# Patient Record
Sex: FEMALE | Race: WHITE | NOT HISPANIC OR LATINO | Employment: OTHER | URBAN - NONMETROPOLITAN AREA
[De-identification: names, ages, dates, MRNs, and addresses within clinical notes are randomized per-mention and may not be internally consistent; named-entity substitution may affect disease eponyms.]

---

## 2017-09-28 ENCOUNTER — TELEPHONE (OUTPATIENT)
Dept: FAMILY MEDICINE | Facility: CLINIC | Age: 67
End: 2017-09-28

## 2017-09-28 ENCOUNTER — OFFICE VISIT (OUTPATIENT)
Dept: FAMILY MEDICINE | Facility: CLINIC | Age: 67
End: 2017-09-28
Payer: MEDICARE

## 2017-09-28 VITALS
DIASTOLIC BLOOD PRESSURE: 54 MMHG | HEART RATE: 68 BPM | RESPIRATION RATE: 18 BRPM | WEIGHT: 191.6 LBS | TEMPERATURE: 98.2 F | SYSTOLIC BLOOD PRESSURE: 110 MMHG | BODY MASS INDEX: 32.71 KG/M2 | HEIGHT: 64 IN

## 2017-09-28 DIAGNOSIS — T63.441A LOCAL REACTION TO BEE STING, ACCIDENTAL OR UNINTENTIONAL, INITIAL ENCOUNTER: Primary | ICD-10-CM

## 2017-09-28 PROCEDURE — 99213 OFFICE O/P EST LOW 20 MIN: CPT | Performed by: NURSE PRACTITIONER

## 2017-09-28 RX ORDER — CEPHALEXIN 500 MG/1
500 CAPSULE ORAL 3 TIMES DAILY
Qty: 21 CAPSULE | Refills: 0 | Status: SHIPPED | OUTPATIENT
Start: 2017-09-28 | End: 2017-10-05

## 2017-09-28 NOTE — TELEPHONE ENCOUNTER
Pt called states yesterday afternoon she was stung by a bee. States over the night pt's left arm swelling. Redness and warm to touch. Appointment made for today. Eva Brady RN

## 2017-09-28 NOTE — NURSING NOTE
"Chief Complaint   Patient presents with     Insect Bites     /54 (BP Location: Right arm, Patient Position: Chair, Cuff Size: Adult Large)  Pulse 68  Temp 98.2  F (36.8  C)  Resp 18  Ht 5' 4\" (1.626 m)  Wt 191 lb 9.6 oz (86.9 kg)  BMI 32.89 kg/m2 Estimated body mass index is 32.89 kg/(m^2) as calculated from the following:    Height as of this encounter: 5' 4\" (1.626 m).    Weight as of this encounter: 191 lb 9.6 oz (86.9 kg).  bp completed using cuff size: large      Health Maintenance that is potentially due pending provider review:  NONE    Pt declines to have  Advanced directive, Mammogram and colonoscopy information    RUTH WinA  "

## 2017-09-28 NOTE — PROGRESS NOTES
SUBJECTIVE:   Ijeoma Shah is a 66 year old female who presents to clinic today for the following health issues:        Bee sting      Duration: Wednesday 9/27/ 3:30 pm    Description (location/character/radiation): left forearm, red, swollen, hot to the touch, itching    Intensity:  moderate    Accompanying signs and symptoms:  red, swollen,hot to the touch, itching    History (similar episodes/previous evaluation): has has a bee sting 6 years     Precipitating or alleviating factors: None    Therapies tried and outcome: benadryl , ibuprofen       Problem list and histories reviewed & adjusted, as indicated.  Additional history: as documented    Patient Active Problem List   Diagnosis     Dermatophytosis of foot     Viral warts     HYPERLIPIDEMIA LDL GOAL <160     Advanced directives, counseling/discussion     Bee sting reaction     Past Surgical History:   Procedure Laterality Date     COLONOSCOPY  6/29/04    colonoscopy to the cecum     SURGICAL HISTORY OF -       nose surgery     TUBAL LIGATION      bilateral tubal ligation.  BTL       Social History   Substance Use Topics     Smoking status: Former Smoker     Packs/day: 0.50     Years: 33.00     Types: Cigarettes     Quit date: 12/13/2014     Smokeless tobacco: Never Used      Comment: working on quitting     Alcohol use 1.0 - 1.5 oz/week     2 - 3 Standard drinks or equivalent per week      Comment: occ,social     Family History   Problem Relation Age of Onset     Eye Disorder Mother      cataracts     CANCER Father      CANCER Sister      bile duct     CANCER Sister      lung         Current Outpatient Prescriptions   Medication Sig Dispense Refill     ketoconazole (NIZORAL) 2 % cream Apply topically daily 60 g 1     clotrimazole (LOTRIMIN) 1 % cream Apply topically 2 times daily 45 g 1     EPINEPHrine (EPIPEN) 0.3 MG/0.3ML injection Inject 0.3 mLs into the muscle once as needed for anaphylaxis for 1 dose. 2 each 3     levofloxacin (LEVAQUIN) 500  "MG tablet Take 1 tablet (500 mg) by mouth daily (Patient not taking: Reported on 9/28/2017) 7 tablet 0     triamcinolone (KENALOG) 0.1 % ointment Apply  topically 2 times daily or as needed. (Patient not taking: Reported on 9/28/2017) 45 g 1     Allergies   Allergen Reactions     Bee Venom Hives     Hot and sweating      Labs reviewed in EPIC      Reviewed and updated as needed this visit by clinical staff     Reviewed and updated as needed this visit by Provider       ROS:  Constitutional, HEENT, cardiovascular, pulmonary, gi and gu systems are negative, except as otherwise noted.      OBJECTIVE:   /54 (BP Location: Right arm, Patient Position: Chair, Cuff Size: Adult Large)  Pulse 68  Temp 98.2  F (36.8  C)  Resp 18  Ht 5' 4\" (1.626 m)  Wt 191 lb 9.6 oz (86.9 kg)  BMI 32.89 kg/m2  Body mass index is 32.89 kg/(m^2).  GENERAL: healthy, alert and no distress  RESP: lungs clear to auscultation - no rales, rhonchi or wheezes  CV: regular rate and rhythm, normal S1 S2, no S3 or S4, no murmur, click or rub, no peripheral edema and peripheral pulses strong  SKIN: left arm, wrist and hand with significant swelling, erythema and warmth present, left anterior arm with central punctuate present  PSYCH: mentation appears normal, affect normal/bright    Diagnostic Test Results:  none     ASSESSMENT/PLAN:     1. Local reaction to bee sting, accidental or unintentional, initial encounter  Keflex sent to the pharmacy for symptoms.  Symptomatic care and follow up discussed.  - cephALEXin (KEFLEX) 500 MG capsule; Take 1 capsule (500 mg) by mouth 3 times daily for 7 days  Dispense: 21 capsule; Refill: 0    Home care instructions were reviewed with the patient. The risks, benefits and treatment options of prescribed medications or other treatments have been discussed with the patient. The patient verbalized their understanding and should call or follow up if no improvement or if they develop further problems.    Ijeoma" reminded-You are due for a physical, please schedule at your earliest convenience.      Patient Instructions     Keflex sent to the pharmacy  Follow up if symptoms do not improve or worsen.    Insect Sting: Local Reaction   You have been stung or bitten by an insect. The insect s venom or body fluid is causing your skin to react in the area where you were stung or bitten. This often causes redness, itching and swelling. This reaction will fade over a few hours, but it can last a few days. An insect bite or sting can become infected 1 to 3 days later, so watch for the signs below. Sometimes it is hard to tell the difference between a local reaction to the insect bite or sting and an early infection, so you may be given antibiotics.  Common insect stings causing problems are from wasps, bees, yellow jackets, and hornets. Common bites are from spiders, mosquitoes, fleas, or ticks. Other types of insects may be more common in different parts of the country or world.  Most people think of allergic reactions when someone has a rash or itchy skin. Symptoms can include:    Rash, hives, redness, welts, or blisters    Itching, burning, stinging, or pain    Swelling around the sting area.  Sometimes swelling spreads to other areas.  Home care  Medicines  The healthcare provider may prescribe medicines to relieve swelling, itching, and pain. Follow the provider s instructions when taking these medicines.    If you had a severe reaction, the provider may prescribe an epinephrine kit. Epinephrine will stop an allergic reaction from getting worse. Before you leave the hospital, be sure that you understand when and how to use this medicine.    Diphenhydramine is an oral antihistamine available at drugstores and groceries. Unless a prescription antihistamine was given, you can use this medicine to reduce itching if large areas of the skin are involved. The medicine may make you sleepy, so be careful using it in the daytime or when  going to school, working, or driving. Don t use diphenhydramine if you have glaucoma or if you are a man with trouble urinating because of an enlarged prostate. Other antihistamines cause less drowsiness and are good choices for daytime use. Ask your pharmacist for suggestions.    Don t use diphenhydramine cream on your skin. In some people it can cause additional reaction and make you allergic to this medicine.    Calamine lotion or oatmeal baths sometimes help with itching.    You may use acetaminophen or ibuprofen to control pain, unless another pain medicine was prescribed. Talk with your healthcare provider before using these medicines if you have chronic liver or kidney disease. Also talk with your provider if you ve had a stomach ulcer or GI bleeding.  General care      If itching is a problem, don t take hot showers or baths. Stay out of direct sunlight. These heat up your skin and will make the itching worse.    Use an ice pack to reduce local areas of redness and itching. You can make your own ice pack by putting ice cubes in a bag that seals and wrapping it in a thin towel. Don t put the ice directly on your skin, because it can damage the skin.    Try not to scratch any affected areas and damage the skin. This will help prevent an infection.    If oral antibiotics were prescribed, be sure to take them until finished.  Preventing future reactions    Future reactions could be worse than this one, so try to stay away from places where you might be stung again.    Be aware that honeybees nest in trees. Wasps and yellow jackets nest in the ground, trees, or roof eaves.    If you are stung by a honeybee, a stinger will remain in your skin. Wasps, yellow jackets, and hornets don t leave a stinger behind. Move away from the nest area right away. The stinger of a honeybee releases a substance that will attract other bees to you. Once you are away from the nest, then remove the stinger as quickly as  possible.    After any sting, you may apply ice and take diphenhydramine or another antihistamine. If you develop any of the warning signs below, seek help right away.    If you are at high risk for another sting, or if your reaction included dizziness, fainting, or trouble breathing or swallowing, ask your doctor for an insect allergy kit.    Remove any ticks on the skin with a set of fine tweezers.  the tick as close to the skin as possible. Pull back gently but firmly. Use an even, steady pressure. Don t jerk or twist. Don t squeeze, crush, or puncture the body of the tick. The bodily fluids may contain infection-causing germs. Don t use a smoldering match or cigarette, nail polish, petroleum jelly, liquid soap, or kerosene. These may irritate the tick. If any mouthparts of the tick remain in the skin, these should be left alone. They will fall off on their own. Trying to remove these parts may damage the skin unless they can be removed very easily. After the tick is removed, wash the bite area with rubbing alcohol, iodine, or soap and water.  Follow-up care  Follow up with your doctor in 2 days, or as advised, if your symptoms don t start to get better.  Call 911  Call 911 if any of these occur:    Trouble breathing or swallowing, or wheezing    New or worsening swelling in the mouth, throat, or tongue    Hoarse voice or trouble speaking    Confused    Very drowsy or trouble awakening    Fainting or loss of consciousness    Rapid heart rate    Low blood pressure    Feeling of doom    Nausea, vomiting, abdominal pain, or diarrhea    Vomiting blood, or large amounts of blood in stool    Seizure  When to seek medical advice  Call your healthcare provider right away if any of these occur:    Spreading areas of itching, redness or swelling    New or worse swelling in the face, eyelids, or  lips    Dizziness or weakness  Also call your provider right away if you have signs of infection:    Spreading  redness    Increased pain or swelling    Fever of 100.4 F (38 C) or higher, or as directed by your healthcare provider    Colored fluid draining from the sting area   Date Last Reviewed: 10/1/2016    9892-5910 [employer] [facility]. [address] [place] [address], [place] [address]. All rights reserved. This information is not intended as a substitute for professional medical care. Always follow your healthcare professional's instructions.            RUDY Cortes [facility]

## 2017-09-28 NOTE — TELEPHONE ENCOUNTER
Reason for call:  Patient reporting a symptom    Symptom or request: Pt was stung by a bee 9/27/17. During the night Left arm swelled. Pt is allergic to Bee's and took Benadryl a couple of times.    Have you been treated for this before? Yes    Phone Number patient can be reached at:  Home number on file [phone] (home)    Best Time:  Any Time      Can we leave a detailed message on this number:  YES    Call taken on 9/28/2017 at 8:31 AM by Svetlana Chen

## 2017-09-28 NOTE — MR AVS SNAPSHOT
After Visit Summary   9/28/2017    Ijeoma Shah    MRN: [id number]           Patient Information     Date Of Birth          1950        Visit Information        Provider Department      9/28/2017 11:00 AM Maria Dolores Godwin APRN [facility]        Today's Diagnoses     Local reaction to bee sting, accidental or unintentional, initial encounter    -  1      Care Instructions      Keflex sent to the pharmacy  Follow up if symptoms do not improve or worsen.    Insect Sting: Local Reaction   You have been stung or bitten by an insect. The insect s venom or body fluid is causing your skin to react in the area where you were stung or bitten. This often causes redness, itching and swelling. This reaction will fade over a few hours, but it can last a few days. An insect bite or sting can become infected 1 to 3 days later, so watch for the signs below. Sometimes it is hard to tell the difference between a local reaction to the insect bite or sting and an early infection, so you may be given antibiotics.  Common insect stings causing problems are from wasps, bees, yellow jackets, and hornets. Common bites are from spiders, mosquitoes, fleas, or ticks. Other types of insects may be more common in different parts of the country or world.  Most people think of allergic reactions when someone has a rash or itchy skin. Symptoms can include:    Rash, hives, redness, welts, or blisters    Itching, burning, stinging, or pain    Swelling around the sting area.  Sometimes swelling spreads to other areas.  Home care  Medicines  The healthcare provider may prescribe medicines to relieve swelling, itching, and pain. Follow the provider s instructions when taking these medicines.    If you had a severe reaction, the provider may prescribe an epinephrine kit. Epinephrine will stop an allergic reaction from getting worse. Before you leave the hospital, be sure that you understand when  and how to use this medicine.    Diphenhydramine is an oral antihistamine available at drugstores and groceries. Unless a prescription antihistamine was given, you can use this medicine to reduce itching if large areas of the skin are involved. The medicine may make you sleepy, so be careful using it in the daytime or when going to school, working, or driving. Don t use diphenhydramine if you have glaucoma or if you are a man with trouble urinating because of an enlarged prostate. Other antihistamines cause less drowsiness and are good choices for daytime use. Ask your pharmacist for suggestions.    Don t use diphenhydramine cream on your skin. In some people it can cause additional reaction and make you allergic to this medicine.    Calamine lotion or oatmeal baths sometimes help with itching.    You may use acetaminophen or ibuprofen to control pain, unless another pain medicine was prescribed. Talk with your healthcare provider before using these medicines if you have chronic liver or kidney disease. Also talk with your provider if you ve had a stomach ulcer or GI bleeding.  General care      If itching is a problem, don t take hot showers or baths. Stay out of direct sunlight. These heat up your skin and will make the itching worse.    Use an ice pack to reduce local areas of redness and itching. You can make your own ice pack by putting ice cubes in a bag that seals and wrapping it in a thin towel. Don t put the ice directly on your skin, because it can damage the skin.    Try not to scratch any affected areas and damage the skin. This will help prevent an infection.    If oral antibiotics were prescribed, be sure to take them until finished.  Preventing future reactions    Future reactions could be worse than this one, so try to stay away from places where you might be stung again.    Be aware that honeybees nest in trees. Wasps and yellow jackets nest in the ground, trees, or roof eaves.    If you are stung  by a honeybee, a stinger will remain in your skin. Wasps, yellow jackets, and hornets don t leave a stinger behind. Move away from the nest area right away. The stinger of a honeybee releases a substance that will attract other bees to you. Once you are away from the nest, then remove the stinger as quickly as possible.    After any sting, you may apply ice and take diphenhydramine or another antihistamine. If you develop any of the warning signs below, seek help right away.    If you are at high risk for another sting, or if your reaction included dizziness, fainting, or trouble breathing or swallowing, ask your doctor for an insect allergy kit.    Remove any ticks on the skin with a set of fine tweezers.  the tick as close to the skin as possible. Pull back gently but firmly. Use an even, steady pressure. Don t jerk or twist. Don t squeeze, crush, or puncture the body of the tick. The bodily fluids may contain infection-causing germs. Don t use a smoldering match or cigarette, nail polish, petroleum jelly, liquid soap, or kerosene. These may irritate the tick. If any mouthparts of the tick remain in the skin, these should be left alone. They will fall off on their own. Trying to remove these parts may damage the skin unless they can be removed very easily. After the tick is removed, wash the bite area with rubbing alcohol, iodine, or soap and water.  Follow-up care  Follow up with your doctor in 2 days, or as advised, if your symptoms don t start to get better.  Call 911  Call 911 if any of these occur:    Trouble breathing or swallowing, or wheezing    New or worsening swelling in the mouth, throat, or tongue    Hoarse voice or trouble speaking    Confused    Very drowsy or trouble awakening    Fainting or loss of consciousness    Rapid heart rate    Low blood pressure    Feeling of doom    Nausea, vomiting, abdominal pain, or diarrhea    Vomiting blood, or large amounts of blood in stool    Seizure  When to  seek medical advice  Call your healthcare provider right away if any of these occur:    Spreading areas of itching, redness or swelling    New or worse swelling in the face, eyelids, or  lips    Dizziness or weakness  Also call your provider right away if you have signs of infection:    Spreading redness    Increased pain or swelling    Fever of 100.4 F (38 C) or higher, or as directed by your healthcare provider    Colored fluid draining from the sting area   Date Last Reviewed: 10/1/2016    6778-9315 [employer]. [address] [place] [address]. All rights reserved. This information is not intended as a substitute for professional medical care. Always follow your healthcare professional's instructions.                Follow-ups after your visit        Who to contact     If you have questions or need follow up information about today's clinic visit or your schedule please contact [facility] directly at [phone].  Normal or non-critical lab and imaging results will be communicated to you by MyChart, letter or phone within 4 business days after the clinic has received the results. If you do not hear from us within 7 days, please contact the clinic through [email or web address] or phone. If you have a critical or abnormal lab result, we will notify you by phone as soon as possible.  Submit refill requests through [email or web address] or call your pharmacy and they will forward the refill request to us. Please allow 3 business days for your refill to be completed.          Additional Information About Your Visit        MyChart Information     [email or web address] gives you secure access to your electronic health record. If you see a primary care provider, you can also send messages to your care team and make appointments. If you have questions, please call your primary care clinic.  If you do not have a primary care provider, please call [phone] and they will assist you.        Care EveryWhere ID     This  "is your Care EveryWhere ID. This could be used by other organizations to access your [facility] medical records  [id number]        Your Vitals Were     Pulse Temperature Respirations Height BMI (Body Mass Index)       68 98.2  F (36.8  C) 18 5' 4\" (1.626 m) 32.89 kg/m2        Blood Pressure from Last 3 Encounters:   09/28/17 110/54   07/13/15 122/80   12/02/13 161/80    Weight from Last 3 Encounters:   09/28/17 191 lb 9.6 oz (86.9 kg)   07/13/15 189 lb (85.7 kg)   12/02/13 200 lb (90.7 kg)              Today, you had the following     No orders found for display         Today's Medication Changes          These changes are accurate as of: 9/28/17 11:21 AM.  If you have any questions, ask your nurse or doctor.               Start taking these medicines.        Dose/Directions    cephALEXin 500 MG capsule   Commonly known as:  KEFLEX   Used for:  Local reaction to bee sting, accidental or unintentional, initial encounter   Started by:  Maria Dolores Godwin APRN CNP        Dose:  500 mg   Take 1 capsule (500 mg) by mouth 3 times daily for 7 days   Quantity:  21 capsule   Refills:  0            Where to get your medicines      These medications were sent to [id number] [address] [address]    Hours:  Closed 10-16-08 business to [facility] Phone:  [phone]     cephALEXin 500 MG capsule                Primary Care Provider Office Phone # Fax #    Jyoti Griffiths [phone]       [address] [place] [address]        Equal Access to Services     BILL CUNNINGHAM AH: Hadtonya Fajardo, laureen snyder, qaybakhil lew. So [facility] [phone].    ATENCIÓN: Si habla español, tiene a soto disposición servicios gratuitos de asistencia lingüística. Llame al [phone].    We comply with applicable federal civil rights laws and [place] laws. We do not discriminate on the " basis of race, color, national origin, age, disability sex, sexual orientation or gender identity.            Thank you!     Thank you for choosing [facility]  for your care. Our goal is always to provide you with excellent care. Hearing back from our patients is one way we can continue to improve our services. Please take a few minutes to complete the written survey that you may receive in the mail after your visit with us. Thank you!             Your Updated Medication List - Protect others around you: Learn how to safely use, store and throw away your medicines at www.disposemymeds.org.          This list is accurate as of: 9/28/17 11:21 AM.  Always use your most recent med list.                   Brand Name Dispense Instructions for use Diagnosis    cephALEXin 500 MG capsule    KEFLEX    21 capsule    Take 1 capsule (500 mg) by mouth 3 times daily for 7 days    Local reaction to bee sting, accidental or unintentional, initial encounter       clotrimazole 1 % cream    LOTRIMIN    45 g    Apply topically 2 times daily    Tinea pedis of both feet, Tinea cruris       EPINEPHrine 0.3 MG/0.3ML injection    EPIPEN    2 each    Inject 0.3 mLs into the muscle once as needed for anaphylaxis for 1 dose.    Bee sting reaction       ketoconazole 2 % cream    NIZORAL    60 g    Apply topically daily    Tinea pedis of both feet       levofloxacin 500 MG tablet    LEVAQUIN    7 tablet    Take 1 tablet (500 mg) by mouth daily    URI (upper respiratory infection)       triamcinolone 0.1 % ointment    KENALOG    45 g    Apply  topically 2 times daily or as needed.    Dermatophytosis of foot

## 2017-09-28 NOTE — PATIENT INSTRUCTIONS
Keflex sent to the pharmacy  Follow up if symptoms do not improve or worsen.    Insect Sting: Local Reaction   You have been stung or bitten by an insect. The insect s venom or body fluid is causing your skin to react in the area where you were stung or bitten. This often causes redness, itching and swelling. This reaction will fade over a few hours, but it can last a few days. An insect bite or sting can become infected 1 to 3 days later, so watch for the signs below. Sometimes it is hard to tell the difference between a local reaction to the insect bite or sting and an early infection, so you may be given antibiotics.  Common insect stings causing problems are from wasps, bees, yellow jackets, and hornets. Common bites are from spiders, mosquitoes, fleas, or ticks. Other types of insects may be more common in different parts of the country or world.  Most people think of allergic reactions when someone has a rash or itchy skin. Symptoms can include:    Rash, hives, redness, welts, or blisters    Itching, burning, stinging, or pain    Swelling around the sting area.  Sometimes swelling spreads to other areas.  Home care  Medicines  The healthcare provider may prescribe medicines to relieve swelling, itching, and pain. Follow the provider s instructions when taking these medicines.    If you had a severe reaction, the provider may prescribe an epinephrine kit. Epinephrine will stop an allergic reaction from getting worse. Before you leave the hospital, be sure that you understand when and how to use this medicine.    Diphenhydramine is an oral antihistamine available at drugstores and groceries. Unless a prescription antihistamine was given, you can use this medicine to reduce itching if large areas of the skin are involved. The medicine may make you sleepy, so be careful using it in the daytime or when going to school, working, or driving. Don t use diphenhydramine if you have glaucoma or if you are a man with  trouble urinating because of an enlarged prostate. Other antihistamines cause less drowsiness and are good choices for daytime use. Ask your pharmacist for suggestions.    Don t use diphenhydramine cream on your skin. In some people it can cause additional reaction and make you allergic to this medicine.    Calamine lotion or oatmeal baths sometimes help with itching.    You may use acetaminophen or ibuprofen to control pain, unless another pain medicine was prescribed. Talk with your healthcare provider before using these medicines if you have chronic liver or kidney disease. Also talk with your provider if you ve had a stomach ulcer or GI bleeding.  General care      If itching is a problem, don t take hot showers or baths. Stay out of direct sunlight. These heat up your skin and will make the itching worse.    Use an ice pack to reduce local areas of redness and itching. You can make your own ice pack by putting ice cubes in a bag that seals and wrapping it in a thin towel. Don t put the ice directly on your skin, because it can damage the skin.    Try not to scratch any affected areas and damage the skin. This will help prevent an infection.    If oral antibiotics were prescribed, be sure to take them until finished.  Preventing future reactions    Future reactions could be worse than this one, so try to stay away from places where you might be stung again.    Be aware that honeybees nest in trees. Wasps and yellow jackets nest in the ground, trees, or roof eaves.    If you are stung by a honeybee, a stinger will remain in your skin. Wasps, yellow jackets, and hornets don t leave a stinger behind. Move away from the nest area right away. The stinger of a honeybee releases a substance that will attract other bees to you. Once you are away from the nest, then remove the stinger as quickly as possible.    After any sting, you may apply ice and take diphenhydramine or another antihistamine. If you develop any of the  warning signs below, seek help right away.    If you are at high risk for another sting, or if your reaction included dizziness, fainting, or trouble breathing or swallowing, ask your doctor for an insect allergy kit.    Remove any ticks on the skin with a set of fine tweezers.  the tick as close to the skin as possible. Pull back gently but firmly. Use an even, steady pressure. Don t jerk or twist. Don t squeeze, crush, or puncture the body of the tick. The bodily fluids may contain infection-causing germs. Don t use a smoldering match or cigarette, nail polish, petroleum jelly, liquid soap, or kerosene. These may irritate the tick. If any mouthparts of the tick remain in the skin, these should be left alone. They will fall off on their own. Trying to remove these parts may damage the skin unless they can be removed very easily. After the tick is removed, wash the bite area with rubbing alcohol, iodine, or soap and water.  Follow-up care  Follow up with your doctor in 2 days, or as advised, if your symptoms don t start to get better.  Call 911  Call 911 if any of these occur:    Trouble breathing or swallowing, or wheezing    New or worsening swelling in the mouth, throat, or tongue    Hoarse voice or trouble speaking    Confused    Very drowsy or trouble awakening    Fainting or loss of consciousness    Rapid heart rate    Low blood pressure    Feeling of doom    Nausea, vomiting, abdominal pain, or diarrhea    Vomiting blood, or large amounts of blood in stool    Seizure  When to seek medical advice  Call your healthcare provider right away if any of these occur:    Spreading areas of itching, redness or swelling    New or worse swelling in the face, eyelids, or  lips    Dizziness or weakness  Also call your provider right away if you have signs of infection:    Spreading redness    Increased pain or swelling    Fever of 100.4 F (38 C) or higher, or as directed by your healthcare provider    Colored fluid  draining from the sting area   Date Last Reviewed: 10/1/2016    6496-1769 [employer]. [address] [place] [address], [place] [address]. All rights reserved. This information is not intended as a substitute for professional medical care. Always follow your healthcare professional's instructions.

## 2018-02-16 ENCOUNTER — OFFICE VISIT (OUTPATIENT)
Dept: FAMILY MEDICINE | Facility: CLINIC | Age: 68
End: 2018-02-16
Payer: COMMERCIAL

## 2018-02-16 VITALS
BODY MASS INDEX: 32.44 KG/M2 | TEMPERATURE: 98.7 F | DIASTOLIC BLOOD PRESSURE: 72 MMHG | OXYGEN SATURATION: 98 % | HEIGHT: 64 IN | WEIGHT: 190 LBS | HEART RATE: 91 BPM | SYSTOLIC BLOOD PRESSURE: 110 MMHG | RESPIRATION RATE: 16 BRPM

## 2018-02-16 DIAGNOSIS — Z23 NEED FOR PROPHYLACTIC VACCINATION AND INOCULATION AGAINST INFLUENZA: ICD-10-CM

## 2018-02-16 DIAGNOSIS — B35.3 TINEA PEDIS OF BOTH FEET: ICD-10-CM

## 2018-02-16 DIAGNOSIS — N81.4 UTERINE PROLAPSE: ICD-10-CM

## 2018-02-16 DIAGNOSIS — E78.5 HYPERLIPIDEMIA LDL GOAL <160: ICD-10-CM

## 2018-02-16 DIAGNOSIS — Z00.00 MEDICARE ANNUAL WELLNESS VISIT, SUBSEQUENT: Primary | ICD-10-CM

## 2018-02-16 DIAGNOSIS — T63.441A BEE STING REACTION, ACCIDENTAL OR UNINTENTIONAL, INITIAL ENCOUNTER: ICD-10-CM

## 2018-02-16 DIAGNOSIS — L98.9 SKIN LESION: ICD-10-CM

## 2018-02-16 DIAGNOSIS — Z12.11 SPECIAL SCREENING FOR MALIGNANT NEOPLASMS, COLON: ICD-10-CM

## 2018-02-16 DIAGNOSIS — N95.0 POST-MENOPAUSAL BLEEDING: ICD-10-CM

## 2018-02-16 PROCEDURE — G0009 ADMIN PNEUMOCOCCAL VACCINE: HCPCS | Performed by: FAMILY MEDICINE

## 2018-02-16 PROCEDURE — G0438 PPPS, INITIAL VISIT: HCPCS | Performed by: FAMILY MEDICINE

## 2018-02-16 PROCEDURE — G0008 ADMIN INFLUENZA VIRUS VAC: HCPCS | Performed by: FAMILY MEDICINE

## 2018-02-16 PROCEDURE — 90662 IIV NO PRSV INCREASED AG IM: CPT | Performed by: FAMILY MEDICINE

## 2018-02-16 PROCEDURE — 99213 OFFICE O/P EST LOW 20 MIN: CPT | Mod: 25 | Performed by: FAMILY MEDICINE

## 2018-02-16 PROCEDURE — 90732 PPSV23 VACC 2 YRS+ SUBQ/IM: CPT | Performed by: FAMILY MEDICINE

## 2018-02-16 RX ORDER — EPINEPHRINE 0.3 MG/.3ML
INJECTION SUBCUTANEOUS
Qty: 0.6 ML | Status: CANCELLED | OUTPATIENT
Start: 2018-02-16

## 2018-02-16 RX ORDER — KETOCONAZOLE 20 MG/G
CREAM TOPICAL DAILY
Qty: 60 G | Refills: 1 | Status: SHIPPED | OUTPATIENT
Start: 2018-02-16 | End: 2020-08-10

## 2018-02-16 RX ORDER — EPINEPHRINE 0.3 MG/.3ML
0.3 INJECTION SUBCUTANEOUS PRN
Qty: 0.6 ML | Refills: 1 | Status: SHIPPED | OUTPATIENT
Start: 2018-02-16 | End: 2020-11-23

## 2018-02-16 NOTE — NURSING NOTE
"Chief Complaint   Patient presents with     Physical     yearly     Flu Shot       Initial /72 (BP Location: Right arm)  Pulse 91  Temp 98.7  F (37.1  C) (Tympanic)  Resp 16  Ht 5' 3.5\" (1.613 m)  Wt 190 lb (86.2 kg)  SpO2 98%  BMI 33.13 kg/m2 Estimated body mass index is 33.13 kg/(m^2) as calculated from the following:    Height as of this encounter: 5' 3.5\" (1.613 m).    Weight as of this encounter: 190 lb (86.2 kg).      Health Maintenance that is potentially due pending provider review:  Mammogram and Colonoscopy/FIT    Gave pt phone number/pended order to schedule mammo and/or colonoscopy(or FIT)    Is there anyone who you would like to be able to receive your results? No  If yes have patient fill out HARPAL    "

## 2018-02-16 NOTE — PATIENT INSTRUCTIONS
Get the pelvic ultrasound done    Switch the detergent.  Take benadryl as you have been doing.   Hives persist, let me know.       Preventive Health Recommendations  Female Ages 65 +    Yearly exam:     See your health care provider every year in order to  o Review health changes.   o Discuss preventive care.    o Review your medicines if your doctor has prescribed any.      You no longer need a yearly Pap test unless you've had an abnormal Pap test in the past 10 years. If you have vaginal symptoms, such as bleeding or discharge, be sure to talk with your provider about a Pap test.      Every 1 to 2 years, have a mammogram.  If you are over 69, talk with your health care provider about whether or not you want to continue having screening mammograms.      Every 10 years, have a colonoscopy. Or, have a yearly FIT test (stool test). These exams will check for colon cancer.       Have a cholesterol test every 5 years, or more often if your doctor advises it.       Have a diabetes test (fasting glucose) every three years. If you are at risk for diabetes, you should have this test more often.       At age 65, have a bone density scan (DEXA) to check for osteoporosis (brittle bone disease).    Shots:    Get a flu shot each year.    Get a tetanus shot every 10 years.    Talk to your doctor about your pneumonia vaccines. There are now two you should receive - Pneumovax (PPSV 23) and Prevnar (PCV 13).    Talk to your doctor about the shingles vaccine.    Talk to your doctor about the hepatitis B vaccine.    Nutrition:     Eat at least 5 servings of fruits and vegetables each day.      Eat whole-grain bread, whole-wheat pasta and brown rice instead of white grains and rice.      Talk to your provider about Calcium and Vitamin D.     Lifestyle    Exercise at least 150 minutes a week (30 minutes a day, 5 days a week). This will help you control your weight and prevent disease.      Limit alcohol to one drink per day.      No  smoking.       Wear sunscreen to prevent skin cancer.       See your dentist twice a year for an exam and cleaning.      See your eye doctor every 1 to 2 years to screen for conditions such as glaucoma, macular degeneration, cataracts, etc

## 2018-02-16 NOTE — LETTER
[facility]  [address] [facility] [address]  [phone]        February 21, 2019    Ijeoma Shah  [address] [facility] [address]              Dear Ijeoma Shah    This is to remind you that your provider wanted you to return to the clinic for fasting lab test(s),    please fast for 10-12 hours. Morning medications can be taken with water.    You may call our office at [facility] at [phone] to schedule an appointment.    Please disregard this notice if you have already had your labs drawn or made an appointment.          Sincerely,        Jyoti Griffiths MD/ keren

## 2018-02-16 NOTE — PROGRESS NOTES

## 2018-02-16 NOTE — PROGRESS NOTES
SUBJECTIVE:   Ijeoma Shah is a 67 year old female who presents for Preventive Visit.      Are you in the first 12 months of your Medicare Part B coverage?  No    Healthy Habits:    Do you get at least three servings of calcium containing foods daily (dairy, green leafy vegetables, etc.)? yes    Amount of exercise or daily activities, outside of work: 2 day(s) per week    Problems taking medications regularly No    Medication side effects: No    Have you had an eye exam in the past two years? no    Do you see a dentist twice per year? no    Do you have sleep apnea, excessive snoring or daytime drowsiness?no      Ability to successfully perform activities of daily living: Yes, no assistance needed    Home safety:  none identified     Hearing impairment: No    Fall risk:  Fallen 2 or more times in the past year?: No  Any fall with injury in the past year?: No        COGNITIVE SCREEN  1) Repeat 3 items (Banana, Sunrise, Chair)    2) Clock draw: NORMAL  3) 3 item recall: Recalls 2 objects   Results: NORMAL clock, 1-2 items recalled: COGNITIVE IMPAIRMENT LESS LIKELY    Mini-CogTM Copyright S Nixon. Licensed by the author for use in [facility]; reprinted with permission (mayela@[facility]). All rights reserved.        Hives-has broken out in hives three times in the last 2 months.   Starts under her breasts. She isn't sure why. Can be when sitting on couch.   Did change laundry soaps recently.   Took 2 benadryl and that resolves it.     Vaginal bleeding-happens once every 3 months for the last 2 year, notices on towels. She does have uterine prolapse .    Reviewed and updated as needed this visit by clinical staff  Tobacco  Allergies  Meds  Problems  Med Hx  Surg Hx  Fam Hx  Soc Hx          Reviewed and updated as needed this visit by Provider  Allergies  Meds  Problems        Social History   Substance Use Topics     Smoking status: Former Smoker     Packs/day: 0.50     Years: 33.00      Types: Cigarettes     Quit date: 12/13/2014     Smokeless tobacco: Never Used      Comment: working on quitting     Alcohol use 1.0 - 1.5 oz/week     2 - 3 Standard drinks or equivalent per week      Comment: occ,social       If you drink alcohol do you typically have >3 drinks per day or >7 drinks per week? No                        Today's PHQ-2 Score:   PHQ-2 ( 1999 Pfizer) 2/16/2018 7/13/2015   Q1: Little interest or pleasure in doing things 0 0   Q2: Feeling down, depressed or hopeless 0 0   PHQ-2 Score 0 0     Recent Labs   Lab Test  07/13/15   1045  03/09/10   1220   CHOL  261*  286*   HDL  67  63   LDL  166*  192*   TRIG  141  156*   CHOLHDLRATIO  3.9  5.0        Do you feel safe in your environment - Yes    Do you have a Health Care Directive?: No: Advance care planning was reviewed with patient; patient declined at this time.    Current providers sharing in care for this patient include:   Patient Care Team:  Jyoti Griffiths MD as PCP - General    The following health maintenance items are reviewed in Epic and correct as of today:  Health Maintenance   Topic Date Due     HEPATITIS C SCREENING  10/22/1968     COLON CANCER SCREEN (SYSTEM ASSIGNED)  06/29/2014     ADVANCE DIRECTIVE PLANNING Q5 YRS  04/06/2016     DEXA SCAN SCREENING (SYSTEM ASSIGNED)  10/22/2016     MAMMO SCREEN Q2 YR (SYSTEM ASSIGNED)  07/13/2017     FALL RISK ASSESSMENT  02/16/2019     LIPID SCREEN Q5 YR FEMALE (SYSTEM ASSIGNED)  07/13/2020     TETANUS IMMUNIZATION (SYSTEM ASSIGNED)  08/28/2022     INFLUENZA VACCINE (SYSTEM ASSIGNED)  Completed     PNEUMOCOCCAL  Completed     BP Readings from Last 3 Encounters:   02/16/18 110/72   09/28/17 110/54   07/13/15 122/80    Wt Readings from Last 3 Encounters:   02/16/18 190 lb (86.2 kg)   09/28/17 191 lb 9.6 oz (86.9 kg)   07/13/15 189 lb (85.7 kg)                    Pneumonia Vaccine:Adults age 65+ who received Pneumovax (PPSV23) at 65 years or older: Should be given PCV13 > 1 year after  "their most recent PPSV23  Mammogram Screening: Patient over age 50, mutual decision to screen reflected in health maintenance.    ROS:  Constitutional, HEENT, cardiovascular, pulmonary, gi and gu systems are negative, except as otherwise noted.    OBJECTIVE:   /72 (BP Location: Right arm)  Pulse 91  Temp 98.7  F (37.1  C) (Tympanic)  Resp 16  Ht 5' 3.5\" (1.613 m)  Wt 190 lb (86.2 kg)  SpO2 98%  BMI 33.13 kg/m2 Estimated body mass index is 33.13 kg/(m^2) as calculated from the following:    Height as of this encounter: 5' 3.5\" (1.613 m).    Weight as of this encounter: 190 lb (86.2 kg).  EXAM:   GENERAL APPEARANCE: healthy, alert and no distress  EYES: Eyes grossly normal to inspection, PERRL and conjunctivae and sclerae normal  HENT: ear canals and TM's normal, nose and mouth without ulcers or lesions, oropharynx clear and oral mucous membranes moist  NECK: no adenopathy, no asymmetry, masses, or scars and thyroid normal to palpation  RESP: lungs clear to auscultation - no rales, rhonchi or wheezes  BREAST: normal without masses, tenderness or nipple discharge and no palpable axillary masses or adenopathy  CV: regular rate and rhythm, normal S1 S2, no S3 or S4, no murmur, click or rub, no peripheral edema and peripheral pulses strong  ABDOMEN: soft, nontender, no hepatosplenomegaly, no masses and bowel sounds normal  MS: no musculoskeletal defects are noted and gait is age appropriate without ataxia  SKIN: no suspicious lesions or rashes  NEURO: Normal strength and tone, sensory exam grossly normal, mentation intact and speech normal  PSYCH: mentation appears normal and affect normal/bright  : uterine prolapse noted, cervix is at introitus and protrudes with valsalva  Skin: 6 mm hyperpigmented inhomogeneous lesion on nose    ASSESSMENT / PLAN:   Ijeoma was seen today for physical and flu shot.    Diagnoses and all orders for this visit:    Medicare annual wellness visit, subsequent  -     " "Pneumococcal vaccine 23 valent PPSV23  (Pneumovax) [97889]  -     ADMIN MEDICARE: Pneumococcal Vaccine ()  -     Glucose; Future    Post-menopausal bleeding  -     US Pelvic Complete w Transvaginal; Future  -     OFFICE/OUTPT VISIT,EST,LEVL III    Hyperlipidemia LDL goal <160  -     Lipid panel reflex to direct LDL Fasting; Future  -     OFFICE/OUTPT VISIT,EST,LEVL III    Uterine prolapse  -     OFFICE/OUTPT VISIT,EST,LEVL III    Skin lesion  -     DERMATOLOGY REFERRAL    Bee sting reaction, accidental or unintentional, initial encounter  -     EPINEPHrine (EPIPEN/ADRENACLICK/OR ANY BX GENERIC EQUIV) 0.3 MG/0.3ML injection 2-pack; Inject 0.3 mLs (0.3 mg) into the muscle as needed  -     OFFICE/OUTPT VISIT,EST,LEVL III    Need for prophylactic vaccination and inoculation against influenza  -     FLU VACCINE, INCREASED ANTIGEN, PRESV FREE, AGE 65+ [78098]  -     ADMIN INFLUENZA (For MEDICARE Patients ONLY) []    Special screening for malignant neoplasms, colon  -     Fecal colorectal cancer screen (FIT); Future    Tinea pedis of both feet  -     ketoconazole (NIZORAL) 2 % cream; Apply topically daily    Other orders  -     Cancel: EPINEPHrine (EPIPEN/ADRENACLICK/OR ANY BX GENERIC EQUIV) 0.3 MG/0.3ML injection 2-pack;     will check a pelvic ultrasound for endometrial thickness  I had given her a referral to ob/gyn in past, would recommend she see them for pessary or surgical fix    End of Life Planning:  Patient currently has an advanced directive: Yes.  Practitioner is supportive of decision.    COUNSELING:  Reviewed preventive health counseling, as reflected in patient instructions       Regular exercise       Healthy diet/nutrition       Vision screening       Hearing screening       Dental care        Estimated body mass index is 33.13 kg/(m^2) as calculated from the following:    Height as of this encounter: 5' 3.5\" (1.613 m).    Weight as of this encounter: 190 lb (86.2 kg).  Weight management plan: " Discussed healthy diet and exercise guidelines and patient will follow up in 12 months in clinic to re-evaluate.     reports that she quit smoking about 3 years ago. Her smoking use included Cigarettes. She has a 16.50 pack-year smoking history. She has never used smokeless tobacco.      Appropriate preventive services were discussed with this patient, including applicable screening as appropriate for cardiovascular disease, diabetes, osteopenia/osteoporosis, and glaucoma.  As appropriate for age/gender, discussed screening for colorectal cancer, prostate cancer, breast cancer, and cervical cancer. Checklist reviewing preventive services available has been given to the patient.    Reviewed patients plan of care and provided an AVS. The Basic Care Plan (routine screening as documented in Health Maintenance) for Ijeoma meets the Care Plan requirement. This Care Plan has been established and reviewed with the Patient.    Counseling Resources:  ATP IV Guidelines  Pooled Cohorts Equation Calculator  Breast Cancer Risk Calculator  FRAX Risk Assessment  ICSI Preventive Guidelines  Dietary Guidelines for Americans, 2010  USDA's MyPlate  ASA Prophylaxis  Lung CA Screening    Jyoti Griffiths MD  [facility]

## 2018-02-16 NOTE — MR AVS SNAPSHOT
After Visit Summary   2/16/2018    Ijeoma Shah    MRN: [id number]           Patient Information     Date Of Birth          1950        Visit Information        Provider Department      2/16/2018 1:40 PM Jyoti Griffiths MD [facility]        Today's Diagnoses     Medicare annual wellness visit, subsequent    -  1    Tinea pedis of both feet        Post-menopausal bleeding        Skin lesion        Bee sting reaction, accidental or unintentional, initial encounter        Need for prophylactic vaccination and inoculation against influenza        Special screening for malignant neoplasms, colon        Hyperlipidemia LDL goal <160          Care Instructions    Get the pelvic ultrasound done    Switch the detergent.  Take benadryl as you have been doing.   Hives persist, let me know.       Preventive Health Recommendations  Female Ages 65 +    Yearly exam:     See your health care provider every year in order to  o Review health changes.   o Discuss preventive care.    o Review your medicines if your doctor has prescribed any.      You no longer need a yearly Pap test unless you've had an abnormal Pap test in the past 10 years. If you have vaginal symptoms, such as bleeding or discharge, be sure to talk with your provider about a Pap test.      Every 1 to 2 years, have a mammogram.  If you are over 69, talk with your health care provider about whether or not you want to continue having screening mammograms.      Every 10 years, have a colonoscopy. Or, have a yearly FIT test (stool test). These exams will check for colon cancer.       Have a cholesterol test every 5 years, or more often if your doctor advises it.       Have a diabetes test (fasting glucose) every three years. If you are at risk for diabetes, you should have this test more often.       At age 65, have a bone density scan (DEXA) to check for osteoporosis (brittle bone disease).    Shots:    Get a flu shot  each year.    Get a tetanus shot every 10 years.    Talk to your doctor about your pneumonia vaccines. There are now two you should receive - Pneumovax (PPSV 23) and Prevnar (PCV 13).    Talk to your doctor about the shingles vaccine.    Talk to your doctor about the hepatitis B vaccine.    Nutrition:     Eat at least 5 servings of fruits and vegetables each day.      Eat whole-grain bread, whole-wheat pasta and brown rice instead of white grains and rice.      Talk to your provider about Calcium and Vitamin D.     Lifestyle    Exercise at least 150 minutes a week (30 minutes a day, 5 days a week). This will help you control your weight and prevent disease.      Limit alcohol to one drink per day.      No smoking.       Wear sunscreen to prevent skin cancer.       See your dentist twice a year for an exam and cleaning.      See your eye doctor every 1 to 2 years to screen for conditions such as glaucoma, macular degeneration, cataracts, etc           Follow-ups after your visit        Additional Services     DERMATOLOGY REFERRAL       Your provider has referred you to: [facility]: [facility] [phone]   [email or web address]    Please be aware that coverage of these services is subject to the terms and limitations of your health insurance plan.  Call member services at your health plan with any benefit or coverage questions.      Please bring the following with you to your appointment:    (1) Any X-Rays, CTs or MRIs which have been performed.  Contact the facility where they were done to arrange for  prior to your scheduled appointment.    (2) List of current medications  (3) This referral request   (4) Any documents/labs given to you for this referral                  Future tests that were ordered for you today     Open Future Orders        Priority Expected Expires Ordered    Lipid panel reflex to direct LDL Fasting Routine  2/15/2019 2/16/2018    Glucose  "Routine  2/16/2019 2/16/2018    US Pelvic Complete w Transvaginal Routine  2/16/2019 2/16/2018    Fecal colorectal cancer screen (FIT) Routine 3/9/2018 5/11/2018 2/16/2018            Who to contact     If you have questions or need follow up information about today's clinic visit or your schedule please contact [facility] directly at [phone].  Normal or non-critical lab and imaging results will be communicated to you by [employer], letter or phone within 4 business days after the clinic has received the results. If you do not hear from us within 7 days, please contact the clinic through [employer] or phone. If you have a critical or abnormal lab result, we will notify you by phone as soon as possible.  Submit refill requests through [employer] or call your pharmacy and they will forward the refill request to us. Please allow 3 business days for your refill to be completed.          Additional Information About Your Visit        [employer] Information     [employer] gives you secure access to your electronic health record. If you see a primary care provider, you can also send messages to your care team and make appointments. If you have questions, please call your primary care clinic.  If you do not have a primary care provider, please call [phone] and they will assist you.        Care EveryWhere ID     This is your Care EveryWhere ID. This could be used by other organizations to access your [facility] medical records  [id number]        Your Vitals Were     Pulse Temperature Respirations Height Pulse Oximetry BMI (Body Mass Index)    91 98.7  F (37.1  C) (Tympanic) 16 5' 3.5\" (1.613 m) 98% 33.13 kg/m2       Blood Pressure from Last 3 Encounters:   02/16/18 110/72   09/28/17 110/54   07/13/15 122/80    Weight from Last 3 Encounters:   02/16/18 190 lb (86.2 kg)   09/28/17 191 lb 9.6 oz (86.9 kg)   07/13/15 189 lb (85.7 kg)              We Performed the Following     ADMIN INFLUENZA (For MEDICARE Patients " ONLY) []     ADMIN MEDICARE: Pneumococcal Vaccine ()     DERMATOLOGY REFERRAL     FLU VACCINE, INCREASED ANTIGEN, PRESV FREE, AGE 65+ [54708]     Pneumococcal vaccine 23 valent PPSV23  (Pneumovax) [07735]          Today's Medication Changes          These changes are accurate as of 2/16/18  2:35 PM.  If you have any questions, ask your nurse or doctor.               Start taking these medicines.        Dose/Directions    EPINEPHrine 0.3 MG/0.3ML injection 2-pack   Commonly known as:  EPIPEN/ADRENACLICK/or ANY BX GENERIC EQUIV   Used for:  Bee sting reaction, accidental or unintentional, initial encounter   Replaces:  EPINEPHrine 0.3 MG/0.3ML injection   Started by:  Jyoti Griffiths MD        Dose:  0.3 mg   Inject 0.3 mLs (0.3 mg) into the muscle as needed   Quantity:  0.6 mL   Refills:  1         Stop taking these medicines if you haven't already. Please contact your care team if you have questions.     EPINEPHrine 0.3 MG/0.3ML injection   Commonly known as:  EPIPEN   Replaced by:  EPINEPHrine 0.3 MG/0.3ML injection 2-pack   Stopped by:  Jyoti Griffiths MD           levofloxacin 500 MG tablet   Commonly known as:  LEVAQUIN   Stopped by:  Jyoti Griffiths MD                Where to get your medicines      These medications were sent to [facility] [address] [place] [address] [address] [facility]  [address] [facility] [address]     Phone:  [phone]     EPINEPHrine 0.3 MG/0.3ML injection 2-pack    ketoconazole 2 % cream                Primary Care Provider Office Phone # Fax #    Jyoti Griffiths [phone]       [address] [place] [address]        Equal Access to Services     [facility] CenterTASHA AH: Hadii aad ku hadasho Soomaali, waaxda luqadaha, qaybta kaalmada adeegyada, akhil verma hayishann jamilah ramirez. So [facility] [phone].    ATENCIÓN: Si habla español, tiene a soto disposición servicios gratuitos de asistencia lingüística. Llame al [phone].    We  comply with applicable federal civil rights laws and [place] laws. We do not discriminate on the basis of race, color, national origin, age, disability, sex, sexual orientation, or gender identity.            Thank you!     Thank you for choosing [facility]  for your care. Our goal is always to provide you with excellent care. Hearing back from our patients is one way we can continue to improve our services. Please take a few minutes to complete the written survey that you may receive in the mail after your visit with us. Thank you!             Your Updated Medication List - Protect others around you: Learn how to safely use, store and throw away your medicines at www.disposemymeds.org.          This list is accurate as of 2/16/18  2:35 PM.  Always use your most recent med list.                   Brand Name Dispense Instructions for use Diagnosis    clotrimazole 1 % cream    LOTRIMIN    45 g    Apply topically 2 times daily    Tinea pedis of both feet, Tinea cruris       EPINEPHrine 0.3 MG/0.3ML injection 2-pack    EPIPEN/ADRENACLICK/or ANY BX GENERIC EQUIV    0.6 mL    Inject 0.3 mLs (0.3 mg) into the muscle as needed    Bee sting reaction, accidental or unintentional, initial encounter       ketoconazole 2 % cream    NIZORAL    60 g    Apply topically daily    Tinea pedis of both feet       triamcinolone 0.1 % ointment    KENALOG    45 g    Apply  topically 2 times daily or as needed.    Dermatophytosis of foot

## 2018-02-18 PROBLEM — N81.4 UTERINE PROLAPSE: Status: ACTIVE | Noted: 2018-02-18

## 2018-04-11 ENCOUNTER — HOSPITAL ENCOUNTER (OUTPATIENT)
Dept: ULTRASOUND IMAGING | Facility: CLINIC | Age: 68
Discharge: HOME | End: 2018-04-11
Attending: FAMILY MEDICINE | Admitting: FAMILY MEDICINE
Payer: MEDICARE

## 2018-04-11 DIAGNOSIS — N95.0 POST-MENOPAUSAL BLEEDING: ICD-10-CM

## 2018-04-11 PROCEDURE — 76856 US EXAM PELVIC COMPLETE: CPT

## 2018-04-23 ENCOUNTER — OFFICE VISIT (OUTPATIENT)
Dept: DERMATOLOGY | Facility: CLINIC | Age: 68
End: 2018-04-23
Payer: COMMERCIAL

## 2018-04-23 VITALS — HEIGHT: 64 IN | DIASTOLIC BLOOD PRESSURE: 89 MMHG | SYSTOLIC BLOOD PRESSURE: 152 MMHG | HEART RATE: 92 BPM

## 2018-04-23 DIAGNOSIS — D48.5 NEOPLASM OF UNCERTAIN BEHAVIOR OF SKIN: Primary | ICD-10-CM

## 2018-04-23 PROCEDURE — 88342 IMHCHEM/IMCYTCHM 1ST ANTB: CPT | Mod: TC | Performed by: PHYSICIAN ASSISTANT

## 2018-04-23 PROCEDURE — 99202 OFFICE O/P NEW SF 15 MIN: CPT | Mod: 25 | Performed by: PHYSICIAN ASSISTANT

## 2018-04-23 PROCEDURE — 88305 TISSUE EXAM BY PATHOLOGIST: CPT | Mod: TC | Performed by: PHYSICIAN ASSISTANT

## 2018-04-23 PROCEDURE — 11100 HC BIOPSY SKIN/SUBQ/MUC MEM, SINGLE LESION: CPT | Performed by: PHYSICIAN ASSISTANT

## 2018-04-23 NOTE — NURSING NOTE
"Chief Complaint   Patient presents with     Derm Problem     spot on nose        Initial BP (!) 155/92  Pulse 92  Ht 1.613 m (5' 3.5\") Estimated body mass index is 33.13 kg/(m^2) as calculated from the following:    Height as of 2/16/18: 1.613 m (5' 3.5\").    Weight as of 2/16/18: 86.2 kg (190 lb).  BP completed using cuff size: selwyn Akers LPN    "

## 2018-04-23 NOTE — MR AVS SNAPSHOT
After Visit Summary   4/23/2018    Ijeoma Shah    MRN: [id number]           Patient Information     Date Of Birth          1950        Visit Information        Provider Department      4/23/2018 2:30 PM Dinorah Sierra PA-C [facility]        Today's Diagnoses     Neoplasm of uncertain behavior of skin    -  1      Care Instructions          Wound Care Instructions     FOR SUPERFICIAL WOUNDS     [facility] [phone]    [facility] [phone]                       AFTER 24 HOURS YOU SHOULD REMOVE THE BANDAGE AND BEGIN DAILY DRESSING CHANGES AS FOLLOWS:     1) Remove Dressing.     2) Clean and dry the area with tap water using a Q-tip or sterile gauze pad.     3) Apply Vaseline, Aquaphor, Polysporin ointment or Bacitracin ointment over entire wound.  Do NOT use Neosporin ointment.     4) Cover the wound with a band-aid, or a sterile non-stick gauze pad and micropore paper tape      REPEAT THESE INSTRUCTIONS AT LEAST ONCE A DAY UNTIL THE WOUND HAS COMPLETELY HEALED.    It is an old wives tale that a wound heals better when it is exposed to air and allowed to dry out. The wound will heal faster with a better cosmetic result if it is kept moist with ointment and covered with a bandage.    **Do not let the wound dry out.**      Supplies Needed:      *Cotton tipped applicators (Q-tips)    *Polysporin Ointment or Bacitracin Ointment (NOT NEOSPORIN)    *Band-aids or non-stick gauze pads and micropore paper tape.      PATIENT INFORMATION:    During the healing process you will notice a number of changes. All wounds develop a small halo of redness surrounding the wound.  This means healing is occurring. Severe itching with extensive redness usually indicates sensitivity to the ointment or bandage tape used to dress the wound.  You should call our office if this develops.      Swelling  and/or discoloration around your surgical site is common,  particularly when performed around the eye.    All wounds normally drain.  The larger the wound the more drainage there will be.  After 7-10 days, you will notice the wound beginning to shrink and new skin will begin to grow.  The wound is healed when you can see skin has formed over the entire area.  A healed wound has a healthy, shiny look to the surface and is red to dark pink in color to normalize.  Wounds may take approximately 4-6 weeks to heal.  Larger wounds may take 6-8 weeks.  After the wound is healed you may discontinue dressing changes.    You may experience a sensation of tightness as your wound heals. This is normal and will gradually subside.    Your healed wound may be sensitive to temperature changes. This sensitivity improves with time, but if you re having a lot of discomfort, try to avoid temperature extremes.    Patients frequently experience itching after their wound appears to have healed because of the continue healing under the skin.  Plain Vaseline will help relieve the itching.        POSSIBLE COMPLICATIONS    BLEEDIN. Leave the bandage in place.  2. Use tightly rolled up gauze or a cloth to apply direct pressure over the bandage for 30  minutes.  3. Reapply pressure for an additional 30 minutes if necessary  4. Use additional gauze and tape to maintain pressure once the bleeding has stopped.            Follow-ups after your visit        Who to contact     If you have questions or need follow up information about today's clinic visit or your schedule please contact [facility] directly at [phone].  Normal or non-critical lab and imaging results will be communicated to you by MyChart, letter or phone within 4 business days after the clinic has received the results. If you do not hear from us within 7 days, please contact the clinic through MyChart or phone. If you have a critical or abnormal lab result, we will notify you by phone as soon as possible.  Submit  "refill requests through [employer] or call your pharmacy and they will forward the refill request to us. Please allow 3 business days for your refill to be completed.          Additional Information About Your Visit        MyChart Information     [employer] gives you secure access to your electronic health record. If you see a primary care provider, you can also send messages to your care team and make appointments. If you have questions, please call your primary care clinic.  If you do not have a primary care provider, please call [phone] and they will assist you.        Care EveryWhere ID     This is your Care EveryWhere ID. This could be used by other organizations to access your [facility] medical records  [id number]        Your Vitals Were     Pulse Height                92 1.613 m (5' 3.5\")           Blood Pressure from Last 3 Encounters:   04/23/18 (!) 155/92   02/16/18 110/72   09/28/17 110/54    Weight from Last 3 Encounters:   02/16/18 86.2 kg (190 lb)   09/28/17 86.9 kg (191 lb 9.6 oz)   07/13/15 85.7 kg (189 lb)              We Performed the Following     BIOPSY SKIN/SUBQ/MUC MEM, SINGLE LESION     Dermatological path order and indications        Primary Care Provider Office Phone # Fax #    Jyoti Griffiths [phone] [address] [place] [phone]        Equal Access to Services     [facility] CenterTASHA : Hadii aad ku hadasho Soomaali, waaxda luqadaha, qaybta kaalmada adeegyada, akhil ramirez. So [facility] [phone].    ATENCIÓN: Si habla español, tiene a soto disposición servicios gratuitos de asistencia lingüística. Emma al [phone].    We comply with applicable federal civil rights laws and [place] laws. We do not discriminate on the basis of race, color, national origin, age, disability, sex, sexual orientation, or gender identity.            Thank you!     Thank you for choosing [facility]  for your care. Our goal is always to provide " you with excellent care. Hearing back from our patients is one way we can continue to improve our services. Please take a few minutes to complete the written survey that you may receive in the mail after your visit with us. Thank you!             Your Updated Medication List - Protect others around you: Learn how to safely use, store and throw away your medicines at www.disposemymeds.org.          This list is accurate as of 4/23/18  2:40 PM.  Always use your most recent med list.                   Brand Name Dispense Instructions for use Diagnosis    clotrimazole 1 % cream    LOTRIMIN    45 g    Apply topically 2 times daily    Tinea pedis of both feet, Tinea cruris       EPINEPHrine 0.3 MG/0.3ML injection 2-pack    EPIPEN/ADRENACLICK/or ANY BX GENERIC EQUIV    0.6 mL    Inject 0.3 mLs (0.3 mg) into the muscle as needed    Bee sting reaction, accidental or unintentional, initial encounter       ketoconazole 2 % cream    NIZORAL    60 g    Apply topically daily    Tinea pedis of both feet       triamcinolone 0.1 % ointment    KENALOG    45 g    Apply  topically 2 times daily or as needed.    Dermatophytosis of foot

## 2018-04-23 NOTE — PATIENT INSTRUCTIONS
Wound Care Instructions     FOR SUPERFICIAL WOUNDS     [facility] [phone]    [facility] [phone]                       AFTER 24 HOURS YOU SHOULD REMOVE THE BANDAGE AND BEGIN DAILY DRESSING CHANGES AS FOLLOWS:     1) Remove Dressing.     2) Clean and dry the area with tap water using a Q-tip or sterile gauze pad.     3) Apply Vaseline, Aquaphor, Polysporin ointment or Bacitracin ointment over entire wound.  Do NOT use Neosporin ointment.     4) Cover the wound with a band-aid, or a sterile non-stick gauze pad and micropore paper tape      REPEAT THESE INSTRUCTIONS AT LEAST ONCE A DAY UNTIL THE WOUND HAS COMPLETELY HEALED.    It is an old wives tale that a wound heals better when it is exposed to air and allowed to dry out. The wound will heal faster with a better cosmetic result if it is kept moist with ointment and covered with a bandage.    **Do not let the wound dry out.**      Supplies Needed:      *Cotton tipped applicators (Q-tips)    *Polysporin Ointment or Bacitracin Ointment (NOT NEOSPORIN)    *Band-aids or non-stick gauze pads and micropore paper tape.      PATIENT INFORMATION:    During the healing process you will notice a number of changes. All wounds develop a small halo of redness surrounding the wound.  This means healing is occurring. Severe itching with extensive redness usually indicates sensitivity to the ointment or bandage tape used to dress the wound.  You should call our office if this develops.      Swelling  and/or discoloration around your surgical site is common, particularly when performed around the eye.    All wounds normally drain.  The larger the wound the more drainage there will be.  After 7-10 days, you will notice the wound beginning to shrink and new skin will begin to grow.  The wound is healed when you can see skin has formed over the entire area.  A healed wound has a healthy, shiny look to the surface and is red to dark pink in color  to normalize.  Wounds may take approximately 4-6 weeks to heal.  Larger wounds may take 6-8 weeks.  After the wound is healed you may discontinue dressing changes.    You may experience a sensation of tightness as your wound heals. This is normal and will gradually subside.    Your healed wound may be sensitive to temperature changes. This sensitivity improves with time, but if you re having a lot of discomfort, try to avoid temperature extremes.    Patients frequently experience itching after their wound appears to have healed because of the continue healing under the skin.  Plain Vaseline will help relieve the itching.        POSSIBLE COMPLICATIONS    BLEEDIN. Leave the bandage in place.  2. Use tightly rolled up gauze or a cloth to apply direct pressure over the bandage for 30  minutes.  3. Reapply pressure for an additional 30 minutes if necessary  4. Use additional gauze and tape to maintain pressure once the bleeding has stopped.

## 2018-04-23 NOTE — LETTER
"    4/23/2018         RE: Ijeoma Shah  [address] [facility] [place] [address]        Dear Colleague,    Thank you for referring your patient, Ijeoma Shah, to the [facility]. Please see a copy of my visit note below.    HPI:   Ijeoma Shah is a 67 year old female who presents for evaluation of a bro  chief complaint  Location: mid nasal bridge   Condition present for:  For a long time - has slowly changed.   Previous treatments include: none    Review Of Systems  Eyes: negative  Ears/Nose/Throat: negative  Respiratory: No shortness of breath, dyspnea on exertion, cough, or hemoptysis  Cardiovascular: negative  Gastrointestinal: negative  Genitourinary: negative  Musculoskeletal: negative  Neurologic: negative  Psychiatric: negative        PHYSICAL EXAM:    BP (!) 155/92  Pulse 92  Ht 1.613 m (5' 3.5\")  Skin exam performed as follows: Type 2 skin. Mood appropriate  Alert and Oriented X 3. Well developed, well nourished in no distress.  General appearance: Normal  Head including face: Normal  Eyes: conjunctiva and lids: Normal  Mouth: Lips, teeth, gums: Normal  Neck: Normal  Chest-breast/axillae: Normal  Back: Normal  Spleen and liver: Normal  Cardiovascular: Exam of peripheral vascular system by observation for swelling, varicosities, edema: Normal  Genitalia: groin, buttocks: Normal  Extremities: digits/nails (clubbing): Normal  Eccrine and Apocrine glands: Normal  Right upper extremity: Normal  Left upper extremity: Normal  Right lower extremity: Normal  Left lower extremity: Normal  Skin: Scalp and body hair: See below    1. 15 x 18 mm irregular dark brown patch on the mid nasal bridge    ASSESSMENT/PLAN:     1. Lentigo r/o lentigo maligna on the mid nasal bridge. Shave bx in typical fashion .  Area cleaned with betadyne and anesthetized with 1% lidocaine with epi .  Dermablade used to remove the lesion and sent to pathology. Bleeding was cauterized. Pt " tolerated procedure well. Can use LN2 to the rest of the area if completely benign.   2. Patient to follow up with Primary Care provider regarding elevated blood pressure.          Follow-up: pending path/PRN  CC:   Scribed By: Dinorah Sierra, MS, PAHOLA      Again, thank you for allowing me to participate in the care of your patient.        Sincerely,        Dinorah Sierra PA-C

## 2018-04-23 NOTE — PROGRESS NOTES
"HPI:   Ijeoma Shah is a 67 year old female who presents for evaluation of a bro  chief complaint  Location: mid nasal bridge   Condition present for:  For a long time - has slowly changed.   Previous treatments include: none    Review Of Systems  Eyes: negative  Ears/Nose/Throat: negative  Respiratory: No shortness of breath, dyspnea on exertion, cough, or hemoptysis  Cardiovascular: negative  Gastrointestinal: negative  Genitourinary: negative  Musculoskeletal: negative  Neurologic: negative  Psychiatric: negative        PHYSICAL EXAM:    BP (!) 155/92  Pulse 92  Ht 1.613 m (5' 3.5\")  Skin exam performed as follows: Type 2 skin. Mood appropriate  Alert and Oriented X 3. Well developed, well nourished in no distress.  General appearance: Normal  Head including face: Normal  Eyes: conjunctiva and lids: Normal  Mouth: Lips, teeth, gums: Normal  Neck: Normal  Chest-breast/axillae: Normal  Back: Normal  Spleen and liver: Normal  Cardiovascular: Exam of peripheral vascular system by observation for swelling, varicosities, edema: Normal  Genitalia: groin, buttocks: Normal  Extremities: digits/nails (clubbing): Normal  Eccrine and Apocrine glands: Normal  Right upper extremity: Normal  Left upper extremity: Normal  Right lower extremity: Normal  Left lower extremity: Normal  Skin: Scalp and body hair: See below    1. 15 x 18 mm irregular dark brown patch on the mid nasal bridge    ASSESSMENT/PLAN:     1. Lentigo r/o lentigo maligna on the mid nasal bridge. Shave bx in typical fashion .  Area cleaned with betadyne and anesthetized with 1% lidocaine with epi .  Dermablade used to remove the lesion and sent to pathology. Bleeding was cauterized. Pt tolerated procedure well. Can use LN2 to the rest of the area if completely benign.   2. Patient to follow up with Primary Care provider regarding elevated blood pressure.          Follow-up: pending path/PRN  CC:   Scribed By: Dinorah Sierra MS, PA-C    "

## 2018-04-26 LAB — COPATH REPORT: NORMAL

## 2018-06-09 ENCOUNTER — HOSPITAL ENCOUNTER (OUTPATIENT)
Facility: CLINIC | Age: 68
Setting detail: SPECIMEN
Discharge: HOME | End: 2018-06-09
Admitting: FAMILY MEDICINE
Payer: MEDICARE

## 2018-06-09 PROCEDURE — 82274 ASSAY TEST FOR BLOOD FECAL: CPT | Performed by: FAMILY MEDICINE

## 2018-06-11 DIAGNOSIS — Z12.11 SPECIAL SCREENING FOR MALIGNANT NEOPLASMS, COLON: ICD-10-CM

## 2018-06-11 LAB — HEMOCCULT STL QL IA: NEGATIVE

## 2018-07-16 ENCOUNTER — TELEPHONE (OUTPATIENT)
Dept: FAMILY MEDICINE | Facility: CLINIC | Age: 68
End: 2018-07-16

## 2018-07-16 DIAGNOSIS — B35.3 TINEA PEDIS OF BOTH FEET: ICD-10-CM

## 2018-07-16 DIAGNOSIS — B35.6 TINEA CRURIS: ICD-10-CM

## 2018-07-16 NOTE — TELEPHONE ENCOUNTER
Reason for Call:  Medication or medication refill:    Do you use a [facility]?  Name of the pharmacy and phone number for the current request:  [facility]    Name of the medication requested:    Ketoconazole not covered by Ins- Requesting alternative to this medication. Please Advise.    Can we leave a detailed message on this number? YES    Phone number patient can be reached at:   Pharm[facility]     Call taken on 7/16/2018 at 11:24 AM by Svetlana Chen

## 2018-07-17 RX ORDER — CLOTRIMAZOLE 1 %
CREAM (GRAM) TOPICAL 2 TIMES DAILY
Qty: 45 G | Refills: 1 | Status: SHIPPED | OUTPATIENT
Start: 2018-07-17 | End: 2019-01-22

## 2018-08-21 ENCOUNTER — TELEPHONE (OUTPATIENT)
Dept: FAMILY MEDICINE | Facility: CLINIC | Age: 68
End: 2018-08-21

## 2018-08-21 NOTE — TELEPHONE ENCOUNTER
Per pt wondering if we heard from Pharmacy ( [facility]) if her medication Epi & Cream not covered. Can ask the Pharmacist to help her locate the OTC.     Spoke with [facility] Pharmacist as the Lotrimin can be bought OTC and would be cheaper.  Epi - Generic not covered - Out of Pocket cost - 379.65 Brand name Pt MOLLY 1   Call [facility] Regarding Insurance.   Svetlana [place] Sec

## 2018-08-23 NOTE — TELEPHONE ENCOUNTER
Pt called. States she still hasn't heard anything. I called pharmacy and states if she only has Part B it isn't covered. They will call her to discuss and see if she has secondary insurance. Eva Brady RN

## 2018-08-29 ENCOUNTER — TELEPHONE (OUTPATIENT)
Dept: FAMILY MEDICINE | Facility: CLINIC | Age: 68
End: 2018-08-29

## 2018-08-29 ENCOUNTER — OFFICE VISIT (OUTPATIENT)
Dept: FAMILY MEDICINE | Facility: CLINIC | Age: 68
End: 2018-08-29
Payer: COMMERCIAL

## 2018-08-29 VITALS
DIASTOLIC BLOOD PRESSURE: 72 MMHG | RESPIRATION RATE: 16 BRPM | SYSTOLIC BLOOD PRESSURE: 138 MMHG | WEIGHT: 189 LBS | OXYGEN SATURATION: 96 % | TEMPERATURE: 97.7 F | BODY MASS INDEX: 32.27 KG/M2 | HEART RATE: 64 BPM | HEIGHT: 64 IN

## 2018-08-29 DIAGNOSIS — J01.90 ACUTE SINUSITIS WITH SYMPTOMS GREATER THAN 10 DAYS: Primary | ICD-10-CM

## 2018-08-29 PROCEDURE — 99213 OFFICE O/P EST LOW 20 MIN: CPT | Performed by: FAMILY MEDICINE

## 2018-08-29 NOTE — PROGRESS NOTES
"  SUBJECTIVE:   Ijeoma Shah is a 67 year old female who presents to clinic today for the following health issues:      RESPIRATORY SYMPTOMS      Duration: 2 week from Monday    Description  nasal congestion, rhinorrhea, sore throat, facial pain/pressure, cough, wheezing, fever and fatigue/malaise    Severity: mild    Accompanying signs and symptoms: None    History (predisposing factors):  none    Precipitating or alleviating factors:     Therapies tried and outcome:  Nyquil- made symptoms worse, but stopped cough    Started with nasal congestion, sore throat, cough, has been more than 2 weeks, fatigue, cough is better, has pain and pressure in her head, forehead, feels pressure over her left eye. No fever.       Problem list and histories reviewed & adjusted, as indicated.  Additional history: as documented    BP Readings from Last 3 Encounters:   08/29/18 138/72   04/23/18 152/89   02/16/18 110/72    Wt Readings from Last 3 Encounters:   08/29/18 189 lb (85.7 kg)   02/16/18 190 lb (86.2 kg)   09/28/17 191 lb 9.6 oz (86.9 kg)                    Reviewed and updated as needed this visit by clinical staff       Reviewed and updated as needed this visit by Provider         OBJECTIVE: /72 (BP Location: Right arm, Patient Position: Sitting, Cuff Size: Adult Large)  Pulse 64  Temp 97.7  F (36.5  C) (Tympanic)  Resp 16  Ht 5' 3.5\" (1.613 m)  Wt 189 lb (85.7 kg)  SpO2 96%  BMI 32.95 kg/m2   General: appears well, no distress  HEENT: TMs and canals negative bilaterally, oropharynx with no erythema, no exudate, pain over frontal sinuses  Neck: supple, no adenopathy  Heart: regular rate and rhythm, normal S1S2, no murmur  Lungs: clear to ascultation     ASSESSMENT:  1. Acute sinusitis with symptoms greater than 10 days        PLAN:  Orders Placed This Encounter     amoxicillin-clavulanate (AUGMENTIN) 875-125 MG per tablet       Patient Instructions   Antibiotic   Return to clinic if symptoms persist or " worsen.      Jyoti Garcia MD

## 2018-08-29 NOTE — NURSING NOTE
"Chief Complaint   Patient presents with     Cough     2 weeks       Initial /72 (BP Location: Right arm, Patient Position: Sitting, Cuff Size: Adult Large)  Pulse 64  Temp 97.7  F (36.5  C) (Tympanic)  Resp 16  Ht 5' 3.5\" (1.613 m)  Wt 189 lb (85.7 kg)  SpO2 96%  BMI 32.95 kg/m2 Estimated body mass index is 32.95 kg/(m^2) as calculated from the following:    Height as of this encounter: 5' 3.5\" (1.613 m).    Weight as of this encounter: 189 lb (85.7 kg).      Health Maintenance that is potentially due pending provider review:  Mammogram    Pt will schedule mammo appt in winter.    Is there anyone who you would like to be able to receive your results? No  If yes have patient fill out HARPAL      "

## 2018-08-29 NOTE — TELEPHONE ENCOUNTER
Reason for Call:  Same Day Appointment, Requested Provider:  Dr Griffiths    PCP: Jyoti Griffiths    Reason for visit: Pt says she has been sick for over 2 weeks. She thought it was just a cold but says she feels drained and run down. She isn't coughing but says she feels weak and wonders if she needs an antibiotic.    Duration of symptoms: > 2 weeks    Additional comments: States she is 10 minutes from the clinic    Can we leave a detailed message on this number? YES    Phone number patient can be reached at: Home number on file [phone] (home)    Best Time: anytime    Call taken on 8/29/2018 at 8:47 AM by Vanessa Fournier

## 2018-08-29 NOTE — MR AVS SNAPSHOT
After Visit Summary   8/29/2018    Ijeoma Shah    MRN: [id number]           Patient Information     Date Of Birth          1950        Visit Information        Provider Department      8/29/2018 10:20 AM Jyoti Griffiths MD [facility]        Today's Diagnoses     Acute sinusitis with symptoms greater than 10 days    -  1      Care Instructions    Antibiotic   Return to clinic if symptoms persist or worsen.           Follow-ups after your visit        Follow-up notes from your care team     Return in about 1 week (around 9/5/2018), or if symptoms worsen or fail to improve.      Who to contact     If you have questions or need follow up information about today's clinic visit or your schedule please contact [facility] directly at [phone].  Normal or non-critical lab and imaging results will be communicated to you by MyChart, letter or phone within 4 business days after the clinic has received the results. If you do not hear from us within 7 days, please contact the clinic through MyChart or phone. If you have a critical or abnormal lab result, we will notify you by phone as soon as possible.  Submit refill requests through [employer] or call your pharmacy and they will forward the refill request to us. Please allow 3 business days for your refill to be completed.          Additional Information About Your Visit        MyChart Information     [employer] gives you secure access to your electronic health record. If you see a primary care provider, you can also send messages to your care team and make appointments. If you have questions, please call your primary care clinic.  If you do not have a primary care provider, please call [phone] and they will assist you.        Care EveryWhere ID     This is your Care EveryWhere ID. This could be used by other organizations to access your [facility] medical records  [id number]        Your Vitals Were      "Pulse Temperature Respirations Height Pulse Oximetry BMI (Body Mass Index)    64 97.7  F (36.5  C) (Tympanic) 16 5' 3.5\" (1.613 m) 96% 32.95 kg/m2       Blood Pressure from Last 3 Encounters:   08/29/18 138/72   04/23/18 152/89   02/16/18 110/72    Weight from Last 3 Encounters:   08/29/18 189 lb (85.7 kg)   02/16/18 190 lb (86.2 kg)   09/28/17 191 lb 9.6 oz (86.9 kg)              Today, you had the following     No orders found for display         Today's Medication Changes          These changes are accurate as of 8/29/18 12:17 PM.  If you have any questions, ask your nurse or doctor.               Start taking these medicines.        Dose/Directions    amoxicillin-clavulanate 875-125 MG per tablet   Commonly known as:  AUGMENTIN   Used for:  Acute sinusitis with symptoms greater than 10 days   Started by:  Jyoti Griffiths MD        Dose:  1 tablet   Take 1 tablet by mouth 2 times daily   Quantity:  20 tablet   Refills:  0            Where to get your medicines      These medications were sent to [facility] [address] [place], [place] [address] [facility]  [address] [facility] [address]     Phone:  [phone]     amoxicillin-clavulanate 875-125 MG per tablet                Primary Care Provider Office Phone # Fax #    Jyoti Griffiths [phone]       [address] [place] [address]        Equal Access to Services     [facility] OCTAVIO AH: Hadii colton fernandez hadasho Soomaali, waaxda luqadaha, qaybta kaalmada mellyegyada, waxay luci canchola adedenilson ramriez. So [facility] [phone].    ATENCIÓN: Si habla kelle, tiene a soto disposición servicios gratuitos de asistencia lingüística. Emma al [phone].    We comply with applicable federal civil rights laws and [place] laws. We do not discriminate on the basis of race, color, national origin, age, disability, sex, sexual orientation, or gender identity.            Thank you!     Thank you for choosing [facility]" for your care. Our goal is always to provide you with excellent care. Hearing back from our patients is one way we can continue to improve our services. Please take a few minutes to complete the written survey that you may receive in the mail after your visit with us. Thank you!             Your Updated Medication List - Protect others around you: Learn how to safely use, store and throw away your medicines at www.disposemymeds.org.          This list is accurate as of 8/29/18 12:17 PM.  Always use your most recent med list.                   Brand Name Dispense Instructions for use Diagnosis    amoxicillin-clavulanate 875-125 MG per tablet    AUGMENTIN    20 tablet    Take 1 tablet by mouth 2 times daily    Acute sinusitis with symptoms greater than 10 days       clotrimazole 1 % cream    LOTRIMIN    45 g    Apply topically 2 times daily    Tinea pedis of both feet, Tinea cruris       EPINEPHrine 0.3 MG/0.3ML injection 2-pack    EPIPEN/ADRENACLICK/or ANY BX GENERIC EQUIV    0.6 mL    Inject 0.3 mLs (0.3 mg) into the muscle as needed    Bee sting reaction, accidental or unintentional, initial encounter       ketoconazole 2 % cream    NIZORAL    60 g    Apply topically daily    Tinea pedis of both feet       triamcinolone 0.1 % ointment    KENALOG    45 g    Apply  topically 2 times daily or as needed.    Dermatophytosis of foot

## 2018-10-25 ENCOUNTER — TELEPHONE (OUTPATIENT)
Dept: FAMILY MEDICINE | Facility: CLINIC | Age: 68
End: 2018-10-25

## 2018-10-25 DIAGNOSIS — B35.3 TINEA PEDIS, UNSPECIFIED LATERALITY: ICD-10-CM

## 2018-10-25 RX ORDER — TRIAMCINOLONE ACETONIDE 1 MG/G
OINTMENT TOPICAL
Qty: 45 G | Refills: 1 | Status: SHIPPED | OUTPATIENT
Start: 2018-10-25 | End: 2019-12-27

## 2018-10-25 NOTE — TELEPHONE ENCOUNTER
Salima is calling to see if she can get some triamcinolone ointment for her rash/ fungus on both feet she has had for years. She has seen her before for this.  She has some left and tried it and it seems to be working well.  The pharmacy is [facility] in [place].    [facility]  [facility] [place].

## 2018-10-31 ENCOUNTER — TELEPHONE (OUTPATIENT)
Dept: MAMMOGRAPHY | Facility: CLINIC | Age: 68
End: 2018-10-31

## 2018-10-31 NOTE — TELEPHONE ENCOUNTER
10/31/2018    Attempt 1    Contacted patient in regards to scheduling VIP mammogram  Message on voicemail     Comments:       Outreach   PAPO

## 2018-11-12 ENCOUNTER — E-VISIT (OUTPATIENT)
Dept: FAMILY MEDICINE | Facility: CLINIC | Age: 68
End: 2018-11-12
Payer: COMMERCIAL

## 2018-11-12 ENCOUNTER — TELEPHONE (OUTPATIENT)
Dept: FAMILY MEDICINE | Facility: CLINIC | Age: 68
End: 2018-11-12

## 2018-11-12 DIAGNOSIS — J01.90 ACUTE SINUSITIS WITH SYMPTOMS GREATER THAN 10 DAYS: ICD-10-CM

## 2018-11-12 PROCEDURE — 99444 ZZC PHYSICIAN ONLINE EVALUATION & MANAGEMENT SERVICE: CPT | Performed by: FAMILY MEDICINE

## 2018-11-12 NOTE — TELEPHONE ENCOUNTER
Reason for Call:  Other     Detailed comments: Patient thrasher the same sinus infection she had in august and she is wanting a Antibiotic and does not want to come in - please call pt    Phone Number Patient can be reached at: Home number on file [phone] (home)    Best Time:     Can we leave a detailed message on this number? YES    Call taken on 11/12/2018 at 9:58 AM by Jyoti Ly

## 2019-01-22 ENCOUNTER — OFFICE VISIT (OUTPATIENT)
Dept: FAMILY MEDICINE | Facility: CLINIC | Age: 69
End: 2019-01-22
Payer: MEDICARE

## 2019-01-22 VITALS
BODY MASS INDEX: 33.12 KG/M2 | RESPIRATION RATE: 20 BRPM | DIASTOLIC BLOOD PRESSURE: 84 MMHG | WEIGHT: 194 LBS | HEART RATE: 81 BPM | TEMPERATURE: 98.4 F | SYSTOLIC BLOOD PRESSURE: 139 MMHG | HEIGHT: 64 IN | OXYGEN SATURATION: 97 %

## 2019-01-22 DIAGNOSIS — Z23 NEED FOR PROPHYLACTIC VACCINATION AND INOCULATION AGAINST INFLUENZA: ICD-10-CM

## 2019-01-22 DIAGNOSIS — Z87.891 PERSONAL HISTORY OF TOBACCO USE: ICD-10-CM

## 2019-01-22 DIAGNOSIS — M65.4 RADIAL STYLOID TENOSYNOVITIS (DE QUERVAIN): Primary | ICD-10-CM

## 2019-01-22 PROCEDURE — G0296 VISIT TO DETERM LDCT ELIG: HCPCS | Performed by: FAMILY MEDICINE

## 2019-01-22 PROCEDURE — 99213 OFFICE O/P EST LOW 20 MIN: CPT | Mod: 25 | Performed by: FAMILY MEDICINE

## 2019-01-22 PROCEDURE — G0008 ADMIN INFLUENZA VIRUS VAC: HCPCS | Performed by: FAMILY MEDICINE

## 2019-01-22 PROCEDURE — 90662 IIV NO PRSV INCREASED AG IM: CPT | Performed by: FAMILY MEDICINE

## 2019-01-22 ASSESSMENT — MIFFLIN-ST. JEOR: SCORE: 1392.04

## 2019-01-22 NOTE — PATIENT INSTRUCTIONS
Wear the splint for 2 weeks at least, taking it out to bathe, and do a few exercises, gradually wean out    Check with insurance for lung cancer screening      Lung Cancer Screening   Frequently Asked Questions  If you are at high-risk for lung cancer, getting screened with low-dose computed tomography (LDCT) every year can help save your life. This handout offers answers to some of the most common questions about lung cancer screening. If you have other questions, please call [phone] ([phone]).     What is it?  Lung cancer screening uses special X-ray technology to create an image of your lung tissue. The exam is quick and easy and takes less than 10 seconds. We don t give you any medicine or use any needles. You can eat before and after the exam. You don t need to change your clothes as long as the clothing on your chest doesn t contain metal. But, you do need to be able to hold your breath for at least 6 seconds during the exam.    What is the goal of lung cancer screening?  The goal of lung cancer screening is to save lives. Many times, lung cancer is not found until a person starts having physical symptoms. Lung cancer screening can help detect lung cancer in the earliest stages when it may be easier to treat.    Who should be screened for lung cancer?  We suggest lung cancer screening for anyone who is at high-risk for lung cancer. You are in the high-risk group if you:      are between the ages of 55 and 79, and    have smoked at least 1 pack of cigarettes a day for 30 or more years, and    still smoke or have quit within the past 15 years.    However, if you have a new cough or shortness of breath, you should talk to your doctor before being screened.    Some national lung health advocacy groups also recommend screening for people ages 50 to 79 who have smoked an average of 1 pack of cigarettes a day for 20 years. They must also have at least 1 other risk factor for lung cancer, not  including exposure to secondhand smoke. Other risk factors are having had cancer in the past, emphysema, pulmonary fibrosis, COPD, a family history of lung cancer, or exposure to certain materials such as arsenic, asbestos, beryllium, cadmium, chromium, diesel fumes, nickel, radon or silica. Your care team can help you know if you have one of these risk factors.     Why does it matter if I have symptoms?  Certain symptoms can be a sign that you have a condition in your lungs that should be checked and treated by your doctor. These symptoms include fever, chest pain, a new or changing cough, shortness of breath that you have never felt before, coughing up blood or unexplained weight loss. Having any of these symptoms can greatly affect the results of lung cancer screening.       Should all smokers get an LDCT lung cancer screening exam?  It depends. Lung cancer screening is for a very specific group of men and women who have a history of heavy smoking over a long period of time (see  Who should be screened for lung cancer  above).  I am in the high-risk group, but have been diagnosed with cancer in the past. Is LDCT lung cancer screening right for me?  In some cases, you should not have LDCT lung screening, such as when your doctor is already following your cancer with CT scan studies. Your doctor will help you decide if LDCT lung screening is right for you.  Do I need to have a screening exam every year?  Yes. If you are in the high-risk group described earlier, you should get an LDCT lung cancer screening exam every year until you are 79, or are no longer willing or able to undergo screening and possible procedures to diagnose and treat lung cancer.  How effective is LDCT at preventing death from lung cancer?  Studies have shown that LDCT lung cancer screening can lower the risk of death from lung cancer by 20 percent in people who are at high-risk.  What are the risks?  There are some risks and limitations of LDCT  lung cancer screening. We want to make sure you understand the risks and benefits, so please let us know if you have any questions. Your doctor may want to talk with you more about these risks.    Radiation exposure: As with any exam that uses radiation, there is a very small increased risk of cancer. The amount of radiation in LDCT is small--about the same amount a person would get from a mammogram. Your doctor orders the exam when he or she feels the potential benefits outweigh the risks.    False negatives: No test is perfect, including LDCT. It is possible that you may have a medical condition, including lung cancer, that is not found during your exam. This is called a false negative result.    False positives and more testing: LDCT very often finds something in the lung that could be cancer, but in fact is not. This is called a false positive result. False positive tests often cause anxiety. To make sure these findings are not cancer, you may need to have more tests. These tests will be done only if you give us permission. Sometimes patients need a treatment that can have side effects, such as a biopsy. For more information on false positives, see  What can I expect from the results?     Findings not related to lung cancer: Your LDCT exam also takes pictures of areas of your body next to your lungs. In a very small number of cases, the CT scan will show an abnormal finding in one of these areas, such as your kidneys, adrenal glands, liver or thyroid. This finding may not be serious, but you may need more tests. Your doctor can help you decide what other tests you may need, if any.  What can I expect from the results?  About 1 out of 4 LDCT exams will find something that may need more tests. Most of the time, these findings are lung nodules. Lung nodules are very small collections of tissue in the lung. These nodules are very common, and the vast majority--more than 97 percent--are not cancer (benign). Most are  normal lymph nodes or small areas of scarring from past infections.  But, if a small lung nodule is found to be cancer, the cancer can be cured more than 90 percent of the time. To know if the nodule is cancer, we may need to get more images before your next yearly screening exam. If the nodule has suspicious features (for example, it is large, has an odd shape or grows over time), we will refer you to a specialist for further testing.  Will my doctor also get the results?  Yes. Your doctor will get a copy of your results.  Is it okay to keep smoking now that there s a cancer screening exam?  No. Tobacco is one of the strongest cancer-causing agents. It causes not only lung cancer, but other cancers and cardiovascular (heart) diseases as well. The damage caused by smoking builds over time. This means that the longer you smoke, the higher your risk of disease. While it is never too late to quit, the sooner you quit, the better.  Where can I find help to quit smoking?  The best way to prevent lung cancer is to stop smoking. If you have already quit smoking, congratulations and keep it up! For help on quitting smoking, please call [employer] at 3-924-290-SKEY ([phone]) or the American Cancer Society at 1-961.742.5723 to find local resources near you.  One-on-one health coaching:  If you d prefer to work individually with a health care provider on tobacco cessation, we offer:      Medication Therapy Management:  Our specially trained pharmacists work closely with you and your doctor to help you quit smoking.  Call [phone] or [phone] (toll free).     Can Do: Health coaching offered by [facility].  [email or web address]

## 2019-01-22 NOTE — NURSING NOTE
"Chief Complaint   Patient presents with     Musculoskeletal Problem     left wrist pain x 1 month       Initial /84 (BP Location: Right arm)   Pulse 81   Temp 98.4  F (36.9  C) (Tympanic)   Resp 20   Ht 1.613 m (5' 3.5\")   Wt 88 kg (194 lb)   SpO2 97%   BMI 33.83 kg/m   Estimated body mass index is 33.83 kg/m  as calculated from the following:    Height as of this encounter: 1.613 m (5' 3.5\").    Weight as of this encounter: 88 kg (194 lb).    Patient presents to the clinic using No DME    Health Maintenance that is potentially due pending provider review:  Mammogram    Gave pt phone number/pended order to schedule mammo and/or colonoscopy(or FIT)    Is there anyone who you would like to be able to receive your results? No  If yes have patient fill out HARPAL    "

## 2019-01-22 NOTE — PROGRESS NOTES
"  SUBJECTIVE:   Ijeoma Shah is a 68 year old female who presents to clinic today for the following health issues:      Musculoskeletal problem/pain      Duration: 1 month    Description  Location: left wrist    Intensity:  moderate,     Accompanying signs and symptoms: radiation of pain to arm, numbness and swelling    History  Previous similar problem: no   Previous evaluation:  none    Precipitating or alleviating factors:  Trauma or overuse: no   Aggravating factors include: if turns wrist a certain way or hits thumb or folds clothes    Therapies tried and outcome: rest/inactivity, heat and ice    3 months ago had some pain in her wrist, then really got worse with hyperflexion of left wrist pain. Located top of hand and thumb and down in the wrist and above it, and swells,   Hurts to turn it, folding clothes. Has a little numbness in fingers.     We also discussed CT lung screening as beow.     Problem list and histories reviewed & adjusted, as indicated.  Additional history: as documented      Reviewed and updated as needed this visit by clinical staff  Tobacco  Allergies  Meds  Problems  Med Hx  Surg Hx  Fam Hx  Soc Hx        Reviewed and updated as needed this visit by Provider  Tobacco  Allergies  Meds  Problems  Med Hx  Surg Hx  Fam Hx       OBJECTIVE: /84 (BP Location: Right arm)   Pulse 81   Temp 98.4  F (36.9  C) (Tympanic)   Resp 20   Ht 1.613 m (5' 3.5\")   Wt 88 kg (194 lb)   SpO2 97%   BMI 33.83 kg/m     General: appears well, in no distress   Left wrist with no erythema, ecchymosis, warmth, there is some mild edema of the wrist, full range of motion, Finkelstein test is markedly positive and tenderness at the base of the left thumb    ASSESSMENT:  1. Radial styloid tenosynovitis (de quervain)    2. Personal history of tobacco use    3. Need for prophylactic vaccination and inoculation against influenza        PLAN:  Orders Placed This Encounter     Prof Fee: " Shared Decision Making Visit for Lung Cancer Screening     CT Chest Lung Cancer Scrn Low Dose wo     FLU VACCINE, INCREASED ANTIGEN, PRESV FREE, AGE 65+ [44961]     ADMIN INFLUENZA (For MEDICARE Patients ONLY) []     order for DME       Patient Instructions   Wear the splint for 2 weeks at least, taking it out to bathe, and do a few exercises, gradually wean out    Check with insurance for lung cancer screening      Lung Cancer Screening   Frequently Asked Questions  If you are at high-risk for lung cancer, getting screened with low-dose computed tomography (LDCT) every year can help save your life. This handout offers answers to some of the most common questions about lung cancer screening. If you have other questions, please call [phone] ([phone]).     What is it?  Lung cancer screening uses special X-ray technology to create an image of your lung tissue. The exam is quick and easy and takes less than 10 seconds. We don t give you any medicine or use any needles. You can eat before and after the exam. You don t need to change your clothes as long as the clothing on your chest doesn t contain metal. But, you do need to be able to hold your breath for at least 6 seconds during the exam.    What is the goal of lung cancer screening?  The goal of lung cancer screening is to save lives. Many times, lung cancer is not found until a person starts having physical symptoms. Lung cancer screening can help detect lung cancer in the earliest stages when it may be easier to treat.    Who should be screened for lung cancer?  We suggest lung cancer screening for anyone who is at high-risk for lung cancer. You are in the high-risk group if you:      are between the ages of 55 and 79, and    have smoked at least 1 pack of cigarettes a day for 30 or more years, and    still smoke or have quit within the past 15 years.    However, if you have a new cough or shortness of breath, you should talk to your doctor  before being screened.    Some national lung health advocacy groups also recommend screening for people ages 50 to 79 who have smoked an average of 1 pack of cigarettes a day for 20 years. They must also have at least 1 other risk factor for lung cancer, not including exposure to secondhand smoke. Other risk factors are having had cancer in the past, emphysema, pulmonary fibrosis, COPD, a family history of lung cancer, or exposure to certain materials such as arsenic, asbestos, beryllium, cadmium, chromium, diesel fumes, nickel, radon or silica. Your care team can help you know if you have one of these risk factors.     Why does it matter if I have symptoms?  Certain symptoms can be a sign that you have a condition in your lungs that should be checked and treated by your doctor. These symptoms include fever, chest pain, a new or changing cough, shortness of breath that you have never felt before, coughing up blood or unexplained weight loss. Having any of these symptoms can greatly affect the results of lung cancer screening.       Should all smokers get an LDCT lung cancer screening exam?  It depends. Lung cancer screening is for a very specific group of men and women who have a history of heavy smoking over a long period of time (see  Who should be screened for lung cancer  above).  I am in the high-risk group, but have been diagnosed with cancer in the past. Is LDCT lung cancer screening right for me?  In some cases, you should not have LDCT lung screening, such as when your doctor is already following your cancer with CT scan studies. Your doctor will help you decide if LDCT lung screening is right for you.  Do I need to have a screening exam every year?  Yes. If you are in the high-risk group described earlier, you should get an LDCT lung cancer screening exam every year until you are 79, or are no longer willing or able to undergo screening and possible procedures to diagnose and treat lung cancer.  How  effective is LDCT at preventing death from lung cancer?  Studies have shown that LDCT lung cancer screening can lower the risk of death from lung cancer by 20 percent in people who are at high-risk.  What are the risks?  There are some risks and limitations of LDCT lung cancer screening. We want to make sure you understand the risks and benefits, so please let us know if you have any questions. Your doctor may want to talk with you more about these risks.    Radiation exposure: As with any exam that uses radiation, there is a very small increased risk of cancer. The amount of radiation in LDCT is small--about the same amount a person would get from a mammogram. Your doctor orders the exam when he or she feels the potential benefits outweigh the risks.    False negatives: No test is perfect, including LDCT. It is possible that you may have a medical condition, including lung cancer, that is not found during your exam. This is called a false negative result.    False positives and more testing: LDCT very often finds something in the lung that could be cancer, but in fact is not. This is called a false positive result. False positive tests often cause anxiety. To make sure these findings are not cancer, you may need to have more tests. These tests will be done only if you give us permission. Sometimes patients need a treatment that can have side effects, such as a biopsy. For more information on false positives, see  What can I expect from the results?     Findings not related to lung cancer: Your LDCT exam also takes pictures of areas of your body next to your lungs. In a very small number of cases, the CT scan will show an abnormal finding in one of these areas, such as your kidneys, adrenal glands, liver or thyroid. This finding may not be serious, but you may need more tests. Your doctor can help you decide what other tests you may need, if any.  What can I expect from the results?  About 1 out of 4 LDCT exams will  find something that may need more tests. Most of the time, these findings are lung nodules. Lung nodules are very small collections of tissue in the lung. These nodules are very common, and the vast majority--more than 97 percent--are not cancer (benign). Most are normal lymph nodes or small areas of scarring from past infections.  But, if a small lung nodule is found to be cancer, the cancer can be cured more than 90 percent of the time. To know if the nodule is cancer, we may need to get more images before your next yearly screening exam. If the nodule has suspicious features (for example, it is large, has an odd shape or grows over time), we will refer you to a specialist for further testing.  Will my doctor also get the results?  Yes. Your doctor will get a copy of your results.  Is it okay to keep smoking now that there s a cancer screening exam?  No. Tobacco is one of the strongest cancer-causing agents. It causes not only lung cancer, but other cancers and cardiovascular (heart) diseases as well. The damage caused by smoking builds over time. This means that the longer you smoke, the higher your risk of disease. While it is never too late to quit, the sooner you quit, the better.  Where can I find help to quit smoking?  The best way to prevent lung cancer is to stop smoking. If you have already quit smoking, congratulations and keep it up! For help on quitting smoking, please call [employer] at 7-664-718-DTZK (1414) or the American [employer] Society at 1-425.857.1040 to find local resources near you.  One-on-one health coaching:  If you d prefer to work individually with a health care provider on tobacco cessation, we offer:      Medication Therapy Management:  Our specially trained pharmacists work closely with you and your doctor to help you quit smoking.  Call [phone] or [phone] (toll free).     Can Do: Health coaching offered by [facility].  [email or web address]       Jyoti Forrester  MD Radha     Lung Cancer Screening Shared Decision Making Visit     Ijeoma Shah is eligible for lung cancer screening on the basis of the information provided in my signed lung cancer screening order.     I have discussed with patient the risks and benefits of screening for lung cancer with low-dose CT.     The risks include:  radiation exposure: one low dose chest CT has as much ionizing radiation as about 15 chest x-rays or 6 months of background radiation living in [place]    false positives: 96% of positive findings/nodules are NOT cancer, but some might still require additional diagnostic evaluation, including biopsy  over-diagnosis: some slow growing cancers that might never have been clinically significant will be detected and treated unnecessarily     The benefit of early detection of lung cancer is contingent upon adherence to annual screening or more frequent follow up if indicated.     Furthermore, reaping the benefits of screening requires Ijeoma Shah to be willing and physically able to undergo diagnostic procedures, if indicated. Although no specific guide is available for determining severity of comorbidities, it is reasonable to withhold screening in patients who have greater mortality risk from other diseases.     We did discuss that the only way to prevent lung cancer is to not smoke. Smoking cessation assistance was not offered.    I did offer risk estimation using a calculator such as this one:    ShouldIScreen

## 2019-01-22 NOTE — PROGRESS NOTES

## 2019-02-18 ENCOUNTER — ANCILLARY PROCEDURE (OUTPATIENT)
Dept: MAMMOGRAPHY | Facility: CLINIC | Age: 69
End: 2019-02-18
Payer: MEDICARE

## 2019-02-18 DIAGNOSIS — Z12.31 VISIT FOR SCREENING MAMMOGRAM: ICD-10-CM

## 2019-02-18 PROCEDURE — 77067 SCR MAMMO BI INCL CAD: CPT | Mod: TC

## 2019-03-27 ENCOUNTER — TELEPHONE (OUTPATIENT)
Dept: FAMILY MEDICINE | Facility: CLINIC | Age: 69
End: 2019-03-27

## 2019-03-27 NOTE — TELEPHONE ENCOUNTER
Patient was told to return for fasting labs, reminder letter was sent to patient on 02/21/2019, patient has still not scheduled an appointment.

## 2019-09-30 ENCOUNTER — TELEPHONE (OUTPATIENT)
Dept: FAMILY MEDICINE | Facility: CLINIC | Age: 69
End: 2019-09-30

## 2019-09-30 NOTE — TELEPHONE ENCOUNTER
Panel Management Review      Patient has the following on her problem list: None      Composite cancer screening  Chart review shows that this patient is due/due soon for the following Fecal Colorectal (FIT)  Summary:    Patient is due/failing the following:   FIT    Action needed:   Patient needs office visit for wellness visit . and Patient needs referral/order: FIT    Type of outreach:    Sent [employer] message.    Questions for provider review:    None                                                                                                                                    Diana Pérez MA

## 2020-02-16 ENCOUNTER — HEALTH MAINTENANCE LETTER (OUTPATIENT)
Age: 70
End: 2020-02-16

## 2020-02-26 ENCOUNTER — TELEPHONE (OUTPATIENT)
Dept: FAMILY MEDICINE | Facility: CLINIC | Age: 70
End: 2020-02-26

## 2020-02-26 NOTE — TELEPHONE ENCOUNTER
Reason for Call:  Other     Detailed comments: pt is calling on her referral from 1/22/19 for a CT Chest Lung and stating per medicare that it needs to be listed with a procedure code.    Please advise pt once this is completed.    Phone Number Patient can be reached at: Home number on file [phone] (home)    Best Time: any    Can we leave a detailed message on this number? YES    Call taken on 2/26/2020 at 3:15 PM by Estefani Dangelo

## 2020-03-18 ENCOUNTER — E-VISIT (OUTPATIENT)
Dept: FAMILY MEDICINE | Facility: CLINIC | Age: 70
End: 2020-03-18
Payer: MEDICARE

## 2020-03-18 DIAGNOSIS — J01.90 ACUTE SINUSITIS WITH SYMPTOMS > 10 DAYS: Primary | ICD-10-CM

## 2020-03-18 PROCEDURE — 99421 OL DIG E/M SVC 5-10 MIN: CPT | Performed by: FAMILY MEDICINE

## 2020-03-18 NOTE — PATIENT INSTRUCTIONS
Thank you for choosing us for your care. I have placed an order for a prescription so that you can start treatment. View your full visit summary for details by clicking on the link below. Your pharmacist will able to address any questions you may have about the medication.     If you're not feeling better within 5-7 days, please schedule an appointment.  You can schedule an appointment right here in [facility], or call [phone]  If the visit is for the same symptoms as your e-visit, we'll refund the cost of your e-visit if seen within seven days.      Sinusitis (Antibiotic Treatment)    The sinuses are air-filled spaces within the bones of the face. They connect to the inside of the nose. Sinusitis is an inflammation of the tissue that lines the sinuses. Sinusitis can occur during a cold. It can also happen due to allergies to pollens and other particles in the air. Sinusitis can cause symptoms of sinus congestion and a feeling of fullness. A sinus infection causes fever, headache, and facial pain. There is often green or yellow fluid draining from the nose or into the back of the throat (post-nasal drip). You have been given antibiotics to treat this condition.  Home care    Take the full course of antibiotics as instructed. Do not stop taking them, even when you feel better.    Drink plenty of water, hot tea, and other liquids. This may help thin nasal mucus. It also may help your sinuses drain fluids.    Heat may help soothe painful areas of your face. Use a towel soaked in hot water. Or,  the shower and direct the warm spray onto your face. Using a vaporizer along with a menthol rub at night may also help soothe symptoms.     An expectorant with guaifenesin may help thin nasal mucus and help your sinuses drain fluids.    You can use an over-the-counter decongestant, unless a similar medicine was prescribed to you. Nasal sprays work the fastest. Use one that contains phenylephrine or oxymetazoline.  First blow your nose gently. Then use the spray. Do not use these medicines more often than directed on the label. If you do, your symptoms may get worse. You may also take pills that contain pseudoephedrine. Don t use products that combine multiple medicines. This is because side effects may be increased. Read labels. You can also ask the pharmacist for help. (People with high blood pressure should not use decongestants. They can raise blood pressure.)    Over-the-counter antihistamines may help if allergies contributed to your sinusitis.      Do not use nasal rinses or irrigation during an acute sinus infection, unless your healthcare provider tells you to. Rinsing may spread the infection to other areas in your sinuses.    Use acetaminophen or ibuprofen to control pain, unless another pain medicine was prescribed to you. If you have chronic liver or kidney disease or ever had a stomach ulcer, talk with your healthcare provider before using these medicines. (Aspirin should never be taken by anyone under age 18 who is ill with a fever. It may cause severe liver damage.)    Don't smoke. This can make symptoms worse.  Follow-up care  Follow up with your healthcare provider or our staff if you are not better in 1 week.  When to seek medical advice  Call your healthcare provider if any of these occur:    Facial pain or headache that gets worse    Stiff neck    Unusual drowsiness or confusion    Swelling of your forehead or eyelids    Vision problems, such as blurred or double vision    Fever of 100.4 F (38 C) or higher, or as directed by your healthcare provider    Seizure    Breathing problems    Symptoms don't go away in 10 days  Prevention  Here are steps you can take to help prevent an infection:    Keep good hand washing habits.    Don t have close contact with people who have sore throats, colds, or other upper respiratory infections.    Don t smoke, and stay away from secondhand smoke.    Stay up to date with of  your vaccines.  Date Last Reviewed: 11/1/2017 2000-2019 [employer] [address] [place] [address], [place] [address]. All rights reserved. This information is not intended as a substitute for professional medical care. Always follow your healthcare professional's instructions.

## 2020-06-23 ENCOUNTER — OFFICE VISIT (OUTPATIENT)
Dept: FAMILY MEDICINE | Facility: CLINIC | Age: 70
End: 2020-06-23
Payer: MEDICARE

## 2020-06-23 VITALS
HEIGHT: 64 IN | DIASTOLIC BLOOD PRESSURE: 80 MMHG | WEIGHT: 177 LBS | TEMPERATURE: 98.1 F | OXYGEN SATURATION: 94 % | RESPIRATION RATE: 16 BRPM | SYSTOLIC BLOOD PRESSURE: 139 MMHG | HEART RATE: 90 BPM | BODY MASS INDEX: 30.22 KG/M2

## 2020-06-23 DIAGNOSIS — Z12.31 ENCOUNTER FOR SCREENING MAMMOGRAM FOR BREAST CANCER: ICD-10-CM

## 2020-06-23 DIAGNOSIS — R63.4 WEIGHT LOSS: ICD-10-CM

## 2020-06-23 DIAGNOSIS — E78.5 HYPERLIPIDEMIA LDL GOAL <160: ICD-10-CM

## 2020-06-23 DIAGNOSIS — Z00.00 ENCOUNTER FOR MEDICARE ANNUAL WELLNESS EXAM: Primary | ICD-10-CM

## 2020-06-23 DIAGNOSIS — Z87.891 HISTORY OF TOBACCO USE: ICD-10-CM

## 2020-06-23 DIAGNOSIS — Z20.822 SUSPECTED COVID-19 VIRUS INFECTION: ICD-10-CM

## 2020-06-23 LAB
ALBUMIN SERPL-MCNC: 3.4 G/DL (ref 3.4–5)
ALP SERPL-CCNC: 106 U/L (ref 40–150)
ALT SERPL W P-5'-P-CCNC: 18 U/L (ref 0–50)
ANION GAP SERPL CALCULATED.3IONS-SCNC: 1 MMOL/L (ref 3–14)
AST SERPL W P-5'-P-CCNC: 19 U/L (ref 0–45)
BILIRUB SERPL-MCNC: 0.3 MG/DL (ref 0.2–1.3)
BUN SERPL-MCNC: 12 MG/DL (ref 7–30)
CALCIUM SERPL-MCNC: 9.4 MG/DL (ref 8.5–10.1)
CHLORIDE SERPL-SCNC: 104 MMOL/L (ref 94–109)
CHOLEST SERPL-MCNC: 238 MG/DL
CO2 SERPL-SCNC: 31 MMOL/L (ref 20–32)
CREAT SERPL-MCNC: 0.74 MG/DL (ref 0.52–1.04)
GFR SERPL CREATININE-BSD FRML MDRD: 83 ML/MIN/{1.73_M2}
GLUCOSE SERPL-MCNC: 107 MG/DL (ref 70–99)
HDLC SERPL-MCNC: 68 MG/DL
LDLC SERPL CALC-MCNC: 147 MG/DL
NONHDLC SERPL-MCNC: 170 MG/DL
POTASSIUM SERPL-SCNC: 4.2 MMOL/L (ref 3.4–5.3)
PROT SERPL-MCNC: 7.5 G/DL (ref 6.8–8.8)
SODIUM SERPL-SCNC: 136 MMOL/L (ref 133–144)
TRIGL SERPL-MCNC: 117 MG/DL
TSH SERPL DL<=0.005 MIU/L-ACNC: 0.62 MU/L (ref 0.4–4)

## 2020-06-23 PROCEDURE — 86769 SARS-COV-2 COVID-19 ANTIBODY: CPT | Mod: 90 | Performed by: FAMILY MEDICINE

## 2020-06-23 PROCEDURE — 99213 OFFICE O/P EST LOW 20 MIN: CPT | Mod: 25 | Performed by: FAMILY MEDICINE

## 2020-06-23 PROCEDURE — 36415 COLL VENOUS BLD VENIPUNCTURE: CPT | Performed by: FAMILY MEDICINE

## 2020-06-23 PROCEDURE — 84443 ASSAY THYROID STIM HORMONE: CPT | Performed by: FAMILY MEDICINE

## 2020-06-23 PROCEDURE — 80061 LIPID PANEL: CPT | Performed by: FAMILY MEDICINE

## 2020-06-23 PROCEDURE — G0439 PPPS, SUBSEQ VISIT: HCPCS | Performed by: FAMILY MEDICINE

## 2020-06-23 PROCEDURE — 80053 COMPREHEN METABOLIC PANEL: CPT | Performed by: FAMILY MEDICINE

## 2020-06-23 ASSESSMENT — MIFFLIN-ST. JEOR: SCORE: 1317.87

## 2020-06-23 NOTE — PROGRESS NOTES
"  SUBJECTIVE:   Ijeoma Shah is a 69 year old female who presents for Preventive Visit.      Are you in the first 12 months of your Medicare Part B coverage?  No    Physical Health:    In general, how would you rate your overall physical health? good    Outside of work, how many days during the week do you exercise? 4-5 days/week    Outside of work, approximately how many minutes a day do you exercise?greater than 60 minutes    If you drink alcohol do you typically have >3 drinks per day or >7 drinks per week? No    Do you usually eat at least 4 servings of fruit and vegetables a day, include whole grains & fiber and avoid regularly eating high fat or \"junk\" foods? No    Do you have any problems taking medications regularly?  No    Do you have any side effects from medications? not applicable    Needs assistance for the following daily activities: no assistance needed    Which of the following safety concerns are present in your home?  none identified     Hearing impairment: No    In the past 6 months, have you been bothered by leaking of urine? no    Mental Health:    In general, how would you rate your overall mental or emotional health? excellent  PHQ-2 Score: 0    Do you feel safe in your environment? Yes    Have you ever done Advance Care Planning? (For example, a Health Directive, POLST, or a discussion with a medical provider or your loved ones about your wishes): No, advance care planning information given to patient to review.  Patient plans to discuss their wishes with loved ones or provider.      Additional concerns to address?  No    Fall risk:  Fallen 2 or more times in the past year?: No  Any fall with injury in the past year?: No    Cognitive Screenin) Repeat 3 items (Leader, Season, Table)    2) Clock draw: ABNORMAL recognized what she did wrong  3) 3 item recall: Recalls 2 objects   Results: ABNORMAL clock, 1-2 items recalled: PROBABLE COGNITIVE IMPAIRMENT, **INFORM " PROVIDER**    Mini-CogTM Copyright TOM Alicea. Licensed by the author for use in [facility]; reprinted with permission (soalona@.[facility]). All rights reserved.      Do you have sleep apnea, excessive snoring or daytime drowsiness?: no    hyperlipidemia   Recent Labs   Lab Test 07/13/15  1045   CHOL 261*   HDL 67   *   TRIG 141   CHOLHDLRATIO 3.9        Weight loss  I note some weight loss. She isn't trying to lose weight but has been busy in the garden and can skip meals  Wt Readings from Last 5 Encounters:   20 80.3 kg (177 lb)   19 88 kg (194 lb)   18 85.7 kg (189 lb)   18 86.2 kg (190 lb)   17 86.9 kg (191 lb 9.6 oz)      Has a history of illness in March, wonders if she had COVID 19      History of smoking  She would like to have the screening test, we had discussed last year but she let the CT scan order .     Reviewed and updated as needed this visit by clinical staff  Tobacco  Allergies  Meds         Reviewed and updated as needed this visit by Provider        Social History     Tobacco Use     Smoking status: Former Smoker     Packs/day: 0.50     Years: 33.00     Pack years: 16.50     Types: Cigarettes     Last attempt to quit: 2014     Years since quittin.5     Smokeless tobacco: Never Used     Tobacco comment: working on quitting   Substance Use Topics     Alcohol use: Yes     Alcohol/week: 1.7 - 2.5 standard drinks     Types: 2 - 3 Standard drinks or equivalent per week     Comment: occ,social                           Current providers sharing in care for this patient include:   Patient Care Team:  Jyoti Espinal MD as PCP - General  Jyoti Espinal MD as Assigned PCP    The following health maintenance items are reviewed in Epic and correct as of today:  Health Maintenance   Topic Date Due     DEXA  1950     HEPATITIS C SCREENING  1950     ZOSTER IMMUNIZATION (2 of 3) 2015     ADVANCE CARE PLANNING  2016      "MEDICARE ANNUAL WELLNESS VISIT  02/16/2019     COLORECTAL CANCER SCREENING  06/09/2019     PHQ-2  01/01/2020     FALL RISK ASSESSMENT  01/22/2020     LIPID  07/13/2020     INFLUENZA VACCINE (Season Ended) 09/01/2020     MAMMO SCREENING  02/18/2021     DTAP/TDAP/TD IMMUNIZATION (2 - Td) 08/28/2022     PNEUMOCOCCAL IMMUNIZATION 65+ LOW/MEDIUM RISK  Completed     IPV IMMUNIZATION  Aged Out     MENINGITIS IMMUNIZATION  Aged Out     BP Readings from Last 3 Encounters:   06/23/20 139/80   01/22/19 139/84   08/29/18 138/72    Wt Readings from Last 3 Encounters:   06/23/20 80.3 kg (177 lb)   01/22/19 88 kg (194 lb)   08/29/18 85.7 kg (189 lb)              ROS:  Constitutional, HEENT, cardiovascular, pulmonary, gi and gu systems are negative, except as otherwise noted.    OBJECTIVE:   /80 (BP Location: Right arm)   Pulse 90   Temp 98.1  F (36.7  C) (Tympanic)   Resp 16   Ht 1.626 m (5' 4\")   Wt 80.3 kg (177 lb)   SpO2 94%   BMI 30.38 kg/m   Estimated body mass index is 30.38 kg/m  as calculated from the following:    Height as of this encounter: 1.626 m (5' 4\").    Weight as of this encounter: 80.3 kg (177 lb).  EXAM:   GENERAL: healthy, alert and no distress  NECK: no adenopathy, no asymmetry, masses, or scars and thyroid normal to palpation  RESP: lungs clear to auscultation - no rales, rhonchi or wheezes  CV: regular rate and rhythm, normal S1 S2, no S3 or S4, no murmur, click or rub, no peripheral edema and peripheral pulses strong  ABDOMEN: soft, nontender, no hepatosplenomegaly, no masses and bowel sounds normal  MS: no gross musculoskeletal defects noted, no edema  PSYCH: mentation appears normal, affect normal/bright  Breast exam: patient declines    Diagnostic Test Results:  Labs reviewed in Epic    ASSESSMENT / PLAN:   Ijeoma was seen today for physical.    Diagnoses and all orders for this visit:    Encounter for Medicare annual wellness exam    Hyperlipidemia LDL goal <160  -     Lipid panel " "reflex to direct LDL Fasting  -     TSH with free T4 reflex    Weight loss  -     Comprehensive metabolic panel  -     TSH with free T4 reflex    Suspected COVID-19 virus infection  -     COVID-19 Virus (Coronavirus) Antibody & Titer Reflex    History of tobacco use  -     CT Chest Lung Cancer Scrn Low Dose wo; Future    Encounter for screening mammogram for breast cancer  -     *MA Screening Digital Bilateral; Future        COUNSELING:  Reviewed preventive health counseling, as reflected in patient instructions  Special attention given to:       Regular exercise       Healthy diet/nutrition       Immunizations    Will get new Shingles vaccination at pharmacy             Consider lung cancer screening for ages 55-80 years and 30 pack-year smoking history          Colon cancer screening    Estimated body mass index is 30.38 kg/m  as calculated from the following:    Height as of this encounter: 1.626 m (5' 4\").    Weight as of this encounter: 80.3 kg (177 lb).    Weight management plan: Discussed healthy diet and exercise guidelines     reports that she quit smoking about 5 years ago. Her smoking use included cigarettes. She has a 16.50 pack-year smoking history. She has never used smokeless tobacco.      Appropriate preventive services were discussed with this patient, including applicable screening as appropriate for cardiovascular disease, diabetes, osteopenia/osteoporosis, and glaucoma.  As appropriate for age/gender, discussed screening for colorectal cancer, prostate cancer, breast cancer, and cervical cancer. Checklist reviewing preventive services available has been given to the patient.    Reviewed patients plan of care and provided an AVS. The Basic Care Plan (routine screening as documented in Health Maintenance) for Ijeoma meets the Care Plan requirement. This Care Plan has been established and reviewed with the Patient.    Counseling Resources:  ATP IV Guidelines  Pooled Cohorts Equation " Calculator  Breast Cancer Risk Calculator  FRAX Risk Assessment  ICSI Preventive Guidelines  Dietary Guidelines for Americans, 2010  [employer]'s MyPlate  ASA Prophylaxis  Lung CA Screening    Jyoti Griffiths MD  [facility]

## 2020-06-23 NOTE — NURSING NOTE
"Chief Complaint   Patient presents with     Physical     yearly       Initial /80 (BP Location: Right arm)   Pulse 90   Temp 98.1  F (36.7  C) (Tympanic)   Resp 16   Ht 1.626 m (5' 4\")   Wt 80.3 kg (177 lb)   SpO2 94%   BMI 30.38 kg/m   Estimated body mass index is 30.38 kg/m  as calculated from the following:    Height as of this encounter: 1.626 m (5' 4\").    Weight as of this encounter: 80.3 kg (177 lb).    Patient presents to the clinic using No DME    Health Maintenance that is potentially due pending provider review:  NONE    n/a    Is there anyone who you would like to be able to receive your results? No  If yes have patient fill out HARPAL    "

## 2020-06-23 NOTE — PATIENT INSTRUCTIONS
Patient Education   Personalized Prevention Plan  You are due for the preventive services outlined below.  Your care team is available to assist you in scheduling these services.  If you have already completed any of these items, please share that information with your care team to update in your medical record.  Health Maintenance Due   Topic Date Due     Osteoporosis Screening  1950     Hepatitis C Screening  1950     Zoster (Shingles) Vaccine (2 of 3) 09/07/2015     Discuss Advance Care Planning  04/06/2016     Annual Wellness Visit  02/16/2019     Colorectal Cancer Screening  06/09/2019     PHQ-2  01/01/2020     FALL RISK ASSESSMENT  01/22/2020     Cholesterol Lab  07/13/2020

## 2020-06-25 LAB
COVID-19 SPIKE RBD ABY TITER: NORMAL
COVID-19 SPIKE RBD ABY: NEGATIVE

## 2020-07-03 ENCOUNTER — TELEPHONE (OUTPATIENT)
Dept: FAMILY MEDICINE | Facility: CLINIC | Age: 70
End: 2020-07-03

## 2020-07-03 ENCOUNTER — HOSPITAL ENCOUNTER (OUTPATIENT)
Dept: MAMMOGRAPHY | Facility: CLINIC | Age: 70
End: 2020-07-03
Attending: FAMILY MEDICINE
Payer: MEDICARE

## 2020-07-03 ENCOUNTER — HOSPITAL ENCOUNTER (OUTPATIENT)
Dept: CT IMAGING | Facility: CLINIC | Age: 70
End: 2020-07-03
Attending: FAMILY MEDICINE
Payer: MEDICARE

## 2020-07-03 DIAGNOSIS — Z87.891 HISTORY OF TOBACCO USE: ICD-10-CM

## 2020-07-03 DIAGNOSIS — R59.0 MEDIASTINAL ADENOPATHY: ICD-10-CM

## 2020-07-03 DIAGNOSIS — Z12.31 ENCOUNTER FOR SCREENING MAMMOGRAM FOR BREAST CANCER: ICD-10-CM

## 2020-07-03 DIAGNOSIS — R91.8 MASS OF LEFT LUNG: Primary | ICD-10-CM

## 2020-07-03 LAB — RADIOLOGIST FLAGS: ABNORMAL

## 2020-07-03 PROCEDURE — G0297 LDCT FOR LUNG CA SCREEN: HCPCS

## 2020-07-03 PROCEDURE — 77067 SCR MAMMO BI INCL CAD: CPT

## 2020-07-03 NOTE — TELEPHONE ENCOUNTER
Dr. Griffiths,    [facility] is calling in urgent finding from CT of chest:    Radiologist flags Rad-Urgent Abnormal  (Urgent)     Probable bronchogenic malignancy in the left lower      Will route this to PCP today for review,    DEIDRE Hernandez

## 2020-07-06 NOTE — TELEPHONE ENCOUNTER
Oncology/Surgical Oncology Referral Request:     Specialty Requested: Medical Oncology     Referring Provider: Jyoti Nascimento MD     Referring Clinic/Organization: [facility]    Records location: [facility]     Requested Provider (if specified): Not Specified

## 2020-07-07 NOTE — TELEPHONE ENCOUNTER
RECORDS STATUS - ALL OTHER DIAGNOSIS      RECORDS RECEIVED FROM: [facility] - Internal Referral   DATE RECEIVED: 7/7/20   NOTES STATUS DETAILS   OFFICE NOTE from referring provider [facility]    OFFICE NOTE from medical oncologist     DISCHARGE SUMMARY from hospital     DISCHARGE REPORT from the ER     OPERATIVE REPORT     MEDICATION LIST [facility]    CLINICAL TRIAL TREATMENTS TO DATE     LABS     PATHOLOGY REPORTS     ANYTHING RELATED TO DIAGNOSIS Epic 5/3/20   GENONOMIC TESTING     TYPE:     IMAGING (NEED IMAGES & REPORT)     CT SCANS PACS 7/3/20   MRI     MAMMO PACS 7/3/20   ULTRASOUND     PET Scheduled @ [facility] 7/9

## 2020-07-09 ENCOUNTER — HOSPITAL ENCOUNTER (OUTPATIENT)
Dept: PET IMAGING | Facility: CLINIC | Age: 70
Discharge: HOME | End: 2020-07-09
Attending: FAMILY MEDICINE | Admitting: FAMILY MEDICINE
Payer: MEDICARE

## 2020-07-09 DIAGNOSIS — R59.0 MEDIASTINAL ADENOPATHY: ICD-10-CM

## 2020-07-09 DIAGNOSIS — R91.8 MASS OF LEFT LUNG: ICD-10-CM

## 2020-07-09 PROCEDURE — A9552 F18 FDG: HCPCS | Performed by: FAMILY MEDICINE

## 2020-07-09 PROCEDURE — 25000128 H RX IP 250 OP 636: Performed by: FAMILY MEDICINE

## 2020-07-09 PROCEDURE — 78816 PET IMAGE W/CT FULL BODY: CPT | Mod: PI

## 2020-07-09 PROCEDURE — 34300033 ZZH RX 343: Performed by: FAMILY MEDICINE

## 2020-07-09 RX ORDER — IOPAMIDOL 755 MG/ML
10-135 INJECTION, SOLUTION INTRAVASCULAR ONCE
Status: COMPLETED | OUTPATIENT
Start: 2020-07-09 | End: 2020-07-09

## 2020-07-09 RX ADMIN — IOPAMIDOL 106 ML: 755 INJECTION, SOLUTION INTRAVENOUS at 09:28

## 2020-07-09 RX ADMIN — FLUDEOXYGLUCOSE F-18 14.3 MCI.: 500 INJECTION, SOLUTION INTRAVENOUS at 09:28

## 2020-07-10 ENCOUNTER — PREP FOR PROCEDURE (OUTPATIENT)
Dept: SURGERY | Facility: CLINIC | Age: 70
End: 2020-07-10

## 2020-07-10 DIAGNOSIS — R91.8 MASS OF LOWER LOBE OF LEFT LUNG: Primary | ICD-10-CM

## 2020-07-10 NOTE — PROGRESS NOTES
I spoke to Salima about recommendations from our Lung Nodule Conference today.  I will have scheduling cancel her appt next week with Dr. Sanabria and, instead, have her talk to Dr. Castro in a virtual visit and arrange for a bronchoscopy/EBUS.  She will talk to her clinic about a pre-op H & P.   She is not on any blood thinners.   I will message our OR  to set this up and call patient with details.

## 2020-07-14 ENCOUNTER — TELEPHONE (OUTPATIENT)
Dept: PULMONOLOGY | Facility: CLINIC | Age: 70
End: 2020-07-14

## 2020-07-14 ENCOUNTER — PRE VISIT (OUTPATIENT)
Dept: SURGERY | Facility: CLINIC | Age: 70
End: 2020-07-14

## 2020-07-14 DIAGNOSIS — Z11.59 ENCOUNTER FOR SCREENING FOR OTHER VIRAL DISEASES: Primary | ICD-10-CM

## 2020-07-14 PROBLEM — R91.8 MASS OF LOWER LOBE OF LEFT LUNG: Status: ACTIVE | Noted: 2020-07-14

## 2020-07-14 NOTE — TELEPHONE ENCOUNTER
Spoke with patient to schedule procedure with Dr. Ashlee Castro    Procedure was scheduled on 07/27 at [facility] OR  Patient will have H&P with [facility]     Patient is aware a COVID test is needed with-in 72hrs, someone will contact her to schedule before surgery.     Patient is aware a / is needed day of surgery.   Surgery letter was sent via [employer], patient has my direct contact information for any further questions.

## 2020-07-15 ENCOUNTER — VIRTUAL VISIT (OUTPATIENT)
Dept: PULMONOLOGY | Facility: CLINIC | Age: 70
End: 2020-07-15
Attending: FAMILY MEDICINE
Payer: MEDICARE

## 2020-07-15 DIAGNOSIS — R91.8 MASS OF LEFT LUNG: ICD-10-CM

## 2020-07-15 DIAGNOSIS — R59.0 MEDIASTINAL ADENOPATHY: ICD-10-CM

## 2020-07-15 NOTE — PROGRESS NOTES
"Ijeoma Shah is a 69 year old female who is being evaluated via a billable video visit.      The patient has been notified of following:     \"This video visit will be conducted via a call between you and your physician/provider. We have found that certain health care needs can be provided without the need for an in-person physical exam.  This service lets us provide the care you need with a video conversation.  If a prescription is necessary we can send it directly to your pharmacy.  If lab work is needed we can place an order for that and you can then stop by our lab to have the test done at a later time.    Video visits are billed at different rates depending on your insurance coverage.  Please reach out to your insurance provider with any questions.    If during the course of the call the physician/provider feels a video visit is not appropriate, you will not be charged for this service.\"    Patient has given verbal consent for Video visit? Yes    How would you like to obtain your AVS? MyChart     Vitals - Patient Reported  Weight (Patient Reported): 80.3 kg (177 lb)  Height (Patient Reported): 162.6 cm (5' 4.02\")  BMI (Based on Pt Reported Ht/Wt): 30.37  Pain Score: No Pain (0)    Maikol Jasso LPN      Video-Visit Details    Type of service:  Video Visit    Video time: 25 minutes  Total visit time: 30 minutes, reviewing chart, images and coordinating care    Originating Location (pt. Location): Home    Distant Location (provider location):  [facility]     Platform used for Video Visit: [facility]  Interventional Pulmonary Clinic Virtual Visit Note    July 15, 2020    Chief complaint:  Ijeoma Shah is a 69 year old female seen for   Chief Complaint   Patient presents with     Video Visit     Mass of Left Lung and Mediastinal Adenopathy       Reason for clinic visit / Chief complaint:   Lung mass    Assessment and Plan:  Left lower lobe mass with multiple hilar " lymph nodes, suspicious for primary lung cancer.  We discussed possible etiologies.  She is scheduled to have bronchoscopy and endobronchial ultrasound-guided biopsies on July 27.  I discussed pros, cons and alternatives of the procedures.  She is in agreement to proceed.  Current smoker, 50-pack-year  Dyspnea on exertion, mild, no pulmonary function tests available.    History of Present Illness:  69 years old woman found to have lung mass on a lung cancer screening CT scan and referred to thoracic surgery.  She was discussed in our multidisciplinary meeting and referred to interventional pulmonary.  She is scheduled to have procedure done as indicated above.  Her main symptom is dyspnea on exertion which is mild which she attributes to smoking.  She smokes almost a pack a day for 50 years.  No history of exposure.  No family history of lung problems.  She had pneumonia in January 2020 when she was treated with antibiotics as an outpatient.  Her father worked in a job where he did siding applications but she recalls no exposure directly.  She has no other medical problems she is not on any medication.       Allergies   Allergen Reactions     Bee Venom Hives     Hot and sweating         Past Medical History:   Diagnosis Date     Simple chronic bronchitis (H)         Past Surgical History:   Procedure Laterality Date     COLONOSCOPY  6/29/04    colonoscopy to the cecum     SURGICAL HISTORY OF -       nose surgery     TUBAL LIGATION      bilateral tubal ligation.  BTL        Social History     Socioeconomic History     Marital status:      Spouse name: Not on file     Number of children: Not on file     Years of education: Not on file     Highest education level: Not on file   Occupational History     Not on file   Social Needs     Financial resource strain: Not on file     Food insecurity     Worry: Not on file     Inability: Not on file     Transportation needs     Medical: Not on file     Non-medical: Not on  file   Tobacco Use     Smoking status: Former Smoker     Packs/day: 0.50     Years: 33.00     Pack years: 16.50     Types: Cigarettes     Last attempt to quit: 2014     Years since quittin.5     Smokeless tobacco: Never Used     Tobacco comment: working on quitting   Substance and Sexual Activity     Alcohol use: Yes     Alcohol/week: 1.7 - 2.5 standard drinks     Types: 2 - 3 Standard drinks or equivalent per week     Comment: occ,social     Drug use: No     Sexual activity: Yes     Partners: Male   Lifestyle     Physical activity     Days per week: Not on file     Minutes per session: Not on file     Stress: Not on file   Relationships     Social connections     Talks on phone: Not on file     Gets together: Not on file     Attends [employer] service: Not on file     Active member of club or organization: Not on file     Attends meetings of clubs or organizations: Not on file     Relationship status: Not on file     Intimate partner violence     Fear of current or ex partner: Not on file     Emotionally abused: Not on file     Physically abused: Not on file     Forced sexual activity: Not on file   Other Topics Concern     Parent/sibling w/ CABG, MI or angioplasty before 65F 55M? Not Asked   Social History Narrative     Not on file        Family History   Problem Relation Age of Onset     Eye Disorder Mother         cataracts     Cancer Father      Melanoma Father      Cancer Sister         bile duct     Cancer Sister         lung        Immunization History   Administered Date(s) Administered     Influenza (High Dose) 3 valent vaccine 2016, 2018, 2019     Pneumo Conj 13-V (2010&after) 2016     Pneumococcal 23 valent 2018     TDAP Vaccine (Adacel) 2012     Zoster vaccine, live 2015       Current Outpatient Medications   Medication Sig     ketoconazole (NIZORAL) 2 % cream Apply topically daily     triamcinolone (KENALOG) 0.1 % external ointment APPLY OINTMENT  TOPICALLY TWICE DAILY OR AS NEEDED     EPINEPHrine (EPIPEN/ADRENACLICK/OR ANY BX GENERIC EQUIV) 0.3 MG/0.3ML injection 2-pack Inject 0.3 mLs (0.3 mg) into the muscle as needed (Patient not taking: Reported on 6/23/2020)     No current facility-administered medications for this visit.         Review of Systems:  I have done 10 points of review systems and all negative except for those mentioned in HPI    Physical examination  Constitutional: Oriented, not in distress  Vitals: unavailable  Head and neck: normal posture and movements  Respiratory: Normal tidal breathing, no shortness of breath, no audible wheezing or stridor over the phone or video visit  Psychiatric:  Mood and affect are appropriate with insight into his/her medical condition    Data:  Lab Results   Component Value Date    WBC 7.7 11/26/2013     Lab Results   Component Value Date    RBC 4.88 11/26/2013     Lab Results   Component Value Date    HGB 14.9 11/26/2013     Lab Results   Component Value Date    HCT 42.4 11/26/2013     Lab Results   Component Value Date    MCV 87 11/26/2013     Lab Results   Component Value Date    MCH 30.5 11/26/2013     Lab Results   Component Value Date    MCHC 35.1 11/26/2013     Lab Results   Component Value Date    RDW 12.3 11/26/2013     Lab Results   Component Value Date     11/26/2013       Lab Results   Component Value Date     06/23/2020      Lab Results   Component Value Date    POTASSIUM 4.2 06/23/2020     Lab Results   Component Value Date    CHLORIDE 104 06/23/2020     Lab Results   Component Value Date    SABINO 9.4 06/23/2020     Lab Results   Component Value Date    CO2 31 06/23/2020     Lab Results   Component Value Date    BUN 12 06/23/2020     Lab Results   Component Value Date    CR 0.74 06/23/2020     Lab Results   Component Value Date     06/23/2020         MARIE Castro MD

## 2020-07-15 NOTE — LETTER
"7/15/2020       RE: Ijeoma Shah  [address] [facility] [address]     Dear Colleague,    Thank you for referring your patient, Ijeoma Shah, to the [facility] at [facility]. Please see a copy of my visit note below.    Ijeoma Shah is a 69 year old female who is being evaluated via a billable video visit.        Vitals - Patient Reported  Weight (Patient Reported): 80.3 kg (177 lb)  Height (Patient Reported): 162.6 cm (5' 4.02\")  BMI (Based on Pt Reported Ht/Wt): 30.37  Pain Score: No Pain (0)    Maikol Jasso LPN      Video-Visit Details    Type of service:  Video Visit    Video time: 25 minutes  Total visit time: 30 minutes, reviewing chart, images and coordinating care    Originating Location (pt. Location): Home    Distant Location (provider location):  [facility]     Platform used for Video Visit: [facility]  Interventional Pulmonary Clinic Virtual Visit Note    July 15, 2020    Chief complaint:  Ijeoma Shah is a 69 year old female seen for   Chief Complaint   Patient presents with     Video Visit     Mass of Left Lung and Mediastinal Adenopathy       Reason for clinic visit / Chief complaint:   Lung mass    Assessment and Plan:  Left lower lobe mass with multiple hilar lymph nodes, suspicious for primary lung cancer.  We discussed possible etiologies.  She is scheduled to have bronchoscopy and endobronchial ultrasound-guided biopsies on July 27.  I discussed pros, cons and alternatives of the procedures.  She is in agreement to proceed.  Current smoker, 50-pack-year  Dyspnea on exertion, mild, no pulmonary function tests available.    History of Present Illness:  69 years old woman found to have lung mass on a lung cancer screening CT scan and referred to thoracic surgery.  She was discussed in our multidisciplinary meeting and referred to interventional pulmonary.  " She is scheduled to have procedure done as indicated above.  Her main symptom is dyspnea on exertion which is mild which she attributes to smoking.  She smokes almost a pack a day for 50 years.  No history of exposure.  No family history of lung problems.  She had pneumonia in 2020 when she was treated with antibiotics as an outpatient.  Her father worked in a job where he did [employer] applications but she recalls no exposure directly.  She has no other medical problems she is not on any medication.       Allergies   Allergen Reactions     Bee Venom Hives     Hot and sweating         Past Medical History:   Diagnosis Date     Simple chronic bronchitis (H)         Past Surgical History:   Procedure Laterality Date     COLONOSCOPY  04    colonoscopy to the cecum     SURGICAL HISTORY OF -       nose surgery     TUBAL LIGATION      bilateral tubal ligation.  BTL        Social History     Socioeconomic History     Marital status:      Spouse name: Not on file     Number of children: Not on file     Years of education: Not on file     Highest education level: Not on file   Occupational History     Not on file   Social Needs     Financial resource strain: Not on file     Food insecurity     Worry: Not on file     Inability: Not on file     Transportation needs     Medical: Not on file     Non-medical: Not on file   Tobacco Use     Smoking status: Former Smoker     Packs/day: 0.50     Years: 33.00     Pack years: 16.50     Types: Cigarettes     Last attempt to quit: 2014     Years since quittin.5     Smokeless tobacco: Never Used     Tobacco comment: working on quitting   Substance and Sexual Activity     Alcohol use: Yes     Alcohol/week: 1.7 - 2.5 standard drinks     Types: 2 - 3 Standard drinks or equivalent per week     Comment: occ,social     Drug use: No     Sexual activity: Yes     Partners: Male   Lifestyle     Physical activity     Days per week: Not on file     Minutes per session: Not  on file     Stress: Not on file   Relationships     Social connections     Talks on phone: Not on file     Gets together: Not on file     Attends Zoroastrianism service: Not on file     Active member of club or organization: Not on file     Attends meetings of clubs or organizations: Not on file     Relationship status: Not on file     Intimate partner violence     Fear of current or ex partner: Not on file     Emotionally abused: Not on file     Physically abused: Not on file     Forced sexual activity: Not on file   Other Topics Concern     Parent/sibling w/ CABG, MI or angioplasty before 65F 55M? Not Asked   Social History Narrative     Not on file        Family History   Problem Relation Age of Onset     Eye Disorder Mother         cataracts     Cancer Father      Melanoma Father      Cancer Sister         bile duct     Cancer Sister         lung        Immunization History   Administered Date(s) Administered     Influenza (High Dose) 3 valent vaccine 11/17/2016, 02/16/2018, 01/22/2019     Pneumo Conj 13-V (2010&after) 11/17/2016     Pneumococcal 23 valent 02/16/2018     TDAP Vaccine (Adacel) 08/28/2012     Zoster vaccine, live 07/13/2015       Current Outpatient Medications   Medication Sig     ketoconazole (NIZORAL) 2 % cream Apply topically daily     triamcinolone (KENALOG) 0.1 % external ointment APPLY OINTMENT TOPICALLY TWICE DAILY OR AS NEEDED     EPINEPHrine (EPIPEN/ADRENACLICK/OR ANY BX GENERIC EQUIV) 0.3 MG/0.3ML injection 2-pack Inject 0.3 mLs (0.3 mg) into the muscle as needed (Patient not taking: Reported on 6/23/2020)     No current facility-administered medications for this visit.         Review of Systems:  I have done 10 points of review systems and all negative except for those mentioned in HPI    Physical examination  Constitutional: Oriented, not in distress  Vitals: unavailable  Head and neck: normal posture and movements  Respiratory: Normal tidal breathing, no shortness of breath, no audible  wheezing or stridor over the phone or video visit  Psychiatric:  Mood and affect are appropriate with insight into his/her medical condition    Data:  Lab Results   Component Value Date    WBC 7.7 11/26/2013     Lab Results   Component Value Date    RBC 4.88 11/26/2013     Lab Results   Component Value Date    HGB 14.9 11/26/2013     Lab Results   Component Value Date    HCT 42.4 11/26/2013     Lab Results   Component Value Date    MCV 87 11/26/2013     Lab Results   Component Value Date    MCH 30.5 11/26/2013     Lab Results   Component Value Date    MCHC 35.1 11/26/2013     Lab Results   Component Value Date    RDW 12.3 11/26/2013     Lab Results   Component Value Date     11/26/2013       Lab Results   Component Value Date     06/23/2020      Lab Results   Component Value Date    POTASSIUM 4.2 06/23/2020     Lab Results   Component Value Date    CHLORIDE 104 06/23/2020     Lab Results   Component Value Date    SABINO 9.4 06/23/2020     Lab Results   Component Value Date    CO2 31 06/23/2020     Lab Results   Component Value Date    BUN 12 06/23/2020     Lab Results   Component Value Date    CR 0.74 06/23/2020     Lab Results   Component Value Date     06/23/2020         MARIE Castro MD

## 2020-07-22 ENCOUNTER — OFFICE VISIT (OUTPATIENT)
Dept: FAMILY MEDICINE | Facility: CLINIC | Age: 70
End: 2020-07-22
Payer: MEDICARE

## 2020-07-22 VITALS
HEIGHT: 64 IN | TEMPERATURE: 98.5 F | RESPIRATION RATE: 18 BRPM | WEIGHT: 178 LBS | OXYGEN SATURATION: 100 % | HEART RATE: 74 BPM | SYSTOLIC BLOOD PRESSURE: 138 MMHG | BODY MASS INDEX: 30.39 KG/M2 | DIASTOLIC BLOOD PRESSURE: 80 MMHG

## 2020-07-22 DIAGNOSIS — Z01.818 PREOP GENERAL PHYSICAL EXAM: Primary | ICD-10-CM

## 2020-07-22 DIAGNOSIS — R91.8 MASS OF LEFT LUNG: ICD-10-CM

## 2020-07-22 PROCEDURE — 99214 OFFICE O/P EST MOD 30 MIN: CPT | Performed by: FAMILY MEDICINE

## 2020-07-22 PROCEDURE — 93000 ELECTROCARDIOGRAM COMPLETE: CPT | Performed by: FAMILY MEDICINE

## 2020-07-22 ASSESSMENT — MIFFLIN-ST. JEOR: SCORE: 1322.4

## 2020-07-22 NOTE — PROGRESS NOTES
[facility]  [address] [place] [id number]  [phone]  Dept: [phone]    PRE-OP EVALUATION:  Today's date: 2020    Ijeoma Shah (: 1950) presents for pre-operative evaluation assessment as requested by Dr. Dr. Krueger.  She requires evaluation and anesthesia risk assessment prior to undergoing surgery/procedure for treatment of Bronchoscopy .    [facility]ax number for surgical facility: [facility]   Primary Physician: Jyoti Espinal  Type of Anesthesia Anticipated: General    Preop Questionnnaire:  Pre-op Questionnaire 2020   Surgery Location: [facility]   Surgeon: irina   Surgery/Procedure: bronchosopy   Surgery Date: 20   Time of Surgery: bronchoscopy   Where patient plans to recover: At home with family   Have you ever had a heart attack or stroke? No   Have you ever had surgery on your heart or blood vessels, such as a stent placement, a coronary artery bypass, or surgery on an artery in your head, neck, heart, or legs? No   Do you have chest pain with activity? No   Do you have a history of  heart failure? No   Do you currently have a cold, bronchitis or symptoms of other infection? No   Do you have a cough, shortness of breath, or wheezing? No   Do you or anyone in your family have previous history of blood clots? No   Do you or does anyone in your family have a serious bleeding problem such as prolonged bleeding following surgeries or cuts? No   Have you ever had problems with anemia or been told to take iron pills? No   Have you had any abnormal blood loss such as black, tarry or bloody stools, or abnormal vaginal bleeding? No   Have you ever had a blood transfusion? No   Are you willing to have a blood transfusion if it is medically needed before, during, or after your surgery? Yes   Have you or any of your relatives ever had problems with anesthesia? No   Do you have sleep apnea, excessive snoring or daytime drowsiness? No   Do you have any  artifical heart valves or other implanted medical devices like a pacemaker, defibrillator, or continuous glucose monitor? No   Do you have artificial joints? No   Are you allergic to latex? No   Is there any chance that you may be pregnant? No         HPI:     HPI related to upcoming procedure   Ijeoma Shah is a 69 year old female with a mass found on screening CT scan.  Having bronchoscopy    Not smoking anymore . No medications or other active medical issues    No fever or cough or cp or sob or rash or urine/stool changes or leg swelling.      MEDICAL HISTORY:     Patient Active Problem List    Diagnosis Date Noted     Mass of lower lobe of left lung 07/14/2020     Priority: Medium     Added automatically from request for surgery [id number]       Mass of left lung 07/03/2020     Priority: Medium     Mediastinal adenopathy 07/03/2020     Priority: Medium     Uterine prolapse 02/18/2018     Priority: Medium     Bee sting reaction 08/28/2012     Priority: Medium     Advanced directives, counseling/discussion 04/06/2011     Priority: Medium     Pt took packet home to fill out        HYPERLIPIDEMIA LDL GOAL <160 10/31/2010     Priority: Medium     Dermatophytosis of foot 05/26/2006     Priority: Medium     Viral warts 05/26/2006     Priority: Medium     Problem list name updated by automated process. Provider to review          Past Surgical History:   Procedure Laterality Date     COLONOSCOPY  6/29/04    colonoscopy to the cecum     SURGICAL HISTORY OF -       nose surgery     TUBAL LIGATION      bilateral tubal ligation.  BTL     Current Outpatient Medications   Medication Sig Dispense Refill     EPINEPHrine (EPIPEN/ADRENACLICK/OR ANY BX GENERIC EQUIV) 0.3 MG/0.3ML injection 2-pack Inject 0.3 mLs (0.3 mg) into the muscle as needed 0.6 mL 1     ketoconazole (NIZORAL) 2 % cream Apply topically daily 60 g 1     triamcinolone (KENALOG) 0.1 % external ointment APPLY OINTMENT TOPICALLY TWICE DAILY OR AS NEEDED 45 g  "0     OTC products: None, except as noted above    Allergies   Allergen Reactions     Bee Venom Hives     Hot and sweating       Latex Allergy: NO    Social History     Tobacco Use     Smoking status: Former Smoker     Packs/day: 0.50     Years: 33.00     Pack years: 16.50     Types: Cigarettes     Last attempt to quit: 2014     Years since quittin.6     Smokeless tobacco: Never Used     Tobacco comment: working on quitting   Substance Use Topics     Alcohol use: Yes     Alcohol/week: 1.7 - 2.5 standard drinks     Types: 2 - 3 Standard drinks or equivalent per week     Comment: occ,social     History   Drug Use No       REVIEW OF SYSTEMS:       EXAM:   /80 (BP Location: Right arm, Patient Position: Sitting, Cuff Size: Adult Large)   Pulse 74   Temp 98.5  F (36.9  C) (Tympanic)   Resp 18   Ht 1.626 m (5' 4\")   Wt 80.7 kg (178 lb)   SpO2 100%   Breastfeeding No   BMI 30.55 kg/m    Gen: alert and oriented, in no acute distress, affect within normal limits  Neck: supple with no masses or nodes  Throat: oropharynx clear, no exudate or tonsillar/palate asymmetry.    CV: RRR, no murmur  Lungs: clear bilaterally with good effort  Abd: nontender, no mass  Ext: no edema or lesions   Neuro: moving all extremities, gait normal, no focal deficts noted      DIAGNOSTICS:   EKG: NSR, no ST or T wave changes.  Normal axis, intervals.  No Q waves.     IMPRESSION:   Pre op  Lung mass      The proposed surgical procedure is considered LOW risk.    REVISED CARDIAC RISK INDEX  The patient has the following serious cardiovascular risks for perioperative complications such as (MI, PE, VFib and 3  AV Block):  No serious cardiac risks  INTERPRETATION: 0 risks: Class I (very low risk - 0.4% complication rate)    The patient has the following additional risks for perioperative complications:  No identified additional risks      RECOMMENDATIONS:       APPROVAL GIVEN to proceed with proposed procedure, without further " diagnostic evaluation       Signed Electronically by: Kojo Griffith MD    Copy of this evaluation report is provided to requesting physician.    [place] Preop Guidelines    Revised Cardiac Risk Index

## 2020-07-24 DIAGNOSIS — Z11.59 ENCOUNTER FOR SCREENING FOR OTHER VIRAL DISEASES: ICD-10-CM

## 2020-07-24 PROCEDURE — U0003 INFECTIOUS AGENT DETECTION BY NUCLEIC ACID (DNA OR RNA); SEVERE ACUTE RESPIRATORY SYNDROME CORONAVIRUS 2 (SARS-COV-2) (CORONAVIRUS DISEASE [COVID-19]), AMPLIFIED PROBE TECHNIQUE, MAKING USE OF HIGH THROUGHPUT TECHNOLOGIES AS DESCRIBED BY CMS-2020-01-R: HCPCS | Performed by: INTERNAL MEDICINE

## 2020-07-25 LAB
SARS-COV-2 RNA SPEC QL NAA+PROBE: NOT DETECTED
SPECIMEN SOURCE: NORMAL

## 2020-07-26 ENCOUNTER — ANESTHESIA EVENT (OUTPATIENT)
Dept: SURGERY | Facility: CLINIC | Age: 70
End: 2020-07-26
Payer: MEDICARE

## 2020-07-26 ASSESSMENT — LIFESTYLE VARIABLES: TOBACCO_USE: 1

## 2020-07-27 ENCOUNTER — ANESTHESIA (OUTPATIENT)
Dept: SURGERY | Facility: CLINIC | Age: 70
End: 2020-07-27
Payer: MEDICARE

## 2020-07-27 ENCOUNTER — HOSPITAL ENCOUNTER (OUTPATIENT)
Facility: CLINIC | Age: 70
Discharge: HOME | End: 2020-07-27
Attending: INTERNAL MEDICINE | Admitting: INTERNAL MEDICINE
Payer: MEDICARE

## 2020-07-27 VITALS
SYSTOLIC BLOOD PRESSURE: 125 MMHG | RESPIRATION RATE: 18 BRPM | WEIGHT: 176.59 LBS | HEART RATE: 79 BPM | DIASTOLIC BLOOD PRESSURE: 72 MMHG | HEIGHT: 64 IN | BODY MASS INDEX: 30.15 KG/M2 | TEMPERATURE: 98.6 F | OXYGEN SATURATION: 94 %

## 2020-07-27 DIAGNOSIS — R91.8 MASS OF LOWER LOBE OF LEFT LUNG: ICD-10-CM

## 2020-07-27 LAB — GLUCOSE BLDC GLUCOMTR-MCNC: 88 MG/DL (ref 70–99)

## 2020-07-27 PROCEDURE — 88342 IMHCHEM/IMCYTCHM 1ST ANTB: CPT | Mod: XU | Performed by: INTERNAL MEDICINE

## 2020-07-27 PROCEDURE — 25800030 ZZH RX IP 258 OP 636: Performed by: STUDENT IN AN ORGANIZED HEALTH CARE EDUCATION/TRAINING PROGRAM

## 2020-07-27 PROCEDURE — 40000170 ZZH STATISTIC PRE-PROCEDURE ASSESSMENT II: Performed by: INTERNAL MEDICINE

## 2020-07-27 PROCEDURE — 36000066 ZZH SURGERY LEVEL 4 W FLUORO 1ST 30 MIN - UMMC: Performed by: INTERNAL MEDICINE

## 2020-07-27 PROCEDURE — 88341 IMHCHEM/IMCYTCHM EA ADD ANTB: CPT | Performed by: INTERNAL MEDICINE

## 2020-07-27 PROCEDURE — 27210794 ZZH OR GENERAL SUPPLY STERILE: Performed by: INTERNAL MEDICINE

## 2020-07-27 PROCEDURE — 36000064 ZZH SURGERY LEVEL 4 EA 15 ADDTL MIN - UMMC: Performed by: INTERNAL MEDICINE

## 2020-07-27 PROCEDURE — 00000155 ZZHCL STATISTIC H-CELL BLOCK W/STAIN: Performed by: INTERNAL MEDICINE

## 2020-07-27 PROCEDURE — 71000014 ZZH RECOVERY PHASE 1 LEVEL 2 FIRST HR: Performed by: INTERNAL MEDICINE

## 2020-07-27 PROCEDURE — 82962 GLUCOSE BLOOD TEST: CPT

## 2020-07-27 PROCEDURE — 88172 CYTP DX EVAL FNA 1ST EA SITE: CPT | Performed by: INTERNAL MEDICINE

## 2020-07-27 PROCEDURE — 71000027 ZZH RECOVERY PHASE 2 EACH 15 MINS: Performed by: INTERNAL MEDICINE

## 2020-07-27 PROCEDURE — 25800030 ZZH RX IP 258 OP 636: Performed by: NURSE ANESTHETIST, CERTIFIED REGISTERED

## 2020-07-27 PROCEDURE — 37000009 ZZH ANESTHESIA TECHNICAL FEE, EACH ADDTL 15 MIN: Performed by: INTERNAL MEDICINE

## 2020-07-27 PROCEDURE — 88360 TUMOR IMMUNOHISTOCHEM/MANUAL: CPT | Performed by: INTERNAL MEDICINE

## 2020-07-27 PROCEDURE — 88305 TISSUE EXAM BY PATHOLOGIST: CPT | Performed by: INTERNAL MEDICINE

## 2020-07-27 PROCEDURE — 00000159 ZZHCL STATISTIC H-SEND OUTS PREP: Performed by: INTERNAL MEDICINE

## 2020-07-27 PROCEDURE — 25000566 ZZH SEVOFLURANE, EA 15 MIN: Performed by: INTERNAL MEDICINE

## 2020-07-27 PROCEDURE — 37000008 ZZH ANESTHESIA TECHNICAL FEE, 1ST 30 MIN: Performed by: INTERNAL MEDICINE

## 2020-07-27 PROCEDURE — 25000128 H RX IP 250 OP 636: Performed by: STUDENT IN AN ORGANIZED HEALTH CARE EDUCATION/TRAINING PROGRAM

## 2020-07-27 PROCEDURE — 88173 CYTOPATH EVAL FNA REPORT: CPT | Performed by: INTERNAL MEDICINE

## 2020-07-27 PROCEDURE — 25000125 ZZHC RX 250: Performed by: ANESTHESIOLOGY

## 2020-07-27 PROCEDURE — 71000015 ZZH RECOVERY PHASE 1 LEVEL 2 EA ADDTL HR: Performed by: INTERNAL MEDICINE

## 2020-07-27 PROCEDURE — 25000125 ZZHC RX 250: Performed by: STUDENT IN AN ORGANIZED HEALTH CARE EDUCATION/TRAINING PROGRAM

## 2020-07-27 RX ORDER — FENTANYL CITRATE 50 UG/ML
INJECTION, SOLUTION INTRAMUSCULAR; INTRAVENOUS PRN
Status: DISCONTINUED | OUTPATIENT
Start: 2020-07-27 | End: 2020-07-27

## 2020-07-27 RX ORDER — SODIUM CHLORIDE, SODIUM LACTATE, POTASSIUM CHLORIDE, CALCIUM CHLORIDE 600; 310; 30; 20 MG/100ML; MG/100ML; MG/100ML; MG/100ML
INJECTION, SOLUTION INTRAVENOUS CONTINUOUS
Status: DISCONTINUED | OUTPATIENT
Start: 2020-07-27 | End: 2020-07-27 | Stop reason: HOSPADM

## 2020-07-27 RX ORDER — PROPOFOL 10 MG/ML
INJECTION, EMULSION INTRAVENOUS CONTINUOUS PRN
Status: DISCONTINUED | OUTPATIENT
Start: 2020-07-27 | End: 2020-07-27

## 2020-07-27 RX ORDER — ONDANSETRON 2 MG/ML
4 INJECTION INTRAMUSCULAR; INTRAVENOUS EVERY 30 MIN PRN
Status: DISCONTINUED | OUTPATIENT
Start: 2020-07-27 | End: 2020-07-27 | Stop reason: HOSPADM

## 2020-07-27 RX ORDER — NALOXONE HYDROCHLORIDE 0.4 MG/ML
.1-.4 INJECTION, SOLUTION INTRAMUSCULAR; INTRAVENOUS; SUBCUTANEOUS
Status: DISCONTINUED | OUTPATIENT
Start: 2020-07-27 | End: 2020-07-27 | Stop reason: HOSPADM

## 2020-07-27 RX ORDER — ALBUTEROL SULFATE 0.83 MG/ML
2.5 SOLUTION RESPIRATORY (INHALATION)
Status: COMPLETED | OUTPATIENT
Start: 2020-07-27 | End: 2020-07-27

## 2020-07-27 RX ORDER — MEPERIDINE HYDROCHLORIDE 25 MG/ML
12.5 INJECTION INTRAMUSCULAR; INTRAVENOUS; SUBCUTANEOUS
Status: DISCONTINUED | OUTPATIENT
Start: 2020-07-27 | End: 2020-07-27 | Stop reason: HOSPADM

## 2020-07-27 RX ORDER — PROPOFOL 10 MG/ML
INJECTION, EMULSION INTRAVENOUS PRN
Status: DISCONTINUED | OUTPATIENT
Start: 2020-07-27 | End: 2020-07-27

## 2020-07-27 RX ORDER — ONDANSETRON 4 MG/1
4 TABLET, ORALLY DISINTEGRATING ORAL EVERY 30 MIN PRN
Status: DISCONTINUED | OUTPATIENT
Start: 2020-07-27 | End: 2020-07-27 | Stop reason: HOSPADM

## 2020-07-27 RX ORDER — LIDOCAINE HYDROCHLORIDE 20 MG/ML
INJECTION, SOLUTION INFILTRATION; PERINEURAL PRN
Status: DISCONTINUED | OUTPATIENT
Start: 2020-07-27 | End: 2020-07-27

## 2020-07-27 RX ORDER — SODIUM CHLORIDE, SODIUM LACTATE, POTASSIUM CHLORIDE, CALCIUM CHLORIDE 600; 310; 30; 20 MG/100ML; MG/100ML; MG/100ML; MG/100ML
INJECTION, SOLUTION INTRAVENOUS CONTINUOUS PRN
Status: DISCONTINUED | OUTPATIENT
Start: 2020-07-27 | End: 2020-07-27

## 2020-07-27 RX ORDER — ONDANSETRON 2 MG/ML
INJECTION INTRAMUSCULAR; INTRAVENOUS PRN
Status: DISCONTINUED | OUTPATIENT
Start: 2020-07-27 | End: 2020-07-27

## 2020-07-27 RX ORDER — DEXAMETHASONE SODIUM PHOSPHATE 4 MG/ML
INJECTION, SOLUTION INTRA-ARTICULAR; INTRALESIONAL; INTRAMUSCULAR; INTRAVENOUS; SOFT TISSUE PRN
Status: DISCONTINUED | OUTPATIENT
Start: 2020-07-27 | End: 2020-07-27

## 2020-07-27 RX ADMIN — LIDOCAINE HYDROCHLORIDE 80 MG: 20 INJECTION, SOLUTION INFILTRATION; PERINEURAL at 10:16

## 2020-07-27 RX ADMIN — PHENYLEPHRINE HYDROCHLORIDE 50 MCG: 10 INJECTION INTRAVENOUS at 11:00

## 2020-07-27 RX ADMIN — PHENYLEPHRINE HYDROCHLORIDE 100 MCG: 10 INJECTION INTRAVENOUS at 10:34

## 2020-07-27 RX ADMIN — PHENYLEPHRINE HYDROCHLORIDE 50 MCG: 10 INJECTION INTRAVENOUS at 10:51

## 2020-07-27 RX ADMIN — PROPOFOL 130 MG: 10 INJECTION, EMULSION INTRAVENOUS at 10:16

## 2020-07-27 RX ADMIN — ONDANSETRON 4 MG: 2 INJECTION INTRAMUSCULAR; INTRAVENOUS at 10:30

## 2020-07-27 RX ADMIN — FENTANYL CITRATE 50 MCG: 50 INJECTION, SOLUTION INTRAMUSCULAR; INTRAVENOUS at 10:16

## 2020-07-27 RX ADMIN — ALBUTEROL SULFATE 2.5 MG: 2.5 SOLUTION RESPIRATORY (INHALATION) at 12:41

## 2020-07-27 RX ADMIN — PROPOFOL 150 MCG/KG/MIN: 10 INJECTION, EMULSION INTRAVENOUS at 10:16

## 2020-07-27 RX ADMIN — ROCURONIUM BROMIDE 50 MG: 10 INJECTION INTRAVENOUS at 10:16

## 2020-07-27 RX ADMIN — SODIUM CHLORIDE, POTASSIUM CHLORIDE, SODIUM LACTATE AND CALCIUM CHLORIDE: 600; 310; 30; 20 INJECTION, SOLUTION INTRAVENOUS at 10:12

## 2020-07-27 RX ADMIN — SUGAMMADEX 200 MG: 100 INJECTION, SOLUTION INTRAVENOUS at 11:19

## 2020-07-27 RX ADMIN — DEXAMETHASONE SODIUM PHOSPHATE 8 MG: 4 INJECTION, SOLUTION INTRA-ARTICULAR; INTRALESIONAL; INTRAMUSCULAR; INTRAVENOUS; SOFT TISSUE at 10:29

## 2020-07-27 RX ADMIN — SODIUM CHLORIDE, POTASSIUM CHLORIDE, SODIUM LACTATE AND CALCIUM CHLORIDE: 600; 310; 30; 20 INJECTION, SOLUTION INTRAVENOUS at 11:30

## 2020-07-27 ASSESSMENT — MIFFLIN-ST. JEOR: SCORE: 1316

## 2020-07-27 NOTE — OR NURSING
Discharge instructions reviewed with patient and her sister. No questions regarding this information patient and sister know where to find phone number to call with questions or concerns.

## 2020-07-27 NOTE — PROCEDURES
INTERVENTIONAL PULMONOLOGY       Procedure(s):    A flexible bronchoscopy  Airway exam  EBUS-TBNA (4 sites)  Therapeutic suctioning (1 sites)    Indication:  New LLL mass on lung cancer screening CT, need for diagnosis and staging    Attending of Record:  Ashlee Castro MD    Interventional Pulmonary Fellow   Kassidy Mariee MD     Trainees Present:   None     Medications:    General Anesthesia - See anesthesia flowsheet for details    Sedation Time:   Per Anesthesia Care Provider    Time Out:  Performed    The patient's medical record has been reviewed.  The indication for the procedure was reviewed.  The necessary history and physical examination was performed and reviewed.  The risks, benefits and alternatives of the procedure were discussed with the the patient in detail and she had the opportunity to ask questions.  I discussed in particular the potential complications including risks of minor or life-threatening bleeding and/or infection, respiratory failure, vocal cord trauma / paralysis, pneumothorax, and discomfort. Sedation risks were also discussed including abnormal heart rhythms, low blood pressure, and respiratory failure. All questions were answered to the best of my ability.  Verbal and written informed consent was obtained.  The proposed procedure and the patient's identification were verified prior to the procedure by the physician and the surgical team.    The patient was assessed for the adequacy for the procedure and to receive medications.   Mental Status:  Alert and oriented x 3  Airway examination:  Class II (Complete visualization of uvula)  Pulmonary:  Clear to ausculation bilaterally  CV:  RRR, no murmurs or gallops  ASA Grade:  (II)  Mild systemic disease    After clinical evaluation and reviewing the indication, risks, alternatives and benefits of the procedure the patient was deemed to be in satisfactory condition to undergo the procedure.        A Tuberculosis risk assessment was  performed:  The patient has no known RISK of Tuberculosis    The procedure was performed in a negative airflow room: The patient could not be moved to a negative airflow room because of needed OR for the procedure    Maneuvers / Procedure:      Airway Examination: A complete airway examination was performed from the distal trachea to the subsegmental level in each lobe of both lungs.  Pertinent findings include: Extrinsic compression of airways in LLL, no endobronchial lesions seen.         EBUS-TBNA: The EBUS scope was inserted and biopsies were obtained from   Station 4L with 4 passes, 4 samples obtained with FAZAL present, a combination of suction and no suction was used to obtain the samples  Station 7 with 4 passes, 4 samples obtained with FAZAL present, a combination of suction and no suction was used to obtain the samples  Station 11L with 4 passes, 4 samples obtained with FAZAL present, a combination of suction and no suction was used to obtain the samples  LLL mass was visualized at LLL takeoff. 8 passes, 8 samples obtained with FAZAL present, a combination of suction and no suction was used to obtain the samples. EBUS samples were sent for cytology.                                      Station 10R, 11R, and 4R were examined but lymph nodes were too small to biopsy.    Therapeutic suctioning: 15-20min of operative time was spent clearing out the airway of debris, blood and mucous prior to the intervention.     Any disposable equipment was visually inspected and deemed to be intact immediately post procedure.      Relevant Pictures  Extrinsic compression at LLL takeoff      LLL mass visualized with EBUS       Recommendations:     --Successful EBUS of lymph nodes and LLL mass  --Followup biopsy results.    Dr. Castro was present for the entire procedure.    Kassidy Mariee  Interventional Pulmonary Fellow  [phone]    I was present with the patient, Ijeoma Shah, for the entire viewing portion of the  bronchscopy procedure (including scope insertion and withdrawal) and agree with the interpretation and report as documented by Dr. Mariee.   Ashlee Castro MD

## 2020-07-27 NOTE — ANESTHESIA CARE TRANSFER NOTE
Patient: Ijeoma Shah    Procedure(s):  Flexible bronchoscopy, endobronchial ultrasound with transbronchial biopsies    Diagnosis: Mass of lower lobe of left lung [R91.8]  Diagnosis Additional Information: No value filed.    Anesthesia Type:   General     Note:  Airway :Face Mask  Patient transferred to:PACU  Comments: Pt alert, breathing spontaneously on 6L o2 via FM. VSS. Report shared with RN.Handoff Report: Identifed the Patient, Identified the Reponsible Provider, Reviewed the pertinent medical history, Discussed the surgical course, Reviewed Intra-OP anesthesia mangement and issues during anesthesia, Set expectations for post-procedure period and Allowed opportunity for questions and acknowledgement of understanding      Vitals: (Last set prior to Anesthesia Care Transfer)    CRNA VITALS  7/27/2020 1055 - 7/27/2020 1133      7/27/2020             NIBP:  (!) 144/99    NIBP Mean:  113    Ht Rate:  100    SpO2:  100 %    EKG:  Sinus rhythm                Electronically Signed By: RUDY Méndez CRNA  July 27, 2020  11:33 AM

## 2020-07-27 NOTE — PROGRESS NOTES
Spiritual Health Services Progress Note  [facility], [address]      Provided chaplaincy care with Salima and her daughter (Fadumo) prior to her surgery. Talked briefly about the exploratory nature of her surgery and the stressors related to the uncertainty of it. Her Ep[employer]opalian fabiana is a source of positive coping. Prayer was shared. She desires follow-up chaplaincy care during her admission. I have no plan for follow-up today.     aimee Torre, MPH, MDiv, [facility]   [phone]

## 2020-07-27 NOTE — OR NURSING
Dr. Hui contacted for neb order for persistent intermittent coughing since arrival to PACU.  Cough productive of small amounts blood tinged secretions.

## 2020-07-27 NOTE — DISCHARGE INSTRUCTIONS
Post-Bronchoscopy Patient Instructions:    July 27, 2020  Ijeoma Shah      Your procedure (lymph node and lung mass biopsy) was completed without any immediate complications.      You may cough up scant amount of blood for the next 12-24 hours. If you have excessive cough with blood, chest pain, shortness of breath or other concerning symptoms, please report to the closest emergency room.      You may experience low grade (less than 100.5 F) fever next 24 hours for which you can take Tylenol. If the fever persists more than 24 hours contact our office or your primary care provider.      Our office (Pulmonary--[phone]) will call you when the results from today's procedure become available.      You  resume your regular diet as it was prior to procedure.      Should you have any question, please do not hesitate to call our office.      [facility], [place]  Same-Day Surgery   Adult Discharge Orders & Instructions     For 24 hours after surgery    1. Get plenty of rest.  A responsible adult must stay with you for at least 24 hours after you leave the hospital.   2. Do not drive or use heavy equipment.  If you have weakness or tingling, don't drive or use heavy equipment until this feeling goes away.  3. Do not drink alcohol.  4. Avoid strenuous or risky activities.  Ask for help when climbing stairs.   5. You may feel lightheaded.  IF so, sit for a few minutes before standing.  Have someone help you get up.   6. If you have nausea (feel sick to your stomach): Drink only clear liquids such as apple juice, ginger ale, broth or 7-Up.  Rest may also help.  Be sure to drink enough fluids.  Move to a regular diet as you feel able.  7. You may have a slight fever. Call the doctor if your fever is over 100 F (37.7 C) (taken under the tongue) or lasts longer than 24 hours.  8. You may have a dry mouth, a sore throat, muscle aches or trouble sleeping.  These should go away after 24  hours.  9. Do not make important or legal decisions.   Call your doctor for any of the followin.  Signs of infection (fever, growing tenderness at the surgery site, a large amount of drainage or bleeding, severe pain, foul-smelling drainage, redness, swelling).    2. It has been over 8 to 10 hours since surgery and you are still not able to urinate (pass water).    3.  Headache for over 24 hours.    To contact a doctor, call Dr. Castro's office at [phone] or:        [phone] and ask for the resident on call for pulmonology (answered 24 hours a day)      Emergency Department:[facility]: [phone]       (TTY for hearing impaired: [phone])

## 2020-07-27 NOTE — ANESTHESIA PREPROCEDURE EVALUATION
"Anesthesia Pre-Procedure Evaluation    Patient: Ijeoma Shah   MRN:     [id number] Gender:   female   Age:    69 year old :      1950        Preoperative Diagnosis: Mass of lower lobe of left lung [R91.8]   Procedure(s):  Flexible bronchoscopy, endobronchial ultrasound with transbronchial biopsies     LABS:  CBC:   Lab Results   Component Value Date    WBC 7.7 2013    HGB 14.9 2013    HCT 42.4 2013     2013     BMP:   Lab Results   Component Value Date     2020    POTASSIUM 4.2 2020    CHLORIDE 104 2020    CO2 31 2020    BUN 12 2020    CR 0.74 2020     (H) 2020     (H) 2015     COAGS: No results found for: PTT, INR, FIBR  POC: No results found for: BGM, HCG, HCGS  OTHER:   Lab Results   Component Value Date    SABINO 9.4 2020    ALBUMIN 3.4 2020    PROTTOTAL 7.5 2020    ALT 18 2020    AST 19 2020    ALKPHOS 106 2020    BILITOTAL 0.3 2020    TSH 0.62 2020        Preop Vitals    BP Readings from Last 3 Encounters:   20 138/80   20 139/80   19 139/84    Pulse Readings from Last 3 Encounters:   20 74   20 90   19 81      Resp Readings from Last 3 Encounters:   20 18   20 16   19 20    SpO2 Readings from Last 3 Encounters:   20 100%   20 94%   19 97%      Temp Readings from Last 1 Encounters:   20 36.9  C (98.5  F) (Tympanic)    Ht Readings from Last 1 Encounters:   20 1.626 m (5' 4\")      Wt Readings from Last 1 Encounters:   20 80.7 kg (178 lb)    Estimated body mass index is 30.55 kg/m  as calculated from the following:    Height as of 20: 1.626 m (5' 4\").    Weight as of 20: 80.7 kg (178 lb).     LDA:        Past Medical History:   Diagnosis Date     Simple chronic bronchitis (H)       Past Surgical History:   Procedure Laterality Date     COLONOSCOPY  " 6/29/04    colonoscopy to the cecum     SURGICAL HISTORY OF -       nose surgery     TUBAL LIGATION      bilateral tubal ligation.  BTL      Allergies   Allergen Reactions     Bee Venom Hives     Hot and sweating         Anesthesia Evaluation     .             ROS/MED HX    ENT/Pulmonary: Comment: Lung mass    (+)tobacco use, Past use , . .    Neurologic:  - neg neurologic ROS     Cardiovascular:  - neg cardiovascular ROS   (+) ----. : . . . :. . Previous cardiac testing date:results:date: results:ECG reviewed date:7/22 results:Sinus Rhythm -Short FL syndrome   Sunny = 116  -Left atrial enlargement.    date: results:          METS/Exercise Tolerance:  >4 METS   Hematologic:  - neg hematologic  ROS       Musculoskeletal:  - neg musculoskeletal ROS       GI/Hepatic:  - neg GI/hepatic ROS       Renal/Genitourinary:         Endo:  - neg endo ROS       Psychiatric:  - neg psychiatric ROS       Infectious Disease:  - neg infectious disease ROS       Malignancy:      - no malignancy   Other:    (+) C-spine cleared: N/A, no H/O Chronic Pain,                       PHYSICAL EXAM:   Mental Status/Neuro: A/A/O   Airway: Facies: Feasible  Mallampati: II  Mouth/Opening: Full  TM distance: > 6 cm  Neck ROM: Full   Respiratory: Auscultation: CTAB     Resp. Rate: Normal      CV: Rhythm: Regular  Heart: Normal Sounds   Comments:      Dental: Normal Dentition                Assessment:   ASA SCORE: 2    H&P: History and physical reviewed and following examination; no interval change.   Smoking Status:  Non-Smoker/Unknown   NPO Status: NPO Appropriate     Plan:   Anes. Type:  General   Pre-Medication: Acetaminophen   Induction:  IV (Standard)   Airway: ETT; Oral   Access/Monitoring: PIV   Maintenance: TIVA     Postop Plan:   Postop Pain: Opioids  Postop Sedation/Airway: Not planned  Disposition: Outpatient     PONV Management:   Adult Risk Factors: Female, Non-Smoker, Postop Opioids   Prevention:, Propofol, No Volatiles     CONSENT:  Direct conversation   Plan and risks discussed with: Patient   Blood Products: Consent Deferred (Minimal Blood Loss)                   Gisell Ruiz MD

## 2020-07-28 NOTE — ANESTHESIA POSTPROCEDURE EVALUATION
Anesthesia POST Procedure Evaluation    Patient: Ijeoma Shah   MRN:     [id number] Gender:   female   Age:    69 year old :      1950        Preoperative Diagnosis: Mass of lower lobe of left lung [R91.8]   Procedure(s):  Flexible bronchoscopy, endobronchial ultrasound with transbronchial biopsies   Postop Comments: No value filed.     Anesthesia Type: General       Disposition: Outpatient   Postop Pain Control: Uneventful            Sign Out: Well controlled pain   PONV: No   Neuro/Psych: Uneventful            Sign Out: Acceptable/Baseline neuro status   Airway/Respiratory: Uneventful            Sign Out: Acceptable/Baseline resp. status   CV/Hemodynamics: Uneventful            Sign Out: Acceptable CV status   Other NRE: NONE   DID A NON-ROUTINE EVENT OCCUR? No         Last Anesthesia Record Vitals:  CRNA VITALS  2020 1055 - 2020 1155      2020             NIBP:  (!) 144/99    NIBP Mean:  113    Ht Rate:  100    SpO2:  100 %    EKG:  Sinus rhythm          Last PACU Vitals:  Vitals Value Taken Time   /83 2020  1:25 PM   Temp 37  C (98.6  F) 2020  1:25 PM   Pulse 95 2020  1:10 PM   Resp 22 2020  1:25 PM   SpO2 96 % 2020  1:25 PM   Temp src     NIBP 144/99 2020 11:30 AM   Pulse     SpO2 100 % 2020 11:30 AM   Resp     Temp     Ht Rate 100 2020 11:30 AM   Temp 2     Vitals shown include unvalidated device data.      Electronically Signed By: Audie Hui MD, 2020, 3:38 PM

## 2020-07-30 ENCOUNTER — TELEPHONE (OUTPATIENT)
Dept: SURGERY | Facility: CLINIC | Age: 70
End: 2020-07-30

## 2020-07-30 DIAGNOSIS — R91.8 MASS OF LOWER LOBE OF LEFT LUNG: Primary | ICD-10-CM

## 2020-07-30 DIAGNOSIS — C34.90 LUNG CANCER (H): Primary | ICD-10-CM

## 2020-07-30 PROCEDURE — 81445 SO NEO GSAP 5-50DNA/DNA&RNA: CPT | Performed by: INTERNAL MEDICINE

## 2020-07-30 PROCEDURE — 40000803 ZZHCL STATISTIC DNA ISOL HIGH PURITY: Performed by: INTERNAL MEDICINE

## 2020-07-30 NOTE — TELEPHONE ENCOUNTER
"I spoke to Salima and we discussed the pathology from her recent EBUS.    I explained that we would review this again tomorrow to address the PET avid adrenal gland.   In the meantime, I will order PFT's and a brain MRI which she will arrange at [facility].   We talked about treatment options.    She is working and says she is \"very active\".       I will call her after conference.    If surgery is option, I will help arrange a virtual visit in 1-2 weeks to discuss further.     She appreciated my call.  "

## 2020-07-31 ENCOUNTER — TELEPHONE (OUTPATIENT)
Dept: SURGERY | Facility: CLINIC | Age: 70
End: 2020-07-31

## 2020-07-31 LAB — COPATH REPORT: NORMAL

## 2020-07-31 NOTE — TELEPHONE ENCOUNTER
I tried to reach Salima and sharmin SCHOFIELD telling her that we wanted a biopsy done of her left adrenal gland prior to pursuing lung cancer surgery.  I explained that this will be done by GI via endoscopy/EUS as an out patient procedure.   I told her to avoid aspirin, ibuprofen until she knows more and to expect a call from the GI department to arrange this.   She has my # to call if additional questions.

## 2020-08-03 ENCOUNTER — PATIENT OUTREACH (OUTPATIENT)
Dept: GASTROENTEROLOGY | Facility: CLINIC | Age: 70
End: 2020-08-03

## 2020-08-03 DIAGNOSIS — E27.8 ADRENAL MASS (H): Primary | ICD-10-CM

## 2020-08-03 NOTE — PROGRESS NOTES
[facility] Internal Procedure Intake form:    Referring/Requesting provider: Dincer    Clinic contact - Thoracic surgery    Procedure Requested: EUS    Requested provider (if specified): Samira      Specific method of sedation requested: MAC    History and physical within last 30 days?  7/22/20    Indication/Reason for procedure: adrenal mass    Is procedure to rule out malignancy or is there concern for underlying malignancy (if yes, send messages as High priority):  Yes    Requested urgency of procedure: tier 2    Is patient is aware of request for procedure and ok to be contacted to schedule? Yes     Orders to endoscopy scheduling for first available EUS with MARVIN Ford RN   BSN, HNBC, STAR-T  [facility]  Care Coordinator  Ph: [phone]  FAX: [phone]

## 2020-08-05 ENCOUNTER — TELEPHONE (OUTPATIENT)
Dept: GASTROENTEROLOGY | Facility: CLINIC | Age: 70
End: 2020-08-05

## 2020-08-07 LAB — COPATH REPORT: NORMAL

## 2020-08-09 DIAGNOSIS — B35.3 TINEA PEDIS, UNSPECIFIED LATERALITY: ICD-10-CM

## 2020-08-10 ENCOUNTER — MYC REFILL (OUTPATIENT)
Dept: FAMILY MEDICINE | Facility: CLINIC | Age: 70
End: 2020-08-10

## 2020-08-10 DIAGNOSIS — B35.3 TINEA PEDIS OF BOTH FEET: ICD-10-CM

## 2020-08-10 RX ORDER — KETOCONAZOLE 20 MG/G
CREAM TOPICAL DAILY
Qty: 60 G | Refills: 1 | Status: SHIPPED | OUTPATIENT
Start: 2020-08-10 | End: 2021-08-24

## 2020-08-10 RX ORDER — TRIAMCINOLONE ACETONIDE 1 MG/G
OINTMENT TOPICAL
Qty: 45 G | Refills: 0 | Status: SHIPPED | OUTPATIENT
Start: 2020-08-10 | End: 2021-08-24

## 2020-08-11 ENCOUNTER — TELEPHONE (OUTPATIENT)
Dept: SURGERY | Facility: CLINIC | Age: 70
End: 2020-08-11

## 2020-08-11 ENCOUNTER — TELEPHONE (OUTPATIENT)
Dept: GASTROENTEROLOGY | Facility: CLINIC | Age: 70
End: 2020-08-11

## 2020-08-11 DIAGNOSIS — Z11.59 ENCOUNTER FOR SCREENING FOR OTHER VIRAL DISEASES: Primary | ICD-10-CM

## 2020-08-11 NOTE — TELEPHONE ENCOUNTER
Patient is scheduled for EUS with Dr. Hogan    Spoke with: patient    Date of Procedure: 9-1-20    Location: [address]    Sedation Type MAC    Informed patient they will need an adult  yes    Informed Patient of COVID Test Requirement yes    Preferred Pharmacy for Pre Prescription chart    Confirmed Nurse will call to complete assessment yes    Additional comments: none

## 2020-08-11 NOTE — TELEPHONE ENCOUNTER
I spoke to Salima and reviewed her course, so far, including the recent lung cancer diagnosis.   Because she has a PET avid left adrenal gland, we need to determine what this is before we proceed with further treatment/surgery for her lung cancer.   I explained an EUS and told her the GI team would be in touch with her soon to arrange this.   She verbalized understanding and agreement with this plan.    She is getting PFT's and a brain MRI tomorrow at [facility] and I advised her to keep those appointments.

## 2020-08-12 ENCOUNTER — HOSPITAL ENCOUNTER (OUTPATIENT)
Dept: MRI IMAGING | Facility: CLINIC | Age: 70
End: 2020-08-12
Attending: CLINICAL NURSE SPECIALIST
Payer: MEDICARE

## 2020-08-12 ENCOUNTER — HOSPITAL ENCOUNTER (OUTPATIENT)
Dept: RESPIRATORY THERAPY | Facility: CLINIC | Age: 70
End: 2020-08-12
Attending: INTERNAL MEDICINE
Payer: MEDICARE

## 2020-08-12 DIAGNOSIS — R91.8 MASS OF LOWER LOBE OF LEFT LUNG: ICD-10-CM

## 2020-08-12 LAB — COPATH REPORT: NORMAL

## 2020-08-12 PROCEDURE — 94729 DIFFUSING CAPACITY: CPT

## 2020-08-12 PROCEDURE — 25500064 ZZH RX 255 OP 636

## 2020-08-12 PROCEDURE — 70553 MRI BRAIN STEM W/O & W/DYE: CPT

## 2020-08-12 PROCEDURE — 94010 BREATHING CAPACITY TEST: CPT

## 2020-08-12 PROCEDURE — 94726 PLETHYSMOGRAPHY LUNG VOLUMES: CPT

## 2020-08-12 PROCEDURE — 94010 BREATHING CAPACITY TEST: CPT | Mod: 26 | Performed by: INTERNAL MEDICINE

## 2020-08-12 PROCEDURE — 94729 DIFFUSING CAPACITY: CPT | Mod: 26 | Performed by: INTERNAL MEDICINE

## 2020-08-12 PROCEDURE — 94726 PLETHYSMOGRAPHY LUNG VOLUMES: CPT | Mod: 26 | Performed by: INTERNAL MEDICINE

## 2020-08-12 PROCEDURE — A9585 GADOBUTROL INJECTION: HCPCS

## 2020-08-12 RX ORDER — GADOBUTROL 604.72 MG/ML
8 INJECTION INTRAVENOUS ONCE
Status: COMPLETED | OUTPATIENT
Start: 2020-08-12 | End: 2020-08-12

## 2020-08-12 RX ADMIN — GADOBUTROL 8 ML: 604.72 INJECTION INTRAVENOUS at 08:59

## 2020-08-17 ENCOUNTER — TELEPHONE (OUTPATIENT)
Dept: SURGERY | Facility: CLINIC | Age: 70
End: 2020-08-17

## 2020-08-17 NOTE — TELEPHONE ENCOUNTER
ONCOLOGY INTAKE: Records Information      APPT INFORMATION:  Referring provider:  Dr. Castro  Referring provider s clinic:  [facility]  Reason for visit/diagnosis:  Lung Cancer  Has patient been notified of appointment date and time?: Yes    RECORDS INFORMATION:  Were the records received with the referral (via [employer])? No    Has patient been seen for any external appt for this diagnosis? No    If yes, where? N/a    Has patient had any imaging or procedures outside of [place] for this condition? No      If Yes, where? N/a    ADDITIONAL INFORMATION:  Scheduling per IB

## 2020-08-17 NOTE — TELEPHONE ENCOUNTER
Left vml for patient to contact scheduling for 8/18 video visit details per IB request (See below). Video instructions sent.        Schedulers, please add her on tomorrow for virtual visit with Dr. Sanabria, referred by Dr. Castro, has lung cancer.   No additional imaging is needed.   I left her a VM about this already.   Thanks   Dr. Sanabria, GI was unable to biopsy the left adrenal (still an unknown) and IR wasn't keen on it, either.   Janny

## 2020-08-17 NOTE — TELEPHONE ENCOUNTER
RECORDS STATUS - ALL OTHER DIAGNOSIS      RECORDS RECEIVED FROM: [facility]   DATE RECEIVED: 8/18/2020    NOTES STATUS DETAILS   OFFICE NOTE from referring provider Complete Jyoti Espinal MD    OFFICE NOTE from medical oncologist     DISCHARGE SUMMARY from hospital Complete 7/27/2020 Dr. Castro (Surgery)   Mass of lower lobe of left lung    DISCHARGE REPORT from the ER     OPERATIVE REPORT Complete General PFT    MEDICATION LIST Complete [facility]   CLINICAL TRIAL TREATMENTS TO DATE     LABS     PATHOLOGY REPORTS Complete [facility]- 7/27/2020 Fine Needle Aspiration    ANYTHING RELATED TO DIAGNOSIS Complete  Epic- Labs last updated on 7/30/2020    GENONOMIC TESTING     TYPE:     IMAGING (NEED IMAGES & REPORT)     CT SCANS Complete CT Chest Lung Cancer    MRI Complete MRI Brain 8/12/2020    MAMMO     ULTRASOUND     PET Complete PET Oncology 7/9/2020

## 2020-08-18 ENCOUNTER — PRE VISIT (OUTPATIENT)
Dept: SURGERY | Facility: CLINIC | Age: 70
End: 2020-08-18

## 2020-08-20 LAB
DLCOUNC-%PRED-PRE: 90 %
DLCOUNC-PRE: 17.72 ML/MIN/MMHG
DLCOUNC-PRED: 19.61 ML/MIN/MMHG
ERV-%PRED-PRE: 56 %
ERV-PRE: 0.29 L
ERV-PRED: 0.52 L
EXPTIME-PRE: 6.03 SEC
FEF2575-%PRED-PRE: 52 %
FEF2575-PRE: 1.02 L/SEC
FEF2575-PRED: 1.95 L/SEC
FEFMAX-%PRED-PRE: 66 %
FEFMAX-PRE: 3.88 L/SEC
FEFMAX-PRED: 5.79 L/SEC
FEV1-%PRED-PRE: 58 %
FEV1-PRE: 1.33 L
FEV1FEV6-PRE: 75 %
FEV1FEV6-PRED: 79 %
FEV1FVC-PRE: 75 %
FEV1FVC-PRED: 78 %
FEV1SVC-PRE: 74 %
FEV1SVC-PRED: 73 %
FIFMAX-PRE: 2.57 L/SEC
FRCPLETH-%PRED-PRE: 112 %
FRCPLETH-PRE: 3.04 L
FRCPLETH-PRED: 2.71 L
FVC-%PRED-PRE: 60 %
FVC-PRE: 1.76 L
FVC-PRED: 2.91 L
IC-%PRED-PRE: 58 %
IC-PRE: 1.51 L
IC-PRED: 2.58 L
RVPLETH-%PRED-PRE: 135 %
RVPLETH-PRE: 2.75 L
RVPLETH-PRED: 2.03 L
TLCPLETH-%PRED-PRE: 92 %
TLCPLETH-PRE: 4.55 L
TLCPLETH-PRED: 4.94 L
VA-%PRED-PRE: 72 %
VA-PRE: 3.44 L
VC-%PRED-PRE: 58 %
VC-PRE: 1.8 L
VC-PRED: 3.1 L

## 2020-08-25 NOTE — PROVIDER NOTIFICATION
H&P Scheduled: 8/28/2020 [facility]  COVID19 PCR Scheduled 8/29/2020 [facility] - pt to request change in appointment to 8/28/2020 to coincide with H&P.     Desiree Yu RN  [facility]

## 2020-08-28 ENCOUNTER — OFFICE VISIT (OUTPATIENT)
Dept: FAMILY MEDICINE | Facility: CLINIC | Age: 70
End: 2020-08-28
Payer: MEDICARE

## 2020-08-28 VITALS
HEART RATE: 89 BPM | OXYGEN SATURATION: 96 % | BODY MASS INDEX: 30.73 KG/M2 | DIASTOLIC BLOOD PRESSURE: 80 MMHG | RESPIRATION RATE: 20 BRPM | HEIGHT: 64 IN | SYSTOLIC BLOOD PRESSURE: 139 MMHG | TEMPERATURE: 98.4 F | WEIGHT: 180 LBS

## 2020-08-28 DIAGNOSIS — Z01.818 PREOP GENERAL PHYSICAL EXAM: Primary | ICD-10-CM

## 2020-08-28 DIAGNOSIS — E27.8 ADRENAL MASS (H): ICD-10-CM

## 2020-08-28 DIAGNOSIS — C34.92 SQUAMOUS CELL CARCINOMA OF LEFT LUNG (H): ICD-10-CM

## 2020-08-28 DIAGNOSIS — R91.8 MASS OF LEFT LUNG: ICD-10-CM

## 2020-08-28 PROCEDURE — 99214 OFFICE O/P EST MOD 30 MIN: CPT | Performed by: FAMILY MEDICINE

## 2020-08-28 ASSESSMENT — MIFFLIN-ST. JEOR: SCORE: 1331.47

## 2020-08-28 NOTE — PATIENT INSTRUCTIONS

## 2020-08-28 NOTE — PROGRESS NOTES
[facility]  [address] [place] [id number]  Phone: [phone]  Fax: [phone]  Primary Provider: Keo Espinal  Pre-op Performing Provider: KEO ESPINAL    PREOPERATIVE EVALUATION:  Today's date: 8/28/2020    Ijeoma Shah is a 69 year old female who presents for a preoperative evaluation.    Surgical Information:  Surgery Details 8/26/2020   Surgery/Procedure: Endoscopy   Surgery Location: [facility]   Surgeon: Dr simpson   Surgery Date: 9-1-2020   Time of Surgery: Endoscopy   Where patient plans to recover: At home with family     Fax number for surgical facility: Note does not need to be faxed, will be available electronically in Epic.  Type of Anesthesia Anticipated: General    Subjective     HPI related to upcoming procedure: ENDOSCOPIC ULTRASOUND, ESOPHAGOSCOPY / UPPER GASTROINTESTINAL TRACT (GI)  Preop Questions 8/26/2020   Have you ever had a heart attack or stroke? No   Have you ever had surgery on your heart or blood vessels, such as a stent placement, a coronary artery bypass, or surgery on an artery in your head, neck, heart, or legs? No   Do you have chest pain with activity? No   Do you have a history of  heart failure? No   Do you currently have a cold, bronchitis or symptoms of other infection? No   Do you have a cough, shortness of breath, or wheezing? No   Do you or anyone in your family have previous history of blood clots? No   Do you or does anyone in your family have a serious bleeding problem such as prolonged bleeding following surgeries or cuts? No   Have you ever had problems with anemia or been told to take iron pills? No   Have you had any abnormal blood loss such as black, tarry or bloody stools, or abnormal vaginal bleeding? No   Have you ever had a blood transfusion? No   Are you willing to have a blood transfusion if it is medically needed before, during, or after your surgery? Yes   Have you or any of your relatives ever had  problems with anesthesia? No   Do you have sleep apnea, excessive snoring or daytime drowsiness? No   Do you have any artifical heart valves or other implanted medical devices like a pacemaker, defibrillator, or continuous glucose monitor? No   Do you have artificial joints? No   Are you allergic to latex? No   Is there any chance that you may be pregnant? No     Patient does not have a Health Care Directive or Living Will: Discussed advance care planning with patient; however, patient declined at this time.    Lung cancer  Mass found on lung CT screening   Bronchoscopy identified squamous cell  Pet scan   5IMPRESSION:  1. Hypermetabolic 7.1 cm mass in the left lower lobe of the lung is  highly suspicious for primary or metastatic malignancy.  2. Hypermetabolic mediastinal adenopathy is suspicious for metastatic  involvement.   3. Small hypermetabolic left adrenal nodule is suspicious for  metastatic disease.  4. A few small indeterminate pulmonary nodules in both lungs measure  0.5 cm or smaller, and are too small for accurate PET  characterization.  5. Two pancreatic cystic lesions are noted, with the largest measuring  1.8 cm. These findings are considered indeterminate, however there is  no significant associated hypermetabolic activity.  6. Cholelithiasis.     ANIYAH MON    Will try to biopsy the adrenal gland to determine next treatment    Review of Systems  ROS: 5 point ROS negative except as noted above in HPI, including Gen., Resp., CV, GI &  system review.     Patient Active Problem List    Diagnosis Date Noted     Mass of lower lobe of left lung 07/14/2020     Priority: Medium     Added automatically from request for surgery [id number]       Mass of left lung 07/03/2020     Priority: Medium     Mediastinal adenopathy 07/03/2020     Priority: Medium     Uterine prolapse 02/18/2018     Priority: Medium     Bee sting reaction 08/28/2012     Priority: Medium     Advanced directives,  counseling/discussion 2011     Priority: Medium     Pt took packet home to fill out        HYPERLIPIDEMIA LDL GOAL <160 10/31/2010     Priority: Medium     Dermatophytosis of foot 2006     Priority: Medium     Viral warts 2006     Priority: Medium     Problem list name updated by automated process. Provider to review        Past Medical History:   Diagnosis Date     Simple chronic bronchitis (H)      Past Surgical History:   Procedure Laterality Date     BRONCHOSCOPY RIGID OR FLEXIBLE W/TRANSENDOSCOPIC ENDOBRONCHIAL ULTRASOUND GUIDED N/A 2020    Procedure: Flexible bronchoscopy, endobronchial ultrasound with transbronchial biopsies;  Surgeon: Edin Castro MD;  Location: [facility] OR     COLONOSCOPY  04    colonoscopy to the cecum     SURGICAL HISTORY OF -       nose surgery     TUBAL LIGATION      bilateral tubal ligation.  BTL     Current Outpatient Medications   Medication Sig Dispense Refill     chlorhexidine (HIBICLENS) 4 % liquid Use for shower before surgery. 118 mL 0     EPINEPHrine (EPIPEN/ADRENACLICK/OR ANY BX GENERIC EQUIV) 0.3 MG/0.3ML injection 2-pack Inject 0.3 mLs (0.3 mg) into the muscle as needed 0.6 mL 1     triamcinolone (KENALOG) 0.1 % external ointment APPLY  OINTMENT TOPICALLY TWICE DAILY OR  AS  NEEDED 45 g 0     ketoconazole (NIZORAL) 2 % external cream Apply topically daily 60 g 1       Allergies   Allergen Reactions     Bee Venom Hives     Hot and sweating         Social History     Tobacco Use     Smoking status: Former Smoker     Packs/day: 0.50     Years: 33.00     Pack years: 16.50     Types: Cigarettes     Last attempt to quit: 2014     Years since quittin.7     Smokeless tobacco: Never Used     Tobacco comment: working on quitting   Substance Use Topics     Alcohol use: Yes     Alcohol/week: 1.7 - 2.5 standard drinks     Types: 2 - 3 Standard drinks or equivalent per week     Comment: occ,social     Family History   Problem Relation Age of Onset  "    Eye Disorder Mother         cataracts     Cancer Father      Melanoma Father      Cancer Sister         bile duct     Cancer Sister         lung     History   Drug Use No            Objective   /80 (BP Location: Right arm)   Pulse 89   Temp 98.4  F (36.9  C) (Tympanic)   Resp 20   Ht 1.626 m (5' 4\")   Wt 81.6 kg (180 lb)   SpO2 96%   BMI 30.90 kg/m    Physical Exam    GENERAL APPEARANCE: healthy, alert and no distress     EYES: EOMI, PERRL     HENT: ear canals and TM's normal and nose and mouth without ulcers or lesions     NECK: no adenopathy, no asymmetry, masses, or scars and thyroid normal to palpation     RESP: lungs clear to auscultation - no rales, rhonchi or wheezes     CV: regular rates and rhythm, normal S1 S2, no S3 or S4 and no murmur, click or rub     ABDOMEN:  soft, nontender, no HSM or masses and bowel sounds normal     MS: extremities normal- no gross deformities noted, no evidence of inflammation in joints, FROM in all extremities.     SKIN: no suspicious lesions or rashes     NEURO: Normal strength and tone, sensory exam grossly normal, mentation intact and speech normal     PSYCH: mentation appears normal. and affect normal/bright     LYMPHATICS: No cervical adenopathy    Recent Labs   Lab Test 06/23/20  1002      POTASSIUM 4.2   CR 0.74        PRE-OP Diagnostics:  No labs were ordered during this visit.  No EKG required for low risk surgery (cataract, skin procedure, breast biopsy, etc).         Assessment & Plan   The proposed surgical procedure is considered LOW risk.    REVISED CARDIAC RISK INDEX  The patient has the following serious cardiovascular risks for perioperative complications:  No serious cardiac risks = 0 points    INTERPRETATION: 0 points: Class I (very low risk - 0.4% complication rate)       Ijeoma was seen today for pre-op exam.    Diagnoses and all orders for this visit:    Preop general physical exam    Mass of left lung    Adrenal mass " (H)        The patient has the following additional risks and recommendations for perioperative complications:     - No identified additional risk factors other than previously addressed     MEDICATION INSTRUCTIONS:  On no medications    RECOMMENDATION:  APPROVAL GIVEN to proceed with proposed procedure, without further diagnostic evaluation.    No follow-ups on file.    Signed Electronically by: Jyoti Griffiths MD    Copy of this evaluation report is provided to requesting physician.    [facility]    Revised Cardiac Risk Index

## 2020-08-28 NOTE — NURSING NOTE
"Chief Complaint   Patient presents with     Pre-Op Exam     ENDOSCOPIC ULTRASOUND, ESOPHAGOSCOPY / UPPER GASTROINTESTINAL TRACT (GI)       Initial /80 (BP Location: Right arm)   Pulse 89   Temp 98.4  F (36.9  C) (Tympanic)   Resp 20   Ht 1.626 m (5' 4\")   Wt 81.6 kg (180 lb)   SpO2 96%   BMI 30.90 kg/m   Estimated body mass index is 30.9 kg/m  as calculated from the following:    Height as of this encounter: 1.626 m (5' 4\").    Weight as of this encounter: 81.6 kg (180 lb).    Patient presents to the clinic using No DME    Health Maintenance that is potentially due pending provider review:  NONE    n/a    Is there anyone who you would like to be able to receive your results? No  If yes have patient fill out HARPAL    "

## 2020-08-29 DIAGNOSIS — Z11.59 ENCOUNTER FOR SCREENING FOR OTHER VIRAL DISEASES: ICD-10-CM

## 2020-08-29 PROCEDURE — U0003 INFECTIOUS AGENT DETECTION BY NUCLEIC ACID (DNA OR RNA); SEVERE ACUTE RESPIRATORY SYNDROME CORONAVIRUS 2 (SARS-COV-2) (CORONAVIRUS DISEASE [COVID-19]), AMPLIFIED PROBE TECHNIQUE, MAKING USE OF HIGH THROUGHPUT TECHNOLOGIES AS DESCRIBED BY CMS-2020-01-R: HCPCS | Performed by: INTERNAL MEDICINE

## 2020-08-30 LAB
SARS-COV-2 RNA SPEC QL NAA+PROBE: NOT DETECTED
SPECIMEN SOURCE: NORMAL

## 2020-09-01 ENCOUNTER — HOSPITAL ENCOUNTER (OUTPATIENT)
Facility: CLINIC | Age: 70
Discharge: HOME | End: 2020-09-01
Attending: INTERNAL MEDICINE | Admitting: INTERNAL MEDICINE
Payer: MEDICARE

## 2020-09-01 ENCOUNTER — ANESTHESIA EVENT (OUTPATIENT)
Dept: GASTROENTEROLOGY | Facility: CLINIC | Age: 70
End: 2020-09-01
Payer: MEDICARE

## 2020-09-01 ENCOUNTER — ANESTHESIA (OUTPATIENT)
Dept: GASTROENTEROLOGY | Facility: CLINIC | Age: 70
End: 2020-09-01
Payer: MEDICARE

## 2020-09-01 VITALS
WEIGHT: 176.8 LBS | BODY MASS INDEX: 30.19 KG/M2 | HEIGHT: 64 IN | HEART RATE: 75 BPM | TEMPERATURE: 97.9 F | SYSTOLIC BLOOD PRESSURE: 159 MMHG | RESPIRATION RATE: 8 BRPM | OXYGEN SATURATION: 94 % | DIASTOLIC BLOOD PRESSURE: 90 MMHG

## 2020-09-01 LAB — UPPER EUS: NORMAL

## 2020-09-01 PROCEDURE — 25000125 ZZHC RX 250: Performed by: NURSE ANESTHETIST, CERTIFIED REGISTERED

## 2020-09-01 PROCEDURE — 25800030 ZZH RX IP 258 OP 636: Performed by: NURSE ANESTHETIST, CERTIFIED REGISTERED

## 2020-09-01 PROCEDURE — 88173 CYTOPATH EVAL FNA REPORT: CPT | Performed by: INTERNAL MEDICINE

## 2020-09-01 PROCEDURE — 00000155 ZZHCL STATISTIC H-CELL BLOCK W/STAIN: Performed by: INTERNAL MEDICINE

## 2020-09-01 PROCEDURE — 88305 TISSUE EXAM BY PATHOLOGIST: CPT | Performed by: INTERNAL MEDICINE

## 2020-09-01 PROCEDURE — 88172 CYTP DX EVAL FNA 1ST EA SITE: CPT | Performed by: INTERNAL MEDICINE

## 2020-09-01 PROCEDURE — 37000009 ZZH ANESTHESIA TECHNICAL FEE, EACH ADDTL 15 MIN: Performed by: INTERNAL MEDICINE

## 2020-09-01 PROCEDURE — 27210249 ZZH NEEDLE EUS, EACH ADDITIONAL: Performed by: INTERNAL MEDICINE

## 2020-09-01 PROCEDURE — 25000128 H RX IP 250 OP 636: Performed by: NURSE ANESTHETIST, CERTIFIED REGISTERED

## 2020-09-01 PROCEDURE — 37000008 ZZH ANESTHESIA TECHNICAL FEE, 1ST 30 MIN: Performed by: INTERNAL MEDICINE

## 2020-09-01 PROCEDURE — 43242 EGD US FINE NEEDLE BX/ASPIR: CPT | Performed by: INTERNAL MEDICINE

## 2020-09-01 RX ORDER — ONDANSETRON 2 MG/ML
INJECTION INTRAMUSCULAR; INTRAVENOUS PRN
Status: DISCONTINUED | OUTPATIENT
Start: 2020-09-01 | End: 2020-09-01

## 2020-09-01 RX ORDER — LABETALOL HYDROCHLORIDE 5 MG/ML
INJECTION, SOLUTION INTRAVENOUS PRN
Status: DISCONTINUED | OUTPATIENT
Start: 2020-09-01 | End: 2020-09-01

## 2020-09-01 RX ORDER — PROPOFOL 10 MG/ML
INJECTION, EMULSION INTRAVENOUS PRN
Status: DISCONTINUED | OUTPATIENT
Start: 2020-09-01 | End: 2020-09-01

## 2020-09-01 RX ORDER — PROPOFOL 10 MG/ML
INJECTION, EMULSION INTRAVENOUS CONTINUOUS PRN
Status: DISCONTINUED | OUTPATIENT
Start: 2020-09-01 | End: 2020-09-01

## 2020-09-01 RX ORDER — NALOXONE HYDROCHLORIDE 0.4 MG/ML
.1-.4 INJECTION, SOLUTION INTRAMUSCULAR; INTRAVENOUS; SUBCUTANEOUS
Status: DISCONTINUED | OUTPATIENT
Start: 2020-09-01 | End: 2020-09-01 | Stop reason: HOSPADM

## 2020-09-01 RX ORDER — LIDOCAINE 40 MG/G
CREAM TOPICAL
Status: DISCONTINUED | OUTPATIENT
Start: 2020-09-01 | End: 2020-09-01 | Stop reason: HOSPADM

## 2020-09-01 RX ORDER — LIDOCAINE HYDROCHLORIDE 20 MG/ML
INJECTION, SOLUTION INFILTRATION; PERINEURAL PRN
Status: DISCONTINUED | OUTPATIENT
Start: 2020-09-01 | End: 2020-09-01

## 2020-09-01 RX ORDER — SODIUM CHLORIDE, SODIUM LACTATE, POTASSIUM CHLORIDE, CALCIUM CHLORIDE 600; 310; 30; 20 MG/100ML; MG/100ML; MG/100ML; MG/100ML
INJECTION, SOLUTION INTRAVENOUS CONTINUOUS PRN
Status: DISCONTINUED | OUTPATIENT
Start: 2020-09-01 | End: 2020-09-01

## 2020-09-01 RX ORDER — FLUMAZENIL 0.1 MG/ML
0.2 INJECTION, SOLUTION INTRAVENOUS
Status: DISCONTINUED | OUTPATIENT
Start: 2020-09-01 | End: 2020-09-01 | Stop reason: HOSPADM

## 2020-09-01 RX ORDER — ESMOLOL HYDROCHLORIDE 10 MG/ML
INJECTION INTRAVENOUS PRN
Status: DISCONTINUED | OUTPATIENT
Start: 2020-09-01 | End: 2020-09-01

## 2020-09-01 RX ORDER — GLYCOPYRROLATE 0.2 MG/ML
INJECTION, SOLUTION INTRAMUSCULAR; INTRAVENOUS PRN
Status: DISCONTINUED | OUTPATIENT
Start: 2020-09-01 | End: 2020-09-01

## 2020-09-01 RX ADMIN — SODIUM CHLORIDE, POTASSIUM CHLORIDE, SODIUM LACTATE AND CALCIUM CHLORIDE: 600; 310; 30; 20 INJECTION, SOLUTION INTRAVENOUS at 11:33

## 2020-09-01 RX ADMIN — GLYCOPYRROLATE 0.2 MG: 0.2 INJECTION, SOLUTION INTRAMUSCULAR; INTRAVENOUS at 11:43

## 2020-09-01 RX ADMIN — LIDOCAINE HYDROCHLORIDE 100 MG: 20 INJECTION, SOLUTION INFILTRATION; PERINEURAL at 11:37

## 2020-09-01 RX ADMIN — ESMOLOL HYDROCHLORIDE 30 MG: 10 INJECTION, SOLUTION INTRAVENOUS at 11:55

## 2020-09-01 RX ADMIN — ESMOLOL HYDROCHLORIDE 20 MG: 10 INJECTION, SOLUTION INTRAVENOUS at 12:09

## 2020-09-01 RX ADMIN — ESMOLOL HYDROCHLORIDE 20 MG: 10 INJECTION, SOLUTION INTRAVENOUS at 12:03

## 2020-09-01 RX ADMIN — ONDANSETRON 4 MG: 2 INJECTION INTRAMUSCULAR; INTRAVENOUS at 11:37

## 2020-09-01 RX ADMIN — TOPICAL ANESTHETIC 1 EACH: 200 SPRAY DENTAL; PERIODONTAL at 11:33

## 2020-09-01 RX ADMIN — PROPOFOL 150 MCG/KG/MIN: 10 INJECTION, EMULSION INTRAVENOUS at 11:37

## 2020-09-01 RX ADMIN — LABETALOL HYDROCHLORIDE 5 MG: 5 INJECTION, SOLUTION INTRAVENOUS at 12:18

## 2020-09-01 RX ADMIN — PROPOFOL 40 MG: 10 INJECTION, EMULSION INTRAVENOUS at 11:50

## 2020-09-01 RX ADMIN — MIDAZOLAM 2 MG: 1 INJECTION INTRAMUSCULAR; INTRAVENOUS at 11:33

## 2020-09-01 ASSESSMENT — MIFFLIN-ST. JEOR: SCORE: 1316.96

## 2020-09-01 ASSESSMENT — LIFESTYLE VARIABLES: TOBACCO_USE: 1

## 2020-09-01 NOTE — ANESTHESIA CARE TRANSFER NOTE
Patient: Ijeoma Shah    Procedure(s):  ESOPHAGOGASTRODUODENOSCOPY, WITH FINE NEEDLE ASPIRATION BIOPSY, WITH ENDOSCOPIC ULTRASOUND GUIDANCE    Diagnosis: Adrenal mass (H) [E27.8]  Diagnosis Additional Information: No value filed.    Anesthesia Type:   MAC     Note:  Airway :Room Air  Patient transferred to:Phase II  Handoff Report: Identifed the Patient, Identified the Reponsible Provider, Reviewed the pertinent medical history, Discussed the surgical course, Reviewed Intra-OP anesthesia mangement and issues during anesthesia, Set expectations for post-procedure period and Allowed opportunity for questions and acknowledgement of understanding      Vitals: (Last set prior to Anesthesia Care Transfer)    CRNA VITALS  9/1/2020 1204 - 9/1/2020 1240      9/1/2020             NIBP:  136/83    Ht Rate:  92                Electronically Signed By: RUDY Powell CRNA  September 1, 2020  12:40 PM

## 2020-09-01 NOTE — ANESTHESIA POSTPROCEDURE EVALUATION
Anesthesia POST Procedure Evaluation    Patient: Ijeoma Shah   MRN:     [id number] Gender:   female   Age:    69 year old :      1950        Preoperative Diagnosis: Adrenal mass (H) [E27.8]   Procedure(s):  ESOPHAGOGASTRODUODENOSCOPY, WITH FINE NEEDLE ASPIRATION BIOPSY, WITH ENDOSCOPIC ULTRASOUND GUIDANCE   Postop Comments: No value filed.     Anesthesia Type: MAC       Disposition: Outpatient   Postop Pain Control: Uneventful            Sign Out: Well controlled pain   PONV: No   Neuro/Psych: Uneventful            Sign Out: Acceptable/Baseline neuro status   Airway/Respiratory: Uneventful            Sign Out: Acceptable/Baseline resp. status   CV/Hemodynamics: Uneventful            Sign Out: Acceptable CV status   Other NRE: NONE   DID A NON-ROUTINE EVENT OCCUR? No         Last Anesthesia Record Vitals:  CRNA VITALS  2020 1204 - 2020 1303      2020             NIBP:  136/83    Ht Rate:  92          Last PACU Vitals:  Vitals Value Taken Time   BP     Temp     Pulse     Resp     SpO2     Temp src     NIBP 144/100 2020 12:31 PM   Pulse 97 2020 12:34 PM   SpO2 100 % 2020 12:34 PM   Resp     Temp     Ht Rate 96 2020 12:34 PM   Temp 2           Electronically Signed By: Yoselin Worrell MD, 2020, 1:03 PM

## 2020-09-01 NOTE — ANESTHESIA PREPROCEDURE EVALUATION
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.8]   Procedure(s):  ENDOSCOPIC ULTRASOUND, ESOPHAGOSCOPY / UPPER GASTROINTESTINAL TRACT (GI)     LABS:  CBC:   Lab Results   Component Value Date    WBC 7.7 2013    HGB 14.9 2013    HCT 42.4 2013     2013     BMP:   Lab Results   Component Value Date     2020    POTASSIUM 4.2 2020    CHLORIDE 104 2020    CO2 31 2020    BUN 12 2020    CR 0.74 2020     (H) 2020     (H) 2015     COAGS: No results found for: PTT, INR, FIBR  POC:   Lab Results   Component Value Date    BGM 88 2020     OTHER:   Lab Results   Component Value Date    SABINO 9.4 2020    ALBUMIN 3.4 2020    PROTTOTAL 7.5 2020    ALT 18 2020    AST 19 2020    ALKPHOS 106 2020    BILITOTAL 0.3 2020    TSH 0.62 2020        Preop Vitals    BP Readings from Last 3 Encounters:   20 (!) 141/86   20 139/80   20 125/72    Pulse Readings from Last 3 Encounters:   20 63   20 89   20 79      Resp Readings from Last 3 Encounters:   20 22   20 20   20 18    SpO2 Readings from Last 3 Encounters:   20 98%   20 96%   20 94%      Temp Readings from Last 1 Encounters:   20 36.6  C (97.9  F) (Oral)    Ht Readings from Last 1 Encounters:   20 1.626 m (5' 4\")      Wt Readings from Last 1 Encounters:   20 80.2 kg (176 lb 12.8 oz)    Estimated body mass index is 30.35 kg/m  as calculated from the following:    Height as of this encounter: 1.626 m (5' 4\").    Weight as of this encounter: 80.2 kg (176 lb 12.8 oz).     LDA:  Peripheral IV 20 Right Wrist (Active)   Site Assessment WDL 20 09   Line Status Saline locked 20 0940 "   Phlebitis Scale 0-->no symptoms 09/01/20 0940   Infiltration Scale 0 09/01/20 0940   Number of days: 0        Past Medical History:   Diagnosis Date     Simple chronic bronchitis (H)       Past Surgical History:   Procedure Laterality Date     BRONCHOSCOPY RIGID OR FLEXIBLE W/TRANSENDOSCOPIC ENDOBRONCHIAL ULTRASOUND GUIDED N/A 7/27/2020    Procedure: Flexible bronchoscopy, endobronchial ultrasound with transbronchial biopsies;  Surgeon: Edin Castro MD;  Location: [facility]     COLONOSCOPY  6/29/04    colonoscopy to the cecum     SURGICAL HISTORY OF -       nose surgery     TUBAL LIGATION      bilateral tubal ligation.  BTL      Allergies   Allergen Reactions     Bee Venom Hives     Hot and sweating         Anesthesia Evaluation     .             ROS/MED HX    ENT/Pulmonary: Comment: Lung mass    (+)tobacco use, Past use , . .    Neurologic:  - neg neurologic ROS     Cardiovascular:  - neg cardiovascular ROS   (+) ----. : . . . :. . Previous cardiac testing date:results:date: results:ECG reviewed date:7/22 results:Sinus Rhythm -Short CA syndrome   Sunny = 116  -Left atrial enlargement.    date: results:          METS/Exercise Tolerance:  4 - Raking leaves, gardening   Hematologic:  - neg hematologic  ROS       Musculoskeletal:  - neg musculoskeletal ROS       GI/Hepatic:  - neg GI/hepatic ROS       Renal/Genitourinary:         Endo:  - neg endo ROS       Psychiatric:  - neg psychiatric ROS       Infectious Disease:  - neg infectious disease ROS       Malignancy:   (+) Malignancy (dx 7/20) History of Lung          Other:    (+) C-spine cleared: N/A, no H/O Chronic Pain,                       PHYSICAL EXAM:   Mental Status/Neuro:    Airway: Facies: Feasible  Mallampati: I  Mouth/Opening: Full  TM distance: > 6 cm  Neck ROM: Full   Respiratory: Auscultation: CTAB     Resp. Rate: Normal     Resp. Effort: Normal      CV: Rhythm: Regular  Rate: Age appropriate  Heart: Normal Sounds  Edema: None   Comments:       Dental: Normal Dentition                Assessment:   ASA SCORE: 3    H&P: History and physical reviewed and following examination; no interval change.   Smoking Status:  Non-Smoker/Unknown   NPO Status: NPO Appropriate     Plan:   Anes. Type:  MAC   Pre-Medication: None   Induction:  IV (Standard)   Airway: Native Airway   Access/Monitoring: PIV   Maintenance: Propofol Sedation     Postop Plan:   Postop Pain: None  Postop Sedation/Airway: Not planned  Disposition: Outpatient     PONV Management:  NO PONV Prophylaxis Required     CONSENT: Direct conversation   Plan and risks discussed with: Patient                      Yoselin Worrell MD

## 2020-09-01 NOTE — OR NURSING
----- Message from Urvashi Moore sent at 3/8/2017 11:35 AM CST -----  Contact: pt  Needs refill on Atorvastatin sent to the [facility] on [address].  Please call pt at [phone] to confirm   Pt tolerated EUS with FNA of 2 separate sites, well under MAC

## 2020-09-02 ENCOUNTER — TELEPHONE (OUTPATIENT)
Dept: GASTROENTEROLOGY | Facility: CLINIC | Age: 70
End: 2020-09-02

## 2020-09-02 LAB — COPATH REPORT: NORMAL

## 2020-09-02 NOTE — TELEPHONE ENCOUNTER
See EUS report from yesterday.    Left adrenal and left lower paratracheal node biopsies returned without evidence of malignancy.    Called and discussed with pt.    Message forwarded to referring pulmonary team. My impression is that plan at this point will be for formal surgical consultation, but defer to pulmonary.    RAVEN Hogan MD  Professor of Medicine  Division of Gastroenterology, Hepatology and Nutrition  [facility]

## 2020-09-03 NOTE — TELEPHONE ENCOUNTER
ONCOLOGY INTAKE: Records Information      APPT INFORMATION:  Referring provider:  Dr. Castro  Referring provider s clinic:  Lung Nodule  Reason for visit/diagnosis:  LLL mass  Has patient been notified of appointment date and time?: Yes    RECORDS INFORMATION:  Were the records received with the referral (via [facility])? No    Has patient been seen for any external appt for this diagnosis? No    If yes, where? N/a    Has patient had any imaging or procedures outside of [place]  [place] for this condition? No      If Yes, where? N/a    ADDITIONAL INFORMATION:  Request via IB

## 2020-09-04 NOTE — TELEPHONE ENCOUNTER
RECORDS STATUS - ALL OTHER DIAGNOSIS      RECORDS RECEIVED FROM: [facility] - Internal Referral   DATE RECEIVED: 9/4/20   NOTES STATUS DETAILS   OFFICE NOTE from referring provider     OFFICE NOTE from medical oncologist     DISCHARGE SUMMARY from hospital     DISCHARGE REPORT from the ER     OPERATIVE REPORT     MEDICATION LIST     CLINICAL TRIAL TREATMENTS TO DATE     LABS     PATHOLOGY REPORTS     ANYTHING RELATED TO DIAGNOSIS     GENONOMIC TESTING     TYPE:     IMAGING (NEED IMAGES & REPORT)     CT SCANS     MRI     MAMMO     ULTRASOUND     PET

## 2020-09-08 ENCOUNTER — PRE VISIT (OUTPATIENT)
Dept: SURGERY | Facility: CLINIC | Age: 70
End: 2020-09-08

## 2020-09-08 ENCOUNTER — VIRTUAL VISIT (OUTPATIENT)
Dept: SURGERY | Facility: CLINIC | Age: 70
End: 2020-09-08
Attending: THORACIC SURGERY (CARDIOTHORACIC VASCULAR SURGERY)
Payer: MEDICARE

## 2020-09-08 ENCOUNTER — PREP FOR PROCEDURE (OUTPATIENT)
Dept: SURGERY | Facility: CLINIC | Age: 70
End: 2020-09-08

## 2020-09-08 DIAGNOSIS — C34.32 MALIGNANT NEOPLASM OF LOWER LOBE OF LEFT LUNG (H): Primary | ICD-10-CM

## 2020-09-08 DIAGNOSIS — C34.92 SQUAMOUS CELL CARCINOMA OF LEFT LUNG (H): Primary | ICD-10-CM

## 2020-09-08 PROCEDURE — 40001009 ZZH VIDEO/TELEPHONE VISIT; NO CHARGE

## 2020-09-08 PROCEDURE — 99203 OFFICE O/P NEW LOW 30 MIN: CPT | Mod: 95 | Performed by: THORACIC SURGERY (CARDIOTHORACIC VASCULAR SURGERY)

## 2020-09-08 RX ORDER — CEFAZOLIN SODIUM 2 G/50ML
2 SOLUTION INTRAVENOUS
Status: CANCELLED | OUTPATIENT
Start: 2020-09-08

## 2020-09-08 RX ORDER — CEFAZOLIN SODIUM 1 G/50ML
1 INJECTION, SOLUTION INTRAVENOUS SEE ADMIN INSTRUCTIONS
Status: CANCELLED | OUTPATIENT
Start: 2020-09-08

## 2020-09-08 NOTE — PROGRESS NOTES
"Ijeoma Shah is a 69 year old female who is being evaluated via a billable video visit.      The patient has been notified of following:     \"This video visit will be conducted via a call between you and your physician/provider. We have found that certain health care needs can be provided without the need for an in-person physical exam.  This service lets us provide the care you need with a video conversation.  If a prescription is necessary we can send it directly to your pharmacy.  If lab work is needed we can place an order for that and you can then stop by our lab to have the test done at a later time.    Video visits are billed at different rates depending on your insurance coverage.  Please reach out to your insurance provider with any questions.    If during the course of the call the physician/provider feels a video visit is not appropriate, you will not be charged for this service.\"    Patient has given verbal consent for Video visit? Yes    How would you like to obtain your AVS? MyChart     If you are dropped from the video visit, the video invite should be resent to: Text to cell phone: [phone]     Will anyone else be joining your video visit? No          Vitals - Patient Reported  Weight (Patient Reported): 79.4 kg (175 lb)  Height (Patient Reported): 162.6 cm (5' 4.02\")  BMI (Based on Pt Reported Ht/Wt): 30.02  Pain Score: No Pain (0)    Maikol Jasso LPN      Video-Visit Details    Type of service:  Video Visit    Video Start Time: 4:15 PM  Video End Time: 4:46 PM    Originating Location (pt. Location): Home    Distant Location (provider location):  [facility]     Platform used for Video Visit: [facility]     THORACIC SURGERY - NEW PATIENT OFFICE VISIT      Dear Dr. Espinal,    I saw Ms. Shah at Dr. Castro s request in consultation for the evaluation and treatment of a Left lower lobe lung nodule.     ANJU Shah is a 69 year old woman who underwent her " "first lung cancer screening CT and was found to have a large left lower lobe lung mass.  She can walk one mile without stopping.  She does yard work and gardening.    Previsit Tests   PFT 8/12/2020:  FEV1:  1.33 (58%)  DLCO:  17.72  (90%)    EUS 9/1/2020:  Left adrenal gland benign  4L:  benign    MRI Brain 8/12/2020:  No intracranial metastasis    EBUS 7/27/2020:  4L/7/11L:  Benign lymph node  Left lower lobe lung mass:  Squamous cell carcinoma, p40 +, TTF-    PET 7/9/2020:     LLL mass:  7.1 x 5.6 cm, SUVmax 19.6  Left paratracheal lymph node (4L):  3.5 x 1.7 cm, SUVmax 5.9  Left adrenal gland nodule 1.1 cm, SUVmax 4.    PMH  Reviewed, as below    Past Medical History:   Diagnosis Date     Simple chronic bronchitis (H)         PSH  Reviewed, as below    Past Surgical History:   Procedure Laterality Date     BRONCHOSCOPY RIGID OR FLEXIBLE W/TRANSENDOSCOPIC ENDOBRONCHIAL ULTRASOUND GUIDED N/A 7/27/2020    Procedure: Flexible bronchoscopy, endobronchial ultrasound with transbronchial biopsies;  Surgeon: Edin Castro MD;  Location: [facility] OR     COLONOSCOPY  6/29/04    colonoscopy to the cecum     ESOPHAGOSCOPY, GASTROSCOPY, DUODENOSCOPY (EGD), COMBINED N/A 9/1/2020    Procedure: ESOPHAGOGASTRODUODENOSCOPY, WITH FINE NEEDLE ASPIRATION BIOPSY, WITH ENDOSCOPIC ULTRASOUND GUIDANCE;  Surgeon: Barber Hogan MD;  Location:  GI     SURGICAL HISTORY OF -       nose surgery     TUBAL LIGATION      bilateral tubal ligation.  BTL        ETOH:  2-3 drinks per every other week  TOB: Age 15 - 69.  1 pack per day for 54 years.  She quit smoking a few months ago.    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  She has one daughter and two grandchildren.    IMPRESSION (C34.92) Squamous cell carcinoma of left lung (H)  (primary encounter diagnosis)    This is a 69 year old woman with a left " lower lobe squamous cell cancer of the lung.  She is a reasonable surgical candidate.    PLAN  I spent a total of 30 minutes with Ms. Shah, more than 50% of which were spent in counseling, coordination of care, and face-to-face time. I reviewed the plan as follows:    Procedure planned: TEMLA, Left thoracoscopic lobectomy, possible thoracotomy.    The risks of mediastinoscopy include bleeding requiring sternotomy or thoracotomy. There is also a risk of recurrent laryngeal nerve injury, which can lead to the need for a separate procedure by the ENT service.    I discussed the risks and benefits of the operation, including obtaining a diagnosis, resecting and staging the cancer. The risks include bleeding, infection, arrhythmia requiring medication or anticoagulation, prolonged air leak, UTI, chylothorax, DVT and PE, and death.  There is also a risk of prolonged pain, which could require further treatment.  Prolonged air leak could be treated with bronchoscopy and endobronchial valve insertion.  Postoperative bleeding (rare) could require a return to the OR.    Consent: Pending    Necessary Preop Tests & Appointments: PAC    Regional Anesthesia Plan: Epidural    Anticoagulation Plan: Lovenox    All questions were answered and Salima Shah was in agreement with the plan.    I appreciate the opportunity to participate in the care of your patient and will keep you updated.    Sincerely,     Andrea Sanabria MD

## 2020-09-08 NOTE — LETTER
"9/8/2020     RE: Ijeoma Shah  [address] [facility] [address]    Dear Colleague,    Thank you for referring your patient, Ijeoma Shah, to the [facility]. Please see a copy of my visit note below.    In error    Ijeoma Shah is a 69 year old female who is being evaluated via a billable video visit.      The patient has been notified of following:     \"This video visit will be conducted via a call between you and your physician/provider. We have found that certain health care needs can be provided without the need for an in-person physical exam.  This service lets us provide the care you need with a video conversation.  If a prescription is necessary we can send it directly to your pharmacy.  If lab work is needed we can place an order for that and you can then stop by our lab to have the test done at a later time.    Video visits are billed at different rates depending on your insurance coverage.  Please reach out to your insurance provider with any questions.    If during the course of the call the physician/provider feels a video visit is not appropriate, you will not be charged for this service.\"    Patient has given verbal consent for Video visit? Yes    How would you like to obtain your AVS? MyChart     If you are dropped from the video visit, the video invite should be resent to: Text to cell phone: [phone]     Will anyone else be joining your video visit? No          Vitals - Patient Reported  Weight (Patient Reported): 79.4 kg (175 lb)  Height (Patient Reported): 162.6 cm (5' 4.02\")  BMI (Based on Pt Reported Ht/Wt): 30.02  Pain Score: No Pain (0)    Maikol Jasso LPN      Video-Visit Details    Type of service:  Video Visit    Video Start Time: 4:15 PM  Video End Time: 4:46 PM    Originating Location (pt. Location): Home    Distant Location (provider location):  [facility]     Platform used for Video Visit: Steven" "    THORACIC SURGERY - NEW PATIENT OFFICE VISIT      Dear Dr. Espinal,    I saw Ms. Shah at Dr. Castro s request in consultation for the evaluation and treatment of a Left lower lobe lung nodule.     HPI  Salima Shah is a 69 year old woman who underwent her first lung cancer screening CT and was found to have a large left lower lobe lung mass.  She can walk one mile without stopping.  She does yard work and gardening.    Previsit Tests   PFT 8/12/2020:  FEV1:  1.33 (58%)  DLCO:  17.72  (90%)    EUS 9/1/2020:  Left adrenal gland benign  4L:  benign    MRI Brain 8/12/2020:  No intracranial metastasis    EBUS 7/27/2020:  4L/7/11L:  Benign lymph node  Left lower lobe lung mass:  Squamous cell carcinoma, p40 +, TTF-    PET 7/9/2020:     LLL mass:  7.1 x 5.6 cm, SUVmax 19.6  Left paratracheal lymph node (4L):  3.5 x 1.7 cm, SUVmax 5.9  Left adrenal gland nodule 1.1 cm, SUVmax 4.    PMH  Reviewed, as below    Past Medical History:   Diagnosis Date     Simple chronic bronchitis (H)         PSH  Reviewed, as below    Past Surgical History:   Procedure Laterality Date     BRONCHOSCOPY RIGID OR FLEXIBLE W/TRANSENDOSCOPIC ENDOBRONCHIAL ULTRASOUND GUIDED N/A 7/27/2020    Procedure: Flexible bronchoscopy, endobronchial ultrasound with transbronchial biopsies;  Surgeon: Edin Castro MD;  Location: [facility] OR     COLONOSCOPY  6/29/04    colonoscopy to the cecum     ESOPHAGOSCOPY, GASTROSCOPY, DUODENOSCOPY (EGD), COMBINED N/A 9/1/2020    Procedure: ESOPHAGOGASTRODUODENOSCOPY, WITH FINE NEEDLE ASPIRATION BIOPSY, WITH ENDOSCOPIC ULTRASOUND GUIDANCE;  Surgeon: Barber Hogan MD;  [facility]     SURGICAL HISTORY OF -       nose surgery     TUBAL LIGATION      bilateral tubal ligation.  BTL        ETOH:  2-3 drinks per every other week  TOB: Age 15 - 69.  1 pack per day for 54 years.  She quit smoking a few months ago.    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  She has one daughter and two grandchildren.    IMPRESSION (C34.92) Squamous cell carcinoma of left lung (H)  (primary encounter diagnosis)    This is a 69 year old woman with a left lower lobe squamous cell cancer of the lung.  She is a reasonable surgical candidate.    PLAN  I spent a total of 30 minutes with Ms. Shah, more than 50% of which were spent in counseling, coordination of care, and face-to-face time. I reviewed the plan as follows:    Procedure planned: TEMLA, Left thoracoscopic lobectomy, possible thoracotomy.    The risks of mediastinoscopy include bleeding requiring sternotomy or thoracotomy. There is also a risk of recurrent laryngeal nerve injury, which can lead to the need for a separate procedure by the ENT service.    I discussed the risks and benefits of the operation, including obtaining a diagnosis, resecting and staging the cancer. The risks include bleeding, infection, arrhythmia requiring medication or anticoagulation, prolonged air leak, UTI, chylothorax, DVT and PE, and death.  There is also a risk of prolonged pain, which could require further treatment.  Prolonged air leak could be treated with bronchoscopy and endobronchial valve insertion.  Postoperative bleeding (rare) could require a return to the OR.    Consent: Pending  Necessary Preop Tests & Appointments: PAC  Regional Anesthesia Plan: Epidural  Anticoagulation Plan: Lovenox    All questions were answered and Salima Shah was in agreement with the plan.    I appreciate the opportunity to participate in the care of your patient and will keep you updated.    Sincerely,     Andrea Sanabria MD

## 2020-09-09 DIAGNOSIS — C34.32 MALIGNANT NEOPLASM OF LOWER LOBE OF LEFT LUNG (H): Primary | ICD-10-CM

## 2020-09-17 DIAGNOSIS — Z11.59 ENCOUNTER FOR SCREENING FOR OTHER VIRAL DISEASES: Primary | ICD-10-CM

## 2020-09-17 PROBLEM — C34.32 MALIGNANT NEOPLASM OF LOWER LOBE OF LEFT LUNG (H): Status: ACTIVE | Noted: 2020-09-17

## 2020-09-17 NOTE — TELEPHONE ENCOUNTER
FUTURE VISIT INFORMATION      SURGERY INFORMATION:    Date: 10/1/20    [facility] or    Surgeon:  Andrea Sanabria MD     Anesthesia Type:  Combined General with Epidural     Procedure: Transcervical extended mediastinal lymphadenectomy Left thoracoscopic lower lobectomy, possible thoracotomy    Consult: virtual visit     RECORDS REQUESTED FROM:       Primary Care Provider: Jyoti Espinal MD - [place]    Most recent EKG+ Tracin20    Most recent PFT's: 20

## 2020-09-18 ENCOUNTER — TELEPHONE (OUTPATIENT)
Dept: SURGERY | Facility: CLINIC | Age: 70
End: 2020-09-18

## 2020-09-18 ENCOUNTER — TELEPHONE (OUTPATIENT)
Dept: ONCOLOGY | Facility: CLINIC | Age: 70
End: 2020-09-18

## 2020-09-18 NOTE — TELEPHONE ENCOUNTER
Spoke with patient to schedule procedure with Dr. Andrea Sanabria    Procedure was scheduled on 10/01 at [facility] OR  Patient will have H&P with PAC 9/29    Patient is aware a COVID-19 test is needed before their procedure. The test should be with-in 4 days of their procedure.   Test Details: Date 09/29  Location: [facility]    Patient is aware a / is needed day of surgery.   Surgery letter was sent via [employer], patient has my direct contact information for any further questions.

## 2020-09-18 NOTE — TELEPHONE ENCOUNTER
----- Message from RUDY Abdullahi sent at 9/18/2020 12:16 PM CDT -----  Regarding: CT needed  She lives in [place] and needs a new chest CT scan before her planned surgery in early Oct.  I believe [facility] is most convenient.   Can you help arrange that?   She has a virtual PAC and Covid testing scheduled, should work around those please.  Thanks  Janny

## 2020-09-28 PROBLEM — J01.90 ACUTE SINUSITIS WITH SYMPTOMS > 10 DAYS: Status: ACTIVE | Noted: 2020-03-06

## 2020-09-29 ENCOUNTER — ANESTHESIA EVENT (OUTPATIENT)
Dept: SURGERY | Facility: CLINIC | Age: 70
End: 2020-09-29
Payer: MEDICARE

## 2020-09-29 ENCOUNTER — HOSPITAL ENCOUNTER (OUTPATIENT)
Dept: CT IMAGING | Facility: CLINIC | Age: 70
Discharge: HOME | End: 2020-09-29
Attending: CLINICAL NURSE SPECIALIST | Admitting: CLINICAL NURSE SPECIALIST
Payer: MEDICARE

## 2020-09-29 ENCOUNTER — VIRTUAL VISIT (OUTPATIENT)
Dept: SURGERY | Facility: CLINIC | Age: 70
End: 2020-09-29
Payer: COMMERCIAL

## 2020-09-29 ENCOUNTER — PRE VISIT (OUTPATIENT)
Dept: SURGERY | Facility: CLINIC | Age: 70
End: 2020-09-29

## 2020-09-29 ENCOUNTER — PREP FOR PROCEDURE (OUTPATIENT)
Dept: SURGERY | Facility: CLINIC | Age: 70
End: 2020-09-29

## 2020-09-29 VITALS — HEIGHT: 64 IN | WEIGHT: 175 LBS | BODY MASS INDEX: 29.88 KG/M2

## 2020-09-29 DIAGNOSIS — C34.32 MALIGNANT NEOPLASM OF LOWER LOBE OF LEFT LUNG (H): ICD-10-CM

## 2020-09-29 DIAGNOSIS — Z01.818 PREOP EXAMINATION: Primary | ICD-10-CM

## 2020-09-29 DIAGNOSIS — Z11.59 ENCOUNTER FOR SCREENING FOR OTHER VIRAL DISEASES: ICD-10-CM

## 2020-09-29 DIAGNOSIS — Z01.818 PREOP EXAMINATION: ICD-10-CM

## 2020-09-29 LAB
ABO + RH BLD: NORMAL
ABO + RH BLD: NORMAL
ANION GAP SERPL CALCULATED.3IONS-SCNC: <1 MMOL/L (ref 3–14)
BLD GP AB SCN SERPL QL: NORMAL
BLOOD BANK CMNT PATIENT-IMP: NORMAL
BUN SERPL-MCNC: 10 MG/DL (ref 7–30)
CALCIUM SERPL-MCNC: 9.4 MG/DL (ref 8.5–10.1)
CHLORIDE SERPL-SCNC: 103 MMOL/L (ref 94–109)
CO2 SERPL-SCNC: 33 MMOL/L (ref 20–32)
CREAT SERPL-MCNC: 0.75 MG/DL (ref 0.52–1.04)
ERYTHROCYTE [DISTWIDTH] IN BLOOD BY AUTOMATED COUNT: 12.9 % (ref 10–15)
GFR SERPL CREATININE-BSD FRML MDRD: 82 ML/MIN/{1.73_M2}
GLUCOSE SERPL-MCNC: 99 MG/DL (ref 70–99)
HCT VFR BLD AUTO: 39.2 % (ref 35–47)
HGB BLD-MCNC: 13.1 G/DL (ref 11.7–15.7)
MCH RBC QN AUTO: 28.9 PG (ref 26.5–33)
MCHC RBC AUTO-ENTMCNC: 33.4 G/DL (ref 31.5–36.5)
MCV RBC AUTO: 86 FL (ref 78–100)
NT-PROBNP SERPL-MCNC: 191 PG/ML (ref 0–125)
PLATELET # BLD AUTO: 276 10E9/L (ref 150–450)
POTASSIUM SERPL-SCNC: 4.1 MMOL/L (ref 3.4–5.3)
RBC # BLD AUTO: 4.54 10E12/L (ref 3.8–5.2)
SODIUM SERPL-SCNC: 136 MMOL/L (ref 133–144)
SPECIMEN EXP DATE BLD: NORMAL
WBC # BLD AUTO: 9.8 10E9/L (ref 4–11)

## 2020-09-29 PROCEDURE — 86850 RBC ANTIBODY SCREEN: CPT | Performed by: NURSE PRACTITIONER

## 2020-09-29 PROCEDURE — 71250 CT THORAX DX C-: CPT

## 2020-09-29 PROCEDURE — 83880 ASSAY OF NATRIURETIC PEPTIDE: CPT | Performed by: NURSE PRACTITIONER

## 2020-09-29 PROCEDURE — 36415 COLL VENOUS BLD VENIPUNCTURE: CPT | Performed by: NURSE PRACTITIONER

## 2020-09-29 PROCEDURE — 86901 BLOOD TYPING SEROLOGIC RH(D): CPT | Performed by: NURSE PRACTITIONER

## 2020-09-29 PROCEDURE — 85027 COMPLETE CBC AUTOMATED: CPT | Performed by: NURSE PRACTITIONER

## 2020-09-29 PROCEDURE — U0003 INFECTIOUS AGENT DETECTION BY NUCLEIC ACID (DNA OR RNA); SEVERE ACUTE RESPIRATORY SYNDROME CORONAVIRUS 2 (SARS-COV-2) (CORONAVIRUS DISEASE [COVID-19]), AMPLIFIED PROBE TECHNIQUE, MAKING USE OF HIGH THROUGHPUT TECHNOLOGIES AS DESCRIBED BY CMS-2020-01-R: HCPCS | Performed by: THORACIC SURGERY (CARDIOTHORACIC VASCULAR SURGERY)

## 2020-09-29 PROCEDURE — 80048 BASIC METABOLIC PNL TOTAL CA: CPT | Performed by: NURSE PRACTITIONER

## 2020-09-29 PROCEDURE — 86900 BLOOD TYPING SEROLOGIC ABO: CPT | Performed by: NURSE PRACTITIONER

## 2020-09-29 RX ORDER — HEPARIN SODIUM 5000 [USP'U]/.5ML
5000 INJECTION, SOLUTION INTRAVENOUS; SUBCUTANEOUS
Status: DISCONTINUED | OUTPATIENT
Start: 2020-10-01 | End: 2020-09-29 | Stop reason: CLARIF

## 2020-09-29 RX ORDER — GABAPENTIN 300 MG/1
300 CAPSULE ORAL ONCE
Status: CANCELLED | OUTPATIENT
Start: 2020-09-29 | End: 2020-09-29

## 2020-09-29 RX ORDER — ACETAMINOPHEN 325 MG/1
975 TABLET ORAL ONCE
Status: CANCELLED | OUTPATIENT
Start: 2020-09-29 | End: 2020-09-29

## 2020-09-29 RX ORDER — CELECOXIB 200 MG/1
200 CAPSULE ORAL ONCE
Status: CANCELLED | OUTPATIENT
Start: 2020-09-29 | End: 2020-09-29

## 2020-09-29 RX ORDER — CHLORHEXIDINE GLUCONATE ORAL RINSE 1.2 MG/ML
15 SOLUTION DENTAL ONCE
Status: CANCELLED | OUTPATIENT
Start: 2020-09-29 | End: 2020-09-29

## 2020-09-29 ASSESSMENT — LIFESTYLE VARIABLES: TOBACCO_USE: 1

## 2020-09-29 ASSESSMENT — COPD QUESTIONNAIRES: COPD: 0

## 2020-09-29 ASSESSMENT — PAIN SCALES - GENERAL: PAINLEVEL: NO PAIN (0)

## 2020-09-29 ASSESSMENT — MIFFLIN-ST. JEOR: SCORE: 1308.79

## 2020-09-29 NOTE — RESULT ENCOUNTER NOTE
Bryant Raphael,    Your test results are attached.  All of your labs are good for surgery.        Jocelyne Pérez DNP, RN, ANP-C

## 2020-09-29 NOTE — H&P
Pre-Operative H & P         Video-Visit Details    Type of service:  Video Visit    Patient verbally consented to video service today: YES      Video Start Time: 0850  Video End Time (time video stopped): 0914    Originating Location (pt. Location): Home    Distant Location (provider location):  [facility]     Mode of Communication:  Video Conference via [employer]      CC:  Preoperative exam to assess for increased cardiopulmonary risk while undergoing surgery and anesthesia.    Date of Encounter: 9/29/2020  Primary Care Physician:  Jyoti Espinal  Associated diagnosis: malignant neoplasm of lower lobe of the left lung    HPI  Ijeoma Shah is a 69 year old female who presents for pre-operative H & P in preparation for a Transcervical extended mediastinal lymphadenectomy (N/A Chest) Left thoracoscopic lower lobectomy, possible thoracotomy on 10/1/20 by Dr. Sanabria at [facility].     Salima Shah is a 69 year old female with obesity and a history of smoking that has a recent diagnosis of malignant neoplasm of lower lobe of the left lung.  She underwent a screening CT scan on 7/3/20 and it returned with results concerning for malignancy.  She had a biopsy done on 7/27/20 that returned positive for malignancy.  She denies any current respiratory symptoms.  She has since consulted with Dr. Sanabria and the above listed surgery has been recommended for initial treatment.      History is obtained from the patient and the medical record.     Past Medical History  Past Medical History:   Diagnosis Date     Lung cancer (H)      Simple chronic bronchitis (H)        Past Surgical History  Past Surgical History:   Procedure Laterality Date     ARTHROEREISIS, SUBTALAR       BRONCHOSCOPY RIGID OR FLEXIBLE W/TRANSENDOSCOPIC ENDOBRONCHIAL ULTRASOUND GUIDED N/A 7/27/2020    Procedure: Flexible bronchoscopy, endobronchial ultrasound with  transbronchial biopsies;  Surgeon: Edin Castro MD;  Location: [facility] OR     CATARACT IOL, RT/LT Bilateral      COLONOSCOPY  6/29/04    colonoscopy to the cecum     ESOPHAGOSCOPY, GASTROSCOPY, DUODENOSCOPY (EGD), COMBINED N/A 9/1/2020    Procedure: ESOPHAGOGASTRODUODENOSCOPY, WITH FINE NEEDLE ASPIRATION BIOPSY, WITH ENDOSCOPIC ULTRASOUND GUIDANCE;  Surgeon: Barber Hogan MD;  Location: [facility] GI     SURGICAL HISTORY OF -       nose surgery     TUBAL LIGATION      bilateral tubal ligation.  BTL       Hx of Blood transfusions/reactions: none    Hx of abnormal bleeding or anti-platelet use: none    Menstrual history: No LMP recorded. Patient is postmenopausal.:     Steroid use in the last year: none    Personal or FH with difficulty with Anesthesia:  none    Prior to Admission Medications  Current Outpatient Medications   Medication Sig Dispense Refill     chlorhexidine (HIBICLENS) 4 % liquid Use for shower before surgery. 118 mL 0     ketoconazole (NIZORAL) 2 % external cream Apply topically daily 60 g 1     triamcinolone (KENALOG) 0.1 % external ointment APPLY  OINTMENT TOPICALLY TWICE DAILY OR  AS  NEEDED 45 g 0     EPINEPHrine (EPIPEN/ADRENACLICK/OR ANY BX GENERIC EQUIV) 0.3 MG/0.3ML injection 2-pack Inject 0.3 mLs (0.3 mg) into the muscle as needed 0.6 mL 1       Allergies  Allergies   Allergen Reactions     Bee Venom Hives     Hot and sweating        Social History  Social History     Socioeconomic History     Marital status:      Spouse name: Not on file     Number of children: 1     Years of education: Not on file     Highest education level: Not on file   Occupational History     Occupation: seasonal summer work only   Social Needs     Financial resource strain: Not on file     Food insecurity     Worry: Not on file     Inability: Not on file     Transportation needs     Medical: Not on file     Non-medical: Not on file   Tobacco Use     Smoking status: Former Smoker     Packs/day: 1.00      Years: 33.00     Pack years: 33.00     Types: Cigarettes     Last attempt to quit: 2014     Years since quittin.8     Smokeless tobacco: Never Used   Substance and Sexual Activity     Alcohol use: Yes     Alcohol/week: 1.7 - 2.5 standard drinks     Types: 2 - 3 Standard drinks or equivalent per week     Comment: occ,social     Drug use: No     Sexual activity: Yes     Partners: Male   Lifestyle     Physical activity     Days per week: Not on file     Minutes per session: Not on file     Stress: Not on file   Relationships     Social connections     Talks on phone: Not on file     Gets together: Not on file     Attends Shinto service: Not on file     Active member of club or organization: Not on file     Attends meetings of clubs or organizations: Not on file     Relationship status: Not on file     Intimate partner violence     Fear of current or ex partner: Not on file     Emotionally abused: Not on file     Physically abused: Not on file     Forced sexual activity: Not on file   Other Topics Concern     Parent/sibling w/ CABG, MI or angioplasty before 65F 55M? Not Asked   Social History Narrative     Not on file       Family History  Family History   Problem Relation Age of Onset     Glaucoma Mother      LUNG DISEASE Mother      Melanoma Father      Down Syndrome Brother      Cancer Sister         bile duct     Coronary Artery Disease Brother      CABG Brother      No Known Problems Brother      No Known Problems Brother      No Known Problems Sister      Lung Cancer Sister         lung     No Known Problems Sister      No Known Problems Sister              ROS/MED HX    The complete review of systems is negative other than noted in the HPI or here.   ENT/Pulmonary:     (+)tobacco use, Past use 0.5 packs/day  , . .   (-) asthma, COPD, ALBERTO risk factors and recent URI   Neurologic:  - neg neurologic ROS     Cardiovascular:  - neg cardiovascular ROS   (+) ----. : . . . :. . No previous cardiac testing   "     METS/Exercise Tolerance:  >4 METS   Hematologic:  - neg hematologic  ROS      (-) history of blood clots and History of Transfusion   Musculoskeletal:  - neg musculoskeletal ROS       GI/Hepatic:  - neg GI/hepatic ROS       Renal/Genitourinary:  - ROS Renal section negative       Endo:  - neg endo ROS       Psychiatric:  - neg psychiatric ROS       Infectious Disease:  - neg infectious disease ROS      (-) Recent Fever   Malignancy:   (+) Malignancy History of Lung  Lung CA Active status post.         Other:    (+) No chance of pregnancy no H/O Chronic Pain,  - neg other ROS                               175 lbs 0 oz  5' 4\"   Body mass index is 30.04 kg/m .       Physical Exam  Constitutional: Awake, alert, cooperative, no apparent distress, and appears stated age.   Neurologic: Awake, alert, oriented to name, place and time.   Neuropsychiatric: Calm, cooperative. Normal affect.     Labs: (personally reviewed)   Component      Latest Ref Rng & Units 2020   Sodium      133 - 144 mmol/L 136   Potassium      3.4 - 5.3 mmol/L 4.1   Chloride      94 - 109 mmol/L 103   Carbon Dioxide      20 - 32 mmol/L 33 (H)   Anion Gap      3 - 14 mmol/L <1 (L)   Glucose      70 - 99 mg/dL 99   Urea Nitrogen      7 - 30 mg/dL 10   Creatinine      0.52 - 1.04 mg/dL 0.75   GFR Estimate      >60 mL/min/1.73:m2 82   GFR Estimate If Black      >60 mL/min/1.73:m2 >90   Calcium      8.5 - 10.1 mg/dL 9.4   WBC      4.0 - 11.0 10e9/L 9.8   RBC Count      3.8 - 5.2 10e12/L 4.54   Hemoglobin      11.7 - 15.7 g/dL 13.1   Hematocrit      35.0 - 47.0 % 39.2   MCV      78 - 100 fl 86   MCH      26.5 - 33.0 pg 28.9   MCHC      31.5 - 36.5 g/dL 33.4   RDW      10.0 - 15.0 % 12.9   Platelet Count      150 - 450 10e9/L 276   N-Terminal Pro Bnp      0 - 125 pg/mL 191 (H)         EK20  SR, short WV syndrome    PFT's: 20  Component  Value  Flag  Ref Range  Units  Status  Collected  Lab    FVC-Pred  2.91    L   2020  7:59 AM  224  "   FVC-Pre  1.76    L   08/12/2020  7:59 AM  224    FVC-%Pred-Pre  60    %   08/12/2020  7:59 AM  224    FEV1-Pre  1.33    L   08/12/2020  7:59 AM  224    FEV1-%Pred-Pre  58    %   08/12/2020  7:59 AM  224    FEV1FVC-Pred  78    %   08/12/2020  7:59 AM  224    FEV1FVC-Pre  75    %   08/12/2020  7:59 AM  224    FEFMax-Pred  5.79    L/sec   08/12/2020  7:59 AM  224    FEFMax-Pre  3.88    L/sec   08/12/2020  7:59 AM  224    FEFMax-%Pred-Pre  66    %   08/12/2020  7:59 AM  224    FEF2575-Pred  1.95    L/sec   08/12/2020  7:59 AM  224    FEF2575-Pre  1.02    L/sec   08/12/2020  7:59 AM  224    BSX1752-%Pred-Pre  52    %   08/12/2020  7:59 AM  224    ExpTime-Pre  6.03    sec   08/12/2020  7:59 AM  224    FIFMax-Pre  2.57    L/sec   08/12/2020  7:59 AM  224    VC-Pred  3.10    L   08/12/2020  7:59 AM  224    VC-Pre  1.80    L   08/12/2020  7:59 AM  224    VC-%Pred-Pre  58    %   08/12/2020  7:59 AM  224    IC-Pred  2.58    L   08/12/2020  7:59 AM  224    IC-Pre  1.51    L   08/12/2020  7:59 AM  224    IC-%Pred-Pre  58    %   08/12/2020  7:59 AM  224    ERV-Pred  0.52    L   08/12/2020  7:59 AM  224    ERV-Pre  0.29    L   08/12/2020  7:59 AM  224    ERV-%Pred-Pre  56    %   08/12/2020  7:59 AM  224    FEV1FEV6-Pred  79    %   08/12/2020  7:59 AM  224    FEV1FEV6-Pre  75    %   08/12/2020  7:59 AM  224    FRCPleth-Pred  2.71    L   08/12/2020  7:59 AM  224    FRCPleth-Pre  3.04    L   08/12/2020  7:59 AM  224    FRCPleth-%Pred-Pre  112    %   08/12/2020  7:59 AM  224    RVPleth-Pred  2.03    L   08/12/2020  7:59 AM  224    RVPleth-Pre  2.75    L   08/12/2020  7:59 AM  224    RVPleth-%Pred-Pre  135    %   08/12/2020  7:59 AM  224    TLCPleth-Pred  4.94    L   08/12/2020  7:59 AM  224    TLCPleth-Pre  4.55    L   08/12/2020  7:59 AM  224    TLCPleth-%Pred-Pre  92    %   08/12/2020  7:59 AM  224    DLCOunc-Pred  19.61    ml/min/mmHg   08/12/2020  7:59 AM  224    DLCOunc-Pre  17.72    ml/min/mmHg   08/12/2020  7:59 AM  224   "  DLCOunc-%Pred-Pre  90    %   08/12/2020  7:59 AM  224    VA-Pre  3.44    L   08/12/2020  7:59 AM  224    VA-%Pred-Pre  72    %   08/12/2020  7:59 AM  224    FEV1SVC-Pred  73    %   08/12/2020  7:59 AM  224    FEV1SVC-Pre  74    %   08/12/2020  7:59 AM  224    Narrative     The FEV1 and FVC are reduced but the FEV1/FVC ratio is normal.  The inspiratory flow rates are recduced but not flattened.  While the vital capacity and total lung capacity are within normal limits, the RV/TLC ratio is increased.  The diffusing capacity   is normal.  However, the diffusing capacity was not corrected for the patient's hemoglobin.   IMPRESSION:   Probable Moderate obstruction   Possible extrathoracic obstruction   Consider repeat testing with broncholdilator reponse          Outside records reviewed from: Care Everywhere     ASSESSMENT and PLAN  Salima Shah is a 69 year old female scheduled for a Transcervical extended mediastinal lymphadenectomy (N/A Chest) Left thoracoscopic lower lobectomy, possible thoracotomy on 10/1/20 by Dr. Sanabria in treatment of malignant neoplasm of lower lobe of left lung.  PAC referral for risk assessment and optimization for anesthesia with comorbid conditions of: history of smoking.    Pre-operative considerations:  1.  Cardiac:  Functional status- METS >4.  She reports that she is active with yard work, but also walks 30 mins a few times per week for exercise.  She has no known cardiac conditions.  BNP today is 191.   Intermediate risk surgery with 0.9% risk of major adverse cardiac event.   2.  Pulm:  Airway feasible.  ALBERTO risk: low.  She reports that she quit smoking in 2014, but has \"cheated\" several times over the years, with a complete stop 6 months ago.    3.  GI:  Risk of PONV score = 3.  If > 2, anti-emetic intervention recommended.  4. Endo:  She is obese with a BMI >30.   5. Anesthesia:  Patient has concerns with the plan for an epidural due to history of her daughter having " difficulty with an epidural in the past.  Discussed during visit today.  Anesthesia to please discuss with her again during pre-op.      VTE risk: 0.5%    Virtual visit - Please refer to the physical examination documented by the anesthesiologist in the anesthesia record on the day of surgery      Patient is optimized and is acceptable candidate for the proposed procedure.  No further diagnostic evaluation is needed.           Jocelyne Pérez DNP, RN, APRN  Preoperative Assessment Center  [facility]  Phone: [phone]  Fax: [phone]

## 2020-09-29 NOTE — PROGRESS NOTES
"Ijeoma Shah is a 69 year old female who is being evaluated via a billable video visit.      The patient has been notified of following:     \"This video visit will be conducted via a call between you and your physician/provider. We have found that certain health care needs can be provided without the need for an in-person physical exam.  This service lets us provide the care you need with a video conversation.  If a prescription is necessary we can send it directly to your pharmacy.  If lab work is needed we can place an order for that and you can then stop by our lab to have the test done at a later time.    Video visits are billed at different rates depending on your insurance coverage.  Please reach out to your insurance provider with any questions.    If during the course of the call the physician/provider feels a video visit is not appropriate, you will not be charged for this service.\"    Patient has given verbal consent for Video visit? Yes  How would you like to obtain your AVS? MyChart  If you are dropped from the video visit, the video invite should be resent to: Other e-mail: [email or web address]  Will anyone else be joining your video visit? No        Esequiel Harmon        "

## 2020-09-29 NOTE — ANESTHESIA PREPROCEDURE EVALUATION
"Anesthesia Pre-Procedure Evaluation    Patient: Ijeoma Shah   MRN:     [id number] Gender:   female   Age:    69 year old :      1950        Preoperative Diagnosis: Malignant neoplasm of lower lobe of left lung (H) [C34.32]   Procedure(s):  Transcervical extended mediastinal lymphadenectomy  Left thoracoscopic lower lobectomy, possible thoracotomy     LABS:  CBC:   Lab Results   Component Value Date    WBC 7.7 2013    HGB 14.9 2013    HCT 42.4 2013     2013     BMP:   Lab Results   Component Value Date     2020    POTASSIUM 4.2 2020    CHLORIDE 104 2020    CO2 31 2020    BUN 12 2020    CR 0.74 2020     (H) 2020     (H) 2015     COAGS: No results found for: PTT, INR, FIBR  POC:   Lab Results   Component Value Date    BGM 88 2020     OTHER:   Lab Results   Component Value Date    SABINO 9.4 2020    ALBUMIN 3.4 2020    PROTTOTAL 7.5 2020    ALT 18 2020    AST 19 2020    ALKPHOS 106 2020    BILITOTAL 0.3 2020    TSH 0.62 2020        Preop Vitals    BP Readings from Last 3 Encounters:   20 (!) 159/90   20 139/80   20 125/72    Pulse Readings from Last 3 Encounters:   20 75   20 89   20 79      Resp Readings from Last 3 Encounters:   20 8   20 20   20 18    SpO2 Readings from Last 3 Encounters:   20 94%   20 96%   20 94%      Temp Readings from Last 1 Encounters:   20 97.9  F (36.6  C) (Oral)    Ht Readings from Last 1 Encounters:   20 1.626 m (5' 4\")      Wt Readings from Last 1 Encounters:   20 79.4 kg (175 lb)    Estimated body mass index is 30.04 kg/m  as calculated from the following:    Height as of this encounter: 1.626 m (5' 4\").    Weight as of this encounter: 79.4 kg (175 lb).     LDA:        Past Medical History:   Diagnosis Date     Lung cancer (H)      " Simple chronic bronchitis (H)       Past Surgical History:   Procedure Laterality Date     BRONCHOSCOPY RIGID OR FLEXIBLE W/TRANSENDOSCOPIC ENDOBRONCHIAL ULTRASOUND GUIDED N/A 7/27/2020    Procedure: Flexible bronchoscopy, endobronchial ultrasound with transbronchial biopsies;  Surgeon: Edin Catsro MD;  Location: [facility] OR     COLONOSCOPY  6/29/04    colonoscopy to the cecum     ESOPHAGOSCOPY, GASTROSCOPY, DUODENOSCOPY (EGD), COMBINED N/A 9/1/2020    Procedure: ESOPHAGOGASTRODUODENOSCOPY, WITH FINE NEEDLE ASPIRATION BIOPSY, WITH ENDOSCOPIC ULTRASOUND GUIDANCE;  Surgeon: Barber Hogan MD;  [facility]     SURGICAL HISTORY OF -       nose surgery     TUBAL LIGATION      bilateral tubal ligation.  BTL      Allergies   Allergen Reactions     Bee Venom Hives     Hot and sweating         Anesthesia Evaluation     . Pt has had prior anesthetic. Type: General    No history of anesthetic complications          ROS/MED HX    ENT/Pulmonary:     (+)tobacco use, Past use 0.5 packs/day  , . .   (-) asthma, COPD, ALBERTO risk factors and recent URI   Neurologic:  - neg neurologic ROS     Cardiovascular:  - neg cardiovascular ROS   (+) ----. : . . . :. . No previous cardiac testing       METS/Exercise Tolerance:  >4 METS   Hematologic:  - neg hematologic  ROS      (-) history of blood clots and History of Transfusion   Musculoskeletal:  - neg musculoskeletal ROS       GI/Hepatic:  - neg GI/hepatic ROS       Renal/Genitourinary:  - ROS Renal section negative       Endo:  - neg endo ROS       Psychiatric:  - neg psychiatric ROS       Infectious Disease:  - neg infectious disease ROS      (-) Recent Fever   Malignancy:   (+) Malignancy History of Lung  Lung CA Active status post.         Other:    (+) No chance of pregnancy no H/O Chronic Pain,  - neg other ROS                 JZG FV AN PHYSICAL EXAM    Assessment:   ASA SCORE: 3      Smoking Status:  Non-Smoker/Unknown        Plan:   Anes. Type:  General; Epidural      "Epidural Details:  Catheter   Pre-Medication: None   Induction:  IV (Standard)   Airway: TIM; CMAC/VL (Bronch tower)   Access/Monitoring: PIV; 2nd PIV; A-Line   Maintenance: Balanced     Postop Plan:   Postop Pain: Opioids; Regional  Postop Sedation/Airway: Not planned  Disposition: Inpatient/Admit     PONV Management:   Adult Risk Factors: Female, Non-Smoker, Postop Opioids   Prevention: Ondansetron, Dexamethasone                PAC Discussion and Assessment    ASA Classification: 2  Case is suitable for: [place]  Anesthetic techniques and relevant risks discussed: GA and GA with regional block for post-op pain control  Invasive monitoring and risk discussed:   Types:   Possibility and Risk of blood transfusion discussed:   NPO instructions given:   Additional anesthetic preparation and risks discussed:   Needs early admission to pre-op area:   Other:     PAC Resident/NP Anesthesia Assessment:  Salima Shah is a 69 year old female scheduled for a Transcervical extended mediastinal lymphadenectomy (N/A Chest) Left thoracoscopic lower lobectomy, possible thoracotomy on 10/1/20 by Dr. Sanabria in treatment of malignant neoplasm of lower lobe of left lung.  PAC referral for risk assessment and optimization for anesthesia with comorbid conditions of: history of smoking.    Pre-operative considerations:  1.  Cardiac:  Functional status- METS >4.  She reports that she is active with yard work, but also walks 30 mins a few times per week for exercise.  She has no known cardiac conditions.  BNP today is 191. Intermediate risk surgery with 0.9% risk of major adverse cardiac event.   2.  Pulm:  Airway feasible.  ALBERTO risk: low.  She reports that she quit smoking in 2014, but has \"cheated\" several times over the years, with a complete stop 6 months ago.    3.  GI:  Risk of PONV score = 3.  If > 2, anti-emetic intervention recommended.  4. Endo:  She is obese with a BMI >30.   5. Anesthesia:  Patient has concerns with the " plan for an epidural due to history of her daughter having difficulty with an epidural in the past.  Discussed during visit today.  Anesthesia to please discuss with her again during pre-op.     VTE risk: 0.5%    Virtual visit - Please refer to the physical examination documented by the anesthesiologist in the anesthesia record on the day of surgery      Patient is optimized and is acceptable candidate for the proposed procedure.  No further diagnostic evaluation is needed.     **For further details of assessment, testing, and physical exam please see H and P completed on same date.          Jocelyne Pérez DNP, RN, APRN      Reviewed and Signed by PAC Mid-Level Provider/Resident  Mid-Level Provider/Resident: Jocelyne Pérez DNP, RN, APRN  Date: 9/29/20  Time: 0921    Attending Anesthesiologist Anesthesia Assessment:  69 year old for mediastinal LND and thoracoscopic LLL in management of  SCC of the left lower lobe. Patient gardens, walks, no known cardiac disease. PFTs show FEV1 at 58% of predicted and DLCO at 80%. 54 pack year smoking history, quit formally August this year.     Surgeon has requested epidural for postop analgesia, but patient is concerned due to daughter's experience - will let this discusison occur DOS with the RAPs team.    Patient/case discussed with TAYLOR/resident; agree with above assessment. No need to see patient. Patient is appropriate for the planned procedure without further work-up or medical management.    Mala Salas MD        Anesthesiologist:   Date:   Time:   Pass/Fail:   Disposition:     PAC Pharmacist Assessment:        Pharmacist:   Date:   Time:    RUDY Badillo CNP

## 2020-09-29 NOTE — PATIENT INSTRUCTIONS
Preparing for Your Surgery      Name:  Ijeoma Shah   MRN:  [id number]   :  1950   Today's Date:  2020       Arriving for surgery:  Surgery date:  10/1/20  Arrival time:  8:45 am    Restrictions due to COVID 19:  Patients are allowed one visitor in the pre-op period  All visitors must wear a mask  No visitors under 18  No ill visitors   parking is not available     Please come to:  [facility] [address]  [address] [place]  [address]     -    Please proceed to the Surgery Lounge on the 3rd floor. [phone]?     - ?If you are in need of directions, wheelchair or escort please stop at the Information Desk in the lobby.  Inform the information person that you are here for surgery; a wheelchair and escort will be provided to the Surgery Lounge .?    Enhanced Recovery After Surgery     This is a team effort, including you, to get you back on your feet, eating and drinking normally and out of the hospital as quickly as possible.  The goals are: 1) NO INFECTIONS and   2) RETURN TO NORMAL DIET    How can we achieve these goals?  1) STAY ACTIVE: Walk every day before your surgery; try to increase the amount every day.  Walk after surgery as much as you can-the nurses will help you.  Walking speeds healing and gets you home quicker, you heal better at home and have less risk of infection.     2) STAY HYDRATED: Drink clear liquids up until 2 hours before your surgery. We would like you to purchase a drink such as Gatorade or Ensure Clear (not the milkshake type).  Drink this before bedtime and on the way into the hospital, drink between 8-10 ounces or until you feel hydrated.  Keeping well hydrated leads to your veins being plump, you wake up faster, and you are less likely to be nauseated. Start drinking water as soon as you can after surgery and advance to clear liquids and food as tolerated.  IV fluids contain salt, drinking fluids will minimize the  amount of IV fluids you need and decrease the amount of salt you get.    The most common reason for the patient to be readmitted is dehydration. Staying hydrated after you go home from the hospital is very important.  Ensure or Ensure Clear are good options to keep you hydrated.     3) PAIN MANAGEMENT: If we minimize the amount of opioids and narcotics, and use regional blocks (which numb the area where your surgery is) along with oral pain medications; you will have less side effects of nausea and constipation. Narcotics can slow down your bowels and cause you to stay in the hospital longer.     Our goal is to keep you comfortable; eating and drinking normally and back home safely.      What can I eat or drink?  -  You may eat and drink normally for up to 8 hours before your surgery. (Until 3:15 am)  -  You may have clear liquids until 2 hours before surgery. (Until 8:45 am- Stop on arrival to hospital)  Examples of clear liquids:  Water  Clear broth  Juices (apple, white grape, white cranberry  and cider) without pulp  Noncarbonated, powder based beverages  (lemonade and Adam-Aid)  Sodas (Sprite, 7-Up, ginger ale and seltzer)  Coffee or tea (without milk or cream)  Gatorade    -  No Alcohol for at least 24 hours before surgery     Which medicines can I take?  Hold Aspirin for 7 days before surgery.   Hold Multivitamins for 7 days before surgery.  Hold Supplements for 7 days before surgery.  Hold Ibuprofen (Advil, Motrin) for 1 day before surgery--unless otherwise directed by surgeon.  Hold Naproxen (Aleve) for 4 days before surgery.    -  DO NOT take these medications the day of surgery:  Nizoral cream, Kenalog ointment    -  PLEASE TAKE these medications the day of surgery:  Acetaminophen (Tylenol) if needed    How do I prepare myself?  - Please take 2 showers before surgery using Scrubcare or Hibiclens soap.    Use this soap only from the neck to your toes.     Leave the soap on your skin for one minute--then  rinse thoroughly.      You may use your own shampoo and conditioner; no other hair products.   - Please remove all jewelry and body piercings.  - No lotions, deodorants or fragrance.  - No makeup or fingernail polish.   - Bring your ID and insurance card.    - All patients are required to have a Covid-19 test within 4 days of surgery/procedure.      -Patients will be contacted by the [facility] scheduling team within 1 week of surgery to make an appointment.      - Patients may call the Scheduling team at [phone] if they have not been scheduled within 4 days of  surgery.      ALL PATIENTS GOING HOME THE SAME DAY OF SURGERY ARE REQUIRED TO HAVE A RESPONSIBLE ADULT TO DRIVE AND BE IN ATTENDANCE WITH THEM FOR 24 HOURS FOLLOWING SURGERY     Questions or Concerns:    - For any questions regarding the day of surgery or your hospital stay, please contact the Pre Admission Nursing Office at [phone].       - If you have health changes between today and your surgery please call your surgeon.       For questions after surgery please call your surgeons office.     AFTER YOUR SURGERY  Breathing exercises   Breathing exercises help you recover faster. Take deep breaths and let the air out slowly. This will:     Help you wake up after surgery.    Help prevent complications like pneumonia.  Preventing complications will help you go home sooner.   Nausea and vomiting   You may feel sick to your stomach after surgery; if so, let your nurse know.    Pain control:  After surgery, you may have pain. Our goal is to help you manage your pain. Pain medicine will help you feel comfortable enough to do activities that will help you heal.  These activities may include breathing exercises, walking and physical therapy.   To help your health care team treat your pain we will ask: 1) If you have pain  2) where it is located 3) describe your pain in your words  Methods of pain control include medications given by mouth, vein or  by nerve block for some surgeries.  Sequential Compression Device (SCD):  You may need to wear SCD S (also called pneumo boots)on your legs or feet. These are wraps connected to a machine that pumps in air and releases it. The repeated pumping helps prevent blood clots from forming.

## 2020-09-30 LAB
SARS-COV-2 RNA SPEC QL NAA+PROBE: NOT DETECTED
SPECIMEN SOURCE: NORMAL

## 2020-10-01 ENCOUNTER — PREP FOR PROCEDURE (OUTPATIENT)
Dept: SURGERY | Facility: CLINIC | Age: 70
End: 2020-10-01

## 2020-10-01 ENCOUNTER — TELEPHONE (OUTPATIENT)
Dept: ONCOLOGY | Facility: CLINIC | Age: 70
End: 2020-10-01

## 2020-10-01 ENCOUNTER — ANESTHESIA (OUTPATIENT)
Dept: SURGERY | Facility: CLINIC | Age: 70
End: 2020-10-01
Payer: MEDICARE

## 2020-10-01 ENCOUNTER — HOSPITAL ENCOUNTER (OUTPATIENT)
Facility: CLINIC | Age: 70
Discharge: HOME | End: 2020-10-01
Attending: THORACIC SURGERY (CARDIOTHORACIC VASCULAR SURGERY) | Admitting: THORACIC SURGERY (CARDIOTHORACIC VASCULAR SURGERY)
Payer: MEDICARE

## 2020-10-01 VITALS
HEART RATE: 64 BPM | SYSTOLIC BLOOD PRESSURE: 116 MMHG | DIASTOLIC BLOOD PRESSURE: 78 MMHG | OXYGEN SATURATION: 97 % | TEMPERATURE: 98.4 F | WEIGHT: 179.9 LBS | BODY MASS INDEX: 29.97 KG/M2 | RESPIRATION RATE: 22 BRPM | HEIGHT: 65 IN

## 2020-10-01 DIAGNOSIS — C34.32 MALIGNANT NEOPLASM OF LOWER LOBE OF LEFT LUNG (H): ICD-10-CM

## 2020-10-01 DIAGNOSIS — I48.91 NEW ONSET ATRIAL FIBRILLATION (H): Primary | ICD-10-CM

## 2020-10-01 DIAGNOSIS — I48.91 ATRIAL FIBRILLATION WITH RAPID VENTRICULAR RESPONSE (H): ICD-10-CM

## 2020-10-01 DIAGNOSIS — R91.8 MASS OF LEFT LUNG: Primary | ICD-10-CM

## 2020-10-01 LAB
GLUCOSE BLDC GLUCOMTR-MCNC: 101 MG/DL (ref 70–99)
INTERPRETATION ECG - MUSE: NORMAL

## 2020-10-01 PROCEDURE — 93010 ELECTROCARDIOGRAM REPORT: CPT | Performed by: INTERNAL MEDICINE

## 2020-10-01 PROCEDURE — 93005 ELECTROCARDIOGRAM TRACING: CPT

## 2020-10-01 PROCEDURE — 250N000013 HC RX MED GY IP 250 OP 250 PS 637: Performed by: NURSE PRACTITIONER

## 2020-10-01 PROCEDURE — 999N001017 HC STATISTIC GLUCOSE BY METER IP

## 2020-10-01 PROCEDURE — 250N000009 HC RX 250

## 2020-10-01 PROCEDURE — 999N000005 HC CANCELLED SURGERY UP TO 61-90 MINS: Performed by: THORACIC SURGERY (CARDIOTHORACIC VASCULAR SURGERY)

## 2020-10-01 RX ORDER — HEPARIN SODIUM 5000 [USP'U]/.5ML
5000 INJECTION, SOLUTION INTRAVENOUS; SUBCUTANEOUS
Status: CANCELLED | OUTPATIENT
Start: 2020-10-01

## 2020-10-01 RX ORDER — CHLORHEXIDINE GLUCONATE ORAL RINSE 1.2 MG/ML
15 SOLUTION DENTAL ONCE
Status: COMPLETED | OUTPATIENT
Start: 2020-10-01 | End: 2020-10-01

## 2020-10-01 RX ORDER — GABAPENTIN 300 MG/1
300 CAPSULE ORAL ONCE
Status: DISCONTINUED | OUTPATIENT
Start: 2020-10-01 | End: 2020-10-01 | Stop reason: HOSPADM

## 2020-10-01 RX ORDER — FENTANYL CITRATE 50 UG/ML
25-50 INJECTION, SOLUTION INTRAMUSCULAR; INTRAVENOUS
Status: DISCONTINUED | OUTPATIENT
Start: 2020-10-01 | End: 2020-10-01 | Stop reason: HOSPADM

## 2020-10-01 RX ORDER — CEFAZOLIN SODIUM 2 G/50ML
2 SOLUTION INTRAVENOUS
Status: CANCELLED | OUTPATIENT
Start: 2020-10-01

## 2020-10-01 RX ORDER — CEFAZOLIN SODIUM 2 G/100ML
2 INJECTION, SOLUTION INTRAVENOUS
Status: DISCONTINUED | OUTPATIENT
Start: 2020-10-01 | End: 2020-10-01 | Stop reason: HOSPADM

## 2020-10-01 RX ORDER — GABAPENTIN 300 MG/1
300 CAPSULE ORAL ONCE
Status: COMPLETED | OUTPATIENT
Start: 2020-10-01 | End: 2020-10-01

## 2020-10-01 RX ORDER — NALOXONE HYDROCHLORIDE 0.4 MG/ML
.1-.4 INJECTION, SOLUTION INTRAMUSCULAR; INTRAVENOUS; SUBCUTANEOUS
Status: DISCONTINUED | OUTPATIENT
Start: 2020-10-01 | End: 2020-10-01 | Stop reason: HOSPADM

## 2020-10-01 RX ORDER — CELECOXIB 200 MG/1
200 CAPSULE ORAL ONCE
Status: COMPLETED | OUTPATIENT
Start: 2020-10-01 | End: 2020-10-01

## 2020-10-01 RX ORDER — ACETAMINOPHEN 325 MG/1
975 TABLET ORAL ONCE
Status: DISCONTINUED | OUTPATIENT
Start: 2020-10-01 | End: 2020-10-01 | Stop reason: HOSPADM

## 2020-10-01 RX ORDER — CEFAZOLIN SODIUM 1 G/50ML
1 INJECTION, SOLUTION INTRAVENOUS SEE ADMIN INSTRUCTIONS
Status: CANCELLED | OUTPATIENT
Start: 2020-10-01

## 2020-10-01 RX ORDER — FLUMAZENIL 0.1 MG/ML
0.2 INJECTION, SOLUTION INTRAVENOUS
Status: DISCONTINUED | OUTPATIENT
Start: 2020-10-01 | End: 2020-10-01 | Stop reason: HOSPADM

## 2020-10-01 RX ORDER — METOPROLOL TARTRATE 1 MG/ML
5 INJECTION, SOLUTION INTRAVENOUS EVERY 5 MIN PRN
Status: DISCONTINUED | OUTPATIENT
Start: 2020-10-01 | End: 2020-10-01 | Stop reason: HOSPADM

## 2020-10-01 RX ORDER — HEPARIN SODIUM 5000 [USP'U]/.5ML
5000 INJECTION, SOLUTION INTRAVENOUS; SUBCUTANEOUS
Status: DISCONTINUED | OUTPATIENT
Start: 2020-10-01 | End: 2020-10-01 | Stop reason: HOSPADM

## 2020-10-01 RX ORDER — ACETAMINOPHEN 325 MG/1
975 TABLET ORAL ONCE
Status: COMPLETED | OUTPATIENT
Start: 2020-10-01 | End: 2020-10-01

## 2020-10-01 RX ORDER — CEFAZOLIN SODIUM 1 G/3ML
1 INJECTION, POWDER, FOR SOLUTION INTRAMUSCULAR; INTRAVENOUS SEE ADMIN INSTRUCTIONS
Status: DISCONTINUED | OUTPATIENT
Start: 2020-10-01 | End: 2020-10-01 | Stop reason: HOSPADM

## 2020-10-01 RX ADMIN — GABAPENTIN 300 MG: 300 CAPSULE ORAL at 06:05

## 2020-10-01 RX ADMIN — CHLORHEXIDINE GLUCONATE 0.12% ORAL RINSE 15 ML: 1.2 LIQUID ORAL at 06:06

## 2020-10-01 RX ADMIN — CELECOXIB 200 MG: 200 CAPSULE ORAL at 06:05

## 2020-10-01 RX ADMIN — ACETAMINOPHEN 975 MG: 325 TABLET, FILM COATED ORAL at 06:05

## 2020-10-01 RX ADMIN — METOPROLOL TARTRATE 5 MG: 5 INJECTION INTRAVENOUS at 06:51

## 2020-10-01 ASSESSMENT — MIFFLIN-ST. JEOR: SCORE: 1346.88

## 2020-10-01 NOTE — PROGRESS NOTES
SPIRITUAL HEALTH SERVICES  [facility]   PRE-SURGERY VISIT    Had pre-surgery visit with pt and spouse.  Provided spiritual support, prayer.   Kenny Rueda M.Div (Bill)., [facility]  Staff   Pager [phone]

## 2020-10-01 NOTE — PROGRESS NOTES
Patient in Atrial fibrillation with RVR, new onset.  She did respond to IV metoprolol and cardiology had no other urgent recommendations.  Given the new onset, I cancelled the case for today and will arrange for cardiology evaluation.  Will reschedule the OR pending these results.

## 2020-10-01 NOTE — TELEPHONE ENCOUNTER
Spoke with patient to schedule procedure with Dr. Andrea Sanabria.   Procedure was scheduled on 10/16/20 at [facility] OR  Patient will have H&P with PAC on 9/29.    Patient is aware a COVID-19 test is needed before their procedure. The test should be with-in 4 days of their procedure.   Test Details: Date 10/12/20 Location [facility].    Patient is aware a / is needed day of surgery.   Surgery letter was sent via [employer] 10/1, patient has my direct contact information for any further questions.

## 2020-10-01 NOTE — OR NURSING
"Anesthesia pain service at bedside in preop to preform pre-op epidural. Upon placing pt onto cardiac monitor, HR's 150-180. STAT 12 lead completed, confirmed Afib w/ RVR. Pt asymptomatic, only stating feeling anxious, /102 and O2 97% on room air.       The following text page was sent to Nathan Sharif with Thoracic team.   \"RAVEN Shah in preop- bay 30   pt here for Dr. Sanabria case- when attached to EKG monitor pt had HRs 160-180 and 12 lead EKG confirmed Afib w/ RVR. \"    Amara JIANG [phone] ext [phone]    Dr. Sharif called this writer back and stated he would let Dr. Sanabria know.   Dr. Valdez with Anesthesia at bedside-orders for 5mg Metoprolol: given. MDA in contact with cardiology. Pt now NSR, HR's 70 post Metoprolol     Dr. Sanabria at bedside- made decision to not proceed with procedure today. Patient to follow up outpatient with cardiology. Dr. Sanabria to set this up.     "

## 2020-10-02 DIAGNOSIS — Z11.59 ENCOUNTER FOR SCREENING FOR OTHER VIRAL DISEASES: Primary | ICD-10-CM

## 2020-10-04 NOTE — TELEPHONE ENCOUNTER
RECORDS RECEIVED FROM:Internal   DATE RECEIVED: 10.7.20   NOTES STATUS DETAILS   OFFICE NOTE from referring provider    Internal 10.1.20 CARLITA Estrada [facility]   OFFICE NOTE from other cardiologist    N/A    DISCHARGE SUMMARY from hospital    N/A    DISCHARGE REPORT from the ER   N/A    OPERATIVE REPORT    N/A    MEDICATION LIST   Internal    LABS     BMP   Internal 9.29.20   CBC   Internal 9.29.20   CMP   Internal 6.23.20   Lipids   N/A    TSH   Internal 6.23.20   DIAGNOSTIC PROCEDURES     EKG   Internal 10.1.20  7.22.20   Monitor Reports   N/A    IMAGING (DISC & REPORT)      Echo   N/A    Stress Tests   N/A    Cath   N/A    MRI/MRA   N/A    CT/CTA   N/A

## 2020-10-05 ENCOUNTER — MYC MEDICAL ADVICE (OUTPATIENT)
Dept: CARDIOLOGY | Facility: CLINIC | Age: 70
End: 2020-10-05

## 2020-10-05 ENCOUNTER — TELEPHONE (OUTPATIENT)
Dept: CARDIOLOGY | Facility: CLINIC | Age: 70
End: 2020-10-05

## 2020-10-05 ENCOUNTER — TELEPHONE (OUTPATIENT)
Dept: CARDIOLOGY | Facility: CLINIC | Age: 70
End: 2020-10-05
Payer: MEDICARE

## 2020-10-05 DIAGNOSIS — Z53.9 ERRONEOUS ENCOUNTER--DISREGARD: Primary | ICD-10-CM

## 2020-10-05 DIAGNOSIS — I48.91 ATRIAL FIBRILLATION (H): Primary | ICD-10-CM

## 2020-10-05 NOTE — TELEPHONE ENCOUNTER
Date: 10/5/2020    Time of Call: 5:37 PM     Diagnosis:  A Fib      [ TORB ] Ordering provider: Dr Jose Luis Arrieta  Order: EKG     Order received by: Maryanne Reyez LPN     Follow-up/additional notes: Per staff message.  Called and spoke with Pt.  She had a difficult time feeling her pulse with her wrist, but was able to take it with her BP machine and noted it was 83 BPM.  Discussed that Dr Arrieta would like her to get a repeat EKG tomorrow.  Pt agreed.  Pt denied any change in sxs.  She said she doesn't feel her heart racing or lightheaded.  Pt has baseline SOB, but reported that has not changed.  Discussed A Fib and the potential sxs she should watch out for and when to report to the ED. Pt verbalized understanding, agreed to current plan and denied any further questions.

## 2020-10-05 NOTE — TELEPHONE ENCOUNTER
----- Message from Jose Luis Arrieta MD sent at 10/3/2020 12:38 PM CDT -----  Karthik Madden,    Can you please call patient and have patient take their pulse manually (teach over phone and count) and if over 110bpm then just send to the ER for afib with RVR.    If pulse is <110bpm then get EKG on Monday please.     Thanks!

## 2020-10-06 ENCOUNTER — DOCUMENTATION ONLY (OUTPATIENT)
Dept: CARE COORDINATION | Facility: CLINIC | Age: 70
End: 2020-10-06

## 2020-10-06 ENCOUNTER — OFFICE VISIT (OUTPATIENT)
Dept: FAMILY MEDICINE | Facility: CLINIC | Age: 70
End: 2020-10-06
Payer: MEDICARE

## 2020-10-06 VITALS
WEIGHT: 179.4 LBS | OXYGEN SATURATION: 97 % | BODY MASS INDEX: 29.85 KG/M2 | DIASTOLIC BLOOD PRESSURE: 84 MMHG | SYSTOLIC BLOOD PRESSURE: 138 MMHG | RESPIRATION RATE: 24 BRPM | HEART RATE: 100 BPM | TEMPERATURE: 98 F

## 2020-10-06 DIAGNOSIS — I48.0 PAROXYSMAL ATRIAL FIBRILLATION (H): ICD-10-CM

## 2020-10-06 PROCEDURE — 93000 ELECTROCARDIOGRAM COMPLETE: CPT | Performed by: PHYSICIAN ASSISTANT

## 2020-10-06 PROCEDURE — 99214 OFFICE O/P EST MOD 30 MIN: CPT | Performed by: PHYSICIAN ASSISTANT

## 2020-10-06 NOTE — PROGRESS NOTES
Subjective     Ijeoma Shah is a 69 year old female who presents to clinic today for the following health issues:    HPI          Chief Complaint   Patient presents with     Heart Problem   was to have surgery 10/01/2020  Patient was found to have an abnormal heart rate. Since then patient has felt anxious because of the findings. Pulse rate at home has been 80s.  Has had elevated BPs at home. Has a virtual visit tomorrow with Cards. Patient here for EKG.   Denies any chest pain, shortness of breath, nausea, vomiting, diaphoresis.     Review of Systems   Constitutional, HEENT, cardiovascular, pulmonary, gi and neuro, psych systems are negative, except as otherwise noted.      Objective    /84   Pulse 100   Temp 98  F (36.7  C)   Resp 24   Wt 81.4 kg (179 lb 6.4 oz)   SpO2 97%   Breastfeeding No   BMI 29.85 kg/m    Body mass index is 29.85 kg/m .  Physical Exam   Constitutional: healthy, alert, and no distress  Head: Normocephalic. Atraumatic  Eyes: No conjunctival injection, sclera anicteric  Cardiovascular: RRR. No murmurs, clicks, gallops, or rubs. No peripheral edema.   Respiratory: No resp distress. Lungs CTAB bilaterally.   Musculoskeletal: extremities normal- no gross deformities noted, and normal muscle tone  Skin: no suspicious lesions or rashes  Neurologic: Gait normal. CN 2-12 grossly intact  Psychiatric: mentation appears normal and affect normal/bright     EKG - appears normal, NSR, no ischemic ST or T wave changes.       Assessment & Plan   Atrial fibrillation (H)  Pt was found to have afib recently. Has appt with Cards tomorrow, who requested an EKG in clinic prior to her appointment. Pt has been anxious about her new diagnosis. On EKG today, she is in NSR. Exam consistent with this as well. She is otherwise asymptomatic. Will have Cards continue work up tomorrow. RTC prn for any new, changing or worsening symptoms.    - EKG 12-lead, tracing only (Future)        Return in about 4  weeks (around 11/3/2020), or if symptoms worsen or fail to improve, for In-Clinic Visit.    Justin Salas PA-C  [facility]

## 2020-10-07 ENCOUNTER — VIRTUAL VISIT (OUTPATIENT)
Dept: CARDIOLOGY | Facility: CLINIC | Age: 70
End: 2020-10-07
Attending: INTERNAL MEDICINE
Payer: MEDICARE

## 2020-10-07 ENCOUNTER — PRE VISIT (OUTPATIENT)
Dept: CARDIOLOGY | Facility: CLINIC | Age: 70
End: 2020-10-07

## 2020-10-07 DIAGNOSIS — I48.91 NEW ONSET ATRIAL FIBRILLATION (H): ICD-10-CM

## 2020-10-07 DIAGNOSIS — I48.91 ATRIAL FIBRILLATION WITH RAPID VENTRICULAR RESPONSE (H): ICD-10-CM

## 2020-10-07 PROCEDURE — 99204 OFFICE O/P NEW MOD 45 MIN: CPT | Mod: 95 | Performed by: INTERNAL MEDICINE

## 2020-10-07 RX ORDER — METOPROLOL SUCCINATE 25 MG/1
25 TABLET, EXTENDED RELEASE ORAL DAILY
Qty: 90 TABLET | Refills: 3 | Status: ON HOLD | OUTPATIENT
Start: 2020-10-07 | End: 2020-10-27

## 2020-10-07 NOTE — PATIENT INSTRUCTIONS
Patient Instructions:  It was a pleasure to see you in the cardiology clinic today.      If you have any questions, you can reach my nurse, Maryanne CARLOS LPN, at [phone].  Press Option #1 for the [facility], and then press Option #4 for nursing.    We are encouraging the use of [employer] to communicate with your HealthCare Provider    Medication Changes:   - Start Metoprolol ER 25 mg every day in the morning.  - We will start Apixaban 5mg po BID if anticoagulation approved by your cancer team    Recommendations:   - Monitor and record daily blood pressures and pulse readings. Contact clinic in 1 week to provide readings.    Studies Ordered:  - Echocardiogram this week.  - 14 day cardiac event monitor in 2 months.    The results from today include: EKG.    Please follow up: With Dr. Arrieta in 3 months virtually.    Sincerely,    Jose Luis Arrieta MD     If you have an urgent need after hours (8:00 am to 4:30 pm) please call [phone] and ask for the cardiology fellow on call.        Patient Education     Patient Education    Metoprolol Succinate Oral tablet, extended-release    Metoprolol Tartrate Oral tablet    Metoprolol Tartrate Solution for injection  Metoprolol Succinate Oral tablet, extended-release  What is this medicine?  METOPROLOL (me TOE proe lole) is a beta-blocker. Beta-blockers reduce the workload on the heart and help it to beat more regularly. This medicine is used to treat high blood pressure and to prevent chest pain. It is also used to after a heart attack and to prevent an additional heart attack from occurring.  This medicine may be used for other purposes; ask your health care provider or pharmacist if you have questions.  What should I tell my health care provider before I take this medicine?  They need to know if you have any of these conditions:    diabetes    heart or vessel disease like slow heart rate, worsening heart failure, heart block, sick sinus syndrome or  Raynaud's disease    kidney disease    liver disease    lung or breathing disease, like asthma or emphysema    pheochromocytoma    thyroid disease    an unusual or allergic reaction to metoprolol, other beta-blockers, medicines, foods, dyes, or preservatives    pregnant or trying to get pregnant    breast-feeding  How should I use this medicine?  Take this medicine by mouth with a glass of water. Follow the directions on the prescription label. Do not crush or chew. Take this medicine with or immediately after meals. Take your doses at regular intervals. Do not take more medicine than directed. Do not stop taking this medicine suddenly. This could lead to serious heart-related effects.  Talk to your pediatrician regarding the use of this medicine in children. While this drug may be prescribed for children as young as 6 years for selected conditions, precautions do apply.  Overdosage: If you think you have taken too much of this medicine contact a poison control center or emergency room at once.  NOTE: This medicine is only for you. Do not share this medicine with others.  What if I miss a dose?  If you miss a dose, take it as soon as you can. If it is almost time for your next dose, take only that dose. Do not take double or extra doses.  What may interact with this medicine?  This medicine may interact with the following medications:    certain medicines for blood pressure, heart disease, irregular heart beat    certain medicines for depression, like monoamine oxidase (MAO) inhibitors, fluoxetine, or paroxetine    clonidine    dobutamine    epinephrine    isoproterenol    reserpine  This list may not describe all possible interactions. Give your health care provider a list of all the medicines, herbs, non-prescription drugs, or dietary supplements you use. Also tell them if you smoke, drink alcohol, or use illegal drugs. Some items may interact with your medicine.  What should I watch for while using this  medicine?  Visit your doctor or health care professional for regular check ups. Contact your doctor right away if your symptoms worsen. Check your blood pressure and pulse rate regularly. Ask your health care professional what your blood pressure and pulse rate should be, and when you should contact them.  You may get drowsy or dizzy. Do not drive, use machinery, or do anything that needs mental alertness until you know how this medicine affects you. Do not sit or stand up quickly, especially if you are an older patient. This reduces the risk of dizzy or fainting spells. Contact your doctor if these symptoms continue. Alcohol may interfere with the effect of this medicine. Avoid alcoholic drinks.  What side effects may I notice from receiving this medicine?  Side effects that you should report to your doctor or health care professional as soon as possible:    allergic reactions like skin rash, itching or hives    cold or numb hands or feet    depression    difficulty breathing    faint    fever with sore throat    irregular heartbeat, chest pain    rapid weight gain    swollen legs or ankles  Side effects that usually do not require medical attention (report to your doctor or health care professional if they continue or are bothersome):    anxiety or nervousness    change in sex drive or performance    dry skin    headache    nightmares or trouble sleeping    short term memory loss    stomach upset or diarrhea    unusually tired  This list may not describe all possible side effects. Call your doctor for medical advice about side effects. You may report side effects to FDA at 8-234-FDA-6804.  Where should I keep my medicine?  Keep out of the reach of children.  Store at room temperature between 15 and 30 degrees C (59 and 86 degrees F). Throw away any unused medicine after the expiration date.  NOTE:This sheet is a summary. It may not cover all possible information. If you have questions about this medicine, talk to  your doctor, pharmacist, or health care provider. Copyright  2016 [employer]           Patient Education     Metoprolol extended-release tablets  Brand Names: toprol, Toprol XL  What is this medicine?  METOPROLOL (me TOE proe lole) is a beta-blocker. Beta-blockers reduce the workload on the heart and help it to beat more regularly. This medicine is used to treat high blood pressure and to prevent chest pain. It is also used to after a heart attack and to prevent an additional heart attack from occurring.  How should I use this medicine?  Take this medicine by mouth with a glass of water. Follow the directions on the prescription label. Do not crush or chew. Take this medicine with or immediately after meals. Take your doses at regular intervals. Do not take more medicine than directed. Do not stop taking this medicine suddenly. This could lead to serious heart-related effects.  Talk to your pediatrician regarding the use of this medicine in children. While this drug may be prescribed for children as young as 6 years for selected conditions, precautions do apply.  What side effects may I notice from receiving this medicine?  Side effects that you should report to your doctor or health care professional as soon as possible:    allergic reactions like skin rash, itching or hives    cold or numb hands or feet    depression    difficulty breathing    faint    fever with sore throat    irregular heartbeat, chest pain    rapid weight gain    swollen legs or ankles  Side effects that usually do not require medical attention (report to your doctor or health care professional if they continue or are bothersome):    anxiety or nervousness    change in sex drive or performance    dry skin    headache    nightmares or trouble sleeping    short term memory loss    stomach upset or diarrhea    unusually tired  What may interact with this medicine?  This medicine may interact with the following medications:    certain medicines  for blood pressure, heart disease, irregular heart beat    certain medicines for depression, like monoamine oxidase (MAO) inhibitors, fluoxetine, or paroxetine    clonidine    dobutamine    epinephrine    isoproterenol    reserpine  What if I miss a dose?  If you miss a dose, take it as soon as you can. If it is almost time for your next dose, take only that dose. Do not take double or extra doses.  Where should I keep my medicine?  Keep out of the reach of children.  Store at room temperature between 15 and 30 degrees C (59 and 86 degrees F). Throw away any unused medicine after the expiration date.  What should I tell my health care provider before I take this medicine?  They need to know if you have any of these conditions:    diabetes    heart or vessel disease like slow heart rate, worsening heart failure, heart block, sick sinus syndrome or Raynaud's disease    kidney disease    liver disease    lung or breathing disease, like asthma or emphysema    pheochromocytoma    thyroid disease    an unusual or allergic reaction to metoprolol, other beta-blockers, medicines, foods, dyes, or preservatives    pregnant or trying to get pregnant    breast-feeding  What should I watch for while using this medicine?  Visit your doctor or health care professional for regular check ups. Contact your doctor right away if your symptoms worsen. Check your blood pressure and pulse rate regularly. Ask your health care professional what your blood pressure and pulse rate should be, and when you should contact them.  You may get drowsy or dizzy. Do not drive, use machinery, or do anything that needs mental alertness until you know how this medicine affects you. Do not sit or stand up quickly, especially if you are an older patient. This reduces the risk of dizzy or fainting spells. Contact your doctor if these symptoms continue. Alcohol may interfere with the effect of this medicine. Avoid alcoholic drinks.  NOTE:This sheet is a  summary. It may not cover all possible information. If you have questions about this medicine, talk to your doctor, pharmacist, or health care provider. Copyright  2019 [employer]           Patient Education     Understanding Atrial Fibrillation    An arrhythmia is any problem with the speed or pattern of the heartbeat. Atrial fibrillation (AFib) is the most common type of arrhythmia. It causes fast, chaotic electrical signals in the atria. This makes it hard for the heart to work as it should. It also affects how much blood your heart can pump out to the body.  AFib may occur once in a while and go away on its own. Or it may continue for longer periods and need treatment.  AFib can lead to serious problems, such as stroke. Your healthcare provider will need to monitor and manage it.  What happens during atrial fibrillation?   The heart has an electrical system that sends signals to control the heartbeat. As signals move through the heart, they tell the heart s upper chambers (atria) and lower chambers (ventricles) when to squeeze (contract) and relax. This lets blood move through the heart and out to the body and lungs.  With AFib, the atria receive abnormal signals. This causes them to contract in a fast and irregular way, and out of sync with the ventricles. When this happens, the atria also have a harder time moving blood into the ventricles. Blood may then pool in the atria. This increases the risk for blood clots and stroke. The ventricles also may contract too quickly and irregularly. As a result, they may not pump blood to the body and lungs as well as they should. This can weaken the heart muscle over time and cause heart failure.  What causes atrial fibrillation?  AFib is more common in older adults. It has many possible causes:    Coronary artery disease    Heart valve disease    Heart attack    Heart surgery    High blood pressure    Thyroid disease    Diabetes    Lung disease    Sleep apnea    Heavy  alcohol use  In some cases of AFib, doctors don't know the cause.  What are the symptoms of atrial fibrillation?  AFib may not cause symptoms. If symptoms do occur, they may include:    A fast, pounding, irregular heartbeat    Shortness of breath    Tiredness    Dizziness or fainting    Chest pain  How is atrial fibrillation treated?  Treatments for AFib can include any of the options below.    Medicines. You may be prescribed:  ? Heart rate medicines to help slow down the heartbeat  ? Heart rhythm medicines to help the heart beat more regularly  ? Blood thinners or anti-clotting medicines to help reduce the risk for blood clots and stroke.    Left atrial appendage closure. Your healthcare provider may advise this device to prevent stroke. You may need if you are at high risk for stroke but have problems taking blood-thinner (anticoagulant) medicines. The device is placed in the part of the heart where most clots form. This area is called the left atrial appendage (BECKI). It's a pouch-like structure in the muscle wall of the left atrium. The device closes off the BECKI to prevent clots moving from the heart to the brain and causing a stroke.    Electrical cardioversion. Your healthcare provider uses special pads or paddles to send one or more brief electrical shocks to the heart. This can help reset the heartbeat to normal.    Ablation. Long, thin tubes (catheters) are threaded through a blood vessel to the heart. There, the catheters send out hot or cold energy to the areas causing the abnormal signals. This energy destroys the problem tissue or cells. This improves the chances that your heart will stay in normal rhythm without using medicines. If your heart rate and rhythm can t be controlled, you may need ablation and a pacemaker. These will help control the heart rate and regularity of the heartbeat.    Surgery. During surgery, your healthcare provider may use different methods to create scar tissue in the areas of  the heart causing the abnormal signals. The scar tissue disrupts the abnormal signals and may stop AFib from occurring.    Hybrid surgical-catheter ablation for AFib. This treatment is used for people with AFib that continues or is hard to treat.. It combines surgery with a catheter ablation. During the surgery, the surgeon makes small cuts (incisions) between the ribs in the chest or in the abdomen near the sternum. The surgeon puts a scope through the incisions to get to the backside of the heart. The catheter portion of the procedure is done by putting a catheter into a vein in the groin. The catheter is guided to the inside of the heart. Using the catheter, radiofrequency ablation is done to destroy the tissue inside the heart that is causing the AFib. Using both of these approaches may work better to block the abnormal electrical signals and be a more permanent treatment for persistent AFib.  What are possible complications of atrial fibrillation?  Complications can include:    Blood clots    Stroke    Heart failure. This problem occurs when the heart muscle weakens so much that it can no longer pump blood well.  When should I call my healthcare provider?  Call your healthcare provider right away if you have any of these:    Symptoms that don t get better with treatment, or get worse    New symptoms  Date Last Reviewed: 5/1/2016 2000-2019 [employer] [address] [place] [address], [place] [address]. All rights reserved. This information is not intended as a substitute for professional medical care. Always follow your healthcare professional's instructions.

## 2020-10-07 NOTE — LETTER
10/7/2020      RE: Ijeoma Shah  [address] [facility] [id number]       Dear Colleague,    Thank you for the opportunity to participate in the care of your patient, Ijeoma Shah, at the [facility] HEART [facility] at [facility]. Please see a copy of my visit note below.    Service Date: 10/07/2020      Jyoti Griffiths MD   [place] [id number] [place] [address]  [id number]      RE: Ijeoma Shah   MRN: [id number]   : 1950      Dear Dr. Griffiths:      It was a pleasure participating in the care of your patient, Ms. Salima Shah.  As you know, she is a 69-year-old lady who I see today for paroxysmal atrial fibrillation.      Her past medical history is significant for the followin.  Hyperlipidemia.   2.  Left lung cancer.   3.  Sinusitis.   4.  Uterine prolapse.   5.  Warts.      Her cardiac history is significant for a recent episode where she was being prepped for lung surgery and was about to undergo anesthesia and she was quite anxious and an EKG revealed that she was in AFib with rapid ventricular response at 161 beats per minute.  This EKG was performed on 10/01/2020.  The procedure was canceled and she was referred here for further evaluation.      Interestingly, the patient, although anxious at that time, did not know that she was in atrial fibrillation.  In other words, she was completely asymptomatic and did not feel any palpitations, rapid heartbeats, dizziness, lightheadedness and essentially could not tell that she was in AFib at the time.      CHADS-VASc2 score is currently 2 for age and female gender.      Her blood pressures have been recorded in the high 130s with a pulse in the 80-90 range.      From a functional standpoint, she is able to walk about a mile 3 times a week and she says that she can climb up and down 2 or 3 flights of stairs without gross limitation or symptoms.   She specifically denies any chest pain or shortness of breath, PND, orthopnea, edema, palpitations, syncope or near-syncope.  She denies any new neurologic symptoms, numbness, weakness, tingling.  She denies any bleeding, black or bloody stools, nosebleeds or bleeding problems.      REVIEW OF SYSTEMS:  A 10-point review of systems is essentially unremarkable.  Pertinent negatives for review of systems, she is not hypersomnolent and does not have trouble sleeping.        CURRENT CARDIAC RISK FACTORS:  No history of diabetes or hypertension.  She quit smoking in July.  Smoked a pack a day for 50 years.  Rarely drinks alcohol.  Brother had heart trouble possibly in his 60s.  She does have hyperlipidemia.      She works in an office.      CURRENT MEDICATIONS:  Triamcinolone ointment.      PHYSICAL EXAMINATION:     VITAL SIGNS:  Her last recorded blood pressure 10/06/2020 was 136/84 with a pulse of 100.  Her weight is 179 pounds.   GENERAL:  She appears comfortable, well groomed.   PSYCHIATRIC:  She is alert and oriented x3.   HEENT:  Her eyes do not appear grossly erythematous or have exudate.   RESPIRATORY:  She is breathing comfortably without gross cough.      The remainder of the comprehensive physical exam was deferred secondary to COVID-19 pandemic and secondary to video visit restrictions.      LABORATORY:  09/29/2020 potassium 4.1, GFR normal, hemoglobin normal.  TSH was normal 06/23/2020.      EKG yesterday, 10/06/2020, revealed normal sinus rhythm at a rate of 84 beats per minute.        IMPRESSION:      Salima is a 69-year-old lady whose past medical history is significant for left lung cancer, anticipating surgery in the near future on 10/23/2020, who has 2 active issues:     1.  Paroxysmal atrial fibrillation with rapid ventricular response, asymptomatic.      The patient had a documented episode of paroxysmal atrial fibrillation 10/01/2020, first noticed prior to undergoing lung surgery for her cancer, at  which time EKG 10/01/2020 revealed atrial fibrillation with a rapid ventricular response of 161 beats per minute.      Her CHADS-VASc2 score is 2 for age and female gender.  She is currently in normal sinus rhythm with an EKG yesterday, 10/06/2020.      Unfortunately, she is completely asymptomatic with her atrial fibrillation and cannot sense whether or not she is in atrial fibrillation despite the rapid rate.  She simply does not feel any palpitations, rapid heart rates, dizziness, lightheadedness or other symptomatology.      Further noninvasive evaluation and medical therapy would be indicated.        PLAN:     1.  Echocardiogram to rule out significant structural pathology as a cause for her atrial fibrillation.     2.  Start Toprol-XL 25 mg a day.  She will keep track of her blood pressures at home and she will let us know what these are in 1 week, and we will hopefully up titrate this up to 50 mg a day if possible prior to her surgery scheduled for 10/23/2020.     3.  If approved by her cancer team, (regarding risks and benefits of bleeding from the malignancy) we will start apixaban 5mg po BID for CHADS-VASc2 score of 2.   If insurance does not cover this or if it is not financially feasible, then Coumadin with a goal INR 2.0-2.5 range would be considered.       3.  If she is able to go through her surgery safely(pending echo results and how she does on the beta blocker).  Hopefully, she will be able to continue the beta blocker throughout the perioperative period in order to help prevent further episodes of atrial fibrillation and also control the rate should she go into it.      4.  Once she is stable on a beta blocker dose and anticoagulation, in the future, we can consider a Zio Patch monitor in approximately 2 months post surgery to monitor for further asymptomatic arrhythmias and to monitor for rate control should she have AFib with a followup in 3 months when results are available.      Once again, it  "was a pleasure participating in the care of your patient, Ms. Salima Vega.  Please feel free to contact me anytime if you have any questions regarding her care in the future.      Sincerely,         CAROLYN PACKER MD          Addendum 10/9/20:    Full anticoagulation OK'd by Dr. Sanabria.    Plan:    1.  Start apixaban 5 mg po BID    2.  Can hold 2-3 days prior to upcoming surgery and restart after deemed safe from a surgical bleeding standpoint.         D: 10/07/2020   T: 10/07/2020   MT: reji      Name:     YOGI VEGA   MRN:      [id number]        Account:      [id number]   :      1950           Service Date: 10/07/2020      Document: [id number]        Yogi Vega is a 69 year old female who is being evaluated via a billable video visit.      The patient has been notified of following:     \"This video visit will be conducted via a call between you and your physician/provider. We have found that certain health care needs can be provided without the need for an in-person physical exam.  This service lets us provide the care you need with a video conversation.  If a prescription is necessary we can send it directly to your pharmacy.  If lab work is needed we can place an order for that and you can then stop by our lab to have the test done at a later time.    Video visits are billed at different rates depending on your insurance coverage.  Please reach out to your insurance provider with any questions.    If during the course of the call the physician/provider feels a video visit is not appropriate, you will not be charged for this service.\"    Patient has given verbal consent for Video visit? Yes  How would you like to obtain your AVS? Mail a copy  If you are dropped from the video visit, the video invite should be resent to: Text to cell phone: [phone], [employer]  Will anyone else be joining your video visit? No      Vitals - Patient Reported  Weight (Patient Reported): 79.4 " kg (175 lb)  Pain Score: No Pain (0)(No SOB)     Video-Visit Details    Type of service:  Video Visit    Video Start Time:237pm    Video End Time:259pm    Originating Location (pt. Location):patient home      Distant Location (provider location):  home office    Platform used for Video Visit: Robert      Please do not hesitate to contact me if you have any questions/concerns.     Sincerely,     Jose Luis Arrieta MD

## 2020-10-07 NOTE — PROGRESS NOTES
Service Date: 10/07/2020      Jyoti Griffiths MD   [facility]   [address] [place] [address]  [id number]      RE: Ijeoma Shah   MRN: [id number]   : 1950      Dear Dr. Griffiths:      It was a pleasure participating in the care of your patient, Ms. Salima Shah.  As you know, she is a 69-year-old lady who I see today for paroxysmal atrial fibrillation.      Her past medical history is significant for the followin.  Hyperlipidemia.   2.  Left lung cancer.   3.  Sinusitis.   4.  Uterine prolapse.   5.  Warts.      Her cardiac history is significant for a recent episode where she was being prepped for lung surgery and was about to undergo anesthesia and she was quite anxious and an EKG revealed that she was in AFib with rapid ventricular response at 161 beats per minute.  This EKG was performed on 10/01/2020.  The procedure was canceled and she was referred here for further evaluation.      Interestingly, the patient, although anxious at that time, did not know that she was in atrial fibrillation.  In other words, she was completely asymptomatic and did not feel any palpitations, rapid heartbeats, dizziness, lightheadedness and essentially could not tell that she was in AFib at the time.      CHADS-VASc2 score is currently 2 for age and female gender.      Her blood pressures have been recorded in the high 130s with a pulse in the 80-90 range.      From a functional standpoint, she is able to walk about a mile 3 times a week and she says that she can climb up and down 2 or 3 flights of stairs without gross limitation or symptoms.  She specifically denies any chest pain or shortness of breath, PND, orthopnea, edema, palpitations, syncope or near-syncope.  She denies any new neurologic symptoms, numbness, weakness, tingling.  She denies any bleeding, black or bloody stools, nosebleeds or bleeding problems.      REVIEW OF SYSTEMS:  A 10-point review of systems is essentially  unremarkable.  Pertinent negatives for review of systems, she is not hypersomnolent and does not have trouble sleeping.        CURRENT CARDIAC RISK FACTORS:  No history of diabetes or hypertension.  She quit smoking in July.  Smoked a pack a day for 50 years.  Rarely drinks alcohol.  Brother had heart trouble possibly in his 60s.  She does have hyperlipidemia.      She works in an office.      CURRENT MEDICATIONS:  Triamcinolone ointment.      PHYSICAL EXAMINATION:     VITAL SIGNS:  Her last recorded blood pressure 10/06/2020 was 136/84 with a pulse of 100.  Her weight is 179 pounds.   GENERAL:  She appears comfortable, well groomed.   PSYCHIATRIC:  She is alert and oriented x3.   HEENT:  Her eyes do not appear grossly erythematous or have exudate.   RESPIRATORY:  She is breathing comfortably without gross cough.      The remainder of the comprehensive physical exam was deferred secondary to COVID-19 pandemic and secondary to video visit restrictions.      LABORATORY:  09/29/2020 potassium 4.1, GFR normal, hemoglobin normal.  TSH was normal 06/23/2020.      EKG yesterday, 10/06/2020, revealed normal sinus rhythm at a rate of 84 beats per minute.        IMPRESSION:      Salima is a 69-year-old lady whose past medical history is significant for left lung cancer, anticipating surgery in the near future on 10/23/2020, who has 2 active issues:     1.  Paroxysmal atrial fibrillation with rapid ventricular response, asymptomatic.      The patient had a documented episode of paroxysmal atrial fibrillation 10/01/2020, first noticed prior to undergoing lung surgery for her cancer, at which time EKG 10/01/2020 revealed atrial fibrillation with a rapid ventricular response of 161 beats per minute.      Her CHADS-VASc2 score is 2 for age and female gender.  She is currently in normal sinus rhythm with an EKG yesterday, 10/06/2020.      Unfortunately, she is completely asymptomatic with her atrial fibrillation and cannot sense  whether or not she is in atrial fibrillation despite the rapid rate.  She simply does not feel any palpitations, rapid heart rates, dizziness, lightheadedness or other symptomatology.      Further noninvasive evaluation and medical therapy would be indicated.        PLAN:     1.  Echocardiogram to rule out significant structural pathology as a cause for her atrial fibrillation.     2.  Start Toprol-XL 25 mg a day.  She will keep track of her blood pressures at home and she will let us know what these are in 1 week, and we will hopefully up titrate this up to 50 mg a day if possible prior to her surgery scheduled for 10/23/2020.     3.  If approved by her cancer team, (regarding risks and benefits of bleeding from the malignancy) we will start apixaban 5mg po BID for CHADS-VASc2 score of 2.   If insurance does not cover this or if it is not financially feasible, then Coumadin with a goal INR 2.0-2.5 range would be considered.       3.  If she is able to go through her surgery safely(pending echo results and how she does on the beta blocker).  Hopefully, she will be able to continue the beta blocker throughout the perioperative period in order to help prevent further episodes of atrial fibrillation and also control the rate should she go into it.      4.  Once she is stable on a beta blocker dose and anticoagulation, in the future, we can consider a Zio Patch monitor in approximately 2 months post surgery to monitor for further asymptomatic arrhythmias and to monitor for rate control should she have AFib with a followup in 3 months when results are available.      Once again, it was a pleasure participating in the care of your patient, Ms. Salima Shah.  Please feel free to contact me anytime if you have any questions regarding her care in the future.      Sincerely,         CAROLYN PACKER MD          Addendum 10/9/20:    Full anticoagulation OK'd by Dr. Sanabria.    Plan:    1.  Start apixaban 5 mg po BID    2.   Can hold 2-3 days prior to upcoming surgery and restart after deemed safe from a surgical bleeding standpoint.      Addendum 10/16/20:    Echo reveals normal LV systolic function without gross valvular pathology, normal atrial dimensions    Insurance requesting change from apixaban to rivaroxaban    Plan:    1.  Patient approved for her procedure at acceptable perioperative risk for event.  Continue the beta blocker throughout the perioperative period both for rhythm control and rate control should she have a recurrent event.    2.  Call patient for current home BPs/pulse/progress to uptitrate beta blocker dose as tolerated for maximal effect prior to procedure    3.  Change from apixaban to rivaroxaban 20mg po every day    Can stop 2-3 days prior to procedure and restart when deemed safe from surgical bleeding standpoint     D: 10/07/2020   T: 10/07/2020   MT: reji      Name:     YOGI VEGA   MRN:      [id number]        Account:      [id number]   :      1950           Service Date: 10/07/2020      Document: [id number]

## 2020-10-07 NOTE — TELEPHONE ENCOUNTER
[facility] Call Center    Phone Message    May a detailed message be left on voicemail: yes     Reason for Call: Other: Pt returning natty's call.  wanted to know what type of blood thinner pt was on. Please call back to discuss.     Action Taken: Message routed to:  [facility]    Travel Screening: Not Applicable

## 2020-10-07 NOTE — PROGRESS NOTES
"Ijeoma Shah is a 69 year old female who is being evaluated via a billable video visit.      The patient has been notified of following:     \"This video visit will be conducted via a call between you and your physician/provider. We have found that certain health care needs can be provided without the need for an in-person physical exam.  This service lets us provide the care you need with a video conversation.  If a prescription is necessary we can send it directly to your pharmacy.  If lab work is needed we can place an order for that and you can then stop by our lab to have the test done at a later time.    Video visits are billed at different rates depending on your insurance coverage.  Please reach out to your insurance provider with any questions.    If during the course of the call the physician/provider feels a video visit is not appropriate, you will not be charged for this service.\"    Patient has given verbal consent for Video visit? Yes  How would you like to obtain your AVS? Mail a copy  If you are dropped from the video visit, the video invite should be resent to: Text to cell phone: [phone], [employer]  Will anyone else be joining your video visit? No      Vitals - Patient Reported  Weight (Patient Reported): 79.4 kg (175 lb)  Pain Score: No Pain (0)(No SOB)     Video-Visit Details    Type of service:  Video Visit    Video Start Time:237pm    Video End Time:259pm    Originating Location (pt. Location):patient home      Distant Location (provider location):  home office    Platform used for Video Visit: Robert      See dictation #[id number]    "

## 2020-10-08 DIAGNOSIS — Z11.59 ENCOUNTER FOR SCREENING FOR OTHER VIRAL DISEASES: Primary | ICD-10-CM

## 2020-10-09 NOTE — TELEPHONE ENCOUNTER
Called and left  for Pt informing her that no anticoagulation has been started yet, as we are awaiting direction from Dr Sanabria on wither medication can be started prior to surgery, or postponed until after.  Requested a return call to clinic to discuss further if needed.  Clinic telephone provided.    Maryanne Reyez LPN

## 2020-10-12 ENCOUNTER — TELEPHONE (OUTPATIENT)
Dept: CARDIOLOGY | Facility: CLINIC | Age: 70
End: 2020-10-12

## 2020-10-12 DIAGNOSIS — I48.91 ATRIAL FIBRILLATION WITH RAPID VENTRICULAR RESPONSE (H): Primary | ICD-10-CM

## 2020-10-12 NOTE — TELEPHONE ENCOUNTER
[facility] Call Center    Phone Message    May a detailed message be left on voicemail: yes     Reason for Call: Other: Pt was told to monitor her BP and pulse for a few days and call in with the values:   10/9: BP: 132/91, HR: 64  10/10: BP: 120/75, HR: 58  10/11: BP: 134/78, HR: 64  10/12: BP: 130/71, HR: 56      Please call back pt with concerns. Thank you    Action Taken: Message routed to:  [facility] Cardio    Travel Screening: Not Applicable

## 2020-10-12 NOTE — TELEPHONE ENCOUNTER
[facility] Call Center    Phone Message    May a detailed message be left on voicemail: yes     Reason for Call: Other: Pt called back and stated she would like a call from Dr. Arrieta's nurse to discuss her plan of care. She is concerned about being put on medication for afib when it had only happened one time. Wanting to discuss if the medication is necessary. Thank you     Action Taken: Message routed to:  [facility] Cardio    Travel Screening: Not Applicable

## 2020-10-14 NOTE — TELEPHONE ENCOUNTER
Date: 10/14/2020    Time of Call: 4:29 PM     Diagnosis:  A Fib     [ TORB ] Ordering provider: Dr Jose Luis Arrieta  Order:   - Start Apixaban 5 mg bid  - Stop 3 days prior to surgery and resume once deemed safe by Dr Sanabria.     Order received by: Maryanne Reyez LPN     Follow-up/additional notes: Called and spoke with Pt.  Discussed medication and Pt's A Fib.  Pt agreed to start medication.  Prescription sent to pharmacy.  Pt asked for explanation to be sent to [facility] so she could review and potentially explore a 2nd opinion. Message will be sent.  Pt verbalized understanding, agreed to current plan and denied any further questions.

## 2020-10-15 DIAGNOSIS — I48.91 ATRIAL FIBRILLATION WITH RAPID VENTRICULAR RESPONSE (H): Primary | ICD-10-CM

## 2020-10-15 NOTE — TELEPHONE ENCOUNTER
apixaban ANTICOAGULANT (ELIQUIS) 5 MG tablet  Last Written Prescription Date:  10/14/20  Last Fill Quantity: 180,   # refills: 3  Last Office Visit : 10/7/20  Future Office visit:  None    Routing refill request to provider for review/approval because:  Alternative medication to apixabanrequested per insurance> xarelto.

## 2020-10-16 ENCOUNTER — HOSPITAL ENCOUNTER (OUTPATIENT)
Dept: CARDIOLOGY | Facility: CLINIC | Age: 70
Discharge: HOME | End: 2020-10-16
Attending: INTERNAL MEDICINE | Admitting: INTERNAL MEDICINE
Payer: MEDICARE

## 2020-10-16 DIAGNOSIS — I48.91 ATRIAL FIBRILLATION WITH RAPID VENTRICULAR RESPONSE (H): ICD-10-CM

## 2020-10-16 PROCEDURE — 93306 TTE W/DOPPLER COMPLETE: CPT

## 2020-10-16 PROCEDURE — 93306 TTE W/DOPPLER COMPLETE: CPT | Mod: 26 | Performed by: INTERNAL MEDICINE

## 2020-10-16 NOTE — TELEPHONE ENCOUNTER
[facility] Call Center    Phone Message    May a detailed message be left on voicemail: no     Reason for Call: Other: Pt says her Eliquis medication is not covered by insurance and Pt requests it be changed to Xarelto instead. Please call Pt back to discuss.     Action Taken: Message routed to:  [facility]    Travel Screening: Not Applicable

## 2020-10-16 NOTE — TELEPHONE ENCOUNTER
Called and spoke with Pt.  Informed her the change to Xarelto will be discussed with Dr Arrieta and Pt would be contacted back with recommendations.  Pt verbalized understanding, agreed to current plan and denied any further questions.    Maryanne Reyez LPN

## 2020-10-19 NOTE — TELEPHONE ENCOUNTER
Date: 10/19/2020    Time of Call: 4:47 PM     Diagnosis:  A Fib     [ TORB ] Ordering provider: Dr Jose Luis Arrieta  Order:   - Stop Apixaban  - Start Rivaroxiban 20 mg every day after surgery once deemed safe by Dr Sanabria     Order received by: Maryanne Reyez LPN     Follow-up/additional notes: Per progress note addendum.  Order placed.  Called and left VM for Pt informing of change approval and order placement.  Requested return call to clinic to discuss further if needed.  Clinic telephone provided.  Also requested home BPs and P readings to be sent via [employer].    Maryanne Reyez LPN

## 2020-10-20 ENCOUNTER — ANESTHESIA EVENT (OUTPATIENT)
Dept: SURGERY | Facility: CLINIC | Age: 70
DRG: 163 | End: 2020-10-20
Payer: MEDICARE

## 2020-10-20 ENCOUNTER — TELEPHONE (OUTPATIENT)
Dept: SURGERY | Facility: CLINIC | Age: 70
End: 2020-10-20

## 2020-10-20 DIAGNOSIS — Z11.59 ENCOUNTER FOR SCREENING FOR OTHER VIRAL DISEASES: ICD-10-CM

## 2020-10-20 PROCEDURE — U0003 INFECTIOUS AGENT DETECTION BY NUCLEIC ACID (DNA OR RNA); SEVERE ACUTE RESPIRATORY SYNDROME CORONAVIRUS 2 (SARS-COV-2) (CORONAVIRUS DISEASE [COVID-19]), AMPLIFIED PROBE TECHNIQUE, MAKING USE OF HIGH THROUGHPUT TECHNOLOGIES AS DESCRIBED BY CMS-2020-01-R: HCPCS | Performed by: THORACIC SURGERY (CARDIOTHORACIC VASCULAR SURGERY)

## 2020-10-20 NOTE — TELEPHONE ENCOUNTER
I called Salima to ask whether she was on any type of blood thinner.   She said she never started one, her insurance denied the first prescribed medication and she never heard back from cardiology about starting another.   She was in a sinus rhythm at the time of her echocardiogram on 10/16.       I told her to continue to stay off anything (in case an alternative was ordered) until after surgery and that Dr. Sanabria would determine if she needed to start on anything post-op.   She agreed with this plan.

## 2020-10-21 LAB
SARS-COV-2 RNA SPEC QL NAA+PROBE: NOT DETECTED
SPECIMEN SOURCE: NORMAL

## 2020-10-22 ENCOUNTER — APPOINTMENT (OUTPATIENT)
Dept: GENERAL RADIOLOGY | Facility: CLINIC | Age: 70
DRG: 163 | End: 2020-10-22
Attending: THORACIC SURGERY (CARDIOTHORACIC VASCULAR SURGERY)
Payer: MEDICARE

## 2020-10-22 ENCOUNTER — HOSPITAL ENCOUNTER (INPATIENT)
Facility: CLINIC | Age: 70
LOS: 6 days | Discharge: HOME | DRG: 163 | End: 2020-10-28
Attending: THORACIC SURGERY (CARDIOTHORACIC VASCULAR SURGERY) | Admitting: THORACIC SURGERY (CARDIOTHORACIC VASCULAR SURGERY)
Payer: MEDICARE

## 2020-10-22 ENCOUNTER — ANESTHESIA (OUTPATIENT)
Dept: SURGERY | Facility: CLINIC | Age: 70
DRG: 163 | End: 2020-10-22
Payer: MEDICARE

## 2020-10-22 DIAGNOSIS — C34.90 LUNG CANCER (H): ICD-10-CM

## 2020-10-22 DIAGNOSIS — I48.0 PAROXYSMAL ATRIAL FIBRILLATION (H): Primary | ICD-10-CM

## 2020-10-22 DIAGNOSIS — C34.32 MALIGNANT NEOPLASM OF LOWER LOBE OF LEFT LUNG (H): ICD-10-CM

## 2020-10-22 DIAGNOSIS — R91.8 MASS OF LEFT LUNG: ICD-10-CM

## 2020-10-22 LAB
ABO + RH BLD: NORMAL
ABO + RH BLD: NORMAL
BASE EXCESS BLDA CALC-SCNC: 2 MMOL/L
BASE EXCESS BLDA CALC-SCNC: 3.2 MMOL/L
BASE EXCESS BLDA CALC-SCNC: 3.9 MMOL/L
BLD GP AB SCN SERPL QL: NORMAL
BLD PROD TYP BPU: NORMAL
BLD UNIT ID BPU: 0
BLOOD BANK CMNT PATIENT-IMP: NORMAL
BLOOD PRODUCT CODE: NORMAL
BPU ID: NORMAL
CA-I BLD-MCNC: 4.6 MG/DL (ref 4.4–5.2)
CA-I BLD-MCNC: 4.7 MG/DL (ref 4.4–5.2)
CA-I BLD-MCNC: 4.7 MG/DL (ref 4.4–5.2)
GLUCOSE BLD-MCNC: 105 MG/DL (ref 70–99)
GLUCOSE BLD-MCNC: 127 MG/DL (ref 70–99)
GLUCOSE BLD-MCNC: 162 MG/DL (ref 70–99)
GLUCOSE BLDC GLUCOMTR-MCNC: 151 MG/DL (ref 70–99)
GLUCOSE BLDC GLUCOMTR-MCNC: 98 MG/DL (ref 70–99)
HCO3 BLD-SCNC: 27 MMOL/L (ref 21–28)
HCO3 BLD-SCNC: 28 MMOL/L (ref 21–28)
HCO3 BLD-SCNC: 28 MMOL/L (ref 21–28)
HGB BLD-MCNC: 11.6 G/DL (ref 11.7–15.7)
HGB BLD-MCNC: 11.8 G/DL (ref 11.7–15.7)
HGB BLD-MCNC: 12.1 G/DL (ref 11.7–15.7)
LACTATE BLD-SCNC: 0.6 MMOL/L (ref 0.7–2)
LACTATE BLD-SCNC: 0.7 MMOL/L (ref 0.7–2)
LACTATE BLD-SCNC: 1 MMOL/L (ref 0.7–2)
NUM BPU REQUESTED: 4
O2/TOTAL GAS SETTING VFR VENT: 40 %
O2/TOTAL GAS SETTING VFR VENT: 40 %
O2/TOTAL GAS SETTING VFR VENT: 60 %
PCO2 BLD: 38 MM HG (ref 35–45)
PCO2 BLD: 41 MM HG (ref 35–45)
PCO2 BLD: 43 MM HG (ref 35–45)
PH BLD: 7.42 PH (ref 7.35–7.45)
PH BLD: 7.42 PH (ref 7.35–7.45)
PH BLD: 7.47 PH (ref 7.35–7.45)
PO2 BLD: 126 MM HG (ref 80–105)
PO2 BLD: 269 MM HG (ref 80–105)
PO2 BLD: 79 MM HG (ref 80–105)
POTASSIUM BLD-SCNC: 4 MMOL/L (ref 3.4–5.3)
POTASSIUM BLD-SCNC: 4 MMOL/L (ref 3.4–5.3)
POTASSIUM BLD-SCNC: 4.1 MMOL/L (ref 3.4–5.3)
SODIUM BLD-SCNC: 137 MMOL/L (ref 133–144)
SODIUM BLD-SCNC: 137 MMOL/L (ref 133–144)
SODIUM BLD-SCNC: 140 MMOL/L (ref 133–144)
SPECIMEN EXP DATE BLD: NORMAL
TRANSFUSION STATUS PATIENT QL: NORMAL

## 2020-10-22 PROCEDURE — 88307 TISSUE EXAM BY PATHOLOGIST: CPT | Mod: TC | Performed by: THORACIC SURGERY (CARDIOTHORACIC VASCULAR SURGERY)

## 2020-10-22 PROCEDURE — 86850 RBC ANTIBODY SCREEN: CPT | Performed by: ANESTHESIOLOGY

## 2020-10-22 PROCEDURE — 83605 ASSAY OF LACTIC ACID: CPT

## 2020-10-22 PROCEDURE — 120N000003 HC R&B IMCU UMMC

## 2020-10-22 PROCEDURE — 0W9B00Z DRAINAGE OF LEFT PLEURAL CAVITY WITH DRAINAGE DEVICE, OPEN APPROACH: ICD-10-PCS | Performed by: THORACIC SURGERY (CARDIOTHORACIC VASCULAR SURGERY)

## 2020-10-22 PROCEDURE — 360N000029 HC SURGERY LEVEL 4 EA 15 ADDTL MIN - UMMC: Performed by: THORACIC SURGERY (CARDIOTHORACIC VASCULAR SURGERY)

## 2020-10-22 PROCEDURE — 250N000011 HC RX IP 250 OP 636: Performed by: STUDENT IN AN ORGANIZED HEALTH CARE EDUCATION/TRAINING PROGRAM

## 2020-10-22 PROCEDURE — 0BTJ0ZZ RESECTION OF LEFT LOWER LUNG LOBE, OPEN APPROACH: ICD-10-PCS | Performed by: THORACIC SURGERY (CARDIOTHORACIC VASCULAR SURGERY)

## 2020-10-22 PROCEDURE — 250N000011 HC RX IP 250 OP 636: Performed by: NURSE ANESTHETIST, CERTIFIED REGISTERED

## 2020-10-22 PROCEDURE — 258N000003 HC RX IP 258 OP 636: Performed by: NURSE ANESTHETIST, CERTIFIED REGISTERED

## 2020-10-22 PROCEDURE — 86900 BLOOD TYPING SEROLOGIC ABO: CPT | Performed by: ANESTHESIOLOGY

## 2020-10-22 PROCEDURE — 88309 TISSUE EXAM BY PATHOLOGIST: CPT | Mod: 26 | Performed by: PATHOLOGY

## 2020-10-22 PROCEDURE — 360N000028 HC SURGERY LEVEL 4 1ST 30 MIN - UMMC: Performed by: THORACIC SURGERY (CARDIOTHORACIC VASCULAR SURGERY)

## 2020-10-22 PROCEDURE — 88360 TUMOR IMMUNOHISTOCHEM/MANUAL: CPT | Mod: TC | Performed by: THORACIC SURGERY (CARDIOTHORACIC VASCULAR SURGERY)

## 2020-10-22 PROCEDURE — 761N000004 HC RECOVERY PHASE 1 LEVEL 2 EA ADDTL HR: Performed by: THORACIC SURGERY (CARDIOTHORACIC VASCULAR SURGERY)

## 2020-10-22 PROCEDURE — 258N000003 HC RX IP 258 OP 636: Performed by: STUDENT IN AN ORGANIZED HEALTH CARE EDUCATION/TRAINING PROGRAM

## 2020-10-22 PROCEDURE — 250N000011 HC RX IP 250 OP 636: Performed by: THORACIC SURGERY (CARDIOTHORACIC VASCULAR SURGERY)

## 2020-10-22 PROCEDURE — 999N000140 HC STATISTIC PRE-PROCEDURE ASSESSMENT III: Performed by: THORACIC SURGERY (CARDIOTHORACIC VASCULAR SURGERY)

## 2020-10-22 PROCEDURE — 71045 X-RAY EXAM CHEST 1 VIEW: CPT | Mod: 26 | Performed by: RADIOLOGY

## 2020-10-22 PROCEDURE — 32480 PARTIAL REMOVAL OF LUNG: CPT | Mod: 62 | Performed by: THORACIC SURGERY (CARDIOTHORACIC VASCULAR SURGERY)

## 2020-10-22 PROCEDURE — 86923 COMPATIBILITY TEST ELECTRIC: CPT | Performed by: ANESTHESIOLOGY

## 2020-10-22 PROCEDURE — 370N000001 HC ANESTHESIA TECHNICAL FEE, 1ST 30 MIN: Performed by: THORACIC SURGERY (CARDIOTHORACIC VASCULAR SURGERY)

## 2020-10-22 PROCEDURE — 258N000003 HC RX IP 258 OP 636: Performed by: ANESTHESIOLOGY

## 2020-10-22 PROCEDURE — 250N000009 HC RX 250: Performed by: NURSE ANESTHETIST, CERTIFIED REGISTERED

## 2020-10-22 PROCEDURE — 88305 TISSUE EXAM BY PATHOLOGIST: CPT | Mod: TC | Performed by: THORACIC SURGERY (CARDIOTHORACIC VASCULAR SURGERY)

## 2020-10-22 PROCEDURE — 39402 MEDIASTINOSCPY W/LMPH NOD BX: CPT | Mod: 51 | Performed by: THORACIC SURGERY (CARDIOTHORACIC VASCULAR SURGERY)

## 2020-10-22 PROCEDURE — 999N000157 HC STATISTIC RCP TIME EA 10 MIN

## 2020-10-22 PROCEDURE — 36415 COLL VENOUS BLD VENIPUNCTURE: CPT | Performed by: ANESTHESIOLOGY

## 2020-10-22 PROCEDURE — 82330 ASSAY OF CALCIUM: CPT

## 2020-10-22 PROCEDURE — 88360 TUMOR IMMUNOHISTOCHEM/MANUAL: CPT | Mod: 26 | Performed by: PATHOLOGY

## 2020-10-22 PROCEDURE — 82803 BLOOD GASES ANY COMBINATION: CPT

## 2020-10-22 PROCEDURE — 82947 ASSAY GLUCOSE BLOOD QUANT: CPT

## 2020-10-22 PROCEDURE — 88305 TISSUE EXAM BY PATHOLOGIST: CPT | Mod: 26 | Performed by: PATHOLOGY

## 2020-10-22 PROCEDURE — 88331 PATH CONSLTJ SURG 1 BLK 1SPC: CPT | Mod: 26 | Performed by: PATHOLOGY

## 2020-10-22 PROCEDURE — 410N000003 HC PER-PERFUSION 1ST 30 MIN: Performed by: THORACIC SURGERY (CARDIOTHORACIC VASCULAR SURGERY)

## 2020-10-22 PROCEDURE — 250N000009 HC RX 250: Performed by: STUDENT IN AN ORGANIZED HEALTH CARE EDUCATION/TRAINING PROGRAM

## 2020-10-22 PROCEDURE — 0BJ08ZZ INSPECTION OF TRACHEOBRONCHIAL TREE, VIA NATURAL OR ARTIFICIAL OPENING ENDOSCOPIC: ICD-10-PCS | Performed by: THORACIC SURGERY (CARDIOTHORACIC VASCULAR SURGERY)

## 2020-10-22 PROCEDURE — 999N000065 XR CHEST PORT 1 VW

## 2020-10-22 PROCEDURE — 999N000015 HC STATISTIC ARTERIAL MONITORING DAILY

## 2020-10-22 PROCEDURE — 07B74ZX EXCISION OF THORAX LYMPHATIC, PERCUTANEOUS ENDOSCOPIC APPROACH, DIAGNOSTIC: ICD-10-PCS | Performed by: THORACIC SURGERY (CARDIOTHORACIC VASCULAR SURGERY)

## 2020-10-22 PROCEDURE — 258N000003 HC RX IP 258 OP 636

## 2020-10-22 PROCEDURE — 250N000009 HC RX 250

## 2020-10-22 PROCEDURE — 81445 SO NEO GSAP 5-50DNA/DNA&RNA: CPT | Performed by: THORACIC SURGERY (CARDIOTHORACIC VASCULAR SURGERY)

## 2020-10-22 PROCEDURE — 86901 BLOOD TYPING SEROLOGIC RH(D): CPT | Performed by: ANESTHESIOLOGY

## 2020-10-22 PROCEDURE — 250N000003 HC SEVOFLURANE, EA 15 MIN: Performed by: THORACIC SURGERY (CARDIOTHORACIC VASCULAR SURGERY)

## 2020-10-22 PROCEDURE — 32480 PARTIAL REMOVAL OF LUNG: CPT | Mod: 62 | Performed by: SURGERY

## 2020-10-22 PROCEDURE — 88309 TISSUE EXAM BY PATHOLOGIST: CPT | Mod: TC | Performed by: THORACIC SURGERY (CARDIOTHORACIC VASCULAR SURGERY)

## 2020-10-22 PROCEDURE — 999N001017 HC STATISTIC GLUCOSE BY METER IP

## 2020-10-22 PROCEDURE — 88307 TISSUE EXAM BY PATHOLOGIST: CPT | Mod: 26 | Performed by: PATHOLOGY

## 2020-10-22 PROCEDURE — 88332 PATH CONSLTJ SURG EA ADD BLK: CPT | Mod: TC | Performed by: THORACIC SURGERY (CARDIOTHORACIC VASCULAR SURGERY)

## 2020-10-22 PROCEDURE — 999N001020 HC STATISTIC H-SEND OUTS PREP: Performed by: THORACIC SURGERY (CARDIOTHORACIC VASCULAR SURGERY)

## 2020-10-22 PROCEDURE — 88331 PATH CONSLTJ SURG 1 BLK 1SPC: CPT | Mod: TC | Performed by: THORACIC SURGERY (CARDIOTHORACIC VASCULAR SURGERY)

## 2020-10-22 PROCEDURE — 84132 ASSAY OF SERUM POTASSIUM: CPT

## 2020-10-22 PROCEDURE — 250N000011 HC RX IP 250 OP 636: Performed by: ANESTHESIOLOGY

## 2020-10-22 PROCEDURE — P9016 RBC LEUKOCYTES REDUCED: HCPCS | Performed by: ANESTHESIOLOGY

## 2020-10-22 PROCEDURE — 761N000003 HC RECOVERY PHASE 1 LEVEL 2 FIRST HR: Performed by: THORACIC SURGERY (CARDIOTHORACIC VASCULAR SURGERY)

## 2020-10-22 PROCEDURE — 84295 ASSAY OF SERUM SODIUM: CPT

## 2020-10-22 PROCEDURE — 02QR0ZZ REPAIR LEFT PULMONARY ARTERY, OPEN APPROACH: ICD-10-PCS | Performed by: THORACIC SURGERY (CARDIOTHORACIC VASCULAR SURGERY)

## 2020-10-22 PROCEDURE — 250N000013 HC RX MED GY IP 250 OP 250 PS 637

## 2020-10-22 PROCEDURE — 272N000001 HC OR GENERAL SUPPLY STERILE: Performed by: THORACIC SURGERY (CARDIOTHORACIC VASCULAR SURGERY)

## 2020-10-22 PROCEDURE — 250N000011 HC RX IP 250 OP 636

## 2020-10-22 PROCEDURE — G0452 MOLECULAR PATHOLOGY INTERPR: HCPCS | Performed by: PATHOLOGY

## 2020-10-22 PROCEDURE — 370N000002 HC ANESTHESIA TECHNICAL FEE, EACH ADDTL 15 MIN: Performed by: THORACIC SURGERY (CARDIOTHORACIC VASCULAR SURGERY)

## 2020-10-22 PROCEDURE — 38746 REMOVE THORACIC LYMPH NODES: CPT | Mod: GC | Performed by: THORACIC SURGERY (CARDIOTHORACIC VASCULAR SURGERY)

## 2020-10-22 RX ORDER — GLYCOPYRROLATE 0.2 MG/ML
INJECTION, SOLUTION INTRAMUSCULAR; INTRAVENOUS PRN
Status: DISCONTINUED | OUTPATIENT
Start: 2020-10-22 | End: 2020-10-22

## 2020-10-22 RX ORDER — ACETAMINOPHEN 325 MG/1
975 TABLET ORAL EVERY 6 HOURS
Status: DISCONTINUED | OUTPATIENT
Start: 2020-10-22 | End: 2020-10-25

## 2020-10-22 RX ORDER — HEPARIN SODIUM 5000 [USP'U]/.5ML
5000 INJECTION, SOLUTION INTRAVENOUS; SUBCUTANEOUS
Status: COMPLETED | OUTPATIENT
Start: 2020-10-22 | End: 2020-10-22

## 2020-10-22 RX ORDER — PHENYLEPHRINE HCL IN 0.9% NACL 50MG/250ML
0.5-6 PLASTIC BAG, INJECTION (ML) INTRAVENOUS CONTINUOUS
Status: DISCONTINUED | OUTPATIENT
Start: 2020-10-22 | End: 2020-10-22 | Stop reason: HOSPADM

## 2020-10-22 RX ORDER — METOPROLOL SUCCINATE 25 MG/1
25 TABLET, EXTENDED RELEASE ORAL DAILY
Status: DISCONTINUED | OUTPATIENT
Start: 2020-10-23 | End: 2020-10-23

## 2020-10-22 RX ORDER — ONDANSETRON 2 MG/ML
4 INJECTION INTRAMUSCULAR; INTRAVENOUS EVERY 30 MIN PRN
Status: DISCONTINUED | OUTPATIENT
Start: 2020-10-22 | End: 2020-10-22 | Stop reason: HOSPADM

## 2020-10-22 RX ORDER — NALOXONE HYDROCHLORIDE 0.4 MG/ML
.1-.4 INJECTION, SOLUTION INTRAMUSCULAR; INTRAVENOUS; SUBCUTANEOUS
Status: DISCONTINUED | OUTPATIENT
Start: 2020-10-22 | End: 2020-10-22

## 2020-10-22 RX ORDER — NALOXONE HYDROCHLORIDE 0.4 MG/ML
.1-.4 INJECTION, SOLUTION INTRAMUSCULAR; INTRAVENOUS; SUBCUTANEOUS
Status: DISCONTINUED | OUTPATIENT
Start: 2020-10-22 | End: 2020-10-23

## 2020-10-22 RX ORDER — ONDANSETRON 4 MG/1
4 TABLET, ORALLY DISINTEGRATING ORAL EVERY 30 MIN PRN
Status: DISCONTINUED | OUTPATIENT
Start: 2020-10-22 | End: 2020-10-22 | Stop reason: HOSPADM

## 2020-10-22 RX ORDER — LIDOCAINE HYDROCHLORIDE AND EPINEPHRINE 15; 5 MG/ML; UG/ML
INJECTION, SOLUTION EPIDURAL PRN
Status: DISCONTINUED | OUTPATIENT
Start: 2020-10-22 | End: 2020-10-22

## 2020-10-22 RX ORDER — LIDOCAINE 40 MG/G
CREAM TOPICAL
Status: DISCONTINUED | OUTPATIENT
Start: 2020-10-22 | End: 2020-10-22 | Stop reason: HOSPADM

## 2020-10-22 RX ORDER — BUPIVACAINE HYDROCHLORIDE 2.5 MG/ML
INJECTION, SOLUTION EPIDURAL; INFILTRATION; INTRACAUDAL PRN
Status: DISCONTINUED | OUTPATIENT
Start: 2020-10-22 | End: 2020-10-22 | Stop reason: HOSPADM

## 2020-10-22 RX ORDER — SODIUM CHLORIDE, SODIUM GLUCONATE, SODIUM ACETATE, POTASSIUM CHLORIDE AND MAGNESIUM CHLORIDE 526; 502; 368; 37; 30 MG/100ML; MG/100ML; MG/100ML; MG/100ML; MG/100ML
INJECTION, SOLUTION INTRAVENOUS CONTINUOUS PRN
Status: DISCONTINUED | OUTPATIENT
Start: 2020-10-22 | End: 2020-10-22

## 2020-10-22 RX ORDER — CALCIUM CHLORIDE 100 MG/ML
INJECTION INTRAVENOUS; INTRAVENTRICULAR PRN
Status: DISCONTINUED | OUTPATIENT
Start: 2020-10-22 | End: 2020-10-22

## 2020-10-22 RX ORDER — LIDOCAINE 40 MG/G
CREAM TOPICAL
Status: DISCONTINUED | OUTPATIENT
Start: 2020-10-22 | End: 2020-10-28 | Stop reason: HOSPADM

## 2020-10-22 RX ORDER — SODIUM CHLORIDE, SODIUM LACTATE, POTASSIUM CHLORIDE, CALCIUM CHLORIDE 600; 310; 30; 20 MG/100ML; MG/100ML; MG/100ML; MG/100ML
INJECTION, SOLUTION INTRAVENOUS CONTINUOUS
Status: DISCONTINUED | OUTPATIENT
Start: 2020-10-22 | End: 2020-10-22 | Stop reason: HOSPADM

## 2020-10-22 RX ORDER — SODIUM CHLORIDE, SODIUM LACTATE, POTASSIUM CHLORIDE, CALCIUM CHLORIDE 600; 310; 30; 20 MG/100ML; MG/100ML; MG/100ML; MG/100ML
INJECTION, SOLUTION INTRAVENOUS CONTINUOUS PRN
Status: DISCONTINUED | OUTPATIENT
Start: 2020-10-22 | End: 2020-10-22

## 2020-10-22 RX ORDER — OXYCODONE HYDROCHLORIDE 5 MG/1
5 TABLET ORAL EVERY 4 HOURS
Status: DISCONTINUED | OUTPATIENT
Start: 2020-10-22 | End: 2020-10-23

## 2020-10-22 RX ORDER — KETAMINE HYDROCHLORIDE 10 MG/ML
INJECTION INTRAMUSCULAR; INTRAVENOUS PRN
Status: DISCONTINUED | OUTPATIENT
Start: 2020-10-22 | End: 2020-10-22

## 2020-10-22 RX ORDER — FENTANYL CITRATE 50 UG/ML
INJECTION, SOLUTION INTRAMUSCULAR; INTRAVENOUS PRN
Status: DISCONTINUED | OUTPATIENT
Start: 2020-10-22 | End: 2020-10-22

## 2020-10-22 RX ORDER — ONDANSETRON 2 MG/ML
INJECTION INTRAMUSCULAR; INTRAVENOUS PRN
Status: DISCONTINUED | OUTPATIENT
Start: 2020-10-22 | End: 2020-10-22

## 2020-10-22 RX ORDER — AMOXICILLIN 250 MG
2 CAPSULE ORAL 2 TIMES DAILY
Status: DISCONTINUED | OUTPATIENT
Start: 2020-10-22 | End: 2020-10-26

## 2020-10-22 RX ORDER — FENTANYL CITRATE 50 UG/ML
25-50 INJECTION, SOLUTION INTRAMUSCULAR; INTRAVENOUS
Status: DISCONTINUED | OUTPATIENT
Start: 2020-10-22 | End: 2020-10-22 | Stop reason: HOSPADM

## 2020-10-22 RX ORDER — FLUMAZENIL 0.1 MG/ML
0.2 INJECTION, SOLUTION INTRAVENOUS
Status: DISCONTINUED | OUTPATIENT
Start: 2020-10-22 | End: 2020-10-22 | Stop reason: HOSPADM

## 2020-10-22 RX ORDER — LIDOCAINE HYDROCHLORIDE 20 MG/ML
INJECTION, SOLUTION INFILTRATION; PERINEURAL PRN
Status: DISCONTINUED | OUTPATIENT
Start: 2020-10-22 | End: 2020-10-22

## 2020-10-22 RX ORDER — NALBUPHINE HYDROCHLORIDE 10 MG/ML
2.5-5 INJECTION, SOLUTION INTRAMUSCULAR; INTRAVENOUS; SUBCUTANEOUS EVERY 6 HOURS PRN
Status: DISCONTINUED | OUTPATIENT
Start: 2020-10-22 | End: 2020-10-23

## 2020-10-22 RX ORDER — NALOXONE HYDROCHLORIDE 0.4 MG/ML
.1-.4 INJECTION, SOLUTION INTRAMUSCULAR; INTRAVENOUS; SUBCUTANEOUS
Status: DISCONTINUED | OUTPATIENT
Start: 2020-10-22 | End: 2020-10-22 | Stop reason: HOSPADM

## 2020-10-22 RX ORDER — CEFAZOLIN SODIUM 2 G/100ML
2 INJECTION, SOLUTION INTRAVENOUS
Status: COMPLETED | OUTPATIENT
Start: 2020-10-22 | End: 2020-10-22

## 2020-10-22 RX ORDER — HYDROMORPHONE HYDROCHLORIDE 1 MG/ML
.3-.5 INJECTION, SOLUTION INTRAMUSCULAR; INTRAVENOUS; SUBCUTANEOUS EVERY 5 MIN PRN
Status: DISCONTINUED | OUTPATIENT
Start: 2020-10-22 | End: 2020-10-22 | Stop reason: HOSPADM

## 2020-10-22 RX ORDER — HEPARIN SODIUM 5000 [USP'U]/.5ML
5000 INJECTION, SOLUTION INTRAVENOUS; SUBCUTANEOUS EVERY 8 HOURS
Status: DISCONTINUED | OUTPATIENT
Start: 2020-10-23 | End: 2020-10-23

## 2020-10-22 RX ORDER — DEXAMETHASONE SODIUM PHOSPHATE 4 MG/ML
INJECTION, SOLUTION INTRA-ARTICULAR; INTRALESIONAL; INTRAMUSCULAR; INTRAVENOUS; SOFT TISSUE PRN
Status: DISCONTINUED | OUTPATIENT
Start: 2020-10-22 | End: 2020-10-22

## 2020-10-22 RX ORDER — CEFAZOLIN SODIUM 1 G/3ML
1 INJECTION, POWDER, FOR SOLUTION INTRAMUSCULAR; INTRAVENOUS SEE ADMIN INSTRUCTIONS
Status: DISCONTINUED | OUTPATIENT
Start: 2020-10-22 | End: 2020-10-22 | Stop reason: HOSPADM

## 2020-10-22 RX ORDER — EPHEDRINE SULFATE 50 MG/ML
INJECTION, SOLUTION INTRAMUSCULAR; INTRAVENOUS; SUBCUTANEOUS PRN
Status: DISCONTINUED | OUTPATIENT
Start: 2020-10-22 | End: 2020-10-22

## 2020-10-22 RX ORDER — PROPOFOL 10 MG/ML
INJECTION, EMULSION INTRAVENOUS PRN
Status: DISCONTINUED | OUTPATIENT
Start: 2020-10-22 | End: 2020-10-22

## 2020-10-22 RX ADMIN — Medication 1 G: at 14:19

## 2020-10-22 RX ADMIN — GLYCOPYRROLATE 0.2 MG: 0.2 INJECTION, SOLUTION INTRAMUSCULAR; INTRAVENOUS at 14:17

## 2020-10-22 RX ADMIN — Medication 2 G: at 08:20

## 2020-10-22 RX ADMIN — Medication 10 MG: at 08:24

## 2020-10-22 RX ADMIN — ONDANSETRON 4 MG: 2 INJECTION INTRAMUSCULAR; INTRAVENOUS at 07:25

## 2020-10-22 RX ADMIN — BUPIVACAINE HYDROCHLORIDE 8 ML/HR: 7.5 INJECTION, SOLUTION EPIDURAL; RETROBULBAR at 10:06

## 2020-10-22 RX ADMIN — CALCIUM CHLORIDE 500 MG: 100 INJECTION INTRAVENOUS; INTRAVENTRICULAR at 10:56

## 2020-10-22 RX ADMIN — SODIUM CHLORIDE, SODIUM GLUCONATE, SODIUM ACETATE, POTASSIUM CHLORIDE AND MAGNESIUM CHLORIDE: 526; 502; 368; 37; 30 INJECTION, SOLUTION INTRAVENOUS at 07:49

## 2020-10-22 RX ADMIN — Medication 10 MG: at 12:06

## 2020-10-22 RX ADMIN — Medication 1 G: at 10:20

## 2020-10-22 RX ADMIN — SUGAMMADEX 200 MG: 100 INJECTION, SOLUTION INTRAVENOUS at 15:40

## 2020-10-22 RX ADMIN — ONDANSETRON 4 MG: 2 INJECTION INTRAMUSCULAR; INTRAVENOUS at 15:11

## 2020-10-22 RX ADMIN — ROCURONIUM BROMIDE 30 MG: 10 INJECTION INTRAVENOUS at 14:16

## 2020-10-22 RX ADMIN — LIDOCAINE HYDROCHLORIDE,EPINEPHRINE BITARTRATE 1.5 ML: 15; .005 INJECTION, SOLUTION EPIDURAL; INFILTRATION; INTRACAUDAL; PERINEURAL at 07:05

## 2020-10-22 RX ADMIN — ROCURONIUM BROMIDE 30 MG: 10 INJECTION INTRAVENOUS at 10:10

## 2020-10-22 RX ADMIN — FENTANYL CITRATE 50 MCG: 50 INJECTION, SOLUTION INTRAMUSCULAR; INTRAVENOUS at 09:08

## 2020-10-22 RX ADMIN — FENTANYL CITRATE 50 MCG: 50 INJECTION, SOLUTION INTRAMUSCULAR; INTRAVENOUS at 06:54

## 2020-10-22 RX ADMIN — HYDROMORPHONE HYDROCHLORIDE 0.5 MG: 1 INJECTION, SOLUTION INTRAMUSCULAR; INTRAVENOUS; SUBCUTANEOUS at 15:54

## 2020-10-22 RX ADMIN — SODIUM CHLORIDE, POTASSIUM CHLORIDE, SODIUM LACTATE AND CALCIUM CHLORIDE: 600; 310; 30; 20 INJECTION, SOLUTION INTRAVENOUS at 16:44

## 2020-10-22 RX ADMIN — Medication 20 MG: at 08:33

## 2020-10-22 RX ADMIN — DEXAMETHASONE SODIUM PHOSPHATE 4 MG: 4 INJECTION, SOLUTION INTRA-ARTICULAR; INTRALESIONAL; INTRAMUSCULAR; INTRAVENOUS; SOFT TISSUE at 07:54

## 2020-10-22 RX ADMIN — PHENYLEPHRINE HYDROCHLORIDE 100 MCG: 10 INJECTION INTRAVENOUS at 07:46

## 2020-10-22 RX ADMIN — DOCUSATE SODIUM 50 MG AND SENNOSIDES 8.6 MG 2 TABLET: 8.6; 5 TABLET, FILM COATED ORAL at 20:06

## 2020-10-22 RX ADMIN — LIDOCAINE HYDROCHLORIDE 100 MG: 20 INJECTION, SOLUTION INFILTRATION; PERINEURAL at 07:37

## 2020-10-22 RX ADMIN — PHENYLEPHRINE HYDROCHLORIDE 0.5 MCG/KG/MIN: 10 INJECTION INTRAVENOUS at 13:55

## 2020-10-22 RX ADMIN — OXYCODONE HYDROCHLORIDE 5 MG: 5 TABLET ORAL at 20:06

## 2020-10-22 RX ADMIN — ROCURONIUM BROMIDE 20 MG: 10 INJECTION INTRAVENOUS at 11:29

## 2020-10-22 RX ADMIN — HYDROMORPHONE HYDROCHLORIDE 0.3 MG: 1 INJECTION, SOLUTION INTRAMUSCULAR; INTRAVENOUS; SUBCUTANEOUS at 17:43

## 2020-10-22 RX ADMIN — Medication 10 MG: at 13:25

## 2020-10-22 RX ADMIN — PHENYLEPHRINE HYDROCHLORIDE 150 MCG: 10 INJECTION INTRAVENOUS at 08:16

## 2020-10-22 RX ADMIN — ROCURONIUM BROMIDE 100 MG: 10 INJECTION INTRAVENOUS at 07:39

## 2020-10-22 RX ADMIN — MIDAZOLAM 2 MG: 1 INJECTION INTRAMUSCULAR; INTRAVENOUS at 06:54

## 2020-10-22 RX ADMIN — HEPARIN SODIUM 5000 UNITS: 5000 INJECTION, SOLUTION INTRAVENOUS; SUBCUTANEOUS at 08:22

## 2020-10-22 RX ADMIN — SODIUM CHLORIDE, POTASSIUM CHLORIDE, SODIUM LACTATE AND CALCIUM CHLORIDE: 600; 310; 30; 20 INJECTION, SOLUTION INTRAVENOUS at 07:30

## 2020-10-22 RX ADMIN — FENTANYL CITRATE 100 MCG: 50 INJECTION, SOLUTION INTRAMUSCULAR; INTRAVENOUS at 07:37

## 2020-10-22 RX ADMIN — PHENYLEPHRINE HYDROCHLORIDE 100 MCG: 10 INJECTION INTRAVENOUS at 10:38

## 2020-10-22 RX ADMIN — Medication 10 MG: at 10:32

## 2020-10-22 RX ADMIN — PHENYLEPHRINE HYDROCHLORIDE 200 MCG: 10 INJECTION INTRAVENOUS at 07:56

## 2020-10-22 RX ADMIN — Medication 1 G: at 12:19

## 2020-10-22 RX ADMIN — PHENYLEPHRINE HYDROCHLORIDE 50 MCG: 10 INJECTION INTRAVENOUS at 11:46

## 2020-10-22 RX ADMIN — PHENYLEPHRINE HYDROCHLORIDE 50 MCG: 10 INJECTION INTRAVENOUS at 10:44

## 2020-10-22 RX ADMIN — PROPOFOL 200 MG: 10 INJECTION, EMULSION INTRAVENOUS at 07:38

## 2020-10-22 RX ADMIN — FENTANYL CITRATE 50 MCG: 50 INJECTION, SOLUTION INTRAMUSCULAR; INTRAVENOUS at 08:45

## 2020-10-22 RX ADMIN — ROCURONIUM BROMIDE 20 MG: 10 INJECTION INTRAVENOUS at 12:45

## 2020-10-22 RX ADMIN — PHENYLEPHRINE HYDROCHLORIDE 0.1 MCG/KG/MIN: 10 INJECTION INTRAVENOUS at 10:50

## 2020-10-22 ASSESSMENT — ACTIVITIES OF DAILY LIVING (ADL): ADLS_ACUITY_SCORE: 12

## 2020-10-22 ASSESSMENT — MIFFLIN-ST. JEOR: SCORE: 1358.88

## 2020-10-22 ASSESSMENT — COPD QUESTIONNAIRES: COPD: 0

## 2020-10-22 ASSESSMENT — LIFESTYLE VARIABLES: TOBACCO_USE: 1

## 2020-10-22 NOTE — OR NURSING
Thoracic epidural placed with 2 mg midazolam and 50 mcg fentanyl. Patient tolerated procedure well, no immediate complications noted (test dose given at 0705), and patient is vitally stable. Will continue to monitor until transfer to OR.

## 2020-10-22 NOTE — ANESTHESIA CARE TRANSFER NOTE
Patient: Ijeoma Shah    Procedure(s):  Left thoracoscopic lobectomy converted to Left Thoracotomy, Medistinal lymph node dissection, Pulmonary Artery repair, flexible bronchoscopy, on-pump oxygenator on standy-by  Transcervical extended mediastinal lymphadenectomy  Thoracotomy    Diagnosis: Mass of left lung [R91.8]  Diagnosis Additional Information: No value filed.    Anesthesia Type:   General     Note:  Airway :Nasal Cannula  Patient transferred to:PACU  Handoff Report: Identifed the Patient, Identified the Reponsible Provider, Reviewed the pertinent medical history, Discussed the surgical course, Reviewed Intra-OP anesthesia mangement and issues during anesthesia, Set expectations for post-procedure period and Allowed opportunity for questions and acknowledgement of understanding      Vitals: (Last set prior to Anesthesia Care Transfer)    CRNA VITALS  10/22/2020 1523 - 10/22/2020 1556      10/22/2020             Pulse:  104    SpO2:  98 %                Electronically Signed By: Charles Patrick Schlatter, APRN CRNA  October 22, 2020  3:56 PM

## 2020-10-22 NOTE — ANESTHESIA PROCEDURE NOTES
Epidural Procedure Note      Staff -   Anesthesiologist:  Caden Jones MD  Resident/Fellow: Jameel Main DO  Performed By: with residents  Procedure performed by resident/CRNA in presence of a teaching physician.      Location: Pre-op     Procedure start time:  10/22/2020 6:50 AM     Procedure end time:  10/22/2020 7:06 AM   Pre-procedure checklist:   patient identified, IV checked, site marked, risks and benefits discussed, informed consent, monitors and equipment checked, pre-op evaluation, at physician/surgeon's request and post-op pain management      Correct Patient: Yes      Correct Position: Yes      Correct Site: Yes      Correct Procedure: Yes      Correct Laterality:  Yes    Site Marked:  Yes  Procedure:     Procedure:  Epidural catheter    ASA:  3    Diagnosis:  Analgesia    Position:  Right lateral decubitus    Sterile Prep: chloraprep      Insertion site:  T6-7    Local skin infiltration:  1% lidocaine    amount (mL):  4    Approach:  Left paramedian    Needle gauge (G):  17    Needle Length (in):  3.5    Block Needle Type:  Touhy    Injection Technique:  LORT saline    JAMIL at (cm):  7.5    Attempts:  2    Redirects:  2    Catheter gauge (G):  19    Catheter threaded easily: Yes      Threaded to cm at skin:  11    Threaded in epidural space (cm):  3.5    Paresthesias:  No    Aspiration negative for Heme or CSF: Yes       Local anesthetic:  Lidocaine 1.5% w/ 1:200,000 epinephrine    Test dose time:  07:05    Test dose negative for signs of intravascular, subdural or intrathecal injection: Yes

## 2020-10-22 NOTE — ANESTHESIA PROCEDURE NOTES
Central Line Procedure Note      Staff -   Anesthesiologist:  Prateek Webster MD  Performed By: anesthesiologist  Location: In OR after induction  Procedure Start/Stop Times:     patient identified      Correct Patient: Yes    Line Placement:     Procedure:  Central Line    Insertion laterality:  Right    Insertion site: brachial.    Sterility preparation included the following: hand hygiene performed prior to central venous catheter insertion, maximum sterile barriers were used: cap, mask, sterile gown, sterile gloves, and large sterile sheet, antiseptic used during central venous catheter insertion and skin prep agent completely dried prior to procedure         Injection Technique:  Ultrasound guided    Vein evaluated via U/S for patency/adequacy of catheter insertion and is adequate.  Using realtime U/S imaging the vein was punctured, and needle was observed entering vein on U/S      Permanent Image entered into patient's record      Local skin infiltration:  None    Catheter size:  9 Fr, 10 cm, Introducer    Cath secured with: suture      Dressing:  Tegaderm and Biopatch    Blood aspirated all lumens: Yes      All Lumens Flushed: Yes  {

## 2020-10-22 NOTE — OR NURSING
Paged Dr. Abram Mabry for Transfer Patient order to [facility]. He returned page concerning BP parameters. OK with current readings.

## 2020-10-22 NOTE — PROGRESS NOTES
"  Thoracic Surgery Progress Note  Surgery Cross-Cover  Post Op Check    10/22/2020    Ijeoma Shah is a 70 year old female with h/o SCC of LLL now POD#0 s/p left thorascopic lobectomy converted left thoracotomy, medistinal lymph node dissection, pulmonary artery repair, flexible bronchoscopy, transcervical extended mediastinal lymphadenectomy.    Pt reports no complaints. Pain controlled. Denies SOB, chest pain, or dizziness. Nursing reinforced dependent portion of chest tube dressing due to saturations. Also reinforced posterior aspect of left thoracotomy incision for slight oozing. Chest tube without air leak. BP soft, last 100/72.     Chest tube currently without air leak; on water seal, will continue to monitor.    BP 99/59   Pulse 84   Temp 98.4  F (36.9  C) (Axillary)   Resp 16   Ht 1.651 m (5' 5\")   Wt 83.3 kg (183 lb 10.3 oz)   SpO2 98%   BMI 30.56 kg/m      Gen: A&O x3, NAD  Chest: breathing non-labored on 2L NC  CV: RRR, no MRG  Abdomen: soft, non-tender, non-distended  Incision: left thoracotomy incision c/d/i with minimal oozing from posterior aspect, L chest tube site saturated on dependent portion  Extremities: warm and well perfused    A/P: No acute post-op issues. Given surgical complexity, will closely monitor BP and chest tube function.     Continue plan of care per primary team. Please call with any questions.    Tai Anand MD  Surgery PGY-1      "

## 2020-10-22 NOTE — BRIEF OP NOTE
[facility]     Brief Operative Note    Pre-operative diagnosis: Mass of left lung [R91.8]    Post-operative diagnosis Same as pre-operative diagnosis    Procedure:  Left Thoracoscopic Lobectomy Converted to Left Thoracotomy, Medistinal Lymph Node Dissection, Pulmonary Artery Repair, Flexible Bronchoscopy, On-Pump Oxygenator on Stand-By,   Transcervical Extended Mediastinal Lymphadenectomy    Surgeon: Surgeon(s) and Role:     * Andrea Sanabria MD - Primary     * Kurt Davis MD - Assisting    Anesthesia: Combined General with Epidural      Estimated blood loss: 300 ml    Drains: 28 Fr Left Apical Pleural Drain to Water Seal    Specimens:   ID Type Source Tests Collected by Time Destination   A : 4R Tissue Lymph Node SURGICAL PATHOLOGY EXAM Andrea Sanabria MD 10/22/2020  8:59 AM    B : 4L  Tissue Lymph Node SURGICAL PATHOLOGY EXAM Andrea Sanabria MD 10/22/2020  9:11 AM    C : level 7 Tissue Lymph Node SURGICAL PATHOLOGY EXAM Andrea Sanabria MD 10/22/2020  9:21 AM    D : 9L Tissue Lymph Node SURGICAL PATHOLOGY EXAM Andrea Sanabria MD 10/22/2020 11:11 AM    E : 11L Tissue Lymph Node SURGICAL PATHOLOGY EXAM Andrea Sanabria MD 10/22/2020 12:30 PM    F : Left Lower Lobectomy Tissue Lung, Left Lower Lobe SURGICAL PATHOLOGY EXAM Andrea Sanabria MD 10/22/2020  2:46 PM    G : level 5 Tissue Lymph Node SURGICAL PATHOLOGY EXAM Andrea Sanabria MD 10/22/2020  2:54 PM      Findings: TEMLA with negative level 4L, 4R and 7 stations. VATS converted to thoracotomy. Significant inflammation, desmoplastic reaction to proximal left basilar pulmonary artery trunk requiring arterioplasty of pulmonary artery with cardiopulmonary bypass standby.     Complications: None    Implants: None    Abram Mabry MD  Cardiothoracic Surgery Fellow  [phone]

## 2020-10-22 NOTE — OR NURSING
R brachial cortis removed by Dr Mik CHAHAL, gauze/transparent dressing applied after pressure held for approx 5 min.

## 2020-10-22 NOTE — ANESTHESIA PROCEDURE NOTES
Arterial Line Procedure Note      Staff -   Anesthesiologist:  Prateek Webster MD  Performed By: anesthesiologist    Location: In OR After Induction  Procedure Start/Stop Times:     patient identified      Correct Patient: Yes    Line Placement:     Procedure:  Arterial Line    Insertion Site:  Radial    Local skin infiltration:  None    Ultrasound Guided?: No      Catheter size:  20 gauge, Quick cath    Cath secured with: suture      Dressing:  Tegaderm    Complications:  None obvious    Arterial waveform: Yes      IBP within 10% of NIBP: Yes

## 2020-10-22 NOTE — ANESTHESIA POSTPROCEDURE EVALUATION
Anesthesia POST Procedure Evaluation    Patient: Ijeoma Shah   MRN:     [id number] Gender:   female   Age:    70 year old :      1950        Preoperative Diagnosis: Mass of left lung [R91.8]   Procedure(s):  Left thoracoscopic lobectomy converted to Left Thoracotomy, Medistinal lymph node dissection, Pulmonary Artery repair, flexible bronchoscopy, on-pump oxygenator on standy-by  Transcervical extended mediastinal lymphadenectomy  Thoracotomy   Postop Comments: No value filed.     Anesthesia Type: General       Disposition: Admission   Postop Pain Control: Uneventful            Sign Out: Well controlled pain   PONV: No   Neuro/Psych: Uneventful            Sign Out: Acceptable/Baseline neuro status   Airway/Respiratory: Uneventful            Sign Out: Acceptable/Baseline resp. status   CV/Hemodynamics: Uneventful            Sign Out: Acceptable CV status   Other NRE: NONE   DID A NON-ROUTINE EVENT OCCUR? No         Last Anesthesia Record Vitals:  CRNA VITALS  10/22/2020 1523 - 10/22/2020 1623      10/22/2020             EKG:  Sinus rhythm          Last PACU Vitals:  Vitals Value Taken Time   /65 10/22/20 1830   Temp 36.1  C (97  F) 10/22/20 1805   Pulse 89 10/22/20 1838   Resp 16 10/22/20 1830   SpO2 98 % 10/22/20 1838   Temp src     NIBP     Pulse     SpO2     Resp     Temp     Ht Rate     Temp 2     Vitals shown include unvalidated device data.      Electronically Signed By: Justin Houser MD, 2020, 6:39 PM

## 2020-10-22 NOTE — PROGRESS NOTES
Spiritual Health Services Progress Note  [facility], [address]      Provided chaplaincy care with Salima and her  (Richard) prior to her surgery. Her [facility] is important for coping. Prayer was shared. She is open to follow-up during her admission. I have no plan for follow-up today.     Chaplain Ajay Torre, MPH, MDiv, [facility]   [phone]

## 2020-10-22 NOTE — ANESTHESIA PREPROCEDURE EVALUATION
"Anesthesia Pre-Procedure Evaluation    Patient: Ijeoma Shah   MRN:     [id number] Gender:   female   Age:    70 year old :      1950        Preoperative Diagnosis: Mass of left lung [R91.8]   Procedure(s):  Left thoracoscopic lobectomy  Transcervical extended mediastinal lymphadenectomy  Possible thoracotomy     LABS:  CBC:   Lab Results   Component Value Date    WBC 9.8 2020    WBC 7.7 2013    HGB 13.1 2020    HGB 14.9 2013    HCT 39.2 2020    HCT 42.4 2013     2020     2013     BMP:   Lab Results   Component Value Date     2020     2020    POTASSIUM 4.1 2020    POTASSIUM 4.2 2020    CHLORIDE 103 2020    CHLORIDE 104 2020    CO2 33 (H) 2020    CO2 31 2020    BUN 10 2020    BUN 12 2020    CR 0.75 2020    CR 0.74 2020    GLC 99 2020     (H) 2020     COAGS: No results found for: PTT, INR, FIBR  POC:   Lab Results   Component Value Date    BGM 98 10/22/2020     OTHER:   Lab Results   Component Value Date    SABINO 9.4 2020    ALBUMIN 3.4 2020    PROTTOTAL 7.5 2020    ALT 18 2020    AST 19 2020    ALKPHOS 106 2020    BILITOTAL 0.3 2020    TSH 0.62 2020        Preop Vitals    BP Readings from Last 3 Encounters:   10/22/20 132/77   10/06/20 138/84   10/01/20 116/78    Pulse Readings from Last 3 Encounters:   10/22/20 64   10/06/20 100   10/01/20 64      Resp Readings from Last 3 Encounters:   10/22/20 16   10/06/20 24   10/01/20 22    SpO2 Readings from Last 3 Encounters:   10/22/20 94%   10/06/20 97%   10/01/20 97%      Temp Readings from Last 1 Encounters:   10/22/20 36.6  C (97.9  F)    Ht Readings from Last 1 Encounters:   10/22/20 1.651 m (5' 5\")      Wt Readings from Last 1 Encounters:   10/22/20 83.3 kg (183 lb 10.3 oz)    Estimated body mass index is 30.56 kg/m  as calculated from the " "following:    Height as of this encounter: 1.651 m (5' 5\").    Weight as of this encounter: 83.3 kg (183 lb 10.3 oz).     LDA:  Peripheral IV 10/01/20 Anterior;Left Wrist (Active)   Number of days: 21       Peripheral IV 10/22/20 Left Hand (Active)   Number of days: 0        Past Medical History:   Diagnosis Date     Arrhythmia     A-Fib     Lung cancer (H)      Simple chronic bronchitis (H)       Past Surgical History:   Procedure Laterality Date     ARTHROEREISIS, SUBTALAR       BRONCHOSCOPY RIGID OR FLEXIBLE W/TRANSENDOSCOPIC ENDOBRONCHIAL ULTRASOUND GUIDED N/A 7/27/2020    Procedure: Flexible bronchoscopy, endobronchial ultrasound with transbronchial biopsies;  Surgeon: Edin Castro MD;  Location: [facility] OR     CATARACT IOL, RT/LT Bilateral      COLONOSCOPY  6/29/04    colonoscopy to the cecum     ESOPHAGOSCOPY, GASTROSCOPY, DUODENOSCOPY (EGD), COMBINED N/A 9/1/2020    Procedure: ESOPHAGOGASTRODUODENOSCOPY, WITH FINE NEEDLE ASPIRATION BIOPSY, WITH ENDOSCOPIC ULTRASOUND GUIDANCE;  Surgeon: Barber Hogan MD;  Location:  GI     SURGICAL HISTORY OF -       nose surgery     TUBAL LIGATION      bilateral tubal ligation.  BTL      Allergies   Allergen Reactions     Bee Venom Hives     Hot and sweating         Anesthesia Evaluation     . Pt has had prior anesthetic. Type: General    No history of anesthetic complications          ROS/MED HX    ENT/Pulmonary:     (+)tobacco use, Past use 0.5 packs/day  , . .   (-) asthma, COPD, ALBERTO risk factors and recent URI   Neurologic:  - neg neurologic ROS     Cardiovascular: Comment: A fib with RVR when presented for last surgery. NSR today. Started on metop. No AC until after surgery. Nl TTE. Saw cariology and is cleared.    (+) ----. : . . . :. . Previous cardiac testing Echodate:10.16.20results:Interpretation Summary     The left ventricle is normal in size. Left ventricular systolic function is  normal. The visual ejection fraction is estimated at 55-60%. Left " ventricular  diastolic function is normal. No regional wall motion abnormalities noted.  The right ventricle is normal size. The right ventricular systolic function is  normal.  Trace mitral and tricuspid regurgitation.  No pericardial effusion.  No previous study for comparison.date: results:ECG reviewed date:10.6.20 results:nsr date: results:         (-) CAD   METS/Exercise Tolerance:  >4 METS   Hematologic:  - neg hematologic  ROS      (-) history of blood clots and History of Transfusion   Musculoskeletal:  - neg musculoskeletal ROS       GI/Hepatic:  - neg GI/hepatic ROS      (-) GERD   Renal/Genitourinary:  - ROS Renal section negative       Endo:  - neg endo ROS       Psychiatric:  - neg psychiatric ROS       Infectious Disease:  - neg infectious disease ROS      (-) Recent Fever   Malignancy:   (+) Malignancy History of Lung  Lung CA Active status post.         Other:    (+) No chance of pregnancy no H/O Chronic Pain,  - neg other ROS                     PHYSICAL EXAM:   Mental Status/Neuro:    Airway: Facies: Feasible  Mallampati: II  Mouth/Opening: Full  TM distance: > 6 cm  Neck ROM: Full   Respiratory: Auscultation: CTAB      CV: Rhythm: Regular   Comments:      Dental: Normal Dentition                Assessment:   ASA SCORE: 3    H&P: History and physical reviewed and following examination; no interval change.   Smoking Status:  Non-Smoker/Unknown   NPO Status: NPO Appropriate     Plan:   Anes. Type:  General   Pre-Medication: None   Induction:  IV (Standard)   Airway: TIM   Access/Monitoring: PIV; 2nd PIV; A-Line   Maintenance: Balanced     Fluid/Blood: T&S active, blood available.     Postop Plan:   Postop Pain: Opioids; Regional; Ketamine  Postop Sedation/Airway: Not planned  Disposition: Inpatient/Admit     PONV Management:   Adult Risk Factors: Female, Non-Smoker, Postop Opioids   Prevention: Ondansetron, Dexamethasone     CONSENT: Direct conversation   Plan and risks discussed with: Patient    Blood Products: Consented (ALL Blood Products)       Comments for Plan/Consent:  Risk of mace, stroke, PN injury, muscle soreness from lateral positioning, blood tx, chipped tooth, bruised lip, sore throat all discussed.     TIM, R radial a line, 2 IV, LE IV per surgeon, epidural.                 Prateek Webster MD

## 2020-10-23 ENCOUNTER — APPOINTMENT (OUTPATIENT)
Dept: GENERAL RADIOLOGY | Facility: CLINIC | Age: 70
DRG: 163 | End: 2020-10-23
Attending: THORACIC SURGERY (CARDIOTHORACIC VASCULAR SURGERY)
Payer: MEDICARE

## 2020-10-23 LAB
ANION GAP SERPL CALCULATED.3IONS-SCNC: 1 MMOL/L (ref 3–14)
ANION GAP SERPL CALCULATED.3IONS-SCNC: 5 MMOL/L (ref 3–14)
BUN SERPL-MCNC: 10 MG/DL (ref 7–30)
BUN SERPL-MCNC: 9 MG/DL (ref 7–30)
CALCIUM SERPL-MCNC: 7.2 MG/DL (ref 8.5–10.1)
CALCIUM SERPL-MCNC: 7.9 MG/DL (ref 8.5–10.1)
CHLORIDE SERPL-SCNC: 101 MMOL/L (ref 94–109)
CHLORIDE SERPL-SCNC: 103 MMOL/L (ref 94–109)
CO2 SERPL-SCNC: 26 MMOL/L (ref 20–32)
CO2 SERPL-SCNC: 32 MMOL/L (ref 20–32)
CREAT SERPL-MCNC: 0.62 MG/DL (ref 0.52–1.04)
CREAT SERPL-MCNC: 0.69 MG/DL (ref 0.52–1.04)
ERYTHROCYTE [DISTWIDTH] IN BLOOD BY AUTOMATED COUNT: 13.1 % (ref 10–15)
ERYTHROCYTE [DISTWIDTH] IN BLOOD BY AUTOMATED COUNT: 13.2 % (ref 10–15)
GFR SERPL CREATININE-BSD FRML MDRD: 89 ML/MIN/{1.73_M2}
GFR SERPL CREATININE-BSD FRML MDRD: >90 ML/MIN/{1.73_M2}
GLUCOSE BLDC GLUCOMTR-MCNC: 107 MG/DL (ref 70–99)
GLUCOSE BLDC GLUCOMTR-MCNC: 117 MG/DL (ref 70–99)
GLUCOSE BLDC GLUCOMTR-MCNC: 130 MG/DL (ref 70–99)
GLUCOSE SERPL-MCNC: 125 MG/DL (ref 70–99)
GLUCOSE SERPL-MCNC: 160 MG/DL (ref 70–99)
HBA1C MFR BLD: 5.6 % (ref 0–5.6)
HCT VFR BLD AUTO: 29.4 % (ref 35–47)
HCT VFR BLD AUTO: 30.6 % (ref 35–47)
HGB BLD-MCNC: 9.3 G/DL (ref 11.7–15.7)
HGB BLD-MCNC: 9.8 G/DL (ref 11.7–15.7)
LMWH PPP CHRO-ACNC: 0.78 IU/ML
MAGNESIUM SERPL-MCNC: 1.9 MG/DL (ref 1.6–2.3)
MAGNESIUM SERPL-MCNC: 2 MG/DL (ref 1.6–2.3)
MCH RBC QN AUTO: 27.9 PG (ref 26.5–33)
MCH RBC QN AUTO: 28.5 PG (ref 26.5–33)
MCHC RBC AUTO-ENTMCNC: 31.6 G/DL (ref 31.5–36.5)
MCHC RBC AUTO-ENTMCNC: 32 G/DL (ref 31.5–36.5)
MCV RBC AUTO: 88 FL (ref 78–100)
MCV RBC AUTO: 89 FL (ref 78–100)
PLATELET # BLD AUTO: 233 10E9/L (ref 150–450)
PLATELET # BLD AUTO: 241 10E9/L (ref 150–450)
POTASSIUM SERPL-SCNC: 4 MMOL/L (ref 3.4–5.3)
POTASSIUM SERPL-SCNC: 4.3 MMOL/L (ref 3.4–5.3)
RBC # BLD AUTO: 3.33 10E12/L (ref 3.8–5.2)
RBC # BLD AUTO: 3.44 10E12/L (ref 3.8–5.2)
SODIUM SERPL-SCNC: 134 MMOL/L (ref 133–144)
SODIUM SERPL-SCNC: 135 MMOL/L (ref 133–144)
WBC # BLD AUTO: 10.7 10E9/L (ref 4–11)
WBC # BLD AUTO: 9.1 10E9/L (ref 4–11)

## 2020-10-23 PROCEDURE — 71045 X-RAY EXAM CHEST 1 VIEW: CPT

## 2020-10-23 PROCEDURE — 250N000011 HC RX IP 250 OP 636: Performed by: STUDENT IN AN ORGANIZED HEALTH CARE EDUCATION/TRAINING PROGRAM

## 2020-10-23 PROCEDURE — 258N000003 HC RX IP 258 OP 636

## 2020-10-23 PROCEDURE — 250N000013 HC RX MED GY IP 250 OP 250 PS 637

## 2020-10-23 PROCEDURE — 93005 ELECTROCARDIOGRAM TRACING: CPT

## 2020-10-23 PROCEDURE — 250N000011 HC RX IP 250 OP 636

## 2020-10-23 PROCEDURE — 36569 INSJ PICC 5 YR+ W/O IMAGING: CPT

## 2020-10-23 PROCEDURE — 999N000127 HC STATISTIC PERIPHERAL IV START W US GUIDANCE

## 2020-10-23 PROCEDURE — 200N000002 HC R&B ICU UMMC

## 2020-10-23 PROCEDURE — 36415 COLL VENOUS BLD VENIPUNCTURE: CPT | Performed by: THORACIC SURGERY (CARDIOTHORACIC VASCULAR SURGERY)

## 2020-10-23 PROCEDURE — 250N000011 HC RX IP 250 OP 636: Performed by: INTERNAL MEDICINE

## 2020-10-23 PROCEDURE — 85520 HEPARIN ASSAY: CPT

## 2020-10-23 PROCEDURE — 80048 BASIC METABOLIC PNL TOTAL CA: CPT | Performed by: STUDENT IN AN ORGANIZED HEALTH CARE EDUCATION/TRAINING PROGRAM

## 2020-10-23 PROCEDURE — 258N000003 HC RX IP 258 OP 636: Performed by: STUDENT IN AN ORGANIZED HEALTH CARE EDUCATION/TRAINING PROGRAM

## 2020-10-23 PROCEDURE — 93010 ELECTROCARDIOGRAM REPORT: CPT | Performed by: INTERNAL MEDICINE

## 2020-10-23 PROCEDURE — 83735 ASSAY OF MAGNESIUM: CPT | Performed by: STUDENT IN AN ORGANIZED HEALTH CARE EDUCATION/TRAINING PROGRAM

## 2020-10-23 PROCEDURE — 272N000201 ZZ HC ADHESIVE SKIN CLOSURE, DERMABOND

## 2020-10-23 PROCEDURE — 99233 SBSQ HOSP IP/OBS HIGH 50: CPT | Mod: 25 | Performed by: INTERNAL MEDICINE

## 2020-10-23 PROCEDURE — 250N000013 HC RX MED GY IP 250 OP 250 PS 637: Performed by: STUDENT IN AN ORGANIZED HEALTH CARE EDUCATION/TRAINING PROGRAM

## 2020-10-23 PROCEDURE — 999N001017 HC STATISTIC GLUCOSE BY METER IP

## 2020-10-23 PROCEDURE — 258N000003 HC RX IP 258 OP 636: Performed by: INTERNAL MEDICINE

## 2020-10-23 PROCEDURE — 272N000459 ZZ HC KIT, 6 FR TL BIOFLO OPEN ENDED PICC

## 2020-10-23 PROCEDURE — 250N000011 HC RX IP 250 OP 636: Performed by: THORACIC SURGERY (CARDIOTHORACIC VASCULAR SURGERY)

## 2020-10-23 PROCEDURE — 71045 X-RAY EXAM CHEST 1 VIEW: CPT | Mod: 26 | Performed by: RADIOLOGY

## 2020-10-23 PROCEDURE — 3E043XZ INTRODUCTION OF VASOPRESSOR INTO CENTRAL VEIN, PERCUTANEOUS APPROACH: ICD-10-PCS | Performed by: THORACIC SURGERY (CARDIOTHORACIC VASCULAR SURGERY)

## 2020-10-23 PROCEDURE — 85027 COMPLETE CBC AUTOMATED: CPT | Performed by: THORACIC SURGERY (CARDIOTHORACIC VASCULAR SURGERY)

## 2020-10-23 PROCEDURE — 250N000009 HC RX 250: Performed by: STUDENT IN AN ORGANIZED HEALTH CARE EDUCATION/TRAINING PROGRAM

## 2020-10-23 PROCEDURE — 258N000003 HC RX IP 258 OP 636: Performed by: THORACIC SURGERY (CARDIOTHORACIC VASCULAR SURGERY)

## 2020-10-23 PROCEDURE — 83735 ASSAY OF MAGNESIUM: CPT | Performed by: THORACIC SURGERY (CARDIOTHORACIC VASCULAR SURGERY)

## 2020-10-23 PROCEDURE — 83036 HEMOGLOBIN GLYCOSYLATED A1C: CPT | Performed by: THORACIC SURGERY (CARDIOTHORACIC VASCULAR SURGERY)

## 2020-10-23 PROCEDURE — 80048 BASIC METABOLIC PNL TOTAL CA: CPT | Performed by: THORACIC SURGERY (CARDIOTHORACIC VASCULAR SURGERY)

## 2020-10-23 PROCEDURE — 99291 CRITICAL CARE FIRST HOUR: CPT | Mod: GC | Performed by: ANESTHESIOLOGY

## 2020-10-23 PROCEDURE — 5A2204Z RESTORATION OF CARDIAC RHYTHM, SINGLE: ICD-10-PCS | Performed by: INTERNAL MEDICINE

## 2020-10-23 PROCEDURE — 92960 CARDIOVERSION ELECTRIC EXT: CPT | Mod: 59 | Performed by: INTERNAL MEDICINE

## 2020-10-23 RX ORDER — ALBUTEROL SULFATE 0.83 MG/ML
2.5 SOLUTION RESPIRATORY (INHALATION)
Status: DISCONTINUED | OUTPATIENT
Start: 2020-10-23 | End: 2020-10-28 | Stop reason: HOSPADM

## 2020-10-23 RX ORDER — FENTANYL CITRATE 50 UG/ML
INJECTION, SOLUTION INTRAMUSCULAR; INTRAVENOUS
Status: DISCONTINUED
Start: 2020-10-23 | End: 2020-10-24 | Stop reason: HOSPADM

## 2020-10-23 RX ORDER — LIDOCAINE 4 G/G
2 PATCH TOPICAL
Status: DISCONTINUED | OUTPATIENT
Start: 2020-10-23 | End: 2020-10-23

## 2020-10-23 RX ORDER — HEPARIN SODIUM,PORCINE 10 UNIT/ML
5-10 VIAL (ML) INTRAVENOUS EVERY 24 HOURS
Status: DISCONTINUED | OUTPATIENT
Start: 2020-10-23 | End: 2020-10-28 | Stop reason: HOSPADM

## 2020-10-23 RX ORDER — NICOTINE POLACRILEX 4 MG
15-30 LOZENGE BUCCAL
Status: DISCONTINUED | OUTPATIENT
Start: 2020-10-23 | End: 2020-10-28 | Stop reason: HOSPADM

## 2020-10-23 RX ORDER — LIDOCAINE 40 MG/G
CREAM TOPICAL
Status: ACTIVE | OUTPATIENT
Start: 2020-10-23 | End: 2020-10-26

## 2020-10-23 RX ORDER — LANOLIN ALCOHOL/MO/W.PET/CERES
3 CREAM (GRAM) TOPICAL
Status: DISCONTINUED | OUTPATIENT
Start: 2020-10-23 | End: 2020-10-28 | Stop reason: HOSPADM

## 2020-10-23 RX ORDER — HYDROMORPHONE HCL IN WATER/PF 6 MG/30 ML
0.2 PATIENT CONTROLLED ANALGESIA SYRINGE INTRAVENOUS
Status: DISCONTINUED | OUTPATIENT
Start: 2020-10-23 | End: 2020-10-24 | Stop reason: DRUGHIGH

## 2020-10-23 RX ORDER — HEPARIN SODIUM,PORCINE 10 UNIT/ML
2-5 VIAL (ML) INTRAVENOUS
Status: ACTIVE | OUTPATIENT
Start: 2020-10-23 | End: 2020-10-26

## 2020-10-23 RX ORDER — FENTANYL CITRATE 50 UG/ML
100 INJECTION, SOLUTION INTRAMUSCULAR; INTRAVENOUS ONCE
Status: COMPLETED | OUTPATIENT
Start: 2020-10-23 | End: 2020-10-23

## 2020-10-23 RX ORDER — OXYCODONE HYDROCHLORIDE 5 MG/1
5-10 TABLET ORAL EVERY 4 HOURS PRN
Status: DISCONTINUED | OUTPATIENT
Start: 2020-10-23 | End: 2020-10-28 | Stop reason: HOSPADM

## 2020-10-23 RX ORDER — FENTANYL CITRATE 50 UG/ML
100 INJECTION, SOLUTION INTRAMUSCULAR; INTRAVENOUS ONCE
Status: DISCONTINUED | OUTPATIENT
Start: 2020-10-23 | End: 2020-10-24 | Stop reason: DRUGHIGH

## 2020-10-23 RX ORDER — HEPARIN SODIUM,PORCINE 10 UNIT/ML
5-10 VIAL (ML) INTRAVENOUS
Status: DISCONTINUED | OUTPATIENT
Start: 2020-10-23 | End: 2020-10-28 | Stop reason: HOSPADM

## 2020-10-23 RX ORDER — DEXTROSE MONOHYDRATE 25 G/50ML
25-50 INJECTION, SOLUTION INTRAVENOUS
Status: DISCONTINUED | OUTPATIENT
Start: 2020-10-23 | End: 2020-10-28 | Stop reason: HOSPADM

## 2020-10-23 RX ORDER — FENTANYL CITRATE 50 UG/ML
INJECTION, SOLUTION INTRAMUSCULAR; INTRAVENOUS
Status: COMPLETED
Start: 2020-10-23 | End: 2020-10-23

## 2020-10-23 RX ORDER — NOREPINEPHRINE BITARTRATE 0.06 MG/ML
0.03-0.4 INJECTION, SOLUTION INTRAVENOUS CONTINUOUS
Status: DISCONTINUED | OUTPATIENT
Start: 2020-10-23 | End: 2020-10-23

## 2020-10-23 RX ORDER — NALOXONE HYDROCHLORIDE 0.4 MG/ML
.1-.4 INJECTION, SOLUTION INTRAMUSCULAR; INTRAVENOUS; SUBCUTANEOUS
Status: DISCONTINUED | OUTPATIENT
Start: 2020-10-23 | End: 2020-10-28 | Stop reason: HOSPADM

## 2020-10-23 RX ORDER — LIDOCAINE 4 G/G
1 PATCH TOPICAL
Status: DISCONTINUED | OUTPATIENT
Start: 2020-10-24 | End: 2020-10-23

## 2020-10-23 RX ORDER — GABAPENTIN 300 MG/1
300 CAPSULE ORAL 3 TIMES DAILY
Status: DISCONTINUED | OUTPATIENT
Start: 2020-10-23 | End: 2020-10-28 | Stop reason: HOSPADM

## 2020-10-23 RX ORDER — HEPARIN SODIUM 10000 [USP'U]/100ML
0-3500 INJECTION, SOLUTION INTRAVENOUS CONTINUOUS
Status: DISPENSED | OUTPATIENT
Start: 2020-10-23 | End: 2020-10-24

## 2020-10-23 RX ADMIN — HEPARIN SODIUM 1000 UNITS/HR: 10000 INJECTION, SOLUTION INTRAVENOUS at 12:30

## 2020-10-23 RX ADMIN — GABAPENTIN 300 MG: 300 CAPSULE ORAL at 20:08

## 2020-10-23 RX ADMIN — OXYCODONE HYDROCHLORIDE 5 MG: 5 TABLET ORAL at 00:12

## 2020-10-23 RX ADMIN — SODIUM CHLORIDE 500 ML: 9 INJECTION, SOLUTION INTRAVENOUS at 08:48

## 2020-10-23 RX ADMIN — ACETAMINOPHEN 975 MG: 325 TABLET, FILM COATED ORAL at 03:58

## 2020-10-23 RX ADMIN — AMIODARONE HYDROCHLORIDE 1 MG/MIN: 50 INJECTION, SOLUTION INTRAVENOUS at 12:56

## 2020-10-23 RX ADMIN — FENTANYL CITRATE 100 MCG: 50 INJECTION, SOLUTION INTRAMUSCULAR; INTRAVENOUS at 13:14

## 2020-10-23 RX ADMIN — PHENYLEPHRINE HYDROCHLORIDE 0.5 MCG/KG/MIN: 10 INJECTION INTRAVENOUS at 13:19

## 2020-10-23 RX ADMIN — ACETAMINOPHEN 975 MG: 325 TABLET, FILM COATED ORAL at 10:41

## 2020-10-23 RX ADMIN — MIDAZOLAM 2 MG: 1 INJECTION INTRAMUSCULAR; INTRAVENOUS at 13:14

## 2020-10-23 RX ADMIN — SODIUM CHLORIDE 500 ML: 9 INJECTION, SOLUTION INTRAVENOUS at 13:05

## 2020-10-23 RX ADMIN — HEPARIN SODIUM 5000 UNITS: 10000 INJECTION, SOLUTION INTRAVENOUS; SUBCUTANEOUS at 10:42

## 2020-10-23 RX ADMIN — AMIODARONE HYDROCHLORIDE 150 MG: 1.5 INJECTION, SOLUTION INTRAVENOUS at 07:36

## 2020-10-23 RX ADMIN — SODIUM CHLORIDE 500 ML: 9 INJECTION, SOLUTION INTRAVENOUS at 13:45

## 2020-10-23 RX ADMIN — Medication: at 16:16

## 2020-10-23 RX ADMIN — OXYCODONE HYDROCHLORIDE 5 MG: 5 TABLET ORAL at 03:58

## 2020-10-23 RX ADMIN — AMIODARONE HYDROCHLORIDE 1 MG/MIN: 50 INJECTION, SOLUTION INTRAVENOUS at 08:38

## 2020-10-23 RX ADMIN — ACETAMINOPHEN 975 MG: 325 TABLET, FILM COATED ORAL at 21:54

## 2020-10-23 RX ADMIN — OXYCODONE HYDROCHLORIDE 5 MG: 5 TABLET ORAL at 16:14

## 2020-10-23 RX ADMIN — LIDOCAINE HYDROCHLORIDE 1 ML: 10 INJECTION, SOLUTION EPIDURAL; INFILTRATION; INTRACAUDAL; PERINEURAL at 10:23

## 2020-10-23 RX ADMIN — SODIUM CHLORIDE 1 MG: 9 INJECTION, SOLUTION INTRAVENOUS at 13:04

## 2020-10-23 RX ADMIN — SODIUM CHLORIDE 1 MG: 9 INJECTION, SOLUTION INTRAVENOUS at 14:23

## 2020-10-23 ASSESSMENT — ACTIVITIES OF DAILY LIVING (ADL)
ADLS_ACUITY_SCORE: 12
ADLS_ACUITY_SCORE: 14
ADLS_ACUITY_SCORE: 14
ADLS_ACUITY_SCORE: 12
ADLS_ACUITY_SCORE: 11
ADLS_ACUITY_SCORE: 12

## 2020-10-23 ASSESSMENT — MIFFLIN-ST. JEOR
SCORE: 1397.88
SCORE: 1370.88

## 2020-10-23 NOTE — PROGRESS NOTES
STAFF NOTE:  70F hx afib, GERD, lung CA  L thoracotomy yesterday w/ repair of PA intra-op  Recovering well; flipped into afib with RVR -> hypotension -> amiodarone    First diagnosis of atrial fibrillation was in October (so presumed paroxysmal vs new-onset in October).    She tells me she does not notice when her heart is racing, and did not notice early October or now, so I have to presume that she may have long-standing afib.    However, transthoracic echo from mid-October did not show significant atrial dilation, suggesting that she does not in fact have significant long-standing afib burden.     Exam awake, interactive  BP has been MAPs 55-70s, SBP 70s-80s even after a saline bolus  Some nausea  Jack dilute  Chest tube tidaling, no airleak    Labs largely unremarkable  ABG adequate oxygenation  Minimal anemia  HgbA1c 5.6    CXR Prominent interstitium with hazy left basilar opacities and possible trace bilateral pleural effusions.     This is a 70F with atrial fibrillation with RVR.  I suspect that she has long-standing paroxysmal atrial fibrillation, and that cardioversion is unlikely to be effective or durable.  However, the cardiology service thinks it's worth a shot, and I agree with that.      Will need her epidural pulled, then can start heparin with a bolus an hour after that, then will attemptt electrical cardioversion.  Continue amiodarone my presumption of low likelihood of durable result with electrical cardioversion.  I favor norepinephrine as a pressor to maintain MAP while in RVR.  Will likely require aggressive diuresis.     LUNG CANCER:  -s/p left lowerr lobectomy and right pulmonary arterioplasty with thoracotomy  -lidocaine patch, scheduled tylenol, gabapentin, and IV/oral narcotics now that epidural will be removed  -chest tube to waterseal    ATRIAL FIBRILLATION:  -presumably long-standing paroxysmal, with acute exacerbation secondary to acute illness / fluid & electrolyte shifts; no  structural disease on formal TTE a few weeks ago  -attempt rate control with amiodarone while hypotensive  -will attempt electrical cardioversion per cardiology, supplemented with amiodarone (150mg bolus, repeated if necessary, then 1mg/h x6h, then 0.5mg/h for 18h, then transition to oral 400mg po bid until has completed ~8g load, then discharge on 400mg po qd and will ask our nurse manager to schedule an outpatient evaluation with PCP or cardiologist to assess need for long-term antiarrhythmic therapy)  -CHADS-2 score of >2 implies the need for long-term systemic anticoagulation.  Patient may be a good candidate for a novel oral anticoagulant.  For now, will need to be on a heparin gtt until thoracic service is comforable transitioning to Xeralto.    MISC:  -Code status is full code  -ok for a diet once sure not going to do a FIOR  -family updated at bedside  -SQH not necessary on heparin gtt; PPI not necessary  -lines: has a new PICC  -solitario can come out (will likely diurese tomorrow)  -anticipate discharge to home in ~5d days    Billing statement: 36min of critical care time; spent in an initial review of imaging, labs, physical exam, and discussion of the patient with my own team and the extended care team including the primary service. Based on this patient's presentation / recent intervention and my bedside assessment, I felt there was or is a reasonably high probability of imminent or life-threatening deterioration today or tonight for hemodynamic reasons.   My overall critical care time, as described in detail above, includes such things as coordination of care, arrhythmia and hemodynamics management with infusions of medicines, respiratory management, fluid therapy including fluid boluses, and pain and sedation therapy. This time excludes time I spent personally performing or supervising procedures for this patient.    MARY Gunderson MD  Clinical   Anesthesia / Critical  Care  *[phone]

## 2020-10-23 NOTE — PROGRESS NOTES
Dr Anand (1st call thoracic surg) paged r/t CT to water seak having air leak and audible leak around site despite reinforced dressing by 6B RN Gem. Awaiting call back r/t plan    Per Gem, thoracic resident assessed patient at bedside

## 2020-10-23 NOTE — PROGRESS NOTES
Rapid Response Team Note    Assessment   In assessment a rapid response was called on Ijeoma Shendarinelyamil due to new onset arrhythmia.     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       Sepsis Evaluation   NO EVIDENCE OF SEPSIS at this time.  Vital sign, physical exam, and lab findings are likely due to o SCC of LLL now POD 1.    Plan   1) Primary team at bedside-  - EKG  - amiodarone gtt  - transfer to ICU  - possible need for 250 bolus if blood pressure falls more    Disposition: The patient will be transferred to the ICU..    The Thoracic Surgery primary team was able to be reached and they are in agreement with the above plan.      Time Spent on this Encounter   Total Critical Care time spent by me, excluding procedures, was 10 minutes.    RUDY Levine CNP  [facility] RRT AMCOM Job Code Contact #[phone]    Hospital Course   Admission Diagnosis: Mass of left lung [R91.8]     Brief Summary of events leading to rapid response:   A rapid response was called for Ijeoma CARLITA Pablo due to acute change in heart rate at 0714  10/23/20.      The patients is not known to have an infection.    Significant Comorbidities:   SCC of LLL now POD 1    Medications   Scheduled     acetaminophen  975 mg Oral Q6H     amiodarone  150 mg Intravenous Once     heparin ANTICOAGULANT  5,000 Units Subcutaneous Q8H     hydromorphone (DILAUDID) PCEA infusion   EPIDURAL PCA     metoprolol succinate ER  25 mg Oral Daily     oxyCODONE  5 mg Oral Q4H     senna-docusate  2 tablet Oral BID     sodium chloride (PF)  3 mL Intracatheter Q8H      PRN   lidocaine 4%, lidocaine (buffered or not buffered), - MEDICATION INSTRUCTIONS -, nalbuphine, naloxone, sodium chloride (PF)   Allergies   Allergies   Allergen Reactions     Bee Venom Hives     Hot and sweating         Physical Exam   Temp: 99.4  F (37.4  C) Temp  Min: 96.9  F (36.1  C)  Max: 99.4  F (37.4  C)  Resp: 18 Resp  Min:  10  Max: 18  SpO2: 99 % SpO2  Min: 93 %  Max: 100 %  Pulse: 75 Pulse  Min: 67  Max: 97    No data recorded  BP: 100/66 Systolic (24hrs), Av , Min:76 , Max:144   Diastolic (24hrs), Av, Min:40, Max:95     I/Os: I/O last 3 completed shifts:  In: 3259.8 [P.O.:838; I.V.:921.8]  Out: 2428 [Urine:1710; Blood:200; Chest Tube:518]     Exam:   General: chronically ill appearing  Mental Status: AAOx4.      Significant Results and Procedures   Lactic Acid:   Recent Labs   Lab Test 10/22/20  1245 10/22/20  1039 10/22/20  0822   LACT 1.0 0.7 0.6*     CBC:   Recent Labs   Lab Test 10/23/20  0442 10/22/20  1245 10/22/20  1039 20  1221 20  1221 13  1545   WBC 9.1  --   --   --  9.8 7.7   HGB 9.8* 12.1 11.6*   < > 13.1 14.9   HCT 30.6*  --   --   --  39.2 42.4     --   --   --  276 237    < > = values in this interval not displayed.

## 2020-10-23 NOTE — PROVIDER NOTIFICATION
10/23/20 0800   Call Information   Date of Call 10/23/20   Time of Call 0714   Name of person requesting the team Ann-Marie   Title of person requesting team RN   RRT Arrival time 0716   Time RRT ended 0840   Reason for call   Type of RRT Adult   Primary reason for call Cardiovascular   Cardiovascular SBP less than 90;HR greater than 160;EKG changes   Was patient transferred from the ED, ICU, or PACU within last 24 hours prior to RRT call? Yes  (POD 1)   SBAR   Situation AFIB/RVR, hypotension   Background s/p lung resection POD 1   Notable History/Conditions Cancer;Recent surgery;Hypertension   Assessment Pt alert and oriented, BPs were as low as SBP 60's, now 80's on arrival; -170's,   Interventions Fluid bolus;Meds;Labs;Portable monitor;ECG   Adjustments to Recommend 500 ml fluid bolus, decreased epidural rate;  Amidoarone bolus and gtt; rate trending down to 130-140's, MAPs low 60's;  Per thoracic and SICU team, pt to transfer to ICU    Patient Outcome   Patient Outcome Transferred to  (4C/SICU overflow)   RRT Team   Attending/Primary/Covering Physician Anel Worrell, Thoracic   Date Attending Physician notified 10/23/20   Time Attending Physician notified 0715   Physician(s) Leigh Berry, BETTINA   Lead RN Mariluz Jacobsen/Estee Lakhani

## 2020-10-23 NOTE — PLAN OF CARE
Admitted/transferred from: 6B at 0810  Reason for admission/transfer: New a fib with RVR with hypotension  Patient status upon admission/transfer: A fib rates 140s-150s, SBP in the 80s, amio drip and fluid bolus running  Interventions: Continue amio and fluids, consulting EP and cards for further workup  Plan: Possible cardioversion and other interventions for a fib  2 RN skin assessment: completed by Saumya  Result of skin assessment and interventions/actions: no new skin concerns, dressings intact  Height, weight, drug calc weight: done  Patient belongings (see Flowsheet - Adult Profile for details): with patient  MDRO education (if applicable): NA

## 2020-10-23 NOTE — PROCEDURES
[facility]     Triple Lumen PICC Placement    Date/Time: 10/23/2020 10:22 AM  Performed by: Billy Mejia RN  Authorized by: Anel Worrell MD   Indications: vascular access    UNIVERSAL PROTOCOL   Site Marked: Yes  Prior Images Obtained and Reviewed:  Yes  Required items: Required blood products, implants, devices and special equipment available    Patient identity confirmed:  Verbally with patient, arm band, provided demographic data and hospital-assigned identification number  NA - No sedation, light sedation, or local anesthesia  Confirmation Checklist:  Patient's identity using two indicators, relevant allergies, procedure was appropriate and matched the consent or emergent situation and correct equipment/implants were available  Time out: Immediately prior to the procedure a time out was called    Universal Protocol: the Joint Commission Universal Protocol was followed    Preparation: Patient was prepped and draped in usual sterile fashion           ANESTHESIA    Anesthesia: See MAR for details  Local Anesthetic:  Lidocaine 1% without epinephrine  Anesthetic Total (mL):  1      SEDATION    Patient Sedated: No        Preparation: skin prepped with ChloraPrep  Skin prep agent: skin prep agent completely dried prior to procedure  Sterile barriers: maximum sterile barriers were used: cap, mask, sterile gown, sterile gloves, and large sterile sheet  Hand hygiene: hand hygiene performed prior to central venous catheter insertion  Type of line used: Power PICC  Catheter type: triple lumen  Lumen type: non-valved  Catheter size: 5 Fr  Brand: Bard  Lot number: [id number]  Placement method: venipuncture, MST, ultrasound and tip confirmation system  Number of attempts: 1  Successful placement: yes  Orientation: right  Location: brachial vein (medial) (vein diameter - 0.39 cm)  Arm circumference: adults 10 cm  Extremity circumference: 34  Visible catheter length:  1  Total catheter length: 36  Dressing and securement: chlorhexidine patch applied, glue, securement device, site cleaned, statlock and sterile dressing applied  Post procedure assessment: blood return through all ports, free fluid flow and placement verified by x-ray  PROCEDURE   Patient Tolerance:  Patient tolerated the procedure well with no immediate complications  Describe Procedure: PICC is OK to use.

## 2020-10-23 NOTE — PLAN OF CARE
Neuro: A&Ox4.   Cardiac: SR. Low BP when lying on side with BP arm elevated. When on back, BP WDL.    Respiratory: Sating 97% on 2L nc.  GI/: Adequate urine output via solitario  Diet/appetite: Tolerating clear liquid diet. Ate popsicle.   Activity:  Repo independently in bed.   Pain: No pain at rest, L shoulder and L incision pain with activity. Epidural infusing.   Skin: CT site draining, dressing reinforced. Scant bloody drainage from thoracotomy incision, dressing applied, drainage on epidural site not increasing.  LDA's: L PIV, Chest tube to water seal, solitario, nasal cannula    Plan:  Continue with POC. Notify primary team with changes.

## 2020-10-23 NOTE — PLAN OF CARE
ICU End of Shift Summary. See flowsheets for vital signs and detailed assessment.    Changes this shift: Pt alert and oriented x4, makes needs known. Reports of generalized back pain 2/2 surgery yesterday. Epidural discontinued due to heparin gtt and cardioversion; dilaudid PCA initiated. Receiving scheduled oxy and tylenol. Upon transfer to , pt in a fib with RVR with sustained hypotension. Attempted cardioversion x2 (200mg fentanyl and 4mg versed given) and given ibutilide x2 with no success at converting, remains in a fib. Amio bolused and gtt started, currently running at 0.5.1 L NS bolus given for soft pressures, Gage running to maintain MAP>65, currently at 0.25. On 2L nasal cannula with EtCO2 monitoring, encouraging IS. Chest tube to water seal with good output. Advanced to full liquid diet. No stool. Jack still in place with good UO. Heparin gtt running at 1000. PICC line placed at bedside.    Plan: Monitor closely and maintain MAPs>65, update SICU with new symptoms associated with a fib and hypotension. Possible more cardioversion/other interventions tomorrow.         Problem: Bleeding (Surgery Nonspecified)  Goal: Absence of Bleeding  Outcome: No Change     Problem: Ongoing Anesthesia Effects (Surgery Nonspecified)  Goal: Anesthesia/Sedation Recovery  Outcome: No Change

## 2020-10-23 NOTE — PROGRESS NOTES
Thoracic Surgery Progress Note    S: No issues overnight.    Team was paged around 0710 that pt was in afib with HR as high as 170s. Rapid response was called and thoracic team, including cardiothoracic surgery fellow, Dr. Mabry, was at bedside to assess.     Upon evaluation, she was in afib with RVR per tele monitor and confirmed by formal EKG. SBP dropped as low as 60s. Patient was placed in trendelenberg, which slightly improved BP. She felt lightheaded, but denied chest pain, palpitations, shortness of breath, and diaphoresis. Amio bolus started and ran over 20min. Amio drip was ordered as well. Pain team was called and decreased epidural rate from 8 to 4. Preparations were made to transfer to SICU. At time of transfer, HR was improved to 120s-130s with occasional bumps into 150s, and BP was consistently in 80s-90s systolic. Lightheadedness had improved.     O:  Temp:  [96.9  F (36.1  C)-99.4  F (37.4  C)] 97.9  F (36.6  C)  Pulse:  [] 176  Resp:  [10-21] 21  BP: ()/(40-95) 82/49  MAP:  [88 mmHg-108 mmHg] 88 mmHg  Arterial Line BP: (119-146)/(68-81) 119/68  SpO2:  [90 %-100 %] 98 %    Gen: NAD, resting comfortably  CV: irregularly irregular narrow-complex tachycardia  Chest: L chest tube output thin, serosanguinous w/o air leak  Resp: nonlabored respirations, comfortable on 2LNC  Abd: soft, ntnd  Ext: WWP  Incisions: cdi, some blood on thoracotomy dressing but no active bleeding from incision    I/O last 3 completed shifts:  In: 3259.8 [P.O.:838; I.V.:921.8]  Out: 2428 [Urine:1710; Blood:200; Chest Tube:518]  Chest tube by shift 180  330    Labs  Reviewed, unremarkable  WBC 9.1  Hgb 9.8    A/P: Ijeoma Shah is a 70 year old female w/ hx of SCC of left lower lobe now s/p L thoracoscopic lobectomy converted to L thoracotomy with pulmonary artery repair and left lower lobectomy, with transcervical extended mediastinal lymphadenectomy, currently with unstable blood pressures in afib with  RVR.    - daily CXR  - chest tube to water seal  Afib w/ RVR    > Transfer to SICU    > cards consult    > amio bolus and drip    > PICC line consult  - Pain control    > RAPS managing epidural    > tylenol 975 Q6H, oxy 5mg Q4H  - Diet: ADAT  - ppx: subcutaneous heparin Q8h, bowel regimen  - Dispo: SICU    Remaining cares per ICU      Patient seen and d/w fellow, Dr. Mabry, and staff    Anel Worrell MD (PGY-1)  Surgery

## 2020-10-23 NOTE — PROVIDER NOTIFICATION
10/23/20 1022   PICC Triple Lumen 10/23/20 Right Brachial vein medial Access.    Placement Date/Time: 10/23/20 (c) 1022   Catheter Brand: Bard  Size (Fr): 5 Fr  Lot #: [id number]  Full barrier precautions done: Yes, hand hygiene, sterile gown, sterile gloves, mask, cap, full body drape, chlorhexidine scrub  Consent Signed: Yes  Time...   Site Assessment WDL   External Cath Length (cm) 1 cm   Extremity Circumference (cm) 34 cm   Dressing Intervention Chlorhexidine patch;Transparent;Securing device;New dressing;Other (Comment)  (Dermabond)   Dressing Change Due 10/30/20   PICC Comment PICC inserted   Lumen A - Color GRAY   Lumen A - Status blood return noted;saline locked   Lumen A - Cap Change Due 10/27/20   Lumen B - Color RED   Lumen B - Status blood return noted;saline locked   Lumen B - Cap Change Due 10/27/20   Lumen C - Color WHITE   Lumen C - Status blood return noted;saline locked   Lumen C - Cap Change Due 10/27/20   Extravasation? No   Line Necessity Yes, meets criteria

## 2020-10-23 NOTE — OP NOTE
CO-SURGEON NOTE    DATE OF SERVICE: 10/22/2020.    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    POST-OPERATIVE DIAGNOSIS:  Left lower lobe squamous cell lung carcinoma of the lung.    PROCEDURE PERFORMED  Right pulmonary arterioplasty (This is performed in conjunction with a left lower lobectomy by Dr. Sanabria).    SURGEON:  Andrea Sanabria MD.    CO-SURGEON:  Kurt Davis MD PhD    ANESTHESIA: General endotracheal anesthesia with a double lumen endotracheal tube.    INDICATIONS FOR PROCEDURE: Ms. Shah is a 69-years-old woman found to have a 7.1 cm left lower lobe lung mass on a lung cancer screening CT scan.  She was discussed at the multidisciplinary meeting lung nodule meeting and referred to interventional pulmonary. Biopsy of the left lower lobe mass demonstrated squamous cell carcinoma.  Her main symptom was dyspnea on exertion which is mild which she attributes to smoking.  She smokes almost a pack a day for 50 years. She had pneumonia in January 2020 when she was treated with antibiotics as an outpatient.     She was undergoing left lower lobectomy today with Dr. Sanabria, and difficulty was encountered with dissection along the left pulmonary artery in the fissure. I was consulted intra-operatively, and I am in agreement with the assessment by Dr. Sanabria that left pulmonary arterioplasty will be required to complete the resection.    INTRA-OPERATIVE FINDINGS: There was a large left lobe mass and the take-off of the medial and lateral lingular pulmonary artery branches, as well as the posterior ascending pulmonary artery branch were retracted down toward the descending pulmonary artery and left lower lobe, precluding safe placement and firing of an endoscopic stapler with a vascular load.    After completion of the left pulmonary arterioplasty, hemostasis was noted. Pulsatile flow was also noted visually in the  medial and lateral lingular pulmonary  artery branches. Before division of the left lower lobe bronchus to complete resection of the left lower lobe, inflation of the left upper lobe was confirmed.    OPERATIVE DESCRIPTION IN DETAIL: I was consulted intra-operatively. Please see the separate operative note by Dr. Sanabria for details of the left lower lobectomy. He had already obtained control of the proximal left main pulmonary artery with a vessel loop. We also then encircled the left upper pulmonary vein with a vessel loop. The left lower lobe pulmonary vein was divided and ligated with a vascular load stapler. A signet clamp was applied across the left main pulmonary artery and after additional dissection posterior to the left pulmonary artery in the fissure, a Derra clamp was placed across the pulmonary artery at the take-off of the medial and lateral lingular pulmonary artery branches, as well as the posterior ascending pulmonary artery branch. The descending left pulmonary artery was then divided sharply and proximal aspect of the left pulmonary artery was over sewn in tow layers with running 6-0 Prolene. The vascular clamps and vessel loops were removed and hemostasis was noted. The left lower lobe bronchus was divided with a blue load endoscopic stapler and the left lower lobe was removed from the field.    Kurt Davis MD PhD.

## 2020-10-23 NOTE — PLAN OF CARE
OT 6B: cancel, pt with rapid response called this AM, transferring down to ICU for higher level of care, not appropriate for therapy today, will reschedule.

## 2020-10-23 NOTE — H&P
SURGICAL ICU ADMISSION NOTE  10/23/2020      PRIMARY TEAM: Thoracic Surgery   PRIMARY PHYSICIAN: Dr. Sanabria    REASON FOR CRITICAL CARE ADMISSION: Close hemodynamic monitoring   ADMITTING PHYSICIAN: Dr. Gunderson  Date of Service (when I saw the patient): 10/23/2020    ASSESSMENT:  Ijeoma Shah is a 70 year old female with a past medical history significant for obesity, newly-diagnosed a. Fib with RVR not on anticoagulation, and  SCC of LLL now POD#1 s/p left open left lower lobectomy, MLND, and  pulmonary arterioplasty who is admitted to the SICU for close hemodynamic monitoring after becoming hypotensive from atrial fibrillation with RVR.     PLAN:    Neurological:  # Acute pain   - Monitor neurological status. Delirium preventions and precautions.   - Pain: thoracic epidural  - Contact RAPs to remove thoracic epidural for initiation of therapeutic heparin. Timing of heparin gtt to their recommendations  - Continue marjan Tylenol, prn oxycodone, gabapentin 300 TID, lidocaine patch   - Order dilaudid PCA for pain control.     Pulmonary:   # SCC of LLL s/p left open left lower lobectomy, MLND, and  pulmonary arterioplasty  # Post operative ventilatory support  # Acute hypoxic respiratory support   - CT to water seal, no air leak in [facility]  - Daily CXR per Thoracic Surgery  - Supplemental oxygen to keep saturation above 92 %.  - Incentive spirometer every 15- 30 minutes when awake.    Cardiovascular:    # A. Fib with RVR  # Hypotension  - Amio bolus + gtt  - Consult EP for cardioversion  - Therapeutic anticoagulation  - Phenylephrine for vasopressor per EP recs.   - Monitor hemodynamic status.     Gastroenterology/Nutrition:  # DAVE  - NPO pending cardioversion, ok for regular diet post-proceudre  - No indication for parenteral nutrition.  - Marjan Miralax, Senna    Fluids/Electrolytes/Renal:   - No MIVF per thoracic  - Received 250mL bolus prior to transfer for hypotension   - Urine output is adequate  - Will  continue to monitor intake and output.    Endocrine:  # Stress hyperglycemia   - No management indication.  - Sliding scale for glucose management.   - Goal to keep BG< 180 for optimal wound healing     ID:  # Leukocytosis   -  no indications for antibiotics.     Heme:     # Acute blood loss anemia   # Anemia of critical illness   - Hemoglobin 9.3  - Transfuse if hgb <7.0 or signs/symptoms of hypoperfusion. Monitor and trend.     Musculoskeletal:  # Weakness and deconditioning of critical illness   - Physical and occupational therapy consult     General Cares/Prophylaxis:    DVT Prophylaxis: Therapeutic heparin  GI Prophylaxis: Not indicated  Restraints: Restraints for medical healing needed: NO    Lines/ tubes/ drains:  - PICC  - PIV  - Thoracic epidural - removed   - Jack - to be removed    Disposition:  -  Surgical ICU.     Patient seen, findings and plan discussed with surgical ICU staff, Dr. Gunderson.    Ashlyn Navarro MD  Surgery Resident PGY-2  Pg [phone]    - - - - - - - - - - - - - - - - - - - - - - - - - - - - - - - - - - - - - - - - - - - - - -   HISTORY PRESENTING ILLNESS:   Salima Shah is a 69 year old female with obesity and a history of smoking that has a recent diagnosis of malignant neoplasm of lower lobe of the left lung.  She underwent a screening CT scan on 7/3/20 and it returned with results concerning for malignancy.  She had a biopsy done on 7/27/20 that returned positive for malignancy. Her main symptom was dyspnea on exertion which is mild which she attributes to smoking.  She smokes almost a pack a day for 50 years. ON 10/1/2020, patient was intended to undergo a left lower lobectomy but while being  prepped for surgery and  about to undergo anesthesia and she was quite anxious and an EKG revealed that she was in AFib with rapid ventricular response at 161 beats per minute. The procedure was canceled and she was referred to cardiology for further evaluation. Her CHADS-VASc2 score is 2 for  age and female gender and was in normal sinus rhythm on EKG the day prior to cardiology appointment (10/6). Cardiology recommended she start apixaban 5mg BID but patient never started this medication.     On 10/22 She was underwent a left lower lobectomy today with Dr. Sanabria. Difficulty was encountered with dissection along the left pulmonary artery in the fissure and CVTS was consulted intra-operatively for  left pulmonary arterioplasty prior to completion of resection. Patient tolerated the procedure, was extubated after the case and then transferred to  after appropriate recovery in PACU. Her initial BP was slightly hypotensive but non-concerning and she was in NSR. Morning of POD1 she was noted have gone into a. Fib with RVR and associated hypotension. Patient denies pain, reports being light headed, but is otherwise alert, oriented, and appropriately pain controlled.     REVIEW OF SYSTEMS: 10 point ROS neg other than the symptoms noted above in the HPI.    PAST MEDICAL HISTORY:   Past Medical History:   Diagnosis Date     Arrhythmia     A-Fib     Lung cancer (H)      Simple chronic bronchitis (H)        SURGICAL HISTORY:   Past Surgical History:   Procedure Laterality Date     ARTHROEREISIS, SUBTALAR       BRONCHOSCOPY RIGID OR FLEXIBLE W/TRANSENDOSCOPIC ENDOBRONCHIAL ULTRASOUND GUIDED N/A 7/27/2020    Procedure: Flexible bronchoscopy, endobronchial ultrasound with transbronchial biopsies;  Surgeon: Edin Castro MD;  Location:  OR     CATARACT IOL, RT/LT Bilateral      COLONOSCOPY  6/29/04    colonoscopy to the cecum     ESOPHAGOSCOPY, GASTROSCOPY, DUODENOSCOPY (EGD), COMBINED N/A 9/1/2020    Procedure: ESOPHAGOGASTRODUODENOSCOPY, WITH FINE NEEDLE ASPIRATION BIOPSY, WITH ENDOSCOPIC ULTRASOUND GUIDANCE;  Surgeon: Barber Hogan MD;  Location:  GI     SURGICAL HISTORY OF -       nose surgery     TUBAL LIGATION      bilateral tubal ligation.  BTL       SOCIAL HISTORY:   Social History      Socioeconomic History     Marital status:      Spouse name: None     Number of children: 1     Years of education: None     Highest education level: None   Occupational History     Occupation: seasonal summer work only   Social Needs     Financial resource strain: None     Food insecurity     Worry: None     Inability: None     Transportation needs     Medical: None     Non-medical: None   Tobacco Use     Smoking status: Former Smoker     Packs/day: 1.00     Years: 33.00     Pack years: 33.00     Types: Cigarettes     Quit date: 2014     Years since quittin.8     Smokeless tobacco: Never Used   Substance and Sexual Activity     Alcohol use: Yes     Alcohol/week: 1.7 - 2.5 standard drinks     Types: 2 - 3 Standard drinks or equivalent per week     Comment: occ,social     Drug use: No     Sexual activity: Yes     Partners: Male   Lifestyle     Physical activity     Days per week: None     Minutes per session: None     Stress: None   Relationships     Social connections     Talks on phone: None     Gets together: None     Attends Yarsani service: None     Active member of club or organization: None     Attends meetings of clubs or organizations: None     Relationship status: None     Intimate partner violence     Fear of current or ex partner: None     Emotionally abused: None     Physically abused: None     Forced sexual activity: None   Other Topics Concern     Parent/sibling w/ CABG, MI or angioplasty before 65F 55M? Not Asked   Social History Narrative     None     FAMILY HISTORY: No bleeding/clotting disorders nor problems with anesthesia.     ALLERGIES:   Allergies   Allergen Reactions     Bee Venom Hives     Hot and sweating        MEDICATIONS:  No current facility-administered medications on file prior to encounter.        EPINEPHrine (EPIPEN/ADRENACLICK/OR ANY BX GENERIC EQUIV) 0.3 MG/0.3ML injection 2-pack, Inject 0.3 mLs (0.3 mg) into the muscle as needed       ketoconazole (NIZORAL) 2  % external cream, Apply topically daily       triamcinolone (KENALOG) 0.1 % external ointment, APPLY  OINTMENT TOPICALLY TWICE DAILY OR  AS  NEEDED        PHYSICAL EXAMINATION:  Temp:  [96.9  F (36.1  C)-99.4  F (37.4  C)] 99.4  F (37.4  C)  Pulse:  [67-97] 75  Resp:  [10-18] 18  BP: ()/(40-95) 100/66  MAP:  [88 mmHg-108 mmHg] 88 mmHg  Arterial Line BP: (119-146)/(68-81) 119/68  SpO2:  [93 %-100 %] 99 %  General:  Pale, non-diaphoretic  Neuro: A&Ox3, NAD  Pulm/Resp: No dyspnea on 2LNC, no increase WOB with conversation  CV: AF RVR on tele, hypotensive,   Abdomen: Soft, non-distended, non-tender  :  solitario catheter in place, urine yellow and clear  Incisions/Skin: left lateral thoracotomy, dressing c/d/i, CT with minimal serosang output, no airleak  MSK/Extremities: no peripheral edema, moving all extremities, peripheral pulses intact, extremities well perfused    LABS: Reviewed.   Arterial Blood Gases   Recent Labs   Lab 10/22/20  1245 10/22/20  1039 10/22/20  0822   PH 7.42 7.42 7.47*   PCO2 41 43 38   PO2 126* 79* 269*   HCO3 27 28 28     Complete Blood Count   Recent Labs   Lab 10/23/20  0442 10/22/20  1245 10/22/20  1039 10/22/20  0822   WBC 9.1  --   --   --    HGB 9.8* 12.1 11.6* 11.8     --   --   --      Basic Metabolic Panel  Recent Labs   Lab 10/23/20  0442 10/22/20  1245 10/22/20  1039 10/22/20  0822    137 137 140   POTASSIUM 4.3 4.0 4.0 4.1   CHLORIDE 101  --   --   --    CO2 32  --   --   --    BUN 10  --   --   --    CR 0.69  --   --   --    * 162* 127* 105*     Liver Function Tests  No lab results found in last 7 days.  Pancreatic Enzymes  No lab results found in last 7 days.  Coagulation Profile  No lab results found in last 7 days.    IMAGING:  Recent Results (from the past 24 hour(s))   XR Chest Port 1 View    Narrative    Portable AP view of the chest10/22/2020 4:36 PM    INDICATION: Status post left lower lobectomy    COMPARISON:  CT 9/29/2020    FINDINGS: AP view of  the chest. Postoperative changes from left lower  lobectomy. Apical approach left-sided chest tube. Small left apical  pneumothorax. Radiopaque linear opacity projecting over the left mid  chest and left neck likely outside of the patient, possibly analgesic  catheter. Cardiac mediastinal silhouette is within normal limits. Mild  interstitial fullness. Hazy left basilar opacities. Possible trace  bilateral pleural effusions. Mild gaseous distention of bowel in the  left upper quadrant.      Impression    IMPRESSION:   1. Postoperative changes from left lower lobectomy with a small left  apical pneumothorax and left-sided apically directed chest tube in  place.  2. Prominent interstitium with hazy left basilar opacities and  possible trace bilateral pleural effusions. Findings likely represent  atelectasis or pulmonary edema.    I have personally reviewed the examination and initial interpretation  and I agree with the findings.    GERALD PANDYA MD

## 2020-10-23 NOTE — PLAN OF CARE
Transfer  Transferred from: PACU  Via: stretcher  Reason for transfer: Pt appropriate for 6BFamily: Aware of transfer  Belongings: Received with pt include cell phone, purse with wallet, clothing.  Chart: Received with pt  Medications: None  Code Status verified on armband: yes  2 RN Skin Assessment Completed By: John Bolaños  Med rec completed: yes  Bed surface reassessed with algorithm and charted: yes  New bed surface ordered: no    Report received from: Roseann CORDOVA   MAP 65 and 63. MD aware, will monitor. HR 80;s. Sats 97% on 2L nc. ETCO2 50. Afebrile.    Pt status: Alert and oriented. C/o pain with movement. Epidural infusing Dilaudid/Marcaine at 8mL/hr. Chest tube to water seal and bubbling in tubing as well as in chamber with cough. MD called to bedside and ruled out air leak. Coarse lung sounds around chest tube site. Bloody drainage from site, dressing reinforced, MD aware. Thoracotoy site with small drainage, dressing applied. Drainage at epidural site as well, not new per PACU RN. CLAUDIO SL. Will monitor and follow POC.

## 2020-10-23 NOTE — PROCEDURES
[facility]     Procedure: *Cardioversion    Date/Time: 10/23/2020 3:18 PM  Performed by: Juani Zamudio MD  Authorized by: Juani Zamudio MD     UNIVERSAL PROTOCOL   Site Marked: NA  Prior Images Obtained and Reviewed:  NA  Required items: Required blood products, implants, devices and special equipment available    Patient identity confirmed:  Verbally with patient and arm band  Patient was reevaluated immediately before administering moderate or deep sedation or anesthesia  Confirmation Checklist:  Patient's identity using two indicators  Time out: Immediately prior to the procedure a time out was called    Universal Protocol: the Joint Commission Universal Protocol was followed          SEDATION    Patient Sedated: Yes    Sedation:  Fentanyl and midazolam  Vital signs: Vital signs monitored during sedation      PROCEDURE DETAILS  Cardioversion basis: emergent  Pre-procedure rhythm: atrial fibrillation  Patient position: patient was placed in a supine position  Electrodes: pads  Electrodes placed: anterior-posterior  Number of attempts: 2    Details of Attempts:  2 failed attempts at cardioversion with 200J & 360J.  Post-procedure rhythm: atrial fibrillation  Complications: no complications    PROCEDURE   Patient Tolerance:  Patient tolerated the procedure well with no immediate complications    Length of time physician/provider present for 1:1 monitoring during sedation: 60    Dr Goldstein present for entirety of procedure.    Juani Zamudio MD,   Cardiovascular Fellow  Pager [phone]

## 2020-10-23 NOTE — PROGRESS NOTES
[facility] EPIDURAL NOTE  Ijeoma Shah is a 70 year old female POD #1s/p C THORACOSCOPY W/THERA WEDGE RESEXN INITIAL UNILAT [89770] (Left thoracoscopic lobectomy)  C THORACOSCOPY W/THERA WEDGE RESEXN ADDL IPSILATRL [84447] (Transcervical extended mediastinal lymphadenectomy)  C THORACOSCOPY W/DX WEDGE RESEXN ANATO LUNG RESEXN [34102] (Possible thoracotomy)  C THORACOSCOPY W/PNEUMONECTOMY [69235]  C BLOOD/LYMPH SYSTEM PROCEDURE [70823]  C THORACTOMY W/DX BX LUNG INFILTRATE UNILATERAL [73908]  C THORACTOMY W/DX BX LUNG NODULE/MASS UNILATERAL [10128]  C THORACOTOMY W/BIOPSY OF PLEURA [35784] and placement of T6-7 epidural catheter for pain management.      SUBJECTIVE  Interval History: This AM, patient in A-fib w/ RVR. Thoracic team requesting epidural maintenance rate be turned down in the setting of hypotension. SICU team requesting epidural to be removed in order to start heparin gtt. Patient reports adequate pain control with epidural infusion and current analgesic medications (see below).  Denies weakness, paresthesias, circumoral numbness, metallic taste or tinnitus.       Antithrombotic/Thrombolytic Therapy ordered:  BID 5,000u heparin transitioning to heparin gtt.     Analgesic Medications:  Medications related to Pain Management (From now, onward)    Start     Dose/Rate Route Frequency Ordered Stop    10/23/20 1400  HYDROmorphone (DILAUDID) 6 mcg/mL, bupivacaine (MARCAINE) 0.125 % in sodium chloride 0.9 % 250 mL EPIDURAL PCEA (patient controlled epidural)       EPIDURAL PCA 10/23/20 0741      10/22/20 2000  senna-docusate (SENOKOT-S/PERICOLACE) 8.6-50 MG per tablet 2 tablet      2 tablet Oral 2 TIMES DAILY 10/22/20 1555      10/22/20 1600  acetaminophen (TYLENOL) tablet 975 mg      975 mg Oral EVERY 6 HOURS 10/22/20 1553      10/22/20 1600  oxyCODONE (ROXICODONE) tablet 5 mg      5 mg Oral EVERY 4 HOURS 10/22/20 1554      10/22/20 1547  lidocaine 1 % 0.1-1 mL      0.1-1 mL Other  "EVERY 1 HOUR PRN 10/22/20 1552      10/22/20 1547  lidocaine (LMX4) kit       Topical EVERY 1 HOUR PRN 10/22/20 1552      10/22/20 0735  nalbuphine (NUBAIN) injection 2.5-5 mg      2.5-5 mg Intravenous EVERY 6 HOURS PRN 10/22/20 0736             OBJECTIVE  Lab Results:   Recent Labs   Lab Test 10/23/20  0442   WBC 9.1   RBC 3.44*   HGB 9.8*   HCT 30.6*   MCV 89   MCH 28.5   MCHC 32.0   RDW 13.1          No results found for: INR    Vitals:    Temp:  [36.1  C (96.9  F)-37.4  C (99.4  F)] 36.6  C (97.9  F)  Pulse:  [] 141  Resp:  [10-24] 18  BP: ()/(34-95) 83/56  MAP:  [88 mmHg-108 mmHg] 88 mmHg  Arterial Line BP: (119-146)/(68-81) 119/68  SpO2:  [90 %-100 %] 99 %  BP (!) 83/56   Pulse 141   Temp 36.6  C (97.9  F) (Oral)   Resp 18   Ht 1.651 m (5' 5\")   Wt 84.5 kg (186 lb 4.6 oz)   SpO2 99%   BMI 31.00 kg/m         Exam:   GEN: alert and no distress  NEURO/MSK: motor intact adequate sensory block.   Heart: A-fib w/ RVR   SKIN: Epidural catheter site with dressing c/d/i, no tenderness, erythema, heme, edema     ASSESSMENT/PLAN:    Given patient in new onset A-fib with RVR, plan to remove epidural so SICU team can anticoagulate with heparin infusion. Discussed timing of removal with attending physician and agree that with 5,000 units heparin can remove now but will need to wait 1 hour for initiation of heparin infusion.    Catheter removed tip intact.     - discussed plan with attending anesthesiologist    Jameel Main DO  [facility]  10/23/2020 7:44 AM    RAPS Contact Info (24 hour job code pager is the last 4 digits) For in-house use only:   [employer] phone: [place] [phone], [place] [employer] [place] [phone], Peds [phone], then enter call-back number.    Text: Use [employer] on the Intranet <Paging/Directory> tab and enter Jobcode ID.   If no call back at any time, contact the hospital  and ask for RAPS attending or backup     "

## 2020-10-23 NOTE — PLAN OF CARE
Neuro: A&Ox4.   Cardiac: SR. HR 70s VSS.   Respiratory: Sating 99% on 2L NC  GI/: Adequate urine output via solitraio and no BM  Diet/appetite: Tolerating clear liquid diet.  Activity:  Assist of 1-2 with repositioning in bed, pt has been in too much pain to get out of bed during the night.  Pain: Pt has scheduled oxy, tylenol, and epidural with pca infusing.  Skin: No new deficits noted.  LDA's: L CT-water seal, L PIV-saline locked, epidural, solitario.    Plan: Continue with POC. Notify primary team with changes.

## 2020-10-23 NOTE — PROGRESS NOTES
Patient went into a-fib 's-170's. BP's dropped to 70's/40's. RRT called. Amiodarone bolus and gtt started. BP's and HR improving. Continuous epidural rate decreased to 4mcg/hr continuous. Patient transferring to  for higher level of care. Will continue to monitor.

## 2020-10-23 NOTE — CONSULTS
Electrophysiology Consultation Note   EP Attending:   Reason for consultation: Hemodynamically unstable atrial fibrillation.   Provider requesting consultation: .  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 Electrophysiology consulted on POD#1 for post-operative hemodynamically unstable atrial fibrillation w/ RVR.     The patient has history of recently diagnosed Afib, first seen on 10/1/2020 when she was being prepped for lung surgery and about to undergo induction by anesthesia; Afib reportedly paroxysmal. EKG 10/6/20 revealed NSR. She was evaluated outpatient by cardiology, underwent a TTE on 10/16/2020 which showed normal LVEF, normal LA with MARIA A 24 mL/m2, no significant valvular pathologies. She was initiated on Toprol XL 25 once a day; no anti-coagulation initiated at the time for CHADS VASc 2 given upcoming lung surgery. She had been asymptomatic from a cardiac perspective during this entire duration.    She underwent the elective lobectomy/PA repair y'day. Post-op course otherwise uncomplicated. Early this AM; the patient had a rapid response called around 7 am for Afib w/ RVR w/ BP 60-70's systolic. She was given a 500 ml bolus, Amio bolus & initiated on Amio gtt without significant improvement in hemodynamics. Patient not initiated on AC this AM due to an epidural catheter in place & risk of epidural hematoma. The patient was largely asymptomatic during this episodes, reporting occasional lightheadedness/diaphoresis; denying palpitations, chest tightness or SOB. Pertinent labs revealed HGB 10 (12 pre-op), normal renal function & lytes. No fever spikes/white count. Pain relatively tolerable.     Past Medical History:   Past Medical History:   Diagnosis Date     Arrhythmia     A-Fib      Lung cancer (H)      Simple chronic bronchitis (H)      Past Surgical History:   Past Surgical History:   Procedure Laterality Date     ARTHROEREISIS, SUBTALAR       BRONCHOSCOPY RIGID OR FLEXIBLE W/TRANSENDOSCOPIC ENDOBRONCHIAL ULTRASOUND GUIDED N/A 07/27/2020    Procedure: Flexible bronchoscopy, endobronchial ultrasound with transbronchial biopsies;  Surgeon: Edin Castro MD;  Location: [facility] OR     CATARACT IOL, RT/LT Bilateral      COLONOSCOPY  06/29/2004    colonoscopy to the cecum     ESOPHAGOSCOPY, GASTROSCOPY, DUODENOSCOPY (EGD), COMBINED N/A 09/01/2020    Procedure: ESOPHAGOGASTRODUODENOSCOPY, WITH FINE NEEDLE ASPIRATION BIOPSY, WITH ENDOSCOPIC ULTRASOUND GUIDANCE;  Surgeon: Barber Hogan MD;  Location: [facility]     PICC TRIPLE LUMEN PLACEMENT Right 10/23/2020    5Fr - 36cm, Medial brachial vein     SURGICAL HISTORY OF -       nose surgery     THORACOSCOPIC RESECTION LUNG Left 10/22/2020    Procedure: Left thoracoscopic lobectomy converted to Left Thoracotomy, Medistinal lymph node dissection, Pulmonary Artery repair, flexible bronchoscopy, on-pump oxygenator on standy-by;  Surgeon: Andrea Sanabria MD;  Location: [facility] OR     THORACOTOMY N/A 10/22/2020    Procedure: Thoracotomy;  Surgeon: Andrea Sanabria MD;  Location: [facility]     TRANSCERVICAL EXTENDED MEDIASTINAL LYMPHADENECTOMY N/A 10/22/2020    Procedure: Transcervical extended mediastinal lymphadenectomy;  Surgeon: Andrea Sanabria MD;  Location: [facility] OR     TUBAL LIGATION      bilateral tubal ligation.  BTL     Allergies: Per MAR     Allergies   Allergen Reactions     Bee Venom Hives     Hot and sweating      Medications:   Per MAR current outpatient cardiovascular medications include:   Medications Prior to Admission   Medication Sig Dispense Refill Last Dose     metoprolol succinate ER (TOPROL XL) 25 MG 24 hr tablet Take 1 tablet (25 mg) by mouth daily 90 tablet 3 10/22/2020 at 0500     EPINEPHrine (EPIPEN/ADRENACLICK/OR ANY BX GENERIC EQUIV)  0.3 MG/0.3ML injection 2-pack Inject 0.3 mLs (0.3 mg) into the muscle as needed 0.6 mL 1      ketoconazole (NIZORAL) 2 % external cream Apply topically daily 60 g 1 More than a month at Unknown time     rivaroxaban ANTICOAGULANT (XARELTO) 20 MG TABS tablet Take 1 tablet (20 mg) by mouth daily (with dinner) 90 tablet 3      triamcinolone (KENALOG) 0.1 % external ointment APPLY  OINTMENT TOPICALLY TWICE DAILY OR  AS  NEEDED 45 g 0 More than a month at Unknown time     No current outpatient medications on file.     Current Facility-Administered Medications   Medication Dose Route Frequency     acetaminophen  975 mg Oral Q6H     fentaNYL (PF)         gabapentin  300 mg Oral TID     heparin lock flush  5-10 mL Intracatheter Q24H     insulin aspart  1-3 Units Subcutaneous TID AC     insulin aspart  1-3 Units Subcutaneous At Bedtime     lidocaine  2 patch Transdermal Q24H     lidocaine   Transdermal Q8H     [Held by provider] metoprolol succinate ER  25 mg Oral Daily     midazolam         senna-docusate  2 tablet Oral BID     sodium chloride (PF)  3 mL Intravenous Q8H     sodium chloride (PF)  3 mL Intracatheter Q8H     Family History:   Family History   Problem Relation Age of Onset     Glaucoma Mother      LUNG DISEASE Mother      Melanoma Father      Down Syndrome Brother      Cancer Sister         bile duct     Coronary Artery Disease Brother      CABG Brother      No Known Problems Brother      No Known Problems Brother      No Known Problems Sister      Lung Cancer Sister         lung     No Known Problems Sister      No Known Problems Sister      Social History:   Social History     Tobacco Use     Smoking status: Former Smoker     Packs/day: 1.00     Years: 33.00     Pack years: 33.00     Types: Cigarettes     Quit date: 2014     Years since quittin.8     Smokeless tobacco: Never Used   Substance Use Topics     Alcohol use: Yes     Alcohol/week: 1.7 - 2.5 standard drinks     Types: 2 - 3 Standard drinks  "or equivalent per week     Comment: occ,social       ROS:   A comprehensive 10 point ROS was negative other than as mentioned in HPI.    Physical Examination:   VITALS: /71   Pulse 116   Temp 98.5  F (36.9  C) (Axillary)   Resp 17   Ht 1.651 m (5' 5\")   Wt 84.5 kg (186 lb 4.6 oz)   SpO2 100%   BMI 31.00 kg/m    GENERAL APPEARANCE: AxO, NAD   HEENT: NCAT, EOMI, MMM.   NECK: Supple. No JVD or bruit. Good carotid upstroke.   CHEST: Surgical bandages in place  CARDIOVASCULAR: S1S2, Irreg, No m/r/g.   ABDOMEN: BS+, soft, No pulsatile masses or bruits.   EXTREMITIES: No pedal edema. Distal pulses intact.   NEURO: Grossly nonfocal.   PSYCH: Normal affect.  SKIN: Warm and dry.   Data:   Labs:  BMP  Recent Labs   Lab 10/23/20  0442 10/22/20  1245 10/22/20  1039 10/22/20  0822    137 137 140   POTASSIUM 4.3 4.0 4.0 4.1   CHLORIDE 101  --   --   --    SABINO 7.9*  --   --   --    CO2 32  --   --   --    BUN 10  --   --   --    CR 0.69  --   --   --    * 162* 127* 105*     CBC  Recent Labs   Lab 10/23/20  1500 10/23/20  0442 10/22/20  1245 10/22/20  1039   WBC 10.7 9.1  --   --    RBC 3.33* 3.44*  --   --    HGB 9.3* 9.8* 12.1 11.6*   HCT 29.4* 30.6*  --   --    MCV 88 89  --   --    MCH 27.9 28.5  --   --    MCHC 31.6 32.0  --   --    RDW 13.2 13.1  --   --     233  --   --      INRNo lab results found in last 7 days.  No results found for: CKTOTAL, CKMB, TROPN  Cholesterol (mg/dL)   Date Value   06/23/2020 238 (H)   07/13/2015 261 (H)   03/09/2010 286 (H)   05/26/2006 234 (H)     Cholesterol/HDL Ratio (no units)   Date Value   07/13/2015 3.9   03/09/2010 5.0   05/26/2006 3.4   06/29/2004 3.7     HDL Cholesterol (mg/dL)   Date Value   06/23/2020 68   07/13/2015 67   03/09/2010 63   05/26/2006 69     LDL Cholesterol Calculated (mg/dL)   Date Value   06/23/2020 147 (H)   07/13/2015 166 (H)   03/09/2010 192 (H)   05/26/2006 142 (H)     EKG: Afib w/ RVR  Tele: Afib w/ RVR  ECHO: 10/16/2020  The left " ventricle is normal in size. Left ventricular systolic function is normal. The visual ejection fraction is estimated at 55-60%. Left ventricular diastolic function is normal. No regional wall motion abnormalities noted.  The right ventricle is normal size. The right ventricular systolic function is normal.  Trace mitral and tricuspid regurgitation.  No pericardial effusion.      Assessment:   Ms Shah ezra 70 year old female with Hx of squamous cell lung carcinoma of the lung admitted on 10/22/2020 for left VATS w/ conversion to thoracotomy, left lower lobectomy & pulmonary artery repair. Electrophysiology consulted on POD#1 for post-operative hemodynamically unstable atrial fibrillation w/ RVR.     The patient underwent 2 attempts at cardioversion (DCCV @ 200J & 360J) without success today. She was given Ibutilide 1 mg times two for chemical cardioversion without success as well. FIOR not performed pre-DCCV due to relatively low risk of LA clot with short duration of Afib, normal LA size & Afib associated significant hemodynamic instability. Heparin gtt initiated after removal of epidural catheter.      EP Recommendations:  1. Recommend continuation of Amio gtt, please decrease rate to 0.5 mg/min.   2. If patient becomes hemodynamically unstable overnight, can attempt digoxin 0.5 mg once along with IV fluids as needed.  3. Recommend transducing CVP from CVC daily for optimal intravascular repletion.   4. Please continue heparin gtt for AC.   5. Close monitoring for triggers of Afib, optimal pain control, electrolytes : K>4, Mg >2.  6. Concurrent work up for alternative causes of shock: sepsis vs hypovolemia vs h'mamadou.      The patient states understanding and is agreeable with plan.   Thank you for allowing us to participate in the care of this patient.     The patient was discussed w/ Dr. Goldstein. The above note reflects our joint plan.    Juani Zamudio MD,   Cardiovascular Disease Fellow  Pager [phone]

## 2020-10-24 ENCOUNTER — APPOINTMENT (OUTPATIENT)
Dept: GENERAL RADIOLOGY | Facility: CLINIC | Age: 70
DRG: 163 | End: 2020-10-24
Attending: THORACIC SURGERY (CARDIOTHORACIC VASCULAR SURGERY)
Payer: MEDICARE

## 2020-10-24 ENCOUNTER — APPOINTMENT (OUTPATIENT)
Dept: OCCUPATIONAL THERAPY | Facility: CLINIC | Age: 70
DRG: 163 | End: 2020-10-24
Attending: THORACIC SURGERY (CARDIOTHORACIC VASCULAR SURGERY)
Payer: MEDICARE

## 2020-10-24 ENCOUNTER — APPOINTMENT (OUTPATIENT)
Dept: PHYSICAL THERAPY | Facility: CLINIC | Age: 70
DRG: 163 | End: 2020-10-24
Attending: THORACIC SURGERY (CARDIOTHORACIC VASCULAR SURGERY)
Payer: MEDICARE

## 2020-10-24 LAB
ANION GAP SERPL CALCULATED.3IONS-SCNC: 5 MMOL/L (ref 3–14)
BUN SERPL-MCNC: 6 MG/DL (ref 7–30)
CALCIUM SERPL-MCNC: 7.7 MG/DL (ref 8.5–10.1)
CHLORIDE SERPL-SCNC: 101 MMOL/L (ref 94–109)
CO2 SERPL-SCNC: 28 MMOL/L (ref 20–32)
CREAT SERPL-MCNC: 0.59 MG/DL (ref 0.52–1.04)
ERYTHROCYTE [DISTWIDTH] IN BLOOD BY AUTOMATED COUNT: 13.3 % (ref 10–15)
GFR SERPL CREATININE-BSD FRML MDRD: >90 ML/MIN/{1.73_M2}
GLUCOSE BLDC GLUCOMTR-MCNC: 106 MG/DL (ref 70–99)
GLUCOSE BLDC GLUCOMTR-MCNC: 113 MG/DL (ref 70–99)
GLUCOSE BLDC GLUCOMTR-MCNC: 154 MG/DL (ref 70–99)
GLUCOSE SERPL-MCNC: 133 MG/DL (ref 70–99)
HCT VFR BLD AUTO: 29.2 % (ref 35–47)
HGB BLD-MCNC: 9.5 G/DL (ref 11.7–15.7)
LMWH PPP CHRO-ACNC: 0.28 IU/ML
LMWH PPP CHRO-ACNC: <0.1 IU/ML
MAGNESIUM SERPL-MCNC: 1.9 MG/DL (ref 1.6–2.3)
MCH RBC QN AUTO: 28.6 PG (ref 26.5–33)
MCHC RBC AUTO-ENTMCNC: 32.5 G/DL (ref 31.5–36.5)
MCV RBC AUTO: 88 FL (ref 78–100)
PHOSPHATE SERPL-MCNC: 2.3 MG/DL (ref 2.5–4.5)
PLATELET # BLD AUTO: 233 10E9/L (ref 150–450)
POTASSIUM SERPL-SCNC: 4 MMOL/L (ref 3.4–5.3)
POTASSIUM SERPL-SCNC: 4.2 MMOL/L (ref 3.4–5.3)
RBC # BLD AUTO: 3.32 10E12/L (ref 3.8–5.2)
SODIUM SERPL-SCNC: 135 MMOL/L (ref 133–144)
TSH SERPL DL<=0.005 MIU/L-ACNC: 0.96 MU/L (ref 0.4–4)
WBC # BLD AUTO: 10.1 10E9/L (ref 4–11)

## 2020-10-24 PROCEDURE — 258N000003 HC RX IP 258 OP 636: Performed by: STUDENT IN AN ORGANIZED HEALTH CARE EDUCATION/TRAINING PROGRAM

## 2020-10-24 PROCEDURE — 97535 SELF CARE MNGMENT TRAINING: CPT | Mod: GO | Performed by: OCCUPATIONAL THERAPIST

## 2020-10-24 PROCEDURE — 250N000011 HC RX IP 250 OP 636: Performed by: STUDENT IN AN ORGANIZED HEALTH CARE EDUCATION/TRAINING PROGRAM

## 2020-10-24 PROCEDURE — 84132 ASSAY OF SERUM POTASSIUM: CPT | Performed by: NURSE PRACTITIONER

## 2020-10-24 PROCEDURE — 97161 PT EVAL LOW COMPLEX 20 MIN: CPT | Mod: GP

## 2020-10-24 PROCEDURE — 84100 ASSAY OF PHOSPHORUS: CPT

## 2020-10-24 PROCEDURE — 250N000011 HC RX IP 250 OP 636: Performed by: THORACIC SURGERY (CARDIOTHORACIC VASCULAR SURGERY)

## 2020-10-24 PROCEDURE — 999N000155 HC STATISTIC RAPCV CVP MONITORING

## 2020-10-24 PROCEDURE — 250N000011 HC RX IP 250 OP 636

## 2020-10-24 PROCEDURE — 80048 BASIC METABOLIC PNL TOTAL CA: CPT

## 2020-10-24 PROCEDURE — 250N000009 HC RX 250: Performed by: STUDENT IN AN ORGANIZED HEALTH CARE EDUCATION/TRAINING PROGRAM

## 2020-10-24 PROCEDURE — 85027 COMPLETE CBC AUTOMATED: CPT

## 2020-10-24 PROCEDURE — 84443 ASSAY THYROID STIM HORMONE: CPT

## 2020-10-24 PROCEDURE — 250N000013 HC RX MED GY IP 250 OP 250 PS 637: Performed by: STUDENT IN AN ORGANIZED HEALTH CARE EDUCATION/TRAINING PROGRAM

## 2020-10-24 PROCEDURE — 97165 OT EVAL LOW COMPLEX 30 MIN: CPT | Mod: GO | Performed by: OCCUPATIONAL THERAPIST

## 2020-10-24 PROCEDURE — 71045 X-RAY EXAM CHEST 1 VIEW: CPT | Mod: 26

## 2020-10-24 PROCEDURE — 200N000002 HC R&B ICU UMMC

## 2020-10-24 PROCEDURE — 71045 X-RAY EXAM CHEST 1 VIEW: CPT

## 2020-10-24 PROCEDURE — 97530 THERAPEUTIC ACTIVITIES: CPT | Mod: GP

## 2020-10-24 PROCEDURE — 85520 HEPARIN ASSAY: CPT

## 2020-10-24 PROCEDURE — 83735 ASSAY OF MAGNESIUM: CPT

## 2020-10-24 PROCEDURE — 999N001017 HC STATISTIC GLUCOSE BY METER IP

## 2020-10-24 PROCEDURE — 97110 THERAPEUTIC EXERCISES: CPT | Mod: GP

## 2020-10-24 RX ORDER — POTASSIUM CHLORIDE 7.45 MG/ML
10 INJECTION INTRAVENOUS
Status: DISCONTINUED | OUTPATIENT
Start: 2020-10-24 | End: 2020-10-28

## 2020-10-24 RX ORDER — DIGOXIN 125 MCG
250 TABLET ORAL DAILY
Status: DISCONTINUED | OUTPATIENT
Start: 2020-10-25 | End: 2020-10-27

## 2020-10-24 RX ORDER — MAGNESIUM SULFATE HEPTAHYDRATE 40 MG/ML
2 INJECTION, SOLUTION INTRAVENOUS DAILY PRN
Status: DISCONTINUED | OUTPATIENT
Start: 2020-10-24 | End: 2020-10-28

## 2020-10-24 RX ORDER — LIDOCAINE 4 G/G
1 PATCH TOPICAL
Status: DISCONTINUED | OUTPATIENT
Start: 2020-10-25 | End: 2020-10-28 | Stop reason: HOSPADM

## 2020-10-24 RX ORDER — AMIODARONE HYDROCHLORIDE 200 MG/1
400 TABLET ORAL 2 TIMES DAILY
Status: DISCONTINUED | OUTPATIENT
Start: 2020-10-24 | End: 2020-10-28 | Stop reason: HOSPADM

## 2020-10-24 RX ORDER — DIGOXIN 0.25 MG/ML
250 INJECTION INTRAMUSCULAR; INTRAVENOUS ONCE
Status: COMPLETED | OUTPATIENT
Start: 2020-10-24 | End: 2020-10-24

## 2020-10-24 RX ORDER — DIGOXIN 0.25 MG/ML
500 INJECTION INTRAMUSCULAR; INTRAVENOUS ONCE
Status: COMPLETED | OUTPATIENT
Start: 2020-10-24 | End: 2020-10-24

## 2020-10-24 RX ORDER — POTASSIUM CHLORIDE 750 MG/1
20-40 TABLET, EXTENDED RELEASE ORAL
Status: DISCONTINUED | OUTPATIENT
Start: 2020-10-24 | End: 2020-10-28

## 2020-10-24 RX ORDER — ONDANSETRON 2 MG/ML
4 INJECTION INTRAMUSCULAR; INTRAVENOUS EVERY 6 HOURS PRN
Status: DISCONTINUED | OUTPATIENT
Start: 2020-10-24 | End: 2020-10-28 | Stop reason: HOSPADM

## 2020-10-24 RX ORDER — DIGOXIN 0.25 MG/ML
250 INJECTION INTRAMUSCULAR; INTRAVENOUS DAILY
Status: DISCONTINUED | OUTPATIENT
Start: 2020-10-25 | End: 2020-10-24

## 2020-10-24 RX ORDER — MAGNESIUM SULFATE HEPTAHYDRATE 40 MG/ML
4 INJECTION, SOLUTION INTRAVENOUS EVERY 4 HOURS PRN
Status: DISCONTINUED | OUTPATIENT
Start: 2020-10-24 | End: 2020-10-28

## 2020-10-24 RX ORDER — POTASSIUM CHLORIDE 1.5 G/1.58G
20-40 POWDER, FOR SOLUTION ORAL
Status: DISCONTINUED | OUTPATIENT
Start: 2020-10-24 | End: 2020-10-28

## 2020-10-24 RX ORDER — POTASSIUM CL/LIDO/0.9 % NACL 10MEQ/0.1L
10 INTRAVENOUS SOLUTION, PIGGYBACK (ML) INTRAVENOUS
Status: DISCONTINUED | OUTPATIENT
Start: 2020-10-24 | End: 2020-10-28

## 2020-10-24 RX ORDER — POTASSIUM CHLORIDE 29.8 MG/ML
20 INJECTION INTRAVENOUS
Status: DISCONTINUED | OUTPATIENT
Start: 2020-10-24 | End: 2020-10-28

## 2020-10-24 RX ORDER — FUROSEMIDE 10 MG/ML
20 INJECTION INTRAMUSCULAR; INTRAVENOUS ONCE
Status: COMPLETED | OUTPATIENT
Start: 2020-10-24 | End: 2020-10-24

## 2020-10-24 RX ADMIN — POTASSIUM PHOSPHATE, MONOBASIC AND POTASSIUM PHOSPHATE, DIBASIC 15 MMOL: 224; 236 INJECTION, SOLUTION INTRAVENOUS at 09:22

## 2020-10-24 RX ADMIN — AMIODARONE HYDROCHLORIDE 400 MG: 200 TABLET ORAL at 20:55

## 2020-10-24 RX ADMIN — OXYCODONE HYDROCHLORIDE 5 MG: 5 TABLET ORAL at 03:33

## 2020-10-24 RX ADMIN — AMIODARONE HYDROCHLORIDE 400 MG: 200 TABLET ORAL at 12:14

## 2020-10-24 RX ADMIN — LACTULOSE 30 G: 20 POWDER, FOR SOLUTION ORAL at 12:14

## 2020-10-24 RX ADMIN — ENOXAPARIN SODIUM 80 MG: 80 INJECTION SUBCUTANEOUS at 21:21

## 2020-10-24 RX ADMIN — DIGOXIN 500 MCG: 0.25 INJECTION INTRAMUSCULAR; INTRAVENOUS at 12:14

## 2020-10-24 RX ADMIN — OXYCODONE HYDROCHLORIDE 5 MG: 5 TABLET ORAL at 17:55

## 2020-10-24 RX ADMIN — GABAPENTIN 300 MG: 300 CAPSULE ORAL at 20:55

## 2020-10-24 RX ADMIN — MAGNESIUM SULFATE IN WATER 2 G: 40 INJECTION, SOLUTION INTRAVENOUS at 06:00

## 2020-10-24 RX ADMIN — LACTULOSE 30 G: 20 POWDER, FOR SOLUTION ORAL at 17:57

## 2020-10-24 RX ADMIN — FUROSEMIDE 20 MG: 10 INJECTION, SOLUTION INTRAVENOUS at 12:14

## 2020-10-24 RX ADMIN — ACETAMINOPHEN 975 MG: 325 TABLET, FILM COATED ORAL at 10:32

## 2020-10-24 RX ADMIN — OXYCODONE HYDROCHLORIDE 5 MG: 5 TABLET ORAL at 12:14

## 2020-10-24 RX ADMIN — HEPARIN SODIUM 1000 UNITS/HR: 10000 INJECTION, SOLUTION INTRAVENOUS at 04:29

## 2020-10-24 RX ADMIN — SALINE NASAL SPRAY 1 SPRAY: 1.5 SOLUTION NASAL at 11:22

## 2020-10-24 RX ADMIN — GABAPENTIN 300 MG: 300 CAPSULE ORAL at 08:01

## 2020-10-24 RX ADMIN — ACETAMINOPHEN 975 MG: 325 TABLET, FILM COATED ORAL at 15:19

## 2020-10-24 RX ADMIN — DIGOXIN 250 MCG: 0.25 INJECTION INTRAMUSCULAR; INTRAVENOUS at 20:55

## 2020-10-24 RX ADMIN — GABAPENTIN 300 MG: 300 CAPSULE ORAL at 15:19

## 2020-10-24 RX ADMIN — OXYCODONE HYDROCHLORIDE 10 MG: 5 TABLET ORAL at 08:01

## 2020-10-24 RX ADMIN — POTASSIUM CHLORIDE 20 MEQ: 29.8 INJECTION, SOLUTION INTRAVENOUS at 08:01

## 2020-10-24 ASSESSMENT — ACTIVITIES OF DAILY LIVING (ADL)
ADLS_ACUITY_SCORE: 15
ADLS_ACUITY_SCORE: 15
IADL_COMMENTS: OT: PT WAS IND
ADLS_ACUITY_SCORE: 15

## 2020-10-24 ASSESSMENT — MIFFLIN-ST. JEOR: SCORE: 1385.88

## 2020-10-24 NOTE — PROGRESS NOTES
Thoracic Surgery Progress Note    S: converted back to NSR around 10 pm so amiodarone was stopped. SICU/thoracic was not notified of that. Pt flipped back to A fib at 0200 so resumed amiodarone. SBP in 110-120 with -160. satting well on 1-2L. No chest pain or sob. Pain tolerable.     O:  Temp:  [98.2  F (36.8  C)-99.4  F (37.4  C)] 99.4  F (37.4  C)  Pulse:  [] 151  Resp:  [8-27] 18  BP: ()/() 97/58  SpO2:  [95 %-100 %] 97 %    Gen: NAD, resting comfortably  CV: irregularly irregular narrow-complex tachycardia  Chest: L chest tube output thin, serosanguinous w/o air leak  Resp: nonlabored respirations, comfortable on 2LNC  Abd: soft, ntnd  Ext: WWP  Incisions: cdi, some blood on thoracotomy dressing but no active bleeding from incision    I/O last 3 completed shifts:  In: 2859.87 [P.O.:400; I.V.:1959.87; IV Piggyback:500]  Out: 2800 [Urine:2280; Chest Tube:520]  Chest tube 710//140    Labs  Reviewed, unremarkable  WBC 10.1  Hgb 9.5    A/P: Ijeoma Shah is a 70 year old female w/ hx of SCC of left lower lobe now s/p L thoracoscopic lobectomy converted to L thoracotomy with pulmonary artery repair and left lower lobectomy, with transcervical extended mediastinal lymphadenectomy, currently with unstable blood pressures in afib with RVR.    - Pain control with dilaudid PCA with oral pain meds. Epidural d/c'ed 10/23  - Afib with RVR. EP on board. On amio gtt, heparin gtt and phenylephrine for BP. Per EP, okay to use digoxin 0.5 once and fluids if hemodynamically unstable with A fib  - chest tube to water seal for now. Daily CXR  - diet ADAT. Bowel regimen  - void spontaneously   - SCDs/heparin gtt    Remaining cares per ICU    Yodit Mcnally MD  General Surgery PGY-3  [phone]

## 2020-10-24 NOTE — PLAN OF CARE
ICU End of Shift Summary. See flowsheets for vital signs and detailed assessment.    Changes this shift: Complaints of back and L shoulder pain throughout day, giving prn oxy and encouraging pt to utilize PCA pump for coverage. Afebrile. Digoxin given. Remained in afib with RVR for most of the shift. Occasional runs of sinus rhythm, longest lasting ~30 minutes. Rates mostly 130-160s, up to 190s with activity and coughing. Phenyl weaned off this morning, blood pressure stable and MAP>65 rest of day. No c/o lightheadedness or dizziness. Remains on 2L NC. Chest tube still putting out moderate amount. Encouraging IS and acapella. Up to chair and bedside commode throughout day. 20 lasix given, UO improved, continent. No stool. Replaced K and phos. Transducing CVP off PICC. Remains on amio and heparin gtt.     Plan: Continue to monitor CV and respiratory status closely, monitor for symptoms associated with a fib. Update team of changes.       Problem: Ongoing Anesthesia Effects (Surgery Nonspecified)  Goal: Anesthesia/Sedation Recovery  10/24/2020 1843 by Rosendo Armstrong RN  Outcome: Improving     Problem: Pain (Surgery Nonspecified)  Goal: Acceptable Pain Control  10/24/2020 1843 by Rosendo Armstrong, RN  Outcome: Improving     Problem: Postoperative Nausea and Vomiting (Surgery Nonspecified)  Goal: Nausea and Vomiting Relief  10/24/2020 1843 by Rosendo Armstrong, RN  Outcome: Improving     Problem: Postoperative Urinary Retention (Surgery Nonspecified)  Goal: Effective Urinary Elimination  10/24/2020 1843 by Rosendo Armstrong, RN  Outcome: Improving     Problem: OT General Care Plan  Goal: Toilet Transfer/Toileting (OT)  Description: Toilet Transfer/Toileting (OT)  10/24/2020 1843 by Rosendo Armstrong, RN  Outcome: Improving

## 2020-10-24 NOTE — PROGRESS NOTES
SURGICAL ICU PROGRESS NOTE      PRIMARY TEAM: Thoracic Surgery   PRIMARY PHYSICIAN: Dr. Sanabria     REASON FOR CRITICAL CARE ADMISSION: Close hemodynamic monitoring   ADMITTING PHYSICIAN: Dr. Gunderson  Date of Service (when I saw the patient): 10/23/2020     ASSESSMENT:  Ijeoma Shah is a 70 year old female with a past medical history significant for obesity, newly-diagnosed a. Fib with RVR not on anticoagulation, and  SCC of LLL now POD#1 s/p left open left lower lobectomy, MLND, and  pulmonary arterioplasty who is admitted to the SICU for close hemodynamic monitoring after becoming hypotensive from atrial fibrillation with RVR.      PLAN:     Neurological:  # Acute pain   - Monitor neurological status. Delirium preventions and precautions.   - Pain: thoracic epidural  - Continue rachid Tylenol, prn oxycodone, gabapentin 300 TID, lidocaine patch   - Order dilaudid PCA for pain control. - used 13x/24h  - used oxy 5 x2 prns   - Better result with oxycodone 10, nursing will continue to encourage better pain control     Pulmonary:   # SCC of LLL s/p left open left lower lobectomy, MLND, and  pulmonary arterioplasty  # Post operative ventilatory support  # Acute hypoxic respiratory support   - CT to water seal, no air leak in [facility] 710/24h 140/shift  - Daily CXR per Thoracic Surgery  - Supplemental oxygen to keep saturation above 92 %.  - Incentive spirometer every 15- 30 minutes when awake. Poor IS, <500mL  - Added Ocean Spray PRN for congestion, patient reports nasal congestion is baseline.      Cardiovascular:    # A. Fib with RVR  # Hypotension  - Amio gtt 400 BID orally starting with 6 hours of gtt running  - Continue 400mg Bid until to 8g load then 200mg  - Therapeutic anticoagulation with heparin   - End heparin gtt at 8pm, start lovenox 1mg/kg q 12h  - Hold home metoprolol   - Digoxin 500x1, 639pgv5 pm, 250 daily  - Digoxin level Monday   - Phenylephrine for vasopressor per EP recs.   - Monitor  hemodynamic status.   - Cardiology EP recs:  EP Recommendations:  1. Recommend continuation of Amio gtt, please decrease rate to 0.5 mg/min.   2. If patient becomes hemodynamically unstable overnight, can attempt digoxin 0.5 mg once along with IV fluids as needed.  3. Recommend transducing CVP from CVC daily for optimal intravascular repletion.   4. Please continue heparin gtt for AC.   5. Close monitoring for triggers of Afib, optimal pain control, electrolytes : K>4, Mg >2.  6. Concurrent work up for alternative causes of shock: sepsis vs hypovolemia vs h'mamadou.     Gastroenterology/Nutrition:  # DAVE  - Regular Diet ADAT - fulls last night   - Boost supplements   - No indication for parenteral nutrition.  - Marjan Miralax, Senna  - no bowel movement charted   - add lactulose 30mg q6h until BM   - Add prn Zofran      Fluids/Electrolytes/Renal:   - No MIVF per thoracic  - Urine output 1640/24h, 990/shift  - q6h K  - Mg to 2.4  - Lasix 200mg once to see response   - goal net negative 200  - Will continue to monitor intake and output.     Endocrine:  # Stress hyperglycemia   - No management indication.  - Sliding scale for glucose management.   - Goal to keep BG< 180 for optimal wound healing      ID:  # Leukocytosis   -  no indications for antibiotics.      Heme:     # Acute blood loss anemia   # Anemia of critical illness   - Hemoglobin 9.5  - Transfuse if hgb <7.0 or signs/symptoms of hypoperfusion. Monitor and trend.      Musculoskeletal:  # Weakness and deconditioning of critical illness   - Physical and occupational therapy consult      General Cares/Prophylaxis:    DVT Prophylaxis: Therapeutic heparin  GI Prophylaxis: Not indicated  Restraints: Restraints for medical healing needed: NO     Lines/ tubes/ drains:  - PICC  - PIVs    Disposition:  -  Surgical ICU.      Patient seen, findings and plan discussed with surgical ICU staff, Dr. Gunderson.     Ashlyn Navarro MD  Surgery Resident PGY-2  Pg  [phone]    ====================================    TODAY'S SUBJECTIVE/INTERVAL HISTORY:   Unsuccessful cardioversion yesterday. Went into NSR overnight with drop in BP, amio stopped as a result. This morning reports 4/10 pain however appears uncomfortable with shallow breaths .    OBJECTIVE:     Temp:  [98.2  F (36.8  C)-99.4  F (37.4  C)] 99.4  F (37.4  C)  Pulse:  [] 186  Resp:  [8-29] 19  BP: ()/() 95/64  SpO2:  [94 %-100 %] 95 %  Resp: 19      I/O last 3 completed shifts:  In: 2859.87 [P.O.:400; I.V.:1959.87; IV Piggyback:500]  Out: 2800 [Urine:2280; Chest Tube:520]    General:  appears in pain  Neuro: A&Ox3, NAD  Pulm/Resp: No dyspnea on 2LNC, no increase WOB with conversation  CV: AF RVR on tele, hypotensive,   Abdomen: Soft, non-distended, non-tender  Incisions/Skin: left lateral thoracotomy, dressing c/d/i, CT with minimal serosang output, no airleak  MSK/Extremities: no peripheral edema, moving all extremities, peripheral pulses intact, extremities well perfused     LABS:   Arterial Blood Gases   Recent Labs   Lab 10/22/20  1245 10/22/20  1039 10/22/20  0822   PH 7.42 7.42 7.47*   PCO2 41 43 38   PO2 126* 79* 269*   HCO3 27 28 28     Complete Blood Count   Recent Labs   Lab 10/24/20  0319 10/23/20  1500 10/23/20  0442 10/22/20  1245   WBC 10.1 10.7 9.1  --    HGB 9.5* 9.3* 9.8* 12.1    241 233  --      Basic Metabolic Panel  Recent Labs   Lab 10/24/20  0319 10/23/20  1500 10/23/20  0442 10/22/20  1245    134 135 137   POTASSIUM 4.0 4.0 4.3 4.0   CHLORIDE 101 103 101  --    CO2 28 26 32  --    BUN 6* 9 10  --    CR 0.59 0.62 0.69  --    * 160* 125* 162*     Liver Function Tests  No lab results found in last 7 days.  Pancreatic Enzymes  No lab results found in last 7 days.  Coagulation Profile  No lab results found in last 7 days.      IMAGING:   Recent Results (from the past 24 hour(s))   XR Chest Port 1 View    Narrative    EXAM: XR CHEST PORT 1 VW  10/23/2020 12:17 PM      HISTORY:  S/P LL Lobectomy       COMPARISON:  Chest x-ray dated 10/22/2020    FINDINGS: Semiupright portable AP chest radiograph. Apically directed  left-sided chest tube is in stable position. New right upper extremity  PICC in place with tip projecting near the cavoatrial junction.     Postoperative changes from left lower lobectomy. Cardiomediastinal  silhouette is within normal limits. Interval decrease in size of small  left apical pneumothorax. Stable small pleural effusions. Bibasilar  and right midlung streaky artifacts suggestive of atelectasis.  Prominent interstitial markings, slightly decreased compared to prior.  No acute osseous abnormality. No evidence of pneumoperitoneum       Impression    IMPRESSION:   1. Right-sided PICC tip near the atrial caval junction.  2. Interval decrease in size of small left apical pneumothorax. Left  chest tube in stable position.  3. Improved pulmonary edema.  3. Right-sided PICC in place with tip projecting near cavoatrial  junction.    I have personally reviewed the examination and initial interpretation  and I agree with the findings.    HARPREET COLIN MD   *Cardioversion    Narrative    Juani Zamudio MD     10/23/2020  3:20 PM  [facility]     Procedure: *Cardioversion    Date/Time: 10/23/2020 3:18 PM  Performed by: Juani Zamudio MD  Authorized by: Juani Zamudio MD     UNIVERSAL PROTOCOL   Site Marked: NA  Prior Images Obtained and Reviewed:  NA  Required items: Required blood products, implants, devices and special   equipment available    Patient identity confirmed:  Verbally with patient and arm band  Patient was reevaluated immediately before administering moderate or deep   sedation or anesthesia  Confirmation Checklist:  Patient's identity using two indicators  Time out: Immediately prior to the procedure a time out was called    Universal Protocol: the Joint Commission Universal Protocol was followed           SEDATION    Patient Sedated: Yes    Sedation:  Fentanyl and midazolam  Vital signs: Vital signs monitored during sedation      PROCEDURE DETAILS  Cardioversion basis: emergent  Pre-procedure rhythm: atrial fibrillation  Patient position: patient was placed in a supine position  Electrodes: pads  Electrodes placed: anterior-posterior  Number of attempts: 2    Details of Attempts:  2 failed attempts at cardioversion with 200J & 360J.  Post-procedure rhythm: atrial fibrillation  Complications: no complications    PROCEDURE   Patient Tolerance:  Patient tolerated the procedure well with no immediate   complications    Length of time physician/provider present for 1:1 monitoring during   sedation: 60   XR Chest Port 1 View    Narrative    Exam: XR CHEST PORT 1 VW, 10/24/2020 6:48 AM    Indication: S/P LL Lobectomy    Comparison: 10/23/2020    Findings:   Postsurgical changes consistent with left lower lobectomy Right  approach PICC line with the tip near the superior cavoatrial junction.  Unchanged right-sided chest tube.    Cardiac silhouette is within normal limits. Mildly widened appearance  of the upper mediastinum similar to prior exam. Interval increase in  mixed interstitial and basilar predominant airspace opacities. Small  bilateral pleural effusions. Small left apical pneumothorax. No acute  findings in the upper abdomen. Osseous structures are unchanged.      Impression    Impression:   1. Increased interstitial and basilar predominant airspace opacities  which may represent pulmonary edema versus atelectasis/infection.  2. Small bilateral pleural effusions.  3. Small left apical pneumothorax, with an unchanged apically directed  chest tube.    I have personally reviewed the examination and initial interpretation  and I agree with the findings.    LUIS DANIEL VOGT MD

## 2020-10-24 NOTE — PROGRESS NOTES
STAFF NOTE:  Unable to be electrically cardioverted yesterday      Exam awake, interactive  BP has been MAPs 55-70s, SBP 70s-80s even after a saline bolus  Some nausea  Solitario dilute  Chest tube tidaling, no airleak    Lytes and renal function stable  Potassium 4  Mild anemia    CXR Prominent interstitium with hazy left basilar opacities and possible trace bilateral pleural effusions.     This is a 70F with atrial fibrillation with RVR. Today will start digoxin; watch potassium with that and with starting diuresis.       LUNG CANCER:  -s/p left lower lobectomy and right pulmonary arterioplasty with thoracotomy  -lidocaine patch, scheduled tylenol, gabapentin, and IV/oral narcotics now that epidural will be removed  -chest tube to waterseal    ATRIAL FIBRILLATION:  -presumably long-standing paroxysmal, with acute exacerbation secondary to acute illness / fluid & electrolyte shifts; no structural disease on formal TTE a few weeks ago  -failed electrical cardioversion  -continue amiodarone, but switch to oral 400mg po bid until has completed ~8g load, then discharge on 200mg po qd   -start oral digoxin (750mcg load, then start 250mcg/d tomorrow  -CHADS-2 score of >2 implies the need for long-term systemic anticoagulation.  Patient may be a good candidate for a novel oral anticoagulant.  Start lovenox tonight to get off heparin gtt, then will start xeralto before discharge    MISC:  -Code status is full code  -add Boost as a dietary supplemtn to every meal  - updated at bedside  -SQH not necessary on heparin gtt / lovenox; PPI not necessary  -lines: PICC to follow CVP  -solitario out (will likely diurese tomorrow)  -anticipate discharge to home in ~5d days    Billing statement: 33min of critical care time; spent in an initial review of imaging, labs, physical exam, and discussion of the patient with my own team and the extended care team including the primary service. Based on this patient's presentation / recent  intervention and my bedside assessment, I felt there was or is a reasonably high probability of imminent or life-threatening deterioration today or tonight for hemodynamic reasons.   My overall critical care time, as described in detail above, includes such things as coordination of care, arrhythmia and hemodynamics management with infusions of medicines, respiratory management, fluid therapy including fluid boluses, and pain and sedation therapy. This time excludes time I spent personally performing or supervising procedures for this patient.    MARY Gunderson MD  Clinical   Anesthesia / Critical Care  *[phone]

## 2020-10-24 NOTE — PROGRESS NOTES
STAFF ADDENDUM:  I saw and evaluated Ms. Shah and agree with the resident s findings and plan of care as documented in the resident s note and edited by me, as applicable.      In summary, Ms. Shah is back in atrial fibrillation with RVR. ICU is working on pharmacologic cardioversion.  The patient had all questions answered and was in agreement with the plan.  Aba Adler MD

## 2020-10-24 NOTE — PROGRESS NOTES
10/24/20 1142   Quick Adds   Type of Visit Initial PT Evaluation   Living Environment   People in home spouse   Current Living Arrangements house   Home Accessibility stairs to enter home;stairs within home   Number of Stairs, Main Entrance 3   Stair Railings, Main Entrance none   Number of Stairs, Within Home, Primary other (see comments)  (12)   Stair Railings, Within Home, Primary railing on right side (ascending)   Transportation Anticipated car, drives self   Living Environment Comments Pt lives with spouse. Stairs present down to basement where shower is located. Pt works full time during spring, summer, and fall, typically off work in winter.    Self-Care   Usual Activity Tolerance good   Current Activity Tolerance moderate   Regular Exercise Yes   Activity/Exercise Type walking   Exercise Amount/Frequency daily   Equipment Currently Used at Home none   Activity/Exercise/Self-Care Comment Pt independent with all mobility and ADLs   Disability/Function   Hearing Difficulty or Deaf no   Wear Glasses or Blind yes   Vision Management reading glasses   Concentrating, Remembering or Making Decisions Difficulty no   Difficulty Communicating no   Difficulty Eating/Swallowing no   Walking or Climbing Stairs Difficulty no   Dressing/Bathing Difficulty no   Toileting no   Doing Errands Independently Difficulty (such as shopping) no   Fall history within last six months no   Change in Functional Status Since Onset of Current Illness/Injury yes   General Information   Onset of Illness/Injury or Date of Surgery 10/22/20   Referring Physician Ashlyn Navarro MD   Patient/Family Therapy Goals Statement (PT) to return home   Pertinent History of Current Problem (include personal factors and/or comorbidities that impact the POC) Per chart, Ijeoma Shah is a 70 year old female with a past medical history significant for obesity, newly-diagnosed a. Fib with RVR not on anticoagulation, and  SCC of LLL now POD#1  s/p left open left lower lobectomy, MLND, and  pulmonary arterioplasty who is admitted to the SICU for close hemodynamic monitoring after becoming hypotensive from atrial fibrillation with RVR.    Existing Precautions/Restrictions fall;thoracotomy   Cognition   Orientation Status (Cognition) oriented x 4   Affect/Mental Status (Cognition) WNL   Follows Commands (Cognition) WNL   Pain Assessment   Patient Currently in Pain Yes, see Vital Sign flowsheet   Integumentary/Edema   Integumentary/Edema no deficits were identifed   Posture    Posture Forward head position;Protracted shoulders   Range of Motion (ROM)   ROM Comment L UE limited 2/2 pain though all other ROM WFL   Strength   Manual Muscle Testing Quick Adds Strength WFL   Bed Mobility   Bed Mobility supine-sit   Supine-Sit [place] (Bed Mobility) minimum assist (75% patient effort)   Bed Mobility Limitations decreased ability to use arms for pushing/pulling   Impairments Contributing to Impaired Bed Mobility pain   Assistive Device (Bed Mobility) bed rails   Transfers   Transfers sit-stand transfer;toilet transfer   Impairments Contributing to Impaired Transfers pain   Sit-Stand Transfer   Sit-Stand [place] (Transfers) minimum assist (75% patient effort)   Toilet Transfer   Type (Toilet Transfer) stand pivot/stand step   [place] Level (Toilet Transfer) minimum assist (75% patient effort)   Assistive Device (Toilet Transfer) commode chair   Balance   Balance Comments SBA for static standing balance   Sensory Examination   Sensory Perception WNL   Clinical Impression   Criteria for Skilled Therapeutic Intervention yes, treatment indicated   PT Diagnosis (PT) Impaired functional mobility   Influenced by the following impairments Pain, impaired balance, post-surgical precautions   Functional limitations due to impairments Pt requires assist for safe functional mobility   Clinical Presentation Stable/Uncomplicated   Clinical Presentation Rationale PM  "and clinical judgment   Clinical Decision Making (Complexity) low complexity   Therapy Frequency (PT) 6x/week   Predicted Duration of Therapy Intervention (days/wks) 2 weeks   Planned Therapy Interventions (PT) balance training;bed mobility training;gait training;home exercise program;neuromuscular re-education;ROM (range of motion);stair training;strengthening;transfer training   Anticipated Equipment Needs at Discharge (PT)   (TBD)   Risk & Benefits of therapy have been explained evaluation/treatment results reviewed;care plan/treatment goals reviewed;participants voiced agreement with care plan;participants included;patient;spouse/significant other   PT Discharge Planning    PT Discharge Recommendation (DC Rec) home with assist   PT Rationale for DC Rec Pt mobilizing well POD 2, anticipate she will be safe to return home with assist from family when medically ready for discharge.   PT Brief overview of current status  Assist of 1-2 for transfers with line management   [facility] TM \"6 Clicks\"   2016, Trustees of [facility], under license to [employer].  All rights reserved.   6 Clicks Short Forms Basic Mobility Inpatient Short Form   [facility] AM-PAC  \"6 Clicks\" V.2 Basic Mobility Inpatient Short Form   1. Turning from your back to your side while in a flat bed without using bedrails? 4 - None   2. Moving from lying on your back to sitting on the side of a flat bed without using bedrails? 3 - A Little   3. Moving to and from a bed to a chair (including a wheelchair)? 3 - A Little   4. Standing up from a chair using your arms (e.g., wheelchair, or bedside chair)? 3 - A Little   5. To walk in hospital room? 3 - A Little   6. Climbing 3-5 steps with a railing? 2 - A Lot   Basic Mobility Raw Score (Score out of 24.Lower scores equate to lower levels of function) 18   Total Evaluation Time   Total Evaluation Time (Minutes) 8     "

## 2020-10-24 NOTE — PLAN OF CARE
ICU End of Shift Summary. See flowsheets for vital signs and detailed assessment.    Changes this shift: Pt in/out A.fib w/ RVR overnight. ~5 Hrs of sinus rhythm w/ HR 60-70. Otherwise, HR fluctuates up to 170. Requiring 0.25-0.5 phenyl to achieve MAP goal >65. On 2 L NC w/ humidification. Intermittently short of breath with one brief episode of light-headedness. Denies chest pain, heart palpitations, or dizziness. Moderate amount of pain in left back, more severe with movement and coughing. PRN oxycodone given x1 on top of scheduled pain meds. PCA dilaudid dose increased to 0.2 mg. L. Chest tube to water-seal with serosanguineous drainage. Moderate amount of leaking at insertion site. Heparin gtt adjusted to 10A level. Mag replaced. Jack removed, purewick in place.    Plan:  Monitor closely and maintain MAP's >65. Consider other interventions to treat A.fib w/ RVR and notify SICU of any worsening symptoms. Encourage voiding on own.       Problem: Pain (Surgery Nonspecified)  Goal: Acceptable Pain Control  Outcome: Declining  Intervention: Prevent or Manage Pain  Recent Flowsheet Documentation  Taken 10/24/2020 0400 by Ese Dickerson RN  Pain Management Interventions:    medication (see MAR)    pillow support provided    rest    repositioned    relaxation techniques promoted  Taken 10/24/2020 0330 by Ese Dickerson, RN  Pain Management Interventions:    medication (see MAR)    rest  Taken 10/23/2020 2000 by Ese Dickerson, RN  Pain Management Interventions:    medication (see MAR)    pillow support provided    repositioned    rest

## 2020-10-25 ENCOUNTER — APPOINTMENT (OUTPATIENT)
Dept: GENERAL RADIOLOGY | Facility: CLINIC | Age: 70
DRG: 163 | End: 2020-10-25
Attending: THORACIC SURGERY (CARDIOTHORACIC VASCULAR SURGERY)
Payer: MEDICARE

## 2020-10-25 ENCOUNTER — APPOINTMENT (OUTPATIENT)
Dept: PHYSICAL THERAPY | Facility: CLINIC | Age: 70
DRG: 163 | End: 2020-10-25
Attending: THORACIC SURGERY (CARDIOTHORACIC VASCULAR SURGERY)
Payer: MEDICARE

## 2020-10-25 PROBLEM — I48.0 PAROXYSMAL ATRIAL FIBRILLATION (H): Status: ACTIVE | Noted: 2020-10-25

## 2020-10-25 LAB
ANION GAP SERPL CALCULATED.3IONS-SCNC: 6 MMOL/L (ref 3–14)
BUN SERPL-MCNC: 6 MG/DL (ref 7–30)
CALCIUM SERPL-MCNC: 8.6 MG/DL (ref 8.5–10.1)
CHLORIDE SERPL-SCNC: 97 MMOL/L (ref 94–109)
CO2 SERPL-SCNC: 29 MMOL/L (ref 20–32)
CREAT SERPL-MCNC: 0.54 MG/DL (ref 0.52–1.04)
ERYTHROCYTE [DISTWIDTH] IN BLOOD BY AUTOMATED COUNT: 13 % (ref 10–15)
ERYTHROCYTE [DISTWIDTH] IN BLOOD BY AUTOMATED COUNT: 13.1 % (ref 10–15)
GFR SERPL CREATININE-BSD FRML MDRD: >90 ML/MIN/{1.73_M2}
GLUCOSE BLDC GLUCOMTR-MCNC: 116 MG/DL (ref 70–99)
GLUCOSE BLDC GLUCOMTR-MCNC: 164 MG/DL (ref 70–99)
GLUCOSE SERPL-MCNC: 128 MG/DL (ref 70–99)
HCT VFR BLD AUTO: 27.4 % (ref 35–47)
HCT VFR BLD AUTO: 31.4 % (ref 35–47)
HGB BLD-MCNC: 10.3 G/DL (ref 11.7–15.7)
HGB BLD-MCNC: 8.9 G/DL (ref 11.7–15.7)
INTERPRETATION ECG - MUSE: NORMAL
LMWH PPP CHRO-ACNC: 0.63 IU/ML
MAGNESIUM SERPL-MCNC: 2.1 MG/DL (ref 1.6–2.3)
MCH RBC QN AUTO: 28.2 PG (ref 26.5–33)
MCH RBC QN AUTO: 28.5 PG (ref 26.5–33)
MCHC RBC AUTO-ENTMCNC: 32.5 G/DL (ref 31.5–36.5)
MCHC RBC AUTO-ENTMCNC: 32.8 G/DL (ref 31.5–36.5)
MCV RBC AUTO: 87 FL (ref 78–100)
MCV RBC AUTO: 87 FL (ref 78–100)
PHOSPHATE SERPL-MCNC: 2.1 MG/DL (ref 2.5–4.5)
PLATELET # BLD AUTO: 260 10E9/L (ref 150–450)
PLATELET # BLD AUTO: 269 10E9/L (ref 150–450)
POTASSIUM SERPL-SCNC: 3.8 MMOL/L (ref 3.4–5.3)
RBC # BLD AUTO: 3.16 10E12/L (ref 3.8–5.2)
RBC # BLD AUTO: 3.62 10E12/L (ref 3.8–5.2)
SODIUM SERPL-SCNC: 132 MMOL/L (ref 133–144)
WBC # BLD AUTO: 11.8 10E9/L (ref 4–11)
WBC # BLD AUTO: 9.6 10E9/L (ref 4–11)

## 2020-10-25 PROCEDURE — 250N000011 HC RX IP 250 OP 636: Performed by: STUDENT IN AN ORGANIZED HEALTH CARE EDUCATION/TRAINING PROGRAM

## 2020-10-25 PROCEDURE — 71045 X-RAY EXAM CHEST 1 VIEW: CPT

## 2020-10-25 PROCEDURE — 250N000013 HC RX MED GY IP 250 OP 250 PS 637: Performed by: STUDENT IN AN ORGANIZED HEALTH CARE EDUCATION/TRAINING PROGRAM

## 2020-10-25 PROCEDURE — 80048 BASIC METABOLIC PNL TOTAL CA: CPT

## 2020-10-25 PROCEDURE — 85027 COMPLETE CBC AUTOMATED: CPT

## 2020-10-25 PROCEDURE — 97116 GAIT TRAINING THERAPY: CPT | Mod: GP

## 2020-10-25 PROCEDURE — 83735 ASSAY OF MAGNESIUM: CPT

## 2020-10-25 PROCEDURE — 250N000013 HC RX MED GY IP 250 OP 250 PS 637: Performed by: THORACIC SURGERY (CARDIOTHORACIC VASCULAR SURGERY)

## 2020-10-25 PROCEDURE — 258N000003 HC RX IP 258 OP 636: Performed by: STUDENT IN AN ORGANIZED HEALTH CARE EDUCATION/TRAINING PROGRAM

## 2020-10-25 PROCEDURE — 85520 HEPARIN ASSAY: CPT

## 2020-10-25 PROCEDURE — 999N001017 HC STATISTIC GLUCOSE BY METER IP

## 2020-10-25 PROCEDURE — 84100 ASSAY OF PHOSPHORUS: CPT

## 2020-10-25 PROCEDURE — 99233 SBSQ HOSP IP/OBS HIGH 50: CPT | Mod: GC | Performed by: ANESTHESIOLOGY

## 2020-10-25 PROCEDURE — 250N000009 HC RX 250: Performed by: STUDENT IN AN ORGANIZED HEALTH CARE EDUCATION/TRAINING PROGRAM

## 2020-10-25 PROCEDURE — 999N000155 HC STATISTIC RAPCV CVP MONITORING

## 2020-10-25 PROCEDURE — 200N000002 HC R&B ICU UMMC

## 2020-10-25 PROCEDURE — 999N000157 HC STATISTIC RCP TIME EA 10 MIN

## 2020-10-25 PROCEDURE — 71045 X-RAY EXAM CHEST 1 VIEW: CPT | Mod: 26 | Performed by: RADIOLOGY

## 2020-10-25 PROCEDURE — 97530 THERAPEUTIC ACTIVITIES: CPT | Mod: GP

## 2020-10-25 RX ORDER — SODIUM CHLORIDE 1 G/1
1 TABLET ORAL
Status: DISCONTINUED | OUTPATIENT
Start: 2020-10-25 | End: 2020-10-27

## 2020-10-25 RX ORDER — FUROSEMIDE 10 MG/ML
20 INJECTION INTRAMUSCULAR; INTRAVENOUS ONCE
Status: COMPLETED | OUTPATIENT
Start: 2020-10-25 | End: 2020-10-25

## 2020-10-25 RX ORDER — ACETAMINOPHEN 325 MG/10.15ML
650 LIQUID ORAL EVERY 4 HOURS PRN
Status: DISCONTINUED | OUTPATIENT
Start: 2020-10-25 | End: 2020-10-27

## 2020-10-25 RX ADMIN — POTASSIUM & SODIUM PHOSPHATES POWDER PACK 280-160-250 MG 1 PACKET: 280-160-250 PACK at 20:30

## 2020-10-25 RX ADMIN — POTASSIUM PHOSPHATE, MONOBASIC AND POTASSIUM PHOSPHATE, DIBASIC 15 MMOL: 224; 236 INJECTION, SOLUTION INTRAVENOUS at 06:12

## 2020-10-25 RX ADMIN — DIGOXIN 250 MCG: 125 TABLET ORAL at 07:46

## 2020-10-25 RX ADMIN — FUROSEMIDE 20 MG: 10 INJECTION, SOLUTION INTRAVENOUS at 11:56

## 2020-10-25 RX ADMIN — ENOXAPARIN SODIUM 80 MG: 80 INJECTION SUBCUTANEOUS at 20:06

## 2020-10-25 RX ADMIN — SODIUM CHLORIDE TAB 1 GM 1 G: 1 TAB at 18:48

## 2020-10-25 RX ADMIN — ENOXAPARIN SODIUM 80 MG: 80 INJECTION SUBCUTANEOUS at 07:46

## 2020-10-25 RX ADMIN — POTASSIUM CHLORIDE 20 MEQ: 29.8 INJECTION, SOLUTION INTRAVENOUS at 10:26

## 2020-10-25 RX ADMIN — AMIODARONE HYDROCHLORIDE 400 MG: 200 TABLET ORAL at 20:06

## 2020-10-25 RX ADMIN — AMIODARONE HYDROCHLORIDE 400 MG: 200 TABLET ORAL at 07:46

## 2020-10-25 RX ADMIN — LIDOCAINE 1 PATCH: 560 PATCH PERCUTANEOUS; TOPICAL; TRANSDERMAL at 07:46

## 2020-10-25 RX ADMIN — OXYCODONE HYDROCHLORIDE 5 MG: 5 TABLET ORAL at 07:46

## 2020-10-25 RX ADMIN — POTASSIUM & SODIUM PHOSPHATES POWDER PACK 280-160-250 MG 1 PACKET: 280-160-250 PACK at 11:56

## 2020-10-25 RX ADMIN — POTASSIUM & SODIUM PHOSPHATES POWDER PACK 280-160-250 MG 1 PACKET: 280-160-250 PACK at 16:26

## 2020-10-25 RX ADMIN — GABAPENTIN 300 MG: 300 CAPSULE ORAL at 14:58

## 2020-10-25 RX ADMIN — GABAPENTIN 300 MG: 300 CAPSULE ORAL at 20:06

## 2020-10-25 RX ADMIN — GABAPENTIN 300 MG: 300 CAPSULE ORAL at 07:46

## 2020-10-25 RX ADMIN — MAGNESIUM SULFATE IN WATER 2 G: 40 INJECTION, SOLUTION INTRAVENOUS at 14:12

## 2020-10-25 ASSESSMENT — ACTIVITIES OF DAILY LIVING (ADL)
ADLS_ACUITY_SCORE: 14

## 2020-10-25 ASSESSMENT — MIFFLIN-ST. JEOR: SCORE: 1377.88

## 2020-10-25 NOTE — PLAN OF CARE
ICU End of Shift Summary. See flowsheets for vital signs and detailed assessment.    Changes this shift: A.fib w/ RVR majority of night with rate consistently in 120-130's. Switched to sinus rhythm around 03:45, rate 70-80's. Maintaining MAP goal >65 w/ few soft BP's. Breathing comfortably on 1-2 L NC. Using IS and acapella at bedside. Transitioned to PO amiodarone and subQ lovenox; heparin gtt and amiodarone gtt shut off. Using PCA pump for back/shoulder pain 2/2 incision & chest tube. Additional PRN pain meds and scheduled tylenol refused. Up to commode w/ SBA. Continent of stool and urine, 4x mixed w/ loose stool. Held scheduled lactulose. Intermittent nausea, refused medication intervention. Reduced output from L. Chest tube (water-seal). Phos being replaced.     Plan: Give K+ replacement with next meal to avoid GI discomfort. Continue POC and notify SICU with changes.

## 2020-10-25 NOTE — PROGRESS NOTES
Thoracic Surgery Progress Note    S: Converted to nsr around 0345, back into afib around 0830. Pressures have been stable. Pain controlled.     O:  Temp:  [97.3  F (36.3  C)-97.8  F (36.6  C)] 97.6  F (36.4  C)  Pulse:  [] 112  Resp:  [8-32] 16  BP: ()/(51-96) 126/79  SpO2:  [91 %-100 %] 100 %    Gen: NAD, resting comfortably  CV: irregularly irregular narrow-complex tachycardia  Chest: L chest tube output thin w/o air leak  Resp: nonlabored respirations, comfortable on NC  Abd: soft, ntnd  Ext: WWP  Incisions: cdi    I/O last 3 completed shifts:  In: 1133.18 [P.O.:380; I.V.:753.18]  Out: 1825 [Urine:1375; Chest Tube:450]  Chest tube by shift: 140  270  100  80    Labs  Reviewed  WBC 11.8 < 10.1  hgb 10.3 < 9.5    Na 132 < 135  Cr 0.54    A/P: Ijeoma Shah is a 70 year old female w/ hx of SCC of left lower lobe now s/p L thoracoscopic lobectomy converted to L thoracotomy with pulmonary artery repair and left lower lobectomy, with transcervical extended mediastinal lymphadenectomy 10/22/20, postoperatively intermittently in afib with RVR requiring pressors, currently stable but still in afib.    - Pain control with dilaudid PCA with oral pain meds. Epidural d/c'ed 10/23  - Afib with RVR. EP on board    > amio PO 40mg BID    > digoxin BID  - chest tube to water seal. Daily CXR  - diet advance as tolerated. Bowel regimen  - SCDs/heparin gtt  - Remainder of cares per SICU  - Dispo: SICU    Patient seen and d/w fellow, Dr. Mabry, and staff    Anel Worrell MD (PGY-1)  Surgery

## 2020-10-25 NOTE — PROGRESS NOTES
STAFF NOTE:  No major issues overnight other than HR better controlled    Exam awake, interactive  BP has been adequate off pressors; HR better controlled now  Chest tube tidaling, no airleak  CVP has been 7-10    Net negative 1.5L yesterday    soidum 132,  Rest of lytes and renal function fine  CBC looks a little hemoconcentrated    CXR     This is a 70F with atrial fibrillation with RVR after a thoracotomy. Digoxin appears to have finally controlled HR.  She should be stable to return to the floor, as long as potassium continues to be watched closely.  No need for aggressive diuresis today.        LUNG CANCER:  -s/p left lower lobectomy and right pulmonary arterioplasty with thoracotomy  -lidocaine patch, scheduled tylenol, gabapentin, and IV/oral narcotics  -chest tube to waterseal    ATRIAL FIBRILLATION:  -presumably long-standing paroxysmal, with acute exacerbation secondary to acute illness / fluid & electrolyte shifts; no structural disease on formal TTE a few weeks ago  -failed electrical cardioversion  -amiodarone oral 400mg po bid until has completed ~8g load, then discharge on 200mg po qd   -digoxin 250mcg/d; check level tomorrow   -CHADS-2 score of >2 implies the need for long-term systemic anticoagulation.  Lovenox through the rest of her hospitalization; will start xeralto before discharge    MISC:  -Code status is full code  -Boost as a dietary supplemtn to every meal  - updated at bedside  -therapeutic lovenox; PPI not necessary  -lines: PICC to follow CVP  -solitario out (will likely diurese tomorrow)  -anticipate discharge to home in ~5d days    Billing Statement: 35min E&M time.    MARY Gunderson MD  Clinical   Anesthesia / Critical Care  *[phone]

## 2020-10-25 NOTE — PROGRESS NOTES
Intensive Care Daily Note          Assessment and Plan:     Summary Statement:  Ijeoma Shah is a 70 year old female admitted on 10/22/2020 for left VATS converted to thoracotomy for removal of SCC of the lung, repair of right pulmonary artery, and mediastinoscopy with lymph node sampling.  She initially did well but then went into atrial fibrillation with RVR with associated hypotension on 10/23.  She was seen by cardiology who attempted cardioversion x2 without success.  She was started on an Amio gtt and Gage gtt and ultimately given Digoxin.  Her BP improved and was able to come off pressors but her HR has still been high.        My assessment and plan for this patient is as follows:  Neurology: Patient with acute post-op pain.    -Switch Tylenol to liquid and PRN  -Continue Gabapentin and Dilaudid PCA   Cardiovascular/Hemodynamics: Patient with A-fib with RVR.  She converted to sinus for a few hours this morning before converting back into A-fib with RVR.  -Continue Amio 400 mg BID and Digoxin 250 mcg BID  -Monitor HR - will consider adding amio bolus if HR goes up  -Lovenox 80 mg BID for anticoagulation for A-fib      Pulmonary: Patient breathing comfortably on mild supplemental O2.  Chest tube in place post-op.    -Chest tube with small air leak - management per thoracic   GI and Nutrition: Patient tolerating a regular diet.   Renal: Patient with good UOP and net negative 1.5 L yesterday.  Patient with worsening hyponatremia.  -Gentle diuresis with Lasix 20 mg IV x1  -Add salt tabs   Hematology and Oncology: Patient with A-fib.  -Lovenox 80 mg BID       Intensive Care: Central line: PICC line present  Arterial line: None  Jack catheter:  None  NG tube:  None  Drains:  Left Chest tube in place  Restraint:  Not needed       Top goals for today -Monitor HR            Key events/ Interval history - last 24 hours:    Patient converted to sinus rhythm for 4-5 hours and then went back into A-fib with RVR  this morning.               Problem list:     Mass of left lung    Paroxysmal atrial fibrillation (H)    * No resolved hospital problems. *           Medications:     Current Facility-Administered Medications Ordered in Epic   Medication Dose Route Frequency Last Rate Last Dose     acetaminophen (TYLENOL) solution 650 mg  650 mg Oral or Feeding Tube Q4H PRN         albuterol (PROVENTIL) neb solution 2.5 mg  2.5 mg Nebulization Q2H PRN         amiodarone (PACERONE) tablet 400 mg  400 mg Oral or Feeding Tube BID   400 mg at 10/25/20 0746     glucose gel 15-30 g  15-30 g Oral Q15 Min PRN        Or     dextrose 50 % injection 25-50 mL  25-50 mL Intravenous Q15 Min PRN        Or     glucagon injection 1 mg  1 mg Subcutaneous Q15 Min PRN         digoxin (LANOXIN) tablet 250 mcg  250 mcg Oral Daily   250 mcg at 10/25/20 0746     enoxaparin ANTICOAGULANT (LOVENOX) injection 80 mg  1 mg/kg (Dosing Weight) Subcutaneous Q12H   80 mg at 10/25/20 0746     gabapentin (NEURONTIN) capsule 300 mg  300 mg Oral TID   300 mg at 10/25/20 0746     Give   of usual dose of LONG ACTING insulin AM of procedure IF diabetic   Does not apply Continuous PRN         heparin lock flush 10 UNIT/ML injection 2-5 mL  2-5 mL Intracatheter Once PRN         heparin lock flush 10 UNIT/ML injection 5-10 mL  5-10 mL Intracatheter Q24H         heparin lock flush 10 UNIT/ML injection 5-10 mL  5-10 mL Intracatheter Q1H PRN         HOLD: Insulin - RAPID/SHORT acting AM of procedure IF diabetic   Does not apply HOLD         HOLD: Insulin - REGULAR AM of procedure IF diabetic   Does not apply HOLD         HOLD: Oral hypoglycemics AM of procedure IF diabetic   Does not apply HOLD         HYDROmorphone (DILAUDID) PCA 0.2 mg/mL OPIOID NAIVE CrCl > 50   Intravenous Continuous         influenza vac high-dose quad (FLUZONE HD) injection JUAN 0.7 mL  0.7 mL Intramuscular Prior to discharge         insulin aspart (NovoLOG) injection (RAPID ACTING)  1-3 Units  Subcutaneous TID AC         insulin aspart (NovoLOG) injection (RAPID ACTING)  1-3 Units Subcutaneous At Bedtime         Lidocaine (LIDOCARE) 4 % Patch 1 patch  1 patch Transdermal Q24H   1 patch at 10/25/20 0746     lidocaine (LMX4) cream   Topical Q1H PRN         lidocaine (LMX4) kit   Topical Q1H PRN         lidocaine 1 % 0.1-1 mL  0.1-1 mL Other Q1H PRN         lidocaine 1 % 0.1-5 mL  0.1-5 mL Other Q1H PRN   1 mL at 10/23/20 1023     lidocaine patch in PLACE   Transdermal Q8H         magnesium sulfate 2 g in water intermittent infusion  2 g Intravenous Daily PRN 50 mL/hr at 10/24/20 0600 2 g at 10/24/20 0600     magnesium sulfate 4 g in 100 mL sterile water (premade)  4 g Intravenous Q4H PRN         May take oral meds with a sip of water, the morning of FIOR procedure.   Does not apply Continuous PRN         melatonin tablet 3 mg  3 mg Oral At Bedtime PRN         naloxone (NARCAN) injection 0.1-0.4 mg  0.1-0.4 mg Intravenous Q2 Min PRN         ondansetron (ZOFRAN) injection 4 mg  4 mg Intravenous Q6H PRN         oxyCODONE (ROXICODONE) tablet 5-10 mg  5-10 mg Oral Q4H PRN   5 mg at 10/25/20 0746     potassium & sodium phosphates (NEUTRA-PHOS) Packet 1 packet  1 packet Oral or Feeding Tube 4x Daily   1 packet at 10/25/20 1156     potassium chloride (KLOR-CON) Packet 20-40 mEq  20-40 mEq Oral or Feeding Tube Q2H PRN         potassium chloride 10 mEq in 100 mL intermittent infusion with 10 mg lidocaine  10 mEq Intravenous Q1H PRN         potassium chloride 10 mEq in 100 mL sterile water intermittent infusion (premix)  10 mEq Intravenous Q1H PRN         potassium chloride 20 mEq in 50 mL intermittent infusion  20 mEq Intravenous Q1H PRN 50 mL/hr at 10/25/20 1026 20 mEq at 10/25/20 1026     potassium chloride ER (KLOR-CON M) CR tablet 20-40 mEq  20-40 mEq Oral Q2H PRN   Stopped at 10/25/20 0654     potassium phosphate 10 mmol in D5W 250 mL intermittent infusion  10 mmol Intravenous Daily PRN         potassium  phosphate 15 mmol in D5W 250 mL intermittent infusion  15 mmol Intravenous Daily PRN 62.5 mL/hr at 10/25/20 0612 15 mmol at 10/25/20 0612     potassium phosphate 20 mmol in D5W 250 mL intermittent infusion  20 mmol Intravenous Q6H PRN         potassium phosphate 20 mmol in D5W 500 mL intermittent infusion  20 mmol Intravenous Q6H PRN         potassium phosphate 25 mmol in D5W 500 mL intermittent infusion  25 mmol Intravenous Q8H PRN         senna-docusate (SENOKOT-S/PERICOLACE) 8.6-50 MG per tablet 2 tablet  2 tablet Oral BID   2 tablet at 10/22/20 2006     sodium chloride (OCEAN) 0.65 % nasal spray 1 spray  1 spray Both Nostrils Q1H PRN   1 spray at 10/24/20 1122     sodium chloride (PF) 0.9% PF flush 10-20 mL  10-20 mL Intracatheter q1 min prn         sodium chloride (PF) 0.9% PF flush 3 mL  3 mL Intravenous Q1H PRN         sodium chloride (PF) 0.9% PF flush 3 mL  3 mL Intravenous Q8H   3 mL at 10/25/20 0746     sodium chloride (PF) 0.9% PF flush 3 mL  3 mL Intracatheter q1 min prn         sodium chloride (PF) 0.9% PF flush 3 mL  3 mL Intracatheter Q8H   3 mL at 10/25/20 0746     sodium chloride (PF) 0.9% PF flush 5-50 mL  5-50 mL Intracatheter Once PRN   30 mL at 10/23/20 1024     sodium chloride tablet 1 g  1 g Oral or Feeding Tube TID w/meals         No current [facility]-ordered outpatient medications on file.            Physical Exam:   Vital Sign Ranges  Temperature Temp  Av.5  F (36.4  C)  Min: 97.3  F (36.3  C)  Max: 97.8  F (36.6  C)   Blood pressure Systolic (24hrs), Av , Min:86 , Max:137        Diastolic (24hrs), Av, Min:51, Max:93      Pulse Pulse  Av.3  Min: 67  Max: 163   Respirations Resp  Av.4  Min: 9  Max: 32   Pulse oximetry SpO2  Av.7 %  Min: 89 %  Max: 100 %       Intake/Output Summary (Last 24 hours) at 10/25/2020 1258  Last data filed at 10/25/2020 1200  Gross per 24 hour   Intake 1117.33 ml   Output 2240 ml   Net -1122.67 ml     General Appearance:   NAD, lying  comfortably in bed   HEENT:   Head is atraumatic   Chest and Lungs:   Symmetrical chest shape and movements with each tidal breath   Cardiovascular:   Atrial fibrillation with HR in 150s     Abdomen:   Non-distended, soft, non tender   Musculoskeletal:   No edema   Extremities and Skin:   Warm, well perfused   Neuro:   Alert and oriented x 3   Drains and Tubes:   Left chest tube on water seal with minor air leak   Intravascular Access and Device:   PICC line present            Data:   Labs:  Lab Results   Component Value Date    WBC 11.8 (H) 10/25/2020    HGB 10.3 (L) 10/25/2020    HCT 31.4 (L) 10/25/2020     10/25/2020     (L) 10/25/2020    POTASSIUM 3.8 10/25/2020    CHLORIDE 97 10/25/2020    CO2 29 10/25/2020    BUN 6 (L) 10/25/2020    CR 0.54 10/25/2020     (H) 10/25/2020    NTBNP 191 (H) 09/29/2020    AST 19 06/23/2020    ALT 18 06/23/2020    ALKPHOS 106 06/23/2020    BILITOTAL 0.3 06/23/2020     Imaging:    CXR:  1) Increased interstitial and airspace opacities most notably in the left lower lung. Findings may represent atelectasis or infection or pulmonary edema.  2) Small right-sided pleural effusion.  3) Left-sided chest tube in unchanged position. Left-sided pneumothorax is not appreciated.      Tigre Calixto MD on 10/25/2020 at 1:22 PM

## 2020-10-25 NOTE — PLAN OF CARE
ICU End of Shift Summary. See flowsheets for vital signs and detailed assessment.    Changes this shift: Pain seems to be improving, prn oxy given x1. Need for PCA dilaudid doses lessening. Tolerated walk in natarajan with therapy, SBA. Afebrile. Maintains sinus rhythm 70s-80s while sleeping or at rest, converted to a fib with RVR in morning and early afternoon when awake and more mobile. A fib rates 130s-160s. BP stable throughout day. Remains on 2L when awake, dyspneic on exertion. Chest tube output lowering. Poor appetite, encouraging boost drinks. 20 lasix given, good urine and stool output. K and mag replaced.     Plan: Continue to monitor heart rate/rhythm and any associated symptoms. Update team of changes.       Problem: Bowel Motility Impaired (Surgery Nonspecified)  Goal: Effective Bowel Elimination  Outcome: Improving     Problem: Infection (Surgery Nonspecified)  Goal: Absence of Infection Signs and Symptoms  Outcome: Improving     Problem: Pain (Surgery Nonspecified)  Goal: Acceptable Pain Control  Outcome: Improving     Problem: Postoperative Nausea and Vomiting (Surgery Nonspecified)  Goal: Nausea and Vomiting Relief  Outcome: Improving     Problem: Postoperative Urinary Retention (Surgery Nonspecified)  Goal: Effective Urinary Elimination  Outcome: Improving     Problem: OT General Care Plan  Goal: Toilet Transfer/Toileting (OT)  Description: Toilet Transfer/Toileting (OT)  Outcome: Improving

## 2020-10-26 ENCOUNTER — APPOINTMENT (OUTPATIENT)
Dept: GENERAL RADIOLOGY | Facility: CLINIC | Age: 70
DRG: 163 | End: 2020-10-26
Attending: THORACIC SURGERY (CARDIOTHORACIC VASCULAR SURGERY)
Payer: MEDICARE

## 2020-10-26 ENCOUNTER — APPOINTMENT (OUTPATIENT)
Dept: PHYSICAL THERAPY | Facility: CLINIC | Age: 70
DRG: 163 | End: 2020-10-26
Attending: THORACIC SURGERY (CARDIOTHORACIC VASCULAR SURGERY)
Payer: MEDICARE

## 2020-10-26 LAB
ANION GAP SERPL CALCULATED.3IONS-SCNC: 3 MMOL/L (ref 3–14)
BUN SERPL-MCNC: 8 MG/DL (ref 7–30)
CALCIUM SERPL-MCNC: 8 MG/DL (ref 8.5–10.1)
CHLORIDE SERPL-SCNC: 94 MMOL/L (ref 94–109)
CO2 SERPL-SCNC: 36 MMOL/L (ref 20–32)
CREAT SERPL-MCNC: 0.58 MG/DL (ref 0.52–1.04)
DIGOXIN SERPL-MCNC: 1 UG/L (ref 0.5–2)
ERYTHROCYTE [DISTWIDTH] IN BLOOD BY AUTOMATED COUNT: 13.1 % (ref 10–15)
GFR SERPL CREATININE-BSD FRML MDRD: >90 ML/MIN/{1.73_M2}
GLUCOSE BLDC GLUCOMTR-MCNC: 101 MG/DL (ref 70–99)
GLUCOSE BLDC GLUCOMTR-MCNC: 108 MG/DL (ref 70–99)
GLUCOSE BLDC GLUCOMTR-MCNC: 119 MG/DL (ref 70–99)
GLUCOSE BLDC GLUCOMTR-MCNC: 146 MG/DL (ref 70–99)
GLUCOSE BLDC GLUCOMTR-MCNC: 94 MG/DL (ref 70–99)
GLUCOSE SERPL-MCNC: 200 MG/DL (ref 70–99)
HCT VFR BLD AUTO: 29.6 % (ref 35–47)
HGB BLD-MCNC: 9.5 G/DL (ref 11.7–15.7)
INTERPRETATION ECG - MUSE: NORMAL
LMWH PPP CHRO-ACNC: 0.91 IU/ML
MAGNESIUM SERPL-MCNC: 2.1 MG/DL (ref 1.6–2.3)
MCH RBC QN AUTO: 27.9 PG (ref 26.5–33)
MCHC RBC AUTO-ENTMCNC: 32.1 G/DL (ref 31.5–36.5)
MCV RBC AUTO: 87 FL (ref 78–100)
PHOSPHATE SERPL-MCNC: 2.1 MG/DL (ref 2.5–4.5)
PLATELET # BLD AUTO: 294 10E9/L (ref 150–450)
POTASSIUM SERPL-SCNC: 3.6 MMOL/L (ref 3.4–5.3)
RBC # BLD AUTO: 3.4 10E12/L (ref 3.8–5.2)
SODIUM SERPL-SCNC: 133 MMOL/L (ref 133–144)
WBC # BLD AUTO: 10.1 10E9/L (ref 4–11)

## 2020-10-26 PROCEDURE — 84100 ASSAY OF PHOSPHORUS: CPT

## 2020-10-26 PROCEDURE — 250N000013 HC RX MED GY IP 250 OP 250 PS 637: Performed by: STUDENT IN AN ORGANIZED HEALTH CARE EDUCATION/TRAINING PROGRAM

## 2020-10-26 PROCEDURE — 120N000003 HC R&B IMCU UMMC

## 2020-10-26 PROCEDURE — 83735 ASSAY OF MAGNESIUM: CPT

## 2020-10-26 PROCEDURE — 71045 X-RAY EXAM CHEST 1 VIEW: CPT | Mod: 26 | Performed by: RADIOLOGY

## 2020-10-26 PROCEDURE — 999N000065 XR CHEST PORT 1 VW

## 2020-10-26 PROCEDURE — 85027 COMPLETE CBC AUTOMATED: CPT

## 2020-10-26 PROCEDURE — 250N000011 HC RX IP 250 OP 636: Performed by: STUDENT IN AN ORGANIZED HEALTH CARE EDUCATION/TRAINING PROGRAM

## 2020-10-26 PROCEDURE — 80048 BASIC METABOLIC PNL TOTAL CA: CPT

## 2020-10-26 PROCEDURE — 93010 ELECTROCARDIOGRAM REPORT: CPT | Performed by: INTERNAL MEDICINE

## 2020-10-26 PROCEDURE — 999N001017 HC STATISTIC GLUCOSE BY METER IP

## 2020-10-26 PROCEDURE — 258N000003 HC RX IP 258 OP 636: Performed by: STUDENT IN AN ORGANIZED HEALTH CARE EDUCATION/TRAINING PROGRAM

## 2020-10-26 PROCEDURE — 250N000009 HC RX 250: Performed by: STUDENT IN AN ORGANIZED HEALTH CARE EDUCATION/TRAINING PROGRAM

## 2020-10-26 PROCEDURE — 250N000013 HC RX MED GY IP 250 OP 250 PS 637: Performed by: THORACIC SURGERY (CARDIOTHORACIC VASCULAR SURGERY)

## 2020-10-26 PROCEDURE — 93005 ELECTROCARDIOGRAM TRACING: CPT

## 2020-10-26 PROCEDURE — 80162 ASSAY OF DIGOXIN TOTAL: CPT

## 2020-10-26 PROCEDURE — 71045 X-RAY EXAM CHEST 1 VIEW: CPT

## 2020-10-26 PROCEDURE — 97116 GAIT TRAINING THERAPY: CPT | Mod: GP

## 2020-10-26 PROCEDURE — 999N000155 HC STATISTIC RAPCV CVP MONITORING

## 2020-10-26 PROCEDURE — 85520 HEPARIN ASSAY: CPT

## 2020-10-26 PROCEDURE — 97530 THERAPEUTIC ACTIVITIES: CPT | Mod: GP

## 2020-10-26 PROCEDURE — 250N000009 HC RX 250

## 2020-10-26 RX ORDER — METOPROLOL TARTRATE 1 MG/ML
5 INJECTION, SOLUTION INTRAVENOUS EVERY 5 MIN PRN
Status: DISCONTINUED | OUTPATIENT
Start: 2020-10-26 | End: 2020-10-28 | Stop reason: HOSPADM

## 2020-10-26 RX ORDER — METOPROLOL TARTRATE 1 MG/ML
5 INJECTION, SOLUTION INTRAVENOUS EVERY 5 MIN PRN
Status: DISCONTINUED | OUTPATIENT
Start: 2020-10-26 | End: 2020-10-26

## 2020-10-26 RX ORDER — METOPROLOL TARTRATE 1 MG/ML
INJECTION, SOLUTION INTRAVENOUS
Status: COMPLETED
Start: 2020-10-26 | End: 2020-10-26

## 2020-10-26 RX ORDER — FUROSEMIDE 10 MG/ML
20 INJECTION INTRAMUSCULAR; INTRAVENOUS ONCE
Status: CANCELLED | OUTPATIENT
Start: 2020-10-26 | End: 2020-10-26

## 2020-10-26 RX ORDER — FUROSEMIDE 20 MG
20 TABLET ORAL ONCE
Status: COMPLETED | OUTPATIENT
Start: 2020-10-26 | End: 2020-10-26

## 2020-10-26 RX ORDER — AMOXICILLIN 250 MG
2 CAPSULE ORAL AT BEDTIME
Status: DISCONTINUED | OUTPATIENT
Start: 2020-10-26 | End: 2020-10-27

## 2020-10-26 RX ORDER — HYDROMORPHONE HYDROCHLORIDE 1 MG/ML
0.3 INJECTION, SOLUTION INTRAMUSCULAR; INTRAVENOUS; SUBCUTANEOUS
Status: DISCONTINUED | OUTPATIENT
Start: 2020-10-26 | End: 2020-10-28 | Stop reason: HOSPADM

## 2020-10-26 RX ADMIN — POTASSIUM & SODIUM PHOSPHATES POWDER PACK 280-160-250 MG 1 PACKET: 280-160-250 PACK at 19:43

## 2020-10-26 RX ADMIN — POTASSIUM CHLORIDE 20 MEQ: 29.8 INJECTION, SOLUTION INTRAVENOUS at 05:48

## 2020-10-26 RX ADMIN — GABAPENTIN 300 MG: 300 CAPSULE ORAL at 08:22

## 2020-10-26 RX ADMIN — AMIODARONE HYDROCHLORIDE 400 MG: 200 TABLET ORAL at 08:22

## 2020-10-26 RX ADMIN — OXYCODONE HYDROCHLORIDE 10 MG: 5 TABLET ORAL at 19:46

## 2020-10-26 RX ADMIN — METOPROLOL TARTRATE 5 MG: 5 INJECTION INTRAVENOUS at 03:27

## 2020-10-26 RX ADMIN — Medication 12.5 MG: at 23:09

## 2020-10-26 RX ADMIN — ENOXAPARIN SODIUM 80 MG: 80 INJECTION SUBCUTANEOUS at 19:43

## 2020-10-26 RX ADMIN — GABAPENTIN 300 MG: 300 CAPSULE ORAL at 19:43

## 2020-10-26 RX ADMIN — SODIUM CHLORIDE TAB 1 GM 1 G: 1 TAB at 12:28

## 2020-10-26 RX ADMIN — AMIODARONE HYDROCHLORIDE 400 MG: 200 TABLET ORAL at 19:43

## 2020-10-26 RX ADMIN — POTASSIUM PHOSPHATE, MONOBASIC AND POTASSIUM PHOSPHATE, DIBASIC 15 MMOL: 224; 236 INJECTION, SOLUTION INTRAVENOUS at 06:59

## 2020-10-26 RX ADMIN — OXYCODONE HYDROCHLORIDE 5 MG: 5 TABLET ORAL at 01:20

## 2020-10-26 RX ADMIN — SODIUM CHLORIDE TAB 1 GM 1 G: 1 TAB at 08:26

## 2020-10-26 RX ADMIN — MAGNESIUM SULFATE IN WATER 2 G: 40 INJECTION, SOLUTION INTRAVENOUS at 03:59

## 2020-10-26 RX ADMIN — DIGOXIN 250 MCG: 125 TABLET ORAL at 08:22

## 2020-10-26 RX ADMIN — GABAPENTIN 300 MG: 300 CAPSULE ORAL at 14:42

## 2020-10-26 RX ADMIN — POTASSIUM & SODIUM PHOSPHATES POWDER PACK 280-160-250 MG 1 PACKET: 280-160-250 PACK at 16:02

## 2020-10-26 RX ADMIN — POTASSIUM & SODIUM PHOSPHATES POWDER PACK 280-160-250 MG 1 PACKET: 280-160-250 PACK at 08:22

## 2020-10-26 RX ADMIN — POTASSIUM & SODIUM PHOSPHATES POWDER PACK 280-160-250 MG 1 PACKET: 280-160-250 PACK at 12:28

## 2020-10-26 RX ADMIN — SODIUM CHLORIDE TAB 1 GM 1 G: 1 TAB at 18:00

## 2020-10-26 RX ADMIN — METOPROLOL TARTRATE 5 MG: 5 INJECTION INTRAVENOUS at 22:09

## 2020-10-26 RX ADMIN — ENOXAPARIN SODIUM 80 MG: 80 INJECTION SUBCUTANEOUS at 08:25

## 2020-10-26 RX ADMIN — FUROSEMIDE 20 MG: 20 TABLET ORAL at 10:31

## 2020-10-26 ASSESSMENT — ACTIVITIES OF DAILY LIVING (ADL)
ADLS_ACUITY_SCORE: 15

## 2020-10-26 ASSESSMENT — MIFFLIN-ST. JEOR: SCORE: 1389.88

## 2020-10-26 NOTE — PROGRESS NOTES
"SPIRITUAL HEALTH SERVICES  SPIRITUAL ASSESSMENT Progress Note  [facility] 4C     REFERRAL SOURCE: pt had been seen by  pre-surgery.  This was follow-up 4C unit  visit to assess needs.    Pt very pleasant, sitting up in chair.  in room, very supportive. Pt/spouse shared that pt \"is doing better today.\" Per pt she is scheduled to transfer to floor when a bed becomes available.   said he is grateful that \"so many people are praying for Salima.\" Prayer was welcomed today.    PLAN:  support available when pt transfers to floor.    Kenny Torres) Fernanda Rueda M.Div., [facility]  Staff   Pager [phone]      "

## 2020-10-26 NOTE — PLAN OF CARE
ICU End of Shift Summary. See flowsheets for vital signs and detailed assessment.    Changes this shift: Pt went into A.fib w/ RVR, rate 140-160's, around 02:45. SICU resident notified. 12-lead EKG ordered and 5 mg metoprolol given IVP. Pt went back into sinus rhythm shortly after. Episode lasted about 1 hr total. BP's stable. ESCALONA and more frequent reports of SOB ever since A.fib episode. Lung sounds clear/diminished. Requiring 1-2 L NC. Chest tube to water seal with 210 mL serosanguineous output. Continues to have a poor appetite and intermittent nausea. Tolerated one boost shake overnight. Mag, potassium, and phos replaced. Still using PCA pump for pain control. PRN 5 mg oxy given x1.     Plan: Notify SICU team if patient goes back into A.fib. Continue POC. Possible removal of chest tube. Assess readiness to transfer out of ICU.

## 2020-10-26 NOTE — PROGRESS NOTES
Thoracic Surgery Progress Note    S: In afib RVR around 3am, received metop 5mg x1. Pain controlled. Stool x6.    O:  Temp:  [97.5  F (36.4  C)-97.8  F (36.6  C)] 97.5  F (36.4  C)  Pulse:  [] 82  Resp:  [9-32] 19  BP: ()/() 116/72  SpO2:  [89 %-100 %] 98 %    Gen: NAD, resting comfortably  CV: RRR  Chest: L chest tube output thin w/o air leak  Resp: nonlabored respirations, comfortable on NC  Abd: soft, ntnd  Ext: WWP  Incisions: cdi    I/O last 3 completed shifts:  In: 1334.5 [P.O.:722; I.V.:612.5]  Out: 1620 [Urine:1050; Other:200; Chest Tube:370]  Chest tube by shift: 80  140  90  140    Labs  Reviewed  Na 133 < 132  Cr 0.58 < 0.54    WBC 10.1 < 9.6   Hgb 9.5 < 8.9  Plt 29.6 < 27.4    Dig 1.0    A/P: Ijeoma Shah is a 70 year old female w/ hx of SCC of left lower lobe now s/p L thoracoscopic lobectomy converted to L thoracotomy with pulmonary artery repair and left lower lobectomy, with transcervical extended mediastinal lymphadenectomy 10/22/20, postoperatively intermittently in afib with RVR requiring pressors, currently stable in nsr.    Changes today:  - Transfer to  with tele  - Remove chest tube    - Pain control with dilaudid PCA with oral pain meds. Epidural d/c'ed 10/23  - Afib with RVR, currently in sinus. EP on board    > amio PO 40mg BID    > digoxin BID  - Lasix 20mg PO x1 for acute noncardiogenic pulmonary edema postop  - Remove chest tube, f/u XR  - diet advance as tolerated. Bowel regimen  - SCDs/heparin gtt  - PT/OT  - Dispo: 6B      Patient seen and d/w fellow, Dr. Mabry, and staff    Anel Worrell MD (PGY-1)  Surgery

## 2020-10-26 NOTE — PLAN OF CARE
Transferred to:  Room 37 Bed 1 at 1715. Report given to DEIDRE Robertson.  Status at time of transfer: VSS. NSR. A/O x4. Denied pain, nausea, and vomiting. Oxygen on via NC at 3 lpm. Right PIV and Right triple lumen PICC saline locked.   Belongings: Brought upstairs by Richard Green.  Jack removed? (if no, why?): N/A.  Chart and medications: Sent along with patient.  Family notified: Richard at bedside.

## 2020-10-27 ENCOUNTER — APPOINTMENT (OUTPATIENT)
Dept: PHYSICAL THERAPY | Facility: CLINIC | Age: 70
DRG: 163 | End: 2020-10-27
Attending: THORACIC SURGERY (CARDIOTHORACIC VASCULAR SURGERY)
Payer: MEDICARE

## 2020-10-27 ENCOUNTER — APPOINTMENT (OUTPATIENT)
Dept: OCCUPATIONAL THERAPY | Facility: CLINIC | Age: 70
DRG: 163 | End: 2020-10-27
Attending: THORACIC SURGERY (CARDIOTHORACIC VASCULAR SURGERY)
Payer: MEDICARE

## 2020-10-27 ENCOUNTER — APPOINTMENT (OUTPATIENT)
Dept: GENERAL RADIOLOGY | Facility: CLINIC | Age: 70
DRG: 163 | End: 2020-10-27
Attending: THORACIC SURGERY (CARDIOTHORACIC VASCULAR SURGERY)
Payer: MEDICARE

## 2020-10-27 LAB
ANION GAP SERPL CALCULATED.3IONS-SCNC: <1 MMOL/L (ref 3–14)
BUN SERPL-MCNC: 7 MG/DL (ref 7–30)
CALCIUM SERPL-MCNC: 8.1 MG/DL (ref 8.5–10.1)
CHLORIDE SERPL-SCNC: 98 MMOL/L (ref 94–109)
CO2 SERPL-SCNC: 37 MMOL/L (ref 20–32)
COPATH REPORT: NORMAL
CREAT SERPL-MCNC: 0.58 MG/DL (ref 0.52–1.04)
ERYTHROCYTE [DISTWIDTH] IN BLOOD BY AUTOMATED COUNT: 13.2 % (ref 10–15)
GFR SERPL CREATININE-BSD FRML MDRD: >90 ML/MIN/{1.73_M2}
GLUCOSE BLDC GLUCOMTR-MCNC: 105 MG/DL (ref 70–99)
GLUCOSE BLDC GLUCOMTR-MCNC: 164 MG/DL (ref 70–99)
GLUCOSE BLDC GLUCOMTR-MCNC: 171 MG/DL (ref 70–99)
GLUCOSE BLDC GLUCOMTR-MCNC: 186 MG/DL (ref 70–99)
GLUCOSE BLDC GLUCOMTR-MCNC: 98 MG/DL (ref 70–99)
GLUCOSE BLDC GLUCOMTR-MCNC: 99 MG/DL (ref 70–99)
GLUCOSE SERPL-MCNC: 104 MG/DL (ref 70–99)
HCT VFR BLD AUTO: 28.2 % (ref 35–47)
HGB BLD-MCNC: 9.1 G/DL (ref 11.7–15.7)
LMWH PPP CHRO-ACNC: 0.89 IU/ML
MAGNESIUM SERPL-MCNC: 2.1 MG/DL (ref 1.6–2.3)
MCH RBC QN AUTO: 28.3 PG (ref 26.5–33)
MCHC RBC AUTO-ENTMCNC: 32.3 G/DL (ref 31.5–36.5)
MCV RBC AUTO: 88 FL (ref 78–100)
PHOSPHATE SERPL-MCNC: 3.3 MG/DL (ref 2.5–4.5)
PLATELET # BLD AUTO: 270 10E9/L (ref 150–450)
POTASSIUM SERPL-SCNC: 4 MMOL/L (ref 3.4–5.3)
RBC # BLD AUTO: 3.22 10E12/L (ref 3.8–5.2)
SODIUM SERPL-SCNC: 136 MMOL/L (ref 133–144)
WBC # BLD AUTO: 6.6 10E9/L (ref 4–11)

## 2020-10-27 PROCEDURE — 250N000013 HC RX MED GY IP 250 OP 250 PS 637: Performed by: STUDENT IN AN ORGANIZED HEALTH CARE EDUCATION/TRAINING PROGRAM

## 2020-10-27 PROCEDURE — 999N001017 HC STATISTIC GLUCOSE BY METER IP

## 2020-10-27 PROCEDURE — 250N000011 HC RX IP 250 OP 636: Performed by: STUDENT IN AN ORGANIZED HEALTH CARE EDUCATION/TRAINING PROGRAM

## 2020-10-27 PROCEDURE — 36415 COLL VENOUS BLD VENIPUNCTURE: CPT

## 2020-10-27 PROCEDURE — 120N000003 HC R&B IMCU UMMC

## 2020-10-27 PROCEDURE — 80048 BASIC METABOLIC PNL TOTAL CA: CPT

## 2020-10-27 PROCEDURE — 97530 THERAPEUTIC ACTIVITIES: CPT | Mod: GO | Performed by: OCCUPATIONAL THERAPIST

## 2020-10-27 PROCEDURE — 97530 THERAPEUTIC ACTIVITIES: CPT | Mod: GP

## 2020-10-27 PROCEDURE — 99232 SBSQ HOSP IP/OBS MODERATE 35: CPT | Mod: GC | Performed by: INTERNAL MEDICINE

## 2020-10-27 PROCEDURE — 71045 X-RAY EXAM CHEST 1 VIEW: CPT

## 2020-10-27 PROCEDURE — 250N000012 HC RX MED GY IP 250 OP 636 PS 637: Performed by: STUDENT IN AN ORGANIZED HEALTH CARE EDUCATION/TRAINING PROGRAM

## 2020-10-27 PROCEDURE — 250N000013 HC RX MED GY IP 250 OP 250 PS 637: Performed by: THORACIC SURGERY (CARDIOTHORACIC VASCULAR SURGERY)

## 2020-10-27 PROCEDURE — 85027 COMPLETE CBC AUTOMATED: CPT

## 2020-10-27 PROCEDURE — 97535 SELF CARE MNGMENT TRAINING: CPT | Mod: GO | Performed by: OCCUPATIONAL THERAPIST

## 2020-10-27 PROCEDURE — 83735 ASSAY OF MAGNESIUM: CPT

## 2020-10-27 PROCEDURE — 84100 ASSAY OF PHOSPHORUS: CPT

## 2020-10-27 PROCEDURE — 97116 GAIT TRAINING THERAPY: CPT | Mod: GP

## 2020-10-27 PROCEDURE — 85520 HEPARIN ASSAY: CPT

## 2020-10-27 PROCEDURE — 71045 X-RAY EXAM CHEST 1 VIEW: CPT | Mod: 26 | Performed by: RADIOLOGY

## 2020-10-27 RX ORDER — ACETAMINOPHEN 325 MG/1
325-650 TABLET ORAL EVERY 6 HOURS PRN
Qty: 60 TABLET | Refills: 0 | Status: SHIPPED | OUTPATIENT
Start: 2020-10-27 | End: 2021-08-24

## 2020-10-27 RX ORDER — ACETAMINOPHEN 325 MG/1
650 TABLET ORAL EVERY 4 HOURS PRN
Status: DISCONTINUED | OUTPATIENT
Start: 2020-10-27 | End: 2020-10-28 | Stop reason: HOSPADM

## 2020-10-27 RX ORDER — OXYCODONE HYDROCHLORIDE 5 MG/1
5-10 TABLET ORAL EVERY 4 HOURS PRN
Qty: 30 TABLET | Refills: 0 | Status: SHIPPED | OUTPATIENT
Start: 2020-10-27 | End: 2021-03-18

## 2020-10-27 RX ORDER — ACETAMINOPHEN 325 MG/1
325 TABLET ORAL EVERY 4 HOURS PRN
Status: DISCONTINUED | OUTPATIENT
Start: 2020-10-27 | End: 2020-10-27

## 2020-10-27 RX ORDER — METOPROLOL TARTRATE 25 MG/1
25 TABLET, FILM COATED ORAL 2 TIMES DAILY
Status: DISCONTINUED | OUTPATIENT
Start: 2020-10-27 | End: 2020-10-28 | Stop reason: HOSPADM

## 2020-10-27 RX ORDER — AMOXICILLIN 250 MG
2 CAPSULE ORAL AT BEDTIME
Qty: 30 TABLET | Refills: 0 | Status: SHIPPED | OUTPATIENT
Start: 2020-10-27 | End: 2020-12-01

## 2020-10-27 RX ORDER — GABAPENTIN 300 MG/1
300 CAPSULE ORAL 3 TIMES DAILY
Qty: 21 CAPSULE | Refills: 0 | Status: SHIPPED | OUTPATIENT
Start: 2020-10-27 | End: 2020-11-17

## 2020-10-27 RX ORDER — METOPROLOL TARTRATE 25 MG/1
25 TABLET, FILM COATED ORAL 2 TIMES DAILY
Qty: 60 TABLET | Refills: 0 | Status: SHIPPED | OUTPATIENT
Start: 2020-10-27 | End: 2020-12-01

## 2020-10-27 RX ADMIN — POTASSIUM & SODIUM PHOSPHATES POWDER PACK 280-160-250 MG 1 PACKET: 280-160-250 PACK at 19:59

## 2020-10-27 RX ADMIN — INSULIN ASPART 1 UNITS: 100 INJECTION, SOLUTION INTRAVENOUS; SUBCUTANEOUS at 12:44

## 2020-10-27 RX ADMIN — AMIODARONE HYDROCHLORIDE 400 MG: 200 TABLET ORAL at 09:01

## 2020-10-27 RX ADMIN — MAGNESIUM SULFATE IN WATER 2 G: 40 INJECTION, SOLUTION INTRAVENOUS at 09:11

## 2020-10-27 RX ADMIN — Medication 5 ML: at 11:51

## 2020-10-27 RX ADMIN — DIGOXIN 250 MCG: 125 TABLET ORAL at 09:01

## 2020-10-27 RX ADMIN — AMIODARONE HYDROCHLORIDE 400 MG: 200 TABLET ORAL at 19:59

## 2020-10-27 RX ADMIN — Medication 12.5 MG: at 09:01

## 2020-10-27 RX ADMIN — GABAPENTIN 300 MG: 300 CAPSULE ORAL at 13:08

## 2020-10-27 RX ADMIN — Medication 12.5 MG: at 11:51

## 2020-10-27 RX ADMIN — Medication 5 ML: at 04:48

## 2020-10-27 RX ADMIN — INSULIN ASPART 1 UNITS: 100 INJECTION, SOLUTION INTRAVENOUS; SUBCUTANEOUS at 18:09

## 2020-10-27 RX ADMIN — OXYCODONE HYDROCHLORIDE 10 MG: 5 TABLET ORAL at 17:29

## 2020-10-27 RX ADMIN — GABAPENTIN 300 MG: 300 CAPSULE ORAL at 19:59

## 2020-10-27 RX ADMIN — GABAPENTIN 300 MG: 300 CAPSULE ORAL at 09:01

## 2020-10-27 RX ADMIN — POTASSIUM & SODIUM PHOSPHATES POWDER PACK 280-160-250 MG 1 PACKET: 280-160-250 PACK at 16:14

## 2020-10-27 RX ADMIN — POTASSIUM & SODIUM PHOSPHATES POWDER PACK 280-160-250 MG 1 PACKET: 280-160-250 PACK at 11:50

## 2020-10-27 RX ADMIN — SALINE NASAL SPRAY 1 SPRAY: 1.5 SOLUTION NASAL at 13:09

## 2020-10-27 RX ADMIN — METOPROLOL TARTRATE 25 MG: 25 TABLET, FILM COATED ORAL at 19:58

## 2020-10-27 RX ADMIN — POTASSIUM CHLORIDE 20 MEQ: 750 TABLET, EXTENDED RELEASE ORAL at 06:59

## 2020-10-27 RX ADMIN — POTASSIUM & SODIUM PHOSPHATES POWDER PACK 280-160-250 MG 1 PACKET: 280-160-250 PACK at 09:02

## 2020-10-27 RX ADMIN — ENOXAPARIN SODIUM 80 MG: 80 INJECTION SUBCUTANEOUS at 09:02

## 2020-10-27 RX ADMIN — ENOXAPARIN SODIUM 80 MG: 80 INJECTION SUBCUTANEOUS at 20:01

## 2020-10-27 ASSESSMENT — ACTIVITIES OF DAILY LIVING (ADL)
ADLS_ACUITY_SCORE: 15

## 2020-10-27 ASSESSMENT — MIFFLIN-ST. JEOR: SCORE: 1357.88

## 2020-10-27 NOTE — PROGRESS NOTES
Electrophysiology Follow up Note   EP Attending: .   Date of Service: 10/27/2020      Assessment:   Ms Shah ezra 70 year old female with Hx of squamous cell lung carcinoma of the lung admitted on 10/22/2020 for left VATS w/ conversion to thoracotomy, left lower lobectomy & pulmonary artery repair. Electrophysiology consulted on POD#1 for post-operative hemodynamically unstable atrial fibrillation w/ RVR.      On 10/23/20, the patient underwent 2 attempts at cardioversion (DCCV @ 200J & 360J), was also given Ibutilide 1 mg times two for chemical cardioversion with transient conversion to NSR. She went back into Afib shortly after; required loading with Amio gtt and Digoxin loading. On 10/26/20, she was initiated on B-blockers (metoprolol) w/ use of PRN IV lopressor for short bursts of Afib while inpatient.    FIOR not performed pre-DCCV due to relatively low risk of LA clot with short duration of Afib, normal LA size & Afib associated significant hemodynamic instability. She was initiated on heparin gtt for AC >> converted to Lovenox 1mg/kg BID.    EP Recommendations:  1. Recommend continuation of Amiodarone 400 mg BID for 4 weeks.  2. Please stop digoxin.  3. Please uptitrate B-blocker: increase metoprolol to 25 mg BID, can increase further outpatient as tolerated hemodynamically.   4. Recommend transition of Lovenox to NOAC for AC.   5. Outpatient follow up with EP, Dr Goldstein in 2-3 weeks.     The patient states understanding and is agreeable with plan.   Thank you for allowing us to participate in the care of this patient.     The patient was discussed w/ Dr. Parmar.  The above note reflects our joint plan.    Juani Zamudio MD,   Cardiovascular Disease Fellow  Pager [phone]    Past Medical History:   Past Medical History:   Diagnosis Date     Arrhythmia     A-Fib     Lung cancer (H)      Simple chronic bronchitis (H)      Past Surgical History:   Past Surgical History:   Procedure Laterality  Date     ARTHROEREISIS, SUBTALAR       BRONCHOSCOPY RIGID OR FLEXIBLE W/TRANSENDOSCOPIC ENDOBRONCHIAL ULTRASOUND GUIDED N/A 07/27/2020    Procedure: Flexible bronchoscopy, endobronchial ultrasound with transbronchial biopsies;  Surgeon: Edin Castro MD;  Location: [facility] OR     CATARACT IOL, RT/LT Bilateral      COLONOSCOPY  06/29/2004    colonoscopy to the cecum     ESOPHAGOSCOPY, GASTROSCOPY, DUODENOSCOPY (EGD), COMBINED N/A 09/01/2020    Procedure: ESOPHAGOGASTRODUODENOSCOPY, WITH FINE NEEDLE ASPIRATION BIOPSY, WITH ENDOSCOPIC ULTRASOUND GUIDANCE;  Surgeon: Barber Hogan MD;  Location: [facility] GI     PICC TRIPLE LUMEN PLACEMENT Right 10/23/2020    5Fr - 36cm, Medial brachial vein     SURGICAL HISTORY OF -       nose surgery     THORACOSCOPIC RESECTION LUNG Left 10/22/2020    Procedure: Left thoracoscopic lobectomy converted to Left Thoracotomy, Medistinal lymph node dissection, Pulmonary Artery repair, flexible bronchoscopy, on-pump oxygenator on standy-by;  Surgeon: Andrea Sanabria MD;  Location: [facility] OR     THORACOTOMY N/A 10/22/2020    Procedure: Thoracotomy;  Surgeon: Andrea Sanabria MD;  Location: [facility] OR     TRANSCERVICAL EXTENDED MEDIASTINAL LYMPHADENECTOMY N/A 10/22/2020    Procedure: Transcervical extended mediastinal lymphadenectomy;  Surgeon: Andrea Sanabria MD;  Location: [facility] OR     TUBAL LIGATION      bilateral tubal ligation.  BTL     Allergies: Per MAR     Allergies   Allergen Reactions     Bee Venom Hives     Hot and sweating      Medications:   Per MAR current outpatient cardiovascular medications include:   Medications Prior to Admission   Medication Sig Dispense Refill Last Dose     metoprolol succinate ER (TOPROL XL) 25 MG 24 hr tablet Take 1 tablet (25 mg) by mouth daily 90 tablet 3 10/22/2020 at 0500     EPINEPHrine (EPIPEN/ADRENACLICK/OR ANY BX GENERIC EQUIV) 0.3 MG/0.3ML injection 2-pack Inject 0.3 mLs (0.3 mg) into the muscle as needed 0.6 mL 1      ketoconazole (NIZORAL) 2 % external  cream Apply topically daily 60 g 1 More than a month at Unknown time     rivaroxaban ANTICOAGULANT (XARELTO) 20 MG TABS tablet Take 1 tablet (20 mg) by mouth daily (with dinner) 90 tablet 3      triamcinolone (KENALOG) 0.1 % external ointment APPLY  OINTMENT TOPICALLY TWICE DAILY OR  AS  NEEDED 45 g 0 More than a month at Unknown time     No current outpatient medications on file.     Current Facility-Administered Medications   Medication Dose Route Frequency     amiodarone  400 mg Oral or Feeding Tube BID     digoxin  250 mcg Oral Daily     enoxaparin ANTICOAGULANT  1 mg/kg (Dosing Weight) Subcutaneous Q12H     gabapentin  300 mg Oral TID     heparin lock flush  5-10 mL Intracatheter Q24H     influenza vac high-dose quad  0.7 mL Intramuscular Prior to discharge     insulin aspart  1-3 Units Subcutaneous TID AC     insulin aspart  1-3 Units Subcutaneous At Bedtime     lidocaine  1 patch Transdermal Q24H     lidocaine   Transdermal Q8H     metoprolol tartrate  12.5 mg Oral BID     potassium & sodium phosphates  1 packet Oral or Feeding Tube 4x Daily     senna-docusate  2 tablet Oral At Bedtime     sodium chloride (PF)  3 mL Intravenous Q8H     sodium chloride (PF)  3 mL Intracatheter Q8H     Family History:   Family History   Problem Relation Age of Onset     Glaucoma Mother      LUNG DISEASE Mother      Melanoma Father      Down Syndrome Brother      Cancer Sister         bile duct     Coronary Artery Disease Brother      CABG Brother      No Known Problems Brother      No Known Problems Brother      No Known Problems Sister      Lung Cancer Sister         lung     No Known Problems Sister      No Known Problems Sister      Social History:   Social History     Tobacco Use     Smoking status: Former Smoker     Packs/day: 1.00     Years: 33.00     Pack years: 33.00     Types: Cigarettes     Quit date: 2014     Years since quittin.8     Smokeless tobacco: Never Used   Substance Use Topics     Alcohol use: Yes      Alcohol/week: 1.7 - 2.5 standard drinks     Types: 2 - 3 Standard drinks or equivalent per week     Comment: occ,social     Labs:  BMP  Recent Labs   Lab 10/27/20  0453 10/26/20  0305 10/25/20  0418 10/24/20  1637 10/24/20  0319    133 132*  --  135   POTASSIUM 4.0 3.6 3.8 4.2 4.0   CHLORIDE 98 94 97  --  101   SABINO 8.1* 8.0* 8.6  --  7.7*   CO2 37* 36* 29  --  28   BUN 7 8 6*  --  6*   CR 0.58 0.58 0.54  --  0.59   * 200* 128*  --  133*     CBC  Recent Labs   Lab 10/27/20  0453 10/26/20  0305 10/25/20  2324 10/25/20  0418   WBC 6.6 10.1 9.6 11.8*   RBC 3.22* 3.40* 3.16* 3.62*   HGB 9.1* 9.5* 8.9* 10.3*   HCT 28.2* 29.6* 27.4* 31.4*   MCV 88 87 87 87   MCH 28.3 27.9 28.2 28.5   MCHC 32.3 32.1 32.5 32.8   RDW 13.2 13.1 13.0 13.1    294 260 269     INRNo lab results found in last 7 days.  No results found for: CKTOTAL, CKMB, TROPN  Cholesterol (mg/dL)   Date Value   06/23/2020 238 (H)   07/13/2015 261 (H)   03/09/2010 286 (H)   05/26/2006 234 (H)     Cholesterol/HDL Ratio (no units)   Date Value   07/13/2015 3.9   03/09/2010 5.0   05/26/2006 3.4   06/29/2004 3.7     HDL Cholesterol (mg/dL)   Date Value   06/23/2020 68   07/13/2015 67   03/09/2010 63   05/26/2006 69     LDL Cholesterol Calculated (mg/dL)   Date Value   06/23/2020 147 (H)   07/13/2015 166 (H)   03/09/2010 192 (H)   05/26/2006 142 (H)       I very much appreciated the opportunity to see and assess Ijeoma Shah in the hospital in follow-up with CV Fellow Dr Zamudio and resident. The note above summarizes my findings and current recommendations.  Please do not hesitate to contact my office if you have any questions or concerns.      Gavin Parmar MD  Cardiac Arrhythmia Service  [facility]  [phone]

## 2020-10-27 NOTE — PLAN OF CARE
Temp: 97.3  F (36.3  C) Temp src: Oral BP: 124/71 Pulse: 127   Resp: 18 SpO2: 100 % O2 Device: Nasal cannula Oxygen Delivery: 1 LPM     Patient A&Ox4, can make needs known, follows commands. VSS, sinus rhythm for majority of shift. Converted to Afib at 1630, rates 100-120s; metoprolol increased this am. Patient complains of SOB and requesting O2, however sats >95% on RA. IS and acapella encouraged. Voiding adequate amounts of urine. Pt having episodes of diarrhea x3, metamucil ordered. BS ACHS with sliding scale insulin. Tolerating regular diet, denies nausea. Up with stand by assist, participating in PT and OT. L lateral incision [place] and CDI, old CT site drainage-dressing changed x2. Plans to discharge tomorrow.

## 2020-10-27 NOTE — PROGRESS NOTES
Transfer  Transferred to: 6B room 37 bed 1 from  at 1730.   Via:bed.  Reason for transfer:appropriate for 6B, improved patient's condition.   Family:  in room.   Belongings:brought with patient.   Chart:brought with patient.   Medications:brought with patient.   Pt status:A&Ox4.

## 2020-10-27 NOTE — PLAN OF CARE
"BP 98/63 (BP Location: Left arm)   Pulse 71   Temp 98.2  F (36.8  C) (Oral)   Resp 16   Ht 1.651 m (5' 5\")   Wt 83.2 kg (183 lb 6.8 oz)   SpO2 99%   BMI 30.52 kg/m      Time: 9990-6180  R. of admission/Status:POD# 5 s/p left VATS conversion to arthrectomy, L lower lobectomy, mediastinal lymph node dissection, and pulmonary artery repair due to hx of SCC of left lower lobe.   Neuro:  A&Ox4, calls appropriately.   Activity: Assist of one using walker and GB to the bathroom.   Pain: c/o left upper back/shoulder area. Oxycodone 10 mg once with relief.   Cardiac: SR converted to Afib with RVR at 2137, 5 mg iv Metoprolol once resulted with conversion to SR then after sometime back to Afib with RVR lower rate 100-120s. 12.5 mg Metoprolol new order twice a day. BP 98/63. Reported feeling of racing, no other symptoms.   Respiratory: On 2 L oxygen supplement via NC IQRA da silva. Old chest tube site leaking, dressing changed x 2. Team notified. LS clear/diminished.   GI/: Active bowel sound. No bm this shift. Voided in the bathroom, not saved.   Diet: regular, denied N/V. Fair appetite.   Skin: left posterior chest incision [place], no drainage noted.   LDAs: PICC triple lumen, saline locked. PIV saline locked.   Labs/Imaging: reviewed. ACHS blood glucose.   New change this shift: Afib with RVR, old dressing CT site leaking.   Plan: PT/OT following. Pain management. Encourage taking deep breathing and coughing. Contact Thoracic surgery team for any concerns/quesiton.   "

## 2020-10-27 NOTE — PROVIDER NOTIFICATION
Telemetry reported that NSR converted to A fib with RVR at the rate of 120-150 at 2137. Pt reported feeling palpitation. Thoracic Surgery team notified via pager. Tai Anand MD came in person and ordered Metoprolol 5 mg iv q 5 minutes prn.

## 2020-10-27 NOTE — PROGRESS NOTES
Surgery Crosscover Note    Paged by RN that patient was in Afib with RVR (rate 120-150s) and experiencing palpitations. Came to bedside to assess. Patient sleeping but when awoken stated that her heart felt like it was racing. Denied dizziness, lightheadedness.     /78 prior to interventions. IV metoprolol 5mg given with conversion to NSR with HR 80s.     Addendum:  Approximately 40 minutes later, patient converted back into Afib with rates in the 110-120s. Continues to be asymptomatic. I contacted the cardiology fellow for recommendations given hx of abnormal rhythm and patient already being on amiodarone PO as well as digoxin.     Recommended PO metoprolol tartrate 12.5mg BID; first dose to be given now. If HR continues to be elevated in 140-150s, will assess need for IV metoprolol.     Patient also discussed with thoracic surgery fellow.     Tai Anand MD  Surgery PGY-1

## 2020-10-28 ENCOUNTER — APPOINTMENT (OUTPATIENT)
Dept: PHYSICAL THERAPY | Facility: CLINIC | Age: 70
DRG: 163 | End: 2020-10-28
Attending: THORACIC SURGERY (CARDIOTHORACIC VASCULAR SURGERY)
Payer: MEDICARE

## 2020-10-28 ENCOUNTER — PATIENT OUTREACH (OUTPATIENT)
Dept: CARE COORDINATION | Facility: CLINIC | Age: 70
End: 2020-10-28

## 2020-10-28 ENCOUNTER — APPOINTMENT (OUTPATIENT)
Dept: GENERAL RADIOLOGY | Facility: CLINIC | Age: 70
DRG: 163 | End: 2020-10-28
Attending: THORACIC SURGERY (CARDIOTHORACIC VASCULAR SURGERY)
Payer: MEDICARE

## 2020-10-28 VITALS
RESPIRATION RATE: 18 BRPM | HEART RATE: 60 BPM | OXYGEN SATURATION: 98 % | HEIGHT: 65 IN | TEMPERATURE: 97.3 F | SYSTOLIC BLOOD PRESSURE: 134 MMHG | WEIGHT: 181.44 LBS | DIASTOLIC BLOOD PRESSURE: 81 MMHG | BODY MASS INDEX: 30.23 KG/M2

## 2020-10-28 LAB
ANION GAP SERPL CALCULATED.3IONS-SCNC: 4 MMOL/L (ref 3–14)
BUN SERPL-MCNC: 9 MG/DL (ref 7–30)
CALCIUM SERPL-MCNC: 8.3 MG/DL (ref 8.5–10.1)
CHLORIDE SERPL-SCNC: 100 MMOL/L (ref 94–109)
CO2 SERPL-SCNC: 32 MMOL/L (ref 20–32)
CREAT SERPL-MCNC: 0.64 MG/DL (ref 0.52–1.04)
ERYTHROCYTE [DISTWIDTH] IN BLOOD BY AUTOMATED COUNT: 13.5 % (ref 10–15)
GFR SERPL CREATININE-BSD FRML MDRD: >90 ML/MIN/{1.73_M2}
GLUCOSE BLDC GLUCOMTR-MCNC: 109 MG/DL (ref 70–99)
GLUCOSE SERPL-MCNC: 109 MG/DL (ref 70–99)
HCT VFR BLD AUTO: 30.5 % (ref 35–47)
HGB BLD-MCNC: 10 G/DL (ref 11.7–15.7)
LMWH PPP CHRO-ACNC: 0.94 IU/ML
MAGNESIUM SERPL-MCNC: 2.1 MG/DL (ref 1.6–2.3)
MCH RBC QN AUTO: 28.7 PG (ref 26.5–33)
MCHC RBC AUTO-ENTMCNC: 32.8 G/DL (ref 31.5–36.5)
MCV RBC AUTO: 87 FL (ref 78–100)
PHOSPHATE SERPL-MCNC: 3.7 MG/DL (ref 2.5–4.5)
PLATELET # BLD AUTO: 317 10E9/L (ref 150–450)
POTASSIUM SERPL-SCNC: 3.8 MMOL/L (ref 3.4–5.3)
RBC # BLD AUTO: 3.49 10E12/L (ref 3.8–5.2)
SODIUM SERPL-SCNC: 137 MMOL/L (ref 133–144)
WBC # BLD AUTO: 8.3 10E9/L (ref 4–11)

## 2020-10-28 PROCEDURE — 250N000011 HC RX IP 250 OP 636: Performed by: STUDENT IN AN ORGANIZED HEALTH CARE EDUCATION/TRAINING PROGRAM

## 2020-10-28 PROCEDURE — 71045 X-RAY EXAM CHEST 1 VIEW: CPT

## 2020-10-28 PROCEDURE — 250N000013 HC RX MED GY IP 250 OP 250 PS 637: Performed by: STUDENT IN AN ORGANIZED HEALTH CARE EDUCATION/TRAINING PROGRAM

## 2020-10-28 PROCEDURE — 36415 COLL VENOUS BLD VENIPUNCTURE: CPT

## 2020-10-28 PROCEDURE — 999N001017 HC STATISTIC GLUCOSE BY METER IP

## 2020-10-28 PROCEDURE — 97530 THERAPEUTIC ACTIVITIES: CPT | Mod: GP | Performed by: REHABILITATION PRACTITIONER

## 2020-10-28 PROCEDURE — 83735 ASSAY OF MAGNESIUM: CPT

## 2020-10-28 PROCEDURE — 85520 HEPARIN ASSAY: CPT

## 2020-10-28 PROCEDURE — 84100 ASSAY OF PHOSPHORUS: CPT

## 2020-10-28 PROCEDURE — 80048 BASIC METABOLIC PNL TOTAL CA: CPT

## 2020-10-28 PROCEDURE — 97116 GAIT TRAINING THERAPY: CPT | Mod: GP | Performed by: REHABILITATION PRACTITIONER

## 2020-10-28 PROCEDURE — 85027 COMPLETE CBC AUTOMATED: CPT

## 2020-10-28 PROCEDURE — 71045 X-RAY EXAM CHEST 1 VIEW: CPT | Mod: 26 | Performed by: RADIOLOGY

## 2020-10-28 RX ORDER — AMIODARONE HYDROCHLORIDE 400 MG/1
400 TABLET ORAL 2 TIMES DAILY
Qty: 56 TABLET | Refills: 0 | Status: SHIPPED | OUTPATIENT
Start: 2020-10-28 | End: 2020-11-23

## 2020-10-28 RX ADMIN — METOPROLOL TARTRATE 25 MG: 25 TABLET, FILM COATED ORAL at 08:11

## 2020-10-28 RX ADMIN — ENOXAPARIN SODIUM 80 MG: 80 INJECTION SUBCUTANEOUS at 08:12

## 2020-10-28 RX ADMIN — SALINE NASAL SPRAY 1 SPRAY: 1.5 SOLUTION NASAL at 04:26

## 2020-10-28 RX ADMIN — GABAPENTIN 300 MG: 300 CAPSULE ORAL at 08:11

## 2020-10-28 RX ADMIN — MAGNESIUM SULFATE IN WATER 2 G: 40 INJECTION, SOLUTION INTRAVENOUS at 08:18

## 2020-10-28 RX ADMIN — POTASSIUM CHLORIDE 20 MEQ: 1.5 POWDER, FOR SOLUTION ORAL at 08:12

## 2020-10-28 RX ADMIN — AMIODARONE HYDROCHLORIDE 400 MG: 200 TABLET ORAL at 08:11

## 2020-10-28 RX ADMIN — POTASSIUM & SODIUM PHOSPHATES POWDER PACK 280-160-250 MG 1 PACKET: 280-160-250 PACK at 08:12

## 2020-10-28 RX ADMIN — OXYCODONE HYDROCHLORIDE 10 MG: 5 TABLET ORAL at 04:52

## 2020-10-28 ASSESSMENT — ACTIVITIES OF DAILY LIVING (ADL)
ADLS_ACUITY_SCORE: 15

## 2020-10-28 ASSESSMENT — MIFFLIN-ST. JEOR: SCORE: 1348.88

## 2020-10-28 NOTE — PROGRESS NOTES
[facility]   Spoke with patient to discuss plans for home care. Patient to be discharged home 10/28/20 and has agreed to have [facility] follow with services of RN, PT and OT. Patient care support center processing referral.  Patient verbalized understanding that initial visit is scheduled for 10/29 or 10/30/20.   Provided 24 hour phone number for [facility] for any questions or concerns.    Lakeisha Del Rio RN BSN  [facility] Care Liaison  [phone]

## 2020-10-28 NOTE — PLAN OF CARE
Neuro: A&Ox4.   Cardiac: Flips between SR and Afib to AFIB RVR. BP stable. Denies Chest pain.   Respiratory: Sating high 90's on 1L NC. Old left chest tube site and thoracotomy site.  GI/: Adequate urine output. No bm overnight.  Diet/appetite: Tolerating regular diet. ACHS BS  Activity:  SBA  Pain: Pain from site manageable  Skin: Left side thoracotomy site and chest tube site. Chest tube pulled 2 days ago.  LDA's: triple lumen PICC on right arm. 1 PIV left hand.     Plan: On metoprolol and amiodarone for afib. Continue with POC. Notify primary team with changes.

## 2020-10-28 NOTE — DISCHARGE SUMMARY
NAME: Ijeoma Shah   MRN: [id number]   : 1950     DATE OF ADMISSION: 10/22/2020     PRE/POSTOPERATIVE DIAGNOSES: Lung cancer    PROCEDURES PERFORMED:   1. TEMLA  2. Left VATS, conversion to thoracotomy  3. Left lower lobectom  4. Mediastinal lymph node dissection  5. 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 Afib with RVR and hypotension - POD#1 patient had a rapid response called around 7 am for Afib w/ RVR w/ BP 60-70's systolic. She was given a 500 ml bolus, Amio bolus & initiated on Amio gtt without significant improvement in hemodynamics.  Pt was transferred to SICU for continued management where she underwent unsuccessful electrical cardioversion. She was able to be managed medically with slow titration of metoprolol and continuation of amiodarone.    CONSULTS: Electrophysiology  EP Final Recommendations:  1. Recommend continuation of Amiodarone 400 mg BID for 4 weeks.  2. Please stop digoxin.  3. Please uptitrate B-blocker: increase metoprolol to 25 mg BID, can increase further outpatient as tolerated hemodynamically.   4. Recommend transition of Lovenox to NOAC for AC.   5. Outpatient follow up with EP, Dr Goldstein in 2-3 weeks.     DRAINS/TUBES PRESENT AT DISCHARGE: None    DATE OF DISCHARGE:  10/28/2020     HOSPITAL COURSE: Ijeoma Shah is a 70 year old female who on 10/22/2020 underwent the above-named procedures.  She tolerated the operation well and postoperatively was transferred to the general post-surgical unit.  The remainder of her course was essentially uncomplicated with the exception of the above noted afib.  Prior to discharge, her pain was controlled well, she was able to perform ADLs and ambulate independently without difficulty, and had full return of bowel and bladder function.  On  10/28/2020, she was discharged to home in stable condition.    DISCHARGE EXAM:   A&O, NAD  Resp non-labored  Distal extremities warm    Incisions healing well     DISCHARGE INSTRUCTIONS:  Discharge Procedure Orders   X-ray Chest 2 vws*   Standing Status: Future Standing Exp. Date: 01/28/21     Order Specific Question Answer Comments   Priority Routine      Adult [facility] Follow-up and recommended labs and tests   Order Comments: Follow up with electrophysiology, at [facility], within 2-3 weeks  to evaluate treatment change and for hospital follow- up. No follow up labs or test are needed.    Appointments on [place] and/or [facility] (with [facility] or [facility] provider or service). Call [phone] if you haven't heard regarding these appointments within 7 days of discharge.     Discharge Instructions   Order Comments: Cardiology Recommendations:  1. Recommend continuation of Amiodarone 400 mg BID for 4 weeks.  2. Please stop digoxin.  3. Please uptitrate B-blocker: increase metoprolol to 25 mg BID, can increase further outpatient as tolerated hemodynamically.   4. Recommend transition of Lovenox to NOAC for AC.   5. Outpatient follow up with EP, Dr Goldstein in 2-3 weeks.     Reason for your hospital stay   Order Comments: The surgery     Adult [facility] Follow-up and recommended labs and tests   Order Comments: 1.) Follow up with primary care physician, Jyoti Espinal, in 1-2 weeks.  2.) Follow up with a thoracic surgery Clinical Nurse Specialist in Thoracic Surgery clinic in 1 month, prior to which a CXR should be performed.     Appointments on [place] and/or [facility] (with [facility] or [facility] provider or service). Call [phone] if you haven't heard regarding these appointments within 7 days of discharge.     Discharge Instructions   Order Comments: THORACIC SURGERY DISCHARGE INSTRUCTIONS    DIET: Regular diet - as prior to admission     If your plans upon discharge include prolonged periods of sitting (i.e a  "lengthy car or plane ride), it is highly beneficial to get up and walk at least once per hour to help prevent swelling and blood clots.     You may remove chest tube dressing 48 hours after tube removal and bandage the site at your own discretion thereafter.  Small amounts of leakage are normal for 2-3 days after removal.  Feel free to call with questions.    You may get incision wet 2 days after operation. Do not submerge, soak, or scrub incision or swim until seen in follow-up.    Take incentive spirometer home for continued frequent use    Activity as tolerated, no strenous activity until seen in follow-up, no lifting greater than 20 pounds for the next 1-2 weeks.    Stay hydrated. Take over the counter fiber (metamucil or benefiber) and stool softeners (Miralax, docusate or senna) if becoming constipated.     Call for fever greater than 101.5, chills, increased size of incision, red skin around incision, vision changes, muscle strength changes, sensation changes, shortness of breath, or other concerns.    No driving while taking narcotic pain medication.    Transition to ibuprofen or tylenol/acetaminophen for pain control. Do not take tylenol/acetaminophen and acetaminophen containing narcotic (e.g., percocet or vicodin) at the same time. If you have known ulcer problems, or kidney trouble (elevated creatinine) do not take the ibuprofen.    In emergencies, call 911    For other Questions or Concerns;   A.) During weekday working hours (Monday through Friday 8am to 4:30pm)   call [phone] and ask to speak to a clinical nurse specialist.     B.) At nights (after 4:30pm), on weekends, or if urgent call [phone] and   tell the  \"I would like to page job code [phone], the thoracic surgery   fellow on call, please.\"     Walker Order   Order Comments: DME Documentation:   Describe the reason for need to support medical necessity: gait instability.     I, the undersigned, certify that the above " prescribed supplies are medically necessary for this patient and is both reasonable and necessary in reference to accepted standards of medical and necessary in reference to accepted standards of medical practice in the treatment of this patient's condition and is not prescribed as a convenience.     Order Specific Question Answer Comments   DME Provider: [facility]    Walker Type: Standard (2 Wheel)        DISCHARGE MEDICATIONS:   Current Discharge Medication List      START taking these medications    Details   acetaminophen (TYLENOL) 325 MG tablet Take 1-2 tablets (325-650 mg) by mouth every 6 hours as needed for mild pain  Qty: 60 tablet, Refills: 0    Associated Diagnoses: Malignant neoplasm of lower lobe of left lung (H)      amiodarone (PACERONE) 400 MG tablet 1 tablet (400 mg) by Oral or Feeding Tube route 2 times daily for 28 days  Qty: 56 tablet, Refills: 0    Associated Diagnoses: Mass of left lung      gabapentin (NEURONTIN) 300 MG capsule Take 1 capsule (300 mg) by mouth 3 times daily for 7 days  Qty: 21 capsule, Refills: 0    Associated Diagnoses: Malignant neoplasm of lower lobe of left lung (H)      metoprolol tartrate (LOPRESSOR) 25 MG tablet Take 1 tablet (25 mg) by mouth 2 times daily  Qty: 60 tablet, Refills: 0    Associated Diagnoses: Malignant neoplasm of lower lobe of left lung (H)      oxyCODONE (ROXICODONE) 5 MG tablet Take 1-2 tablets (5-10 mg) by mouth every 4 hours as needed for moderate to severe pain  Qty: 30 tablet, Refills: 0    Associated Diagnoses: Malignant neoplasm of lower lobe of left lung (H)      senna-docusate (SENOKOT-S/PERICOLACE) 8.6-50 MG tablet Take 2 tablets by mouth At Bedtime  Qty: 30 tablet, Refills: 0    Comments: While taking oxycodone  Associated Diagnoses: Malignant neoplasm of lower lobe of left lung (H)         CONTINUE these medications which have NOT CHANGED    Details   EPINEPHrine (EPIPEN/ADRENACLICK/OR ANY BX GENERIC EQUIV) 0.3 MG/0.3ML injection  2-pack Inject 0.3 mLs (0.3 mg) into the muscle as needed  Qty: 0.6 mL, Refills: 1    Associated Diagnoses: Bee sting reaction, accidental or unintentional, initial encounter      ketoconazole (NIZORAL) 2 % external cream Apply topically daily  Qty: 60 g, Refills: 1    Associated Diagnoses: Tinea pedis of both feet      rivaroxaban ANTICOAGULANT (XARELTO) 20 MG TABS tablet Take 1 tablet (20 mg) by mouth daily (with dinner)  Qty: 90 tablet, Refills: 3    Associated Diagnoses: Atrial fibrillation with rapid ventricular response (H)      triamcinolone (KENALOG) 0.1 % external ointment APPLY  OINTMENT TOPICALLY TWICE DAILY OR  AS  NEEDED  Qty: 45 g, Refills: 0    Associated Diagnoses: Tinea pedis, unspecified laterality         STOP taking these medications       metoprolol succinate ER (TOPROL XL) 25 MG 24 hr tablet Comments:   Reason for Stopping:

## 2020-10-28 NOTE — PROGRESS NOTES
Thoracic Surgery Progress Note    S: Afib with RVR overnight, given 5mg of IV metoprolol and then 12.5mg PO. Started on 12.5mg PO BID. Pain controlled. Loose stools.    O:  Temp:  [97.3  F (36.3  C)-98.6  F (37  C)] 97.7  F (36.5  C)  Pulse:  [] 119  Resp:  [16-18] 18  BP: ()/(55-87) 102/87  SpO2:  [91 %-100 %] 95 %    Gen: NAD, resting comfortably  CV: RRR  Chest: L chest tube site draining serous fluid  Resp: nonlabored respirations, comfortable on NC  Abd: soft, ntnd  Ext: WWP  Incisions: cdi    I/O last 3 completed shifts:  In: 9 [I.V.:9]  Out: 2100 [Urine:2100]    Labs  All labs and imaging reviewed    A/P: Ijeoma Shah is a 70 year old female w/ hx of SCC of left lower lobe now s/p L thoracoscopic lobectomy converted to L thoracotomy with pulmonary artery repair and left lower lobectomy, with transcervical extended mediastinal lymphadenectomy 10/22/20, postoperatively intermittently in afib with RVR requiring pressors, currently stable in nsr     Changes today:  - Stop digoxin  - Metoprolol 25mg BID  - Pain control with oral pain meds. Epidural d/c'ed 10/23  - Afib with RVR, currently in sinus. EP on board    > amio PO 400mg BID    > metop 25 BID    > Outpatient referral to EP in 2-3 weeks  - Remove chest tube, f/u XR  - Full diet  - SCDs/heparin gtt  - PT/OT  - Dispo: 6B      Patient seen and d/w fellow, Dr. Mabry, and staff    Anel Worrell MD (PGY-1)  Surgery

## 2020-10-28 NOTE — PLAN OF CARE
Physical Therapy Discharge Summary    Reason for therapy discharge:    Discharged to home with home therapy.    Progress towards therapy goal(s). See [facility] on [facility] for goal details.  Goals partially met.  Barriers to achieving goals:   discharge from facility.    Therapy recommendation(s):    Continued therapy is recommended.  Rationale/Recommendations:  Pt would benefit from additional skilled PT to address gait and endurance.

## 2020-10-28 NOTE — PLAN OF CARE
DISCHARGE                         10/28/2020  1:39 PM  ----------------------------------------------------------------------------  Discharged to: Home  Via: private transportation  Accompanied by: Family  Discharge Instructions: regular diet, activity as tolerated, medications, follow up appointments, when to call the MD, aftercare instructions.  Prescriptions: To be filled by [facility] pharmacy; medication list reviewed & sent with pt  Follow Up Appointments: arranged; information given  Belongings: All sent with pt  IV: d/c'd  Telemetry: d/c'd  Pt exhibits understanding of above discharge instructions; all questions answered.    Discharge Paperwork: Signed, copied, and sent home with patient.

## 2020-10-28 NOTE — CONSULTS
Care Management Assessment and Discharge Consult    General Information  Assessment completed with:: Patient, (Patient)     Primary Care Provider verified and updated as needed?: Yes        Reason for Consult: discharge planning            Communication Assessment  Patient's communication style: spoken language (English or Bilingual)  Hearing Difficulty or Deaf: no Wear Glasses or Blind: yes    Cognitive  Cognitive/Neuro/Behavioral: WDL  Level of Consciousness: alert  Arousal Level: opens eyes spontaneously  Orientation: oriented x 4  Mood/Behavior: calm, cooperative  Best Language: 0 - No aphasia  Speech: clear, spontaneous, logical    Living Environment:   People in home: spouse  Name(s) of People in Home: Richard Puentes  Current living Arrangements: house          Family/Social Support:   Marital Status:   Who is your support system?:   Spouse's Name: (Richard)  Description of Support System: Supportive, Involved         Current Resources:   Skilled Home Care Services:  Referral made to [facility] #[phone]  Equipment currently used at home: none     Employment:  Employment Status:     Retired     Financial/Environmental Concerns:    N/A              Socioeconomic History     Marital status:      Spouse name: Not on file     Number of children: 1     Years of education: Not on file     Highest education level: Not on file   Occupational History     Occupation: seasonal summer work only     Tobacco Use     Smoking status: Former Smoker     Packs/day: 1.00     Years: 33.00     Pack years: 33.00     Types: Cigarettes     Quit date: 2014     Years since quittin.8     Smokeless tobacco: Never Used   Substance and Sexual Activity     Alcohol use: Yes     Alcohol/week: 1.7 - 2.5 standard drinks     Types: 2 - 3 Standard drinks or equivalent per week     Comment: occ,social     Drug use: No     Sexual activity: Yes     Partners: Male       Functional Status:  Prior to admission  patient needed assistance:     Assesssment of Functional Status: Not at baseline with mobility    Mental Health Status:  WDL                            Values/Beliefs:  Spiritual, Cultural Beliefs, Mandaeism Practices, Values that affect care: yes                 Discharge Planning:  Expected Discharge Date: 10/29/20     Concerns to be Addressed: discharge planning       Anticipated Discharge Disposition: Home Care  Anticipated Discharge Services:    Anticipated Discharge DME:      Patient/family educated on Medicare website which has current facility and service quality ratings: yes  Referrals Placed by CM/SW: Homecare  Education Provided on the Discharge Plan:    Patient/Family in Agreement with the Plan: yes     Disposition Comments:    Pt with Lung cancer, s/p Thoracotomy, left lower lobectomy, mediastinal lymph node dissection, post op a fib.  Per Thoracic Surgery, Pt is medically stable for discharge to home today.  I have met with Pt and , Richard to assist with discharge planning. Prior to admission Pt was independent living with her  in [facility].  Home Care follow up discussed which Pt is agreeable to. Choice of Home Care agencies offered, [facility] is preferred. I have made a referral to [facility] and added RN, PT to the discharge orders.  Selected Continued Care - Admitted Since 10/22/2020             Brissa Cook RN  6B Care Coordinator #[phone]

## 2020-10-29 NOTE — PROGRESS NOTES
[facility]: Post-Discharge Note  SITUATION                                                      Admission:    Admission Date: 10/22/20   Reason for Admission: Lung cancer  Discharge:   Discharge Date: 10/28/20  Discharge Diagnosis: Lung cancer    BACKGROUND                                                      POSTOPERATIVE COMPLICATIONS:   1. Afib with RVR and hypotension - POD#1 patient had a rapid response called around 7 am for Afib w/ RVR w/ BP 60-70's systolic. She was given a 500 ml bolus, Amio bolus & initiated on Amio gtt without significant improvement in hemodynamics.  Pt was transferred to SICU for continued management where she underwent unsuccessful electrical cardioversion. She was able to be managed medically with slow titration of metoprolol and continuation of amiodarone    ASSESSMENT      Discharge Assessment  Patient reports symptoms are: Improved  Does the patient have all of their medications?: Yes  Does patient know what their new medications are for?: Yes  Does patient have a follow-up appointment scheduled?: No  Does patient have any other questions or concerns?: No    Post-op  Did the patient have surgery or a procedure: Yes  Incision: healing  Drainage: Yes  Drainage Color: serous  Incision Drainage Amount: light  Bleeding: none  Fever: No  Chills: No  Redness: No  Warmth: No  Swelling: No  Incision site pain: No  Eating & Drinking: eating and drinking without complaints/concerns  PO Intake: regular diet  Bowel Function: normal  Urinary Status: voiding without complaint/concerns        PLAN                                                      Outpatient Plan:  Follow up with electrophysiology, at [facility], within 2-3 weeks  to evaluate treatment change and for hospital follow- up. No follow up labs or test are needed.       Future Appointments   Date Time Provider Department Center   11/23/2020 11:00 AM Alexa Goldstein MD [facility]           Erlinda Washington  CMA

## 2020-10-30 ENCOUNTER — TELEPHONE (OUTPATIENT)
Dept: SURGERY | Facility: CLINIC | Age: 70
End: 2020-10-30

## 2020-10-30 NOTE — TELEPHONE ENCOUNTER
I called Jocelyne Nation RN from  Home Care at [phone].  She had called asking for home care orders.  She just saw Salima and said she is doing pretty good, has some pain and a small amount of drainage from her chest tube site but over all is doing well.  Home care request:  ORDER SN 2w2, 1w1, 3 prns, PT eval and treat, SW assessment     MD SUMMARY/PLAN OF CARE     SN for incision assessment/education, respiratory assessment/education, cardiac assessment, nutrition/hydration, GI/ status, infection control education.     PT for endurance and stair training, home safety.     SW for community resources, insurance questions.     INCISION CARE  May leave incisions open to air.     Drain site to be covered with gauze and tape when draining, ok to leave open to air when scabbed over.    This plan was approved by me.

## 2020-11-03 ENCOUNTER — TRANSCRIBE ORDERS (OUTPATIENT)
Dept: SURGERY | Facility: CLINIC | Age: 70
End: 2020-11-03

## 2020-11-03 DIAGNOSIS — C34.92 SQUAMOUS CELL CARCINOMA OF LEFT LUNG (H): Primary | ICD-10-CM

## 2020-11-03 NOTE — TELEPHONE ENCOUNTER
ONCOLOGY INTAKE: Records Information      APPT INFORMATION:  Referring provider:  Janny GRANT CNS  Referring provider s clinic:  [facility]  Reason for visit/diagnosis:  Squamous cell carcinoma of left lung   Has patient been notified of appointment date and time?: Yes    RECORDS INFORMATION:  Were the records received with the referral (via [employer])? No,Internal Referral      Has patient been seen for any external appt for this diagnosis? No    If yes, where? NA    ADDITIONAL INFORMATION:  None

## 2020-11-04 ENCOUNTER — TELEPHONE (OUTPATIENT)
Dept: FAMILY MEDICINE | Facility: CLINIC | Age: 70
End: 2020-11-04

## 2020-11-04 NOTE — TELEPHONE ENCOUNTER
[facility] now requests orders and shares plan of care/discharge summaries for some patients through [employer].  Please REPLY TO THIS MESSAGE OR ROUTE BACK TO THE AUTHOR in order to give authorization for orders when needed.  This is considered a verbal order, you will still receive a faxed copy of orders for signature.  Thank you for your assistance in improving collaboration for our patients.    ORDER: PT eval completed. Requesting to continue PT for therapeutic exercise/HEP instruction, balance training, and falls prevention to facilitate return to community activities.    Thank you,  Shira Vu, PT  [email or web address]  [phone]

## 2020-11-04 NOTE — TELEPHONE ENCOUNTER
RECORDS STATUS - ALL OTHER DIAGNOSIS      RECORDS RECEIVED FROM: [facility] - Internal Referral   DATE RECEIVED: 11/4/20   NOTES STATUS DETAILS   OFFICE NOTE from referring provider [facility] Dr. Sanabria   OFFICE NOTE from medical oncologist     DISCHARGE SUMMARY from hospital [facility] 10/22/20, 10/1/20, 9/1/20, 7/27/20   DISCHARGE REPORT from the ER NA    OPERATIVE REPORT [facility] 10/22/20: Left thoracoscopic lobectomy converted to Left Thoracotomy, Medistinal lymph node dissection    9/1/20: EGD    7/27/20: Flexible Bronchoscopy   MEDICATION LIST [facility] 10/28/20   CLINICAL TRIAL TREATMENTS TO DATE     LABS     PATHOLOGY REPORTS [facility] 10/22/20: Surg Path  9/1/20, 7/27/20: FNA   ANYTHING RELATED TO DIAGNOSIS Epic 10/28/20   GENONOMIC TESTING     TYPE:     IMAGING (NEED IMAGES & REPORT)     CT SCANS PACS 9/29/19, 7/3/20: Epic   MRI PACS 8/12/20: [facility]   MAMMO     ULTRASOUND     PET PACS 7/9/20: [facility]

## 2020-11-05 LAB — COPATH REPORT: NORMAL

## 2020-11-10 LAB — COPATH REPORT: NORMAL

## 2020-11-17 ENCOUNTER — VIRTUAL VISIT (OUTPATIENT)
Dept: ONCOLOGY | Facility: CLINIC | Age: 70
End: 2020-11-17
Attending: CLINICAL NURSE SPECIALIST
Payer: MEDICARE

## 2020-11-17 ENCOUNTER — ANCILLARY PROCEDURE (OUTPATIENT)
Dept: GENERAL RADIOLOGY | Facility: CLINIC | Age: 70
End: 2020-11-17
Attending: PHYSICIAN ASSISTANT
Payer: MEDICARE

## 2020-11-17 ENCOUNTER — PRE VISIT (OUTPATIENT)
Dept: ONCOLOGY | Facility: CLINIC | Age: 70
End: 2020-11-17

## 2020-11-17 DIAGNOSIS — I48.0 PAROXYSMAL ATRIAL FIBRILLATION (H): Primary | ICD-10-CM

## 2020-11-17 DIAGNOSIS — E83.42 HYPOMAGNESEMIA: ICD-10-CM

## 2020-11-17 DIAGNOSIS — R91.8 MASS OF LEFT LUNG: ICD-10-CM

## 2020-11-17 DIAGNOSIS — D70.1 CHEMOTHERAPY-INDUCED NEUTROPENIA (H): ICD-10-CM

## 2020-11-17 DIAGNOSIS — T45.1X5A CHEMOTHERAPY-INDUCED NEUTROPENIA (H): ICD-10-CM

## 2020-11-17 DIAGNOSIS — C34.32 SQUAMOUS CELL CARCINOMA OF BRONCHUS IN LEFT LOWER LOBE (H): ICD-10-CM

## 2020-11-17 PROCEDURE — 71046 X-RAY EXAM CHEST 2 VIEWS: CPT | Performed by: RADIOLOGY

## 2020-11-17 PROCEDURE — 99204 OFFICE O/P NEW MOD 45 MIN: CPT | Mod: 95 | Performed by: INTERNAL MEDICINE

## 2020-11-17 RX ORDER — MEPERIDINE HYDROCHLORIDE 25 MG/ML
25 INJECTION INTRAMUSCULAR; INTRAVENOUS; SUBCUTANEOUS EVERY 30 MIN PRN
Status: CANCELLED | OUTPATIENT
Start: 2020-12-10

## 2020-11-17 RX ORDER — MEPERIDINE HYDROCHLORIDE 25 MG/ML
25 INJECTION INTRAMUSCULAR; INTRAVENOUS; SUBCUTANEOUS EVERY 30 MIN PRN
Status: CANCELLED | OUTPATIENT
Start: 2020-12-03

## 2020-11-17 RX ORDER — METHYLPREDNISOLONE SODIUM SUCCINATE 125 MG/2ML
125 INJECTION, POWDER, LYOPHILIZED, FOR SOLUTION INTRAMUSCULAR; INTRAVENOUS
Status: CANCELLED
Start: 2020-12-10

## 2020-11-17 RX ORDER — ALBUTEROL SULFATE 0.83 MG/ML
2.5 SOLUTION RESPIRATORY (INHALATION)
Status: CANCELLED | OUTPATIENT
Start: 2020-12-10

## 2020-11-17 RX ORDER — HEPARIN SODIUM,PORCINE 10 UNIT/ML
5 VIAL (ML) INTRAVENOUS
Status: CANCELLED | OUTPATIENT
Start: 2020-12-10

## 2020-11-17 RX ORDER — NALOXONE HYDROCHLORIDE 0.4 MG/ML
.1-.4 INJECTION, SOLUTION INTRAMUSCULAR; INTRAVENOUS; SUBCUTANEOUS
Status: CANCELLED | OUTPATIENT
Start: 2020-12-10

## 2020-11-17 RX ORDER — ALBUTEROL SULFATE 90 UG/1
1-2 AEROSOL, METERED RESPIRATORY (INHALATION)
Status: CANCELLED
Start: 2020-12-10

## 2020-11-17 RX ORDER — EPINEPHRINE 1 MG/ML
0.3 INJECTION, SOLUTION INTRAMUSCULAR; SUBCUTANEOUS EVERY 5 MIN PRN
Status: CANCELLED | OUTPATIENT
Start: 2020-12-03

## 2020-11-17 RX ORDER — LORAZEPAM 2 MG/ML
0.5 INJECTION INTRAMUSCULAR EVERY 4 HOURS PRN
Status: CANCELLED
Start: 2020-12-10

## 2020-11-17 RX ORDER — NALOXONE HYDROCHLORIDE 0.4 MG/ML
.1-.4 INJECTION, SOLUTION INTRAMUSCULAR; INTRAVENOUS; SUBCUTANEOUS
Status: CANCELLED | OUTPATIENT
Start: 2020-12-03

## 2020-11-17 RX ORDER — ALBUTEROL SULFATE 90 UG/1
1-2 AEROSOL, METERED RESPIRATORY (INHALATION)
Status: CANCELLED
Start: 2020-12-03

## 2020-11-17 RX ORDER — DIPHENHYDRAMINE HYDROCHLORIDE 50 MG/ML
50 INJECTION INTRAMUSCULAR; INTRAVENOUS
Status: CANCELLED
Start: 2020-12-10

## 2020-11-17 RX ORDER — ALBUTEROL SULFATE 0.83 MG/ML
2.5 SOLUTION RESPIRATORY (INHALATION)
Status: CANCELLED | OUTPATIENT
Start: 2020-12-03

## 2020-11-17 RX ORDER — LORAZEPAM 2 MG/ML
0.5 INJECTION INTRAMUSCULAR EVERY 4 HOURS PRN
Status: CANCELLED
Start: 2020-12-03

## 2020-11-17 RX ORDER — HEPARIN SODIUM (PORCINE) LOCK FLUSH IV SOLN 100 UNIT/ML 100 UNIT/ML
5 SOLUTION INTRAVENOUS
Status: CANCELLED | OUTPATIENT
Start: 2020-12-10

## 2020-11-17 RX ORDER — EPINEPHRINE 1 MG/ML
0.3 INJECTION, SOLUTION INTRAMUSCULAR; SUBCUTANEOUS EVERY 5 MIN PRN
Status: CANCELLED | OUTPATIENT
Start: 2020-12-10

## 2020-11-17 RX ORDER — DIPHENHYDRAMINE HYDROCHLORIDE 50 MG/ML
50 INJECTION INTRAMUSCULAR; INTRAVENOUS
Status: CANCELLED
Start: 2020-12-03

## 2020-11-17 RX ORDER — PALONOSETRON 0.05 MG/ML
0.25 INJECTION, SOLUTION INTRAVENOUS ONCE
Status: CANCELLED
Start: 2020-12-03

## 2020-11-17 RX ORDER — METHYLPREDNISOLONE SODIUM SUCCINATE 125 MG/2ML
125 INJECTION, POWDER, LYOPHILIZED, FOR SOLUTION INTRAMUSCULAR; INTRAVENOUS
Status: CANCELLED
Start: 2020-12-03

## 2020-11-17 RX ORDER — HEPARIN SODIUM (PORCINE) LOCK FLUSH IV SOLN 100 UNIT/ML 100 UNIT/ML
5 SOLUTION INTRAVENOUS
Status: CANCELLED | OUTPATIENT
Start: 2020-12-03

## 2020-11-17 RX ORDER — HEPARIN SODIUM,PORCINE 10 UNIT/ML
5 VIAL (ML) INTRAVENOUS
Status: CANCELLED | OUTPATIENT
Start: 2020-12-03

## 2020-11-17 RX ORDER — SODIUM CHLORIDE 9 MG/ML
1000 INJECTION, SOLUTION INTRAVENOUS CONTINUOUS PRN
Status: CANCELLED
Start: 2020-12-10

## 2020-11-17 RX ORDER — SODIUM CHLORIDE 9 MG/ML
1000 INJECTION, SOLUTION INTRAVENOUS CONTINUOUS PRN
Status: CANCELLED
Start: 2020-12-03

## 2020-11-17 NOTE — PROGRESS NOTES
[facility] CANCER [facility]    NEW PATIENT VIRTUAL VIDEO VISIT NOTE    PATIENT NAME: Ijeoma Shah MRN # [id number]  DATE OF VISIT: November 17, 2020 YOB: 1950    Referring Provider: Dr. Andrea Sanabria, Thoracic Surgery    CANCER TYPE: SCC lung, poorly differentiated  STAGE: pT4N0 (IIIA)  ECOG PS: 0-1    Cancer Staging  Squamous cell carcinoma of left lung (H)  Staging form: Lung, AJCC 8th Edition  - Pathologic stage from 11/17/2020: Stage IIIA (pT4, pN0, cM0) - Signed by Eneida De Leon MD on 11/17/2020    PD-L1: TPS 15% on [id number] lobectomy, <1% on [id number] (LLL bx)  Lung panel: SMO [id number] on LLL bx, negative for fusions on lobectomy specimen.   NGS: N/A    SUMMARY  7/3/20  CT chest (screening). 6.7 cm LLL mass, 0.6 cm RML nodule, mediastinal and L hilar LNs  7/9/20 PET/CT. 7.1 x 5.6 cm LLL mass (SUV 19.6), post obstructive pneumonitis LLL inferior to the mass (SUV 2.8), 3.5 x 1.7 cm 4L node (SUV 5.9), L adrenal nodule 1.1 cm (SUV 4), indeterminate pulmonary nodules, pancreatic cysts, not hypermetabolic  7/27/20 Bronch, EBUS (Dr. Castro). 10R, 11R, 4R too small to biopsy. Stations 7, 4L, 11L negative for malignancy. LLL mass bx: SCC  8/12/20 Brain MRI negative  9/1/20 EUS to evaluate adrenal and 4L (Dr. Hogan). Both negative for malignancy. Adrenal with bland adrenal cells  10/22/20 L VATS, converted to thoracotomy, LLL lobectomy, MLND, R pulmonary arterioplasty (Dr. Sanabria, Dr. Davis). 8.3 cm poorly differentiated SCC, +angiolymphatic invasion    SUBJECTIVE  Ms. Shah is a 69 yo female who presents today for SCC lung, s/p lobectomy 10/22/20, so almost 4 weeks ago. Goes by [place]. Found incidentally on screening CT. Doing ok overall. Has some discomfort under the bra line, feels tight. Not needing analgesics. Hasn't been sleeping as much as usual, usually 8 hours per day but has been experiencing shortness of breath, mostly when lying down  at night and especially when thinking about the atrial fibrillation. Has a little cough once in a while, mostly non productive, has to work to get the phelgm out but feels better once it's out. Has been experiencing temperature fluctuations. Otherwise doing ok. Eating ok. No F/C, N/V. No numbness/tingling. No problems with hearing or ringing in the ears. No problems with leg swelling. No problems with rivaroxaban. No bleeding. Afib was asymptomatic. Has questions about the atrial fibrillation and anticoagulations, for example, warfarin vs rivaroxaban. Has been checking her pulse ox a couple of times per day. Sometimes it reads in the 40s or up close to 100. No symptoms such as lightheadedness, chest pressure or shortness of breath associated with those readings.     PAST MEDICAL HISTORY  SCC as above  Afib with RVR. Initially when she presented for surgery 10/1, then post-op in 10/2020. DCCV x 2 10/23/20, ibutilide --> NSR, dig load, amio gtt. TTE 10/16/20 unremarkable.   Dyslipidemia  H/o bronchitis  Nose surgery  Tubal ligation  Adrenal nodule, bx 9/1/20, negative for malignancy  Cholelithiasis incidentally identified on CT  Cataract repair 2011?    PFT 8/20/20. FEV1 1.33 (58%), FVC 1.76 (60%), DLCO 17.72 (90%)    CURRENT OUTPATIENT MEDICATIONS  Current Outpatient Medications   Medication     acetaminophen (TYLENOL) 325 MG tablet     amiodarone (PACERONE) 400 MG tablet     EPINEPHrine (EPIPEN/ADRENACLICK/OR ANY BX GENERIC EQUIV) 0.3 MG/0.3ML injection 2-pack     ketoconazole (NIZORAL) 2 % external cream     metoprolol tartrate (LOPRESSOR) 25 MG tablet     oxyCODONE (ROXICODONE) 5 MG tablet     rivaroxaban ANTICOAGULANT (XARELTO ANTICOAGULANT) 20 MG TABS tablet     rivaroxaban ANTICOAGULANT (XARELTO) 20 MG TABS tablet     senna-docusate (SENOKOT-S/PERICOLACE) 8.6-50 MG tablet     triamcinolone (KENALOG) 0.1 % external ointment     gabapentin (NEURONTIN) 300 MG capsule     No current facility-administered  medications for this visit.      ALLERGIES  Allergies   Allergen Reactions     Bee Venom Hives     Hot and sweating      SOCIAL HISTORY: . Former smoker. Quit in about 2014, about 50 years at 0.5-1 ppd. One daughter, three grandchildren    FAMILY HISTORY:   Family History   Problem Relation Age of Onset     Glaucoma Mother      LUNG DISEASE Mother      Melanoma Father      Down Syndrome Brother      Cancer Sister         bile duct     Coronary Artery Disease Brother      CABG Brother      No Known Problems Brother      No Known Problems Brother      No Known Problems Sister      Lung Cancer Sister         lung     No Known Problems Sister      No Known Problems Sister       REVIEW OF SYSTEMS  As above in the HPI, o/w complete 12-point ROS was negative.    PHYSICAL EXAM  No vitals due to virtual video visit  Reviews some of the readings from her pulse ox with me. See above in subjective section  Wt Readings from Last 3 Encounters:   10/28/20 82.3 kg (181 lb 7 oz)   10/06/20 81.4 kg (179 lb 6.4 oz)   10/01/20 81.6 kg (179 lb 14.3 oz)     GEN: NAD    Remainder of physical exam deferred due to public health emergency and limitations of video visit.    LABORATORY AND IMAGING STUDIES  No labs for today's visit, but lab results from hospitalization in October reviewed.     Results from CT chest 7/3/20, PET/CT 7/9, brain MRI 8/12, CT chest 9/29 were all personally reviewed and the images were also reviewed.    Non calcified nodule (6:185 on 7/3/20 CT, 12:78 on PET/CT 7/9) was reviewed. No prior chest CTs to compare. Neck CT back in 2013 didn't go down as far as where the nodule is.     Copath Report Patient Name: YOGI VEGA   MR#: [id number]   Specimen #: [id number]   Collected: 10/22/2020   Received: 10/22/2020   Reported: 10/26/2020 19:01   Ordering Phy(s): NELSON CHONG     For improved result formatting, select 'View Enhanced Report Format' under    Linked Documents section.     +++Original Report  Follows Addendum+++     TO ORIGINAL REPORT   Status: Signed Out   Date Ordered:10/27/2020   Date Reported:10/27/2020 12:34   Signed Out By: Katelynn Danielle M.D., PhD, [facility]     INTERPRETATION:   PD-L1 immunostain with appropriate control reveals that the tumor cells   show low expression (TPS 15% and CPS   30%).     COMMENTS:   RESULT FOR IMMUNOHISTOCHEMICAL VENT[place] CLONE  PD-L1 ASSAY   TUMOR PROPORTION SCORE (TPS):             15%   INTERPRETATION: LOW PD-L1 EXPRESSION (TPS >/=1-49%)   COMMENT:  This [place]  PD-L1 immunohistochemistry antibody assay is   a laboratory developed test to be   used for patients with non-small cell lung carcinoma (NSCLC) who are being    considered for treatment with   Keytruda (Pembrolizumab), an anti-PD-1 immune checkpoint inhibitor.  The   [place]  PD-L1 assay has been   validated by the [facility]   Immunohistochemistry Laboratory against the FDA   approved clinical trial-validated PharmDx 22C3 PD-L1 assay.  Evidence   suggests that the level of PD-L1   expression in the tumor cell population by immunohistochemistry is a major    predictor of response to checkpoint   inhibitor therapy.   Previous studies demonstrate a high correlation   between PD-L1 immunohistochemistry   expression data obtained with PD-L1 clones Dako 22C3 and [place]  in   NSCLC.   (References:  Lancet   387:1540-50, 2016; :1823-33, 2016; J Clin Oncol 34:4102-9. 2016; J    Thorac Oncol 12:1654-63, 2017;   [facility] PD-L1 2018 assessment)   Scoring system:   The tumor proportion score(TPS) is determined by   enumeration of the percentage of PD-L1   tumor cells with any amount of membrane positivity expressed as a whole   number relative to all viable tumor   cells in the specimen.  The scoring system for PD-L1 expression is divided    into three groups:  a) High   expressor, for tumors with TPS >/= 50%; b) Low expressor, for tumors with   TPS of >/=1%-49%;  and c) Negative ,   for tumors with TPS <1%.   Assay conditions:   - Fixation and processing:  10% neutral buffered formalin, paraffin   embedded.   - Staining method:  [place] predilute monoclonal PD-L1 antibody clone   , standard heat induced epitope   retrieval in cell conditioning 1 (CC1 -  EDTA, alkaline pH) , primary   antibody incubation 16 minutes, [place]   Optiview detection kit, and [place] BenchMark Ultra automated instrument.   - Minimum tumor cell requirement:   >/= 100 viable tumor cells present in   the specimen.   - Positive and negative controls react appropriately.   This test was developed and its performance characteristics determined by   [facility].  It has not been cleared or   approved by the FDA.  The laboratory is   regulated under CLIA and qualified to perform high complexity testing.     This test is used for clinical   purposes.   It  should not be regarded as investigational or for research.     ORIGINAL REPORT:     SPECIMEN(S):   A: Lymph node, 4R   B: Lymph node, 4L   C: Lymph node, level 7   D: Lymph node, 9L   E: Lymph node, 11L   F: Left lower lobectomy   G: Lymph nodes, level 5     FINAL DIAGNOSIS:   A. LYMPH NODE, 4R, EXCISION:   - No malignancy identified in five lymph node fragments     B. LYMPH NODE, 4L, EXCISION:   - No malignancy identified in three lymph node fragments     C. LYMPH NODE, LEVEL 7, EXCISION:   - No malignancy identified in six lymph node fragments     D. LYMPH NODE, 9L, EXCISION:   - One lymph node, negative for malignancy (0/1)     E. LYMPH NODE, 11L, EXCISION:   - No malignancy identified in three lymph node fragments     F. LUNG, LEFT LOWER LOBE, LOBECTOMY:   - Invasive poorly differentiated squamous cell carcinoma, 8.3 cm in   greatest dimension   - Angiolymphatic invasion: Identified   - Visceral pleura invasion: Not identified   - Margins negative for tumor   - Two benign peribronchial  lymph nodes   - Respiratory bronchiolitis   - See synoptic report     G. LYMPH NODES, LEVEL 5, EXCISION:   - No malignancy identified in seven lymph node fragments     COMMENT:   Frozen section interpretations correlated with final diagnoses.     Report Name: Lung - Resection        Status: Submitted Checklist Inst: 1      Last Updated By: Katelynn Danielle M.D., PhD, [facility], 10/26/2020   19:01:22   Part(s) Involved:   F: Left lower lobectomy     Synoptic Report:     SPECIMEN     Procedure:         - Lobectomy     Specimen Laterality:         - Left     TUMOR     Tumor Site:         - Lower lobe of lung     Histologic Type:         - Invasive squamous cell carcinoma, keratinizing     Total Tumor Size (size of entire tumor): 8.3 Centimeters (cm)     Tumor Focality:         - Single focus     Visceral Pleura Invasion:         - Not identified     Direct Invasion of Adjacent Structures:         - Adjacent structures present but not involved     Treatment Effect:         - No known presurgical therapy     Lymphovascular Invasion:         - Present     MARGINS     Margins:         - All margins are uninvolved by tumor       Margins Examined:           - Bronchial           - Vascular           - Parenchymal       Distance of Invasive Carcinoma from Closest Margin (Centimeters):           0.1 cm         Closest Margin:             - Bronchial     LYMPH NODES     Number of Lymph Nodes Involved:         - 0     Number of Lymph Nodes Examined: Cannot be determined - multiple   fragments     Sylvia Stations Examined:         - 7: Subcarinal         - 5: Subaortic/ aortopulmonary (AP) / AP window         - 9L: Pulmonary ligament         - 11L: Interlobar         - 12L: Lobar     PATHOLOGIC STAGE CLASSIFICATION (PTNM, AJCC 8TH EDITION)     Primary Tumor (pT):         - pT4     Regional Lymph Nodes (pN):         - pN0     COMMENTS    Best block for ancillary studies:  F7     CAP eCC August 2019 Agile Release     I have  personally reviewed all specimens and/or slides, including the   listed special stains, and used them   with my medical judgement to determine or confirm the final diagnosis.     Electronically signed out by:     Katelynn Danielle M.D., PhD, [facility]      ASSESSMENT AND PLAN  SCC lung, [id number], IIIA, R0 resection, discrepant PD-L1: Discussed rationale of adjuvant cisplatin (d1) and gemcitabine (d1, 8) IV every 3 weeks x 4 cycles, with improvement in DFS and overall survival, with the improvement increasing with increasing stage. Discussed logistics and potential side effects. Lives very close to [place]. I asked her to come to the [facility] for C1D1 so we can meet her once in person prior to starting; subsequent visits can be virtual video and subsequent infusions can be given in [place]. Has recovered sufficiently from surgery and will plan to start the week of 11/30 rather than next week due to the Thanksgiving holiday. Briefly discussed surveillance strategy with CT chest w/contrast including the liver and adrenals (needs to be specified in order) every 3 months for year 1, every 3-4 months for year 2, every 6 months for year 3, then annual after that. Will also be doing the CT with contrast due to the mediastinal adenopathy, even though pathologically negative. First scan will be about 3-4 weeks after chemo. Teaching will be done. No port needed. She asked about immunotherapy as part of the adjuvant therapy. We discussed there are many clinical trials going on right now but we don't have a definitive answer as to the benefit just yet. Further, the ALCHEMIST trial just recently closed, although I can double check on that, and we discussed the longer duration of immunotherapy typically in clinical trials compared to the 3 months of typical adjuvant therapy. CXR today and has appt with Janny but not until next week. Messaged Janny to see if it could potentially be moved up to tomorrow.     Afib w/RVR, anticoagulation: TSH  "last month was normal. On rivaroxaban, metoprolol and amiodarone for another week. Has an appt with Dr. Goldstein next week. Asked her to discuss pros and cons between warfarin, aspirin and DOACs such as rivaroxaban with Dr. Goldstein, although aspirin would be least preferable due to the paroxysmal nature of the afib. The copay for the rivaroxaban is high until the out-of-pocket maximum is met. Will see if there's a mechanism for copay assistance.    R pulmonary nodule: Monitor on routine scans for lung cancer    A total of 50 minutes was spent with the patient, >50% of which was spent in counseling and coordination of care.    Eneida De Leon MD  Associate Professor of Medicine  Hematology, Oncology and Transplantation    Ijeoma Shah is a 70 year old female who is being evaluated via a billable video visit.      The patient has been notified of following:     \"This video visit will be conducted via a call between you and your physician/provider. We have found that certain health care needs can be provided without the need for an in-person physical exam.  This service lets us provide the care you need with a video conversation.  If a prescription is necessary we can send it directly to your pharmacy.  If lab work is needed we can place an order for that and you can then stop by our lab to have the test done at a later time.    Video visits are billed at different rates depending on your insurance coverage.  Please reach out to your insurance provider with any questions.    If during the course of the call the physician/provider feels a video visit is not appropriate, you will not be charged for this service.\"    Patient has given verbal consent for Video visit? Yes  How would you like to obtain your AVS? MyChart  If you are dropped from the video visit, the video invite should be resent to: Text to cell phone: [phone]  Will anyone else be joining your video visit? No      Missy MACARIO    Video-Visit " Details  Type of service:  Video Visit  Video Start Time: 10:29 AM  Video End Time: 11:19 AM  Originating Location (pt. Location): Home  Distant Location (provider location):  [facility] CANCER [facility]   Platform used for Video Visit: Steven De Leon MD

## 2020-11-17 NOTE — LETTER
11/17/2020         RE: Ijeoma Shah  [address] [facility] [address]        Dear Colleague,    Thank you for referring your patient, Ijeoma Shah, to the [facility]. Please see a copy of my visit note below.    [facility]    NEW PATIENT VIRTUAL VIDEO VISIT NOTE    PATIENT NAME: Ijeoma Shah MRN # [id number]  DATE OF VISIT: November 17, 2020 YOB: 1950    Referring Provider: Dr. Andrea Sanabria, Thoracic Surgery    CANCER TYPE: SCC lung, poorly differentiated  STAGE: pT4N0 (IIIA)  ECOG PS: 0-1    Cancer Staging  Squamous cell carcinoma of left lung (H)  Staging form: Lung, AJCC 8th Edition  - Pathologic stage from 11/17/2020: Stage IIIA (pT4, pN0, cM0) - Signed by Eneida De Leon MD on 11/17/2020    PD-L1: TPS 15% on [id number] lobectomy, <1% on [id number] (LLL bx)  Lung panel: SMO [id number] on LLL bx, negative for fusions on lobectomy specimen.   NGS: N/A    SUMMARY  7/3/20  CT chest (screening). 6.7 cm LLL mass, 0.6 cm RML nodule, mediastinal and L hilar LNs  7/9/20 PET/CT. 7.1 x 5.6 cm LLL mass (SUV 19.6), post obstructive pneumonitis LLL inferior to the mass (SUV 2.8), 3.5 x 1.7 cm 4L node (SUV 5.9), L adrenal nodule 1.1 cm (SUV 4), indeterminate pulmonary nodules, pancreatic cysts, not hypermetabolic  7/27/20 Bronch, EBUS (Dr. Castro). 10R, 11R, 4R too small to biopsy. Stations 7, 4L, 11L negative for malignancy. LLL mass bx: SCC  8/12/20 Brain MRI negative  9/1/20 EUS to evaluate adrenal and 4L (Dr. Hogan). Both negative for malignancy. Adrenal with bland adrenal cells  10/22/20 L VATS, converted to thoracotomy, LLL lobectomy, MLND, R pulmonary arterioplasty (Dr. Sanabria, Dr. Davis). 8.3 cm poorly differentiated SCC, +angiolymphatic invasion    SUBJECTIVE  Ms. Shah is a 71 yo female who presents today for SCC lung, s/p lobectomy 10/22/20, so almost 4 weeks ago.  Goes by Salima. Found incidentally on screening CT. Doing ok overall. Has some discomfort under the bra line, feels tight. Not needing analgesics. Hasn't been sleeping as much as usual, usually 8 hours per day but has been experiencing shortness of breath, mostly when lying down at night and especially when thinking about the atrial fibrillation. Has a little cough once in a while, mostly non productive, has to work to get the phelgm out but feels better once it's out. Has been experiencing temperature fluctuations. Otherwise doing ok. Eating ok. No F/C, N/V. No numbness/tingling. No problems with hearing or ringing in the ears. No problems with leg swelling. No problems with rivaroxaban. No bleeding. Afib was asymptomatic. Has questions about the atrial fibrillation and anticoagulations, for example, warfarin vs rivaroxaban. Has been checking her pulse ox a couple of times per day. Sometimes it reads in the 40s or up close to 100. No symptoms such as lightheadedness, chest pressure or shortness of breath associated with those readings.     PAST MEDICAL HISTORY  SCC as above  Afib with RVR. Initially when she presented for surgery 10/1, then post-op in 10/2020. DCCV x 2 10/23/20, ibutilide --> NSR, dig load, amio gtt. TTE 10/16/20 unremarkable.   Dyslipidemia  H/o bronchitis  Nose surgery  Tubal ligation  Adrenal nodule, bx 9/1/20, negative for malignancy  Cholelithiasis incidentally identified on CT  Cataract repair 2011?    PFT 8/20/20. FEV1 1.33 (58%), FVC 1.76 (60%), DLCO 17.72 (90%)    CURRENT OUTPATIENT MEDICATIONS  Current Outpatient Medications   Medication     acetaminophen (TYLENOL) 325 MG tablet     amiodarone (PACERONE) 400 MG tablet     EPINEPHrine (EPIPEN/ADRENACLICK/OR ANY BX GENERIC EQUIV) 0.3 MG/0.3ML injection 2-pack     ketoconazole (NIZORAL) 2 % external cream     metoprolol tartrate (LOPRESSOR) 25 MG tablet     oxyCODONE (ROXICODONE) 5 MG tablet     rivaroxaban ANTICOAGULANT (XARELTO  ANTICOAGULANT) 20 MG TABS tablet     rivaroxaban ANTICOAGULANT (XARELTO) 20 MG TABS tablet     senna-docusate (SENOKOT-S/PERICOLACE) 8.6-50 MG tablet     triamcinolone (KENALOG) 0.1 % external ointment     gabapentin (NEURONTIN) 300 MG capsule     No current facility-administered medications for this visit.      ALLERGIES  Allergies   Allergen Reactions     Bee Venom Hives     Hot and sweating      SOCIAL HISTORY: . Former smoker. Quit in about 2014, about 50 years at 0.5-1 ppd. One daughter, three grandchildren    FAMILY HISTORY:   Family History   Problem Relation Age of Onset     Glaucoma Mother      LUNG DISEASE Mother      Melanoma Father      Down Syndrome Brother      Cancer Sister         bile duct     Coronary Artery Disease Brother      CABG Brother      No Known Problems Brother      No Known Problems Brother      No Known Problems Sister      Lung Cancer Sister         lung     No Known Problems Sister      No Known Problems Sister       REVIEW OF SYSTEMS  As above in the HPI, o/w complete 12-point ROS was negative.    PHYSICAL EXAM  No vitals due to virtual video visit  Reviews some of the readings from her pulse ox with me. See above in subjective section  Wt Readings from Last 3 Encounters:   10/28/20 82.3 kg (181 lb 7 oz)   10/06/20 81.4 kg (179 lb 6.4 oz)   10/01/20 81.6 kg (179 lb 14.3 oz)     GEN: NAD    Remainder of physical exam deferred due to public health emergency and limitations of video visit.    LABORATORY AND IMAGING STUDIES  No labs for today's visit, but lab results from hospitalization in October reviewed.     Results from CT chest 7/3/20, PET/CT 7/9, brain MRI 8/12, CT chest 9/29 were all personally reviewed and the images were also reviewed.    Non calcified nodule (6:185 on 7/3/20 CT, 12:78 on PET/CT 7/9) was reviewed. No prior chest CTs to compare. Neck CT back in 2013 didn't go down as far as where the nodule is.     Copath Report Patient Name: YOGI VEGA    MR#: [id number]   Specimen #: [id number]   Collected: 10/22/2020   Received: 10/22/2020   Reported: 10/26/2020 19:01   Ordering Phy(s): NELSON CHONG     For improved result formatting, select 'View Enhanced Report Format' under    Linked Documents section.     +++Original Report Follows Addendum+++     TO ORIGINAL REPORT   Status: Signed Out   Date Ordered:10/27/2020   Date Reported:10/27/2020 12:34   Signed Out By: Katelynn Danielle M.D., PhD, [facility]     INTERPRETATION:   PD-L1 immunostain with appropriate control reveals that the tumor cells   show low expression (TPS 15% and CPS   30%).     COMMENTS:   RESULT FOR IMMUNOHISTOCHEMICAL VENTANA CLONE  PD-L1 ASSAY   TUMOR PROPORTION SCORE (TPS):             15%   INTERPRETATION: LOW PD-L1 EXPRESSION (TPS >/=1-49%)   COMMENT:  This [place]  PD-L1 immunohistochemistry antibody assay is   a laboratory developed test to be   used for patients with non-small cell lung carcinoma (NSCLC) who are being    considered for treatment with   Keytruda (Pembrolizumab), an anti-PD-1 immune checkpoint inhibitor.  The   [place]  PD-L1 assay has been   validated by the [facility]   Immunohistochemistry Laboratory against the FDA   approved clinical trial-validated PharmDx 22C3 PD-L1 assay.  Evidence   suggests that the level of PD-L1   expression in the tumor cell population by immunohistochemistry is a major    predictor of response to checkpoint   inhibitor therapy.   Previous studies demonstrate a high correlation   between PD-L1 immunohistochemistry   expression data obtained with PD-L1 clones Dako 22C3 and [place]  in   NSCLC.   (References:  Lancet   387:1540-50, 2016; NEJM 375:1823-33, 2016; J Clin Oncol 34:4102-9. 2016; J    Thorac Oncol 12:1654-63, 2017;   [facility] PD-L1 2018 assessment)   Scoring system:   The tumor proportion score(TPS) is determined by   enumeration of the percentage of PD-L1   tumor cells with any amount of  membrane positivity expressed as a whole   number relative to all viable tumor   cells in the specimen.  The scoring system for PD-L1 expression is divided    into three groups:  a) High   expressor, for tumors with TPS >/= 50%; b) Low expressor, for tumors with   TPS of >/=1%-49%; and c) Negative ,   for tumors with TPS <1%.   Assay conditions:   - Fixation and processing:  10% neutral buffered formalin, paraffin   embedded.   - Staining method:  [place] predilute monoclonal PD-L1 antibody clone   , standard heat induced epitope   retrieval in cell conditioning 1 (CC1 -  EDTA, alkaline pH) , primary   antibody incubation 16 minutes, [place]ino   Optiview detection kit, and [place]ino BenchMark Ultra automated instrument.   - Minimum tumor cell requirement:   >/= 100 viable tumor cells present in   the specimen.   - Positive and negative controls react appropriately.   This test was developed and its performance characteristics determined by   [facility].  It has not been cleared or   approved by the FDA.  The laboratory is   regulated under CLIA and qualified to perform high complexity testing.     This test is used for clinical   purposes.   It  should not be regarded as investigational or for research.     ORIGINAL REPORT:     SPECIMEN(S):   A: Lymph node, 4R   B: Lymph node, 4L   C: Lymph node, level 7   D: Lymph node, 9L   E: Lymph node, 11L   F: Left lower lobectomy   G: Lymph nodes, level 5     FINAL DIAGNOSIS:   A. LYMPH NODE, 4R, EXCISION:   - No malignancy identified in five lymph node fragments     B. LYMPH NODE, 4L, EXCISION:   - No malignancy identified in three lymph node fragments     C. LYMPH NODE, LEVEL 7, EXCISION:   - No malignancy identified in six lymph node fragments     D. LYMPH NODE, 9L, EXCISION:   - One lymph node, negative for malignancy (0/1)     E. LYMPH NODE, 11L, EXCISION:   - No malignancy identified in three lymph node  fragments     F. 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 LYMPH NODES, LEVEL 5, EXCISION:   - No malignancy identified in seven lymph node fragments     COMMENT:   Frozen section interpretations correlated with final diagnoses.     Report Name: Lung - Resection        Status: Submitted Checklist Inst: 1      Last Updated By: Katelynn Danielle M.D., PhD, [facility], 10/26/2020   19:01:22   Part(s) Involved:   F: Left lower lobectomy     Synoptic Report:     SPECIMEN     Procedure:         - Lobectomy     Specimen Laterality:         - Left     TUMOR     Tumor Site:         - Lower lobe of lung     Histologic Type:         - Invasive squamous cell carcinoma, keratinizing     Total Tumor Size (size of entire tumor): 8.3 Centimeters (cm)     Tumor Focality:         - Single focus     Visceral Pleura Invasion:         - Not identified     Direct Invasion of Adjacent Structures:         - Adjacent structures present but not involved     Treatment Effect:         - No known presurgical therapy     Lymphovascular Invasion:         - Present     MARGINS     Margins:         - All margins are uninvolved by tumor       Margins Examined:           - Bronchial           - Vascular           - Parenchymal       Distance of Invasive Carcinoma from Closest Margin (Centimeters):           0.1 cm         Closest Margin:             - Bronchial     LYMPH NODES     Number of Lymph Nodes Involved:         - 0     Number of Lymph Nodes Examined: Cannot be determined - multiple   fragments     Sylvia Stations Examined:         - 7: Subcarinal         - 5: Subaortic/ aortopulmonary (AP) / AP window         - 9L: Pulmonary ligament         - 11L:  Interlobar         - 12L: Lobar     PATHOLOGIC STAGE CLASSIFICATION (PTNM, AJCC 8TH EDITION)     Primary Tumor (pT):         - pT4     Regional Lymph Nodes (pN):         - pN0     COMMENTS    Best block for ancillary studies:  F7     CAP eCC August 2019 Agile Release     I have personally reviewed all specimens and/or slides, including the   listed special stains, and used them   with my medical judgement to determine or confirm the final diagnosis.     Electronically signed out by:     Katelynn Danielle M.D., PhD, [facility]      ASSESSMENT AND PLAN  SCC lung, [id number], IIIA, R0 resection, discrepant PD-L1: Discussed rationale of adjuvant cisplatin (d1) and gemcitabine (d1, 8) IV every 3 weeks x 4 cycles, with improvement in DFS and overall survival, with the improvement increasing with increasing stage. Discussed logistics and potential side effects. Lives very close to [place]. I asked her to come to the [facility] for C1D1 so we can meet her once in person prior to starting; subsequent visits can be virtual video and subsequent infusions can be given in [place]. Has recovered sufficiently from surgery and will plan to start the week of 11/30 rather than next week due to the Thanksgiving holiday. Briefly discussed surveillance strategy with CT chest w/contrast including the liver and adrenals (needs to be specified in order) every 3 months for year 1, every 3-4 months for year 2, every 6 months for year 3, then annual after that. Will also be doing the CT with contrast due to the mediastinal adenopathy, even though pathologically negative. First scan will be about 3-4 weeks after chemo. Teaching will be done. No port needed. She asked about immunotherapy as part of the adjuvant therapy. We discussed there are many clinical trials going on right now but we don't have a definitive answer as to the benefit just yet. Further, the ALCHEMIST trial just recently closed, although I can double check on that, and we discussed the  "longer duration of immunotherapy typically in clinical trials compared to the 3 months of typical adjuvant therapy. CXR today and has appt with Janny but not until next week. Messaged Janny to see if it could potentially be moved up to tomorrow.     Afib w/RVR, anticoagulation: TSH last month was normal. On rivaroxaban, metoprolol and amiodarone for another week. Has an appt with Dr. Goldstein next week. Asked her to discuss pros and cons between warfarin, aspirin and DOACs such as rivaroxaban with Dr. Goldstein, although aspirin would be least preferable due to the paroxysmal nature of the afib. The copay for the rivaroxaban is high until the out-of-pocket maximum is met. Will see if there's a mechanism for copay assistance.    R pulmonary nodule: Monitor on routine scans for lung cancer    A total of 50 minutes was spent with the patient, >50% of which was spent in counseling and coordination of care.    Eneida De Leon MD  Associate Professor of Medicine  Hematology, Oncology and Transplantation    Ijeoma Shah is a 70 year old female who is being evaluated via a billable video visit.      The patient has been notified of following:     \"This video visit will be conducted via a call between you and your physician/provider. We have found that certain health care needs can be provided without the need for an in-person physical exam.  This service lets us provide the care you need with a video conversation.  If a prescription is necessary we can send it directly to your pharmacy.  If lab work is needed we can place an order for that and you can then stop by our lab to have the test done at a later time.    Video visits are billed at different rates depending on your insurance coverage.  Please reach out to your insurance provider with any questions.    If during the course of the call the physician/provider feels a video visit is not appropriate, you will not be charged for this service.\"    Patient has given " verbal consent for Video visit? Yes  How would you like to obtain your AVS? MyChart  If you are dropped from the video visit, the video invite should be resent to: Text to cell phone: [phone]  Will anyone else be joining your video visit? Naz MACARIO    Video-Visit Details  Type of service:  Video Visit  Video Start Time: 10:29 AM  Video End Time: 11:19 AM  Originating Location (pt. Location): Home  Distant Location (provider location):  [facility]   Platform used for Video Visit: Steven De Leon MD

## 2020-11-18 ENCOUNTER — VIRTUAL VISIT (OUTPATIENT)
Dept: SURGERY | Facility: CLINIC | Age: 70
End: 2020-11-18
Attending: CLINICAL NURSE SPECIALIST
Payer: MEDICARE

## 2020-11-18 DIAGNOSIS — C34.92 SQUAMOUS CELL CARCINOMA OF LEFT LUNG (H): Primary | ICD-10-CM

## 2020-11-18 PROCEDURE — 999N001193 HC VIDEO/TELEPHONE VISIT; NO CHARGE

## 2020-11-18 PROCEDURE — 99024 POSTOP FOLLOW-UP VISIT: CPT | Mod: 95 | Performed by: CLINICAL NURSE SPECIALIST

## 2020-11-18 NOTE — PROGRESS NOTES
"Ijeoma Shah is a 70 year old female who is being evaluated via a billable telephone visit.      The patient has been notified of following:     \"This telephone visit will be conducted via a call between you and your physician/provider. We have found that certain health care needs can be provided without the need for a physical exam.  This service lets us provide the care you need with a short phone conversation.  If a prescription is necessary we can send it directly to your pharmacy.  If lab work is needed we can place an order for that and you can then stop by our lab to have the test done at a later time.    Telephone visits are billed at different rates depending on your insurance coverage. During this emergency period, for some insurers they may be billed the same as an in-person visit.  Please reach out to your insurance provider with any questions.    If during the course of the call the physician/provider feels a telephone visit is not appropriate, you will not be charged for this service.\"    Patient has given verbal consent for Telephone visit?  Yes    What phone number would you like to be contacted at? [phone]    How would you like to obtain your AVS? [facility] beds at the [facility]    THORACIC SURGERY FOLLOW UP VISIT    I spoke by telephone to Ms. Shah in follow-up today. The clinical summary follows:     PREOP DIAGNOSIS   Left lower lobe nodule    PROCEDURE   1. TEMLA  2. Left VATS, conversion to thoracotomy  3. Left lower lobectom  4. Mediastinal lymph node dissection  5. Pulmonary artery repair    DATE OF PROCEDURE  10/22/2020    HISTOPATHOLOGY  T4 N0    FINAL DIAGNOSIS:   A. LYMPH NODE, 4R, EXCISION:   - No malignancy identified in five lymph node fragments     B. LYMPH NODE, 4L, EXCISION:   - No malignancy identified in three lymph node fragments     C. LYMPH NODE, LEVEL 7, EXCISION:   - No malignancy identified in six lymph node fragments     D. LYMPH NODE, 9L, EXCISION:   - One " lymph node, negative for malignancy (0/1)     E. LYMPH NODE, 11L, EXCISION:   - No malignancy identified in three lymph node fragments     F. 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 LYMPH NODES, LEVEL 5, EXCISION:   - No malignancy identified in seven lymph node fragments     COMPLICATIONS  None    INTERVAL STUDIES  CXR 11/17/2020  FINDINGS: There is been interval resolution in the right lower lobe  infiltrate. A small left lower lobe infiltrate or area of atelectasis  remains. The upper lungs are clear. Normal heart and pulmonary  vasculature. Probable small left pleural effusion. No masses are  identified. However, the previous CT a mass is identified in the left  basilar infiltrate. This would be better followed with CT.     SUBJECTIVE   I am actually doing pretty good!        IMPRESSION Left lower lobe squamous cell cancer  Salima is a 71 yo female who is recovering from her recent lobectomy for squamous cell cancer.   She reports feeling better each day with minimal pain.   She states her incisions are healed with no redness or drainage.   She denies a cough, fever or significant shortness of breath.          She has seen Dr. De Leon in medical oncology and has planned chemotherapy.   She will be meeting with cardiology in the next few weeks to follow-up after experiencing atrial fibrillation with a rapid ventricular rate which caused us to cancel her surgery a few weeks ago.   She experienced another episode of Afib with RVR and hypotension on post-op day #1, was given a fluid bolus along with amiodarone, eventually successful cardioversion.       We reviewed her pathology again and the results from her CXR  today.    I explained that Dr. De Leon would determine timing of her next scan and we would not need to see her again unless something changed.       I asked her to call us with any new concerns or questions.    PLAN  I spent a total of 30 minutes with Ms. Ijeoma Shah, more than 50% of which were spent in counseling, coordination of care, and face-to-face time. I reviewed the plan as follows:  Call or return PRN  1. Necessary Tests & Appointments: As scheduled with medical oncology and cardiology.  2. Pain Control Plan: N/A  3. Anticoagulation Plan: Xarelto as prescribed by cardiology-  She wants to discuss options as the cost is difficult for her to sustain.  4. Smoking Cessation: N/A    All questions were answered and the patient and present family were in agreement with the plan.  I appreciate the opportunity to participate in the care of your patient and will keep you updated.  Sincerely,  RUDY Garza, CNS

## 2020-11-18 NOTE — LETTER
"    11/18/2020         RE: Ijeoma Shah  [address] [facility] [id number]        Dear Colleague,    Thank you for referring your patient, Ijeoma Shah, to the [facility]. Please see a copy of my visit note below.    Ijeoma Shah is a 70 year old female who is being evaluated via a billable telephone visit.      The patient has been notified of following:     \"This telephone visit will be conducted via a call between you and your physician/provider. We have found that certain health care needs can be provided without the need for a physical exam.  This service lets us provide the care you need with a short phone conversation.  If a prescription is necessary we can send it directly to your pharmacy.  If lab work is needed we can place an order for that and you can then stop by our lab to have the test done at a later time.    Telephone visits are billed at different rates depending on your insurance coverage. During this emergency period, for some insurers they may be billed the same as an in-person visit.  Please reach out to your insurance provider with any questions.    If during the course of the call the physician/provider feels a telephone visit is not appropriate, you will not be charged for this service.\"    Patient has given verbal consent for Telephone visit?  Yes    What phone number would you like to be contacted at? [phone]    How would you like to obtain your AVS? [facility]    THORACIC SURGERY FOLLOW UP VISIT    I spoke by telephone to Ms. Shah in follow-up today. The clinical summary follows:     PREOP DIAGNOSIS   Left lower lobe nodule    PROCEDURE   1. TEMLA  2. Left VATS, conversion to thoracotomy  3. Left lower lobectom  4. Mediastinal lymph node dissection  5. Pulmonary artery repair    DATE OF PROCEDURE  10/22/2020    HISTOPATHOLOGY  T4 N0    FINAL DIAGNOSIS:   A. LYMPH NODE, 4R, EXCISION:   - No " malignancy identified in five lymph node fragments     B. LYMPH NODE, 4L, EXCISION:   - No malignancy identified in three lymph node fragments     C. LYMPH NODE, LEVEL 7, EXCISION:   - No malignancy identified in six lymph node fragments     D. LYMPH NODE, 9L, EXCISION:   - One lymph node, negative for malignancy (0/1)     E. LYMPH NODE, 11L, EXCISION:   - No malignancy identified in three lymph node fragments     F. 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 LYMPH NODES, LEVEL 5, EXCISION:   - No malignancy identified in seven lymph node fragments     COMPLICATIONS  None    INTERVAL STUDIES  CXR 11/17/2020  FINDINGS: There is been interval resolution in the right lower lobe  infiltrate. A small left lower lobe infiltrate or area of atelectasis  remains. The upper lungs are clear. Normal heart and pulmonary  vasculature. Probable small left pleural effusion. No masses are  identified. However, the previous CT a mass is identified in the left  basilar infiltrate. This would be better followed with CT.     SUBJECTIVE   I am actually doing pretty good!        IMPRESSION Left lower lobe squamous cell cancer  Salima is a 69 yo female who is recovering from her recent lobectomy for squamous cell cancer.   She reports feeling better each day with minimal pain.   She states her incisions are healed with no redness or drainage.   She denies a cough, fever or significant shortness of breath.          She has seen Dr. De Leon in medical oncology and has planned chemotherapy.   She will be meeting with cardiology in the next few weeks to follow-up after experiencing atrial fibrillation with a rapid ventricular rate which caused us to  cancel her surgery a few weeks ago.   She experienced another episode of Afib with RVR and hypotension on post-op day #1, was given a fluid bolus along with amiodarone, eventually successful cardioversion.       We reviewed her pathology again and the results from her CXR today.    I explained that Dr. De Leon would determine timing of her next scan and we would not need to see her again unless something changed.       I asked her to call us with any new concerns or questions.    PLAN  I spent a total of 30 minutes with Ms. Ijeoma ZAZUETA Pablo, more than 50% of which were spent in counseling, coordination of care, and face-to-face time. I reviewed the plan as follows:  Call or return PRN  1. Necessary Tests & Appointments: As scheduled with medical oncology and cardiology.  2. Pain Control Plan: N/A  3. Anticoagulation Plan: Xarelto as prescribed by cardiology-  She wants to discuss options as the cost is difficult for her to sustain.  4. Smoking Cessation: N/A    All questions were answered and the patient and present family were in agreement with the plan.  I appreciate the opportunity to participate in the care of your patient and will keep you updated.  Sincerely,  RUDY Garza, CNS

## 2020-11-19 ENCOUNTER — PATIENT OUTREACH (OUTPATIENT)
Dept: ONCOLOGY | Facility: CLINIC | Age: 70
End: 2020-11-19

## 2020-11-19 NOTE — PROGRESS NOTES
RN Care Coordination Note  OUTGOING CALL to Ijeoma Shah after consultation appt with Dr. De Leon.    We reviewed Dr. De Leon's plan of care: Cisplatin/Gemzar x 4 cycles.  Household includes: .       Introduced self and role of RN Care Coordinator at [facility]. Provided my contact information, [facility] phone number (which has options to talk with a Nurse available 24/7 - triage and RNCC via this option during business hours).     Reviewed current adjustments in clinic flow due to Covid-19 pandemic including addition of telemedicine visits, visitor restrictions, RNCCs working remotely at varying intervals, and Covid testing.    Reviewed [facility] care team members including midlevel providers in oncology dept and Dr. De Leon usual clinic hours.    Reviewed [facility] Guidebook as resource for additional information including side effects management, clinic information as well as additional supportive care resources. [employer] message sent with      Reviewed appropriate use of MyChart, not to be used for symptom reporting.      Reviewed Auth to Discuss PHI form. Verbal ok to discuss PHI with: spouse, Lisa Shah.      Learning assessment: complete      Answered questions regarding: Covid testing prior to first infusion. Patient is aware she will need one.     Patient also has questions regarding survival rates with adjuvant treatment vs no treatment. Discussed with Dr De Leon; Depending on the study, we can improve overall survival and disease free survival, so the amount of time without cancer, by about 5-15%, with the estimate leaning more toward the 10-15% range since it was a higher stage IIIA cancer. People with IIIA cancer clearly benefitted more from chemo than those with Stage II cancers. Those with Stage IIIA did much better than that average improvement.       Ijeoma Shah voiced understanding and appreciation of above information and denies any  further questions, and she understands that I will follow and provide        Vicenta Pearce RN, BSN, OCN   RN Care Coordinator   [facility]

## 2020-11-20 ENCOUNTER — TELEPHONE (OUTPATIENT)
Dept: FAMILY MEDICINE | Facility: CLINIC | Age: 70
End: 2020-11-20

## 2020-11-20 NOTE — PATIENT INSTRUCTIONS
Call or return PRN  1. Necessary Tests & Appointments: As scheduled with medical oncology and cardiology.  2. Pain Control Plan: N/A  3. Anticoagulation Plan: Xarelto as prescribed by cardiology-  She wants to discuss options as the cost is difficult for her to sustain.  4. Smoking Cessation: N/A

## 2020-11-20 NOTE — PROGRESS NOTES
"Ijeoma Shah is a 70 year old female who is being evaluated via a billable video visit.      The patient has been notified of following:     \"This video visit will be conducted via a call between you and your physician/provider. We have found that certain health care needs can be provided without the need for an in-person physical exam.  This service lets us provide the care you need with a video conversation.  If a prescription is necessary we can send it directly to your pharmacy.  If lab work is needed we can place an order for that and you can then stop by our lab to have the test done at a later time.    Video visits are billed at different rates depending on your insurance coverage.  Please reach out to your insurance provider with any questions.    If during the course of the call the physician/provider feels a video visit is not appropriate, you will not be charged for this service.\"    Patient has given verbal consent for Video visit? Yes  How would you like to obtain your AVS? [employer]  If you are dropped from the video visit, the video invite should be resent to: Text to cell phone: [phone]   Will anyone else be joining your video visit? Her  joined.    Pt will be connecting on [employer] @10:45    Video-Visit Details    Type of service:  Video Visit    Video Start Time: 11:02 AM  Video End Time: 11:51 AM    Originating Location (pt. Location): Home    Distant Location (provider location):  [facility]     Platform used for Video Visit: Steven Goldstein MD      HPI:  Ms Shah is a 70 year old female with Hx of squamous cell lung carcinoma of the lung admitted on 10/22/2020 for left VATS w/ conversion to thoracotomy, left lower lobectomy & pulmonary artery repair. Electrophysiology consulted on POD#1 for post-operative hemodynamically unstable atrial fibrillation w/ RVR.      The patient has history of recently diagnosed Afib, first seen on 10/1/2020 " when she was being prepped for lung surgery and about to undergo induction by anesthesia; Afib reportedly paroxysmal. EKG 10/6/20 revealed NSR. She was evaluated outpatient by cardiology, underwent a TTE on 10/16/2020 which showed normal LVEF, normal LA with MARIA A 24 mL/m2, no significant valvular pathologies. She was initiated on Toprol XL 25 once a day; no anti-coagulation initiated at the time for CHADS VASc = 2 given upcoming lung surgery. She had been asymptomatic from a cardiac perspective during this entire duration.     She underwent the elective lobectomy/PA repair y'day. Post-op course otherwise uncomplicated. She developed Afib w/ RVR. The patient underwent 2 attempts at cardioversion (DCCV @ 200J & 360J) without success. Intravenous 1 mg of ibutilide x 2 for chemical cardioversion successfully cardioverted her, but could not maintain sinus rhythm. She was then started on amiodarone 400 mg BID.    She is now seen for a follow up.    She has not recently taken any ECG, but her home care nurse was checking her BP and HR, which were OK except a slight bradycardia.      PAST MEDICAL HISTORY:  Past Medical History:   Diagnosis Date     Arrhythmia     A-Fib     Lung cancer (H)      Simple chronic bronchitis (H)        CURRENT MEDICATIONS:  Current Outpatient Medications   Medication Sig Dispense Refill     acetaminophen (TYLENOL) 325 MG tablet Take 1-2 tablets (325-650 mg) by mouth every 6 hours as needed for mild pain 60 tablet 0     amiodarone (PACERONE) 400 MG tablet 1 tablet (400 mg) by Oral or Feeding Tube route 2 times daily for 28 days 56 tablet 0     ketoconazole (NIZORAL) 2 % external cream Apply topically daily 60 g 1     metoprolol tartrate (LOPRESSOR) 25 MG tablet Take 1 tablet (25 mg) by mouth 2 times daily 60 tablet 0     oxyCODONE (ROXICODONE) 5 MG tablet Take 1-2 tablets (5-10 mg) by mouth every 4 hours as needed for moderate to severe pain 30 tablet 0     rivaroxaban ANTICOAGULANT (XARELTO  ANTICOAGULANT) 20 MG TABS tablet Take 1 tablet (20 mg) by mouth daily (with dinner) 30 tablet 1     senna-docusate (SENOKOT-S/PERICOLACE) 8.6-50 MG tablet Take 2 tablets by mouth At Bedtime 30 tablet 0     triamcinolone (KENALOG) 0.1 % external ointment APPLY  OINTMENT TOPICALLY TWICE DAILY OR  AS  NEEDED 45 g 0     EPINEPHrine (EPIPEN/ADRENACLICK/OR ANY BX GENERIC EQUIV) 0.3 MG/0.3ML injection 2-pack Inject 0.3 mLs (0.3 mg) into the muscle as needed (Patient not taking: Reported on 11/23/2020) 0.6 mL 1     rivaroxaban ANTICOAGULANT (XARELTO) 20 MG TABS tablet Take 1 tablet (20 mg) by mouth daily (with dinner) (Patient not taking: Reported on 11/23/2020) 90 tablet 3       PAST SURGICAL HISTORY:  Past Surgical History:   Procedure Laterality Date     ARTHROEREISIS, SUBTALAR       BRONCHOSCOPY RIGID OR FLEXIBLE W/TRANSENDOSCOPIC ENDOBRONCHIAL ULTRASOUND GUIDED N/A 07/27/2020    Procedure: Flexible bronchoscopy, endobronchial ultrasound with transbronchial biopsies;  Surgeon: Edin Castro MD;  Location: [facility] OR     CATARACT IOL, RT/LT Bilateral      COLONOSCOPY  06/29/2004    colonoscopy to the cecum     ESOPHAGOSCOPY, GASTROSCOPY, DUODENOSCOPY (EGD), COMBINED N/A 09/01/2020    Procedure: ESOPHAGOGASTRODUODENOSCOPY, WITH FINE NEEDLE ASPIRATION BIOPSY, WITH ENDOSCOPIC ULTRASOUND GUIDANCE;  Surgeon: Barber Hogan MD;  Location: [facility] GI     PICC TRIPLE LUMEN PLACEMENT Right 10/23/2020    5Fr - 36cm, Medial brachial vein     SURGICAL HISTORY OF -       nose surgery     THORACOSCOPIC RESECTION LUNG Left 10/22/2020    Procedure: Left thoracoscopic lobectomy converted to Left Thoracotomy, Medistinal lymph node dissection, Pulmonary Artery repair, flexible bronchoscopy, on-pump oxygenator on standy-by;  Surgeon: Andrea Sanabria MD;  Location: [facility] OR     THORACOTOMY N/A 10/22/2020    Procedure: Thoracotomy;  Surgeon: Andrea Sanabria MD;  Location: [facility] OR     TRANSCERVICAL EXTENDED MEDIASTINAL LYMPHADENECTOMY N/A  10/22/2020    Procedure: Transcervical extended mediastinal lymphadenectomy;  Surgeon: Andrea Sanabria MD;  Location: [facility] OR     TUBAL LIGATION      bilateral tubal ligation.  BTL       ALLERGIES:     Allergies   Allergen Reactions     Bee Venom Hives     Hot and sweating        FAMILY HISTORY:  + Premature coronary artery disease  + Atrial fibrillation  - Sudden cardiac death     SOCIAL HISTORY:  Social History     Tobacco Use     Smoking status: Former Smoker     Packs/day: 1.00     Years: 33.00     Pack years: 33.00     Types: Cigarettes     Quit date: 2014     Years since quittin.9     Smokeless tobacco: Never Used   Substance Use Topics     Alcohol use: Yes     Alcohol/week: 1.7 - 2.5 standard drinks     Types: 2 - 3 Standard drinks or equivalent per week     Comment: occ,social     Drug use: No       ROS:   10 points of ROS were reviewed, and there were no significant changes since the discharge.  Constitutional: No fever, chills, or sweats. Weight stable.   ENT: No visual disturbance, ear ache, epistaxis, sore throat.   Cardiovascular: As per HPI.   Respiratory: No cough, hemoptysis.    GI: No nausea, vomiting, hematemesis, melena, or hematochezia.   : No hematuria.   Integument: Negative.   Psychiatric: Negative.   Hematologic:  Easy bruising, no easy bleeding.  Neuro: Negative.   Endocrinology: No significant heat or cold intolerance   Musculoskeletal: No myalgia.    Exam:  There were no vitals taken for this visit.  GENERAL APPEARANCE: healthy, alert and no distress  HEENT: no icterus, no xanthelasmas, normal pupil size and reaction, normal palate, mucosa moist, no central cyanosis  NECK: no adenopathy, no asymmetry, masses, or scars, thyroid normal to palpation and no bruits, JVP not elevated  RESPIRATORY: lungs clear to auscultation - no rales, rhonchi or wheezes, no use of accessory muscles, no retractions, respirations are unlabored, normal respiratory rate  CARDIOVASCULAR: regular rhythm,  normal S1 with physiologic split S2, no S3 or S4 and no murmur, click or rub, precordium quiet with normal PMI.  ABDOMEN: soft, non tender, without hepatosplenomegaly, no masses palpable, bowel sounds normal, aorta not enlarged by palpation, no abdominal bruits  EXTREMITIES: peripheral pulses normal, no edema, no bruits  NEURO: alert and oriented to person/place/time, normal speech, gait and affect  VASC: Radial, femoral, dorsalis pedis and posterior tibialis pulses are normal in volumes and symmetric bilaterally. No bruits are heard.  SKIN: no ecchymoses, no rashes    Labs:  CBC RESULTS:   Lab Results   Component Value Date    WBC 8.3 10/28/2020    RBC 3.49 (L) 10/28/2020    HGB 10.0 (L) 10/28/2020    HCT 30.5 (L) 10/28/2020    MCV 87 10/28/2020    MCH 28.7 10/28/2020    MCHC 32.8 10/28/2020    RDW 13.5 10/28/2020     10/28/2020       BMP RESULTS:  Lab Results   Component Value Date     10/28/2020    POTASSIUM 3.8 10/28/2020    CHLORIDE 100 10/28/2020    CO2 32 10/28/2020    ANIONGAP 4 10/28/2020     (H) 10/28/2020    BUN 9 10/28/2020    CR 0.64 10/28/2020    GFRESTIMATED >90 10/28/2020    GFRESTBLACK >90 10/28/2020    SABINO 8.3 (L) 10/28/2020        INR RESULTS:  No results found for: INR    Procedures:      Assessment and Plan:  1. Squamous cell lung carcinoma of the lung s/p left VATS w/ conversion to thoracotomy, left lower lobectomy & pulmonary artery repair on 10/22/2020.  2. PAF with RVR Diagnosed on 10/1/2020 before the lung surgery.  She developed Afib w/ RVR post surgery. The patient underwent 2 attempts at cardioversion (DCCV @ 200J & 360J) without success. Intravenous 1 mg of ibutilide x 2 for chemical cardioversion successfully cardioverted her, but could not maintain sinus rhythm. She was then started on amiodarone 400 mg BID.  It appears that she has been doing well on amiodarone, metoprolol, and xarelto.  Her CHADS2-Vasc score = 2. I advised her to continue Xarelto 20 mg  daily.  The etiology of her AF is not clear; lone AF vs the other medical problem-related.  If her AF was related to her lung disease, she may discontinue amiodarone.  Because she is schedule to take chemotherapy for next couple of months, I advised he to stay on amiodarone at least until the chemotherapy is completed.  I advised her to reduce amiodarone to 200 mg daily at this point.  We will schedule her to take ECG next week, and if she stays in sinus rhythm, I will advise her to discontinue metoprolol because of her slight bradycardia.  I will follow up with her in 6 months.    I spent more than 30 min to discuss the physiology and treatment plans of her AF.     CC  Patient Care Team:  Jyoti Espinal MD as PCP - General  Jyoti Espinal MD as Assigned PCP  Edin Castro MD as Referring Physician (Pulmonary Disease)  Andrea Sanabria MD as MD (Thoracic Surgery)  Edin Castro MD as Assigned Pulmonology Provider  [facility], Jose Luis Barroso MD as Assigned Heart and Vascular Provider  [facility] ([facility]ALTH [facility]ENC[facility] ([facility]), ([place]))  Eneida De Leon MD as MD (Hematology & Oncology)  Vicenta Pearce, RN as Specialty Care Coordinator (Hematology & Oncology)  Michelle Carlisle, RN as Specialty Care Coordinator (Cardiology)  Alexa Goldstein MD (Cardiovascular Disease)  KALEN GOLDEN

## 2020-11-20 NOTE — TELEPHONE ENCOUNTER
Reason for Call:  Home Health Care    Teresa  with [facility] called regarding (reason for call): Verbal Orders    Orders are needed for this patient.     Skilled Nursing: Skilled nursing 1x a week for 5 weeks and 2 PRN visits    Pt Provider: Radha    Phone Number Homecare Nurse can be reached at: [phone]    Can we leave a detailed message on this number? YES      Call taken on 11/20/2020 at 1:26 PM by Vanessa Fournier

## 2020-11-23 ENCOUNTER — VIRTUAL VISIT (OUTPATIENT)
Dept: CARDIOLOGY | Facility: CLINIC | Age: 70
End: 2020-11-23
Attending: INTERNAL MEDICINE
Payer: MEDICARE

## 2020-11-23 DIAGNOSIS — I48.0 PAROXYSMAL ATRIAL FIBRILLATION (H): Primary | ICD-10-CM

## 2020-11-23 DIAGNOSIS — R91.8 MASS OF LEFT LUNG: ICD-10-CM

## 2020-11-23 PROCEDURE — 99215 OFFICE O/P EST HI 40 MIN: CPT | Mod: 95 | Performed by: INTERNAL MEDICINE

## 2020-11-23 RX ORDER — AMIODARONE HYDROCHLORIDE 200 MG/1
200 TABLET ORAL DAILY
Qty: 90 TABLET | Refills: 1 | Status: SHIPPED | OUTPATIENT
Start: 2020-11-23 | End: 2021-05-05

## 2020-11-23 ASSESSMENT — PAIN SCALES - GENERAL: PAINLEVEL: NO PAIN (0)

## 2020-11-23 NOTE — LETTER
"11/23/2020      RE: Ijeoma Shah  [address] [facility] [address]       Dear Colleague,    Thank you for the opportunity to participate in the care of your patient, Ijeoma Shah, at the [facility] HEART [facility]. Please see a copy of my visit note below.    Ijeoma Shah is a 70 year old female who is being evaluated via a billable video visit.      The patient has been notified of following:     \"This video visit will be conducted via a call between you and your physician/provider. We have found that certain health care needs can be provided without the need for an in-person physical exam.  This service lets us provide the care you need with a video conversation.  If a prescription is necessary we can send it directly to your pharmacy.  If lab work is needed we can place an order for that and you can then stop by our lab to have the test done at a later time.    Video visits are billed at different rates depending on your insurance coverage.  Please reach out to your insurance provider with any questions.    If during the course of the call the physician/provider feels a video visit is not appropriate, you will not be charged for this service.\"    Patient has given verbal consent for Video visit? Yes  How would you like to obtain your AVS? Transfer To  If you are dropped from the video visit, the video invite should be resent to: Text to cell phone: [phone]   Will anyone else be joining your video visit? Her  joined.    Pt will be connecting on [email or web address] @10:45    Video-Visit Details    Type of service:  Video Visit    Video Start Time: 11:02 AM  Video End Time: 11:51 AM    Originating Location (pt. 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 Electrophysiology consulted on POD#1 for post-operative hemodynamically unstable atrial fibrillation w/ RVR.      The patient has history of recently diagnosed Afib, first seen on 10/1/2020 when she was being prepped for lung surgery and about to undergo induction by anesthesia; Afib reportedly paroxysmal. EKG 10/6/20 revealed NSR. She was evaluated outpatient by cardiology, underwent a TTE on 10/16/2020 which showed normal LVEF, normal LA with MARIA A 24 mL/m2, no significant valvular pathologies. She was initiated on Toprol XL 25 once a day; no anti-coagulation initiated at the time for CHADS VASc = 2 given upcoming lung surgery. She had been asymptomatic from a cardiac perspective during this entire duration.     She underwent the elective lobectomy/PA repair y'day. Post-op course otherwise uncomplicated. She developed Afib w/ RVR. The patient underwent 2 attempts at cardioversion (DCCV @ 200J & 360J) without success. Intravenous 1 mg of ibutilide x 2 for chemical cardioversion successfully cardioverted her, but could not maintain sinus rhythm. She was then started on amiodarone 400 mg BID.    She is now seen for a follow up.    She has not recently taken any ECG, but her home care nurse was checking her BP and HR, which were OK except a slight bradycardia.      PAST MEDICAL HISTORY:  Past Medical History:   Diagnosis Date     Arrhythmia     A-Fib     Lung cancer (H)      Simple chronic bronchitis (H)        CURRENT MEDICATIONS:  Current Outpatient Medications   Medication Sig Dispense Refill     acetaminophen (TYLENOL) 325 MG tablet Take 1-2 tablets (325-650 mg) by mouth every 6 hours as needed for mild pain 60 tablet 0     amiodarone (PACERONE) 400 MG tablet 1 tablet (400 mg) by Oral or Feeding Tube route 2 times daily for 28 days 56 tablet 0      ketoconazole (NIZORAL) 2 % external cream Apply topically daily 60 g 1     metoprolol tartrate (LOPRESSOR) 25 MG tablet Take 1 tablet (25 mg) by mouth 2 times daily 60 tablet 0     oxyCODONE (ROXICODONE) 5 MG tablet Take 1-2 tablets (5-10 mg) by mouth every 4 hours as needed for moderate to severe pain 30 tablet 0     rivaroxaban ANTICOAGULANT (XARELTO ANTICOAGULANT) 20 MG TABS tablet Take 1 tablet (20 mg) by mouth daily (with dinner) 30 tablet 1     senna-docusate (SENOKOT-S/PERICOLACE) 8.6-50 MG tablet Take 2 tablets by mouth At Bedtime 30 tablet 0     triamcinolone (KENALOG) 0.1 % external ointment APPLY  OINTMENT TOPICALLY TWICE DAILY OR  AS  NEEDED 45 g 0     EPINEPHrine (EPIPEN/ADRENACLICK/OR ANY BX GENERIC EQUIV) 0.3 MG/0.3ML injection 2-pack Inject 0.3 mLs (0.3 mg) into the muscle as needed (Patient not taking: Reported on 11/23/2020) 0.6 mL 1     rivaroxaban ANTICOAGULANT (XARELTO) 20 MG TABS tablet Take 1 tablet (20 mg) by mouth daily (with dinner) (Patient not taking: Reported on 11/23/2020) 90 tablet 3       PAST SURGICAL HISTORY:  Past Surgical History:   Procedure Laterality Date     ARTHROEREISIS, SUBTALAR       BRONCHOSCOPY RIGID OR FLEXIBLE W/TRANSENDOSCOPIC ENDOBRONCHIAL ULTRASOUND GUIDED N/A 07/27/2020    Procedure: Flexible bronchoscopy, endobronchial ultrasound with transbronchial biopsies;  Surgeon: Edin Castro MD;  Location: [facility] OR     CATARACT IOL, RT/LT Bilateral      COLONOSCOPY  06/29/2004    colonoscopy to the cecum     ESOPHAGOSCOPY, GASTROSCOPY, DUODENOSCOPY (EGD), COMBINED N/A 09/01/2020    Procedure: ESOPHAGOGASTRODUODENOSCOPY, WITH FINE NEEDLE ASPIRATION BIOPSY, WITH ENDOSCOPIC ULTRASOUND GUIDANCE;  Surgeon: Barber Hogan MD;  Location:  GI     PICC TRIPLE LUMEN PLACEMENT Right 10/23/2020    5Fr - 36cm, Medial brachial vein     SURGICAL HISTORY OF -       nose surgery     THORACOSCOPIC RESECTION LUNG Left 10/22/2020    Procedure: Left thoracoscopic lobectomy  converted to Left Thoracotomy, Medistinal lymph node dissection, Pulmonary Artery repair, flexible bronchoscopy, on-pump oxygenator on standy-by;  Surgeon: Andrea Sanabria MD;  [facility]     THORACOTOMY N/A 10/22/2020    Procedure: Thoracotomy;  Surgeon: Andrea Sanabria MD;  [facility]     TRANSCERVICAL EXTENDED MEDIASTINAL LYMPHADENECTOMY N/A 10/22/2020    Procedure: Transcervical extended mediastinal lymphadenectomy;  Surgeon: Andrea Sanabria MD;  [facility]     TUBAL LIGATION      bilateral tubal ligation.  BTL       ALLERGIES:     Allergies   Allergen Reactions     Bee Venom Hives     Hot and sweating        FAMILY HISTORY:  + Premature coronary artery disease  + Atrial fibrillation  - Sudden cardiac death     SOCIAL HISTORY:  Social History     Tobacco Use     Smoking status: Former Smoker     Packs/day: 1.00     Years: 33.00     Pack years: 33.00     Types: Cigarettes     Quit date: 2014     Years since quittin.9     Smokeless tobacco: Never Used   Substance Use Topics     Alcohol use: Yes     Alcohol/week: 1.7 - 2.5 standard drinks     Types: 2 - 3 Standard drinks or equivalent per week     Comment: occ,social     Drug use: No       ROS:   10 points of ROS were reviewed, and there were no significant changes since the discharge.  Constitutional: No fever, chills, or sweats. Weight stable.   ENT: No visual disturbance, ear ache, epistaxis, sore throat.   Cardiovascular: As per HPI.   Respiratory: No cough, hemoptysis.    GI: No nausea, vomiting, hematemesis, melena, or hematochezia.   : No hematuria.   Integument: Negative.   Psychiatric: Negative.   Hematologic:  Easy bruising, no easy bleeding.  Neuro: Negative.   Endocrinology: No significant heat or cold intolerance   Musculoskeletal: No myalgia.    Exam:  There were no vitals taken for this visit.  GENERAL APPEARANCE: healthy, alert and no distress  HEENT: no icterus, no xanthelasmas, normal pupil size and reaction, normal  palate, mucosa moist, no central cyanosis  NECK: no adenopathy, no asymmetry, masses, or scars, thyroid normal to palpation and no bruits, JVP not elevated  RESPIRATORY: lungs clear to auscultation - no rales, rhonchi or wheezes, no use of accessory muscles, no retractions, respirations are unlabored, normal respiratory rate  CARDIOVASCULAR: regular rhythm, normal S1 with physiologic split S2, no S3 or S4 and no murmur, click or rub, precordium quiet with normal PMI.  ABDOMEN: soft, non tender, without hepatosplenomegaly, no masses palpable, bowel sounds normal, aorta not enlarged by palpation, no abdominal bruits  EXTREMITIES: peripheral pulses normal, no edema, no bruits  NEURO: alert and oriented to person/place/time, normal speech, gait and affect  VASC: Radial, femoral, dorsalis pedis and posterior tibialis pulses are normal in volumes and symmetric bilaterally. No bruits are heard.  SKIN: no ecchymoses, no rashes    Labs:  CBC RESULTS:   Lab Results   Component Value Date    WBC 8.3 10/28/2020    RBC 3.49 (L) 10/28/2020    HGB 10.0 (L) 10/28/2020    HCT 30.5 (L) 10/28/2020    MCV 87 10/28/2020    MCH 28.7 10/28/2020    MCHC 32.8 10/28/2020    RDW 13.5 10/28/2020     10/28/2020       BMP RESULTS:  Lab Results   Component Value Date     10/28/2020    POTASSIUM 3.8 10/28/2020    CHLORIDE 100 10/28/2020    CO2 32 10/28/2020    ANIONGAP 4 10/28/2020     (H) 10/28/2020    BUN 9 10/28/2020    CR 0.64 10/28/2020    GFRESTIMATED >90 10/28/2020    GFRESTBLACK >90 10/28/2020    SABINO 8.3 (L) 10/28/2020        INR RESULTS:  No results found for: INR    Procedures:      Assessment and Plan:  1. Squamous cell lung carcinoma of the lung s/p left VATS w/ conversion to thoracotomy, left lower lobectomy & pulmonary artery repair on 10/22/2020.  2. PAF with RVR Diagnosed on 10/1/2020 before the lung surgery.  She developed Afib w/ RVR post surgery. The patient underwent 2 attempts at cardioversion (DCCV @ 200J  & 360J) without success. Intravenous 1 mg of ibutilide x 2 for chemical cardioversion successfully cardioverted her, but could not maintain sinus rhythm. She was then started on amiodarone 400 mg BID.  It appears that she has been doing well on amiodarone, metoprolol, and xarelto.  Her CHADS2-Vasc score = 2. I advised her to continue Xarelto 20 mg daily.  The etiology of her AF is not clear; lone AF vs the other medical problem-related.  If her AF was related to her lung disease, she may discontinue amiodarone.  Because she is schedule to take chemotherapy for next couple of months, I advised he to stay on amiodarone at least until the chemotherapy is completed.  I advised her to reduce amiodarone to 200 mg daily at this point.  We will schedule her to take ECG next week, and if she stays in sinus rhythm, I will advise her to discontinue metoprolol because of her slight bradycardia.  I will follow up with her in 6 months.    I spent more than 30 min to discuss the physiology and treatment plans of her AF.     CC  Patient Care Team:  Jyoti Espinal MD as PCP - General  Jyoti Espinal MD as Assigned PCP  Edin Castro MD as Referring Physician (Pulmonary Disease)  Andrea Sanabria MD as MD (Thoracic Surgery)  Edin Castro MD as Assigned Pulmonology Provider  [facility] as [facility]igned Heart and Vascular Provider  Care, [facility] ([facility] ([facility]), ([place]))  Eneida De Leon MD as MD (Hematology & Oncology)  Vicenta Pearce, RN as Specialty Care Coordinator (Hematology & Oncology)  Michelle Carlisle, DEIDRE as Specialty Care Coordinator (Cardiology)  Alexa Goldstein MD (Cardiovascular Disease)  KALEN GOLDEN        Please do not hesitate to contact me if you have any questions/concerns.     Sincerely,     Alexa Goldstein MD

## 2020-11-23 NOTE — PATIENT INSTRUCTIONS
You were seen virtually in Electrophysiology today by: Dr Goldstein    Plan:     Medication Changes:     Decrease amiodarone to 200mg daily    Labs/Tests Needed:    EKG next week (can be done in [facility] #: [phone] to schedule)    Follow up visit: 6 months        Your Care Team:  [facility]   Telephone Number     Michelle Carlisle RN [phone]     For scheduling appts or procedures:    Katheryn Ferreira   [phone]   For the Device Clinic (Pacemakers, ICDs, Loop Recorders)    During business hours: [phone]  After business hours:   [phone]- select option 4 and ask for job code [id number].       Cardiovascular Clinic:   [address] [facility] [place] [address]      As always, Thank you for trusting us with your health care needs!

## 2020-12-01 ENCOUNTER — MYC MEDICAL ADVICE (OUTPATIENT)
Dept: CARDIOLOGY | Facility: CLINIC | Age: 70
End: 2020-12-01

## 2020-12-01 ENCOUNTER — HOSPITAL ENCOUNTER (OUTPATIENT)
Dept: CARDIOLOGY | Facility: CLINIC | Age: 70
Discharge: HOME | End: 2020-12-01
Attending: FAMILY MEDICINE | Admitting: FAMILY MEDICINE
Payer: MEDICARE

## 2020-12-01 DIAGNOSIS — C34.32 SQUAMOUS CELL CARCINOMA OF BRONCHUS IN LEFT LOWER LOBE (H): ICD-10-CM

## 2020-12-01 DIAGNOSIS — I48.0 PAROXYSMAL ATRIAL FIBRILLATION (H): ICD-10-CM

## 2020-12-01 LAB
SARS-COV-2 RNA SPEC QL NAA+PROBE: NOT DETECTED
SPECIMEN SOURCE: NORMAL

## 2020-12-01 PROCEDURE — 93005 ELECTROCARDIOGRAM TRACING: CPT

## 2020-12-01 PROCEDURE — U0003 INFECTIOUS AGENT DETECTION BY NUCLEIC ACID (DNA OR RNA); SEVERE ACUTE RESPIRATORY SYNDROME CORONAVIRUS 2 (SARS-COV-2) (CORONAVIRUS DISEASE [COVID-19]), AMPLIFIED PROBE TECHNIQUE, MAKING USE OF HIGH THROUGHPUT TECHNOLOGIES AS DESCRIBED BY CMS-2020-01-R: HCPCS | Performed by: INTERNAL MEDICINE

## 2020-12-01 PROCEDURE — 93010 ELECTROCARDIOGRAM REPORT: CPT | Performed by: INTERNAL MEDICINE

## 2020-12-01 NOTE — TELEPHONE ENCOUNTER
EKG today shows NSR 75bpm. Will discontinue metoprolol. MyC msg sent to patient.       Dr Goldstein 11/23/20 note:  We will schedule her to take ECG next week, and if she stays in sinus rhythm, I will advise her to discontinue metoprolol because of her slight bradycardia.  I will follow up with her in 6 months.

## 2020-12-02 NOTE — PROGRESS NOTES
Oncology/Hematology Visit Note  Dec 3, 2020    Reason for Visit: Follow up of SCC lung, poorly differentiated    History of Present Illness:   7/3/20                 CT chest (screening). 6.7 cm LLL mass, 0.6 cm RML nodule, mediastinal and L hilar LNs  7/9/20                PET/CT. 7.1 x 5.6 cm LLL mass (SUV 19.6), post obstructive pneumonitis LLL inferior to the mass (SUV 2.8), 3.5 x 1.7 cm 4L node (SUV 5.9), L adrenal nodule 1.1 cm (SUV 4), indeterminate pulmonary nodules, pancreatic cysts, not hypermetabolic  7/27/20              Bronch, EBUS (Dr. Castro). 10R, 11R, 4R too small to biopsy. Stations 7, 4L, 11L negative for malignancy. LLL mass bx: SCC  8/12/20              Brain MRI negative  9/1/20                EUS to evaluate adrenal and 4L (Dr. Hogan). Both negative for malignancy. Adrenal with bland adrenal cells  10/22/20            L VATS, converted to thoracotomy, LLL lobectomy, MLND, R pulmonary arterioplasty (Dr. Sanabria, Dr. Davis). 8.3 cm poorly differentiated SCC, +angiolymphatic invasion    Interval History:  Ijeoma ZAZUETA Anjaliyamil was met with for follow up  -Overall she is feeling okay.  She continues to note some mild shortness of breath on exertion and occasionally at night when she is laying down to sleep.  Denies any cough at this time.  No chest pain.  Denies any fevers or chills.  -Otherwise she is eating and drinking okay.  Continues on rivaroxaban and amiodarone.  No rashes or swelling.  Denies any diminished in her hearing or baseline neuropathy.    Review of Systems:  Patient denies fevers, chills, headaches, dizziness, vision or hearing changes, new lumps or bumps, abdominal pain, nausea, vomiting, changes to bowel or bladder, swelling of extremities, bleeding issues, or rash.    Current Outpatient Medications   Medication Sig Dispense Refill     acetaminophen (TYLENOL) 325 MG tablet Take 1-2 tablets (325-650 mg) by mouth every 6 hours as needed for mild pain 60 tablet 0      amiodarone (PACERONE) 200 MG tablet Take 1 tablet (200 mg) by mouth daily 90 tablet 1     ketoconazole (NIZORAL) 2 % external cream Apply topically daily 60 g 1     oxyCODONE (ROXICODONE) 5 MG tablet Take 1-2 tablets (5-10 mg) by mouth every 4 hours as needed for moderate to severe pain 30 tablet 0     rivaroxaban ANTICOAGULANT (XARELTO) 20 MG TABS tablet Take 1 tablet (20 mg) by mouth daily (with dinner) 90 tablet 3     triamcinolone (KENALOG) 0.1 % external ointment APPLY  OINTMENT TOPICALLY TWICE DAILY OR  AS  NEEDED 45 g 0       Physical Examination:  There were no vitals taken for this visit.  Wt Readings from Last 10 Encounters:   10/28/20 82.3 kg (181 lb 7 oz)   10/06/20 81.4 kg (179 lb 6.4 oz)   10/01/20 81.6 kg (179 lb 14.3 oz)   09/29/20 79.4 kg (175 lb)   09/01/20 80.2 kg (176 lb 12.8 oz)   08/28/20 81.6 kg (180 lb)   07/27/20 80.1 kg (176 lb 9.4 oz)   07/22/20 80.7 kg (178 lb)   06/23/20 80.3 kg (177 lb)   01/22/19 88 kg (194 lb)     Constitutional: Well-appearing female in no acute distress.   Eyes: EOMI, PERRL. No scleral icterus.  ENT: Oral mucosa is moist without lesions or thrush.   Lymphatic: Neck is supple without cervical or supraclavicular lymphadenopathy. No axillary lymphadenopathy.  Cardiovascular: Regular rate and rhythm. No murmurs, gallops, or rubs. No peripheral edema.  Respiratory: Clear to auscultation bilaterally. No wheezes or crackles.  Gastrointestinal: Bowel sounds present. Abdomen soft, non-tender. No palpable hepatosplenomegaly or masses.   Neurologic: Cranial nerves II through XII are grossly intact.  Skin: Left lateral chest wall incision is healed. Well approximated without any evidence of infection.    Laboratory Data:  Results for MAYCO VEGA (MRN [id number]) as of 12/3/2020 08:28   Ref. Range 12/3/2020 07:35   Sodium Latest Ref Range: 133 - 144 mmol/L 133   Potassium Latest Ref Range: 3.4 - 5.3 mmol/L 4.0   Chloride Latest Ref Range: 94 - 109 mmol/L 98   Carbon  Dioxide Latest Ref Range: 20 - 32 mmol/L 30   Urea Nitrogen Latest Ref Range: 7 - 30 mg/dL 11   Creatinine Latest Ref Range: 0.52 - 1.04 mg/dL 0.72   GFR Estimate Latest Ref Range: >60 mL/min/1.73_m2 85   GFR Estimate If Black Latest Ref Range: >60 mL/min/1.73_m2 >90   Calcium Latest Ref Range: 8.5 - 10.1 mg/dL 9.0   Anion Gap Latest Ref Range: 3 - 14 mmol/L 5   Magnesium Latest Ref Range: 1.6 - 2.3 mg/dL 2.0   Albumin Latest Ref Range: 3.4 - 5.0 g/dL 3.1 (L)   Protein Total Latest Ref Range: 6.8 - 8.8 g/dL 7.7   Bilirubin Total Latest Ref Range: 0.2 - 1.3 mg/dL 0.3   Alkaline Phosphatase Latest Ref Range: 40 - 150 U/L 108   ALT Latest Ref Range: 0 - 50 U/L 20   AST Latest Ref Range: 0 - 45 U/L 13   Glucose Latest Ref Range: 70 - 99 mg/dL 164 (H)   WBC Latest Ref Range: 4.0 - 11.0 10e9/L 10.7   Hemoglobin Latest Ref Range: 11.7 - 15.7 g/dL 12.5   Hematocrit Latest Ref Range: 35.0 - 47.0 % 38.3   Platelet Count Latest Ref Range: 150 - 450 10e9/L 380   RBC Count Latest Ref Range: 3.8 - 5.2 10e12/L 4.45   MCV Latest Ref Range: 78 - 100 fl 86   MCH Latest Ref Range: 26.5 - 33.0 pg 28.1   MCHC Latest Ref Range: 31.5 - 36.5 g/dL 32.6   RDW Latest Ref Range: 10.0 - 15.0 % 13.7   Diff Method Unknown Automated Method   % Neutrophils Latest Units: % 83.5   % Lymphocytes Latest Units: % 5.3   % Monocytes Latest Units: % 5.9   % Eosinophils Latest Units: % 4.7   % Basophils Latest Units: % 0.2   % Immature Granulocytes Latest Units: % 0.4   Nucleated RBCs Latest Ref Range: 0 /100 0   Absolute Neutrophil Latest Ref Range: 1.6 - 8.3 10e9/L 9.0 (H)   Absolute Lymphocytes Latest Ref Range: 0.8 - 5.3 10e9/L 0.6 (L)   Absolute Monocytes Latest Ref Range: 0.0 - 1.3 10e9/L 0.6   Absolute Eosinophils Latest Ref Range: 0.0 - 0.7 10e9/L 0.5   Absolute Basophils Latest Ref Range: 0.0 - 0.2 10e9/L 0.0   Abs Immature Granulocytes Latest Ref Range: 0 - 0.4 10e9/L 0.0   Absolute Nucleated RBC Unknown 0.0         Assessment and Plan:  ONC  SCC  lung, [id number], IIIA, R0 resection, discrepant PD-L1:   - Started adjuvant cisplatin (d1) and gemcitabine (d1, 8) IV every 3 weeks x 4 cycles today 12/3/2020. We reviewed potential adverse effects related to cis/gem including but not limited to cytopenias, kidney liver dysfunction, hearing loss, neuropathy, worsening shortness of breath or cough, leg swelling, rash.  - Plan for surveillance strategy with CT chest w/contrast including the liver and adrenals (needs to be specified in order) every 3 months for year 1, every 3-4 months for year 2, every 6 months for year 3, then annual after that. First scan will be about 3-4 weeks after chemo.      CARDS  Afib w/RVR, anticoagulation:  On rivaroxaban and amiodarone.   - Given the need for amiodarone, will check occasional ecgs if antiemetic adjustment is needed- had one on Monday. Will avoid zofran for now- has scripts for compazine and ativan.      PULM  R pulmonary nodule: Monitor on routine scans for lung cancer    Baseline, mild shortness of breath on exertion and occasionally at night. Overall improving. No cough or chest pain. Aware to monitor for worsening.     Bhumi Barrow PA-C  [facility]  [address] [place] [address] [phone]

## 2020-12-03 ENCOUNTER — APPOINTMENT (OUTPATIENT)
Dept: LAB | Facility: CLINIC | Age: 70
End: 2020-12-03
Attending: INTERNAL MEDICINE
Payer: MEDICARE

## 2020-12-03 ENCOUNTER — TELEPHONE (OUTPATIENT)
Dept: ONCOLOGY | Facility: CLINIC | Age: 70
End: 2020-12-03

## 2020-12-03 ENCOUNTER — ONCOLOGY VISIT (OUTPATIENT)
Dept: ONCOLOGY | Facility: CLINIC | Age: 70
End: 2020-12-03
Attending: INTERNAL MEDICINE
Payer: MEDICARE

## 2020-12-03 VITALS
HEART RATE: 85 BPM | DIASTOLIC BLOOD PRESSURE: 74 MMHG | BODY MASS INDEX: 29.52 KG/M2 | RESPIRATION RATE: 15 BRPM | OXYGEN SATURATION: 97 % | WEIGHT: 177.4 LBS | TEMPERATURE: 98.6 F | SYSTOLIC BLOOD PRESSURE: 132 MMHG

## 2020-12-03 DIAGNOSIS — C34.32 SQUAMOUS CELL CARCINOMA OF BRONCHUS IN LEFT LOWER LOBE (H): Primary | ICD-10-CM

## 2020-12-03 DIAGNOSIS — E83.42 HYPOMAGNESEMIA: ICD-10-CM

## 2020-12-03 DIAGNOSIS — T45.1X5A CHEMOTHERAPY-INDUCED NEUTROPENIA (H): ICD-10-CM

## 2020-12-03 DIAGNOSIS — D70.1 CHEMOTHERAPY-INDUCED NEUTROPENIA (H): ICD-10-CM

## 2020-12-03 DIAGNOSIS — E83.42 HYPOMAGNESEMIA: Primary | ICD-10-CM

## 2020-12-03 DIAGNOSIS — C34.32 SQUAMOUS CELL CARCINOMA OF BRONCHUS IN LEFT LOWER LOBE (H): ICD-10-CM

## 2020-12-03 LAB
ALBUMIN SERPL-MCNC: 3.1 G/DL (ref 3.4–5)
ALP SERPL-CCNC: 108 U/L (ref 40–150)
ALT SERPL W P-5'-P-CCNC: 20 U/L (ref 0–50)
ANION GAP SERPL CALCULATED.3IONS-SCNC: 5 MMOL/L (ref 3–14)
AST SERPL W P-5'-P-CCNC: 13 U/L (ref 0–45)
BASOPHILS # BLD AUTO: 0 10E9/L (ref 0–0.2)
BASOPHILS NFR BLD AUTO: 0.2 %
BILIRUB SERPL-MCNC: 0.3 MG/DL (ref 0.2–1.3)
BUN SERPL-MCNC: 11 MG/DL (ref 7–30)
CALCIUM SERPL-MCNC: 9 MG/DL (ref 8.5–10.1)
CHLORIDE SERPL-SCNC: 98 MMOL/L (ref 94–109)
CO2 SERPL-SCNC: 30 MMOL/L (ref 20–32)
CREAT SERPL-MCNC: 0.72 MG/DL (ref 0.52–1.04)
DIFFERENTIAL METHOD BLD: ABNORMAL
EOSINOPHIL # BLD AUTO: 0.5 10E9/L (ref 0–0.7)
EOSINOPHIL NFR BLD AUTO: 4.7 %
ERYTHROCYTE [DISTWIDTH] IN BLOOD BY AUTOMATED COUNT: 13.7 % (ref 10–15)
GFR SERPL CREATININE-BSD FRML MDRD: 85 ML/MIN/{1.73_M2}
GLUCOSE SERPL-MCNC: 164 MG/DL (ref 70–99)
HCT VFR BLD AUTO: 38.3 % (ref 35–47)
HGB BLD-MCNC: 12.5 G/DL (ref 11.7–15.7)
IMM GRANULOCYTES # BLD: 0 10E9/L (ref 0–0.4)
IMM GRANULOCYTES NFR BLD: 0.4 %
LYMPHOCYTES # BLD AUTO: 0.6 10E9/L (ref 0.8–5.3)
LYMPHOCYTES NFR BLD AUTO: 5.3 %
MAGNESIUM SERPL-MCNC: 2 MG/DL (ref 1.6–2.3)
MCH RBC QN AUTO: 28.1 PG (ref 26.5–33)
MCHC RBC AUTO-ENTMCNC: 32.6 G/DL (ref 31.5–36.5)
MCV RBC AUTO: 86 FL (ref 78–100)
MONOCYTES # BLD AUTO: 0.6 10E9/L (ref 0–1.3)
MONOCYTES NFR BLD AUTO: 5.9 %
NEUTROPHILS # BLD AUTO: 9 10E9/L (ref 1.6–8.3)
NEUTROPHILS NFR BLD AUTO: 83.5 %
NRBC # BLD AUTO: 0 10*3/UL
NRBC BLD AUTO-RTO: 0 /100
PLATELET # BLD AUTO: 380 10E9/L (ref 150–450)
POTASSIUM SERPL-SCNC: 4 MMOL/L (ref 3.4–5.3)
PROT SERPL-MCNC: 7.7 G/DL (ref 6.8–8.8)
RBC # BLD AUTO: 4.45 10E12/L (ref 3.8–5.2)
SODIUM SERPL-SCNC: 133 MMOL/L (ref 133–144)
WBC # BLD AUTO: 10.7 10E9/L (ref 4–11)

## 2020-12-03 PROCEDURE — 96413 CHEMO IV INFUSION 1 HR: CPT

## 2020-12-03 PROCEDURE — 83735 ASSAY OF MAGNESIUM: CPT | Performed by: INTERNAL MEDICINE

## 2020-12-03 PROCEDURE — 80053 COMPREHEN METABOLIC PANEL: CPT | Performed by: INTERNAL MEDICINE

## 2020-12-03 PROCEDURE — 85025 COMPLETE CBC W/AUTO DIFF WBC: CPT | Performed by: INTERNAL MEDICINE

## 2020-12-03 PROCEDURE — 258N000003 HC RX IP 258 OP 636: Performed by: INTERNAL MEDICINE

## 2020-12-03 PROCEDURE — 250N000011 HC RX IP 250 OP 636: Performed by: INTERNAL MEDICINE

## 2020-12-03 PROCEDURE — 96368 THER/DIAG CONCURRENT INF: CPT

## 2020-12-03 PROCEDURE — 96367 TX/PROPH/DG ADDL SEQ IV INF: CPT

## 2020-12-03 PROCEDURE — 96375 TX/PRO/DX INJ NEW DRUG ADDON: CPT

## 2020-12-03 PROCEDURE — 96417 CHEMO IV INFUS EACH ADDL SEQ: CPT

## 2020-12-03 PROCEDURE — 96415 CHEMO IV INFUSION ADDL HR: CPT

## 2020-12-03 PROCEDURE — 99214 OFFICE O/P EST MOD 30 MIN: CPT | Performed by: PHYSICIAN ASSISTANT

## 2020-12-03 RX ORDER — LORAZEPAM 0.5 MG/1
0.5 TABLET ORAL EVERY 4 HOURS PRN
Qty: 30 TABLET | Refills: 0 | Status: SHIPPED | OUTPATIENT
Start: 2020-12-03 | End: 2021-03-18

## 2020-12-03 RX ORDER — LORAZEPAM 0.5 MG/1
0.5 TABLET ORAL EVERY 4 HOURS PRN
Qty: 30 TABLET | Refills: 0 | Status: SHIPPED | OUTPATIENT
Start: 2020-12-03 | End: 2020-12-03

## 2020-12-03 RX ORDER — PROCHLORPERAZINE MALEATE 10 MG
5 TABLET ORAL EVERY 6 HOURS PRN
Qty: 30 TABLET | Refills: 11 | Status: SHIPPED | OUTPATIENT
Start: 2020-12-03 | End: 2021-03-18

## 2020-12-03 RX ORDER — PROCHLORPERAZINE MALEATE 10 MG
5 TABLET ORAL EVERY 6 HOURS PRN
Qty: 30 TABLET | Refills: 11 | Status: SHIPPED | OUTPATIENT
Start: 2020-12-03 | End: 2020-12-03

## 2020-12-03 RX ORDER — PALONOSETRON 0.05 MG/ML
0.25 INJECTION, SOLUTION INTRAVENOUS ONCE
Status: COMPLETED | OUTPATIENT
Start: 2020-12-03 | End: 2020-12-03

## 2020-12-03 RX ADMIN — MAGNESIUM SULFATE HEPTAHYDRATE: 500 INJECTION, SOLUTION INTRAMUSCULAR; INTRAVENOUS at 11:45

## 2020-12-03 RX ADMIN — GEMCITABINE 2400 MG: 38 INJECTION, SOLUTION INTRAVENOUS at 09:45

## 2020-12-03 RX ADMIN — PALONOSETRON 0.25 MG: 0.05 INJECTION, SOLUTION INTRAVENOUS at 09:03

## 2020-12-03 RX ADMIN — SODIUM CHLORIDE 1000 ML: 9 INJECTION, SOLUTION INTRAVENOUS at 09:03

## 2020-12-03 RX ADMIN — DEXAMETHASONE SODIUM PHOSPHATE: 10 INJECTION, SOLUTION INTRAMUSCULAR; INTRAVENOUS at 09:11

## 2020-12-03 RX ADMIN — CISPLATIN 150 MG: 1 INJECTION, SOLUTION INTRAVENOUS at 10:52

## 2020-12-03 ASSESSMENT — PAIN SCALES - GENERAL: PAINLEVEL: NO PAIN (0)

## 2020-12-03 NOTE — TELEPHONE ENCOUNTER
PA Initiation    Medication: Lorazepam PA Pending  Insurance Company: [employer] - Phone [phone] Fax [phone]  Pharmacy Filling the Rx: [facility] [place], [place] - [address] [place] [address] [phone]  Filling Pharmacy Phone:    Filling Pharmacy Fax:    Start Date: 12/3/2020    Timi Goodrich CPhT  [facility]  Oncology Pharmacy [email or web address]  [phone] (phone  [phone] (fax

## 2020-12-03 NOTE — NURSING NOTE
"Oncology Rooming Note    December 3, 2020 7:51 AM   Ijeoma Shah is a 70 year old female who presents for:    Chief Complaint   Patient presents with     Blood Draw     IV placed, blood drawn, vitals taken, checked into next appointment.     Oncology Clinic Visit     Squamous cell carcinoma of left lung (H)      Initial Vitals: /74   Pulse 85   Temp 98.6  F (37  C) (Oral)   Resp 15   Wt 80.5 kg (177 lb 6.4 oz)   SpO2 97%   BMI 29.52 kg/m   Estimated body mass index is 29.52 kg/m  as calculated from the following:    Height as of 10/22/20: 1.651 m (5' 5\").    Weight as of this encounter: 80.5 kg (177 lb 6.4 oz). Body surface area is 1.92 meters squared.  No Pain (0) Comment: Data Unavailable   No LMP recorded. Patient is postmenopausal.  Allergies reviewed: Yes  Medications reviewed: Yes    Medications: Medication refills not needed today.  Pharmacy name entered into [facility]:    [id number] - [place], [place] - [address] [facility] [address] [place] [facility] - [place], [place] - [address] [id number] - [facility] [address] [facility]    Clinical concerns: BRIAN Villarreal CMA              "

## 2020-12-03 NOTE — PROGRESS NOTES
Chief Complaint   Patient presents with     Blood Draw     IV placed, blood drawn, vitals taken, checked into next appointment.     Labs drawn via IV placed by Mame Cade MA in right arm.    Mame Cade MA

## 2020-12-03 NOTE — PROGRESS NOTES
Infusion Nursing Note:  Ijeoma Shah presents today for Cycle 1 Day 1 Gemzar and Cisplatin.    Patient seen by provider today: Yes: HOMA Rocha prior to infusion   present during visit today: Not Applicable.    Note: Patient arrives to infusion today doing well. Patient new to oncology infusion room and is receiving Cisplatin and Gemzar for the first time. Pt oriented to infusion room, including chair, nutrition station and call light. New patient teaching done previously by MANI Alan. Pt received new pt folder in infusion as well as additional medication handouts. Writer reinforced chemotherapy teaching/side effects and schedule.     Pt instructed to call care coordinator, triage (or MD on call if after hours/weekends) with chills/temp >=100.5, questions/concerns. Pt stated understanding of plan.     Per pharmacy, okay to run post cisplatin fluids concurrently with Cisplatin.    Intravenous Access:  Peripheral IV placed.    Treatment Conditions:  Lab Results   Component Value Date    HGB 12.5 12/03/2020     Lab Results   Component Value Date    WBC 10.7 12/03/2020      Lab Results   Component Value Date    ANEU 9.0 12/03/2020     Lab Results   Component Value Date     12/03/2020      Lab Results   Component Value Date     12/03/2020                   Lab Results   Component Value Date    POTASSIUM 4.0 12/03/2020           Lab Results   Component Value Date    MAG 2.0 12/03/2020            Lab Results   Component Value Date    CR 0.72 12/03/2020                   Lab Results   Component Value Date    SABINO 9.0 12/03/2020                Lab Results   Component Value Date    BILITOTAL 0.3 12/03/2020           Lab Results   Component Value Date    ALBUMIN 3.1 12/03/2020                    Lab Results   Component Value Date    ALT 20 12/03/2020           Lab Results   Component Value Date    AST 13 12/03/2020       Results reviewed, labs MET treatment parameters, ok to  proceed with treatment.  Patient voided pre and post Cisplatin.     Post Infusion Assessment:  Patient tolerated infusion without incident.  Blood return noted pre and post infusion.  Site patent and intact, free from redness, edema or discomfort.  No evidence of extravasations.  Access discontinued per protocol.     Discharge Plan:   Prescription refills given for Ativan and Compazine. Patient counseled on new medications by pharmacy.  Discharge instructions reviewed with: Patient.  Patient and/or family verbalized understanding of discharge instructions and all questions answered.  Copy of AVS reviewed with patient and/or family.  Patient will return to [facility] 12/10 for next appointment.  Patient discharged in stable condition accompanied by: self.  Departure Mode: Ambulatory.    Seda Adam RN

## 2020-12-03 NOTE — PATIENT INSTRUCTIONS
Contact Numbers  [facility]: [phone] (for symptom and scheduling needs)    Please call the [facility] Triage line if you experience a temperature greater than or equal to 100.4, shaking chills, have uncontrolled nausea, vomiting and/or diarrhea, dizziness, shortness of breath, chest pain, bleeding, unexplained bruising, or if you have any other new/concerning symptoms, questions or concerns.     If you are having any concerning symptoms or wish to speak to a provider before your next infusion visit, please call your care coordinator or triage to notify them so we can adequately serve you.     If you need a refill on a narcotic prescription or other medication, please call triage before your infusion appointment.          December 2020 Sunday Monday Tuesday Wednesday Thursday Friday Saturday             1    ECG   9:30 AM   (15 min.)   [place] [facility]   [facility]    PRE-PROCEDURE COVID PCR  11:00 AM   (15 min.)   [place] COVID LAB   [facility] 2     3    LAB PERIPHERAL   7:30 AM   (15 min.)    [facility] DRAW   [facility] RETURN   7:45 AM   (50 min.)   Bhmui Barrow PA-C   [facility] ONC INFUSION 360   8:30 AM   (360 min.)   [facility]   [facility] 4     5       6     7     8     9    VIDEO VISIT RETURN   7:05 AM   (50 min.)   Dina Sy CNP   [facility] 10    LAB   1:00 PM   (15 min.)   NL LAB [facility]    LEVEL 2   1:30 PM   (120 min.)   NL INFUSION CHAIR 2   [facility] 11     12       13     14     15     16     17     18     19       20     21    VIDEO VISIT RETURN  10:05 AM   (50 min.)   Dina Sy CNP   [facility] 22    LAB   8:00 AM   (15 min.)   NL LAB [facility]  [facility]    LEVEL 6   8:30 AM   (360 min.)   NL INFUSION CHAIR 5   [facility] 23     24     25     26       27     28     29    LAB   1:00 PM   (15 min.)   NL LAB PMC   [facility] Laboratory    LEVEL 2   1:30 PM   (120 min.)   NL INFUSION CHAIR 7   [facility] 30 31 January 2021 Sunday Monday Tuesday Wednesday Thursday Friday Saturday                            1     2       3     4     5     6     7     8    VIDEO VISIT RETURN  11:25 AM   (50 min.)   Dina Sy CNP   [facility] 9       10     11    LAB   8:00 AM   (10 min.)   [facility]    LEVEL 7   8:30 AM   (420 min.)   ROOM 8 [facility] 12     13     14     15     16       17     18    LAB  10:30 AM   (10 min.)   [facility]    LEVEL 1  11:00 AM   (60 min.)   ROOM 7 [facility] 19     20     21     22     23       24     25     26     27     28     29     30       31                                                   Lab Results:  Recent Results (from the past 12 hour(s))   Magnesium    Collection Time: 12/03/20  7:35 AM   Result Value Ref Range    Magnesium 2.0 1.6 - 2.3 mg/dL   CBC with platelets differential    Collection Time: 12/03/20  7:35 AM   Result Value Ref Range    WBC 10.7 4.0 - 11.0 10e9/L    RBC Count 4.45 3.8 - 5.2 10e12/L    Hemoglobin 12.5 11.7 - 15.7 g/dL    Hematocrit 38.3 35.0 - 47.0 %    MCV 86 78 - 100 fl    MCH 28.1 26.5 - 33.0 pg    MCHC 32.6 31.5 - 36.5 g/dL    RDW 13.7 10.0 - 15.0 %    Platelet Count 380 150 - 450 10e9/L    Diff Method Automated Method     % Neutrophils 83.5 %    % Lymphocytes 5.3 %    % Monocytes 5.9 %    % Eosinophils 4.7 %    % Basophils 0.2 %    % Immature Granulocytes 0.4 %    Nucleated  RBCs 0 0 /100    Absolute Neutrophil 9.0 (H) 1.6 - 8.3 10e9/L    Absolute Lymphocytes 0.6 (L) 0.8 - 5.3 10e9/L    Absolute Monocytes 0.6 0.0 - 1.3 10e9/L    Absolute Eosinophils 0.5 0.0 - 0.7 10e9/L    Absolute Basophils 0.0 0.0 - 0.2 10e9/L    Abs Immature Granulocytes 0.0 0 - 0.4 10e9/L    Absolute Nucleated RBC 0.0    Comprehensive metabolic panel    Collection Time: 12/03/20  7:35 AM   Result Value Ref Range    Sodium 133 133 - 144 mmol/L    Potassium 4.0 3.4 - 5.3 mmol/L    Chloride 98 94 - 109 mmol/L    Carbon Dioxide 30 20 - 32 mmol/L    Anion Gap 5 3 - 14 mmol/L    Glucose 164 (H) 70 - 99 mg/dL    Urea Nitrogen 11 7 - 30 mg/dL    Creatinine 0.72 0.52 - 1.04 mg/dL    GFR Estimate 85 >60 mL/min/[1.73_m2]    GFR Estimate If Black >90 >60 mL/min/[1.73_m2]    Calcium 9.0 8.5 - 10.1 mg/dL    Bilirubin Total 0.3 0.2 - 1.3 mg/dL    Albumin 3.1 (L) 3.4 - 5.0 g/dL    Protein Total 7.7 6.8 - 8.8 g/dL    Alkaline Phosphatase 108 40 - 150 U/L    ALT 20 0 - 50 U/L    AST 13 0 - 45 U/L

## 2020-12-03 NOTE — TELEPHONE ENCOUNTER
Prior Authorization Approval    Authorization Effective Date: 12/3/2020  Authorization Expiration Date: 12/31/2021  Medication: Lorazepam PA Approved  Approved Dose/Quantity:   Reference #: [id number]   Insurance Company: [employer] - Phone [phone] Fax [phone]  Expected CoPay:       CoPay Card Available:      Foundation Assistance Needed:    Which Pharmacy is filling the prescription (Not needed for infusion/clinic administered): [facility] [place], [place] - [address] [place] [address] [phone]  Pharmacy Notified:  Yes  Patient Notified:  No-pharmacy will contact

## 2020-12-08 ENCOUNTER — TELEPHONE (OUTPATIENT)
Dept: ONCOLOGY | Facility: CLINIC | Age: 70
End: 2020-12-08

## 2020-12-08 NOTE — TELEPHONE ENCOUNTER
Timi Goodrich CPhT  [facility]  Oncology Pharmacy Liaison   [email or web address]  [phone] (phone  [phone] (fax

## 2020-12-08 NOTE — PROGRESS NOTES
"Ijeoma Shah is a 70 year old female who is being evaluated via a billable video visit.      The patient has been notified of following:     \"This video visit will be conducted via a call between you and your physician/provider. We have found that certain health care needs can be provided without the need for an in-person physical exam.  This service lets us provide the care you need with a video conversation.  If a prescription is necessary we can send it directly to your pharmacy.  If lab work is needed we can place an order for that and you can then stop by our lab to have the test done at a later time.    Video visits are billed at different rates depending on your insurance coverage.  Please reach out to your insurance provider with any questions.    If during the course of the call the physician/provider feels a video visit is not appropriate, you will not be charged for this service.\"    Patient has given verbal consent for Video visit? Yes  How would you like to obtain your AVS? MyChart  If you are dropped from the video visit, the video invite should be resent to: Text to cell phone: [phone]  Will anyone else be joining your video visit? No        Video-Visit Details    Type of service:  Video Visit    Video Start Time: 0720  Video End Time: 7:48 AM    Originating Location (pt. Location): Home    Distant Location (provider location):  [facility]     Platform used for Video Visit: Steven Sy CNP      I have reviewed and updated the patient's allergies and medication list. Patient was asked if they had any patient reported vital signs to present, if yes, please see documented vitals.  Patient was also asked for their current weight and height, if presented, documented in vitals.    Concerns: Pt would like to discuss feeling more nauseous after first treatment.    Refills: No refills    Stephanie Vail CMA    Oncology/Hematology Visit Note  Dec 9, " "2020    Reason for Visit: Follow up of SCC lung, poorly differentiated    History of Present Illness:   7/3/20                 CT chest (screening). 6.7 cm LLL mass, 0.6 cm RML nodule, mediastinal and L hilar LNs  7/9/20                PET/CT. 7.1 x 5.6 cm LLL mass (SUV 19.6), post obstructive pneumonitis LLL inferior to the mass (SUV 2.8), 3.5 x 1.7 cm 4L node (SUV 5.9), L adrenal nodule 1.1 cm (SUV 4), indeterminate pulmonary nodules, pancreatic cysts, not hypermetabolic  7/27/20              Bronch, EBUS (Dr. Castro). 10R, 11R, 4R too small to biopsy. Stations 7, 4L, 11L negative for malignancy. LLL mass bx: SCC  8/12/20              Brain MRI negative  9/1/20                EUS to evaluate adrenal and 4L (Dr. Hogan). Both negative for malignancy. Adrenal with bland adrenal cells  10/22/20            L VATS, converted to thoracotomy, LLL lobectomy, MLND, R pulmonary arterioplasty (Dr. Sanabria, Dr. Davis). 8.3 cm poorly differentiated SCC, +angiolymphatic invasion  12/03/20 C1D1 Cisplatin & Gemzar    Interval History:  Last Thursday was infusion, was tired after. Friday night she started getting \"sick\"--vomited only once very small amount, more so just felt queasy. Saturday and Sunday she was taking every 6 hours compazine, Lorazepam taking at bedtime. Did help some. Energy is sort of blah,  is doing cooking. Yesterday she did some laundry, cleaned, etc. Breathing a little better, dyspnea has improved since infusion. Tightness under-chest L side unchanged, has residual numbness too. Both present since surgery. No hearing changes, maybe a couple episodes of \"buzzing\". Denies chest pain, racing heart, or palpitations. Appetite overall has been diminished since her surgery. Denies feeling dehydrated (dark urine, LH/dizziness).     Review of Systems:  Patient denies fevers, chills, headaches, dizziness, vision or hearing changes, new lumps or bumps, abdominal pain, nausea, vomiting, changes to bowel or " bladder, swelling of extremities, bleeding issues, or rash.    Current Outpatient Medications   Medication Sig Dispense Refill     acetaminophen (TYLENOL) 325 MG tablet Take 1-2 tablets (325-650 mg) by mouth every 6 hours as needed for mild pain 60 tablet 0     amiodarone (PACERONE) 200 MG tablet Take 1 tablet (200 mg) by mouth daily 90 tablet 1     ketoconazole (NIZORAL) 2 % external cream Apply topically daily 60 g 1     LORazepam (ATIVAN) 0.5 MG tablet Take 1 tablet (0.5 mg) by mouth every 4 hours as needed (Nausea/Vomiting) 30 tablet 0     oxyCODONE (ROXICODONE) 5 MG tablet Take 1-2 tablets (5-10 mg) by mouth every 4 hours as needed for moderate to severe pain 30 tablet 0     prochlorperazine (COMPAZINE) 10 MG tablet Take 0.5 tablets (5 mg) by mouth every 6 hours as needed (Nausea/Vomiting) 30 tablet 11     rivaroxaban ANTICOAGULANT (XARELTO) 20 MG TABS tablet Take 1 tablet (20 mg) by mouth daily (with dinner) 90 tablet 3     triamcinolone (KENALOG) 0.1 % external ointment APPLY  OINTMENT TOPICALLY TWICE DAILY OR  AS  NEEDED 45 g 0       Physical Examination:  There were no vitals taken for this visit.  Wt Readings from Last 10 Encounters:   12/03/20 80.5 kg (177 lb 6.4 oz)   10/28/20 82.3 kg (181 lb 7 oz)   10/06/20 81.4 kg (179 lb 6.4 oz)   10/01/20 81.6 kg (179 lb 14.3 oz)   09/29/20 79.4 kg (175 lb)   09/01/20 80.2 kg (176 lb 12.8 oz)   08/28/20 81.6 kg (180 lb)   07/27/20 80.1 kg (176 lb 9.4 oz)   07/22/20 80.7 kg (178 lb)   06/23/20 80.3 kg (177 lb)     Video physical exam  General: Patient appears well in no acute distress. Normal body habitus.  Skin: No visualized rash or lesions on visualized skin  Eyes: EOMI, no erythema, sclera icterus or discharge noted  Resp: Appears to be breathing comfortably without accessory muscle usage, speaking in full sentences, no cough  MSK: Appears to have normal range of motion based on visualized movements  Neurologic: No apparent tremors, facial movements  symmetric  Psych: affect good, alert and oriented    The rest of a comprehensive physical examination is deferred due to PHE (public health emergency) video restrictions    Laboratory Data: to be drawn tomorrow     Assessment and Plan:  ONC  SCC lung, [id number], IIIA, R0 resection, discrepant PD-L1:   - Started adjuvant cisplatin (d1) and gemcitabine (d1, 8) IV every 3 weeks x 4 cycles 12/3/2020. She tolerated ex nausea and fatigue. Adjusted some meds for day 8 tomorrow and will likely add dex taper with c2d1.   - Plan for surveillance strategy with CT chest w/contrast including the liver and adrenals (needs to be specified in order) every 3 months for year 1, every 3-4 months for year 2, every 6 months for year 3, then annual after that. First scan will be about 3-4 weeks after chemo.      Nausea: started ~24-32 hours after infusion. I suspect cisplatin is main contributor but will add in Emend with Dex for Gemzar tomorrow. Also adding Zofran as prn to alternate with compazine. Lorazepam as third line. Will try pepcid 20 mg BID too to see if reflux contributing. She had a EKG 12/1 with QTc 420. Will check before cycle 2.     Nutrition/poor appetite: 2/2 chemotherapy. Discussed small frequent meals. She will give Ensure a try. Consider nutrition consult pending status.     CARDS  Afib w/RVR, anticoagulation:  On rivaroxaban and amiodarone.   - Given the need for amiodarone, checking EKG before C2 as above.      PULM  R pulmonary nodule: Monitor on routine scans for lung cancer    Baseline, mild shortness of breath on exertion and occasionally at night. Notable improvement since infusion. No cough or chest pain. Aware to monitor for worsening.     Has some lingering discomfort/numbness near surgical site. Discussed this could be scar tissue or ongoing nerve healing which can take some time.     Dina Sy, CNP on 12/9/2020 at 7:54 AM

## 2020-12-09 ENCOUNTER — VIRTUAL VISIT (OUTPATIENT)
Dept: ONCOLOGY | Facility: CLINIC | Age: 70
End: 2020-12-09
Attending: INTERNAL MEDICINE
Payer: MEDICARE

## 2020-12-09 DIAGNOSIS — C34.92 SQUAMOUS CELL CARCINOMA OF LEFT LUNG (H): Primary | ICD-10-CM

## 2020-12-09 PROCEDURE — 99215 OFFICE O/P EST HI 40 MIN: CPT | Mod: 95 | Performed by: NURSE PRACTITIONER

## 2020-12-09 PROCEDURE — 999N001193 HC VIDEO/TELEPHONE VISIT; NO CHARGE

## 2020-12-09 RX ORDER — ONDANSETRON 8 MG/1
8 TABLET, FILM COATED ORAL EVERY 8 HOURS PRN
Qty: 30 TABLET | Refills: 1 | Status: SHIPPED | OUTPATIENT
Start: 2020-12-09 | End: 2021-03-18

## 2020-12-09 RX ORDER — FAMOTIDINE 20 MG/1
20 TABLET, FILM COATED ORAL 2 TIMES DAILY
Qty: 60 TABLET | Refills: 1 | Status: SHIPPED | OUTPATIENT
Start: 2020-12-09 | End: 2021-03-18

## 2020-12-09 RX ORDER — AMIODARONE HYDROCHLORIDE 400 MG/1
TABLET ORAL
COMMUNITY
Start: 2020-10-28 | End: 2021-02-17

## 2020-12-09 NOTE — LETTER
"    12/9/2020         RE: Ijeoma Shah  [address] [facility] [address]        Dear Colleague,    Thank you for referring your patient, Ijeoma Shah, to the [facility]. Please see a copy of my visit note below.    Ijeoma Shah is a 70 year old female who is being evaluated via a billable video visit.      The patient has been notified of following:     \"This video visit will be conducted via a call between you and your physician/provider. We have found that certain health care needs can be provided without the need for an in-person physical exam.  This service lets us provide the care you need with a video conversation.  If a prescription is necessary we can send it directly to your pharmacy.  If lab work is needed we can place an order for that and you can then stop by our lab to have the test done at a later time.    Video visits are billed at different rates depending on your insurance coverage.  Please reach out to your insurance provider with any questions.    If during the course of the call the physician/provider feels a video visit is not appropriate, you will not be charged for this service.\"    Patient has given verbal consent for Video visit? Yes  How would you like to obtain your AVS? MyChart  If you are dropped from the video visit, the video invite should be resent to: Text to cell phone: [phone]  Will anyone else be joining your video visit? No        Video-Visit Details    Type of service:  Video Visit    Video Start Time: 0720  Video End Time: 7:48 AM    Originating Location (pt. Location): Home    Distant Location (provider location):  [facility]     Platform used for Video Visit: Steven Sy CNP      I have reviewed and updated the patient's allergies and medication list. Patient was asked if they had any patient reported vital signs to present, if yes, please see documented " "vitals.  Patient was also asked for their current weight and height, if presented, documented in vitals.    Concerns: Pt would like to discuss feeling more nauseous after first treatment.    Refills: No refills    Stephanie Vail CMA    Oncology/Hematology Visit Note  Dec 9, 2020    Reason for Visit: Follow up of SCC lung, poorly differentiated    History of Present Illness:   7/3/20                 CT chest (screening). 6.7 cm LLL mass, 0.6 cm RML nodule, mediastinal and L hilar LNs  7/9/20                PET/CT. 7.1 x 5.6 cm LLL mass (SUV 19.6), post obstructive pneumonitis LLL inferior to the mass (SUV 2.8), 3.5 x 1.7 cm 4L node (SUV 5.9), L adrenal nodule 1.1 cm (SUV 4), indeterminate pulmonary nodules, pancreatic cysts, not hypermetabolic  7/27/20              Bronch, EBUS (Dr. Castro). 10R, 11R, 4R too small to biopsy. Stations 7, 4L, 11L negative for malignancy. LLL mass bx: SCC  8/12/20              Brain MRI negative  9/1/20                EUS to evaluate adrenal and 4L (Dr. Hogan). Both negative for malignancy. Adrenal with bland adrenal cells  10/22/20            L VATS, converted to thoracotomy, LLL lobectomy, MLND, R pulmonary arterioplasty (Dr. Sanabria, Dr. Davis). 8.3 cm poorly differentiated SCC, +angiolymphatic invasion  12/03/20 C1D1 Cisplatin & Gemzar    Interval History:  Last Thursday was infusion, was tired after. Friday night she started getting \"sick\"--vomited only once very small amount, more so just felt queasy. Saturday and Sunday she was taking every 6 hours compazine, Lorazepam taking at bedtime. Did help some. Energy is sort of blah,  is doing cooking. Yesterday she did some laundry, cleaned, etc. Breathing a little better, dyspnea has improved since infusion. Tightness under-chest L side unchanged, has residual numbness too. Both present since surgery. No hearing changes, maybe a couple episodes of \"buzzing\". Denies chest pain, racing heart, or palpitations. Appetite " overall has been diminished since her surgery. Denies feeling dehydrated (dark urine, LH/dizziness).     Review of Systems:  Patient denies fevers, chills, headaches, dizziness, vision or hearing changes, new lumps or bumps, abdominal pain, nausea, vomiting, changes to bowel or bladder, swelling of extremities, bleeding issues, or rash.    Current Outpatient Medications   Medication Sig Dispense Refill     acetaminophen (TYLENOL) 325 MG tablet Take 1-2 tablets (325-650 mg) by mouth every 6 hours as needed for mild pain 60 tablet 0     amiodarone (PACERONE) 200 MG tablet Take 1 tablet (200 mg) by mouth daily 90 tablet 1     ketoconazole (NIZORAL) 2 % external cream Apply topically daily 60 g 1     LORazepam (ATIVAN) 0.5 MG tablet Take 1 tablet (0.5 mg) by mouth every 4 hours as needed (Nausea/Vomiting) 30 tablet 0     oxyCODONE (ROXICODONE) 5 MG tablet Take 1-2 tablets (5-10 mg) by mouth every 4 hours as needed for moderate to severe pain 30 tablet 0     prochlorperazine (COMPAZINE) 10 MG tablet Take 0.5 tablets (5 mg) by mouth every 6 hours as needed (Nausea/Vomiting) 30 tablet 11     rivaroxaban ANTICOAGULANT (XARELTO) 20 MG TABS tablet Take 1 tablet (20 mg) by mouth daily (with dinner) 90 tablet 3     triamcinolone (KENALOG) 0.1 % external ointment APPLY  OINTMENT TOPICALLY TWICE DAILY OR  AS  NEEDED 45 g 0       Physical Examination:  There were no vitals taken for this visit.  Wt Readings from Last 10 Encounters:   12/03/20 80.5 kg (177 lb 6.4 oz)   10/28/20 82.3 kg (181 lb 7 oz)   10/06/20 81.4 kg (179 lb 6.4 oz)   10/01/20 81.6 kg (179 lb 14.3 oz)   09/29/20 79.4 kg (175 lb)   09/01/20 80.2 kg (176 lb 12.8 oz)   08/28/20 81.6 kg (180 lb)   07/27/20 80.1 kg (176 lb 9.4 oz)   07/22/20 80.7 kg (178 lb)   06/23/20 80.3 kg (177 lb)     Video physical exam  General: Patient appears well in no acute distress. Normal body habitus.  Skin: No visualized rash or lesions on visualized skin  Eyes: EOMI, no erythema, sclera  icterus or discharge noted  Resp: Appears to be breathing comfortably without accessory muscle usage, speaking in full sentences, no cough  MSK: Appears to have normal range of motion based on visualized movements  Neurologic: No apparent tremors, facial movements symmetric  Psych: affect good, alert and oriented    The rest of a comprehensive physical examination is deferred due to PHE (public health emergency) video restrictions    Laboratory Data: to be drawn tomorrow     Assessment and Plan:  ONC  SCC lung, [id number], IIIA, R0 resection, discrepant PD-L1:   - Started adjuvant cisplatin (d1) and gemcitabine (d1, 8) IV every 3 weeks x 4 cycles 12/3/2020. She tolerated ex nausea and fatigue. Adjusted some meds for day 8 tomorrow and will likely add dex taper with c2d1.   - Plan for surveillance strategy with CT chest w/contrast including the liver and adrenals (needs to be specified in order) every 3 months for year 1, every 3-4 months for year 2, every 6 months for year 3, then annual after that. First scan will be about 3-4 weeks after chemo.      Nausea: started ~24-32 hours after infusion. I suspect cisplatin is main contributor but will add in Emend with Dex for Gemzar tomorrow. Also adding Zofran as prn to alternate with compazine. Lorazepam as third line. Will try pepcid 20 mg BID too to see if reflux contributing. She had a EKG 12/1 with QTc 420. Will check before cycle 2.     Nutrition/poor appetite: 2/2 chemotherapy. Discussed small frequent meals. She will give Ensure a try. Consider nutrition consult pending status.     CARDS  Afib w/RVR, anticoagulation:  On rivaroxaban and amiodarone.   - Given the need for amiodarone, checking EKG before C2 as above.      PULM  R pulmonary nodule: Monitor on routine scans for lung cancer    Baseline, mild shortness of breath on exertion and occasionally at night. Notable improvement since infusion. No cough or chest pain. Aware to monitor for worsening.     Has some  lingering discomfort/numbness near surgical site. Discussed this could be scar tissue or ongoing nerve healing which can take some time.     Dina Sy, CNP on 12/9/2020 at 7:54 AM

## 2020-12-10 ENCOUNTER — INFUSION THERAPY VISIT (OUTPATIENT)
Dept: INFUSION THERAPY | Facility: CLINIC | Age: 70
End: 2020-12-10
Payer: MEDICARE

## 2020-12-10 VITALS
HEIGHT: 65 IN | SYSTOLIC BLOOD PRESSURE: 189 MMHG | RESPIRATION RATE: 18 BRPM | WEIGHT: 169.7 LBS | TEMPERATURE: 97.5 F | BODY MASS INDEX: 28.27 KG/M2 | DIASTOLIC BLOOD PRESSURE: 93 MMHG | OXYGEN SATURATION: 98 % | HEART RATE: 66 BPM

## 2020-12-10 DIAGNOSIS — C34.32 SQUAMOUS CELL CARCINOMA OF BRONCHUS IN LEFT LOWER LOBE (H): ICD-10-CM

## 2020-12-10 DIAGNOSIS — E83.42 HYPOMAGNESEMIA: ICD-10-CM

## 2020-12-10 DIAGNOSIS — T45.1X5A CHEMOTHERAPY-INDUCED NEUTROPENIA (H): ICD-10-CM

## 2020-12-10 DIAGNOSIS — C34.32 SQUAMOUS CELL CARCINOMA OF BRONCHUS IN LEFT LOWER LOBE (H): Primary | ICD-10-CM

## 2020-12-10 DIAGNOSIS — D70.1 CHEMOTHERAPY-INDUCED NEUTROPENIA (H): ICD-10-CM

## 2020-12-10 LAB
ALBUMIN SERPL-MCNC: 3.5 G/DL (ref 3.4–5)
ALP SERPL-CCNC: 116 U/L (ref 40–150)
ALT SERPL W P-5'-P-CCNC: 62 U/L (ref 0–50)
ANION GAP SERPL CALCULATED.3IONS-SCNC: 6 MMOL/L (ref 3–14)
AST SERPL W P-5'-P-CCNC: 41 U/L (ref 0–45)
BASOPHILS # BLD AUTO: 0 10E9/L (ref 0–0.2)
BASOPHILS NFR BLD AUTO: 0.4 %
BILIRUB SERPL-MCNC: 0.3 MG/DL (ref 0.2–1.3)
BUN SERPL-MCNC: 19 MG/DL (ref 7–30)
CALCIUM SERPL-MCNC: 9.3 MG/DL (ref 8.5–10.1)
CHLORIDE SERPL-SCNC: 98 MMOL/L (ref 94–109)
CO2 SERPL-SCNC: 31 MMOL/L (ref 20–32)
CREAT SERPL-MCNC: 0.87 MG/DL (ref 0.52–1.04)
DIFFERENTIAL METHOD BLD: ABNORMAL
EOSINOPHIL NFR BLD AUTO: 1.9 %
ERYTHROCYTE [DISTWIDTH] IN BLOOD BY AUTOMATED COUNT: 13.2 % (ref 10–15)
GFR SERPL CREATININE-BSD FRML MDRD: 68 ML/MIN/{1.73_M2}
GLUCOSE SERPL-MCNC: 110 MG/DL (ref 70–99)
HCT VFR BLD AUTO: 37.6 % (ref 35–47)
HGB BLD-MCNC: 12.6 G/DL (ref 11.7–15.7)
IMM GRANULOCYTES # BLD: 0 10E9/L (ref 0–0.4)
IMM GRANULOCYTES NFR BLD: 0.4 %
LYMPHOCYTES # BLD AUTO: 0.9 10E9/L (ref 0.8–5.3)
LYMPHOCYTES NFR BLD AUTO: 34.2 %
MAGNESIUM SERPL-MCNC: 1.9 MG/DL (ref 1.6–2.3)
MCH RBC QN AUTO: 28.4 PG (ref 26.5–33)
MCHC RBC AUTO-ENTMCNC: 33.5 G/DL (ref 31.5–36.5)
MCV RBC AUTO: 85 FL (ref 78–100)
MONOCYTES # BLD AUTO: 0.1 10E9/L (ref 0–1.3)
MONOCYTES NFR BLD AUTO: 5.1 %
NEUTROPHILS # BLD AUTO: 1.5 10E9/L (ref 1.6–8.3)
NEUTROPHILS NFR BLD AUTO: 58 %
NRBC # BLD AUTO: 0 10*3/UL
NRBC BLD AUTO-RTO: 0 /100
PLATELET # BLD AUTO: 248 10E9/L (ref 150–450)
POTASSIUM SERPL-SCNC: 4.2 MMOL/L (ref 3.4–5.3)
PROT SERPL-MCNC: 7.9 G/DL (ref 6.8–8.8)
RBC # BLD AUTO: 4.44 10E12/L (ref 3.8–5.2)
SODIUM SERPL-SCNC: 135 MMOL/L (ref 133–144)
WBC # BLD AUTO: 2.6 10E9/L (ref 4–11)

## 2020-12-10 PROCEDURE — 258N000003 HC RX IP 258 OP 636: Performed by: NURSE PRACTITIONER

## 2020-12-10 PROCEDURE — 258N000003 HC RX IP 258 OP 636: Performed by: INTERNAL MEDICINE

## 2020-12-10 PROCEDURE — 250N000011 HC RX IP 250 OP 636: Performed by: NURSE PRACTITIONER

## 2020-12-10 PROCEDURE — 96413 CHEMO IV INFUSION 1 HR: CPT

## 2020-12-10 PROCEDURE — 96375 TX/PRO/DX INJ NEW DRUG ADDON: CPT

## 2020-12-10 PROCEDURE — 250N000011 HC RX IP 250 OP 636: Performed by: INTERNAL MEDICINE

## 2020-12-10 PROCEDURE — 96367 TX/PROPH/DG ADDL SEQ IV INF: CPT

## 2020-12-10 PROCEDURE — 36415 COLL VENOUS BLD VENIPUNCTURE: CPT | Performed by: INTERNAL MEDICINE

## 2020-12-10 PROCEDURE — 85025 COMPLETE CBC W/AUTO DIFF WBC: CPT | Performed by: INTERNAL MEDICINE

## 2020-12-10 PROCEDURE — 80053 COMPREHEN METABOLIC PANEL: CPT | Performed by: INTERNAL MEDICINE

## 2020-12-10 PROCEDURE — 83735 ASSAY OF MAGNESIUM: CPT | Performed by: INTERNAL MEDICINE

## 2020-12-10 RX ADMIN — DEXAMETHASONE SODIUM PHOSPHATE 12 MG: 10 INJECTION, SOLUTION INTRAMUSCULAR; INTRAVENOUS at 15:02

## 2020-12-10 RX ADMIN — SODIUM CHLORIDE 250 ML: 9 INJECTION, SOLUTION INTRAVENOUS at 14:05

## 2020-12-10 RX ADMIN — FOSAPREPITANT 150 MG: 150 INJECTION, POWDER, LYOPHILIZED, FOR SOLUTION INTRAVENOUS at 14:37

## 2020-12-10 RX ADMIN — GEMCITABINE 2400 MG: 38 INJECTION, SOLUTION INTRAVENOUS at 15:30

## 2020-12-10 ASSESSMENT — PAIN SCALES - GENERAL: PAINLEVEL: MILD PAIN (2)

## 2020-12-10 ASSESSMENT — MIFFLIN-ST. JEOR: SCORE: 1295.63

## 2020-12-10 NOTE — PROGRESS NOTES
Infusion Nursing Note:  Ijeoma ZAZUETA Anjaliyamil presents today for C2D8 Gemzar.    Patient seen by provider today: No   present during visit today: Not Applicable.    Note: Patient B/P elevated. Will monitor at home and follow up with PCP. .    Intravenous Access:  Peripheral IV placed.    Treatment Conditions:  Lab Results   Component Value Date    HGB 12.6 12/10/2020     Lab Results   Component Value Date    WBC 2.6 12/10/2020      Lab Results   Component Value Date    ANEU 1.5 12/10/2020     Lab Results   Component Value Date     12/10/2020      Lab Results   Component Value Date     12/10/2020                   Lab Results   Component Value Date    POTASSIUM 4.2 12/10/2020           Lab Results   Component Value Date    MAG 1.9 12/10/2020            Lab Results   Component Value Date    CR 0.87 12/10/2020                   Lab Results   Component Value Date    SABINO 9.3 12/10/2020                Lab Results   Component Value Date    BILITOTAL 0.3 12/10/2020           Lab Results   Component Value Date    ALBUMIN 3.5 12/10/2020                    Lab Results   Component Value Date    ALT 62 12/10/2020           Lab Results   Component Value Date    AST 41 12/10/2020       Results reviewed, labs MET treatment parameters, ok to proceed with treatment.      Post Infusion Assessment:  Patient tolerated infusion without incident.  Site patent and intact, free from redness, edema or discomfort.  No evidence of extravasations.  Access discontinued per protocol.       Discharge Plan:   Discharge instructions reviewed with: Patient.  Patient and/or family verbalized understanding of discharge instructions and all questions answered.  Patient discharged in stable condition accompanied by: self.  Departure Mode: Ambulatory,  to piack her up at front entrance..    Bhakti Pérez RN

## 2020-12-10 NOTE — TELEPHONE ENCOUNTER
Prescription verified by Oleksandr Cruz, PharmD.  Oral Chemotherapy Monitoring Program  [phone]

## 2020-12-10 NOTE — TELEPHONE ENCOUNTER
Free Drug Application Denied  Denial Reason(s) over income, cut-off is $51,720  Patient notified: yes via email  Additional Information-no    Timi Goodrich CPhT  [facility]  Oncology Pharmacy Liaison   [email or web address]  [phone] (phone  [phone] (fax

## 2020-12-21 ENCOUNTER — VIRTUAL VISIT (OUTPATIENT)
Dept: ONCOLOGY | Facility: CLINIC | Age: 70
End: 2020-12-21
Attending: NURSE PRACTITIONER
Payer: MEDICARE

## 2020-12-21 DIAGNOSIS — D70.1 CHEMOTHERAPY-INDUCED NEUTROPENIA (H): ICD-10-CM

## 2020-12-21 DIAGNOSIS — E83.42 HYPOMAGNESEMIA: ICD-10-CM

## 2020-12-21 DIAGNOSIS — T45.1X5A CHEMOTHERAPY-INDUCED NEUTROPENIA (H): ICD-10-CM

## 2020-12-21 DIAGNOSIS — C34.92 SQUAMOUS CELL CARCINOMA OF LEFT LUNG (H): Primary | ICD-10-CM

## 2020-12-21 DIAGNOSIS — C34.32 SQUAMOUS CELL CARCINOMA OF BRONCHUS IN LEFT LOWER LOBE (H): ICD-10-CM

## 2020-12-21 PROCEDURE — 999N001193 HC VIDEO/TELEPHONE VISIT; NO CHARGE

## 2020-12-21 PROCEDURE — 99214 OFFICE O/P EST MOD 30 MIN: CPT | Mod: 95 | Performed by: NURSE PRACTITIONER

## 2020-12-21 RX ORDER — HEPARIN SODIUM (PORCINE) LOCK FLUSH IV SOLN 100 UNIT/ML 100 UNIT/ML
5 SOLUTION INTRAVENOUS
Status: CANCELLED | OUTPATIENT
Start: 2020-12-22

## 2020-12-21 RX ORDER — METHYLPREDNISOLONE SODIUM SUCCINATE 125 MG/2ML
125 INJECTION, POWDER, LYOPHILIZED, FOR SOLUTION INTRAMUSCULAR; INTRAVENOUS
Status: CANCELLED
Start: 2020-12-22

## 2020-12-21 RX ORDER — EPINEPHRINE 1 MG/ML
0.3 INJECTION, SOLUTION INTRAMUSCULAR; SUBCUTANEOUS EVERY 5 MIN PRN
Status: CANCELLED | OUTPATIENT
Start: 2020-12-22

## 2020-12-21 RX ORDER — DEXAMETHASONE 4 MG/1
8 TABLET ORAL
Qty: 6 TABLET | Refills: 0 | Status: SHIPPED | OUTPATIENT
Start: 2020-12-21 | End: 2021-01-25

## 2020-12-21 RX ORDER — ALBUTEROL SULFATE 90 UG/1
1-2 AEROSOL, METERED RESPIRATORY (INHALATION)
Status: CANCELLED
Start: 2020-12-22

## 2020-12-21 RX ORDER — ALBUTEROL SULFATE 0.83 MG/ML
2.5 SOLUTION RESPIRATORY (INHALATION)
Status: CANCELLED | OUTPATIENT
Start: 2020-12-22

## 2020-12-21 RX ORDER — DIPHENHYDRAMINE HYDROCHLORIDE 50 MG/ML
50 INJECTION INTRAMUSCULAR; INTRAVENOUS
Status: CANCELLED
Start: 2020-12-22

## 2020-12-21 RX ORDER — FLUTICASONE PROPIONATE 50 MCG
1 SPRAY, SUSPENSION (ML) NASAL DAILY
Qty: 9.9 ML | Refills: 1 | Status: SHIPPED | OUTPATIENT
Start: 2020-12-21 | End: 2021-03-18

## 2020-12-21 RX ORDER — HEPARIN SODIUM,PORCINE 10 UNIT/ML
5 VIAL (ML) INTRAVENOUS
Status: CANCELLED | OUTPATIENT
Start: 2020-12-22

## 2020-12-21 RX ORDER — PALONOSETRON 0.05 MG/ML
0.25 INJECTION, SOLUTION INTRAVENOUS ONCE
Status: CANCELLED
Start: 2020-12-22

## 2020-12-21 RX ORDER — MEPERIDINE HYDROCHLORIDE 25 MG/ML
25 INJECTION INTRAMUSCULAR; INTRAVENOUS; SUBCUTANEOUS EVERY 30 MIN PRN
Status: CANCELLED | OUTPATIENT
Start: 2020-12-22

## 2020-12-21 RX ORDER — NALOXONE HYDROCHLORIDE 0.4 MG/ML
.1-.4 INJECTION, SOLUTION INTRAMUSCULAR; INTRAVENOUS; SUBCUTANEOUS
Status: CANCELLED | OUTPATIENT
Start: 2020-12-22

## 2020-12-21 RX ORDER — LORAZEPAM 2 MG/ML
0.5 INJECTION INTRAMUSCULAR EVERY 4 HOURS PRN
Status: CANCELLED
Start: 2020-12-22

## 2020-12-21 RX ORDER — SODIUM CHLORIDE 9 MG/ML
1000 INJECTION, SOLUTION INTRAVENOUS CONTINUOUS PRN
Status: CANCELLED
Start: 2020-12-22

## 2020-12-21 NOTE — LETTER
"    12/21/2020         RE: Ijeoma Shah  [address] [facility] [address]        Dear Colleague,    Thank you for referring your patient, Ijeoma Shah, to the [facility]. Please see a copy of my visit note below.      Ijeoma Shah is a 70 year old female who is being evaluated via a billable video visit.      The patient has been notified of following:     \"This video visit will be conducted via a call between you and your physician/provider. We have found that certain health care needs can be provided without the need for an in-person physical exam.  This service lets us provide the care you need with a video conversation.  If a prescription is necessary we can send it directly to your pharmacy.  If lab work is needed we can place an order for that and you can then stop by our lab to have the test done at a later time.    Video visits are billed at different rates depending on your insurance coverage.  Please reach out to your insurance provider with any questions.    If during the course of the call the physician/provider feels a video visit is not appropriate, you will not be charged for this service.\"    Patient has given verbal consent for Video visit? Yes  How would you like to obtain your AVS? MyChart  If you are dropped from the video visit, the video invite should be resent to: Text to cell phone: [phone]  Will anyone else be joining your video visit? No      Vitals - Patient Reported  Weight (Patient Reported): 76.7 kg (169 lb)  Height (Patient Reported): 165.1 cm (5' 5\")  BMI (Based on Pt Reported Ht/Wt): 28.12  Pain Score: Mild Pain (2)  Pain Loc: (PATIENT REPORTS PAIN UNDER LEFT BREAST)      I have reviewed and updated patient's allergy and medication list.    Concerns: NONE  Refills: NONE      Kaykay Nash [facility]      Oncology/Hematology Visit Note  Dec 21, 2020    Reason for Visit: Follow up of SCC lung, poorly " "differentiated    History of Present Illness:   7/3/20                 CT chest (screening). 6.7 cm LLL mass, 0.6 cm RML nodule, mediastinal and L hilar LNs  7/9/20                PET/CT. 7.1 x 5.6 cm LLL mass (SUV 19.6), post obstructive pneumonitis LLL inferior to the mass (SUV 2.8), 3.5 x 1.7 cm 4L node (SUV 5.9), L adrenal nodule 1.1 cm (SUV 4), indeterminate pulmonary nodules, pancreatic cysts, not hypermetabolic  7/27/20              Bronch, EBUS (Dr. Castro). 10R, 11R, 4R too small to biopsy. Stations 7, 4L, 11L negative for malignancy. LLL mass bx: SCC  8/12/20              Brain MRI negative  9/1/20                EUS to evaluate adrenal and 4L (Dr. Hogan). Both negative for malignancy. Adrenal with bland adrenal cells  10/22/20            L VATS, converted to thoracotomy, LLL lobectomy, MLND, R pulmonary arterioplasty (Dr. Sanabria, Dr. Davis). 8.3 cm poorly differentiated SCC, +angiolymphatic invasion  12/03/20 C1D1 Cisplatin & Gemzar    Interval History:  Felt sort of sick Sunday but ate some greasy food Saturday. Stomach \"hurt\" after. Close to normal today. Was feeling sort of constipated, took some softeners and was able to have a BM today. Zofran was helpful, just felt queasy the day after. Having some congestion, and nasal fullness. Sinuses not tender. Taste and smell intact. No fever. Still some dyspnea with exertion is the same.     Numbness around her incision site okay, tight feeling. Not taking any pain medication. Energy level is medium. This last week she has done more cooking and cleaning. No hearing changes, no neuropathy. Not feeling dehydrated, but admits she could be drinking. Appetite overall has been diminished since her surgery. Denies chest pain, racing heart, or palpitations. /70, she was started on lisinopril.     Review of Systems:  Patient denies fevers, chills, headaches, dizziness, vision or hearing changes, new lumps or bumps, abdominal pain, nausea, vomiting, " changes to bowel or bladder, swelling of extremities, bleeding issues, or rash.    Current Outpatient Medications   Medication Sig Dispense Refill     acetaminophen (TYLENOL) 325 MG tablet Take 1-2 tablets (325-650 mg) by mouth every 6 hours as needed for mild pain 60 tablet 0     amiodarone (PACERONE) 200 MG tablet Take 1 tablet (200 mg) by mouth daily 90 tablet 1     amiodarone (PACERONE) 400 MG tablet        famotidine (PEPCID) 20 MG tablet Take 1 tablet (20 mg) by mouth 2 times daily 60 tablet 1     ketoconazole (NIZORAL) 2 % external cream Apply topically daily 60 g 1     lisinopril (ZESTRIL) 20 MG tablet Take 1 tablet (20 mg) by mouth daily 30 tablet 11     LORazepam (ATIVAN) 0.5 MG tablet Take 1 tablet (0.5 mg) by mouth every 4 hours as needed (Nausea/Vomiting) 30 tablet 0     ondansetron (ZOFRAN) 8 MG tablet Take 1 tablet (8 mg) by mouth every 8 hours as needed for nausea 30 tablet 1     oxyCODONE (ROXICODONE) 5 MG tablet Take 1-2 tablets (5-10 mg) by mouth every 4 hours as needed for moderate to severe pain (Patient not taking: Reported on 12/9/2020) 30 tablet 0     prochlorperazine (COMPAZINE) 10 MG tablet Take 0.5 tablets (5 mg) by mouth every 6 hours as needed (Nausea/Vomiting) 30 tablet 11     rivaroxaban ANTICOAGULANT (XARELTO) 20 MG TABS tablet Take 1 tablet (20 mg) by mouth daily (with dinner) 90 tablet 3     triamcinolone (KENALOG) 0.1 % external ointment APPLY  OINTMENT TOPICALLY TWICE DAILY OR  AS  NEEDED 45 g 0       Physical Examination:  There were no vitals taken for this visit.  Wt Readings from Last 10 Encounters:   12/10/20 77 kg (169 lb 11.2 oz)   12/03/20 80.5 kg (177 lb 6.4 oz)   10/28/20 82.3 kg (181 lb 7 oz)   10/06/20 81.4 kg (179 lb 6.4 oz)   10/01/20 81.6 kg (179 lb 14.3 oz)   09/29/20 79.4 kg (175 lb)   09/01/20 80.2 kg (176 lb 12.8 oz)   08/28/20 81.6 kg (180 lb)   07/27/20 80.1 kg (176 lb 9.4 oz)   07/22/20 80.7 kg (178 lb)     Video physical exam  General: Patient appears well  in no acute distress. Normal body habitus.  Skin: No visualized rash or lesions on visualized skin  Eyes: EOMI, no erythema, sclera icterus or discharge noted  Resp: Appears to be breathing comfortably without accessory muscle usage, speaking in full sentences, no cough  MSK: Appears to have normal range of motion based on visualized movements  Neurologic: No apparent tremors, facial movements symmetric  Psych: affect good, alert and oriented    The rest of a comprehensive physical examination is deferred due to PHE (public health emergency) video restrictions    Laboratory Data: to be drawn tomorrow     Assessment and Plan:  ONC  SCC lung, [id number], IIIA, R0 resection, discrepant PD-L1:   - Started adjuvant cisplatin (d1) and gemcitabine (d1, 8) IV every 3 weeks x 4 cycles 12/3/2020. She tolerated ex nausea and fatigue. Adjusted some meds for day 8 tomorrow and will likely add dex taper with c2d1.   - Plan for surveillance strategy with CT chest w/contrast including the liver and adrenals (needs to be specified in order) every 3 months for year 1, every 3-4 months for year 2, every 6 months for year 3, then annual after that. First scan will be about 3-4 weeks after chemo.      Nausea: started ~24-32 hours after Cis/[place] infusion. I suspect cisplatin is main contributor but added Emend with Dex for Gemzar for D8. Still felt sort of queasy the after D8 but the zofran did help. She had a EKG 12/1 with QTc 420. Will check tomorrow.      We talked about the following plan for C2, D1:  -Dex, Emend, Aloxi premed   -Dex PO 8 mg days 2-4, sent to her pharmacy   -prn compazine and lorazepam starting anytime after chemotherapy  -prn zofran starting day 5 after chemotherapy   -can use pepcid prn if she feels reflux    For D8:  -Dex, emend premed  -prn zofran, compazine, and lorazepam anytime  -pepcid prn    Nutrition/poor appetite: 2/2 chemotherapy. Discussed small frequent meals. She will give Ensure a try. Consider nutrition  consult pending status. Weight will be checked tomorrow. Talked about weighing herself at home for better trend.     CARDS  Afib w/RVR, anticoagulation:  On rivaroxaban and amiodarone.   - Given the need for amiodarone, checking EKG before C2 as above.     HTN: recenty started on lisinoprl. She is monitoring BP daily at home. deferr management to cards    PULM  R pulmonary nodule: Monitor on routine scans for lung cancer    Baseline, mild shortness of breath on exertion and occasionally at night. Stable today. No cough or chest pain. Aware to monitor for worsening.     Has some lingering discomfort/numbness near surgical site. Discussed this could be scar tissue or ongoing nerve healing which can take some time. I offered her a follow up with Janny Ruvalcaba which she was interested in, they last met about one month ago.     ENT  She has some nasal congestion that is sort of non-specific. No sinus tenderness or fever/chills with cause for infectious process. She is using saline rinse. Has taste and smell and no COVID exposure so will hold off on testing.   -Try flonase, sent to her pharmacy    Video-Visit Details    Type of service:  Video Visit    Video Start Time: 10:00 AM  Video End Time: 10:30 AM    Originating Location (pt. Location): Home    Distant Location (provider location):  [facility]     Platform used for Video Visit: Steven Sy CNP

## 2020-12-21 NOTE — PROGRESS NOTES
"  Ijeoma Shah is a 70 year old female who is being evaluated via a billable video visit.      The patient has been notified of following:     \"This video visit will be conducted via a call between you and your physician/provider. We have found that certain health care needs can be provided without the need for an in-person physical exam.  This service lets us provide the care you need with a video conversation.  If a prescription is necessary we can send it directly to your pharmacy.  If lab work is needed we can place an order for that and you can then stop by our lab to have the test done at a later time.    Video visits are billed at different rates depending on your insurance coverage.  Please reach out to your insurance provider with any questions.    If during the course of the call the physician/provider feels a video visit is not appropriate, you will not be charged for this service.\"    Patient has given verbal consent for Video visit? Yes  How would you like to obtain your AVS? MyChart  If you are dropped from the video visit, the video invite should be resent to: Text to cell phone: [phone]  Will anyone else be joining your video visit? No      Vitals - Patient Reported  Weight (Patient Reported): 76.7 kg (169 lb)  Height (Patient Reported): 165.1 cm (5' 5\")  BMI (Based on Pt Reported Ht/Wt): 28.12  Pain Score: Mild Pain (2)  Pain Loc: (PATIENT REPORTS PAIN UNDER LEFT BREAST)      I have reviewed and updated patient's allergy and medication list.    Concerns: NONE  Refills: NONE      Kaykay Nash [facility]      Oncology/Hematology Visit Note  Dec 21, 2020    Reason for Visit: Follow up of SCC lung, poorly differentiated    History of Present Illness:   7/3/20                 CT chest (screening). 6.7 cm LLL mass, 0.6 cm RML nodule, mediastinal and L hilar LNs  7/9/20                PET/CT. 7.1 x 5.6 cm LLL mass (SUV 19.6), post obstructive pneumonitis LLL inferior to the mass (SUV 2.8), 3.5 " "x 1.7 cm 4L node (SUV 5.9), L adrenal nodule 1.1 cm (SUV 4), indeterminate pulmonary nodules, pancreatic cysts, not hypermetabolic  7/27/20              Bronch, EBUS (Dr. Castro). 10R, 11R, 4R too small to biopsy. Stations 7, 4L, 11L negative for malignancy. LLL mass bx: SCC  8/12/20              Brain MRI negative  9/1/20                EUS to evaluate adrenal and 4L (Dr. Hogan). Both negative for malignancy. Adrenal with bland adrenal cells  10/22/20            L VATS, converted to thoracotomy, LLL lobectomy, MLND, R pulmonary arterioplasty (Dr. Sanabria, Dr. Davis). 8.3 cm poorly differentiated SCC, +angiolymphatic invasion  12/03/20 C1D1 Cisplatin & Gemzar    Interval History:  Felt sort of sick Sunday but ate some greasy food Saturday. Stomach \"hurt\" after. Close to normal today. Was feeling sort of constipated, took some softeners and was able to have a BM today. Zofran was helpful, just felt queasy the day after. Having some congestion, and nasal fullness. Sinuses not tender. Taste and smell intact. No fever. Still some dyspnea with exertion is the same.     Numbness around her incision site okay, tight feeling. Not taking any pain medication. Energy level is medium. This last week she has done more cooking and cleaning. No hearing changes, no neuropathy. Not feeling dehydrated, but admits she could be drinking. Appetite overall has been diminished since her surgery. Denies chest pain, racing heart, or palpitations. /69, she was started on lisinopril.     Review of Systems:  Patient denies fevers, chills, headaches, dizziness, vision or hearing changes, new lumps or bumps, abdominal pain, nausea, vomiting, changes to bowel or bladder, swelling of extremities, bleeding issues, or rash.    Current Outpatient Medications   Medication Sig Dispense Refill     acetaminophen (TYLENOL) 325 MG tablet Take 1-2 tablets (325-650 mg) by mouth every 6 hours as needed for mild pain 60 tablet 0     amiodarone " (PACERONE) 200 MG tablet Take 1 tablet (200 mg) by mouth daily 90 tablet 1     amiodarone (PACERONE) 400 MG tablet        famotidine (PEPCID) 20 MG tablet Take 1 tablet (20 mg) by mouth 2 times daily 60 tablet 1     ketoconazole (NIZORAL) 2 % external cream Apply topically daily 60 g 1     lisinopril (ZESTRIL) 20 MG tablet Take 1 tablet (20 mg) by mouth daily 30 tablet 11     LORazepam (ATIVAN) 0.5 MG tablet Take 1 tablet (0.5 mg) by mouth every 4 hours as needed (Nausea/Vomiting) 30 tablet 0     ondansetron (ZOFRAN) 8 MG tablet Take 1 tablet (8 mg) by mouth every 8 hours as needed for nausea 30 tablet 1     oxyCODONE (ROXICODONE) 5 MG tablet Take 1-2 tablets (5-10 mg) by mouth every 4 hours as needed for moderate to severe pain (Patient not taking: Reported on 12/9/2020) 30 tablet 0     prochlorperazine (COMPAZINE) 10 MG tablet Take 0.5 tablets (5 mg) by mouth every 6 hours as needed (Nausea/Vomiting) 30 tablet 11     rivaroxaban ANTICOAGULANT (XARELTO) 20 MG TABS tablet Take 1 tablet (20 mg) by mouth daily (with dinner) 90 tablet 3     triamcinolone (KENALOG) 0.1 % external ointment APPLY  OINTMENT TOPICALLY TWICE DAILY OR  AS  NEEDED 45 g 0       Physical Examination:  There were no vitals taken for this visit.  Wt Readings from Last 10 Encounters:   12/10/20 77 kg (169 lb 11.2 oz)   12/03/20 80.5 kg (177 lb 6.4 oz)   10/28/20 82.3 kg (181 lb 7 oz)   10/06/20 81.4 kg (179 lb 6.4 oz)   10/01/20 81.6 kg (179 lb 14.3 oz)   09/29/20 79.4 kg (175 lb)   09/01/20 80.2 kg (176 lb 12.8 oz)   08/28/20 81.6 kg (180 lb)   07/27/20 80.1 kg (176 lb 9.4 oz)   07/22/20 80.7 kg (178 lb)     Video physical exam  General: Patient appears well in no acute distress. Normal body habitus.  Skin: No visualized rash or lesions on visualized skin  Eyes: EOMI, no erythema, sclera icterus or discharge noted  Resp: Appears to be breathing comfortably without accessory muscle usage, speaking in full sentences, no cough  MSK: Appears to have  normal range of motion based on visualized movements  Neurologic: No apparent tremors, facial movements symmetric  Psych: affect good, alert and oriented    The rest of a comprehensive physical examination is deferred due to PHE (public health emergency) video restrictions    Laboratory Data: to be drawn tomorrow     Assessment and Plan:  ONC  SCC lung, [id number], IIIA, R0 resection, discrepant PD-L1:   - Started adjuvant cisplatin (d1) and gemcitabine (d1, 8) IV every 3 weeks x 4 cycles 12/3/2020. She tolerated ex nausea and fatigue. Adjusted some meds for day 8 tomorrow and will likely add dex taper with c2d1.   - Plan for surveillance strategy with CT chest w/contrast including the liver and adrenals (needs to be specified in order) every 3 months for year 1, every 3-4 months for year 2, every 6 months for year 3, then annual after that. First scan will be about 3-4 weeks after chemo.      Nausea: started ~24-32 hours after Cis/[place] infusion. I suspect cisplatin is main contributor but added Emend with Dex for Gemzar for D8. Still felt sort of queasy the after D8 but the zofran did help. She had a EKG 12/1 with QTc 420. Will check tomorrow.      We talked about the following plan for C2, D1:  -Dex, Emend, Aloxi premed   -Dex PO 8 mg days 2-4, sent to her pharmacy   -prn compazine and lorazepam starting anytime after chemotherapy  -prn zofran starting day 5 after chemotherapy   -can use pepcid prn if she feels reflux    For D8:  -Dex, emend premed  -prn zofran, compazine, and lorazepam anytime  -pepcid prn    Nutrition/poor appetite: 2/2 chemotherapy. Discussed small frequent meals. She will give Ensure a try. Consider nutrition consult pending status. Weight will be checked tomorrow. Talked about weighing herself at home for better trend.     CARDS  Afib w/RVR, anticoagulation:  On rivaroxaban and amiodarone.   - Given the need for amiodarone, checking EKG before C2 as above.     HTN: recenty started on lisinoprl.  She is monitoring BP daily at home. deferr management to cards    PULM  R pulmonary nodule: Monitor on routine scans for lung cancer    Baseline, mild shortness of breath on exertion and occasionally at night. Stable today. No cough or chest pain. Aware to monitor for worsening.     Has some lingering discomfort/numbness near surgical site. Discussed this could be scar tissue or ongoing nerve healing which can take some time. I offered her a follow up with Janny Ruvalcaba which she was interested in, they last met about one month ago.     ENT  She has some nasal congestion that is sort of non-specific. No sinus tenderness or fever/chills with cause for infectious process. She is using saline rinse. Has taste and smell and no COVID exposure so will hold off on testing.   -Try flonase, sent to her pharmacy    Video-Visit Details    Type of service:  Video Visit    Video Start Time: 10:00 AM  Video End Time: 10:30 AM    Originating Location (pt. Location): Home    Distant Location (provider location):  [facility]     Platform used for Video Visit: Steven Sy, CNP

## 2020-12-22 ENCOUNTER — INFUSION THERAPY VISIT (OUTPATIENT)
Dept: INFUSION THERAPY | Facility: CLINIC | Age: 70
End: 2020-12-22
Attending: INTERNAL MEDICINE
Payer: MEDICARE

## 2020-12-22 ENCOUNTER — HOSPITAL ENCOUNTER (OUTPATIENT)
Dept: CARDIOLOGY | Facility: CLINIC | Age: 70
Discharge: HOME | End: 2020-12-22
Attending: NURSE PRACTITIONER | Admitting: NURSE PRACTITIONER
Payer: MEDICARE

## 2020-12-22 VITALS
TEMPERATURE: 97.8 F | WEIGHT: 171.5 LBS | RESPIRATION RATE: 16 BRPM | HEART RATE: 67 BPM | SYSTOLIC BLOOD PRESSURE: 137 MMHG | DIASTOLIC BLOOD PRESSURE: 80 MMHG | BODY MASS INDEX: 28.54 KG/M2 | OXYGEN SATURATION: 98 %

## 2020-12-22 DIAGNOSIS — C34.32 SQUAMOUS CELL CARCINOMA OF BRONCHUS IN LEFT LOWER LOBE (H): Primary | ICD-10-CM

## 2020-12-22 DIAGNOSIS — C34.92 SQUAMOUS CELL CARCINOMA OF LEFT LUNG (H): ICD-10-CM

## 2020-12-22 DIAGNOSIS — D70.1 CHEMOTHERAPY-INDUCED NEUTROPENIA (H): ICD-10-CM

## 2020-12-22 DIAGNOSIS — E83.42 HYPOMAGNESEMIA: ICD-10-CM

## 2020-12-22 DIAGNOSIS — C34.32 SQUAMOUS CELL CARCINOMA OF BRONCHUS IN LEFT LOWER LOBE (H): ICD-10-CM

## 2020-12-22 DIAGNOSIS — T45.1X5A CHEMOTHERAPY-INDUCED NEUTROPENIA (H): ICD-10-CM

## 2020-12-22 LAB
ALBUMIN SERPL-MCNC: 3.3 G/DL (ref 3.4–5)
ALP SERPL-CCNC: 101 U/L (ref 40–150)
ALT SERPL W P-5'-P-CCNC: 22 U/L (ref 0–50)
ANION GAP SERPL CALCULATED.3IONS-SCNC: 5 MMOL/L (ref 3–14)
AST SERPL W P-5'-P-CCNC: 15 U/L (ref 0–45)
BASOPHILS # BLD AUTO: 0 10E9/L (ref 0–0.2)
BASOPHILS NFR BLD AUTO: 0.4 %
BILIRUB SERPL-MCNC: 0.2 MG/DL (ref 0.2–1.3)
BUN SERPL-MCNC: 11 MG/DL (ref 7–30)
CALCIUM SERPL-MCNC: 8.9 MG/DL (ref 8.5–10.1)
CHLORIDE SERPL-SCNC: 102 MMOL/L (ref 94–109)
CO2 SERPL-SCNC: 31 MMOL/L (ref 20–32)
CREAT SERPL-MCNC: 0.94 MG/DL (ref 0.52–1.04)
DIFFERENTIAL METHOD BLD: ABNORMAL
EOSINOPHIL NFR BLD AUTO: 5.2 %
ERYTHROCYTE [DISTWIDTH] IN BLOOD BY AUTOMATED COUNT: 13.9 % (ref 10–15)
GFR SERPL CREATININE-BSD FRML MDRD: 62 ML/MIN/{1.73_M2}
GLUCOSE SERPL-MCNC: 84 MG/DL (ref 70–99)
HCT VFR BLD AUTO: 33.3 % (ref 35–47)
HGB BLD-MCNC: 11.2 G/DL (ref 11.7–15.7)
IMM GRANULOCYTES # BLD: 0 10E9/L (ref 0–0.4)
IMM GRANULOCYTES NFR BLD: 0.8 %
LYMPHOCYTES # BLD AUTO: 0.8 10E9/L (ref 0.8–5.3)
LYMPHOCYTES NFR BLD AUTO: 30 %
MAGNESIUM SERPL-MCNC: 2 MG/DL (ref 1.6–2.3)
MCH RBC QN AUTO: 28.3 PG (ref 26.5–33)
MCHC RBC AUTO-ENTMCNC: 33.6 G/DL (ref 31.5–36.5)
MCV RBC AUTO: 84 FL (ref 78–100)
MONOCYTES # BLD AUTO: 0.4 10E9/L (ref 0–1.3)
MONOCYTES NFR BLD AUTO: 14 %
NEUTROPHILS # BLD AUTO: 1.2 10E9/L (ref 1.6–8.3)
NEUTROPHILS NFR BLD AUTO: 49.6 %
NRBC # BLD AUTO: 0 10*3/UL
NRBC BLD AUTO-RTO: 0 /100
PLATELET # BLD AUTO: 175 10E9/L (ref 150–450)
POTASSIUM SERPL-SCNC: 3.9 MMOL/L (ref 3.4–5.3)
PROT SERPL-MCNC: 6.9 G/DL (ref 6.8–8.8)
RBC # BLD AUTO: 3.96 10E12/L (ref 3.8–5.2)
SODIUM SERPL-SCNC: 138 MMOL/L (ref 133–144)
WBC # BLD AUTO: 2.5 10E9/L (ref 4–11)

## 2020-12-22 PROCEDURE — 36415 COLL VENOUS BLD VENIPUNCTURE: CPT | Performed by: INTERNAL MEDICINE

## 2020-12-22 PROCEDURE — 93005 ELECTROCARDIOGRAM TRACING: CPT | Performed by: REHABILITATION PRACTITIONER

## 2020-12-22 PROCEDURE — 83735 ASSAY OF MAGNESIUM: CPT | Performed by: INTERNAL MEDICINE

## 2020-12-22 PROCEDURE — 85025 COMPLETE CBC W/AUTO DIFF WBC: CPT | Performed by: INTERNAL MEDICINE

## 2020-12-22 PROCEDURE — 80053 COMPREHEN METABOLIC PANEL: CPT | Performed by: INTERNAL MEDICINE

## 2020-12-22 PROCEDURE — 999N000104 HC STATISTIC NO CHARGE

## 2020-12-22 ASSESSMENT — PAIN SCALES - GENERAL: PAINLEVEL: MILD PAIN (2)

## 2020-12-22 NOTE — PROGRESS NOTES
Infusion Nursing Note:  Ijeoma ZAZUETA Hermelindasindy presents today for C2D1 Gemzar/Cisplatin.    Patient seen by provider today: No   present during visit today: Not Applicable.    Note: Patient neutropenic, afebrile. Discussed neutropenic precautions, hand out given to patient. Questions answered and verbalizes understanding. New schedule of appts given to her. .    Intravenous Access:  No Intravenous access at this visit.    Treatment Conditions:  Lab Results   Component Value Date    HGB 11.2 12/22/2020     Lab Results   Component Value Date    WBC 2.5 12/22/2020      Lab Results   Component Value Date    ANEU 1.2 12/22/2020     Lab Results   Component Value Date     12/22/2020      Lab Results   Component Value Date     12/22/2020                   Lab Results   Component Value Date    POTASSIUM 3.9 12/22/2020           Lab Results   Component Value Date    MAG 2.0 12/22/2020            Lab Results   Component Value Date    CR 0.94 12/22/2020                   Lab Results   Component Value Date    SABINO 8.9 12/22/2020                Lab Results   Component Value Date    BILITOTAL 0.2 12/22/2020           Lab Results   Component Value Date    ALBUMIN 3.3 12/22/2020                    Lab Results   Component Value Date    ALT 22 12/22/2020           Lab Results   Component Value Date    AST 15 12/22/2020       Results reviewed, labs did NOT meet treatment parameters: ANC.      Post Infusion Assessment:  N/A.       Discharge Plan:   Discharge instructions reviewed with: Patient.  Patient and/or family verbalized understanding of discharge instructions and all questions answered.  Patient discharged in stable condition accompanied by: self.  Departure Mode: Ambulatory.  picking her up at front entrance. Patient will return next week for C2D1.    Bhakti Pérez RN

## 2020-12-24 ENCOUNTER — TELEPHONE (OUTPATIENT)
Dept: FAMILY MEDICINE | Facility: CLINIC | Age: 70
End: 2020-12-24

## 2020-12-24 ENCOUNTER — MYC MEDICAL ADVICE (OUTPATIENT)
Dept: FAMILY MEDICINE | Facility: CLINIC | Age: 70
End: 2020-12-24

## 2020-12-24 DIAGNOSIS — I10 BENIGN ESSENTIAL HYPERTENSION: Primary | ICD-10-CM

## 2020-12-24 RX ORDER — AMLODIPINE BESYLATE 5 MG/1
5 TABLET ORAL DAILY
Qty: 90 TABLET | Refills: 0 | Status: SHIPPED | OUTPATIENT
Start: 2020-12-24 | End: 2021-01-08

## 2020-12-24 NOTE — TELEPHONE ENCOUNTER
Reason for Call:  Elevated BP  190/100  and orders    Detailed comments: Teresa from [facility] is calling to ask for orders to continue home care for 1 time a week for 3 weeks and 2 prn. She also wants to report a 200/100 home machine, and 190/100, manually by Teresa. No headache, also pre medication for BP. Please call.    Phone Number Patient can be reached at: Other phone number:  [phone]    Best Time: any    Can we leave a detailed message on this number? YES   Roseann Graf  [facility] [place]       Call taken on 12/24/2020 at 9:13 AM by Roseann Mckeon

## 2020-12-24 NOTE — TELEPHONE ENCOUNTER
Please call. On 12/14 cardiology had recommended lisinopril 20mg daily for hypertension.  It is not on her med list.  Is she taking that medication?  If not, start the lisinopril 20mg daily.   If she has been taking lisinopril and blood pressure still high, I suggest adding amlodipine 5mg daily.    We can send prescription for #30 with 1 refill.  Continue to monitor blood pressure and if remaining high like over 140/90, let us know.   FERNANDA IZQUIERDO MD

## 2020-12-24 NOTE — TELEPHONE ENCOUNTER
Spoke with Homecare nurse, Salima has been taking the Lisinopril so we will go ahead and start the Amlodipine. She will continue to monitor her BP and update us next week if elevated

## 2020-12-28 DIAGNOSIS — C34.32 SQUAMOUS CELL CARCINOMA OF BRONCHUS IN LEFT LOWER LOBE (H): Primary | ICD-10-CM

## 2020-12-28 DIAGNOSIS — T45.1X5A CHEMOTHERAPY-INDUCED NEUTROPENIA (H): ICD-10-CM

## 2020-12-28 DIAGNOSIS — D70.1 CHEMOTHERAPY-INDUCED NEUTROPENIA (H): ICD-10-CM

## 2020-12-28 DIAGNOSIS — E83.42 HYPOMAGNESEMIA: ICD-10-CM

## 2020-12-28 RX ORDER — ALBUTEROL SULFATE 90 UG/1
1-2 AEROSOL, METERED RESPIRATORY (INHALATION)
Status: CANCELLED
Start: 2020-12-29

## 2020-12-28 RX ORDER — NALOXONE HYDROCHLORIDE 0.4 MG/ML
.1-.4 INJECTION, SOLUTION INTRAMUSCULAR; INTRAVENOUS; SUBCUTANEOUS
Status: CANCELLED | OUTPATIENT
Start: 2021-01-05

## 2020-12-28 RX ORDER — HEPARIN SODIUM,PORCINE 10 UNIT/ML
5 VIAL (ML) INTRAVENOUS
Status: CANCELLED | OUTPATIENT
Start: 2020-12-29

## 2020-12-28 RX ORDER — DIPHENHYDRAMINE HYDROCHLORIDE 50 MG/ML
50 INJECTION INTRAMUSCULAR; INTRAVENOUS
Status: CANCELLED
Start: 2020-12-29

## 2020-12-28 RX ORDER — ALBUTEROL SULFATE 0.83 MG/ML
2.5 SOLUTION RESPIRATORY (INHALATION)
Status: CANCELLED | OUTPATIENT
Start: 2021-01-05

## 2020-12-28 RX ORDER — SODIUM CHLORIDE 9 MG/ML
1000 INJECTION, SOLUTION INTRAVENOUS CONTINUOUS PRN
Status: CANCELLED
Start: 2020-12-29

## 2020-12-28 RX ORDER — HEPARIN SODIUM (PORCINE) LOCK FLUSH IV SOLN 100 UNIT/ML 100 UNIT/ML
5 SOLUTION INTRAVENOUS
Status: CANCELLED | OUTPATIENT
Start: 2020-12-29

## 2020-12-28 RX ORDER — METHYLPREDNISOLONE SODIUM SUCCINATE 125 MG/2ML
125 INJECTION, POWDER, LYOPHILIZED, FOR SOLUTION INTRAMUSCULAR; INTRAVENOUS
Status: CANCELLED
Start: 2021-01-05

## 2020-12-28 RX ORDER — MEPERIDINE HYDROCHLORIDE 25 MG/ML
25 INJECTION INTRAMUSCULAR; INTRAVENOUS; SUBCUTANEOUS EVERY 30 MIN PRN
Status: CANCELLED | OUTPATIENT
Start: 2020-12-29

## 2020-12-28 RX ORDER — METHYLPREDNISOLONE SODIUM SUCCINATE 125 MG/2ML
125 INJECTION, POWDER, LYOPHILIZED, FOR SOLUTION INTRAMUSCULAR; INTRAVENOUS
Status: CANCELLED
Start: 2020-12-29

## 2020-12-28 RX ORDER — EPINEPHRINE 1 MG/ML
0.3 INJECTION, SOLUTION INTRAMUSCULAR; SUBCUTANEOUS EVERY 5 MIN PRN
Status: CANCELLED | OUTPATIENT
Start: 2021-01-05

## 2020-12-28 RX ORDER — DIPHENHYDRAMINE HYDROCHLORIDE 50 MG/ML
50 INJECTION INTRAMUSCULAR; INTRAVENOUS
Status: CANCELLED
Start: 2021-01-05

## 2020-12-28 RX ORDER — HEPARIN SODIUM,PORCINE 10 UNIT/ML
5 VIAL (ML) INTRAVENOUS
Status: CANCELLED | OUTPATIENT
Start: 2021-01-05

## 2020-12-28 RX ORDER — NALOXONE HYDROCHLORIDE 0.4 MG/ML
.1-.4 INJECTION, SOLUTION INTRAMUSCULAR; INTRAVENOUS; SUBCUTANEOUS
Status: CANCELLED | OUTPATIENT
Start: 2020-12-29

## 2020-12-28 RX ORDER — SODIUM CHLORIDE 9 MG/ML
1000 INJECTION, SOLUTION INTRAVENOUS CONTINUOUS PRN
Status: CANCELLED
Start: 2021-01-05

## 2020-12-28 RX ORDER — ALBUTEROL SULFATE 90 UG/1
1-2 AEROSOL, METERED RESPIRATORY (INHALATION)
Status: CANCELLED
Start: 2021-01-05

## 2020-12-28 RX ORDER — LORAZEPAM 2 MG/ML
0.5 INJECTION INTRAMUSCULAR EVERY 4 HOURS PRN
Status: CANCELLED
Start: 2021-01-05

## 2020-12-28 RX ORDER — MEPERIDINE HYDROCHLORIDE 25 MG/ML
25 INJECTION INTRAMUSCULAR; INTRAVENOUS; SUBCUTANEOUS EVERY 30 MIN PRN
Status: CANCELLED | OUTPATIENT
Start: 2021-01-05

## 2020-12-28 RX ORDER — HEPARIN SODIUM (PORCINE) LOCK FLUSH IV SOLN 100 UNIT/ML 100 UNIT/ML
5 SOLUTION INTRAVENOUS
Status: CANCELLED | OUTPATIENT
Start: 2021-01-05

## 2020-12-28 RX ORDER — EPINEPHRINE 1 MG/ML
0.3 INJECTION, SOLUTION INTRAMUSCULAR; SUBCUTANEOUS EVERY 5 MIN PRN
Status: CANCELLED | OUTPATIENT
Start: 2020-12-29

## 2020-12-28 RX ORDER — PALONOSETRON 0.05 MG/ML
0.25 INJECTION, SOLUTION INTRAVENOUS ONCE
Status: CANCELLED
Start: 2020-12-29

## 2020-12-28 RX ORDER — LORAZEPAM 2 MG/ML
0.5 INJECTION INTRAMUSCULAR EVERY 4 HOURS PRN
Status: CANCELLED
Start: 2020-12-29

## 2020-12-28 RX ORDER — ALBUTEROL SULFATE 0.83 MG/ML
2.5 SOLUTION RESPIRATORY (INHALATION)
Status: CANCELLED | OUTPATIENT
Start: 2020-12-29

## 2020-12-29 ENCOUNTER — INFUSION THERAPY VISIT (OUTPATIENT)
Dept: INFUSION THERAPY | Facility: CLINIC | Age: 70
End: 2020-12-29
Attending: INTERNAL MEDICINE
Payer: MEDICARE

## 2020-12-29 VITALS
TEMPERATURE: 97.9 F | RESPIRATION RATE: 16 BRPM | OXYGEN SATURATION: 99 % | BODY MASS INDEX: 28.87 KG/M2 | WEIGHT: 173.3 LBS | HEART RATE: 81 BPM | DIASTOLIC BLOOD PRESSURE: 72 MMHG | SYSTOLIC BLOOD PRESSURE: 142 MMHG | HEIGHT: 65 IN

## 2020-12-29 DIAGNOSIS — C34.32 SQUAMOUS CELL CARCINOMA OF BRONCHUS IN LEFT LOWER LOBE (H): ICD-10-CM

## 2020-12-29 DIAGNOSIS — D70.1 CHEMOTHERAPY-INDUCED NEUTROPENIA (H): ICD-10-CM

## 2020-12-29 DIAGNOSIS — T45.1X5A CHEMOTHERAPY-INDUCED NEUTROPENIA (H): ICD-10-CM

## 2020-12-29 DIAGNOSIS — E83.42 HYPOMAGNESEMIA: ICD-10-CM

## 2020-12-29 DIAGNOSIS — E83.42 HYPOMAGNESEMIA: Primary | ICD-10-CM

## 2020-12-29 LAB
ALBUMIN SERPL-MCNC: 3.4 G/DL (ref 3.4–5)
ALP SERPL-CCNC: 89 U/L (ref 40–150)
ALT SERPL W P-5'-P-CCNC: 19 U/L (ref 0–50)
ANION GAP SERPL CALCULATED.3IONS-SCNC: 4 MMOL/L (ref 3–14)
AST SERPL W P-5'-P-CCNC: 13 U/L (ref 0–45)
BASOPHILS # BLD AUTO: 0 10E9/L (ref 0–0.2)
BASOPHILS NFR BLD AUTO: 0.3 %
BILIRUB SERPL-MCNC: 0.1 MG/DL (ref 0.2–1.3)
BUN SERPL-MCNC: 16 MG/DL (ref 7–30)
CALCIUM SERPL-MCNC: 9.2 MG/DL (ref 8.5–10.1)
CHLORIDE SERPL-SCNC: 101 MMOL/L (ref 94–109)
CO2 SERPL-SCNC: 32 MMOL/L (ref 20–32)
CREAT SERPL-MCNC: 1.04 MG/DL (ref 0.52–1.04)
DIFFERENTIAL METHOD BLD: ABNORMAL
EOSINOPHIL NFR BLD AUTO: 3.5 %
ERYTHROCYTE [DISTWIDTH] IN BLOOD BY AUTOMATED COUNT: 15.3 % (ref 10–15)
GFR SERPL CREATININE-BSD FRML MDRD: 55 ML/MIN/{1.73_M2}
GLUCOSE SERPL-MCNC: 137 MG/DL (ref 70–99)
HCT VFR BLD AUTO: 35.5 % (ref 35–47)
HGB BLD-MCNC: 11.5 G/DL (ref 11.7–15.7)
IMM GRANULOCYTES # BLD: 0.1 10E9/L (ref 0–0.4)
IMM GRANULOCYTES NFR BLD: 1.3 %
LYMPHOCYTES # BLD AUTO: 1 10E9/L (ref 0.8–5.3)
LYMPHOCYTES NFR BLD AUTO: 26.5 %
MAGNESIUM SERPL-MCNC: 1.9 MG/DL (ref 1.6–2.3)
MCH RBC QN AUTO: 28.2 PG (ref 26.5–33)
MCHC RBC AUTO-ENTMCNC: 32.4 G/DL (ref 31.5–36.5)
MCV RBC AUTO: 87 FL (ref 78–100)
MONOCYTES # BLD AUTO: 0.6 10E9/L (ref 0–1.3)
MONOCYTES NFR BLD AUTO: 15 %
NEUTROPHILS # BLD AUTO: 2 10E9/L (ref 1.6–8.3)
NEUTROPHILS NFR BLD AUTO: 53.4 %
NRBC # BLD AUTO: 0 10*3/UL
NRBC BLD AUTO-RTO: 0 /100
PLATELET # BLD AUTO: 738 10E9/L (ref 150–450)
POTASSIUM SERPL-SCNC: 4.1 MMOL/L (ref 3.4–5.3)
PROT SERPL-MCNC: 7.1 G/DL (ref 6.8–8.8)
RBC # BLD AUTO: 4.08 10E12/L (ref 3.8–5.2)
SODIUM SERPL-SCNC: 137 MMOL/L (ref 133–144)
WBC # BLD AUTO: 3.7 10E9/L (ref 4–11)

## 2020-12-29 PROCEDURE — 96367 TX/PROPH/DG ADDL SEQ IV INF: CPT

## 2020-12-29 PROCEDURE — 96413 CHEMO IV INFUSION 1 HR: CPT

## 2020-12-29 PROCEDURE — 83735 ASSAY OF MAGNESIUM: CPT | Performed by: INTERNAL MEDICINE

## 2020-12-29 PROCEDURE — 85025 COMPLETE CBC W/AUTO DIFF WBC: CPT | Performed by: INTERNAL MEDICINE

## 2020-12-29 PROCEDURE — 96417 CHEMO IV INFUS EACH ADDL SEQ: CPT

## 2020-12-29 PROCEDURE — 96366 THER/PROPH/DIAG IV INF ADDON: CPT

## 2020-12-29 PROCEDURE — 96361 HYDRATE IV INFUSION ADD-ON: CPT

## 2020-12-29 PROCEDURE — 96368 THER/DIAG CONCURRENT INF: CPT

## 2020-12-29 PROCEDURE — 96375 TX/PRO/DX INJ NEW DRUG ADDON: CPT

## 2020-12-29 PROCEDURE — 258N000003 HC RX IP 258 OP 636: Performed by: INTERNAL MEDICINE

## 2020-12-29 PROCEDURE — 250N000011 HC RX IP 250 OP 636: Performed by: INTERNAL MEDICINE

## 2020-12-29 PROCEDURE — 96415 CHEMO IV INFUSION ADDL HR: CPT

## 2020-12-29 PROCEDURE — 36415 COLL VENOUS BLD VENIPUNCTURE: CPT | Performed by: INTERNAL MEDICINE

## 2020-12-29 PROCEDURE — 80053 COMPREHEN METABOLIC PANEL: CPT | Performed by: INTERNAL MEDICINE

## 2020-12-29 PROCEDURE — 250N000009 HC RX 250: Performed by: INTERNAL MEDICINE

## 2020-12-29 RX ORDER — PALONOSETRON 0.05 MG/ML
0.25 INJECTION, SOLUTION INTRAVENOUS ONCE
Status: COMPLETED | OUTPATIENT
Start: 2020-12-29 | End: 2020-12-29

## 2020-12-29 RX ADMIN — PALONOSETRON 0.25 MG: 0.05 INJECTION, SOLUTION INTRAVENOUS at 09:59

## 2020-12-29 RX ADMIN — POTASSIUM CHLORIDE: 2 INJECTION, SOLUTION, CONCENTRATE INTRAVENOUS at 11:38

## 2020-12-29 RX ADMIN — GEMCITABINE 2400 MG: 38 INJECTION, SOLUTION INTRAVENOUS at 10:58

## 2020-12-29 RX ADMIN — SODIUM CHLORIDE 1000 ML: 9 INJECTION, SOLUTION INTRAVENOUS at 09:13

## 2020-12-29 RX ADMIN — CISPLATIN 150 MG: 1 INJECTION, SOLUTION INTRAVENOUS at 12:10

## 2020-12-29 RX ADMIN — FAMOTIDINE 20 MG: 10 INJECTION, SOLUTION INTRAVENOUS at 10:01

## 2020-12-29 RX ADMIN — FOSAPREPITANT: 150 INJECTION, POWDER, LYOPHILIZED, FOR SOLUTION INTRAVENOUS at 10:21

## 2020-12-29 ASSESSMENT — PAIN SCALES - GENERAL: PAINLEVEL: MILD PAIN (2)

## 2020-12-29 ASSESSMENT — MIFFLIN-ST. JEOR: SCORE: 1311.96

## 2020-12-30 NOTE — PATIENT INSTRUCTIONS
Pt to return on 01/05/21 for C2 D8 Gemzar. Copies of medication list and upcoming appointments given prior to discharge.

## 2020-12-30 NOTE — PROGRESS NOTES
Infusion Nursing Note:  Ijeoma ZAZUETA Hermelindasindy presents today for C2 D1 Cisplatin and Gemzar.    Patient seen by provider today: No   present during visit today: Not Applicable.    Note: Spoke to Dr. De Leon regarding patients platelet results and ok to proceed with treatment today, will colt labs at next week appts. Gemzar started to hurt and irritate vein, slowed infusion down and placed hot pack on it which helped    Intravenous Access:  Peripheral IV placed.    Treatment Conditions:  Lab Results   Component Value Date    HGB 11.5 12/29/2020     Lab Results   Component Value Date    WBC 3.7 12/29/2020      Lab Results   Component Value Date    ANEU 2.0 12/29/2020     Lab Results   Component Value Date     12/29/2020      Lab Results   Component Value Date     12/29/2020                   Lab Results   Component Value Date    POTASSIUM 4.1 12/29/2020           Lab Results   Component Value Date    MAG 1.9 12/29/2020            Lab Results   Component Value Date    CR 1.04 12/29/2020                   Lab Results   Component Value Date    SABINO 9.2 12/29/2020                Lab Results   Component Value Date    BILITOTAL 0.1 12/29/2020           Lab Results   Component Value Date    ALBUMIN 3.4 12/29/2020                    Lab Results   Component Value Date    ALT 19 12/29/2020           Lab Results   Component Value Date    AST 13 12/29/2020       Results reviewed, labs MET treatment parameters, ok to proceed with treatment.      Post Infusion Assessment:  Patient tolerated infusion without incident.  Patient observed for 15 minutes post infusion per protocol.  Blood return noted pre and post infusion.  Site patent and intact, free from redness, edema or discomfort.  No evidence of extravasations.  Access discontinued per protocol.       Discharge Plan:   Discharge instructions reviewed with: Patient.  Patient and/or family verbalized understanding of discharge instructions and all questions  answered.  Patient discharged in stable condition accompanied by: self.  Departure Mode: Ambulatory.    Lauren Hudson RN

## 2021-01-05 ENCOUNTER — INFUSION THERAPY VISIT (OUTPATIENT)
Dept: INFUSION THERAPY | Facility: CLINIC | Age: 71
End: 2021-01-05
Attending: INTERNAL MEDICINE
Payer: MEDICARE

## 2021-01-05 VITALS
WEIGHT: 170.8 LBS | HEART RATE: 69 BPM | DIASTOLIC BLOOD PRESSURE: 84 MMHG | TEMPERATURE: 97.5 F | OXYGEN SATURATION: 98 % | SYSTOLIC BLOOD PRESSURE: 156 MMHG | BODY MASS INDEX: 28.42 KG/M2 | RESPIRATION RATE: 16 BRPM

## 2021-01-05 DIAGNOSIS — E83.42 HYPOMAGNESEMIA: Primary | ICD-10-CM

## 2021-01-05 DIAGNOSIS — E83.42 HYPOMAGNESEMIA: ICD-10-CM

## 2021-01-05 DIAGNOSIS — C34.32 SQUAMOUS CELL CARCINOMA OF BRONCHUS IN LEFT LOWER LOBE (H): ICD-10-CM

## 2021-01-05 DIAGNOSIS — T45.1X5A CHEMOTHERAPY-INDUCED NEUTROPENIA (H): ICD-10-CM

## 2021-01-05 DIAGNOSIS — D70.1 CHEMOTHERAPY-INDUCED NEUTROPENIA (H): ICD-10-CM

## 2021-01-05 LAB
ALBUMIN SERPL-MCNC: 3.3 G/DL (ref 3.4–5)
ALP SERPL-CCNC: 89 U/L (ref 40–150)
ALT SERPL W P-5'-P-CCNC: 73 U/L (ref 0–50)
ANION GAP SERPL CALCULATED.3IONS-SCNC: 4 MMOL/L (ref 3–14)
AST SERPL W P-5'-P-CCNC: 26 U/L (ref 0–45)
BASOPHILS # BLD AUTO: 0 10E9/L (ref 0–0.2)
BASOPHILS NFR BLD AUTO: 0.4 %
BILIRUB SERPL-MCNC: 0.3 MG/DL (ref 0.2–1.3)
BUN SERPL-MCNC: 17 MG/DL (ref 7–30)
CALCIUM SERPL-MCNC: 8.5 MG/DL (ref 8.5–10.1)
CHLORIDE SERPL-SCNC: 103 MMOL/L (ref 94–109)
CO2 SERPL-SCNC: 31 MMOL/L (ref 20–32)
CREAT SERPL-MCNC: 0.87 MG/DL (ref 0.52–1.04)
DIFFERENTIAL METHOD BLD: ABNORMAL
EOSINOPHIL NFR BLD AUTO: 0.4 %
ERYTHROCYTE [DISTWIDTH] IN BLOOD BY AUTOMATED COUNT: 14.7 % (ref 10–15)
GFR SERPL CREATININE-BSD FRML MDRD: 68 ML/MIN/{1.73_M2}
GLUCOSE SERPL-MCNC: 108 MG/DL (ref 70–99)
HCT VFR BLD AUTO: 35.3 % (ref 35–47)
HGB BLD-MCNC: 11.8 G/DL (ref 11.7–15.7)
IMM GRANULOCYTES # BLD: 0 10E9/L (ref 0–0.4)
IMM GRANULOCYTES NFR BLD: 0.8 %
LYMPHOCYTES # BLD AUTO: 1.5 10E9/L (ref 0.8–5.3)
LYMPHOCYTES NFR BLD AUTO: 57.1 %
MAGNESIUM SERPL-MCNC: 1.7 MG/DL (ref 1.6–2.3)
MCH RBC QN AUTO: 28.4 PG (ref 26.5–33)
MCHC RBC AUTO-ENTMCNC: 33.4 G/DL (ref 31.5–36.5)
MCV RBC AUTO: 85 FL (ref 78–100)
MONOCYTES # BLD AUTO: 0.1 10E9/L (ref 0–1.3)
MONOCYTES NFR BLD AUTO: 3.1 %
NEUTROPHILS # BLD AUTO: 1 10E9/L (ref 1.6–8.3)
NEUTROPHILS NFR BLD AUTO: 38.2 %
NRBC # BLD AUTO: 0 10*3/UL
NRBC BLD AUTO-RTO: 0 /100
PLATELET # BLD AUTO: 302 10E9/L (ref 150–450)
POTASSIUM SERPL-SCNC: 3.8 MMOL/L (ref 3.4–5.3)
PROT SERPL-MCNC: 6.6 G/DL (ref 6.8–8.8)
RBC # BLD AUTO: 4.16 10E12/L (ref 3.8–5.2)
SODIUM SERPL-SCNC: 138 MMOL/L (ref 133–144)
WBC # BLD AUTO: 2.5 10E9/L (ref 4–11)

## 2021-01-05 PROCEDURE — 36415 COLL VENOUS BLD VENIPUNCTURE: CPT | Performed by: INTERNAL MEDICINE

## 2021-01-05 PROCEDURE — 83735 ASSAY OF MAGNESIUM: CPT | Performed by: INTERNAL MEDICINE

## 2021-01-05 PROCEDURE — 80053 COMPREHEN METABOLIC PANEL: CPT | Performed by: INTERNAL MEDICINE

## 2021-01-05 PROCEDURE — 999N000104 HC STATISTIC NO CHARGE

## 2021-01-05 PROCEDURE — 85025 COMPLETE CBC W/AUTO DIFF WBC: CPT | Performed by: INTERNAL MEDICINE

## 2021-01-05 ASSESSMENT — PAIN SCALES - GENERAL: PAINLEVEL: MILD PAIN (2)

## 2021-01-05 NOTE — PROGRESS NOTES
Infusion Nursing Note:  Ijeoma Shah presents today for C2D8 Gemzar.    Patient seen by provider today: No   present during visit today: Not Applicable.    Note: NA.    Intravenous Access:  No Intravenous access at this visit.    Treatment Conditions:  Lab Results   Component Value Date    HGB 11.8 01/05/2021     Lab Results   Component Value Date    WBC 2.5 01/05/2021      Lab Results   Component Value Date    ANEU 1.0 01/05/2021     Lab Results   Component Value Date     01/05/2021      Lab Results   Component Value Date     01/05/2021                   Lab Results   Component Value Date    POTASSIUM 3.8 01/05/2021           Lab Results   Component Value Date    MAG 1.7 01/05/2021            Lab Results   Component Value Date    CR 0.87 01/05/2021                   Lab Results   Component Value Date    SABINO 8.5 01/05/2021                Lab Results   Component Value Date    BILITOTAL 0.3 01/05/2021           Lab Results   Component Value Date    ALBUMIN 3.3 01/05/2021                    Lab Results   Component Value Date    ALT 73 01/05/2021           Lab Results   Component Value Date    AST 26 01/05/2021       Results reviewed, labs did NOT meet treatment parameters: ANC-1.0.      Post Infusion Assessment:  Reviewed Neutropenic precautions. Patient tx deferred x 1 week. New schedule of appts. Given to patient. Message left with Bhakti Gill regarding  appt if needs to be rescheduled 1 week later.       Discharge Plan:   Discharge instructions reviewed with: Patient.  Departure Mode: Ambulatory.  picking her up at front entrance.    Bhakti Pérez RN

## 2021-01-06 NOTE — PROGRESS NOTES
"  Salima is a 70 year old who is being evaluated via a billable video visit.      How would you like to obtain your AVS? MyChart  If the video visit is dropped, the invitation should be resent by: Text to cell phone: [phone]  Will anyone else be joining your video visit? No      Vitals - Patient Reported  Weight (Patient Reported): 77.1 kg (170 lb)  Height (Patient Reported): 165.1 cm (5' 5\")  BMI (Based on Pt Reported Ht/Wt): 28.29  Pain Score: Mild Pain (2)  Pain Loc: (PATIENT REPORTS DISCOMFORT UNDER LEFT BREAST)  I have reviewed and updated patient's allergy and medication list.    Concerns: NONE  Refills: NONE    Kaykay Nash [facility]    Video Start Time: 12:00 PM    Subjective     Salima is a 70 year old who presents to clinic today for the following health issues         Objective    Vitals - Patient Reported  Weight (Patient Reported): 77.1 kg (170 lb)  Height (Patient Reported): 165.1 cm (5' 5\")  BMI (Based on Pt Reported Ht/Wt): 28.29  Pain Score: Mild Pain (2)  Pain Loc: (PATIENT REPORTS DISCOMFORT UNDER LEFT BREAST)      Physical Exam     Video-Visit Details    Type of service:  Video Visit    Video Start Time: 11:50am    Video End Time:12:20 pm    Originating Location (pt. Location): Home    Distant Location (provider location):  [facility]     Platform used for Video Visit: [facility]      Oncology/Hematology Visit Note  Jan 8, 2021    Reason for Visit: Follow up of SCC lung, poorly differentiated    History of Present Illness:   7/3/20                 CT chest (screening). 6.7 cm LLL mass, 0.6 cm RML nodule, mediastinal and L hilar LNs  7/9/20                PET/CT. 7.1 x 5.6 cm LLL mass (SUV 19.6), post obstructive pneumonitis LLL inferior to the mass (SUV 2.8), 3.5 x 1.7 cm 4L node (SUV 5.9), L adrenal nodule 1.1 cm (SUV 4), indeterminate pulmonary nodules, pancreatic cysts, not hypermetabolic  7/27/20              Bronch, EBUS (Dr. Castro). 10R, 11R, 4R too small to " biopsy. Stations 7, 4L, 11L negative for malignancy. LLL mass bx: SCC  8/12/20              Brain MRI negative  9/1/20                EUS to evaluate adrenal and 4L (Dr. Hogan). Both negative for malignancy. Adrenal with bland adrenal cells  10/22/20            L VATS, converted to thoracotomy, LLL lobectomy, MLND, R pulmonary arterioplasty (Dr. Sanabria, Dr. Davis). 8.3 cm poorly differentiated SCC, +angiolymphatic invasion  12/03/20 C1D1 Cisplatin & Gemzar    Interval History:  -Nausea from last infusion resolved a couple days ago, anti nausea medicine helps. Couldn't tell if the steroids after helped much   -Sedentary especially the week after chemo but trying go for a walk and do things around the house.  -Eating about the same, lost a few lbs maybe but overall just eating what tastes good which is stable.   -Still feeling congested, no facial pain. Feels better after she coughs.   -Overall, breathing a bit better. Less dyspnea than prior to treatment  -Still has tightness by incision, numbness too  -No neuropathy or hearing changes  -No chest pain or racing heart  -No swelling of arms or legs    -Denies fever or chills.   -They recently increased her BP med further, usually she runs 140-150    Review of Systems:  Patient denies fevers, chills, headaches, dizziness, vision or hearing changes, new lumps or bumps, abdominal pain, nausea, vomiting, changes to bowel or bladder, swelling of extremities, bleeding issues, or rash.    Current Outpatient Medications   Medication Sig Dispense Refill     acetaminophen (TYLENOL) 325 MG tablet Take 1-2 tablets (325-650 mg) by mouth every 6 hours as needed for mild pain 60 tablet 0     amiodarone (PACERONE) 200 MG tablet Take 1 tablet (200 mg) by mouth daily 90 tablet 1     amiodarone (PACERONE) 400 MG tablet        amLODIPine (NORVASC) 5 MG tablet Take 1 tablet (5 mg) by mouth daily 90 tablet 0     dexamethasone (DECADRON) 4 MG tablet Take 2 tablets (8 mg) by mouth  daily (with breakfast) for 3 days Start taking daily the day after chemotherapy for 3 days 6 tablet 0     famotidine (PEPCID) 20 MG tablet Take 1 tablet (20 mg) by mouth 2 times daily (Patient taking differently: Take 20 mg by mouth 2 times daily as needed ) 60 tablet 1     fluticasone (FLONASE) 50 MCG/ACT nasal spray Spray 1 spray into both nostrils daily 9.9 mL 1     ketoconazole (NIZORAL) 2 % external cream Apply topically daily 60 g 1     lisinopril (ZESTRIL) 20 MG tablet Take 1 tablet (20 mg) by mouth daily 30 tablet 11     LORazepam (ATIVAN) 0.5 MG tablet Take 1 tablet (0.5 mg) by mouth every 4 hours as needed (Nausea/Vomiting) 30 tablet 0     ondansetron (ZOFRAN) 8 MG tablet Take 1 tablet (8 mg) by mouth every 8 hours as needed for nausea 30 tablet 1     oxyCODONE (ROXICODONE) 5 MG tablet Take 1-2 tablets (5-10 mg) by mouth every 4 hours as needed for moderate to severe pain (Patient not taking: Reported on 12/9/2020) 30 tablet 0     prochlorperazine (COMPAZINE) 10 MG tablet Take 0.5 tablets (5 mg) by mouth every 6 hours as needed (Nausea/Vomiting) (Patient not taking: Reported on 12/29/2020) 30 tablet 11     rivaroxaban ANTICOAGULANT (XARELTO) 20 MG TABS tablet Take 1 tablet (20 mg) by mouth daily (with dinner) 90 tablet 3     triamcinolone (KENALOG) 0.1 % external ointment APPLY  OINTMENT TOPICALLY TWICE DAILY OR  AS  NEEDED 45 g 0     Physical Examination:  There were no vitals taken for this visit.  Wt Readings from Last 10 Encounters:   01/05/21 77.5 kg (170 lb 12.8 oz)   12/29/20 78.6 kg (173 lb 4.8 oz)   12/22/20 77.8 kg (171 lb 8 oz)   12/10/20 77 kg (169 lb 11.2 oz)   12/03/20 80.5 kg (177 lb 6.4 oz)   10/28/20 82.3 kg (181 lb 7 oz)   10/06/20 81.4 kg (179 lb 6.4 oz)   10/01/20 81.6 kg (179 lb 14.3 oz)   09/29/20 79.4 kg (175 lb)   09/01/20 80.2 kg (176 lb 12.8 oz)     Video physical exam  General: Patient appears well in no acute distress. Normal body habitus.  Skin: No visualized rash or lesions on  visualized skin  Eyes: EOMI, no erythema, sclera icterus or discharge noted  Resp: Appears to be breathing comfortably without accessory muscle usage, speaking in full sentences, no cough  MSK: Appears to have normal range of motion based on visualized movements  Neurologic: No apparent tremors, facial movements symmetric  Psych: affect good, alert and oriented    The rest of a comprehensive physical examination is deferred due to PHE (public health emergency) video restrictions    Laboratory Data: recent labs reviewed, to be drawn next cycle    Assessment and Plan:  ONC  SCC lung, [id number], IIIA, R0 resection, discrepant PD-L1:   - Started adjuvant cisplatin (d1) and gemcitabine (d1, 8) IV every 3 weeks x 4 cycles 12/3/2020. She tolerated ex nausea and fatigue, and two delays due to neutropenia. Add neulasta with day 8 moving forward. Proceed with chemo 1/12 pending labs. I will add a visit with me prior to C3.  - Plan for surveillance strategy with CT chest w/contrast including the liver and adrenals (needs to be specified in order) every 3 months [ for year 1, every 3-4 months for year 2, every 6 months for year 3, then annual after that. First scan will be about 3-4 weeks after chemo completion. May need to adjust currently scheduled scan due to delays, will clarify with Dr. De Leon.     Addendum: keeps scans as scheduled. Dose reducing gemzar by 80% instead of adding neulasta. I called and updated Salima on 1/11/21.     Dina Sy, CNP on 1/11/2021 at 6:27 PM       Nausea: starts ~24-32 hours after Cis/[place] infusion and lasts about one week. Still felt the other day when she was due for D8 which she did not notice prior cycle. Nausea plan as follows, we can re-discuss prior to C3:    For D1 with Cis/[place]:  -Dex, Emend, Aloxi premed  -Dex PO 8 mg days 2-4  -Pepcid BID scheduled x7 days  -Add zyprexa with C3, 5 mg at bedtime starting night of chemotherapy x1 week (talked about increasing to 7.5, then 10 if she  cannot tell benefit from 5 mg)  -prn compazine and lorazepam starting anytime after chemotherapy  -prn zofran starting day 5 after chemotherapy     She had a EKG 12/22 with QTc 418. Will recheck next week in light of adding zyprexa    For D8 with [place]:  -Dex, emend premed  -prn pepcid  -prn zofran, compazine, and lorazepam anytime     Nutrition/poor appetite: 2/2 chemotherapy. Discussed small frequent meals. She will give Ensure a try. Offered nutrition consult but she declined. Weight overall stable    CARDS  Afib w/RVR, anticoagulation:  On rivaroxaban and amiodarone.     HTN: On lisinopril and norvasc. She is monitoring BP daily at home, runs 140-150. Recently increased norvasc to 10 mg    PULM  R pulmonary nodule: Monitor on routine scans for lung cancer    Baseline, mild shortness of breath on exertion and occasionally at night has improved!  No cough or chest pain. Aware to monitor for worsening.     Has some lingering discomfort/numbness near surgical site. Discussed this could be scar tissue or ongoing nerve healing which can take some time. I requested follow up with Janny Ruvalcaba last visit but this was not scheduled thus I messaged Janny today personally.     ENT  She has some nasal congestion that is sort of non-specific and chronic. No sinus tenderness or fever/chills with cause for infectious process. Sh is using saline rinse. Has taste and smell and no COVID exposure so will hold off on testing.   -Continue Flonase  -Try Claritin daily   -Recommended she follow up with PCP for further recs or we could consider ENT referral      Dina Sy CNP on 1/8/2021 at 4:43 PM

## 2021-01-07 ENCOUNTER — TELEPHONE (OUTPATIENT)
Dept: FAMILY MEDICINE | Facility: CLINIC | Age: 71
End: 2021-01-07

## 2021-01-07 DIAGNOSIS — C34.32 SQUAMOUS CELL CARCINOMA OF BRONCHUS IN LEFT LOWER LOBE (H): Primary | ICD-10-CM

## 2021-01-07 DIAGNOSIS — T45.1X5A CHEMOTHERAPY-INDUCED NEUTROPENIA (H): ICD-10-CM

## 2021-01-07 DIAGNOSIS — E83.42 HYPOMAGNESEMIA: ICD-10-CM

## 2021-01-07 DIAGNOSIS — D70.1 CHEMOTHERAPY-INDUCED NEUTROPENIA (H): ICD-10-CM

## 2021-01-07 RX ORDER — HEPARIN SODIUM (PORCINE) LOCK FLUSH IV SOLN 100 UNIT/ML 100 UNIT/ML
5 SOLUTION INTRAVENOUS
Status: CANCELLED | OUTPATIENT
Start: 2021-01-12

## 2021-01-07 RX ORDER — ALBUTEROL SULFATE 0.83 MG/ML
2.5 SOLUTION RESPIRATORY (INHALATION)
Status: CANCELLED | OUTPATIENT
Start: 2021-01-12

## 2021-01-07 RX ORDER — METHYLPREDNISOLONE SODIUM SUCCINATE 125 MG/2ML
125 INJECTION, POWDER, LYOPHILIZED, FOR SOLUTION INTRAMUSCULAR; INTRAVENOUS
Status: CANCELLED
Start: 2021-01-12

## 2021-01-07 RX ORDER — LORAZEPAM 2 MG/ML
0.5 INJECTION INTRAMUSCULAR EVERY 4 HOURS PRN
Status: CANCELLED
Start: 2021-01-12

## 2021-01-07 RX ORDER — ALBUTEROL SULFATE 90 UG/1
1-2 AEROSOL, METERED RESPIRATORY (INHALATION)
Status: CANCELLED
Start: 2021-01-12

## 2021-01-07 RX ORDER — EPINEPHRINE 1 MG/ML
0.3 INJECTION, SOLUTION INTRAMUSCULAR; SUBCUTANEOUS EVERY 5 MIN PRN
Status: CANCELLED | OUTPATIENT
Start: 2021-01-12

## 2021-01-07 RX ORDER — HEPARIN SODIUM,PORCINE 10 UNIT/ML
5 VIAL (ML) INTRAVENOUS
Status: CANCELLED | OUTPATIENT
Start: 2021-01-12

## 2021-01-07 RX ORDER — SODIUM CHLORIDE 9 MG/ML
1000 INJECTION, SOLUTION INTRAVENOUS CONTINUOUS PRN
Status: CANCELLED
Start: 2021-01-12

## 2021-01-07 RX ORDER — MEPERIDINE HYDROCHLORIDE 25 MG/ML
25 INJECTION INTRAMUSCULAR; INTRAVENOUS; SUBCUTANEOUS EVERY 30 MIN PRN
Status: CANCELLED | OUTPATIENT
Start: 2021-01-12

## 2021-01-07 RX ORDER — DIPHENHYDRAMINE HYDROCHLORIDE 50 MG/ML
50 INJECTION INTRAMUSCULAR; INTRAVENOUS
Status: CANCELLED
Start: 2021-01-12

## 2021-01-07 RX ORDER — NALOXONE HYDROCHLORIDE 0.4 MG/ML
.1-.4 INJECTION, SOLUTION INTRAMUSCULAR; INTRAVENOUS; SUBCUTANEOUS
Status: CANCELLED | OUTPATIENT
Start: 2021-01-12

## 2021-01-07 NOTE — TELEPHONE ENCOUNTER
Patient Concern/Update:    Patient BP continues to be elevated.  This has been ranging in the 130 to upper 150s systolic.  Diastolic has been in 80 to 90 range.  She has been complaint with taking amlodipine and lisinopril.      Please advise if any other changes need to be made to Blood Pressure Medications.    Also of note, client continues to have some pain on the left rib cage region. This is a mild pain rated at a 2 or a 3 and has been present since her surgery.  She reports it is always present and that deep breathing will make this pain worse.  Client also reports that at times this pain will travel to the center (sternum region).      Verónica Ruelas RN Case Manager  [phone]  [email or web address]

## 2021-01-07 NOTE — TELEPHONE ENCOUNTER
She can increase the Amlodipine to 10 mg daily, continue to monitor.   I would let her surgeon know about the chest wall pain, I suspect it will be present for some time.

## 2021-01-08 ENCOUNTER — VIRTUAL VISIT (OUTPATIENT)
Dept: ONCOLOGY | Facility: CLINIC | Age: 71
End: 2021-01-08
Attending: NURSE PRACTITIONER
Payer: MEDICARE

## 2021-01-08 DIAGNOSIS — C34.32 SQUAMOUS CELL CARCINOMA OF BRONCHUS IN LEFT LOWER LOBE (H): Primary | ICD-10-CM

## 2021-01-08 DIAGNOSIS — I10 BENIGN ESSENTIAL HYPERTENSION: ICD-10-CM

## 2021-01-08 PROCEDURE — 99214 OFFICE O/P EST MOD 30 MIN: CPT | Mod: 95 | Performed by: NURSE PRACTITIONER

## 2021-01-08 PROCEDURE — 999N001193 HC VIDEO/TELEPHONE VISIT; NO CHARGE

## 2021-01-08 RX ORDER — LORATADINE 10 MG/1
10 TABLET ORAL DAILY
Qty: 30 TABLET | Refills: 1 | Status: SHIPPED | OUTPATIENT
Start: 2021-01-08 | End: 2021-03-18

## 2021-01-08 RX ORDER — OLANZAPINE 5 MG/1
5 TABLET ORAL AT BEDTIME
Qty: 30 TABLET | Refills: 0 | Status: SHIPPED | OUTPATIENT
Start: 2021-01-08 | End: 2021-03-18

## 2021-01-08 RX ORDER — AMLODIPINE BESYLATE 5 MG/1
10 TABLET ORAL DAILY
Qty: 30 TABLET | Refills: 0 | COMMUNITY
Start: 2021-01-08 | End: 2021-02-15

## 2021-01-08 NOTE — LETTER
"1/8/2021       RE: Ijeoma Shah  [address] [facility] [address]         Salima is a 70 year old who is being evaluated via a billable video visit.      How would you like to obtain your AVS? MyChart  If the video visit is dropped, the invitation should be resent by: Text to cell phone: [phone]  Will anyone else be joining your video visit? No      Vitals - Patient Reported  Weight (Patient Reported): 77.1 kg (170 lb)  Height (Patient Reported): 165.1 cm (5' 5\")  BMI (Based on Pt Reported Ht/Wt): 28.29  Pain Score: Mild Pain (2)  Pain Loc: (PATIENT REPORTS DISCOMFORT UNDER LEFT BREAST)  I have reviewed and updated patient's allergy and medication list.    Concerns: NONE  Refills: NONE    Kaykay Nash [facility]    Video Start Time: 12:00 PM    Subjective     Salima is a 70 year old who presents to clinic today for the following health issues         Objective    Vitals - Patient Reported  Weight (Patient Reported): 77.1 kg (170 lb)  Height (Patient Reported): 165.1 cm (5' 5\")  BMI (Based on Pt Reported Ht/Wt): 28.29  Pain Score: Mild Pain (2)  Pain Loc: (PATIENT REPORTS DISCOMFORT UNDER LEFT BREAST)      Physical Exam     Video-Visit Details    Type of service:  Video Visit    Video Start Time: 11:50am    Video End Time:12:20 pm    Originating Location (pt. Location): Home    Distant Location (provider location):  [facility]     Platform used for Video Visit: [facility]      Oncology/Hematology Visit Note  Jan 8, 2021    Reason for Visit: Follow up of SCC lung, poorly differentiated    History of Present Illness:   7/3/20                 CT chest (screening). 6.7 cm LLL mass, 0.6 cm RML nodule, mediastinal and L hilar LNs  7/9/20                PET/CT. 7.1 x 5.6 cm LLL mass (SUV 19.6), post obstructive pneumonitis LLL inferior to the mass (SUV 2.8), 3.5 x 1.7 cm 4L node (SUV 5.9), L adrenal nodule 1.1 cm (SUV 4), indeterminate pulmonary nodules, pancreatic cysts, " not hypermetabolic  7/27/20              Bronch, EBUS (Dr. Castro). 10R, 11R, 4R too small to biopsy. Stations 7, 4L, 11L negative for malignancy. LLL mass bx: SCC  8/12/20              Brain MRI negative  9/1/20                EUS to evaluate adrenal and 4L (Dr. Hogan). Both negative for malignancy. Adrenal with bland adrenal cells  10/22/20            L VATS, converted to thoracotomy, LLL lobectomy, MLND, R pulmonary arterioplasty (Dr. Sanabria, Dr. Davis). 8.3 cm poorly differentiated SCC, +angiolymphatic invasion  12/03/20 C1D1 Cisplatin & Gemzar    Interval History:  -Nausea from last infusion resolved a couple days ago, anti nausea medicine helps. Couldn't tell if the steroids after helped much   -Sedentary especially the week after chemo but trying go for a walk and do things around the house.  -Eating about the same, lost a few lbs maybe but overall just eating what tastes good which is stable.   -Still feeling congested, no facial pain. Feels better after she coughs.   -Overall, breathing a bit better. Less dyspnea than prior to treatment  -Still has tightness by incision, numbness too  -No neuropathy or hearing changes  -No chest pain or racing heart  -No swelling of arms or legs    -Denies fever or chills.   -They recently increased her BP med further, usually she runs 140-150    Review of Systems:  Patient denies fevers, chills, headaches, dizziness, vision or hearing changes, new lumps or bumps, abdominal pain, nausea, vomiting, changes to bowel or bladder, swelling of extremities, bleeding issues, or rash.    Current Outpatient Medications   Medication Sig Dispense Refill     acetaminophen (TYLENOL) 325 MG tablet Take 1-2 tablets (325-650 mg) by mouth every 6 hours as needed for mild pain 60 tablet 0     amiodarone (PACERONE) 200 MG tablet Take 1 tablet (200 mg) by mouth daily 90 tablet 1     amiodarone (PACERONE) 400 MG tablet        amLODIPine (NORVASC) 5 MG tablet Take 1 tablet (5 mg) by  mouth daily 90 tablet 0     dexamethasone (DECADRON) 4 MG tablet Take 2 tablets (8 mg) by mouth daily (with breakfast) for 3 days Start taking daily the day after chemotherapy for 3 days 6 tablet 0     famotidine (PEPCID) 20 MG tablet Take 1 tablet (20 mg) by mouth 2 times daily (Patient taking differently: Take 20 mg by mouth 2 times daily as needed ) 60 tablet 1     fluticasone (FLONASE) 50 MCG/ACT nasal spray Spray 1 spray into both nostrils daily 9.9 mL 1     ketoconazole (NIZORAL) 2 % external cream Apply topically daily 60 g 1     lisinopril (ZESTRIL) 20 MG tablet Take 1 tablet (20 mg) by mouth daily 30 tablet 11     LORazepam (ATIVAN) 0.5 MG tablet Take 1 tablet (0.5 mg) by mouth every 4 hours as needed (Nausea/Vomiting) 30 tablet 0     ondansetron (ZOFRAN) 8 MG tablet Take 1 tablet (8 mg) by mouth every 8 hours as needed for nausea 30 tablet 1     oxyCODONE (ROXICODONE) 5 MG tablet Take 1-2 tablets (5-10 mg) by mouth every 4 hours as needed for moderate to severe pain (Patient not taking: Reported on 12/9/2020) 30 tablet 0     prochlorperazine (COMPAZINE) 10 MG tablet Take 0.5 tablets (5 mg) by mouth every 6 hours as needed (Nausea/Vomiting) (Patient not taking: Reported on 12/29/2020) 30 tablet 11     rivaroxaban ANTICOAGULANT (XARELTO) 20 MG TABS tablet Take 1 tablet (20 mg) by mouth daily (with dinner) 90 tablet 3     triamcinolone (KENALOG) 0.1 % external ointment APPLY  OINTMENT TOPICALLY TWICE DAILY OR  AS  NEEDED 45 g 0     Physical Examination:  There were no vitals taken for this visit.  Wt Readings from Last 10 Encounters:   01/05/21 77.5 kg (170 lb 12.8 oz)   12/29/20 78.6 kg (173 lb 4.8 oz)   12/22/20 77.8 kg (171 lb 8 oz)   12/10/20 77 kg (169 lb 11.2 oz)   12/03/20 80.5 kg (177 lb 6.4 oz)   10/28/20 82.3 kg (181 lb 7 oz)   10/06/20 81.4 kg (179 lb 6.4 oz)   10/01/20 81.6 kg (179 lb 14.3 oz)   09/29/20 79.4 kg (175 lb)   09/01/20 80.2 kg (176 lb 12.8 oz)     Video physical exam  General: Patient  appears well in no acute distress. Normal body habitus.  Skin: No visualized rash or lesions on visualized skin  Eyes: EOMI, no erythema, sclera icterus or discharge noted  Resp: Appears to be breathing comfortably without accessory muscle usage, speaking in full sentences, no cough  MSK: Appears to have normal range of motion based on visualized movements  Neurologic: No apparent tremors, facial movements symmetric  Psych: affect good, alert and oriented    The rest of a comprehensive physical examination is deferred due to PHE (public health emergency) video restrictions    Laboratory Data: recent labs reviewed, to be drawn next cycle    Assessment and Plan:  ONC  SCC lung, [id number], IIIA, R0 resection, discrepant PD-L1:   - Started adjuvant cisplatin (d1) and gemcitabine (d1, 8) IV every 3 weeks x 4 cycles 12/3/2020. She tolerated ex nausea and fatigue, and two delays due to neutropenia. Add neulasta with day 8 moving forward. Proceed with chemo 1/12 pending labs. I will add a visit with me prior to C3.  - Plan for surveillance strategy with CT chest w/contrast including the liver and adrenals (needs to be specified in order) every 3 months [ for year 1, every 3-4 months for year 2, every 6 months for year 3, then annual after that. First scan will be about 3-4 weeks after chemo completion. May need to adjust currently scheduled scan due to delays, will clarify with Dr. De Leon.     Addendum: keeps scans as scheduled. Dose reducing gemzar by 80% instead of adding neulasta. I called and updated Salima on 1/11/21.     Dina Sy, CNP on 1/11/2021 at 6:27 PM       Nausea: starts ~24-32 hours after Cis/[place] infusion and lasts about one week. Still felt the other day when she was due for D8 which she did not notice prior cycle. Nausea plan as follows, we can re-discuss prior to C3:    For D1 with Cis/[place]:  -Dex, Emend, Aloxi premed  -Dex PO 8 mg days 2-4  -Pepcid BID scheduled x7 days  -Add zyprexa with C3, 5 mg  at bedtime starting night of chemotherapy x1 week (talked about increasing to 7.5, then 10 if she cannot tell benefit from 5 mg)  -prn compazine and lorazepam starting anytime after chemotherapy  -prn zofran starting day 5 after chemotherapy     She had a EKG 12/22 with QTc 418. Will recheck next week in light of adding zyprexa    For D8 with [place]:  -Dex, emend premed  -prn pepcid  -prn zofran, compazine, and lorazepam anytime     Nutrition/poor appetite: 2/2 chemotherapy. Discussed small frequent meals. She will give Ensure a try. Offered nutrition consult but she declined. Weight overall stable    CARDS  Afib w/RVR, anticoagulation:  On rivaroxaban and amiodarone.     HTN: On lisinopril and norvasc. She is monitoring BP daily at home, runs 140-150. Recently increased norvasc to 10 mg    PULM  R pulmonary nodule: Monitor on routine scans for lung cancer    Baseline, mild shortness of breath on exertion and occasionally at night has improved!  No cough or chest pain. Aware to monitor for worsening.     Has some lingering discomfort/numbness near surgical site. Discussed this could be scar tissue or ongoing nerve healing which can take some time. I requested follow up with Janny Ruvalcaba last visit but this was not scheduled thus I messaged Janny today personally.     ENT  She has some nasal congestion that is sort of non-specific and chronic. No sinus tenderness or fever/chills with cause for infectious process. Sh is using saline rinse. Has taste and smell and no COVID exposure so will hold off on testing.   -Continue Flonase  -Try Claritin daily   -Recommended she follow up with PCP for further recs or we could consider ENT referral      Dina Sy CNP on 1/8/2021 at 4:43 PM

## 2021-01-08 NOTE — LETTER
January 8, 2021       TO: Ijeoma Shah  [address] [facility] [address]       DearMs.Pablo,    We are writing to inform you of your test results.    {[facility] results letter list:[id number]}    No results found from the In Basket message.    ***

## 2021-01-12 ENCOUNTER — INFUSION THERAPY VISIT (OUTPATIENT)
Dept: INFUSION THERAPY | Facility: CLINIC | Age: 71
End: 2021-01-12
Attending: INTERNAL MEDICINE
Payer: MEDICARE

## 2021-01-12 ENCOUNTER — HOSPITAL ENCOUNTER (OUTPATIENT)
Dept: CARDIOLOGY | Facility: CLINIC | Age: 71
End: 2021-01-12
Attending: PHYSICIAN ASSISTANT
Payer: MEDICARE

## 2021-01-12 VITALS
RESPIRATION RATE: 18 BRPM | HEART RATE: 71 BPM | WEIGHT: 172.6 LBS | BODY MASS INDEX: 28.72 KG/M2 | TEMPERATURE: 98.5 F | DIASTOLIC BLOOD PRESSURE: 82 MMHG | SYSTOLIC BLOOD PRESSURE: 164 MMHG | OXYGEN SATURATION: 100 %

## 2021-01-12 DIAGNOSIS — E83.42 HYPOMAGNESEMIA: ICD-10-CM

## 2021-01-12 DIAGNOSIS — C34.32 SQUAMOUS CELL CARCINOMA OF BRONCHUS IN LEFT LOWER LOBE (H): ICD-10-CM

## 2021-01-12 DIAGNOSIS — I48.0 PAROXYSMAL ATRIAL FIBRILLATION (H): Primary | ICD-10-CM

## 2021-01-12 DIAGNOSIS — D70.1 CHEMOTHERAPY-INDUCED NEUTROPENIA (H): ICD-10-CM

## 2021-01-12 DIAGNOSIS — T45.1X5A CHEMOTHERAPY-INDUCED NEUTROPENIA (H): ICD-10-CM

## 2021-01-12 DIAGNOSIS — E83.42 HYPOMAGNESEMIA: Primary | ICD-10-CM

## 2021-01-12 LAB
ALBUMIN SERPL-MCNC: 3.5 G/DL (ref 3.4–5)
ALP SERPL-CCNC: 81 U/L (ref 40–150)
ALT SERPL W P-5'-P-CCNC: 25 U/L (ref 0–50)
ANION GAP SERPL CALCULATED.3IONS-SCNC: 5 MMOL/L (ref 3–14)
AST SERPL W P-5'-P-CCNC: 12 U/L (ref 0–45)
BASOPHILS # BLD AUTO: 0 10E9/L (ref 0–0.2)
BASOPHILS NFR BLD AUTO: 0.2 %
BILIRUB SERPL-MCNC: 0.2 MG/DL (ref 0.2–1.3)
BUN SERPL-MCNC: 15 MG/DL (ref 7–30)
CALCIUM SERPL-MCNC: 9 MG/DL (ref 8.5–10.1)
CHLORIDE SERPL-SCNC: 105 MMOL/L (ref 94–109)
CO2 SERPL-SCNC: 28 MMOL/L (ref 20–32)
CREAT SERPL-MCNC: 0.84 MG/DL (ref 0.52–1.04)
DIFFERENTIAL METHOD BLD: ABNORMAL
EOSINOPHIL NFR BLD AUTO: 0.5 %
ERYTHROCYTE [DISTWIDTH] IN BLOOD BY AUTOMATED COUNT: 15.9 % (ref 10–15)
GFR SERPL CREATININE-BSD FRML MDRD: 71 ML/MIN/{1.73_M2}
GLUCOSE SERPL-MCNC: 136 MG/DL (ref 70–99)
HCT VFR BLD AUTO: 34.8 % (ref 35–47)
HGB BLD-MCNC: 11.5 G/DL (ref 11.7–15.7)
IMM GRANULOCYTES # BLD: 0 10E9/L (ref 0–0.4)
IMM GRANULOCYTES NFR BLD: 0.7 %
LYMPHOCYTES # BLD AUTO: 1 10E9/L (ref 0.8–5.3)
LYMPHOCYTES NFR BLD AUTO: 16.8 %
MAGNESIUM SERPL-MCNC: 1.9 MG/DL (ref 1.6–2.3)
MCH RBC QN AUTO: 28.3 PG (ref 26.5–33)
MCHC RBC AUTO-ENTMCNC: 33 G/DL (ref 31.5–36.5)
MCV RBC AUTO: 86 FL (ref 78–100)
MONOCYTES # BLD AUTO: 0.5 10E9/L (ref 0–1.3)
MONOCYTES NFR BLD AUTO: 8.9 %
NEUTROPHILS # BLD AUTO: 4.3 10E9/L (ref 1.6–8.3)
NEUTROPHILS NFR BLD AUTO: 72.9 %
NRBC # BLD AUTO: 0 10*3/UL
NRBC BLD AUTO-RTO: 0 /100
PLATELET # BLD AUTO: 143 10E9/L (ref 150–450)
POTASSIUM SERPL-SCNC: 4.2 MMOL/L (ref 3.4–5.3)
PROT SERPL-MCNC: 6.8 G/DL (ref 6.8–8.8)
RBC # BLD AUTO: 4.06 10E12/L (ref 3.8–5.2)
SODIUM SERPL-SCNC: 138 MMOL/L (ref 133–144)
WBC # BLD AUTO: 5.9 10E9/L (ref 4–11)

## 2021-01-12 PROCEDURE — 250N000011 HC RX IP 250 OP 636: Performed by: INTERNAL MEDICINE

## 2021-01-12 PROCEDURE — 250N000009 HC RX 250: Performed by: INTERNAL MEDICINE

## 2021-01-12 PROCEDURE — 96413 CHEMO IV INFUSION 1 HR: CPT

## 2021-01-12 PROCEDURE — 80053 COMPREHEN METABOLIC PANEL: CPT | Performed by: INTERNAL MEDICINE

## 2021-01-12 PROCEDURE — 85025 COMPLETE CBC W/AUTO DIFF WBC: CPT | Performed by: INTERNAL MEDICINE

## 2021-01-12 PROCEDURE — 96367 TX/PROPH/DG ADDL SEQ IV INF: CPT

## 2021-01-12 PROCEDURE — 96375 TX/PRO/DX INJ NEW DRUG ADDON: CPT

## 2021-01-12 PROCEDURE — 36415 COLL VENOUS BLD VENIPUNCTURE: CPT | Performed by: INTERNAL MEDICINE

## 2021-01-12 PROCEDURE — 93005 ELECTROCARDIOGRAM TRACING: CPT | Performed by: REHABILITATION PRACTITIONER

## 2021-01-12 PROCEDURE — 258N000003 HC RX IP 258 OP 636: Performed by: INTERNAL MEDICINE

## 2021-01-12 PROCEDURE — 93010 ELECTROCARDIOGRAM REPORT: CPT | Performed by: NURSE PRACTITIONER

## 2021-01-12 PROCEDURE — 93005 ELECTROCARDIOGRAM TRACING: CPT

## 2021-01-12 PROCEDURE — 83735 ASSAY OF MAGNESIUM: CPT | Performed by: INTERNAL MEDICINE

## 2021-01-12 RX ADMIN — FAMOTIDINE 20 MG: 10 INJECTION INTRAVENOUS at 08:56

## 2021-01-12 RX ADMIN — DEXAMETHASONE SODIUM PHOSPHATE 12 MG: 10 INJECTION, SOLUTION INTRAMUSCULAR; INTRAVENOUS at 09:01

## 2021-01-12 RX ADMIN — GEMCITABINE 2000 MG: 38 INJECTION, SOLUTION INTRAVENOUS at 10:00

## 2021-01-12 RX ADMIN — FOSAPREPITANT 150 MG: 150 INJECTION, POWDER, LYOPHILIZED, FOR SOLUTION INTRAVENOUS at 09:22

## 2021-01-12 RX ADMIN — SODIUM CHLORIDE 250 ML: 9 INJECTION, SOLUTION INTRAVENOUS at 08:56

## 2021-01-12 ASSESSMENT — PAIN SCALES - GENERAL: PAINLEVEL: MILD PAIN (2)

## 2021-01-12 NOTE — PROGRESS NOTES
Infusion Nursing Note:  Ijeoma Shah presents today for C2D8 Gemzar.    Patient seen by provider today: No   present during visit today: Not Applicable.    Note: Patient states she has a mild upset stomach this morning. Nasal congestion at times;not new. No other new or concerning symptoms. B/P 150/84, HR 79. Afebrile.    Stomach started feeling better during infusion. Rate of Gemzar adjusted again today due to vein irritation.. See MAR. Patient tolerated well with at this rate.    Intravenous Access:  Peripheral IV placed.    Treatment Conditions:  Lab Results   Component Value Date    HGB 11.5 01/12/2021     Lab Results   Component Value Date    WBC 5.9 01/12/2021      Lab Results   Component Value Date    ANEU 4.3 01/12/2021     Lab Results   Component Value Date     01/12/2021      Lab Results   Component Value Date     01/12/2021                   Lab Results   Component Value Date    POTASSIUM 4.2 01/12/2021           Lab Results   Component Value Date    MAG 1.9 01/12/2021            Lab Results   Component Value Date    CR 0.84 01/12/2021                   Lab Results   Component Value Date    SABINO 9.0 01/12/2021                Lab Results   Component Value Date    BILITOTAL 0.2 01/12/2021           Lab Results   Component Value Date    ALBUMIN 3.5 01/12/2021                    Lab Results   Component Value Date    ALT 25 01/12/2021           Lab Results   Component Value Date    AST 12 01/12/2021       Results reviewed, labs MET treatment parameters, ok to proceed with treatment.      Post Infusion Assessment:  Patient tolerated infusion without incident.  Blood return noted pre and post infusion.  Site patent and intact, free from redness, edema or discomfort.  No evidence of extravasations.  PIV access discontinued per protocol.       Discharge Plan:   Copy of AVS reviewed with patient and/or family.  Patient will return 1/26 for next appointment.  Patient discharged in  stable condition accompanied by: self.  Departure Mode: Ambulatory.    Glenny Farmre RN

## 2021-01-13 ENCOUNTER — VIRTUAL VISIT (OUTPATIENT)
Dept: SURGERY | Facility: CLINIC | Age: 71
End: 2021-01-13
Attending: CLINICAL NURSE SPECIALIST
Payer: MEDICARE

## 2021-01-13 DIAGNOSIS — G89.18 POST-OP PAIN: ICD-10-CM

## 2021-01-13 DIAGNOSIS — C34.32 MALIGNANT NEOPLASM OF LOWER LOBE OF LEFT LUNG (H): Primary | ICD-10-CM

## 2021-01-13 PROCEDURE — 99024 POSTOP FOLLOW-UP VISIT: CPT | Mod: 95 | Performed by: CLINICAL NURSE SPECIALIST

## 2021-01-13 PROCEDURE — 999N001193 HC VIDEO/TELEPHONE VISIT; NO CHARGE

## 2021-01-13 NOTE — LETTER
"    1/13/2021         RE: Ijeoma Shah  [address] [facility] [address]        Dear Colleague,    Thank you for referring your patient, Ijeoma Shah, to the [facility]. Please see a copy of my visit note below.    Salima is a 70 year old who is being evaluated via a billable telephone visit.      What phone number would you like to be contacted at? [phone]  How would you like to obtain your AVS? MyChart     Vitals - Patient Reported  Weight (Patient Reported): 78 kg (172 lb)  Height (Patient Reported): 165.1 cm (5' 5\")  BMI (Based on Pt Reported Ht/Wt): 28.62  Pain Score: Mild Pain (2)  Pain Loc: (PATIENT REPORTS PAIN IN LEFT BREAST/RIB AREA)      I have reviewed and updated patient's allergy and medication list.    Concerns: NONE  Refills: NONE      Kaykay Nash [facility]    THORACIC SURGERY FOLLOW UP VISIT    Dear Dr. De Leon,  I saw Ms. Shah in follow-up today. The clinical summary follows:     PREOP DIAGNOSIS   Left lower lobe nodule    PROCEDURE   1. TEMLA  2. Left VATS, conversion to thoracotomy  3. Left lower lobectom  4. Mediastinal lymph node dissection  5. Pulmonary artery repair    DATE OF PROCEDURE  10/22/2020    HISTOPATHOLOGY  T4 N0  FINAL DIAGNOSIS:   A. LYMPH NODE, 4R, EXCISION:   - No malignancy identified in five lymph node fragments     B. LYMPH NODE, 4L, EXCISION:   - No malignancy identified in three lymph node fragments     C. LYMPH NODE, LEVEL 7, EXCISION:   - No malignancy identified in six lymph node fragments     D. LYMPH NODE, 9L, EXCISION:   - One lymph node, negative for malignancy (0/1)     E. LYMPH NODE, 11L, EXCISION:   - No malignancy identified in three lymph node fragments     F. 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 LYMPH NODES, LEVEL 5, EXCISION:   - No malignancy identified in seven lymph node fragments     COMPLICATIONS  None    INTERVAL STUDIES  None    SUBJECTIVE   I continue to have a pain under my breast and around to my back, a little better but it is constant.    I can't wear a bra.   I am sleeping fair, finally able to lie on my left side.    From a personal perspective, she is receiving chemotherapy, needed to have a break in scheduling recently due to blood counts.    IMPRESSION (C34.32) Malignant neoplasm of lower lobe of left lung (H)  (primary encounter diagnosis)  Salima is a 70 year old female who is nearly 3 months post-op from a lobectomy via a thoracotomy incision.   She is doing fairly well, overall, is undergoing chemotherapy right now.    She has had persistent pain under left breast and radiating to back.   She denies cough, fever or shortness of breath.   The pain does not impact her ability to sleep, and she is able to now lie on her left side.   She is not taking any analgesics.       We discussed neuropathic pain and methods to decrease this with gabapentin, heating pad, NSAIDS and/or lidocaine patches.   She is reluctant to add more medications (\"I take enough already\").   She has Icy Hot and may try that.   I offered her a referral to a pain management clinic/provider but she declined this for now.          I advised her to let me know if she changes her mind or if this pain doesn't lessen significantly over the next month or two.  She verbalized agreement and appreciated my call.    PLAN  I spent a total of 30 minutes with Ms. Ijeoma Shah, more than 50% of which were spent in counseling, coordination of care, and face-to-face time. I reviewed the plan as follows:  Call with any persistent pain or concerns  All questions were answered and the patient and present family were in " agreement with the plan.  I appreciate the opportunity to participate in the care of your patient and will keep you updated.  Sincerely,  RUDY Garza, CNS          Again, thank you for allowing me to participate in the care of your patient.        Sincerely,        RUDY Abdullahi CNS

## 2021-01-13 NOTE — PROGRESS NOTES
"Salima is a 70 year old who is being evaluated via a billable telephone visit.      What phone number would you like to be contacted at? [phone]  How would you like to obtain your AVS? Mt     Vitals - Patient Reported  Weight (Patient Reported): 78 kg (172 lb)  Height (Patient Reported): 165.1 cm (5' 5\")  BMI (Based on Pt Reported Ht/Wt): 28.62  Pain Score: Mild Pain (2)  Pain Loc: (PATIENT REPORTS PAIN IN LEFT BREAST/RIB AREA)      I have reviewed and updated patient's allergy and medication list.    Concerns: NONE  Refills: NONE      Kaykay Nash [facility]    THORACIC SURGERY FOLLOW UP VISIT    Dear Dr. De Leon,  I saw Ms. Shah in follow-up today. The clinical summary follows:     PREOP DIAGNOSIS   Left lower lobe nodule    PROCEDURE   1. TEMLA  2. Left VATS, conversion to thoracotomy  3. Left lower lobectom  4. Mediastinal lymph node dissection  5. Pulmonary artery repair    DATE OF PROCEDURE  10/22/2020    HISTOPATHOLOGY  T4 N0  FINAL DIAGNOSIS:   A. LYMPH NODE, 4R, EXCISION:   - No malignancy identified in five lymph node fragments     B. LYMPH NODE, 4L, EXCISION:   - No malignancy identified in three lymph node fragments     C. LYMPH NODE, LEVEL 7, EXCISION:   - No malignancy identified in six lymph node fragments     D. LYMPH NODE, 9L, EXCISION:   - One lymph node, negative for malignancy (0/1)     E. LYMPH NODE, 11L, EXCISION:   - No malignancy identified in three lymph node fragments     F. 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 LYMPH NODES, LEVEL 5, EXCISION:   - No malignancy identified in seven lymph node fragments     COMPLICATIONS  None    INTERVAL STUDIES  None    SUBJECTIVE   I continue to " "have a pain under my breast and around to my back, a little better but it is constant.    I can't wear a bra.   I am sleeping fair, finally able to lie on my left side.    From a personal perspective, she is receiving chemotherapy, needed to have a break in scheduling recently due to blood counts.    IMPRESSION (C34.32) Malignant neoplasm of lower lobe of left lung (H)  (primary encounter diagnosis)  Salima is a 70 year old female who is nearly 3 months post-op from a lobectomy via a thoracotomy incision.   She is doing fairly well, overall, is undergoing chemotherapy right now.    She has had persistent pain under left breast and radiating to back.   She denies cough, fever or shortness of breath.   The pain does not impact her ability to sleep, and she is able to now lie on her left side.   She is not taking any analgesics.       We discussed neuropathic pain and methods to decrease this with gabapentin, heating pad, NSAIDS and/or lidocaine patches.   She is reluctant to add more medications (\"I take enough already\").   She has Icy Hot and may try that.   I offered her a referral to a pain management clinic/provider but she declined this for now.          I advised her to let me know if she changes her mind or if this pain doesn't lessen significantly over the next month or two.  She verbalized agreement and appreciated my call.    PLAN  I spent a total of 30 minutes with Ms. Ijeoma Shah, more than 50% of which were spent in counseling, coordination of care, and face-to-face time. I reviewed the plan as follows:  Call with any persistent pain or concerns  All questions were answered and the patient and present family were in agreement with the plan.  I appreciate the opportunity to participate in the care of your patient and will keep you updated.  Sincerely,  RUDY Garza, CNS      "

## 2021-01-15 ENCOUNTER — TELEPHONE (OUTPATIENT)
Dept: FAMILY MEDICINE | Facility: CLINIC | Age: 71
End: 2021-01-15

## 2021-01-15 NOTE — TELEPHONE ENCOUNTER
[facility] utilizes an encounter to take the place of a direct phone call to your office. Please take a moment to review the below request. Please reply or route message to author of this encounter.  Message will act as a verbal OK of orders requested below. Thank you.    ORDER    Skilled Nursing 1 x a week for 2 weeks with 1 PRN    Ongoing assessment of BP.  Client BP today prior to taking medications is 149/98.  She will start to set an alarm on her phone to check BP approx 2 hours after taking AM medications.     Verónica Ruelas RN Case Manager  [phone]  [email or web address]

## 2021-01-25 ENCOUNTER — VIRTUAL VISIT (OUTPATIENT)
Dept: ONCOLOGY | Facility: CLINIC | Age: 71
End: 2021-01-25
Attending: INTERNAL MEDICINE
Payer: MEDICARE

## 2021-01-25 DIAGNOSIS — C34.92 SQUAMOUS CELL CARCINOMA OF LEFT LUNG (H): ICD-10-CM

## 2021-01-25 PROCEDURE — 999N001193 HC VIDEO/TELEPHONE VISIT; NO CHARGE

## 2021-01-25 PROCEDURE — 99214 OFFICE O/P EST MOD 30 MIN: CPT | Mod: 95 | Performed by: NURSE PRACTITIONER

## 2021-01-25 RX ORDER — DEXAMETHASONE 4 MG/1
8 TABLET ORAL
Qty: 6 TABLET | Refills: 0 | Status: SHIPPED | OUTPATIENT
Start: 2021-01-25 | End: 2021-03-18

## 2021-01-25 NOTE — LETTER
"1/25/2021      RE: Ijeoma CARLITA Pablo  [address] [facility] [address]       Salima is a 70 year old who is being evaluated via a billable video visit.      How would you like to obtain your AVS? MyChart     If the video visit is dropped, the invitation should be resent by: Text to cell phone: [phone]     Will anyone else be joining your video visit? No         Vitals - Patient Reported  Weight (Patient Reported): 78 kg (172 lb)  Height (Patient Reported): 165.1 cm (5' 5\")  BMI (Based on Pt Reported Ht/Wt): 28.62  Pain Score: Mild Pain (2)  Pain Loc: Other - see comment(UNDER LEFT BREAST)    Maikol Jasso LPN      Video-Visit Details    Type of service:  Video Visit    Video Start Time: 2: 38 PM    Video End Time:3:08 PM    Originating Location (pt. Location): home    Distant Location (provider location):  [facility]     Platform used for Video Visit:[facility]    Oncology/Hematology Visit Note  Jan 25, 2021    Reason for Visit: Follow up of SCC lung, poorly differentiated    History of Present Illness:   7/3/20                 CT chest (screening). 6.7 cm LLL mass, 0.6 cm RML nodule, mediastinal and L hilar LNs  7/9/20                PET/CT. 7.1 x 5.6 cm LLL mass (SUV 19.6), post obstructive pneumonitis LLL inferior to the mass (SUV 2.8), 3.5 x 1.7 cm 4L node (SUV 5.9), L adrenal nodule 1.1 cm (SUV 4), indeterminate pulmonary nodules, pancreatic cysts, not hypermetabolic  7/27/20              Bronch, EBUS (Dr. Castro). 10R, 11R, 4R too small to biopsy. Stations 7, 4L, 11L negative for malignancy. LLL mass bx: SCC  8/12/20              Brain MRI negative  9/1/20                EUS to evaluate adrenal and 4L (Dr. Hogan). Both negative for malignancy. Adrenal with bland adrenal cells  10/22/20            L VATS, converted to thoracotomy, LLL lobectomy, MLND, R pulmonary arterioplasty (Dr. Sanabria, Dr. Davis). 8.3 cm poorly differentiated SCC, +angiolymphatic " invasion  12/03/20 C1D1 Cisplatin & Gemzar    Interval History:  -Feeling blah. Going for walks once in a while, 2-3 times per week. Doing things around the house too.   -Has some dyspnea and tiring with moving, better compared to 6 weeks ago  -Still congested, tried Claritin, doing sinus wash. Not a new issue but also not getting better  -No racing heart or palpitations  -130/87 BP, which is better.   -No fever or chills  -Took Zofran every 8 hours for 4 or 5 days after gemzar and that really helped  -Stool softener if no BM for a couple days but she needed this only once   -Eating enough, appetite ok. weight stable.   -No chest pain or racing heart  -No swelling of arms or legs    -Denies fever or chills.     10 point review of systems otherwise negative    Current Outpatient Medications   Medication Sig Dispense Refill     acetaminophen (TYLENOL) 325 MG tablet Take 1-2 tablets (325-650 mg) by mouth every 6 hours as needed for mild pain 60 tablet 0     amiodarone (PACERONE) 200 MG tablet Take 1 tablet (200 mg) by mouth daily 90 tablet 1     amiodarone (PACERONE) 400 MG tablet        amLODIPine (NORVASC) 5 MG tablet Take 2 tablets (10 mg) by mouth daily 30 tablet 0     dexamethasone (DECADRON) 4 MG tablet Take 2 tablets (8 mg) by mouth daily (with breakfast) for 3 days Start taking daily the day after chemotherapy for 3 days 6 tablet 0     famotidine (PEPCID) 20 MG tablet Take 1 tablet (20 mg) by mouth 2 times daily (Patient taking differently: Take 20 mg by mouth 2 times daily as needed ) 60 tablet 1     fluticasone (FLONASE) 50 MCG/ACT nasal spray Spray 1 spray into both nostrils daily 9.9 mL 1     ketoconazole (NIZORAL) 2 % external cream Apply topically daily 60 g 1     lisinopril (ZESTRIL) 20 MG tablet Take 1 tablet (20 mg) by mouth daily 30 tablet 11     loratadine (CLARITIN) 10 MG tablet Take 1 tablet (10 mg) by mouth daily 30 tablet 1     LORazepam (ATIVAN) 0.5 MG tablet Take 1 tablet (0.5 mg) by mouth  every 4 hours as needed (Nausea/Vomiting) 30 tablet 0     OLANZapine (ZYPREXA) 5 MG tablet Take 1 tablet (5 mg) by mouth At Bedtime Starting night of chemotherapy x7 days. 30 tablet 0     ondansetron (ZOFRAN) 8 MG tablet Take 1 tablet (8 mg) by mouth every 8 hours as needed for nausea 30 tablet 1     oxyCODONE (ROXICODONE) 5 MG tablet Take 1-2 tablets (5-10 mg) by mouth every 4 hours as needed for moderate to severe pain (Patient not taking: Reported on 12/9/2020) 30 tablet 0     prochlorperazine (COMPAZINE) 10 MG tablet Take 0.5 tablets (5 mg) by mouth every 6 hours as needed (Nausea/Vomiting) (Patient not taking: Reported on 12/29/2020) 30 tablet 11     rivaroxaban ANTICOAGULANT (XARELTO) 20 MG TABS tablet Take 1 tablet (20 mg) by mouth daily (with dinner) 90 tablet 3     triamcinolone (KENALOG) 0.1 % external ointment APPLY  OINTMENT TOPICALLY TWICE DAILY OR  AS  NEEDED 45 g 0     Physical Examination:  There were no vitals taken for this visit.  Wt Readings from Last 10 Encounters:   01/12/21 78.3 kg (172 lb 9.6 oz)   01/05/21 77.5 kg (170 lb 12.8 oz)   12/29/20 78.6 kg (173 lb 4.8 oz)   12/22/20 77.8 kg (171 lb 8 oz)   12/10/20 77 kg (169 lb 11.2 oz)   12/03/20 80.5 kg (177 lb 6.4 oz)   10/28/20 82.3 kg (181 lb 7 oz)   10/06/20 81.4 kg (179 lb 6.4 oz)   10/01/20 81.6 kg (179 lb 14.3 oz)   09/29/20 79.4 kg (175 lb)     Video physical exam  General: Patient appears well in no acute distress. Normal body habitus.  Skin: No visualized rash or lesions on visualized skin  Eyes: EOMI, no erythema, sclera icterus or discharge noted  Resp: Appears to be breathing comfortably without accessory muscle usage, speaking in full sentences, no cough  MSK: Appears to have normal range of motion based on visualized movements  Neurologic: No apparent tremors, facial movements symmetric  Psych: affect good, alert and oriented    The rest of a comprehensive physical examination is deferred due to PHE (public health emergency) video  restrictions    Laboratory Data: recent labs reviewed, to be drawn next cycle    Assessment and Plan:  ONC  SCC lung, [id number], IIIA, R0 resection, discrepant PD-L1:   - Started adjuvant cisplatin (d1) and gemcitabine (d1, 8) IV every 3 weeks x 4 cycles 12/3/2020. She tolerated ex nausea and fatigue, and two delays due to neutropenia. Add neulasta with day 8 next two cycles, she will take tylenol prn, declined claritin. Back to full dose Gemzar moving forward after discussion with Dr. De Leon. Proceed with chemo tomorrow pending labs. Seeing Dr. De Leon prior to next (last) cycle with CT--clarified timing okay with her.   - Plan for surveillance strategy with CT chest w/contrast including the liver and adrenals (needs to be specified in order) every 3 months [ for year 1, every 3-4 months for year 2, every 6 months for year 3, then annual after that. First surveillance scan will be about 3-4 weeks after chemo completion.    Nausea: starts ~24-32 hours after Cis/McLennan infusion and lasts about one week. Still felt the other day when she was due for D8 which she did not notice prior cycle. Nausea plan as follows, we can re-discuss prior to C3:    For D1 with Cis/McLennan:  -Dex, Emend, Aloxi premed  -Dex PO 8 mg days 2-4  -Pepcid BID scheduled x 7 days  -Add zyprexa with C3, 5 mg at bedtime starting night of chemotherapy x1 week (talked about increasing to 7.5, then 10 if she cannot tell benefit from 5 mg)  -prn compazine and lorazepam starting anytime after chemotherapy  -prn zofran starting day 4 after chemotherapy     She had a EKG at last infusion with ok QT.     For D8 with McLennan:  -Dex, emend premed  -prn pepcid  -prn zofran, compazine, and lorazepam anytime     Nutrition/poor appetite: 2/2 chemotherapy. Discussed small frequent meals. She will give Ensure a try. Offered nutrition consult but she declined. Weight overall stable    CARDS  Afib w/RVR, anticoagulation:  On rivaroxaban and amiodarone.     HTN: On lisinopril  and norvasc. She is monitoring BP daily at home, which have been better. Now 130's instead of 140-150's.     PULM  R pulmonary nodule: Monitor on routine scans for lung cancer    Baseline, mild shortness of breath on exertion and occasionally at night overall better since treatment.  No cough or chest pain. Aware to monitor for worsening.     Has some lingering discomfort/numbness near surgical site. Had follow up with Janny Ruvalcaba at my request, see her note from 1/13/20.    ENT  She has some nasal congestion that is sort of non-specific and chronic. No sinus tenderness or fever/chills with cause for infectious process. She is using saline rinse. Has taste and smell and no COVID exposure so will hold off on testing.   -Claritin and Flonase were unsuccessful.   -Recommended she follow up with PCP if worsens otherwise she is interested in ENT eval after chemo done. I will request today so we can get on the books.     Prep: 10 minutes  Visit: 30 minutes  Care coordination and documentation: 20 minutes    Dina Sy CNP on 1/8/2021 at 4:43 PM

## 2021-01-25 NOTE — PROGRESS NOTES
"Salima is a 70 year old who is being evaluated via a billable video visit.      How would you like to obtain your AVS? MyChart     If the video visit is dropped, the invitation should be resent by: Text to cell phone: [phone]     Will anyone else be joining your video visit? No         Vitals - Patient Reported  Weight (Patient Reported): 78 kg (172 lb)  Height (Patient Reported): 165.1 cm (5' 5\")  BMI (Based on Pt Reported Ht/Wt): 28.62  Pain Score: Mild Pain (2)  Pain Loc: Other - see comment(UNDER LEFT BREAST)    Maikol Jasso LPN      Video-Visit Details    Type of service:  Video Visit    Video Start Time: 2: 38 PM    Video End Time:3:08 PM    Originating Location (pt. Location): home    Distant Location (provider location):  [facility]     Platform used for Video Visit:[facility]    Oncology/Hematology Visit Note  Jan 25, 2021    Reason for Visit: Follow up of SCC lung, poorly differentiated    History of Present Illness:   7/3/20                 CT chest (screening). 6.7 cm LLL mass, 0.6 cm RML nodule, mediastinal and L hilar LNs  7/9/20                PET/CT. 7.1 x 5.6 cm LLL mass (SUV 19.6), post obstructive pneumonitis LLL inferior to the mass (SUV 2.8), 3.5 x 1.7 cm 4L node (SUV 5.9), L adrenal nodule 1.1 cm (SUV 4), indeterminate pulmonary nodules, pancreatic cysts, not hypermetabolic  7/27/20              Bronch, EBUS (Dr. Castro). 10R, 11R, 4R too small to biopsy. Stations 7, 4L, 11L negative for malignancy. LLL mass bx: SCC  8/12/20              Brain MRI negative  9/1/20                EUS to evaluate adrenal and 4L (Dr. Hogan). Both negative for malignancy. Adrenal with bland adrenal cells  10/22/20            L VATS, converted to thoracotomy, LLL lobectomy, MLND, R pulmonary arterioplasty (Dr. Sanabria, Dr. Davis). 8.3 cm poorly differentiated SCC, +angiolymphatic invasion  12/03/20 C1D1 Cisplatin & Gemzar    Interval History:  -Feeling blah. Going for walks " once in a while, 2-3 times per week. Doing things around the house too.   -Has some dyspnea and tiring with moving, better compared to 6 weeks ago  -Still congested, tried Claritin, doing sinus wash. Not a new issue but also not getting better  -No racing heart or palpitations  -130/87 BP, which is better.   -No fever or chills  -Took Zofran every 8 hours for 4 or 5 days after gemzar and that really helped  -Stool softener if no BM for a couple days but she needed this only once   -Eating enough, appetite ok. weight stable.   -No chest pain or racing heart  -No swelling of arms or legs    -Denies fever or chills.     10 point review of systems otherwise negative    Current Outpatient Medications   Medication Sig Dispense Refill     acetaminophen (TYLENOL) 325 MG tablet Take 1-2 tablets (325-650 mg) by mouth every 6 hours as needed for mild pain 60 tablet 0     amiodarone (PACERONE) 200 MG tablet Take 1 tablet (200 mg) by mouth daily 90 tablet 1     amiodarone (PACERONE) 400 MG tablet        amLODIPine (NORVASC) 5 MG tablet Take 2 tablets (10 mg) by mouth daily 30 tablet 0     dexamethasone (DECADRON) 4 MG tablet Take 2 tablets (8 mg) by mouth daily (with breakfast) for 3 days Start taking daily the day after chemotherapy for 3 days 6 tablet 0     famotidine (PEPCID) 20 MG tablet Take 1 tablet (20 mg) by mouth 2 times daily (Patient taking differently: Take 20 mg by mouth 2 times daily as needed ) 60 tablet 1     fluticasone (FLONASE) 50 MCG/ACT nasal spray Spray 1 spray into both nostrils daily 9.9 mL 1     ketoconazole (NIZORAL) 2 % external cream Apply topically daily 60 g 1     lisinopril (ZESTRIL) 20 MG tablet Take 1 tablet (20 mg) by mouth daily 30 tablet 11     loratadine (CLARITIN) 10 MG tablet Take 1 tablet (10 mg) by mouth daily 30 tablet 1     LORazepam (ATIVAN) 0.5 MG tablet Take 1 tablet (0.5 mg) by mouth every 4 hours as needed (Nausea/Vomiting) 30 tablet 0     OLANZapine (ZYPREXA) 5 MG tablet Take 1  tablet (5 mg) by mouth At Bedtime Starting night of chemotherapy x7 days. 30 tablet 0     ondansetron (ZOFRAN) 8 MG tablet Take 1 tablet (8 mg) by mouth every 8 hours as needed for nausea 30 tablet 1     oxyCODONE (ROXICODONE) 5 MG tablet Take 1-2 tablets (5-10 mg) by mouth every 4 hours as needed for moderate to severe pain (Patient not taking: Reported on 12/9/2020) 30 tablet 0     prochlorperazine (COMPAZINE) 10 MG tablet Take 0.5 tablets (5 mg) by mouth every 6 hours as needed (Nausea/Vomiting) (Patient not taking: Reported on 12/29/2020) 30 tablet 11     rivaroxaban ANTICOAGULANT (XARELTO) 20 MG TABS tablet Take 1 tablet (20 mg) by mouth daily (with dinner) 90 tablet 3     triamcinolone (KENALOG) 0.1 % external ointment APPLY  OINTMENT TOPICALLY TWICE DAILY OR  AS  NEEDED 45 g 0     Physical Examination:  There were no vitals taken for this visit.  Wt Readings from Last 10 Encounters:   01/12/21 78.3 kg (172 lb 9.6 oz)   01/05/21 77.5 kg (170 lb 12.8 oz)   12/29/20 78.6 kg (173 lb 4.8 oz)   12/22/20 77.8 kg (171 lb 8 oz)   12/10/20 77 kg (169 lb 11.2 oz)   12/03/20 80.5 kg (177 lb 6.4 oz)   10/28/20 82.3 kg (181 lb 7 oz)   10/06/20 81.4 kg (179 lb 6.4 oz)   10/01/20 81.6 kg (179 lb 14.3 oz)   09/29/20 79.4 kg (175 lb)     Video physical exam  General: Patient appears well in no acute distress. Normal body habitus.  Skin: No visualized rash or lesions on visualized skin  Eyes: EOMI, no erythema, sclera icterus or discharge noted  Resp: Appears to be breathing comfortably without accessory muscle usage, speaking in full sentences, no cough  MSK: Appears to have normal range of motion based on visualized movements  Neurologic: No apparent tremors, facial movements symmetric  Psych: affect good, alert and oriented    The rest of a comprehensive physical examination is deferred due to PHE (public health emergency) video restrictions    Laboratory Data: recent labs reviewed, to be drawn next cycle    Assessment and  Plan:  ONC  SCC lung, [id number], IIIA, R0 resection, discrepant PD-L1:   - Started adjuvant cisplatin (d1) and gemcitabine (d1, 8) IV every 3 weeks x 4 cycles 12/3/2020. She tolerated ex nausea and fatigue, and two delays due to neutropenia. Add neulasta with day 8 next two cycles, she will take tylenol prn, declined claritin. Back to full dose Gemzar moving forward after discussion with Dr. De Leon. Proceed with chemo tomorrow pending labs. Seeing Dr. De Leon prior to next (last) cycle with CT--clarified timing okay with her.   - Plan for surveillance strategy with CT chest w/contrast including the liver and adrenals (needs to be specified in order) every 3 months [ for year 1, every 3-4 months for year 2, every 6 months for year 3, then annual after that. First surveillance scan will be about 3-4 weeks after chemo completion.    Nausea: starts ~24-32 hours after Cis/McCreary infusion and lasts about one week. Still felt the other day when she was due for D8 which she did not notice prior cycle. Nausea plan as follows, we can re-discuss prior to C3:    For D1 with Cis/McCreary:  -Dex, Emend, Aloxi premed  -Dex PO 8 mg days 2-4  -Pepcid BID scheduled x 7 days  -Add zyprexa with C3, 5 mg at bedtime starting night of chemotherapy x1 week (talked about increasing to 7.5, then 10 if she cannot tell benefit from 5 mg)  -prn compazine and lorazepam starting anytime after chemotherapy  -prn zofran starting day 4 after chemotherapy     She had a EKG at last infusion with ok QT.     For D8 with McCreary:  -Dex, emend premed  -prn pepcid  -prn zofran, compazine, and lorazepam anytime     Nutrition/poor appetite: 2/2 chemotherapy. Discussed small frequent meals. She will give Ensure a try. Offered nutrition consult but she declined. Weight overall stable    CARDS  Afib w/RVR, anticoagulation:  On rivaroxaban and amiodarone.     HTN: On lisinopril and norvasc. She is monitoring BP daily at home, which have been better. Now 130's instead of  140-150's.     PULM  R pulmonary nodule: Monitor on routine scans for lung cancer    Baseline, mild shortness of breath on exertion and occasionally at night overall better since treatment.  No cough or chest pain. Aware to monitor for worsening.     Has some lingering discomfort/numbness near surgical site. Had follow up with Janny Ruvalcaba at my request, see her note from 1/13/20.    ENT  She has some nasal congestion that is sort of non-specific and chronic. No sinus tenderness or fever/chills with cause for infectious process. She is using saline rinse. Has taste and smell and no COVID exposure so will hold off on testing.   -Claritin and Flonase were unsuccessful.   -Recommended she follow up with PCP if worsens otherwise she is interested in ENT eval after chemo done. I will request today so we can get on the books.     Prep: 10 minutes  Visit: 30 minutes  Care coordination and documentation: 20 minutes    Dina Sy CNP on 1/8/2021 at 4:43 PM

## 2021-01-25 NOTE — LETTER
January 25, 2021       TO: Ijeoma Shah  [address] [facility] [address]       DearMs.Pablo,    We are writing to inform you of your test results.    {[facility] results letter list:750312}    No results found from the In Basket message.    ***

## 2021-01-26 ENCOUNTER — INFUSION THERAPY VISIT (OUTPATIENT)
Dept: INFUSION THERAPY | Facility: CLINIC | Age: 71
End: 2021-01-26
Attending: INTERNAL MEDICINE
Payer: MEDICARE

## 2021-01-26 VITALS
SYSTOLIC BLOOD PRESSURE: 145 MMHG | HEIGHT: 65 IN | HEART RATE: 83 BPM | WEIGHT: 170.3 LBS | RESPIRATION RATE: 16 BRPM | DIASTOLIC BLOOD PRESSURE: 76 MMHG | TEMPERATURE: 98.1 F | BODY MASS INDEX: 28.37 KG/M2 | OXYGEN SATURATION: 100 %

## 2021-01-26 DIAGNOSIS — T45.1X5A CHEMOTHERAPY-INDUCED NEUTROPENIA (H): ICD-10-CM

## 2021-01-26 DIAGNOSIS — E83.42 HYPOMAGNESEMIA: ICD-10-CM

## 2021-01-26 DIAGNOSIS — D70.1 CHEMOTHERAPY-INDUCED NEUTROPENIA (H): ICD-10-CM

## 2021-01-26 DIAGNOSIS — C34.32 SQUAMOUS CELL CARCINOMA OF BRONCHUS IN LEFT LOWER LOBE (H): Primary | ICD-10-CM

## 2021-01-26 DIAGNOSIS — C34.32 SQUAMOUS CELL CARCINOMA OF BRONCHUS IN LEFT LOWER LOBE (H): ICD-10-CM

## 2021-01-26 LAB
ALBUMIN SERPL-MCNC: 3.4 G/DL (ref 3.4–5)
ALP SERPL-CCNC: 86 U/L (ref 40–150)
ALT SERPL W P-5'-P-CCNC: 17 U/L (ref 0–50)
ANION GAP SERPL CALCULATED.3IONS-SCNC: 4 MMOL/L (ref 3–14)
AST SERPL W P-5'-P-CCNC: 12 U/L (ref 0–45)
BASOPHILS # BLD AUTO: 0 10E9/L (ref 0–0.2)
BASOPHILS NFR BLD AUTO: 0.2 %
BILIRUB SERPL-MCNC: 0.2 MG/DL (ref 0.2–1.3)
BUN SERPL-MCNC: 18 MG/DL (ref 7–30)
CALCIUM SERPL-MCNC: 9.1 MG/DL (ref 8.5–10.1)
CHLORIDE SERPL-SCNC: 101 MMOL/L (ref 94–109)
CO2 SERPL-SCNC: 30 MMOL/L (ref 20–32)
CREAT SERPL-MCNC: 0.99 MG/DL (ref 0.52–1.04)
DIFFERENTIAL METHOD BLD: ABNORMAL
EOSINOPHIL NFR BLD AUTO: 1.6 %
ERYTHROCYTE [DISTWIDTH] IN BLOOD BY AUTOMATED COUNT: 16.8 % (ref 10–15)
GFR SERPL CREATININE-BSD FRML MDRD: 58 ML/MIN/{1.73_M2}
GLUCOSE SERPL-MCNC: 96 MG/DL (ref 70–99)
HCT VFR BLD AUTO: 33.5 % (ref 35–47)
HGB BLD-MCNC: 11.2 G/DL (ref 11.7–15.7)
IMM GRANULOCYTES # BLD: 0.1 10E9/L (ref 0–0.4)
IMM GRANULOCYTES NFR BLD: 1.6 %
LYMPHOCYTES # BLD AUTO: 1.2 10E9/L (ref 0.8–5.3)
LYMPHOCYTES NFR BLD AUTO: 19.4 %
MAGNESIUM SERPL-MCNC: 2 MG/DL (ref 1.6–2.3)
MCH RBC QN AUTO: 28.5 PG (ref 26.5–33)
MCHC RBC AUTO-ENTMCNC: 33.4 G/DL (ref 31.5–36.5)
MCV RBC AUTO: 85 FL (ref 78–100)
MONOCYTES # BLD AUTO: 0.6 10E9/L (ref 0–1.3)
MONOCYTES NFR BLD AUTO: 9.9 %
NEUTROPHILS # BLD AUTO: 4.2 10E9/L (ref 1.6–8.3)
NEUTROPHILS NFR BLD AUTO: 67.3 %
NRBC # BLD AUTO: 0 10*3/UL
NRBC BLD AUTO-RTO: 0 /100
PLATELET # BLD AUTO: 143 10E9/L (ref 150–450)
POTASSIUM SERPL-SCNC: 4.3 MMOL/L (ref 3.4–5.3)
PROT SERPL-MCNC: 7.1 G/DL (ref 6.8–8.8)
RBC # BLD AUTO: 3.93 10E12/L (ref 3.8–5.2)
SODIUM SERPL-SCNC: 135 MMOL/L (ref 133–144)
WBC # BLD AUTO: 6.2 10E9/L (ref 4–11)

## 2021-01-26 PROCEDURE — 96361 HYDRATE IV INFUSION ADD-ON: CPT

## 2021-01-26 PROCEDURE — 96417 CHEMO IV INFUS EACH ADDL SEQ: CPT

## 2021-01-26 PROCEDURE — 250N000009 HC RX 250: Performed by: INTERNAL MEDICINE

## 2021-01-26 PROCEDURE — 96366 THER/PROPH/DIAG IV INF ADDON: CPT

## 2021-01-26 PROCEDURE — 96376 TX/PRO/DX INJ SAME DRUG ADON: CPT

## 2021-01-26 PROCEDURE — 96367 TX/PROPH/DG ADDL SEQ IV INF: CPT

## 2021-01-26 PROCEDURE — 96368 THER/DIAG CONCURRENT INF: CPT

## 2021-01-26 PROCEDURE — 258N000003 HC RX IP 258 OP 636: Performed by: INTERNAL MEDICINE

## 2021-01-26 PROCEDURE — 96415 CHEMO IV INFUSION ADDL HR: CPT

## 2021-01-26 PROCEDURE — 250N000011 HC RX IP 250 OP 636: Performed by: INTERNAL MEDICINE

## 2021-01-26 PROCEDURE — 36415 COLL VENOUS BLD VENIPUNCTURE: CPT | Performed by: INTERNAL MEDICINE

## 2021-01-26 PROCEDURE — 80053 COMPREHEN METABOLIC PANEL: CPT | Performed by: INTERNAL MEDICINE

## 2021-01-26 PROCEDURE — 85025 COMPLETE CBC W/AUTO DIFF WBC: CPT | Performed by: INTERNAL MEDICINE

## 2021-01-26 PROCEDURE — 83735 ASSAY OF MAGNESIUM: CPT | Performed by: INTERNAL MEDICINE

## 2021-01-26 PROCEDURE — 96375 TX/PRO/DX INJ NEW DRUG ADDON: CPT

## 2021-01-26 PROCEDURE — 96413 CHEMO IV INFUSION 1 HR: CPT

## 2021-01-26 RX ORDER — LORAZEPAM 2 MG/ML
0.5 INJECTION INTRAMUSCULAR EVERY 4 HOURS PRN
Status: CANCELLED
Start: 2021-01-26

## 2021-01-26 RX ORDER — PALONOSETRON 0.05 MG/ML
0.25 INJECTION, SOLUTION INTRAVENOUS ONCE
Status: COMPLETED | OUTPATIENT
Start: 2021-01-26 | End: 2021-01-26

## 2021-01-26 RX ORDER — MEPERIDINE HYDROCHLORIDE 25 MG/ML
25 INJECTION INTRAMUSCULAR; INTRAVENOUS; SUBCUTANEOUS EVERY 30 MIN PRN
Status: CANCELLED | OUTPATIENT
Start: 2021-01-26

## 2021-01-26 RX ORDER — HEPARIN SODIUM,PORCINE 10 UNIT/ML
5 VIAL (ML) INTRAVENOUS
Status: CANCELLED | OUTPATIENT
Start: 2021-02-02

## 2021-01-26 RX ORDER — LORAZEPAM 2 MG/ML
0.5 INJECTION INTRAMUSCULAR EVERY 4 HOURS PRN
Status: CANCELLED
Start: 2021-02-02

## 2021-01-26 RX ORDER — EPINEPHRINE 1 MG/ML
0.3 INJECTION, SOLUTION INTRAMUSCULAR; SUBCUTANEOUS EVERY 5 MIN PRN
Status: CANCELLED | OUTPATIENT
Start: 2021-02-02

## 2021-01-26 RX ORDER — DIPHENHYDRAMINE HYDROCHLORIDE 50 MG/ML
50 INJECTION INTRAMUSCULAR; INTRAVENOUS
Status: CANCELLED
Start: 2021-02-02

## 2021-01-26 RX ORDER — EPINEPHRINE 1 MG/ML
0.3 INJECTION, SOLUTION INTRAMUSCULAR; SUBCUTANEOUS EVERY 5 MIN PRN
Status: CANCELLED | OUTPATIENT
Start: 2021-01-26

## 2021-01-26 RX ORDER — DIPHENHYDRAMINE HYDROCHLORIDE 50 MG/ML
50 INJECTION INTRAMUSCULAR; INTRAVENOUS
Status: CANCELLED
Start: 2021-01-26

## 2021-01-26 RX ORDER — METHYLPREDNISOLONE SODIUM SUCCINATE 125 MG/2ML
125 INJECTION, POWDER, LYOPHILIZED, FOR SOLUTION INTRAMUSCULAR; INTRAVENOUS
Status: CANCELLED
Start: 2021-01-26

## 2021-01-26 RX ORDER — MEPERIDINE HYDROCHLORIDE 25 MG/ML
25 INJECTION INTRAMUSCULAR; INTRAVENOUS; SUBCUTANEOUS EVERY 30 MIN PRN
Status: CANCELLED | OUTPATIENT
Start: 2021-02-02

## 2021-01-26 RX ORDER — NALOXONE HYDROCHLORIDE 0.4 MG/ML
.1-.4 INJECTION, SOLUTION INTRAMUSCULAR; INTRAVENOUS; SUBCUTANEOUS
Status: CANCELLED | OUTPATIENT
Start: 2021-02-02

## 2021-01-26 RX ORDER — HEPARIN SODIUM (PORCINE) LOCK FLUSH IV SOLN 100 UNIT/ML 100 UNIT/ML
5 SOLUTION INTRAVENOUS
Status: CANCELLED | OUTPATIENT
Start: 2021-02-02

## 2021-01-26 RX ORDER — ALBUTEROL SULFATE 0.83 MG/ML
2.5 SOLUTION RESPIRATORY (INHALATION)
Status: CANCELLED | OUTPATIENT
Start: 2021-01-26

## 2021-01-26 RX ORDER — SODIUM CHLORIDE 9 MG/ML
1000 INJECTION, SOLUTION INTRAVENOUS CONTINUOUS PRN
Status: CANCELLED
Start: 2021-01-26

## 2021-01-26 RX ORDER — ALBUTEROL SULFATE 90 UG/1
1-2 AEROSOL, METERED RESPIRATORY (INHALATION)
Status: CANCELLED
Start: 2021-02-02

## 2021-01-26 RX ORDER — HEPARIN SODIUM,PORCINE 10 UNIT/ML
5 VIAL (ML) INTRAVENOUS
Status: CANCELLED | OUTPATIENT
Start: 2021-01-26

## 2021-01-26 RX ORDER — HEPARIN SODIUM (PORCINE) LOCK FLUSH IV SOLN 100 UNIT/ML 100 UNIT/ML
5 SOLUTION INTRAVENOUS
Status: CANCELLED | OUTPATIENT
Start: 2021-01-26

## 2021-01-26 RX ORDER — ALBUTEROL SULFATE 90 UG/1
1-2 AEROSOL, METERED RESPIRATORY (INHALATION)
Status: CANCELLED
Start: 2021-01-26

## 2021-01-26 RX ORDER — SODIUM CHLORIDE 9 MG/ML
1000 INJECTION, SOLUTION INTRAVENOUS CONTINUOUS PRN
Status: CANCELLED
Start: 2021-02-02

## 2021-01-26 RX ORDER — NALOXONE HYDROCHLORIDE 0.4 MG/ML
.1-.4 INJECTION, SOLUTION INTRAMUSCULAR; INTRAVENOUS; SUBCUTANEOUS
Status: CANCELLED | OUTPATIENT
Start: 2021-01-26

## 2021-01-26 RX ORDER — ALBUTEROL SULFATE 0.83 MG/ML
2.5 SOLUTION RESPIRATORY (INHALATION)
Status: CANCELLED | OUTPATIENT
Start: 2021-02-02

## 2021-01-26 RX ORDER — METHYLPREDNISOLONE SODIUM SUCCINATE 125 MG/2ML
125 INJECTION, POWDER, LYOPHILIZED, FOR SOLUTION INTRAMUSCULAR; INTRAVENOUS
Status: CANCELLED
Start: 2021-02-02

## 2021-01-26 RX ADMIN — PALONOSETRON 0.25 MG: 0.05 INJECTION, SOLUTION INTRAVENOUS at 09:29

## 2021-01-26 RX ADMIN — SODIUM CHLORIDE 1000 ML: 9 INJECTION, SOLUTION INTRAVENOUS at 09:17

## 2021-01-26 RX ADMIN — FAMOTIDINE 20 MG: 10 INJECTION, SOLUTION INTRAVENOUS at 09:33

## 2021-01-26 RX ADMIN — CISPLATIN 150 MG: 1 INJECTION, SOLUTION INTRAVENOUS at 11:38

## 2021-01-26 RX ADMIN — POTASSIUM CHLORIDE: 2 INJECTION, SOLUTION, CONCENTRATE INTRAVENOUS at 11:43

## 2021-01-26 RX ADMIN — FOSAPREPITANT: 150 INJECTION, POWDER, LYOPHILIZED, FOR SOLUTION INTRAVENOUS at 09:37

## 2021-01-26 RX ADMIN — GEMCITABINE 2400 MG: 38 INJECTION, SOLUTION INTRAVENOUS at 10:16

## 2021-01-26 ASSESSMENT — MIFFLIN-ST. JEOR: SCORE: 1298.36

## 2021-01-26 ASSESSMENT — PAIN SCALES - GENERAL: PAINLEVEL: NO PAIN (0)

## 2021-01-26 NOTE — PATIENT INSTRUCTIONS
Pt to return on 02/02/21 for C3 D8 Gemzar. Copies of medication list and upcoming appointments given prior to discharge.

## 2021-01-26 NOTE — PROGRESS NOTES
Infusion Nursing Note:  Ijeoma Shah presents today for C3 D1 Gemzar and Cisplatin.    Patient seen by provider today: No   present during visit today: Not Applicable.    Note: Patient declined any new complaints today.  Patient  did meet criteria for an asymptomatic covid-19 PCR test in infusion today. Patient declined the covid-19 test.    Intravenous Access:  Peripheral IV placed.  Difficult IV stick today, reiterated more water intake day before and morning of treatments.    Treatment Conditions:  Lab Results   Component Value Date    HGB 11.2 01/26/2021     Lab Results   Component Value Date    WBC 6.2 01/26/2021      Lab Results   Component Value Date    ANEU 4.2 01/26/2021     Lab Results   Component Value Date     01/26/2021      Lab Results   Component Value Date     01/26/2021                   Lab Results   Component Value Date    POTASSIUM 4.3 01/26/2021           Lab Results   Component Value Date    MAG 2.0 01/26/2021            Lab Results   Component Value Date    CR 0.99 01/26/2021                   Lab Results   Component Value Date    SABINO 9.1 01/26/2021                Lab Results   Component Value Date    BILITOTAL 0.2 01/26/2021           Lab Results   Component Value Date    ALBUMIN 3.4 01/26/2021                    Lab Results   Component Value Date    ALT 17 01/26/2021           Lab Results   Component Value Date    AST 12 01/26/2021       Results reviewed, labs MET treatment parameters, ok to proceed with treatment.      Post Infusion Assessment:  Patient tolerated infusion without incident.  Patient observed for 15 minutes post infusion per protocol.  Blood return noted pre and post infusion.  Site patent and intact, free from redness, edema or discomfort.  No evidence of extravasations.  Access discontinued per protocol.       Discharge Plan:   Discharge instructions reviewed with: Patient.  Patient and/or family verbalized understanding of discharge  instructions and all questions answered.  Patient discharged in stable condition accompanied by: self.  Departure Mode: Ambulatory.    Lauren Hudson RN

## 2021-01-29 ENCOUNTER — TELEPHONE (OUTPATIENT)
Dept: FAMILY MEDICINE | Facility: CLINIC | Age: 71
End: 2021-01-29

## 2021-01-29 NOTE — TELEPHONE ENCOUNTER
[facility] utilizes an encounter to take the place of a direct phone call to your office. Please take a moment to review the below request. Please reply or route message to author of this encounter.  Message will act as a verbal OK of orders requested below. Thank you.    ORDER    SN 1 x a week for 4 weeks with 2 PRN    BP today is 122/78    Reading have been averaging in the 130/140 systolic and 70/80 diastolic    Verónica Ruelas RN Case Manager  [phone]  [email or web address]

## 2021-02-02 ENCOUNTER — INFUSION THERAPY VISIT (OUTPATIENT)
Dept: INFUSION THERAPY | Facility: CLINIC | Age: 71
End: 2021-02-02
Attending: INTERNAL MEDICINE
Payer: MEDICARE

## 2021-02-02 VITALS
BODY MASS INDEX: 28.71 KG/M2 | DIASTOLIC BLOOD PRESSURE: 83 MMHG | OXYGEN SATURATION: 99 % | RESPIRATION RATE: 18 BRPM | WEIGHT: 172.5 LBS | SYSTOLIC BLOOD PRESSURE: 159 MMHG | HEART RATE: 75 BPM | TEMPERATURE: 97.6 F

## 2021-02-02 DIAGNOSIS — E83.42 HYPOMAGNESEMIA: Primary | ICD-10-CM

## 2021-02-02 DIAGNOSIS — T45.1X5A CHEMOTHERAPY-INDUCED NEUTROPENIA (H): ICD-10-CM

## 2021-02-02 DIAGNOSIS — C34.32 SQUAMOUS CELL CARCINOMA OF BRONCHUS IN LEFT LOWER LOBE (H): ICD-10-CM

## 2021-02-02 DIAGNOSIS — D70.1 CHEMOTHERAPY-INDUCED NEUTROPENIA (H): ICD-10-CM

## 2021-02-02 DIAGNOSIS — E83.42 HYPOMAGNESEMIA: ICD-10-CM

## 2021-02-02 LAB
ALBUMIN SERPL-MCNC: 3.5 G/DL (ref 3.4–5)
ALP SERPL-CCNC: 96 U/L (ref 40–150)
ALT SERPL W P-5'-P-CCNC: 33 U/L (ref 0–50)
ANION GAP SERPL CALCULATED.3IONS-SCNC: 7 MMOL/L (ref 3–14)
AST SERPL W P-5'-P-CCNC: 16 U/L (ref 0–45)
BASOPHILS # BLD AUTO: 0 10E9/L (ref 0–0.2)
BASOPHILS NFR BLD AUTO: 0.2 %
BILIRUB SERPL-MCNC: 0.3 MG/DL (ref 0.2–1.3)
BUN SERPL-MCNC: 18 MG/DL (ref 7–30)
CALCIUM SERPL-MCNC: 8.9 MG/DL (ref 8.5–10.1)
CHLORIDE SERPL-SCNC: 99 MMOL/L (ref 94–109)
CO2 SERPL-SCNC: 30 MMOL/L (ref 20–32)
CREAT SERPL-MCNC: 1.01 MG/DL (ref 0.52–1.04)
DIFFERENTIAL METHOD BLD: ABNORMAL
EOSINOPHIL NFR BLD AUTO: 0.4 %
ERYTHROCYTE [DISTWIDTH] IN BLOOD BY AUTOMATED COUNT: 16.2 % (ref 10–15)
GFR SERPL CREATININE-BSD FRML MDRD: 57 ML/MIN/{1.73_M2}
GLUCOSE SERPL-MCNC: 128 MG/DL (ref 70–99)
HCT VFR BLD AUTO: 32.8 % (ref 35–47)
HGB BLD-MCNC: 11 G/DL (ref 11.7–15.7)
IMM GRANULOCYTES # BLD: 0 10E9/L (ref 0–0.4)
IMM GRANULOCYTES NFR BLD: 0.7 %
LYMPHOCYTES # BLD AUTO: 1.8 10E9/L (ref 0.8–5.3)
LYMPHOCYTES NFR BLD AUTO: 33 %
MAGNESIUM SERPL-MCNC: 1.6 MG/DL (ref 1.6–2.3)
MCH RBC QN AUTO: 28.6 PG (ref 26.5–33)
MCHC RBC AUTO-ENTMCNC: 33.5 G/DL (ref 31.5–36.5)
MCV RBC AUTO: 85 FL (ref 78–100)
MONOCYTES # BLD AUTO: 0.2 10E9/L (ref 0–1.3)
MONOCYTES NFR BLD AUTO: 3.1 %
NEUTROPHILS # BLD AUTO: 3.4 10E9/L (ref 1.6–8.3)
NEUTROPHILS NFR BLD AUTO: 62.6 %
NRBC # BLD AUTO: 0 10*3/UL
NRBC BLD AUTO-RTO: 0 /100
PLATELET # BLD AUTO: 334 10E9/L (ref 150–450)
POTASSIUM SERPL-SCNC: 3.4 MMOL/L (ref 3.4–5.3)
PROT SERPL-MCNC: 7 G/DL (ref 6.8–8.8)
RBC # BLD AUTO: 3.85 10E12/L (ref 3.8–5.2)
SODIUM SERPL-SCNC: 136 MMOL/L (ref 133–144)
WBC # BLD AUTO: 5.5 10E9/L (ref 4–11)

## 2021-02-02 PROCEDURE — 36415 COLL VENOUS BLD VENIPUNCTURE: CPT | Performed by: INTERNAL MEDICINE

## 2021-02-02 PROCEDURE — 80053 COMPREHEN METABOLIC PANEL: CPT | Performed by: INTERNAL MEDICINE

## 2021-02-02 PROCEDURE — 85025 COMPLETE CBC W/AUTO DIFF WBC: CPT | Performed by: INTERNAL MEDICINE

## 2021-02-02 PROCEDURE — 96372 THER/PROPH/DIAG INJ SC/IM: CPT | Performed by: INTERNAL MEDICINE

## 2021-02-02 PROCEDURE — 250N000011 HC RX IP 250 OP 636: Performed by: INTERNAL MEDICINE

## 2021-02-02 PROCEDURE — 96377 APPLICATON ON-BODY INJECTOR: CPT | Mod: XS

## 2021-02-02 PROCEDURE — 96375 TX/PRO/DX INJ NEW DRUG ADDON: CPT

## 2021-02-02 PROCEDURE — 258N000003 HC RX IP 258 OP 636: Performed by: INTERNAL MEDICINE

## 2021-02-02 PROCEDURE — 83735 ASSAY OF MAGNESIUM: CPT | Performed by: INTERNAL MEDICINE

## 2021-02-02 PROCEDURE — 250N000009 HC RX 250: Performed by: INTERNAL MEDICINE

## 2021-02-02 PROCEDURE — 96413 CHEMO IV INFUSION 1 HR: CPT

## 2021-02-02 PROCEDURE — 96376 TX/PRO/DX INJ SAME DRUG ADON: CPT

## 2021-02-02 PROCEDURE — 96367 TX/PROPH/DG ADDL SEQ IV INF: CPT

## 2021-02-02 RX ADMIN — PEGFILGRASTIM 6 MG: KIT SUBCUTANEOUS at 16:30

## 2021-02-02 RX ADMIN — DEXAMETHASONE SODIUM PHOSPHATE 12 MG: 10 INJECTION, SOLUTION INTRAMUSCULAR; INTRAVENOUS at 14:38

## 2021-02-02 RX ADMIN — SODIUM CHLORIDE 250 ML: 9 INJECTION, SOLUTION INTRAVENOUS at 14:35

## 2021-02-02 RX ADMIN — FOSAPREPITANT 150 MG: 150 INJECTION, POWDER, LYOPHILIZED, FOR SOLUTION INTRAVENOUS at 14:55

## 2021-02-02 RX ADMIN — GEMCITABINE 2400 MG: 38 INJECTION, SOLUTION INTRAVENOUS at 15:36

## 2021-02-02 RX ADMIN — FAMOTIDINE 20 MG: 10 INJECTION, SOLUTION INTRAVENOUS at 15:20

## 2021-02-02 ASSESSMENT — PAIN SCALES - GENERAL: PAINLEVEL: MILD PAIN (2)

## 2021-02-02 NOTE — PROGRESS NOTES
Infusion Nursing Note:  Ijeoma ZAZUETA Anjaliaymil presents today for C3D8 Gemzar.    Patient seen by provider today: No   present during visit today: Not Applicable.    Note: N/A.  Patient did meet criteria for an asymptomatic covid-19 PCR test in infusion today. Patient  declined the covid-19 test.    Intravenous Access:  Peripheral IV placed.    Treatment Conditions:  Lab Results   Component Value Date    HGB 11.0 02/02/2021     Lab Results   Component Value Date    WBC 5.5 02/02/2021      Lab Results   Component Value Date    ANEU 3.4 02/02/2021     Lab Results   Component Value Date     02/02/2021      Lab Results   Component Value Date     02/02/2021                   Lab Results   Component Value Date    POTASSIUM 3.4 02/02/2021           Lab Results   Component Value Date    MAG 1.6 02/02/2021            Lab Results   Component Value Date    CR 1.01 02/02/2021                   Lab Results   Component Value Date    SABINO 8.9 02/02/2021                Lab Results   Component Value Date    BILITOTAL 0.3 02/02/2021           Lab Results   Component Value Date    ALBUMIN 3.5 02/02/2021                    Lab Results   Component Value Date    ALT 33 02/02/2021           Lab Results   Component Value Date    AST 16 02/02/2021       Results reviewed, labs MET treatment parameters, ok to proceed with treatment.      Post Infusion Assessment:  Patient tolerated infusion without incident.  Patient observed for 15 minutes post chemo per protocol.  Blood return noted pre and post infusion.  Site patent and intact, free from redness, edema or discomfort.  Access discontinued per protocol.    Your On-body Neulasta injector was applied today at 1630.  The injection will start 27 hours after application, at 1930 tomorrow.  Note: the medication will be delivered over 45 minutes.  Before removing the injector, check to see that the light is either solid green or off and that the indicator line is on empty.   If there is a red light on the injector at any time or wetness on the dressing or under the injector after removal, you must report this information.   Call [place]: [phone] during business hours.  Please refer to the written information given to you for additional information on the injector. .       Discharge Plan:   Discharge instructions reviewed with: Patient.  Patient and/or family verbalized understanding of discharge instructions and all questions answered.  Patient discharged in stable condition accompanied by: self.  Departure Mode: Ambulatory.  Patient will start claritin tonight.    Bhakti Pérez RN

## 2021-02-15 ENCOUNTER — TELEPHONE (OUTPATIENT)
Dept: FAMILY MEDICINE | Facility: CLINIC | Age: 71
End: 2021-02-15

## 2021-02-15 ENCOUNTER — HOSPITAL ENCOUNTER (OUTPATIENT)
Dept: CT IMAGING | Facility: CLINIC | Age: 71
Discharge: HOME | End: 2021-02-15
Attending: INTERNAL MEDICINE | Admitting: INTERNAL MEDICINE
Payer: MEDICARE

## 2021-02-15 DIAGNOSIS — I10 BENIGN ESSENTIAL HYPERTENSION: ICD-10-CM

## 2021-02-15 DIAGNOSIS — C34.32 SQUAMOUS CELL CARCINOMA OF BRONCHUS IN LEFT LOWER LOBE (H): ICD-10-CM

## 2021-02-15 PROCEDURE — 71260 CT THORAX DX C+: CPT | Mod: MG

## 2021-02-15 PROCEDURE — 250N000011 HC RX IP 250 OP 636: Performed by: RADIOLOGY

## 2021-02-15 PROCEDURE — 250N000009 HC RX 250: Performed by: RADIOLOGY

## 2021-02-15 RX ORDER — AMLODIPINE BESYLATE 5 MG/1
TABLET ORAL
Qty: 90 TABLET | Refills: 0 | OUTPATIENT
Start: 2021-02-15

## 2021-02-15 RX ORDER — IOPAMIDOL 755 MG/ML
500 INJECTION, SOLUTION INTRAVASCULAR ONCE
Status: COMPLETED | OUTPATIENT
Start: 2021-02-15 | End: 2021-02-15

## 2021-02-15 RX ORDER — AMLODIPINE BESYLATE 10 MG/1
10 TABLET ORAL DAILY
Qty: 90 TABLET | Refills: 0 | Status: SHIPPED | OUTPATIENT
Start: 2021-02-15 | End: 2021-03-18

## 2021-02-15 RX ADMIN — IOPAMIDOL 85 ML: 755 INJECTION, SOLUTION INTRAVENOUS at 11:26

## 2021-02-15 RX ADMIN — SODIUM CHLORIDE 60 ML: 9 INJECTION, SOLUTION INTRAVENOUS at 11:26

## 2021-02-15 NOTE — TELEPHONE ENCOUNTER
( Pt is out of her Medication ) Pt is to take two 5 mg tablets daily. She is with one tablet 10 mg tablet.  Please Advise.  Svetlana Orn Station Sec

## 2021-02-15 NOTE — TELEPHONE ENCOUNTER
1/7/21 2:25 PM  Note     She can increase the Amlodipine to 10 mg daily, continue to monitor.   I would let her surgeon know about the chest wall pain, I suspect it will be present for some time.

## 2021-02-17 ENCOUNTER — VIRTUAL VISIT (OUTPATIENT)
Dept: ONCOLOGY | Facility: CLINIC | Age: 71
End: 2021-02-17
Attending: INTERNAL MEDICINE
Payer: MEDICARE

## 2021-02-17 DIAGNOSIS — C34.32 SQUAMOUS CELL CARCINOMA OF BRONCHUS IN LEFT LOWER LOBE (H): ICD-10-CM

## 2021-02-17 DIAGNOSIS — E83.42 HYPOMAGNESEMIA: ICD-10-CM

## 2021-02-17 DIAGNOSIS — K80.20 GALLSTONES: ICD-10-CM

## 2021-02-17 DIAGNOSIS — D70.1 CHEMOTHERAPY-INDUCED NEUTROPENIA (H): ICD-10-CM

## 2021-02-17 DIAGNOSIS — J43.9 PULMONARY EMPHYSEMA, UNSPECIFIED EMPHYSEMA TYPE (H): Primary | ICD-10-CM

## 2021-02-17 DIAGNOSIS — T45.1X5A CHEMOTHERAPY-INDUCED NEUTROPENIA (H): ICD-10-CM

## 2021-02-17 PROCEDURE — 99215 OFFICE O/P EST HI 40 MIN: CPT | Mod: 95 | Performed by: INTERNAL MEDICINE

## 2021-02-17 PROCEDURE — 999N001193 HC VIDEO/TELEPHONE VISIT; NO CHARGE

## 2021-02-17 RX ORDER — LORAZEPAM 2 MG/ML
0.5 INJECTION INTRAMUSCULAR EVERY 4 HOURS PRN
Status: CANCELLED
Start: 2021-02-18

## 2021-02-17 RX ORDER — SODIUM CHLORIDE 9 MG/ML
1000 INJECTION, SOLUTION INTRAVENOUS CONTINUOUS PRN
Status: CANCELLED
Start: 2021-02-25

## 2021-02-17 RX ORDER — HEPARIN SODIUM (PORCINE) LOCK FLUSH IV SOLN 100 UNIT/ML 100 UNIT/ML
5 SOLUTION INTRAVENOUS
Status: CANCELLED | OUTPATIENT
Start: 2021-02-18

## 2021-02-17 RX ORDER — MEPERIDINE HYDROCHLORIDE 25 MG/ML
25 INJECTION INTRAMUSCULAR; INTRAVENOUS; SUBCUTANEOUS EVERY 30 MIN PRN
Status: CANCELLED | OUTPATIENT
Start: 2021-02-18

## 2021-02-17 RX ORDER — NALOXONE HYDROCHLORIDE 0.4 MG/ML
.1-.4 INJECTION, SOLUTION INTRAMUSCULAR; INTRAVENOUS; SUBCUTANEOUS
Status: CANCELLED | OUTPATIENT
Start: 2021-02-18

## 2021-02-17 RX ORDER — EPINEPHRINE 1 MG/ML
0.3 INJECTION, SOLUTION INTRAMUSCULAR; SUBCUTANEOUS EVERY 5 MIN PRN
Status: CANCELLED | OUTPATIENT
Start: 2021-02-18

## 2021-02-17 RX ORDER — NALOXONE HYDROCHLORIDE 0.4 MG/ML
.1-.4 INJECTION, SOLUTION INTRAMUSCULAR; INTRAVENOUS; SUBCUTANEOUS
Status: CANCELLED | OUTPATIENT
Start: 2021-02-25

## 2021-02-17 RX ORDER — METHYLPREDNISOLONE SODIUM SUCCINATE 125 MG/2ML
125 INJECTION, POWDER, LYOPHILIZED, FOR SOLUTION INTRAMUSCULAR; INTRAVENOUS
Status: CANCELLED
Start: 2021-02-18

## 2021-02-17 RX ORDER — DIPHENHYDRAMINE HYDROCHLORIDE 50 MG/ML
50 INJECTION INTRAMUSCULAR; INTRAVENOUS
Status: CANCELLED
Start: 2021-02-25

## 2021-02-17 RX ORDER — PALONOSETRON 0.05 MG/ML
0.25 INJECTION, SOLUTION INTRAVENOUS ONCE
Status: CANCELLED
Start: 2021-02-18

## 2021-02-17 RX ORDER — ALBUTEROL SULFATE 0.83 MG/ML
2.5 SOLUTION RESPIRATORY (INHALATION)
Status: CANCELLED | OUTPATIENT
Start: 2021-02-25

## 2021-02-17 RX ORDER — SODIUM CHLORIDE 9 MG/ML
1000 INJECTION, SOLUTION INTRAVENOUS CONTINUOUS PRN
Status: CANCELLED
Start: 2021-02-18

## 2021-02-17 RX ORDER — HEPARIN SODIUM,PORCINE 10 UNIT/ML
5 VIAL (ML) INTRAVENOUS
Status: CANCELLED | OUTPATIENT
Start: 2021-02-18

## 2021-02-17 RX ORDER — ALBUTEROL SULFATE 0.83 MG/ML
2.5 SOLUTION RESPIRATORY (INHALATION)
Status: CANCELLED | OUTPATIENT
Start: 2021-02-18

## 2021-02-17 RX ORDER — METHYLPREDNISOLONE SODIUM SUCCINATE 125 MG/2ML
125 INJECTION, POWDER, LYOPHILIZED, FOR SOLUTION INTRAMUSCULAR; INTRAVENOUS
Status: CANCELLED
Start: 2021-02-25

## 2021-02-17 RX ORDER — MEPERIDINE HYDROCHLORIDE 25 MG/ML
25 INJECTION INTRAMUSCULAR; INTRAVENOUS; SUBCUTANEOUS EVERY 30 MIN PRN
Status: CANCELLED | OUTPATIENT
Start: 2021-02-25

## 2021-02-17 RX ORDER — ALBUTEROL SULFATE 90 UG/1
1-2 AEROSOL, METERED RESPIRATORY (INHALATION)
Status: CANCELLED
Start: 2021-02-25

## 2021-02-17 RX ORDER — DIPHENHYDRAMINE HYDROCHLORIDE 50 MG/ML
50 INJECTION INTRAMUSCULAR; INTRAVENOUS
Status: CANCELLED
Start: 2021-02-18

## 2021-02-17 RX ORDER — ALBUTEROL SULFATE 90 UG/1
1-2 AEROSOL, METERED RESPIRATORY (INHALATION)
Status: CANCELLED
Start: 2021-02-18

## 2021-02-17 RX ORDER — ALBUTEROL SULFATE 90 UG/1
1-2 AEROSOL, METERED RESPIRATORY (INHALATION) EVERY 6 HOURS
Qty: 1 INHALER | Refills: 11 | Status: SHIPPED | OUTPATIENT
Start: 2021-02-17 | End: 2021-05-28

## 2021-02-17 RX ORDER — HEPARIN SODIUM (PORCINE) LOCK FLUSH IV SOLN 100 UNIT/ML 100 UNIT/ML
5 SOLUTION INTRAVENOUS
Status: CANCELLED | OUTPATIENT
Start: 2021-02-25

## 2021-02-17 RX ORDER — EPINEPHRINE 1 MG/ML
0.3 INJECTION, SOLUTION INTRAMUSCULAR; SUBCUTANEOUS EVERY 5 MIN PRN
Status: CANCELLED | OUTPATIENT
Start: 2021-02-25

## 2021-02-17 RX ORDER — LORAZEPAM 2 MG/ML
0.5 INJECTION INTRAMUSCULAR EVERY 4 HOURS PRN
Status: CANCELLED
Start: 2021-02-25

## 2021-02-17 RX ORDER — HEPARIN SODIUM,PORCINE 10 UNIT/ML
5 VIAL (ML) INTRAVENOUS
Status: CANCELLED | OUTPATIENT
Start: 2021-02-25

## 2021-02-17 NOTE — LETTER
2/17/2021         RE: Ijeoma Shah  [address] [id number]        Dear Colleague,    Thank you for referring your patient, Ijeoma Shah, to the [facility]. Please see a copy of my visit note below.    [facility]    FOLLOW-UP VISIT NOTE    PATIENT NAME: Ijeoma Shah MRN # [id number]  DATE OF VISIT: February 17, 2021 YOB: 1950    CANCER TYPE: SCC lung, poorly differentiated  STAGE: pT4N0 (IIIA)  ECOG PS: 1    Cancer Staging  Squamous cell carcinoma of left lung (H)  Staging form: Lung, AJCC 8th Edition  - Pathologic stage from 11/17/2020: Stage IIIA (pT4, pN0, cM0) - Signed by Eneida De Leon MD on 11/17/2020    PD-L1: TPS 15% on [id number] lobectomy, <1% on [id number] (LLL bx)  Lung panel: SMO [id number] on LLL bx, negative for fusions on lobectomy specimen.   NGS: N/A    SUMMARY  7/3/20  CT chest (screening). 6.7 cm LLL mass, 0.6 cm RML nodule, mediastinal and L hilar LNs  7/9/20 PET/CT. 7.1 x 5.6 cm LLL mass (SUV 19.6), post obstructive pneumonitis LLL inferior to the mass (SUV 2.8), 3.5 x 1.7 cm 4L node (SUV 5.9), L adrenal nodule 1.1 cm (SUV 4), indeterminate pulmonary nodules, pancreatic cysts, not hypermetabolic  7/27/20 Bronch, EBUS (Dr. Castro). 10R, 11R, 4R too small to biopsy. Stations 7, 4L, 11L negative for malignancy. LLL mass bx: SCC  8/12/20 Brain MRI negative  9/1/20 EUS to evaluate adrenal and 4L (Dr. Hogan). Both negative for malignancy. Adrenal with bland adrenal cells  10/22/20 L VATS, converted to thoracotomy, LLL lobectomy, MLND, R pulmonary arterioplasty (Dr. Sanabria, Dr. Davis). 8.3 cm poorly differentiated SCC, +angiolymphatic invasion  12/3/20~curr Cisplatin gemcitabine. C2D8 delayed 1 week due to neutropenia, added neulasta      ASSESSMENT AND PLAN  SCC lung, [id number], IIIA, R0 resection, discrepant PD-L1: Tolerating adjuvant cisplatin gemcitabine fairly well.  Doesn't think any adjustments need to be made to anti-emetics, etc. Will complete 4 cycles 2/25. Discussed findings on the CT and the somewhat unconventional timing of it. I'm more worried about a solid-appearing nodule in the L lung. The GGO on the right looks potentially inflammatory, possibly malignant but certainly not what I would expect a metastatic SCC to look. There's a small chance I suppose that this represents some lung toxicity from amio, but I would expect that to look more diffuse. Recommended repeating CT chest abdomen w/contrast in about 6-8 weeks and going from there.    Shortness of breath: Due to surgery, some degree of deconditioning and I think COPD (see below). Ideally would refer her for pulm rehab, will keep that in mind. Encouraged activity.     COPD: Reviewed PFTs prior to surgery, which showed FEV1 about 1.5 c/w COPD. Discussed trying to manage this more optimally in an attempt to help shortness of breath. Start Breo and albuterol inhaler. I don't think she needs an anticholinergic. Discussed potential cost issues and asked her to let us know if an issue. Further management per Dr. Espinal. I asked Salima to make an appt with Dr. Espinal in the next couple of months for this and afib.     Impacted gallstone, GB wall thickening: No clinical symptoms of cholecystitis. RUQ US. Review results at appt with TAYLOR.    Fatigue: Not surprising but will check TSH with next chemo as she's on amio.     Afib: On amiodarone through the end of chemo, currently 200 mg daily, and rivaroxaban. Off metoprolol since ~ 12/1 as she remains in NSR. Sees Dr. Goldstein in Cardiology but I explained that she can also discuss when to stop amiodarone with Dr. Espinal since she wasn't going to see Dr. Goldstein for a number of more months.     HTN: Started lisinopril in mid Dec 10 mg daily, added amlodipine 5 mg daily ~ 12/24.    R pulmonary nodules: As above; the one that was there before looks partially calcified and is  stable.     Nausea: Continue current anti-emetic plan - see notes by HOMA yS and Danial before.     Review of the result(s) of each unique test - CMP, CBC p/d  Independent interpretation of a test performed by another physician/other qualified health care professional (not separately reported) - CT CAP    60 minutes spent on the date of the encounter doing chart review, history and exam, documentation and further activities as noted above     Eneida De Leon MD  Associate Professor of Medicine  Hematology, Oncology and Transplantation      SUBJECTIVE  Salima returns today for follow of NSCLC, SCC, on adjuvant cisplatin gemcitabine. Her  is also there providing some history. She's been doing overall ok. Had some issues with nausea but feels the most recent regimen seems to be doing better - addition of emend, more regular ondansetron use. Appetite is down as food isn't tasting the same. But no weight loss. No change in hearing, no ringing in the ears, no neuropathy. More short of breath than before surgery. Still has some numbness and some heaviness at the surgical site. Otherwise no new problems.     PAST MEDICAL HISTORY  SCC as above  Afib with RVR. Initially when she presented for surgery 10/1, then post-op in 10/2020. DCCV x 2 10/23/20, ibutilide --> NSR, dig load, amio gtt. TTE 10/16/20 unremarkable.   Dyslipidemia  H/o bronchitis  Nose surgery  Tubal ligation  Adrenal nodule, bx 9/1/20, negative for malignancy  Cholelithiasis incidentally identified on CT  Cataract repair 2011?    PFT 8/20/20. FEV1 1.33 (58%), FVC 1.76 (60%), DLCO 17.72 (90%)    CURRENT OUTPATIENT MEDICATIONS  Current Outpatient Medications   Medication Sig Dispense Refill     acetaminophen (TYLENOL) 325 MG tablet Take 1-2 tablets (325-650 mg) by mouth every 6 hours as needed for mild pain 60 tablet 0     albuterol (PROAIR HFA/PROVENTIL HFA/VENTOLIN HFA) 108 (90 Base) MCG/ACT inhaler Inhale 1-2 puffs into the lungs every 6 hours 1  Inhaler 11     amiodarone (PACERONE) 200 MG tablet Take 1 tablet (200 mg) by mouth daily 90 tablet 1     amLODIPine (NORVASC) 10 MG tablet Take 1 tablet (10 mg) by mouth daily 90 tablet 0     dexamethasone (DECADRON) 4 MG tablet Take 2 tablets (8 mg) by mouth daily (with breakfast) Start taking daily the day after chemotherapy for 3 days 6 tablet 0     famotidine (PEPCID) 20 MG tablet Take 1 tablet (20 mg) by mouth 2 times daily (Patient taking differently: Take 20 mg by mouth 2 times daily as needed ) 60 tablet 1     fluticasone (FLONASE) 50 MCG/ACT nasal spray Spray 1 spray into both nostrils daily 9.9 mL 1     fluticasone-vilanterol (BREO ELLIPTA) 100-25 MCG/INH inhaler Inhale 1 puff into the lungs daily 1 each 11     ketoconazole (NIZORAL) 2 % external cream Apply topically daily 60 g 1     lisinopril (ZESTRIL) 20 MG tablet Take 1 tablet (20 mg) by mouth daily 30 tablet 11     loratadine (CLARITIN) 10 MG tablet Take 1 tablet (10 mg) by mouth daily 30 tablet 1     LORazepam (ATIVAN) 0.5 MG tablet Take 1 tablet (0.5 mg) by mouth every 4 hours as needed (Nausea/Vomiting) 30 tablet 0     OLANZapine (ZYPREXA) 5 MG tablet Take 1 tablet (5 mg) by mouth At Bedtime Starting night of chemotherapy x7 days. 30 tablet 0     ondansetron (ZOFRAN) 8 MG tablet Take 1 tablet (8 mg) by mouth every 8 hours as needed for nausea 30 tablet 1     rivaroxaban ANTICOAGULANT (XARELTO) 20 MG TABS tablet Take 1 tablet (20 mg) by mouth daily (with dinner) 90 tablet 3     triamcinolone (KENALOG) 0.1 % external ointment APPLY  OINTMENT TOPICALLY TWICE DAILY OR  AS  NEEDED 45 g 0     oxyCODONE (ROXICODONE) 5 MG tablet Take 1-2 tablets (5-10 mg) by mouth every 4 hours as needed for moderate to severe pain (Patient not taking: Reported on 12/9/2020) 30 tablet 0     prochlorperazine (COMPAZINE) 10 MG tablet Take 0.5 tablets (5 mg) by mouth every 6 hours as needed (Nausea/Vomiting) (Patient not taking: Reported on 12/29/2020) 30 tablet 11      ALLERGIES  Allergies   Allergen Reactions     Bee Venom Hives     Hot and sweating       REVIEW OF SYSTEMS  As above in the HPI, o/w complete 12-point ROS was negative.    PHYSICAL EXAM      2/2/2021  Day 8   Weight 78.2 kg (172 lb 8 oz)   /83 (A)   Temp 97.6  F (36.4  C)   Pulse 75     Wt Readings from Last 3 Encounters:   02/02/21 78.2 kg (172 lb 8 oz)   01/26/21 77.2 kg (170 lb 4.8 oz)   01/12/21 78.3 kg (172 lb 9.6 oz)     GEN: NAD  NEURO: alert  SKIN: no rashes    Remainder of physical exam deferred due to public health emergency and limitations of video visit.    LABORATORY AND IMAGING STUDIES  Results for MAYCO VEGA (MRN [id number]) as of 2/17/2021 09:25   1/26/2021 08:05 2/2/2021 13:28   Sodium 135 136   Potassium 4.3 3.4   Chloride 101 99   Carbon Dioxide 30 30   Urea Nitrogen 18 18   Creatinine 0.99 1.01   GFR Estimate 58 (L) 57 (L)   GFR Estimate If Black 67 66   Calcium 9.1 8.9   Anion Gap 4 7   Magnesium 2.0 1.6   Albumin 3.4 3.5   Protein Total 7.1 7.0   Bilirubin Total 0.2 0.3   Alkaline Phosphatase 86 96   ALT 17 33   AST 12 16   Glucose 96 128 (H)   WBC 6.2 5.5   Hemoglobin 11.2 (L) 11.0 (L)   Hematocrit 33.5 (L) 32.8 (L)   Platelet Count 143 (L) 334   RBC Count 3.93 3.85   MCV 85 85   MCH 28.5 28.6   MCHC 33.4 33.5   RDW 16.8 (H) 16.2 (H)   Diff Method Automated Method Automated Method   % Neutrophils 67.3 62.6   % Lymphocytes 19.4 33.0   % Monocytes 9.9 3.1   % Eosinophils 1.6 0.4   % Basophils 0.2 0.2   % Immature Granulocytes 1.6 0.7   Nucleated RBCs 0 0   Absolute Neutrophil 4.2 3.4   Absolute Lymphocytes 1.2 1.8   Absolute Monocytes 0.6 0.2   Absolute Basophils 0.0 0.0   Abs Immature Granulocytes 0.1 0.0   Absolute Nucleated RBC 0.0 0.0     Labs were independently reviewed and interpreted by me    CT Chest/Abdomen/Pelvis w Contrast  Narrative: CT CHEST/ABDOMEN/PELVIS WITH CONTRAST  2/15/2021 11:40 AM    CLINICAL HISTORY:  Assess for treatment response. Squamous  cell  carcinoma of bronchus in left lower lobe (H).    TECHNIQUE: CT scan of the chest, abdomen, and pelvis was performed  following injection of IV contrast. Multiplanar reformats were  obtained. Dose reduction techniques were used.   CONTRAST: 85 mL Isovue 370    COMPARISON: Chest x-rays dated 11/17/2020, CT chest dated 9/29/2020  and CT PET dated 7/9/2020.    FINDINGS:   LUNGS AND PLEURA: Since the prior study, there has been partial left  lung resection. There is a focal area of density in the posterior left  hemithorax adjacent to the resection site likely representing  scarring, although neoplastic infiltration is difficult to exclude.  This appears to be outside of the lung adjacent to the pleura  measuring up to 5.1 x 2.3 x 8.7 cm in the transverse, AP and  craniocaudal dimensions, respectively. It has an average density of  approximately 11 Hounsfield units, most consistent with postoperative  fluid. There is an irregular nodular density in the left lower lung  measuring 0.8 cm (image 173 series 5). Other smaller nodular densities  are seen in the left lung. These are best seen on images 122, 150, and  187. Nodular type infiltrates are seen in the right upper lung lobe,  the largest area of groundglass nodular density measuring up to 1.6 cm  (image 98 series 5). A more solid nodular density is seen in the  lateral aspect of the right upper lung lobe (image 93 series 5)  measuring up to 0.7 cm. This nodule is either new or more prominent as  compared to the prior study. The nodule in the anterolateral right  upper lung (image 149 series 5) measures up to 0.5 x 0.3 cm, not  appreciably changed since the prior study. Other groundglass  nodularity is seen in the right upper lung lobe (images 84 and 94 of  series 5). No pneumothorax or significant pleural fluid collection.    MEDIASTINUM/AXILLAE: Heart is normal in size. No mediastinal, hilar,  or axillary lymphadenopathy is identified. Visualized portions of  the  thyroid are unremarkable. Calcified left hilar lymph node is noted.  There are no filling defects in the central pulmonary arteries to  suggest central pulmonary artery embolism. This study was not  optimized for pulmonary artery evaluation. Thoracic aorta is of normal  caliber. There are thoracic aortic calcifications.    HEPATOBILIARY: Multiple hepatic cysts are again noted and are  unchanged since the prior study. No evidence for hepatic metastasis is  seen. There is abnormal irregular thickening of the gallbladder wall  which is either new or significantly progressed since the prior CT PET  dated 7/9/2020. There is a large gallstone in the dependent aspect of  the gallbladder with adjacent gallbladder wall thickening and  hypoattenuation. This stone measures at least 1.7 cm and with the  adjacent gallbladder wall thickening measures a total of 2.7 cm in  diameter. Acute cholecystitis is not excluded. Neoplastic infiltration  of the wall of the gallbladder is considered a less likely  possibility.    PANCREAS: Cystic structures in the pancreatic neck are again noted,  stable as compared to the prior CT PET dated 7/9/2020. The largest  measures approximately 1.2 cm in diameter. Pancreas otherwise enhances  normally.    SPLEEN: Normal.    ADRENAL GLANDS: Minimal thickening of the left adrenal gland is stable  since the prior CT PET dated 7/9/2020. The adrenal glands otherwise  enhance normally.    KIDNEYS/BLADDER: Kidneys enhance normally. No hydronephrosis,  nephrolithiasis, hydroureter, or ureteral calculus is identified.  Urinary bladder is normal in appearance.    BOWEL: The colon is of normal caliber without pericolonic inflammatory  change to suggest acute diverticulitis. Mild thickening of the wall of  the proximal transverse colon at the hepatic flexure is likely due to  contraction and less likely due to soft tissue neoplasm. The appendix  is normal in appearance. Small bowel is of normal caliber  and  appearance. The stomach contains a moderate to large amount of  ingested material and air but is otherwise unremarkable.    PELVIC ORGANS: Uterus and ovaries are normal in appearance.    ADDITIONAL FINDINGS: No adenopathy, free fluid or free air is seen in  the peritoneal cavity. There is nonaneurysmal atherosclerosis.    MUSCULOSKELETAL: There are degenerative changes in the spine. No  aggressive osseous lesions or acute osseous fractures are seen. There  appears to be transitional L5 that is partially sacralized. Minimal  anterolisthesis of L4 on L5 appears degenerative in etiology.  Impression: IMPRESSION:  1.  Multiple small nodules in the bilateral lungs are either new or  more prominent as compared to the prior study and could represent  metastases versus an inflammatory or infectious etiology. Some of  these are groundglass and others are more solid in appearance. Close  interval follow-up CT chest is recommended in 3 months.  2.  Interval partial left pneumonectomy since the prior CT PET and  prior CT chest. Small amount of loculated fluid is seen adjacent to  the operative site in the posterior left hemithorax.  3.  Hepatic cysts are again seen. Pancreatic cystic structures are  also unchanged since the prior study.  4.  Previous noted AP window lymphadenopathy is not well seen on  today's study.  5.  No other evidence for metastasis is seen.  6.  Abnormal irregular thickening of the gallbladder wall with what  appears to be a large impacted gallstone in the gallbladder neck/lower  gallbladder are concerning for acute cholecystitis although other  etiologies for gallbladder wall thickening are also possible. Further  evaluation with gallbladder ultrasound is recommended.    TARAN GARCIA MD       Imaging was personally reviewed and interpreted by me       Salima is a 70 year old who is being evaluated via a billable video visit.      How would you like to obtain your AVS? MyChart     If the video visit  "is dropped, the invitation should be resent by: Text to cell phone: [phone]     Will anyone else be joining your video visit? No          Vitals - Patient Reported  Weight (Patient Reported): 79.4 kg (175 lb)  Height (Patient Reported): 165.1 cm (5' 5\")  BMI (Based on Pt Reported Ht/Wt): 29.12  Pain Score: Mild Pain (2)  Pain Loc: (LEFT SIDE OF CHEST)    Maikol Jasso LPN    Video Start Time: 1:35 PM    Video-Visit Details  Type of service:  Video Visit  Video End Time:2:27 PM  Originating Location (pt. Location): Home  Distant Location (provider location):  [facility]   Platform used for Video Visit: Well      Again, thank you for allowing me to participate in the care of your patient.        Sincerely,        Eneida De Leon MD    "

## 2021-02-17 NOTE — PROGRESS NOTES
[facility]    FOLLOW-UP VISIT NOTE    PATIENT NAME: Ijeoma Shah MRN # [id number]  DATE OF VISIT: February 17, 2021 YOB: 1950    CANCER TYPE: SCC lung, poorly differentiated  STAGE: pT4N0 (IIIA)  ECOG PS: 1    Cancer Staging  Squamous cell carcinoma of left lung (H)  Staging form: Lung, AJCC 8th Edition  - Pathologic stage from 11/17/2020: Stage IIIA (pT4, pN0, cM0) - Signed by Eneida De Leon MD on 11/17/2020    PD-L1: TPS 15% on [id number] lobectomy, <1% on [id number] (LLL bx)  Lung panel: SMO [id number] on LLL bx, negative for fusions on lobectomy specimen.   NGS: N/A    SUMMARY  7/3/20  CT chest (screening). 6.7 cm LLL mass, 0.6 cm RML nodule, mediastinal and L hilar LNs  7/9/20 PET/CT. 7.1 x 5.6 cm LLL mass (SUV 19.6), post obstructive pneumonitis LLL inferior to the mass (SUV 2.8), 3.5 x 1.7 cm 4L node (SUV 5.9), L adrenal nodule 1.1 cm (SUV 4), indeterminate pulmonary nodules, pancreatic cysts, not hypermetabolic  7/27/20 Bronch, EBUS (Dr. Castro). 10R, 11R, 4R too small to biopsy. Stations 7, 4L, 11L negative for malignancy. LLL mass bx: SCC  8/12/20 Brain MRI negative  9/1/20 EUS to evaluate adrenal and 4L (Dr. Hogan). Both negative for malignancy. Adrenal with bland adrenal cells  10/22/20 L VATS, converted to thoracotomy, LLL lobectomy, MLND, R pulmonary arterioplasty (Dr. Sanabria, Dr. Davis). 8.3 cm poorly differentiated SCC, +angiolymphatic invasion  12/3/20~curr Cisplatin gemcitabine. C2D8 delayed 1 week due to neutropenia, added neulasta      ASSESSMENT AND PLAN  SCC lung, [id number], IIIA, R0 resection, discrepant PD-L1: Tolerating adjuvant cisplatin gemcitabine fairly well. Doesn't think any adjustments need to be made to anti-emetics, etc. Will complete 4 cycles 2/25. Discussed findings on the CT and the somewhat unconventional timing of it. I'm more worried about a solid-appearing nodule in the L lung. The GGO on the right looks potentially  inflammatory, possibly malignant but certainly not what I would expect a metastatic SCC to look. There's a small chance I suppose that this represents some lung toxicity from amio, but I would expect that to look more diffuse. Recommended repeating CT chest abdomen w/contrast in about 6-8 weeks and going from there.    Shortness of breath: Due to surgery, some degree of deconditioning and I think COPD (see below). Ideally would refer her for pulm rehab, will keep that in mind. Encouraged activity.     COPD: Reviewed PFTs prior to surgery, which showed FEV1 about 1.5 c/w COPD. Discussed trying to manage this more optimally in an attempt to help shortness of breath. Start Breo and albuterol inhaler. I don't think she needs an anticholinergic. Discussed potential cost issues and asked her to let us know if an issue. Further management per Dr. Espinal. I asked Salima to make an appt with Dr. Espinal in the next couple of months for this and afib.     Impacted gallstone, GB wall thickening: No clinical symptoms of cholecystitis. RUQ US. Review results at appt with TAYLOR.    Fatigue: Not surprising but will check TSH with next chemo as she's on amio.     Afib: On amiodarone through the end of chemo, currently 200 mg daily, and rivaroxaban. Off metoprolol since ~ 12/1 as she remains in NSR. Sees Dr. Goldstein in Cardiology but I explained that she can also discuss when to stop amiodarone with Dr. Espinal since she wasn't going to see Dr. Goldstein for a number of more months.     HTN: Started lisinopril in mid Dec 10 mg daily, added amlodipine 5 mg daily ~ 12/24.    R pulmonary nodules: As above; the one that was there before looks partially calcified and is stable.     Nausea: Continue current anti-emetic plan - see notes by HOMA Sy and Danial before.     Review of the result(s) of each unique test - CMP, CBC p/d  Independent interpretation of a test performed by another physician/other qualified health care professional (not  separately reported) - CT CAP    60 minutes spent on the date of the encounter doing chart review, history and exam, documentation and further activities as noted above     Eneida De Leon MD  Associate Professor of Medicine  Hematology, Oncology and Transplantation      SUBJECTIVE  Salima returns today for follow of NSCLC, SCC, on adjuvant cisplatin gemcitabine. Her  is also there providing some history. She's been doing overall ok. Had some issues with nausea but feels the most recent regimen seems to be doing better - addition of emend, more regular ondansetron use. Appetite is down as food isn't tasting the same. But no weight loss. No change in hearing, no ringing in the ears, no neuropathy. More short of breath than before surgery. Still has some numbness and some heaviness at the surgical site. Otherwise no new problems.     PAST MEDICAL HISTORY  SCC as above  Afib with RVR. Initially when she presented for surgery 10/1, then post-op in 10/2020. DCCV x 2 10/23/20, ibutilide --> NSR, dig load, amio gtt. TTE 10/16/20 unremarkable.   Dyslipidemia  H/o bronchitis  Nose surgery  Tubal ligation  Adrenal nodule, bx 9/1/20, negative for malignancy  Cholelithiasis incidentally identified on CT  Cataract repair 2011?    PFT 8/20/20. FEV1 1.33 (58%), FVC 1.76 (60%), DLCO 17.72 (90%)    CURRENT OUTPATIENT MEDICATIONS  Current Outpatient Medications   Medication Sig Dispense Refill     acetaminophen (TYLENOL) 325 MG tablet Take 1-2 tablets (325-650 mg) by mouth every 6 hours as needed for mild pain 60 tablet 0     albuterol (PROAIR HFA/PROVENTIL HFA/VENTOLIN HFA) 108 (90 Base) MCG/ACT inhaler Inhale 1-2 puffs into the lungs every 6 hours 1 Inhaler 11     amiodarone (PACERONE) 200 MG tablet Take 1 tablet (200 mg) by mouth daily 90 tablet 1     amLODIPine (NORVASC) 10 MG tablet Take 1 tablet (10 mg) by mouth daily 90 tablet 0     dexamethasone (DECADRON) 4 MG tablet Take 2 tablets (8 mg) by mouth daily (with  breakfast) Start taking daily the day after chemotherapy for 3 days 6 tablet 0     famotidine (PEPCID) 20 MG tablet Take 1 tablet (20 mg) by mouth 2 times daily (Patient taking differently: Take 20 mg by mouth 2 times daily as needed ) 60 tablet 1     fluticasone (FLONASE) 50 MCG/ACT nasal spray Spray 1 spray into both nostrils daily 9.9 mL 1     fluticasone-vilanterol (BREO ELLIPTA) 100-25 MCG/INH inhaler Inhale 1 puff into the lungs daily 1 each 11     ketoconazole (NIZORAL) 2 % external cream Apply topically daily 60 g 1     lisinopril (ZESTRIL) 20 MG tablet Take 1 tablet (20 mg) by mouth daily 30 tablet 11     loratadine (CLARITIN) 10 MG tablet Take 1 tablet (10 mg) by mouth daily 30 tablet 1     LORazepam (ATIVAN) 0.5 MG tablet Take 1 tablet (0.5 mg) by mouth every 4 hours as needed (Nausea/Vomiting) 30 tablet 0     OLANZapine (ZYPREXA) 5 MG tablet Take 1 tablet (5 mg) by mouth At Bedtime Starting night of chemotherapy x7 days. 30 tablet 0     ondansetron (ZOFRAN) 8 MG tablet Take 1 tablet (8 mg) by mouth every 8 hours as needed for nausea 30 tablet 1     rivaroxaban ANTICOAGULANT (XARELTO) 20 MG TABS tablet Take 1 tablet (20 mg) by mouth daily (with dinner) 90 tablet 3     triamcinolone (KENALOG) 0.1 % external ointment APPLY  OINTMENT TOPICALLY TWICE DAILY OR  AS  NEEDED 45 g 0     oxyCODONE (ROXICODONE) 5 MG tablet Take 1-2 tablets (5-10 mg) by mouth every 4 hours as needed for moderate to severe pain (Patient not taking: Reported on 12/9/2020) 30 tablet 0     prochlorperazine (COMPAZINE) 10 MG tablet Take 0.5 tablets (5 mg) by mouth every 6 hours as needed (Nausea/Vomiting) (Patient not taking: Reported on 12/29/2020) 30 tablet 11     ALLERGIES  Allergies   Allergen Reactions     Bee Venom Hives     Hot and sweating       REVIEW OF SYSTEMS  As above in the HPI, o/w complete 12-point ROS was negative.    PHYSICAL EXAM      2/2/2021  Day 8   Weight 78.2 kg (172 lb 8 oz)   /83 (A)   Temp 97.6  F (36.4   C)   Pulse 75     Wt Readings from Last 3 Encounters:   02/02/21 78.2 kg (172 lb 8 oz)   01/26/21 77.2 kg (170 lb 4.8 oz)   01/12/21 78.3 kg (172 lb 9.6 oz)     GEN: NAD  NEURO: alert  SKIN: no rashes    Remainder of physical exam deferred due to public health emergency and limitations of video visit.    LABORATORY AND IMAGING STUDIES  Results for MAYCO VEGA (MRN [id number]) as of 2/17/2021 09:25   1/26/2021 08:05 2/2/2021 13:28   Sodium 135 136   Potassium 4.3 3.4   Chloride 101 99   Carbon Dioxide 30 30   Urea Nitrogen 18 18   Creatinine 0.99 1.01   GFR Estimate 58 (L) 57 (L)   GFR Estimate If Black 67 66   Calcium 9.1 8.9   Anion Gap 4 7   Magnesium 2.0 1.6   Albumin 3.4 3.5   Protein Total 7.1 7.0   Bilirubin Total 0.2 0.3   Alkaline Phosphatase 86 96   ALT 17 33   AST 12 16   Glucose 96 128 (H)   WBC 6.2 5.5   Hemoglobin 11.2 (L) 11.0 (L)   Hematocrit 33.5 (L) 32.8 (L)   Platelet Count 143 (L) 334   RBC Count 3.93 3.85   MCV 85 85   MCH 28.5 28.6   MCHC 33.4 33.5   RDW 16.8 (H) 16.2 (H)   Diff Method Automated Method Automated Method   % Neutrophils 67.3 62.6   % Lymphocytes 19.4 33.0   % Monocytes 9.9 3.1   % Eosinophils 1.6 0.4   % Basophils 0.2 0.2   % Immature Granulocytes 1.6 0.7   Nucleated RBCs 0 0   Absolute Neutrophil 4.2 3.4   Absolute Lymphocytes 1.2 1.8   Absolute Monocytes 0.6 0.2   Absolute Basophils 0.0 0.0   Abs Immature Granulocytes 0.1 0.0   Absolute Nucleated RBC 0.0 0.0     Labs were independently reviewed and interpreted by me    CT Chest/Abdomen/Pelvis w Contrast  Narrative: CT CHEST/ABDOMEN/PELVIS WITH CONTRAST  2/15/2021 11:40 AM    CLINICAL HISTORY:  Assess for treatment response. Squamous cell  carcinoma of bronchus in left lower lobe (H).    TECHNIQUE: CT scan of the chest, abdomen, and pelvis was performed  following injection of IV contrast. Multiplanar reformats were  obtained. Dose reduction techniques were used.   CONTRAST: 85 mL Isovue 370    COMPARISON: Chest x-rays  dated 11/17/2020, CT chest dated 9/29/2020  and CT PET dated 7/9/2020.    FINDINGS:   LUNGS AND PLEURA: Since the prior study, there has been partial left  lung resection. There is a focal area of density in the posterior left  hemithorax adjacent to the resection site likely representing  scarring, although neoplastic infiltration is difficult to exclude.  This appears to be outside of the lung adjacent to the pleura  measuring up to 5.1 x 2.3 x 8.7 cm in the transverse, AP and  craniocaudal dimensions, respectively. It has an average density of  approximately 11 Hounsfield units, most consistent with postoperative  fluid. There is an irregular nodular density in the left lower lung  measuring 0.8 cm (image 173 series 5). Other smaller nodular densities  are seen in the left lung. These are best seen on images 122, 150, and  187. Nodular type infiltrates are seen in the right upper lung lobe,  the largest area of groundglass nodular density measuring up to 1.6 cm  (image 98 series 5). A more solid nodular density is seen in the  lateral aspect of the right upper lung lobe (image 93 series 5)  measuring up to 0.7 cm. This nodule is either new or more prominent as  compared to the prior study. The nodule in the anterolateral right  upper lung (image 149 series 5) measures up to 0.5 x 0.3 cm, not  appreciably changed since the prior study. Other groundglass  nodularity is seen in the right upper lung lobe (images 84 and 94 of  series 5). No pneumothorax or significant pleural fluid collection.    MEDIASTINUM/AXILLAE: Heart is normal in size. No mediastinal, hilar,  or axillary lymphadenopathy is identified. Visualized portions of the  thyroid are unremarkable. Calcified left hilar lymph node is noted.  There are no filling defects in the central pulmonary arteries to  suggest central pulmonary artery embolism. This study was not  optimized for pulmonary artery evaluation. Thoracic aorta is of normal  caliber. There  are thoracic aortic calcifications.    HEPATOBILIARY: Multiple hepatic cysts are again noted and are  unchanged since the prior study. No evidence for hepatic metastasis is  seen. There is abnormal irregular thickening of the gallbladder wall  which is either new or significantly progressed since the prior CT PET  dated 7/9/2020. There is a large gallstone in the dependent aspect of  the gallbladder with adjacent gallbladder wall thickening and  hypoattenuation. This stone measures at least 1.7 cm and with the  adjacent gallbladder wall thickening measures a total of 2.7 cm in  diameter. Acute cholecystitis is not excluded. Neoplastic infiltration  of the wall of the gallbladder is considered a less likely  possibility.    PANCREAS: Cystic structures in the pancreatic neck are again noted,  stable as compared to the prior CT PET dated 7/9/2020. The largest  measures approximately 1.2 cm in diameter. Pancreas otherwise enhances  normally.    SPLEEN: Normal.    ADRENAL GLANDS: Minimal thickening of the left adrenal gland is stable  since the prior CT PET dated 7/9/2020. The adrenal glands otherwise  enhance normally.    KIDNEYS/BLADDER: Kidneys enhance normally. No hydronephrosis,  nephrolithiasis, hydroureter, or ureteral calculus is identified.  Urinary bladder is normal in appearance.    BOWEL: The colon is of normal caliber without pericolonic inflammatory  change to suggest acute diverticulitis. Mild thickening of the wall of  the proximal transverse colon at the hepatic flexure is likely due to  contraction and less likely due to soft tissue neoplasm. The appendix  is normal in appearance. Small bowel is of normal caliber and  appearance. The stomach contains a moderate to large amount of  ingested material and air but is otherwise unremarkable.    PELVIC ORGANS: Uterus and ovaries are normal in appearance.    ADDITIONAL FINDINGS: No adenopathy, free fluid or free air is seen in  the peritoneal cavity. There is  "nonaneurysmal atherosclerosis.    MUSCULOSKELETAL: There are degenerative changes in the spine. No  aggressive osseous lesions or acute osseous fractures are seen. There  appears to be transitional L5 that is partially sacralized. Minimal  anterolisthesis of L4 on L5 appears degenerative in etiology.  Impression: IMPRESSION:  1.  Multiple small nodules in the bilateral lungs are either new or  more prominent as compared to the prior study and could represent  metastases versus an inflammatory or infectious etiology. Some of  these are groundglass and others are more solid in appearance. Close  interval follow-up CT chest is recommended in 3 months.  2.  Interval partial left pneumonectomy since the prior CT PET and  prior CT chest. Small amount of loculated fluid is seen adjacent to  the operative site in the posterior left hemithorax.  3.  Hepatic cysts are again seen. Pancreatic cystic structures are  also unchanged since the prior study.  4.  Previous noted AP window lymphadenopathy is not well seen on  today's study.  5.  No other evidence for metastasis is seen.  6.  Abnormal irregular thickening of the gallbladder wall with what  appears to be a large impacted gallstone in the gallbladder neck/lower  gallbladder are concerning for acute cholecystitis although other  etiologies for gallbladder wall thickening are also possible. Further  evaluation with gallbladder ultrasound is recommended.    TARAN GARCIA MD       Imaging was personally reviewed and interpreted by me       Salima is a 70 year old who is being evaluated via a billable video visit.      How would you like to obtain your AVS? MyChart     If the video visit is dropped, the invitation should be resent by: Text to cell phone: [phone]     Will anyone else be joining your video visit? No          Vitals - Patient Reported  Weight (Patient Reported): 79.4 kg (175 lb)  Height (Patient Reported): 165.1 cm (5' 5\")  BMI (Based on Pt Reported Ht/Wt): " 29.12  Pain Score: Mild Pain (2)  Pain Loc: (LEFT SIDE OF CHEST)    Maikol Jasso LPN    Video Start Time: 1:35 PM    Video-Visit Details  Type of service:  Video Visit  Video End Time:2:27 PM  Originating Location (pt. Location): Home  Distant Location (provider location):  [facility]   Platform used for Video Visit: [employer]

## 2021-02-18 ENCOUNTER — INFUSION THERAPY VISIT (OUTPATIENT)
Dept: INFUSION THERAPY | Facility: CLINIC | Age: 71
End: 2021-02-18
Attending: INTERNAL MEDICINE
Payer: MEDICARE

## 2021-02-18 VITALS
DIASTOLIC BLOOD PRESSURE: 64 MMHG | TEMPERATURE: 97.9 F | WEIGHT: 171.9 LBS | OXYGEN SATURATION: 100 % | HEART RATE: 87 BPM | BODY MASS INDEX: 28.61 KG/M2 | RESPIRATION RATE: 18 BRPM | SYSTOLIC BLOOD PRESSURE: 126 MMHG

## 2021-02-18 DIAGNOSIS — E83.42 HYPOMAGNESEMIA: ICD-10-CM

## 2021-02-18 DIAGNOSIS — C34.32 SQUAMOUS CELL CARCINOMA OF BRONCHUS IN LEFT LOWER LOBE (H): Primary | ICD-10-CM

## 2021-02-18 DIAGNOSIS — D70.1 CHEMOTHERAPY-INDUCED NEUTROPENIA (H): ICD-10-CM

## 2021-02-18 DIAGNOSIS — C34.32 SQUAMOUS CELL CARCINOMA OF BRONCHUS IN LEFT LOWER LOBE (H): ICD-10-CM

## 2021-02-18 DIAGNOSIS — T45.1X5A CHEMOTHERAPY-INDUCED NEUTROPENIA (H): ICD-10-CM

## 2021-02-18 LAB
ALBUMIN SERPL-MCNC: 3.5 G/DL (ref 3.4–5)
ALP SERPL-CCNC: 169 U/L (ref 40–150)
ALT SERPL W P-5'-P-CCNC: 32 U/L (ref 0–50)
ANION GAP SERPL CALCULATED.3IONS-SCNC: 4 MMOL/L (ref 3–14)
AST SERPL W P-5'-P-CCNC: 15 U/L (ref 0–45)
BASOPHILS # BLD AUTO: 0 10E9/L (ref 0–0.2)
BASOPHILS NFR BLD AUTO: 0 %
BILIRUB SERPL-MCNC: 0.2 MG/DL (ref 0.2–1.3)
BUN SERPL-MCNC: 11 MG/DL (ref 7–30)
CALCIUM SERPL-MCNC: 9.2 MG/DL (ref 8.5–10.1)
CHLORIDE SERPL-SCNC: 102 MMOL/L (ref 94–109)
CO2 SERPL-SCNC: 30 MMOL/L (ref 20–32)
CREAT SERPL-MCNC: 0.99 MG/DL (ref 0.52–1.04)
DIFFERENTIAL METHOD BLD: ABNORMAL
EOSINOPHIL # BLD AUTO: 0 10E9/L (ref 0–0.7)
EOSINOPHIL NFR BLD AUTO: 0 %
ERYTHROCYTE [DISTWIDTH] IN BLOOD BY AUTOMATED COUNT: 19.7 % (ref 10–15)
GFR SERPL CREATININE-BSD FRML MDRD: 58 ML/MIN/{1.73_M2}
GLUCOSE SERPL-MCNC: 95 MG/DL (ref 70–99)
HCT VFR BLD AUTO: 30.5 % (ref 35–47)
HGB BLD-MCNC: 9.9 G/DL (ref 11.7–15.7)
LYMPHOCYTES # BLD AUTO: 2.5 10E9/L (ref 0.8–5.3)
LYMPHOCYTES NFR BLD AUTO: 17 %
MAGNESIUM SERPL-MCNC: 2.2 MG/DL (ref 1.6–2.3)
MCH RBC QN AUTO: 29.1 PG (ref 26.5–33)
MCHC RBC AUTO-ENTMCNC: 32.5 G/DL (ref 31.5–36.5)
MCV RBC AUTO: 90 FL (ref 78–100)
MONOCYTES # BLD AUTO: 0.3 10E9/L (ref 0–1.3)
MONOCYTES NFR BLD AUTO: 2 %
NEUTROPHILS # BLD AUTO: 12.1 10E9/L (ref 1.6–8.3)
NEUTROPHILS NFR BLD AUTO: 81 %
PLATELET # BLD AUTO: 361 10E9/L (ref 150–450)
PLATELET # BLD EST: ABNORMAL 10*3/UL
POTASSIUM SERPL-SCNC: 4.5 MMOL/L (ref 3.4–5.3)
PROT SERPL-MCNC: 7.1 G/DL (ref 6.8–8.8)
RBC # BLD AUTO: 3.4 10E12/L (ref 3.8–5.2)
RBC MORPH BLD: ABNORMAL
SODIUM SERPL-SCNC: 136 MMOL/L (ref 133–144)
TSH SERPL DL<=0.005 MIU/L-ACNC: 1.94 MU/L (ref 0.4–4)
WBC # BLD AUTO: 14.9 10E9/L (ref 4–11)

## 2021-02-18 PROCEDURE — 96366 THER/PROPH/DIAG IV INF ADDON: CPT

## 2021-02-18 PROCEDURE — 250N000009 HC RX 250: Performed by: INTERNAL MEDICINE

## 2021-02-18 PROCEDURE — 36415 COLL VENOUS BLD VENIPUNCTURE: CPT | Performed by: INTERNAL MEDICINE

## 2021-02-18 PROCEDURE — 250N000011 HC RX IP 250 OP 636: Performed by: INTERNAL MEDICINE

## 2021-02-18 PROCEDURE — 85025 COMPLETE CBC W/AUTO DIFF WBC: CPT | Performed by: INTERNAL MEDICINE

## 2021-02-18 PROCEDURE — 96367 TX/PROPH/DG ADDL SEQ IV INF: CPT

## 2021-02-18 PROCEDURE — 96417 CHEMO IV INFUS EACH ADDL SEQ: CPT

## 2021-02-18 PROCEDURE — 84443 ASSAY THYROID STIM HORMONE: CPT | Performed by: INTERNAL MEDICINE

## 2021-02-18 PROCEDURE — 80053 COMPREHEN METABOLIC PANEL: CPT | Performed by: INTERNAL MEDICINE

## 2021-02-18 PROCEDURE — 96415 CHEMO IV INFUSION ADDL HR: CPT

## 2021-02-18 PROCEDURE — 96375 TX/PRO/DX INJ NEW DRUG ADDON: CPT

## 2021-02-18 PROCEDURE — 96413 CHEMO IV INFUSION 1 HR: CPT

## 2021-02-18 PROCEDURE — 96361 HYDRATE IV INFUSION ADD-ON: CPT

## 2021-02-18 PROCEDURE — 83735 ASSAY OF MAGNESIUM: CPT | Performed by: INTERNAL MEDICINE

## 2021-02-18 PROCEDURE — 258N000003 HC RX IP 258 OP 636: Performed by: INTERNAL MEDICINE

## 2021-02-18 RX ORDER — PALONOSETRON 0.05 MG/ML
0.25 INJECTION, SOLUTION INTRAVENOUS ONCE
Status: COMPLETED | OUTPATIENT
Start: 2021-02-18 | End: 2021-02-18

## 2021-02-18 RX ADMIN — PALONOSETRON 0.25 MG: 0.05 INJECTION, SOLUTION INTRAVENOUS at 09:47

## 2021-02-18 RX ADMIN — CISPLATIN 150 MG: 1 INJECTION, SOLUTION INTRAVENOUS at 11:47

## 2021-02-18 RX ADMIN — GEMCITABINE 2400 MG: 38 INJECTION, SOLUTION INTRAVENOUS at 10:39

## 2021-02-18 RX ADMIN — FOSAPREPITANT: 150 INJECTION, POWDER, LYOPHILIZED, FOR SOLUTION INTRAVENOUS at 09:56

## 2021-02-18 RX ADMIN — POTASSIUM CHLORIDE: 2 INJECTION, SOLUTION, CONCENTRATE INTRAVENOUS at 13:57

## 2021-02-18 RX ADMIN — FAMOTIDINE 20 MG: 10 INJECTION INTRAVENOUS at 09:51

## 2021-02-18 RX ADMIN — SODIUM CHLORIDE 1000 ML: 9 INJECTION, SOLUTION INTRAVENOUS at 09:31

## 2021-02-18 ASSESSMENT — PAIN SCALES - GENERAL: PAINLEVEL: NO PAIN (0)

## 2021-02-18 NOTE — PROGRESS NOTES
"Infusion Nursing Note:  Ijeoma Shah presents today for C4D1 Gemcitabine/Cisplatin.    Patient seen by provider today: No, Virtual visit with Dr. De Leon yesterday.   present during visit today: Not Applicable.    Note: Patient denies new symptoms or concerns including pain. Feels \"icky\"/fatigued usually during the last half of the day. Has upcoming appt with ENT to evaluate ongoing nasal congestion. VSS, afebrile.   Patient did not meet criteria for an asymptomatic covid-19 PCR test in infusion today. Patient declined the covid-19 test.    Intravenous Access:  Peripheral IV placed.    Treatment Conditions:  Lab Results   Component Value Date    HGB 11.0 02/02/2021     Lab Results   Component Value Date    WBC 5.5 02/02/2021      Lab Results   Component Value Date    ANEU 3.4 02/02/2021     Lab Results   Component Value Date     02/02/2021      Lab Results   Component Value Date     02/02/2021                   Lab Results   Component Value Date    POTASSIUM 3.4 02/02/2021           Lab Results   Component Value Date    MAG 1.6 02/02/2021            Lab Results   Component Value Date    CR 1.01 02/02/2021                   Lab Results   Component Value Date    SABINO 8.9 02/02/2021                Lab Results   Component Value Date    BILITOTAL 0.3 02/02/2021           Lab Results   Component Value Date    ALBUMIN 3.5 02/02/2021                    Lab Results   Component Value Date    ALT 33 02/02/2021           Lab Results   Component Value Date    AST 16 02/02/2021       Results reviewed, labs MET treatment parameters, ok to proceed with treatment.      Post Infusion Assessment:  Patient tolerated infusion without incident.  Blood return noted pre and post infusion.  Site patent and intact, free from redness, edema or discomfort.  No evidence of extravasations.  PIV access discontinued per protocol.       Discharge Plan:   Copy of AVS reviewed with patient and/or family.  Patient will " return 2/25 for next appointment.  Patient discharged in stable condition accompanied by: self.  Departure Mode: Ambulatory.    Glenny Farmer RN

## 2021-02-25 ENCOUNTER — INFUSION THERAPY VISIT (OUTPATIENT)
Dept: INFUSION THERAPY | Facility: CLINIC | Age: 71
End: 2021-02-25
Attending: INTERNAL MEDICINE
Payer: MEDICARE

## 2021-02-25 VITALS
WEIGHT: 170.1 LBS | OXYGEN SATURATION: 100 % | DIASTOLIC BLOOD PRESSURE: 72 MMHG | BODY MASS INDEX: 28.31 KG/M2 | SYSTOLIC BLOOD PRESSURE: 143 MMHG | HEART RATE: 75 BPM | TEMPERATURE: 97.8 F

## 2021-02-25 DIAGNOSIS — E83.42 HYPOMAGNESEMIA: Primary | ICD-10-CM

## 2021-02-25 DIAGNOSIS — T45.1X5A CHEMOTHERAPY-INDUCED NEUTROPENIA (H): ICD-10-CM

## 2021-02-25 DIAGNOSIS — D70.1 CHEMOTHERAPY-INDUCED NEUTROPENIA (H): ICD-10-CM

## 2021-02-25 DIAGNOSIS — C34.32 SQUAMOUS CELL CARCINOMA OF BRONCHUS IN LEFT LOWER LOBE (H): ICD-10-CM

## 2021-02-25 LAB
BASOPHILS # BLD AUTO: 0 10E9/L (ref 0–0.2)
BASOPHILS NFR BLD AUTO: 1 %
CREAT SERPL-MCNC: 1.04 MG/DL (ref 0.52–1.04)
DIFFERENTIAL METHOD BLD: ABNORMAL
EOSINOPHIL NFR BLD AUTO: 0.3 %
ERYTHROCYTE [DISTWIDTH] IN BLOOD BY AUTOMATED COUNT: 18.4 % (ref 10–15)
GFR SERPL CREATININE-BSD FRML MDRD: 55 ML/MIN/{1.73_M2}
HCT VFR BLD AUTO: 27 % (ref 35–47)
HGB BLD-MCNC: 8.9 G/DL (ref 11.7–15.7)
IMM GRANULOCYTES # BLD: 0 10E9/L (ref 0–0.4)
IMM GRANULOCYTES NFR BLD: 0.6 %
LYMPHOCYTES # BLD AUTO: 1.1 10E9/L (ref 0.8–5.3)
LYMPHOCYTES NFR BLD AUTO: 34 %
MCH RBC QN AUTO: 29.1 PG (ref 26.5–33)
MCHC RBC AUTO-ENTMCNC: 33 G/DL (ref 31.5–36.5)
MCV RBC AUTO: 88 FL (ref 78–100)
MONOCYTES # BLD AUTO: 0.1 10E9/L (ref 0–1.3)
MONOCYTES NFR BLD AUTO: 4.5 %
NEUTROPHILS # BLD AUTO: 1.8 10E9/L (ref 1.6–8.3)
NEUTROPHILS NFR BLD AUTO: 59.6 %
NRBC # BLD AUTO: 0 10*3/UL
NRBC BLD AUTO-RTO: 0 /100
PLATELET # BLD AUTO: 215 10E9/L (ref 150–450)
RBC # BLD AUTO: 3.06 10E12/L (ref 3.8–5.2)
WBC # BLD AUTO: 3.1 10E9/L (ref 4–11)

## 2021-02-25 PROCEDURE — 82565 ASSAY OF CREATININE: CPT | Performed by: INTERNAL MEDICINE

## 2021-02-25 PROCEDURE — 96413 CHEMO IV INFUSION 1 HR: CPT

## 2021-02-25 PROCEDURE — 250N000009 HC RX 250: Performed by: INTERNAL MEDICINE

## 2021-02-25 PROCEDURE — 96367 TX/PROPH/DG ADDL SEQ IV INF: CPT

## 2021-02-25 PROCEDURE — 258N000003 HC RX IP 258 OP 636: Performed by: INTERNAL MEDICINE

## 2021-02-25 PROCEDURE — 250N000011 HC RX IP 250 OP 636: Performed by: INTERNAL MEDICINE

## 2021-02-25 PROCEDURE — 85025 COMPLETE CBC W/AUTO DIFF WBC: CPT | Performed by: INTERNAL MEDICINE

## 2021-02-25 PROCEDURE — 96376 TX/PRO/DX INJ SAME DRUG ADON: CPT

## 2021-02-25 PROCEDURE — 96375 TX/PRO/DX INJ NEW DRUG ADDON: CPT

## 2021-02-25 PROCEDURE — 36415 COLL VENOUS BLD VENIPUNCTURE: CPT | Performed by: INTERNAL MEDICINE

## 2021-02-25 RX ADMIN — GEMCITABINE 2400 MG: 38 INJECTION, SOLUTION INTRAVENOUS at 11:34

## 2021-02-25 RX ADMIN — FAMOTIDINE 20 MG: 10 INJECTION INTRAVENOUS at 11:12

## 2021-02-25 RX ADMIN — FOSAPREPITANT 150 MG: 150 INJECTION, POWDER, LYOPHILIZED, FOR SOLUTION INTRAVENOUS at 10:42

## 2021-02-25 RX ADMIN — DEXAMETHASONE SODIUM PHOSPHATE 12 MG: 10 INJECTION, SOLUTION INTRAMUSCULAR; INTRAVENOUS at 10:21

## 2021-02-25 ASSESSMENT — PAIN SCALES - GENERAL: PAINLEVEL: NO PAIN (0)

## 2021-02-25 NOTE — PROGRESS NOTES
Infusion Nursing Note:  Ijeoma CARLITA Pablo presents today for C4D8 Gemzar.    Patient seen by provider today: No   present during visit today: Not Applicable.    Note: N/A.  Patient  did meet criteria for an asymptomatic covid-19 PCR test in infusion today. Patient  declined the covid-19 test.    Intravenous Access:  Peripheral IV placed.    Treatment Conditions:  Lab Results   Component Value Date    HGB 8.9 02/25/2021     Lab Results   Component Value Date    WBC 3.1 02/25/2021      Lab Results   Component Value Date    ANEU 1.8 02/25/2021     Lab Results   Component Value Date     02/25/2021      Lab Results   Component Value Date     02/18/2021                   Lab Results   Component Value Date    POTASSIUM 4.5 02/18/2021           Lab Results   Component Value Date    MAG 2.2 02/18/2021            Lab Results   Component Value Date    CR 1.04 02/25/2021                   Lab Results   Component Value Date    SABINO 9.2 02/18/2021                Lab Results   Component Value Date    BILITOTAL 0.2 02/18/2021           Lab Results   Component Value Date    ALBUMIN 3.5 02/18/2021                    Lab Results   Component Value Date    ALT 32 02/18/2021           Lab Results   Component Value Date    AST 15 02/18/2021       Results reviewed, labs MET treatment parameters, ok to proceed with treatment.      Post Infusion Assessment:  Patient tolerated infusion without incident.  Patient observed for 15 minutes post infusion per protocol.  Blood return noted pre and post infusion.  Site patent and intact, free from redness, edema or discomfort.  Access discontinued per protocol.       Discharge Plan:   Discharge instructions reviewed with: Patient.  Patient and/or family verbalized understanding of discharge instructions and all questions answered.  Patient discharged in stable condition accompanied by: self.  Departure Mode: Ambulatory.    Bhakti Pérez RN

## 2021-02-26 ENCOUNTER — MYC MEDICAL ADVICE (OUTPATIENT)
Dept: FAMILY MEDICINE | Facility: CLINIC | Age: 71
End: 2021-02-26

## 2021-03-03 ENCOUNTER — DOCUMENTATION ONLY (OUTPATIENT)
Dept: ONCOLOGY | Facility: CLINIC | Age: 71
End: 2021-03-03

## 2021-03-03 DIAGNOSIS — J43.9 PULMONARY EMPHYSEMA, UNSPECIFIED EMPHYSEMA TYPE (H): ICD-10-CM

## 2021-03-05 ENCOUNTER — IMMUNIZATION (OUTPATIENT)
Dept: FAMILY MEDICINE | Facility: CLINIC | Age: 71
End: 2021-03-05
Payer: MEDICARE

## 2021-03-05 PROCEDURE — 91300 PR COVID VAC PFIZER DIL RECON 30 MCG/0.3 ML IM: CPT

## 2021-03-05 PROCEDURE — 0001A PR COVID VAC PFIZER DIL RECON 30 MCG/0.3 ML IM: CPT

## 2021-03-10 ENCOUNTER — HOSPITAL ENCOUNTER (OUTPATIENT)
Dept: ULTRASOUND IMAGING | Facility: CLINIC | Age: 71
Discharge: HOME | End: 2021-03-10
Attending: INTERNAL MEDICINE | Admitting: INTERNAL MEDICINE
Payer: MEDICARE

## 2021-03-10 DIAGNOSIS — C34.32 SQUAMOUS CELL CARCINOMA OF BRONCHUS IN LEFT LOWER LOBE (H): ICD-10-CM

## 2021-03-10 DIAGNOSIS — E83.42 HYPOMAGNESEMIA: ICD-10-CM

## 2021-03-10 DIAGNOSIS — C34.32 SQUAMOUS CELL CARCINOMA OF BRONCHUS IN LEFT LOWER LOBE (H): Primary | ICD-10-CM

## 2021-03-10 DIAGNOSIS — K80.20 GALLSTONES: ICD-10-CM

## 2021-03-10 LAB
ALBUMIN SERPL-MCNC: 3.6 G/DL (ref 3.4–5)
ALP SERPL-CCNC: 84 U/L (ref 40–150)
ALT SERPL W P-5'-P-CCNC: 22 U/L (ref 0–50)
ANION GAP SERPL CALCULATED.3IONS-SCNC: 4 MMOL/L (ref 3–14)
AST SERPL W P-5'-P-CCNC: 13 U/L (ref 0–45)
BASOPHILS # BLD AUTO: 0 10E9/L (ref 0–0.2)
BASOPHILS NFR BLD AUTO: 0 %
BILIRUB SERPL-MCNC: 0.3 MG/DL (ref 0.2–1.3)
BUN SERPL-MCNC: 12 MG/DL (ref 7–30)
CALCIUM SERPL-MCNC: 9.1 MG/DL (ref 8.5–10.1)
CHLORIDE SERPL-SCNC: 107 MMOL/L (ref 94–109)
CO2 SERPL-SCNC: 29 MMOL/L (ref 20–32)
CREAT SERPL-MCNC: 1.17 MG/DL (ref 0.52–1.04)
DIFFERENTIAL METHOD BLD: ABNORMAL
EOSINOPHIL # BLD AUTO: 0.2 10E9/L (ref 0–0.7)
EOSINOPHIL NFR BLD AUTO: 6.3 %
ERYTHROCYTE [DISTWIDTH] IN BLOOD BY AUTOMATED COUNT: 21.6 % (ref 10–15)
GFR SERPL CREATININE-BSD FRML MDRD: 47 ML/MIN/{1.73_M2}
GLUCOSE SERPL-MCNC: 96 MG/DL (ref 70–99)
HCT VFR BLD AUTO: 25.9 % (ref 35–47)
HGB BLD-MCNC: 8.3 G/DL (ref 11.7–15.7)
IMM GRANULOCYTES # BLD: 0 10E9/L (ref 0–0.4)
IMM GRANULOCYTES NFR BLD: 0.3 %
LYMPHOCYTES # BLD AUTO: 0.5 10E9/L (ref 0.8–5.3)
LYMPHOCYTES NFR BLD AUTO: 17.8 %
MAGNESIUM SERPL-MCNC: 2 MG/DL (ref 1.6–2.3)
MCH RBC QN AUTO: 31 PG (ref 26.5–33)
MCHC RBC AUTO-ENTMCNC: 32 G/DL (ref 31.5–36.5)
MCV RBC AUTO: 97 FL (ref 78–100)
MONOCYTES # BLD AUTO: 0.4 10E9/L (ref 0–1.3)
MONOCYTES NFR BLD AUTO: 13.9 %
NEUTROPHILS # BLD AUTO: 1.8 10E9/L (ref 1.6–8.3)
NEUTROPHILS NFR BLD AUTO: 61.7 %
NRBC # BLD AUTO: 0 10*3/UL
NRBC BLD AUTO-RTO: 0 /100
PLATELET # BLD AUTO: 205 10E9/L (ref 150–450)
POTASSIUM SERPL-SCNC: 4.4 MMOL/L (ref 3.4–5.3)
PROT SERPL-MCNC: 6.6 G/DL (ref 6.8–8.8)
RBC # BLD AUTO: 2.68 10E12/L (ref 3.8–5.2)
SODIUM SERPL-SCNC: 140 MMOL/L (ref 133–144)
WBC # BLD AUTO: 2.9 10E9/L (ref 4–11)

## 2021-03-10 PROCEDURE — 85025 COMPLETE CBC W/AUTO DIFF WBC: CPT | Performed by: INTERNAL MEDICINE

## 2021-03-10 PROCEDURE — 83735 ASSAY OF MAGNESIUM: CPT | Performed by: INTERNAL MEDICINE

## 2021-03-10 PROCEDURE — 76705 ECHO EXAM OF ABDOMEN: CPT

## 2021-03-10 PROCEDURE — 36415 COLL VENOUS BLD VENIPUNCTURE: CPT | Performed by: INTERNAL MEDICINE

## 2021-03-10 PROCEDURE — 80053 COMPREHEN METABOLIC PANEL: CPT | Performed by: INTERNAL MEDICINE

## 2021-03-11 ENCOUNTER — VIRTUAL VISIT (OUTPATIENT)
Dept: ONCOLOGY | Facility: CLINIC | Age: 71
End: 2021-03-11
Attending: INTERNAL MEDICINE
Payer: MEDICARE

## 2021-03-11 DIAGNOSIS — C34.32 SQUAMOUS CELL CARCINOMA OF BRONCHUS IN LEFT LOWER LOBE (H): Primary | ICD-10-CM

## 2021-03-11 DIAGNOSIS — K80.20 CALCULUS OF GALLBLADDER WITHOUT CHOLECYSTITIS WITHOUT OBSTRUCTION: ICD-10-CM

## 2021-03-11 PROCEDURE — 99215 OFFICE O/P EST HI 40 MIN: CPT | Mod: 95 | Performed by: NURSE PRACTITIONER

## 2021-03-11 PROCEDURE — 999N001193 HC VIDEO/TELEPHONE VISIT; NO CHARGE

## 2021-03-11 NOTE — LETTER
"    3/11/2021         RE: Ijeoma Shah  [address] [facility] [address]        Dear Colleague,    Thank you for referring your patient, Ijeoma Shah, to the [facility]. Please see a copy of my visit note below.    Salima is a 70 year old who is being evaluated via a billable video visit.      How would you like to obtain your AVS? MyChart  If the video visit is dropped, the invitation should be resent by: Text to cell phone: [phone]  Will anyone else be joining your video visit? No    Vitals - Patient Reported  Weight (Patient Reported): 77.1 kg (170 lb)  Height (Patient Reported): 165.1 cm (5' 5\")  BMI (Based on Pt Reported Ht/Wt): 28.29  Pain Score: Mild Pain (2)  Pain Loc: (BY RIBS)  Video Start Time: 1109  Video-Visit Details    Type of service:  Video Visit    Video End Time:1126    Originating Location (pt. Location): Home    Distant Location (provider location):  [facility]     Platform used for Video Visit: Steven Zamudio MA      Reason for Visit: f/u lung cancer, f/u gallbladder thickening    Oncology HPI:   CANCER TYPE: SCC lung, poorly differentiated  STAGE: pT4N0 (IIIA)     Cancer Staging  Squamous cell carcinoma of left lung (H)  Staging form: Lung, AJCC 8th Edition  - Pathologic stage from 11/17/2020: Stage IIIA (pT4, pN0, cM0) - Signed by Eneida De Leon MD on 11/17/2020     PD-L1: TPS 15% on [id number] lobectomy, <1% on [id number] (LLL bx)  Lung panel: SMO [id number] on LLL bx, negative for fusions on lobectomy specimen.   NGS: N/A     SUMMARY  7/3/20                 CT chest (screening). 6.7 cm LLL mass, 0.6 cm RML nodule, mediastinal and L hilar LNs  7/9/20                PET/CT. 7.1 x 5.6 cm LLL mass (SUV 19.6), post obstructive pneumonitis LLL inferior to the mass (SUV 2.8), 3.5 x 1.7 cm 4L node (SUV 5.9), L adrenal nodule 1.1 cm (SUV 4), indeterminate pulmonary nodules, pancreatic cysts, not " "hypermetabolic  7/27/20              Bronch, EBUS (Dr. Castro). 10R, 11R, 4R too small to biopsy. Stations 7, 4L, 11L negative for malignancy. LLL mass bx: SCC  8/12/20              Brain MRI negative  9/1/20                EUS to evaluate adrenal and 4L (Dr. Hogan). Both negative for malignancy. Adrenal with bland adrenal cells  10/22/20            L VATS, converted to thoracotomy, LLL lobectomy, MLND, R pulmonary arterioplasty (Dr. Sanabria, Dr. Davis). 8.3 cm poorly differentiated SCC, +angiolymphatic invasion  12/3/20~ 2/25/21    Completed 4 cycles of adjuvant Cisplatin gemcitabine. C2D8 delayed 1 week due to neutropenia, added neulasta    Interval history: Salima is noting she is starting to recover from her last chemotherapy cycle. Remains fatigued, but is worse on the first couple of weeks after chemotherapy. Nausea is improved. Appetite is stable. Denies RUQ pain after meals, fatty stools, cramping, diarrhea. Has noted pain along the R lateral rib cage for \" months\" but hasn't been associated with meals. Urine flow is slow sometimes, no dysuria/hematuria.   -no fevers/chills.    Current Outpatient Medications   Medication Sig Dispense Refill     acetaminophen (TYLENOL) 325 MG tablet Take 1-2 tablets (325-650 mg) by mouth every 6 hours as needed for mild pain 60 tablet 0     albuterol (PROAIR HFA/PROVENTIL HFA/VENTOLIN HFA) 108 (90 Base) MCG/ACT inhaler Inhale 1-2 puffs into the lungs every 6 hours (Patient not taking: Reported on 2/25/2021) 1 Inhaler 11     amiodarone (PACERONE) 200 MG tablet Take 1 tablet (200 mg) by mouth daily 90 tablet 1     amLODIPine (NORVASC) 10 MG tablet Take 1 tablet (10 mg) by mouth daily 90 tablet 0     dexamethasone (DECADRON) 4 MG tablet Take 2 tablets (8 mg) by mouth daily (with breakfast) Start taking daily the day after chemotherapy for 3 days 6 tablet 0     famotidine (PEPCID) 20 MG tablet Take 1 tablet (20 mg) by mouth 2 times daily (Patient taking differently: Take " 20 mg by mouth 2 times daily as needed ) 60 tablet 1     fluticasone (FLONASE) 50 MCG/ACT nasal spray Spray 1 spray into both nostrils daily 9.9 mL 1     fluticasone-vilanterol (BREO ELLIPTA) 100-25 MCG/INH inhaler Inhale 1 puff into the lungs daily 1 each 11     ketoconazole (NIZORAL) 2 % external cream Apply topically daily 60 g 1     lisinopril (ZESTRIL) 20 MG tablet Take 1 tablet (20 mg) by mouth daily 30 tablet 11     loratadine (CLARITIN) 10 MG tablet Take 1 tablet (10 mg) by mouth daily 30 tablet 1     LORazepam (ATIVAN) 0.5 MG tablet Take 1 tablet (0.5 mg) by mouth every 4 hours as needed (Nausea/Vomiting) 30 tablet 0     OLANZapine (ZYPREXA) 5 MG tablet Take 1 tablet (5 mg) by mouth At Bedtime Starting night of chemotherapy x7 days. 30 tablet 0     ondansetron (ZOFRAN) 8 MG tablet Take 1 tablet (8 mg) by mouth every 8 hours as needed for nausea 30 tablet 1     oxyCODONE (ROXICODONE) 5 MG tablet Take 1-2 tablets (5-10 mg) by mouth every 4 hours as needed for moderate to severe pain (Patient not taking: Reported on 12/9/2020) 30 tablet 0     prochlorperazine (COMPAZINE) 10 MG tablet Take 0.5 tablets (5 mg) by mouth every 6 hours as needed (Nausea/Vomiting) (Patient not taking: Reported on 12/29/2020) 30 tablet 11     rivaroxaban ANTICOAGULANT (XARELTO) 20 MG TABS tablet Take 1 tablet (20 mg) by mouth daily (with dinner) 90 tablet 3     triamcinolone (KENALOG) 0.1 % external ointment APPLY  OINTMENT TOPICALLY TWICE DAILY OR  AS  NEEDED 45 g 0          Allergies   Allergen Reactions     Bee Venom Hives     Hot and sweating          Video physical exam  General: Patient appears well in no acute distress.   Skin: No visualized rash or lesions on visualized skin  Eyes: EOMI, no erythema, sclera icterus or discharge noted  Resp: Appears to be breathing comfortably without accessory muscle usage, speaking in full sentences, no cough  MSK: Appears to have normal range of motion based on visualized  movements  Neurologic: No apparent tremors, facial movements symmetric  Psych: affect pleasant, alert and oriented    The rest of a comprehensive physical examination is deferred due to PHE (public health emergency) video restrictions     not currently breastfeeding.  Wt Readings from Last 4 Encounters:   02/25/21 77.2 kg (170 lb 1.6 oz)   02/18/21 78 kg (171 lb 14.4 oz)   02/02/21 78.2 kg (172 lb 8 oz)   01/26/21 77.2 kg (170 lb 4.8 oz)     Labs: Results for MAYCO VEGA (MRN [id number]) as of 3/11/2021 18:32   Ref. Range 3/10/2021 07:52   Sodium Latest Ref Range: 133 - 144 mmol/L 140   Potassium Latest Ref Range: 3.4 - 5.3 mmol/L 4.4   Chloride Latest Ref Range: 94 - 109 mmol/L 107   Carbon Dioxide Latest Ref Range: 20 - 32 mmol/L 29   Urea Nitrogen Latest Ref Range: 7 - 30 mg/dL 12   Creatinine Latest Ref Range: 0.52 - 1.04 mg/dL 1.17 (H)   GFR Estimate Latest Ref Range: >60 mL/min/1.73_m2 47 (L)   GFR Estimate If Black Latest Ref Range: >60 mL/min/1.73_m2 55 (L)   Calcium Latest Ref Range: 8.5 - 10.1 mg/dL 9.1   Anion Gap Latest Ref Range: 3 - 14 mmol/L 4   Magnesium Latest Ref Range: 1.6 - 2.3 mg/dL 2.0   Albumin Latest Ref Range: 3.4 - 5.0 g/dL 3.6   Protein Total Latest Ref Range: 6.8 - 8.8 g/dL 6.6 (L)   Bilirubin Total Latest Ref Range: 0.2 - 1.3 mg/dL 0.3   Alkaline Phosphatase Latest Ref Range: 40 - 150 U/L 84   ALT Latest Ref Range: 0 - 50 U/L 22   AST Latest Ref Range: 0 - 45 U/L 13   Glucose Latest Ref Range: 70 - 99 mg/dL 96   WBC Latest Ref Range: 4.0 - 11.0 10e9/L 2.9 (L)   Hemoglobin Latest Ref Range: 11.7 - 15.7 g/dL 8.3 (L)   Hematocrit Latest Ref Range: 35.0 - 47.0 % 25.9 (L)   Platelet Count Latest Ref Range: 150 - 450 10e9/L 205   RBC Count Latest Ref Range: 3.8 - 5.2 10e12/L 2.68 (L)   MCV Latest Ref Range: 78 - 100 fl 97   MCH Latest Ref Range: 26.5 - 33.0 pg 31.0   MCHC Latest Ref Range: 31.5 - 36.5 g/dL 32.0   RDW Latest Ref Range: 10.0 - 15.0 % 21.6 (H)   Diff Method Unknown  Automated Method   % Neutrophils Latest Units: % 61.7   % Lymphocytes Latest Units: % 17.8   % Monocytes Latest Units: % 13.9   % Eosinophils Latest Units: % 6.3   % Basophils Latest Units: % 0.0   % Immature Granulocytes Latest Units: % 0.3   Nucleated RBCs Latest Ref Range: 0 /100 0   Absolute Neutrophil Latest Ref Range: 1.6 - 8.3 10e9/L 1.8   Absolute Lymphocytes Latest Ref Range: 0.8 - 5.3 10e9/L 0.5 (L)   Absolute Monocytes Latest Ref Range: 0.0 - 1.3 10e9/L 0.4   Absolute Eosinophils Latest Ref Range: 0.0 - 0.7 10e9/L 0.2   Absolute Basophils Latest Ref Range: 0.0 - 0.2 10e9/L 0.0   Abs Immature Granulocytes Latest Ref Range: 0 - 0.4 10e9/L 0.0   Absolute Nucleated RBC Unknown 0.0       Imaging:   ULTRASOUND ABDOMEN LIMITED March 10, 2021 8:30 AM     CLINICAL HISTORY: Evaluate impacted gallstone seen on CT. No  cholecystitis symptoms. Gallstones.     TECHNIQUE: Limited abdominal ultrasound.     COMPARISON: CT chest, abdomen and pelvis dated 2/15/2021. CT/PET dated  7/9/2020.     FINDINGS:     GALLBLADDER: The gallbladder wall is abnormally thickened at 0.6 cm.  Gallbladder is filled with calculi, causing the wall echo shadow sign  limiting evaluation of the gallbladder. The largest stone measures up  to 3.0 cm. No sonographic Blackburn's sign or pericholecystic fluid.     BILE DUCTS: There is no biliary dilatation. The common duct measures 7  mm.     LIVER: Two simple appearing cysts are again seen in the liver  measuring 2.3 x 1.1 x 1.7 cm and 3.2 x 1.8 x 3.2 cm. These correspond  with cysts in the left lobe of the liver on prior CT dated 2/15/2021.  The other smaller cysts seen in the liver on the prior CT are not  identified on this ultrasound. No intrahepatic biliary ductal  dilatation. The liver is diffusely hyperechogenic, as compared to the  right kidney, most consistent with diffuse fatty infiltration.     RIGHT KIDNEY: No hydronephrosis.     PANCREAS: Cysts in the head of the pancreas measuring 1.3 x  1.1 x 0.7  cm and 1.3 x 0.9 x 1.3 cm corresponding with cysts in the pancreas on  the prior CT dated 2/15/2021 and CT/PET dated 7/9/2020. These have not  significantly increased in size since prior CT/PET scan dated 7/9/2020     No ascites.                                                                      IMPRESSION:  1. Abnormal thickened gallbladder wall is 0.6 cm. Upper normal limits  at 0.3 cm.  2. Gallbladder is contracted around multiple gallstones measuring up  to 3.0 cm.  3. No evidence for pericholecystic fluid or sonographic Blackburn's sign  to confirm acute cholecystitis although given the cholelithiasis and  abnormal thickening of the gallbladder wall, acute cholecystitis is  considered a possibility.  4. Hepatic cysts are again noted.  5. Cystic structures in the pancreatic head are again seen, similar to  the prior CT abdomen and CT/PET. These are highly likely benign.  Follow-up MRI versus CT of the pancreas is recommended in one year.   6. Diffuse fatty infiltration of the liver.     TARAN GARCIA MD    Impression/plan:   SCC lung, [id number], IIIA, R0 resection, discrepant PD-L1  -has now completed adjuvant treatment with cisplatin/gem  X 4 planned cycles, starting 12/3/20, ending 2/25/21  -recovering from the side effects. Expect that to continue to improve as she gets further out from treatment  -reviewed the plan for follow-up with a CT and f/u with Dr. De Leon in 1 month to follow up on lung nodules    Cholelithiasis/ thickened GB  -has some chronic R lateral rib pain, but no nausea/RUQ pain, dyspepsia, bowel irregularity  -reviewed her US with her. Reviewed potential complications including biliary obstruction, cholecystitis. Reviewed symptoms that would require urgent attention including RUQ abdominal pain, fevers. Recommended she have a surgical consultation to discuss possible cholecystectomy. She was agreeable    COPD/ dyspnea  -on breo and albuterol inhaler  -following with Dr. Teddy GUPTA  fib  -on amiodarone through the end of chemo  -off metoprolol since 12/1 as in NSR   -anticoagulation with rivaroxaban  -following with Dr. Goldstein in cardiology, Dr. Espinal next week    HTN  -started lisinopril in mid- Dec, amolidipine added 12/24    Fatigue/hair thinning  -likely s/t chemotherapy, TSH was normal. Should improve as she gets further out from completion of treatment    Nausea  -improving now that she has completed her cancer treatment     40 minutes spent on the date of the encounter doing chart review, review of test results, interpretation of tests, patient visit and documentation       Again, thank you for allowing me to participate in the care of your patient.        Sincerely,        RUDY Carlson CNP

## 2021-03-11 NOTE — PROGRESS NOTES
"Salima is a 70 year old who is being evaluated via a billable video visit.      How would you like to obtain your AVS? MyChart  If the video visit is dropped, the invitation should be resent by: Text to cell phone: [phone]  Will anyone else be joining your video visit? No    Vitals - Patient Reported  Weight (Patient Reported): 77.1 kg (170 lb)  Height (Patient Reported): 165.1 cm (5' 5\")  BMI (Based on Pt Reported Ht/Wt): 28.29  Pain Score: Mild Pain (2)  Pain Loc: (BY RIBS)  Video Start Time: 1109  Video-Visit Details    Type of service:  Video Visit    Video End Time:1126    Originating Location (pt. Location): Home    Distant Location (provider location):  [facility] CANCER [facility]     Platform used for Video Visit: [employer]     Chloé Zamudio MA      Reason for Visit: f/u lung cancer, f/u gallbladder thickening    Oncology HPI:   CANCER TYPE: SCC lung, poorly differentiated  STAGE: pT4N0 (IIIA)     Cancer Staging  Squamous cell carcinoma of left lung (H)  Staging form: Lung, AJCC 8th Edition  - Pathologic stage from 11/17/2020: Stage IIIA (pT4, pN0, cM0) - Signed by Eneida De Leon MD on 11/17/2020     PD-L1: TPS 15% on [id number] lobectomy, <1% on [id number] (LLL bx)  Lung panel: SMO [id number] on LLL bx, negative for fusions on lobectomy specimen.   NGS: N/A     SUMMARY  7/3/20                 CT chest (screening). 6.7 cm LLL mass, 0.6 cm RML nodule, mediastinal and L hilar LNs  7/9/20                PET/CT. 7.1 x 5.6 cm LLL mass (SUV 19.6), post obstructive pneumonitis LLL inferior to the mass (SUV 2.8), 3.5 x 1.7 cm 4L node (SUV 5.9), L adrenal nodule 1.1 cm (SUV 4), indeterminate pulmonary nodules, pancreatic cysts, not hypermetabolic  7/27/20              Bronch, EBUS (Dr. Castro). 10R, 11R, 4R too small to biopsy. Stations 7, 4L, 11L negative for malignancy. LLL mass bx: SCC  8/12/20              Brain MRI negative  9/1/20                EUS to evaluate adrenal and 4L (Dr. Hogan). Both " "negative for malignancy. Adrenal with bland adrenal cells  10/22/20            L VATS, converted to thoracotomy, LLL lobectomy, MLND, R pulmonary arterioplasty (Dr. Sanabria, Dr. Davis). 8.3 cm poorly differentiated SCC, +angiolymphatic invasion  12/3/20~ 2/25/21    Completed 4 cycles of adjuvant Cisplatin gemcitabine. C2D8 delayed 1 week due to neutropenia, added neulasta    Interval history: Salima is noting she is starting to recover from her last chemotherapy cycle. Remains fatigued, but is worse on the first couple of weeks after chemotherapy. Nausea is improved. Appetite is stable. Denies RUQ pain after meals, fatty stools, cramping, diarrhea. Has noted pain along the R lateral rib cage for \" months\" but hasn't been associated with meals. Urine flow is slow sometimes, no dysuria/hematuria.   -no fevers/chills.    Current Outpatient Medications   Medication Sig Dispense Refill     acetaminophen (TYLENOL) 325 MG tablet Take 1-2 tablets (325-650 mg) by mouth every 6 hours as needed for mild pain 60 tablet 0     albuterol (PROAIR HFA/PROVENTIL HFA/VENTOLIN HFA) 108 (90 Base) MCG/ACT inhaler Inhale 1-2 puffs into the lungs every 6 hours (Patient not taking: Reported on 2/25/2021) 1 Inhaler 11     amiodarone (PACERONE) 200 MG tablet Take 1 tablet (200 mg) by mouth daily 90 tablet 1     amLODIPine (NORVASC) 10 MG tablet Take 1 tablet (10 mg) by mouth daily 90 tablet 0     dexamethasone (DECADRON) 4 MG tablet Take 2 tablets (8 mg) by mouth daily (with breakfast) Start taking daily the day after chemotherapy for 3 days 6 tablet 0     famotidine (PEPCID) 20 MG tablet Take 1 tablet (20 mg) by mouth 2 times daily (Patient taking differently: Take 20 mg by mouth 2 times daily as needed ) 60 tablet 1     fluticasone (FLONASE) 50 MCG/ACT nasal spray Spray 1 spray into both nostrils daily 9.9 mL 1     fluticasone-vilanterol (BREO ELLIPTA) 100-25 MCG/INH inhaler Inhale 1 puff into the lungs daily 1 each 11     " ketoconazole (NIZORAL) 2 % external cream Apply topically daily 60 g 1     lisinopril (ZESTRIL) 20 MG tablet Take 1 tablet (20 mg) by mouth daily 30 tablet 11     loratadine (CLARITIN) 10 MG tablet Take 1 tablet (10 mg) by mouth daily 30 tablet 1     LORazepam (ATIVAN) 0.5 MG tablet Take 1 tablet (0.5 mg) by mouth every 4 hours as needed (Nausea/Vomiting) 30 tablet 0     OLANZapine (ZYPREXA) 5 MG tablet Take 1 tablet (5 mg) by mouth At Bedtime Starting night of chemotherapy x7 days. 30 tablet 0     ondansetron (ZOFRAN) 8 MG tablet Take 1 tablet (8 mg) by mouth every 8 hours as needed for nausea 30 tablet 1     oxyCODONE (ROXICODONE) 5 MG tablet Take 1-2 tablets (5-10 mg) by mouth every 4 hours as needed for moderate to severe pain (Patient not taking: Reported on 12/9/2020) 30 tablet 0     prochlorperazine (COMPAZINE) 10 MG tablet Take 0.5 tablets (5 mg) by mouth every 6 hours as needed (Nausea/Vomiting) (Patient not taking: Reported on 12/29/2020) 30 tablet 11     rivaroxaban ANTICOAGULANT (XARELTO) 20 MG TABS tablet Take 1 tablet (20 mg) by mouth daily (with dinner) 90 tablet 3     triamcinolone (KENALOG) 0.1 % external ointment APPLY  OINTMENT TOPICALLY TWICE DAILY OR  AS  NEEDED 45 g 0          Allergies   Allergen Reactions     Bee Venom Hives     Hot and sweating          Video physical exam  General: Patient appears well in no acute distress.   Skin: No visualized rash or lesions on visualized skin  Eyes: EOMI, no erythema, sclera icterus or discharge noted  Resp: Appears to be breathing comfortably without accessory muscle usage, speaking in full sentences, no cough  MSK: Appears to have normal range of motion based on visualized movements  Neurologic: No apparent tremors, facial movements symmetric  Psych: affect pleasant, alert and oriented    The rest of a comprehensive physical examination is deferred due to PHE (public health emergency) video restrictions     not currently breastfeeding.  Wt Readings  from Last 4 Encounters:   02/25/21 77.2 kg (170 lb 1.6 oz)   02/18/21 78 kg (171 lb 14.4 oz)   02/02/21 78.2 kg (172 lb 8 oz)   01/26/21 77.2 kg (170 lb 4.8 oz)     Labs: Results for MAYCO VEGA (MRN [id number]) as of 3/11/2021 18:32   Ref. Range 3/10/2021 07:52   Sodium Latest Ref Range: 133 - 144 mmol/L 140   Potassium Latest Ref Range: 3.4 - 5.3 mmol/L 4.4   Chloride Latest Ref Range: 94 - 109 mmol/L 107   Carbon Dioxide Latest Ref Range: 20 - 32 mmol/L 29   Urea Nitrogen Latest Ref Range: 7 - 30 mg/dL 12   Creatinine Latest Ref Range: 0.52 - 1.04 mg/dL 1.17 (H)   GFR Estimate Latest Ref Range: >60 mL/min/1.73_m2 47 (L)   GFR Estimate If Black Latest Ref Range: >60 mL/min/1.73_m2 55 (L)   Calcium Latest Ref Range: 8.5 - 10.1 mg/dL 9.1   Anion Gap Latest Ref Range: 3 - 14 mmol/L 4   Magnesium Latest Ref Range: 1.6 - 2.3 mg/dL 2.0   Albumin Latest Ref Range: 3.4 - 5.0 g/dL 3.6   Protein Total Latest Ref Range: 6.8 - 8.8 g/dL 6.6 (L)   Bilirubin Total Latest Ref Range: 0.2 - 1.3 mg/dL 0.3   Alkaline Phosphatase Latest Ref Range: 40 - 150 U/L 84   ALT Latest Ref Range: 0 - 50 U/L 22   AST Latest Ref Range: 0 - 45 U/L 13   Glucose Latest Ref Range: 70 - 99 mg/dL 96   WBC Latest Ref Range: 4.0 - 11.0 10e9/L 2.9 (L)   Hemoglobin Latest Ref Range: 11.7 - 15.7 g/dL 8.3 (L)   Hematocrit Latest Ref Range: 35.0 - 47.0 % 25.9 (L)   Platelet Count Latest Ref Range: 150 - 450 10e9/L 205   RBC Count Latest Ref Range: 3.8 - 5.2 10e12/L 2.68 (L)   MCV Latest Ref Range: 78 - 100 fl 97   MCH Latest Ref Range: 26.5 - 33.0 pg 31.0   MCHC Latest Ref Range: 31.5 - 36.5 g/dL 32.0   RDW Latest Ref Range: 10.0 - 15.0 % 21.6 (H)   Diff Method Unknown Automated Method   % Neutrophils Latest Units: % 61.7   % Lymphocytes Latest Units: % 17.8   % Monocytes Latest Units: % 13.9   % Eosinophils Latest Units: % 6.3   % Basophils Latest Units: % 0.0   % Immature Granulocytes Latest Units: % 0.3   Nucleated RBCs Latest Ref Range: 0 /100 0    Absolute Neutrophil Latest Ref Range: 1.6 - 8.3 10e9/L 1.8   Absolute Lymphocytes Latest Ref Range: 0.8 - 5.3 10e9/L 0.5 (L)   Absolute Monocytes Latest Ref Range: 0.0 - 1.3 10e9/L 0.4   Absolute Eosinophils Latest Ref Range: 0.0 - 0.7 10e9/L 0.2   Absolute Basophils Latest Ref Range: 0.0 - 0.2 10e9/L 0.0   Abs Immature Granulocytes Latest Ref Range: 0 - 0.4 10e9/L 0.0   Absolute Nucleated RBC Unknown 0.0       Imaging:   ULTRASOUND ABDOMEN LIMITED March 10, 2021 8:30 AM     CLINICAL HISTORY: Evaluate impacted gallstone seen on CT. No  cholecystitis symptoms. Gallstones.     TECHNIQUE: Limited abdominal ultrasound.     COMPARISON: CT chest, abdomen and pelvis dated 2/15/2021. CT/PET dated  7/9/2020.     FINDINGS:     GALLBLADDER: The gallbladder wall is abnormally thickened at 0.6 cm.  Gallbladder is filled with calculi, causing the wall echo shadow sign  limiting evaluation of the gallbladder. The largest stone measures up  to 3.0 cm. No sonographic Blackburn's sign or pericholecystic fluid.     BILE DUCTS: There is no biliary dilatation. The common duct measures 7  mm.     LIVER: Two simple appearing cysts are again seen in the liver  measuring 2.3 x 1.1 x 1.7 cm and 3.2 x 1.8 x 3.2 cm. These correspond  with cysts in the left lobe of the liver on prior CT dated 2/15/2021.  The other smaller cysts seen in the liver on the prior CT are not  identified on this ultrasound. No intrahepatic biliary ductal  dilatation. The liver is diffusely hyperechogenic, as compared to the  right kidney, most consistent with diffuse fatty infiltration.     RIGHT KIDNEY: No hydronephrosis.     PANCREAS: Cysts in the head of the pancreas measuring 1.3 x 1.1 x 0.7  cm and 1.3 x 0.9 x 1.3 cm corresponding with cysts in the pancreas on  the prior CT dated 2/15/2021 and CT/PET dated 7/9/2020. These have not  significantly increased in size since prior CT/PET scan dated 7/9/2020     No ascites.                                                                       IMPRESSION:  1. Abnormal thickened gallbladder wall is 0.6 cm. Upper normal limits  at 0.3 cm.  2. Gallbladder is contracted around multiple gallstones measuring up  to 3.0 cm.  3. No evidence for pericholecystic fluid or sonographic Blackburn's sign  to confirm acute cholecystitis although given the cholelithiasis and  abnormal thickening of the gallbladder wall, acute cholecystitis is  considered a possibility.  4. Hepatic cysts are again noted.  5. Cystic structures in the pancreatic head are again seen, similar to  the prior CT abdomen and CT/PET. These are highly likely benign.  Follow-up MRI versus CT of the pancreas is recommended in one year.   6. Diffuse fatty infiltration of the liver.     TARAN GARCIA MD    Impression/plan:   SCC lung, [id number], IIIA, R0 resection, discrepant PD-L1  -has now completed adjuvant treatment with cisplatin/gem  X 4 planned cycles, starting 12/3/20, ending 2/25/21  -recovering from the side effects. Expect that to continue to improve as she gets further out from treatment  -reviewed the plan for follow-up with a CT and f/u with Dr. De Leon in 1 month to follow up on lung nodules    Cholelithiasis/ thickened GB  -has some chronic R lateral rib pain, but no nausea/RUQ pain, dyspepsia, bowel irregularity  -reviewed her US with her. Reviewed potential complications including biliary obstruction, cholecystitis. Reviewed symptoms that would require urgent attention including RUQ abdominal pain, fevers. Recommended she have a surgical consultation to discuss possible cholecystectomy. She was agreeable    COPD/ dyspnea  -on breo and albuterol inhaler  -following with Dr. Espinal    A fib  -on amiodarone through the end of chemo  -off metoprolol since 12/1 as in NSR   -anticoagulation with rivaroxaban  -following with Dr. Goldstein in cardiology, Dr. Espinal next week    HTN  -started lisinopril in mid- Dec, amolidipine added 12/24    Fatigue/hair thinning  -likely s/t  chemotherapy, TSH was normal. Should improve as she gets further out from completion of treatment    Nausea  -improving now that she has completed her cancer treatment     40 minutes spent on the date of the encounter doing chart review, review of test results, interpretation of tests, patient visit and documentation

## 2021-03-14 PROBLEM — C34.32: Status: ACTIVE | Noted: 2020-07-03

## 2021-03-14 PROBLEM — C34.92 SQUAMOUS CELL CARCINOMA OF LEFT LUNG (H): Status: ACTIVE | Noted: 2020-07-03

## 2021-03-18 ENCOUNTER — OFFICE VISIT (OUTPATIENT)
Dept: FAMILY MEDICINE | Facility: CLINIC | Age: 71
End: 2021-03-18
Payer: MEDICARE

## 2021-03-18 VITALS
WEIGHT: 169 LBS | TEMPERATURE: 98.2 F | SYSTOLIC BLOOD PRESSURE: 110 MMHG | OXYGEN SATURATION: 100 % | RESPIRATION RATE: 20 BRPM | HEART RATE: 96 BPM | BODY MASS INDEX: 28.12 KG/M2 | DIASTOLIC BLOOD PRESSURE: 76 MMHG

## 2021-03-18 DIAGNOSIS — J31.0 CHRONIC RHINITIS: ICD-10-CM

## 2021-03-18 DIAGNOSIS — I10 BENIGN ESSENTIAL HYPERTENSION: Primary | ICD-10-CM

## 2021-03-18 DIAGNOSIS — K80.20 GALLSTONES: ICD-10-CM

## 2021-03-18 DIAGNOSIS — C34.92 SQUAMOUS CELL CARCINOMA OF LEFT LUNG (H): ICD-10-CM

## 2021-03-18 DIAGNOSIS — E27.8 ADRENAL MASS (H): ICD-10-CM

## 2021-03-18 DIAGNOSIS — I48.0 PAROXYSMAL ATRIAL FIBRILLATION (H): ICD-10-CM

## 2021-03-18 DIAGNOSIS — H00.015 HORDEOLUM EXTERNUM OF LEFT LOWER EYELID: ICD-10-CM

## 2021-03-18 DIAGNOSIS — J44.9 CHRONIC OBSTRUCTIVE PULMONARY DISEASE, UNSPECIFIED COPD TYPE (H): ICD-10-CM

## 2021-03-18 PROCEDURE — 99215 OFFICE O/P EST HI 40 MIN: CPT | Performed by: FAMILY MEDICINE

## 2021-03-18 RX ORDER — IPRATROPIUM BROMIDE 42 UG/1
2 SPRAY, METERED NASAL 4 TIMES DAILY
Qty: 15 ML | Refills: 1 | Status: SHIPPED | OUTPATIENT
Start: 2021-03-18 | End: 2021-05-05

## 2021-03-18 NOTE — PROGRESS NOTES
"    Assessment & Plan     Paroxysmal atrial fibrillation (H)  Stable  Continue Xarelto  Discuss with cardiology stopping amiodarone     Adrenal mass (H)  Thickened   stable    Benign essential hypertension  Well controlled  Continue lisinopril    Squamous cell carcinoma of left lung (H)  Followed by oncology    Gallstones  Recommend she talk to GI about it  - GASTROENTEROLOGY ADULT REF CONSULT ONLY; Future    Chronic rhinitis  - ipratropium (ATROVENT) 0.06 % nasal spray; Spray 2 sprays into both nostrils 4 times daily  - ALLERGY/ASTHMA ADULT REFERRAL    Chronic obstructive pulmonary disease, unspecified COPD type (H)  Stable  Continue Breo Ellipta    Hordeolum externum of left lower eyelid  monitor      I spent a total of 46 minutes on the day of the visit.         BMI:   Estimated body mass index is 28.12 kg/m  as calculated from the following:    Height as of 1/26/21: 1.651 m (5' 5\").    Weight as of this encounter: 76.7 kg (169 lb).   Weight management plan: Discussed healthy diet and exercise guidelines        Return if symptoms worsen or fail to improve.    Jyoti Garcia MD  [facility]    Samantha Borrego is a 70 year old who presents for the following health issues     HPI     COPD Follow-Up    Overall, how are your COPD symptoms since your last clinic visit?  Slightly worse since surgery    How much fatigue or shortness of breath do you have when you are walking?  Same as usual    How much shortness of breath do you have when you are resting?  Less than usual    How often do you cough? Never    Have you noticed any change in your sputum/phlegm?  No    Have you experienced a recent fever? No    Please describe how far you can walk without stopping to rest:  2-5 blocks    How many flights of stairs are you able to walk up without stopping?  2    Have you had any Emergency Room Visits, Urgent Care Visits, or Hospital Admissions because of your COPD since your last office visit?  " No    History   Smoking Status     Former Smoker     Packs/day: 1.00     Years: 33.00     Types: Cigarettes     Quit date: 12/13/2014   Smokeless Tobacco     Never Used     No results found for: FEV1, [id number]      Hypertension Follow-up      Do you check your blood pressure regularly outside of the clinic? Yes     Are you following a low salt diet? No    Are your blood pressures ever more than 140 on the top number (systolic) OR more   than 90 on the bottom number (diastolic), for example 140/90? No    On lisinopril and Amlodipine   BP Readings from Last 6 Encounters:   03/18/21 110/76   02/25/21 (!) 143/72   02/18/21 126/64   02/02/21 (!) 159/83   01/26/21 (!) 145/76   01/12/21 (!) 164/82      Last week she took her blood pressures off meds and they were good. She would like to stop the Amlodipine. blood pressure had gone up during chemo, now is down.      afib  Off metoprolol. On amiodarone, Xarelto.     Lung cancer  She finished chemo 3 weeks ago. They are monitoring some spots.   Had CT scan in Feb, 2021.     Has a stye on her left eye  Has had 5 days, is getting better    Nose is very stuffy ever since her surgery  Tried Zyrtec, Flonase, did not help  Does have an apt with ENT    COPD  Just started Breo      Review of Systems   Urination slow since last year  Some shortness of breath with deep breath, but better  See above      Objective    /76 (BP Location: Right arm)   Pulse 96   Temp 98.2  F (36.8  C) (Tympanic)   Resp 20   Wt 76.7 kg (169 lb)   SpO2 100%   BMI 28.12 kg/m    Body mass index is 28.12 kg/m .  Physical Exam   GENERAL: healthy, alert and no distress  NECK: no adenopathy, no asymmetry, masses, or scars and thyroid normal to palpation  RESP: lungs clear to auscultation - no rales, rhonchi or wheezes  CV: regular rate and rhythm, normal S1 S2, no S3 or S4, no murmur, click or rub, no peripheral edema and peripheral pulses strong  ABDOMEN: soft, nontender, no hepatosplenomegaly, no  masses and bowel sounds normal  MS: no gross musculoskeletal defects noted, no edema

## 2021-03-18 NOTE — PATIENT INSTRUCTIONS
Try the Atrovent nasal spray    See the allergist and ENT    Stop the Amlodipine, monitor blood pressures  Send them to me on mychart in a week    Talk to GI about the gallbladder    Talk to cardiology about the amiodarone

## 2021-03-18 NOTE — NURSING NOTE
"Chief Complaint   Patient presents with     COPD     Lung Cancer     follow up meds        Initial /76 (BP Location: Right arm)   Pulse 96   Temp 98.2  F (36.8  C) (Tympanic)   Resp 20   Wt 76.7 kg (169 lb)   SpO2 100%   BMI 28.12 kg/m   Estimated body mass index is 28.12 kg/m  as calculated from the following:    Height as of 1/26/21: 1.651 m (5' 5\").    Weight as of this encounter: 76.7 kg (169 lb).    Patient presents to the clinic using No DME    Health Maintenance that is potentially due pending provider review:  NONE    n/a    Is there anyone who you would like to be able to receive your results? No  If yes have patient fill out HARPAL    "

## 2021-03-19 NOTE — TELEPHONE ENCOUNTER
REFERRAL INFORMATION:    Referring Provider:  Dr. Jyoti Espinal    Referring Clinic:  3/18/2021    Reason for Visit/Diagnosis: Large gallstones, thickened wall       FUTURE VISIT INFORMATION:    Appointment Date: 3/22/2021    Appointment Time: 2 PM      NOTES RECORD STATUS  DETAILS   OFFICE NOTE from Referring Provider Internal 3/18/2021 Office visit with Dr. Espinal      OFFICE NOTE from Other Specialists Internal 3/11/2021 Office visit with RUDY Lau CNP ([facility])     2/17/2021 Office visit with Dr. Eneida De Leon ([facility])    HOSPITAL DISCHARGE SUMMARY/ ED VISITS  N/A    OPERATIVE REPORT N/A    ENDOSCOPY (EGD)  N/A    PERTINENT LABS Internal    PATHOLOGY REPORTS (RELATED) N/A    IMAGING (CT, MRI, US, XR)  Internal US Abdomen: 3/10/2021  CT Chest Abdomen Pelvis: 2/15/2021

## 2021-03-22 ENCOUNTER — PRE VISIT (OUTPATIENT)
Dept: SURGERY | Facility: CLINIC | Age: 71
End: 2021-03-22

## 2021-03-22 ENCOUNTER — VIRTUAL VISIT (OUTPATIENT)
Dept: SURGERY | Facility: CLINIC | Age: 71
End: 2021-03-22
Payer: MEDICARE

## 2021-03-22 VITALS — HEIGHT: 65 IN | BODY MASS INDEX: 28.16 KG/M2 | WEIGHT: 169 LBS

## 2021-03-22 DIAGNOSIS — K80.20 CALCULUS OF GALLBLADDER WITHOUT CHOLECYSTITIS WITHOUT OBSTRUCTION: Primary | ICD-10-CM

## 2021-03-22 PROCEDURE — 99202 OFFICE O/P NEW SF 15 MIN: CPT | Mod: 95 | Performed by: SURGERY

## 2021-03-22 ASSESSMENT — MIFFLIN-ST. JEOR: SCORE: 1292.46

## 2021-03-22 ASSESSMENT — PAIN SCALES - GENERAL: PAINLEVEL: NO PAIN (0)

## 2021-03-22 NOTE — PROGRESS NOTES
Salima is a 70 year old who is being evaluated via a billable video visit.      How would you like to obtain your AVS? MyChart  If the video visit is dropped, the invitation should be resent by: Send to e-mail at: [email or web address]  Will anyone else be joining your video visit? No      Video Start Time: 1435    Assessment & Plan     Calculus of gallbladder without cholecystitis without obstruction  Gallstones seen on multiple different imaging modalities- but without symptoms or history off cholangitis or pancreatitis.      -Advised patient to observe for any symptoms (described typical symptoms of gallstones) and will plan for cholecystectomy if she develops these symptoms, otherwise will not plan for surgery      Review of the result(s) of each unique test - RUQ US, PET CT of whole body  25 minutes spent on the date of the encounter doing chart review, review of test results, patient visit and documentation            No follow-ups on file.    Gavin Castillo MD  [facility] GENERAL SURGERY CLINIC [facility]   Salima is a 70 year old who presents for the following health issues    HPI     70 year old woman who has just finished chemotherapy for SCC of the lung.  Underwent LL lobectomy for this.  During her cancer workup she was noted to have gallstones in her gallbladder.  This prompted a referral to surgery.  She denies post prandial pain, no history of jaundice or pancreatitis.  She notes intermittent right back pain which comes after laying in certain positions    Review of Systems   No recent illness  Lost about 10 lbs during chemo  No jaundice, no pancreatitis  Prior tubal ligation, left lobectomy for cancer      Objective    Vitals - Patient Reported  Pain Score: No Pain (0)        Physical Exam   GENERAL: Healthy, alert and no distress  EYES: Eyes grossly normal to inspection.  No discharge or erythema, or obvious scleral/conjunctival abnormalities.  RESP: No audible wheeze,  cough, or visible cyanosis.  No visible retractions or increased work of breathing.    SKIN: Visible skin clear. No significant rash, abnormal pigmentation or lesions.  NEURO: Cranial nerves grossly intact.  Mentation and speech appropriate for age.  PSYCH: Mentation appears normal, affect normal/bright, judgement and insight intact, normal speech and appearance well-groomed.    I reviewed her RUQ US and CT scans which showed gallstones throughout the gallbladder          Video-Visit Details    Type of service:  Video Visit    Video End Time:1450pm    Originating Location (pt. Location): Home    Distant Location (provider location):  [facility] GENERAL SURGERY CLINIC [place]     Platform used for Video Visit: [employer]

## 2021-03-22 NOTE — LETTER
3/22/2021       RE: Ijeoma Shah  [address] [place] [facility] [address]     Dear Colleague,    Thank you for referring your patient, Ijeoma Shah, to the [facility] GENERAL SURGERY CLINIC [place] at [facility]. Please see a copy of my visit note below.    Salima is a 70 year old who is being evaluated via a billable video visit.      How would you like to obtain your AVS? MyChart  If the video visit is dropped, the invitation should be resent by: Send to e-mail at: [email or web address]  Will anyone else be joining your video visit? No      Video Start Time: 1435    Assessment & Plan     Calculus of gallbladder without cholecystitis without obstruction  Gallstones seen on multiple different imaging modalities- but without symptoms or history off cholangitis or pancreatitis.      -Advised patient to observe for any symptoms (described typical symptoms of gallstones) and will plan for cholecystectomy if she develops these symptoms, otherwise will not plan for surgery    Review of the result(s) of each unique test - RUQ US, PET CT of whole body  25 minutes spent on the date of the encounter doing chart review, review of test results, patient visit and documentation     No follow-ups on file.    Gavin Castillo MD  [facility] SURGERY CLINIC [place]    Subjective   Salima is a 70 year old who presents for the following health issues    HPI     70 year old woman who has just finished chemotherapy for SCC of the lung.  Underwent LL lobectomy for this.  During her cancer workup she was noted to have gallstones in her gallbladder.  This prompted a referral to surgery.  She denies post prandial pain, no history of jaundice or pancreatitis.  She notes intermittent right back pain which comes after laying in certain positions    Review of Systems   No recent illness  Lost about 10 lbs during chemo  No jaundice, no  pancreatitis  Prior tubal ligation, left lobectomy for cancer      Objective    Vitals - Patient Reported  Pain Score: No Pain (0)    Physical Exam   GENERAL: Healthy, alert and no distress  EYES: Eyes grossly normal to inspection.  No discharge or erythema, or obvious scleral/conjunctival abnormalities.  RESP: No audible wheeze, cough, or visible cyanosis.  No visible retractions or increased work of breathing.    SKIN: Visible skin clear. No significant rash, abnormal pigmentation or lesions.  NEURO: Cranial nerves grossly intact.  Mentation and speech appropriate for age.  PSYCH: Mentation appears normal, affect normal/bright, judgement and insight intact, normal speech and appearance well-groomed.    I reviewed her RUQ US and CT scans which showed gallstones throughout the gallbladder    Video-Visit Details    Type of service:  Video Visit    Video End Time:1450pm    Originating Location (pt. Location): Home    Distant Location (provider location):  [facility] GENERAL SURGERY [facility]     Platform used for Video Visit: Steven    Again, thank you for allowing me to participate in the care of your patient.      Sincerely,    Gavin Castillo MD

## 2021-03-22 NOTE — PATIENT INSTRUCTIONS
You met with Dr. Gavin Castillo.      Today's visit instructions:    Please call our office if you would like to schedule surgery.     If you have questions please contact Adrianna JIANG during regular clinic hours, Monday through Friday 7:30 AM - 4:00 PM, or you can contact us via "[employer] at anytime.       If you have urgent needs after-hours, weekends, or holidays please call the hospital at [phone] and ask to speak with our on-call General Surgery Team.    Appointment [phone], option #1   Nurse Advice (Adrianna): [phone]   Surgery Scheduler (Chloé): [phone]  Fax: [phone]

## 2021-03-22 NOTE — NURSING NOTE
"Chief Complaint   Patient presents with     Consult     Virtual consult       Vitals:    03/22/21 1341   Weight: 76.7 kg (169 lb)   Height: 1.651 m (5' 5\")       Body mass index is 28.12 kg/m .      MICHAEL HardinT                      "

## 2021-03-26 ENCOUNTER — IMMUNIZATION (OUTPATIENT)
Dept: FAMILY MEDICINE | Facility: CLINIC | Age: 71
End: 2021-03-26
Attending: FAMILY MEDICINE
Payer: MEDICARE

## 2021-03-26 PROCEDURE — 91300 PR COVID VAC PFIZER DIL RECON 30 MCG/0.3 ML IM: CPT

## 2021-03-26 PROCEDURE — 0002A PR COVID VAC PFIZER DIL RECON 30 MCG/0.3 ML IM: CPT

## 2021-03-31 ENCOUNTER — MYC MEDICAL ADVICE (OUTPATIENT)
Dept: FAMILY MEDICINE | Facility: CLINIC | Age: 71
End: 2021-03-31

## 2021-04-12 ENCOUNTER — HOSPITAL ENCOUNTER (OUTPATIENT)
Dept: CT IMAGING | Facility: CLINIC | Age: 71
Discharge: HOME | End: 2021-04-12
Attending: INTERNAL MEDICINE | Admitting: INTERNAL MEDICINE
Payer: MEDICARE

## 2021-04-12 DIAGNOSIS — J43.9 PULMONARY EMPHYSEMA, UNSPECIFIED EMPHYSEMA TYPE (H): ICD-10-CM

## 2021-04-12 DIAGNOSIS — E83.42 HYPOMAGNESEMIA: ICD-10-CM

## 2021-04-12 LAB
ALBUMIN SERPL-MCNC: 3.7 G/DL (ref 3.4–5)
ALP SERPL-CCNC: 77 U/L (ref 40–150)
ALT SERPL W P-5'-P-CCNC: 23 U/L (ref 0–50)
ANION GAP SERPL CALCULATED.3IONS-SCNC: 4 MMOL/L (ref 3–14)
AST SERPL W P-5'-P-CCNC: 15 U/L (ref 0–45)
BASOPHILS # BLD AUTO: 0 10E9/L (ref 0–0.2)
BASOPHILS NFR BLD AUTO: 0.4 %
BILIRUB SERPL-MCNC: 0.3 MG/DL (ref 0.2–1.3)
BUN SERPL-MCNC: 19 MG/DL (ref 7–30)
CALCIUM SERPL-MCNC: 8.7 MG/DL (ref 8.5–10.1)
CHLORIDE SERPL-SCNC: 102 MMOL/L (ref 94–109)
CO2 SERPL-SCNC: 28 MMOL/L (ref 20–32)
CREAT SERPL-MCNC: 1.11 MG/DL (ref 0.52–1.04)
DIFFERENTIAL METHOD BLD: ABNORMAL
EOSINOPHIL # BLD AUTO: 0.3 10E9/L (ref 0–0.7)
EOSINOPHIL NFR BLD AUTO: 3.5 %
ERYTHROCYTE [DISTWIDTH] IN BLOOD BY AUTOMATED COUNT: 13.8 % (ref 10–15)
GFR SERPL CREATININE-BSD FRML MDRD: 50 ML/MIN/{1.73_M2}
GLUCOSE SERPL-MCNC: 109 MG/DL (ref 70–99)
HCT VFR BLD AUTO: 32.8 % (ref 35–47)
HGB BLD-MCNC: 11 G/DL (ref 11.7–15.7)
IMM GRANULOCYTES # BLD: 0 10E9/L (ref 0–0.4)
IMM GRANULOCYTES NFR BLD: 0.4 %
LYMPHOCYTES # BLD AUTO: 1 10E9/L (ref 0.8–5.3)
LYMPHOCYTES NFR BLD AUTO: 13.9 %
MAGNESIUM SERPL-MCNC: 1.9 MG/DL (ref 1.6–2.3)
MCH RBC QN AUTO: 31.5 PG (ref 26.5–33)
MCHC RBC AUTO-ENTMCNC: 33.5 G/DL (ref 31.5–36.5)
MCV RBC AUTO: 94 FL (ref 78–100)
MONOCYTES # BLD AUTO: 0.3 10E9/L (ref 0–1.3)
MONOCYTES NFR BLD AUTO: 4.3 %
NEUTROPHILS # BLD AUTO: 5.7 10E9/L (ref 1.6–8.3)
NEUTROPHILS NFR BLD AUTO: 77.5 %
NRBC # BLD AUTO: 0 10*3/UL
NRBC BLD AUTO-RTO: 0 /100
PLATELET # BLD AUTO: 203 10E9/L (ref 150–450)
POTASSIUM SERPL-SCNC: 4.5 MMOL/L (ref 3.4–5.3)
PROT SERPL-MCNC: 6.9 G/DL (ref 6.8–8.8)
RBC # BLD AUTO: 3.49 10E12/L (ref 3.8–5.2)
SODIUM SERPL-SCNC: 134 MMOL/L (ref 133–144)
WBC # BLD AUTO: 7.4 10E9/L (ref 4–11)

## 2021-04-12 PROCEDURE — 250N000011 HC RX IP 250 OP 636: Performed by: RADIOLOGY

## 2021-04-12 PROCEDURE — 36415 COLL VENOUS BLD VENIPUNCTURE: CPT | Performed by: INTERNAL MEDICINE

## 2021-04-12 PROCEDURE — 85025 COMPLETE CBC W/AUTO DIFF WBC: CPT | Performed by: INTERNAL MEDICINE

## 2021-04-12 PROCEDURE — 74177 CT ABD & PELVIS W/CONTRAST: CPT | Mod: MG

## 2021-04-12 PROCEDURE — 250N000009 HC RX 250: Performed by: RADIOLOGY

## 2021-04-12 PROCEDURE — 80053 COMPREHEN METABOLIC PANEL: CPT | Performed by: INTERNAL MEDICINE

## 2021-04-12 PROCEDURE — 83735 ASSAY OF MAGNESIUM: CPT | Performed by: INTERNAL MEDICINE

## 2021-04-12 RX ORDER — IOPAMIDOL 755 MG/ML
500 INJECTION, SOLUTION INTRAVASCULAR ONCE
Status: COMPLETED | OUTPATIENT
Start: 2021-04-12 | End: 2021-04-12

## 2021-04-12 RX ADMIN — IOPAMIDOL 83 ML: 755 INJECTION, SOLUTION INTRAVENOUS at 12:21

## 2021-04-12 RX ADMIN — SODIUM CHLORIDE 60 ML: 9 INJECTION, SOLUTION INTRAVENOUS at 12:21

## 2021-04-14 ENCOUNTER — VIRTUAL VISIT (OUTPATIENT)
Dept: ONCOLOGY | Facility: CLINIC | Age: 71
End: 2021-04-14
Attending: INTERNAL MEDICINE
Payer: MEDICARE

## 2021-04-14 DIAGNOSIS — C34.32 SQUAMOUS CELL CARCINOMA OF BRONCHUS IN LEFT LOWER LOBE (H): Primary | ICD-10-CM

## 2021-04-14 PROCEDURE — 99214 OFFICE O/P EST MOD 30 MIN: CPT | Mod: 95 | Performed by: INTERNAL MEDICINE

## 2021-04-14 PROCEDURE — 999N001193 HC VIDEO/TELEPHONE VISIT; NO CHARGE

## 2021-04-14 NOTE — LETTER
4/14/2021         RE: Ijeoma Shah  [address] [facility] [facility] [id number]        Dear Colleague,    Thank you for referring your patient, Ijeoma Shah, to the [facility]. Please see a copy of my visit note below.    [facility]    FOLLOW-UP VISIT NOTE    PATIENT NAME: Ijeoma Shah MRN # [id number]  DATE OF VISIT: April 14, 2021 YOB: 1950    CANCER TYPE: SCC lung, poorly differentiated  STAGE: pT4N0 (IIIA)  ECOG PS: 1    Cancer Staging  Squamous cell carcinoma of left lung (H)  Staging form: Lung, AJCC 8th Edition  - Pathologic stage from 11/17/2020: Stage IIIA (pT4, pN0, cM0) - Signed by Eneida De Leon MD on 11/17/2020    PD-L1: TPS 15% on [id number] lobectomy, <1% on [id number] (LLL bx)  Lung panel: SMO [id number] on LLL bx, negative for fusions on lobectomy specimen.   NGS: N/A    SUMMARY  7/3/20  CT chest (screening). 6.7 cm LLL mass, 0.6 cm RML nodule, mediastinal and L hilar LNs  7/9/20 PET/CT. 7.1 x 5.6 cm LLL mass (SUV 19.6), post obstructive pneumonitis LLL inferior to the mass (SUV 2.8), 3.5 x 1.7 cm 4L node (SUV 5.9), L adrenal nodule 1.1 cm (SUV 4), indeterminate pulmonary nodules, pancreatic cysts, not hypermetabolic  7/27/20 Bronch, EBUS (Dr. Castro). 10R, 11R, 4R too small to biopsy. Stations 7, 4L, 11L negative for malignancy. LLL mass bx: SCC  8/12/20 Brain MRI negative  9/1/20 EUS to evaluate adrenal and 4L (Dr. Hogan). Both negative for malignancy. Adrenal with bland adrenal cells  10/22/20 L VATS, converted to thoracotomy, LLL lobectomy, MLND, R pulmonary arterioplasty (Dr. Sanabria, Dr. Davis). 8.3 cm poorly differentiated SCC, +angiolymphatic invasion  12/3/20~2/25/21 Cisplatin gemcitabine x 4. C2D8 delayed 1 week due to neutropenia, added neulasta        ASSESSMENT AND PLAN  SCC lung, [id number], IIIA, R0 resection, discrepant PD-L1: Now about 6 weeks after completing 4 cycles of  adjuvant cisplatin gemcitabine. Recovering well. We got this early CT due to the new pulmonary nodules seen on the last CT. They're better and so are c/w inflammatory nodules. CT chest/abd in 3 months and TAYLOR visit with labs. Visit with me in 6 months with CT chest/abd w/IV contrast. Refer to survivorship clinic.     Chronic upper back pain: She wonders if it's related to the large gallstones as she knows people whose similar pain resolved after mya. I encouraged her to discuss with Dr. Espinal as I'm not sure of the relationship.     COPD: Stopped breo as she didn't think it was helping. Discussed that its benefit isn't necessarily something she'll notice. Asked her to discuss its utility with Dr. Espinal     Shortness of breath: Due to surgery, some degree of deconditioning and COPD. Doing a bit better.     Impacted gallstone: Saw Dr. Castillo with General Surgery on 3/22, recommended watchful waiting as she's asymptomatic (see above)    Afib: Off amio. Per Dr. Goldstein in Cardiology and PCP Dr. Teddy CORDOVA pulmonary nodules: As above; additionally will continue to follow some other chronic stable ones.    Review of the result(s) of each unique test - CMP, CBC pd  Independent interpretation of a test performed by another physician/other qualified health care professional (not separately reported) - CT CAP    Pt was seen in conjunction with Dr. Dominga Russ, Hematology Oncology fellow. The note, except for the subjective section, was written by me. I agree with the subjective section of the note.     30 minutes spent on the date of the encounter doing chart review, history and exam, documentation and further activities per the note     Eneida De Leon MD  Associate Professor of Medicine  Hematology, Oncology and Transplantation      SUBJECTIVE  Salima is in video visit for follow up today, now about 2 1/2 months after completing adjuvant cisplatin and gemcitabine.  She reports she is doing well overall. She has chronic  right sided back pain for about 1 year which is persistent about 2-3/10 in severity. The pain is not associated with any dietary intake. She still has intermittent dry cough, and has chest pain when she coughs. She is able to walk a flight of stairs without getting much shortness of breath. She has been following with cardiology and recently got off of amiodarone, still taking Xarelto.   Good appetite, stable weight. Good energy level, works at office full time now.     Attending addendum: Doing well overall. Breathing a little better nowadays. Stopped breo as she didn't perceive benefit. Wondering if this pain between her spine and shoulder blade on the right side that's been there for many months, well before the cancer diagnosis, might be related to gallstones and is wondering if she should have surgery.     PAST MEDICAL HISTORY  SCC as above  Afib with RVR. Initially when she presented for surgery 10/1, then post-op in 10/2020. DCCV x 2 10/23/20, ibutilide --> NSR, dig load, amio gtt. TTE 10/16/20 unremarkable.   Dyslipidemia  H/o bronchitis  Nose surgery  Tubal ligation  Adrenal nodule, bx 9/1/20, negative for malignancy  Cholelithiasis incidentally identified on CT  Cataract repair 2011?    PFT 8/20/20. FEV1 1.33 (58%), FVC 1.76 (60%), DLCO 17.72 (90%)    CURRENT OUTPATIENT MEDICATIONS  Current Outpatient Medications   Medication Sig Dispense Refill     acetaminophen (TYLENOL) 325 MG tablet Take 1-2 tablets (325-650 mg) by mouth every 6 hours as needed for mild pain 60 tablet 0     albuterol (PROAIR HFA/PROVENTIL HFA/VENTOLIN HFA) 108 (90 Base) MCG/ACT inhaler Inhale 1-2 puffs into the lungs every 6 hours 1 Inhaler 11     amiodarone (PACERONE) 200 MG tablet Take 1 tablet (200 mg) by mouth daily 90 tablet 1     fluticasone-vilanterol (BREO ELLIPTA) 100-25 MCG/INH inhaler Inhale 1 puff into the lungs daily 1 each 11     ipratropium (ATROVENT) 0.06 % nasal spray Spray 2 sprays into both nostrils 4 times daily 15  mL 1     ketoconazole (NIZORAL) 2 % external cream Apply topically daily 60 g 1     lisinopril (ZESTRIL) 20 MG tablet Take 1 tablet (20 mg) by mouth daily 30 tablet 11     rivaroxaban ANTICOAGULANT (XARELTO) 20 MG TABS tablet Take 1 tablet (20 mg) by mouth daily (with dinner) 90 tablet 3     triamcinolone (KENALOG) 0.1 % external ointment APPLY  OINTMENT TOPICALLY TWICE DAILY OR  AS  NEEDED 45 g 0     ALLERGIES  Allergies   Allergen Reactions     Bee Venom Hives     Hot and sweating      REVIEW OF SYSTEMS  As above in the HPI, o/w complete 12-point ROS was negative.    PHYSICAL EXAM  No vitals due to video visit   Wt Readings from Last 3 Encounters:   03/22/21 76.7 kg (169 lb)   03/18/21 76.7 kg (169 lb)   02/25/21 77.2 kg (170 lb 1.6 oz)     GEN: NAD  NEURO: alert    Remainder of physical exam deferred due to public health emergency and limitations of video visit.    LABORATORY AND IMAGING STUDIES  Results for MAYCO VEGA (MRN [id number]) as of 4/14/2021 09:28   4/12/2021 11:25   Sodium 134   Potassium 4.5   Chloride 102   Carbon Dioxide 28   Urea Nitrogen 19   Creatinine 1.11 (H)   GFR Estimate 50 (L)   GFR Estimate If Black 58 (L)   Calcium 8.7   Anion Gap 4   Magnesium 1.9   Albumin 3.7   Protein Total 6.9   Bilirubin Total 0.3   Alkaline Phosphatase 77   ALT 23   AST 15   Glucose 109 (H)   WBC 7.4   Hemoglobin 11.0 (L)   Hematocrit 32.8 (L)   Platelet Count 203   RBC Count 3.49 (L)   MCV 94   MCH 31.5   MCHC 33.5   RDW 13.8   Diff Method Automated Method   % Neutrophils 77.5   % Lymphocytes 13.9   % Monocytes 4.3   % Eosinophils 3.5   % Basophils 0.4   % Immature Granulocytes 0.4   Nucleated RBCs 0   Absolute Neutrophil 5.7   Absolute Lymphocytes 1.0   Absolute Monocytes 0.3   Absolute Eosinophils 0.3   Absolute Basophils 0.0   Abs Immature Granulocytes 0.0   Absolute Nucleated RBC 0.0     Labs were independently reviewed and interpreted by me    CT Chest/Abdomen/Pelvis w Contrast  Narrative: CT  CHEST/ABDOMEN/PELVIS WITH CONTRAST April 12, 2021 12:23 PM    CLINICAL HISTORY: Restage IIIA (pT4N0) SCC LLL status post lobectomy,  MLND in Oct 2020 and four cycles of adjuvant chemo. Pulmonary  emphysema, unspecified emphysema type (H).    TECHNIQUE: CT scan of the chest, abdomen, and pelvis was performed  following injection of IV contrast. Multiplanar reformats were  obtained. Dose reduction techniques were used.   CONTRAST: 83mL, Isovue 370.    COMPARISON: CT chest, abdomen and pelvis dated 2/15/2021.    FINDINGS:   LUNGS AND PLEURA: Postop changes status post left lower lobectomy  again noted. There is an area of soft tissue thickening in the  posterior medial left hemithorax (image 146 series 5) likely  representing postoperative scarring. This has slightly decreased in  prominence since the prior study dated 2/15/2021 and now measures up  to 3.0 x 1.6 cm in cross-section. This previously measured up to 3.6 x  2.2 cm in cross-section. There are surgical staples adjacent to this  region.    There is a small mildly indistinct nodule in the superolateral right  upper lung lobe (image 71 series 5) measuring up to 0.3 cm, unchanged  since the prior study.    Additionally, there is a small indistinct nodule in the posterolateral  right upper lobe just superior to the minor fissure and anterior to  the major fissure. This measures up to 0.5 cm in diameter. This has  significantly decreased in size since the prior study where a  ground-glass nodular density in this region measuring up to 1.6 cm in  maximum dimension. 0.5 cm nodule in the posterolateral right middle  lobe is stable 2/15/2021. Anterolateral nodule right middle lobe  (image 156 series 5) measuring up to 0.5 cm, is stable. 0.5 similar  nodule lateral inferior posterior right middle lobe (image 193 series  5) is stable. No other significant nodularity is seen in the lungs.    MEDIASTINUM/AXILLAE: Heart is normal in size. Postop changes left  hilum are  noted similar to the prior study. No mediastinal, hilar, or  axillary lymphadenopathy is identified. Minimally prominent  pretracheal lymph node (image 15 series 2) may be slightly more  prominent than on the prior study and measures up to 0.7 cm in short  axis, but is still well within the normal size criterion. Visualized  portions of the thyroid are unremarkable. No central pulmonary artery  filling defects to suggest central pulmonary artery embolism. The  study was not optimized for pulmonary artery evaluation. Thoracic  aorta is of normal caliber and demonstrates no evidence for  dissection. There are nonaneurysmal atherosclerotic calcifications in  the aorta.    HEPATOBILIARY: Large gallstones are again seen in the gallbladder.  Gallbladder wall is mildly irregular and thickened. Cholecystitis is  not excluded. Simple appearing cysts are again seen in the liver,  unchanged since the prior study. Liver otherwise enhances normally. No  biliary ductal dilatation or choledocholithiasis is seen.    PANCREAS: Cystic lesions in the head/neck of pancreas are again noted  and measure up to maximum of 0.8 cm. These have decreased in size  since the prior CT dated 2/15/2021 and are highly likely benign.  Pancreas otherwise enhances normally. No pancreatic ductal dilatation.    SPLEEN: Normal.    ADRENAL GLANDS: Normal.    KIDNEYS/BLADDER: Normal.    BOWEL: The colon is grossly of normal caliber without pericolonic  inflammatory change to suggest acute diverticulitis. Appendix extends  posteriorly and inferiorly from the cecum and is normal in appearance.  Small bowel is of normal caliber and appearance. The stomach contains  a moderate to large amount of ingested material and air. Radiopaque  structures in the anterior aspect of the stomach inferiorly are likely  due to ingested material. Stomach is otherwise unremarkable.    PELVIC ORGANS: Uterus appears somewhat atrophic but is otherwise  unremarkable. Ovaries are  "grossly unremarkable bilaterally.    ADDITIONAL FINDINGS: No adenopathy, free fluid, or free air is seen in  the peritoneal cavity. There is nonaneurysmal atherosclerosis.    MUSCULOSKELETAL: There are degenerative changes in the spine. No  aggressive osseous lesions or acute osseous fractures are seen.  Impression: IMPRESSION:  1. Postoperative changes status post left lower lobectomy with  associated scarring in the medial left hemithorax base. The  scarring  has decreased since the prior study.  2. One mildly indistinct, tiny, pulmonary nodule in the superolateral  right upper lung lobe has not significantly changed. Another nodule in  the inferior right upper lung lobe laterally has significantly  decreased in size which could represent good response to treatment or  possibly that this nodule represents an inflammatory nodule. Otherwise  the scattered nodules in the lungs are stable. No new or enlarging  nodules are seen.  3. Cystic lesions in the head/neck of the pancreas have decreased in  size.  4. Cholelithiasis with gallbladder wall thickening and large  gallstones again noted. Ultrasound of gallbladder is recommended for  further evaluation. No other significant abnormalities are identified.  No new evidence for metastasis is seen.    TARAN GARCIA MD       Imaging was personally reviewed and interpreted by me    Salima is a 70 year old who is being evaluated via a billable video visit.      How would you like to obtain your AVS? MyChart  If the video visit is dropped, the invitation should be resent by: Text to cell phone: [phone]  Will anyone else be joining your video visit? No     Vitals - Patient Reported  Weight (Patient Reported): 77.1 kg (170 lb)  Height (Patient Reported): 165.1 cm (5' 5\")  BMI (Based on Pt Reported Ht/Wt): 28.29  Pain Score: Mild Pain (3)(RIGHT LOWER BACK PAIN)    Missy MACARIO        Video Start Time: 1:26 PM    Video-Visit Details  Type of service:  Video Visit  Video " End Time: 2:00 PM   Originating Location (pt. Location): Home  Distant Location (provider location):  [facility]   Platform used for Video Visit: Steven    Attending time on video visit: 20 minutes.            Again, thank you for allowing me to participate in the care of your patient.        Sincerely,        Eneida De Leon MD

## 2021-04-14 NOTE — PROGRESS NOTES
[facility] CANCER CLINIC    FOLLOW-UP VISIT NOTE    PATIENT NAME: Ijeoma Shah MRN # [id number]  DATE OF VISIT: April 14, 2021 YOB: 1950    CANCER TYPE: SCC lung, poorly differentiated  STAGE: pT4N0 (IIIA)  ECOG PS: 1    Cancer Staging  Squamous cell carcinoma of left lung (H)  Staging form: Lung, AJCC 8th Edition  - Pathologic stage from 11/17/2020: Stage IIIA (pT4, pN0, cM0) - Signed by Eneida De Leon MD on 11/17/2020    PD-L1: TPS 15% on [id number] lobectomy, <1% on [id number] (LLL bx)  Lung panel: SMO [id number] on LLL bx, negative for fusions on lobectomy specimen.   NGS: N/A    SUMMARY  7/3/20  CT chest (screening). 6.7 cm LLL mass, 0.6 cm RML nodule, mediastinal and L hilar LNs  7/9/20 PET/CT. 7.1 x 5.6 cm LLL mass (SUV 19.6), post obstructive pneumonitis LLL inferior to the mass (SUV 2.8), 3.5 x 1.7 cm 4L node (SUV 5.9), L adrenal nodule 1.1 cm (SUV 4), indeterminate pulmonary nodules, pancreatic cysts, not hypermetabolic  7/27/20 Bronch, EBUS (Dr. Castro). 10R, 11R, 4R too small to biopsy. Stations 7, 4L, 11L negative for malignancy. LLL mass bx: SCC  8/12/20 Brain MRI negative  9/1/20 EUS to evaluate adrenal and 4L (Dr. Hogan). Both negative for malignancy. Adrenal with bland adrenal cells  10/22/20 L VATS, converted to thoracotomy, LLL lobectomy, MLND, R pulmonary arterioplasty (Dr. Sanabria, Dr. Davis). 8.3 cm poorly differentiated SCC, +angiolymphatic invasion  12/3/20~2/25/21 Cisplatin gemcitabine x 4. C2D8 delayed 1 week due to neutropenia, added neulasta        ASSESSMENT AND PLAN  SCC lung, [id number], IIIA, R0 resection, discrepant PD-L1: Now about 6 weeks after completing 4 cycles of adjuvant cisplatin gemcitabine. Recovering well. We got this early CT due to the new pulmonary nodules seen on the last CT. They're better and so are c/w inflammatory nodules. CT chest/abd in 3 months and TAYLOR visit with labs. Visit with me in 6 months with CT chest/abd w/IV  contrast. Refer to survivorship clinic.     Chronic upper back pain: She wonders if it's related to the large gallstones as she knows people whose similar pain resolved after mya. I encouraged her to discuss with Dr. Espinal as I'm not sure of the relationship.     COPD: Stopped breo as she didn't think it was helping. Discussed that its benefit isn't necessarily something she'll notice. Asked her to discuss its utility with Dr. Espinal     Shortness of breath: Due to surgery, some degree of deconditioning and COPD. Doing a bit better.     Impacted gallstone: Saw Dr. Castillo with General Surgery on 3/22, recommended watchful waiting as she's asymptomatic (see above)    Afib: Off amio. Per Dr. Goldstein in Cardiology and PCP Dr. Teddy CORDOVA pulmonary nodules: As above; additionally will continue to follow some other chronic stable ones.    Review of the result(s) of each unique test - CMP, CBC pd  Independent interpretation of a test performed by another physician/other qualified health care professional (not separately reported) - CT CAP    Pt was seen in conjunction with Dr. Dominga Russ, Hematology Oncology fellow. The note, except for the subjective section, was written by me. I agree with the subjective section of the note.     30 minutes spent on the date of the encounter doing chart review, history and exam, documentation and further activities per the note     Eneida De Leon MD  Associate Professor of Medicine  Hematology, Oncology and Transplantation      BELA Borrego is in video visit for follow up today, now about 2 1/2 months after completing adjuvant cisplatin and gemcitabine.  She reports she is doing well overall. She has chronic right sided back pain for about 1 year which is persistent about 2-3/10 in severity. The pain is not associated with any dietary intake. She still has intermittent dry cough, and has chest pain when she coughs. She is able to walk a flight of stairs without getting much  shortness of breath. She has been following with cardiology and recently got off of amiodarone, still taking Xarelto.   Good appetite, stable weight. Good energy level, works at office full time now.     Attending addendum: Doing well overall. Breathing a little better nowadays. Stopped breo as she didn't perceive benefit. Wondering if this pain between her spine and shoulder blade on the right side that's been there for many months, well before the cancer diagnosis, might be related to gallstones and is wondering if she should have surgery.     PAST MEDICAL HISTORY  SCC as above  Afib with RVR. Initially when she presented for surgery 10/1, then post-op in 10/2020. DCCV x 2 10/23/20, ibutilide --> NSR, dig load, amio gtt. TTE 10/16/20 unremarkable.   Dyslipidemia  H/o bronchitis  Nose surgery  Tubal ligation  Adrenal nodule, bx 9/1/20, negative for malignancy  Cholelithiasis incidentally identified on CT  Cataract repair 2011?    PFT 8/20/20. FEV1 1.33 (58%), FVC 1.76 (60%), DLCO 17.72 (90%)    CURRENT OUTPATIENT MEDICATIONS  Current Outpatient Medications   Medication Sig Dispense Refill     acetaminophen (TYLENOL) 325 MG tablet Take 1-2 tablets (325-650 mg) by mouth every 6 hours as needed for mild pain 60 tablet 0     albuterol (PROAIR HFA/PROVENTIL HFA/VENTOLIN HFA) 108 (90 Base) MCG/ACT inhaler Inhale 1-2 puffs into the lungs every 6 hours 1 Inhaler 11     amiodarone (PACERONE) 200 MG tablet Take 1 tablet (200 mg) by mouth daily 90 tablet 1     fluticasone-vilanterol (BREO ELLIPTA) 100-25 MCG/INH inhaler Inhale 1 puff into the lungs daily 1 each 11     ipratropium (ATROVENT) 0.06 % nasal spray Spray 2 sprays into both nostrils 4 times daily 15 mL 1     ketoconazole (NIZORAL) 2 % external cream Apply topically daily 60 g 1     lisinopril (ZESTRIL) 20 MG tablet Take 1 tablet (20 mg) by mouth daily 30 tablet 11     rivaroxaban ANTICOAGULANT (XARELTO) 20 MG TABS tablet Take 1 tablet (20 mg) by mouth daily (with  dinner) 90 tablet 3     triamcinolone (KENALOG) 0.1 % external ointment APPLY  OINTMENT TOPICALLY TWICE DAILY OR  AS  NEEDED 45 g 0     ALLERGIES  Allergies   Allergen Reactions     Bee Venom Hives     Hot and sweating      REVIEW OF SYSTEMS  As above in the HPI, o/w complete 12-point ROS was negative.    PHYSICAL EXAM  No vitals due to video visit   Wt Readings from Last 3 Encounters:   03/22/21 76.7 kg (169 lb)   03/18/21 76.7 kg (169 lb)   02/25/21 77.2 kg (170 lb 1.6 oz)     GEN: NAD  NEURO: alert    Remainder of physical exam deferred due to public health emergency and limitations of video visit.    LABORATORY AND IMAGING STUDIES  Results for MAYCO VEGA (MRN [id number]) as of 4/14/2021 09:28   4/12/2021 11:25   Sodium 134   Potassium 4.5   Chloride 102   Carbon Dioxide 28   Urea Nitrogen 19   Creatinine 1.11 (H)   GFR Estimate 50 (L)   GFR Estimate If Black 58 (L)   Calcium 8.7   Anion Gap 4   Magnesium 1.9   Albumin 3.7   Protein Total 6.9   Bilirubin Total 0.3   Alkaline Phosphatase 77   ALT 23   AST 15   Glucose 109 (H)   WBC 7.4   Hemoglobin 11.0 (L)   Hematocrit 32.8 (L)   Platelet Count 203   RBC Count 3.49 (L)   MCV 94   MCH 31.5   MCHC 33.5   RDW 13.8   Diff Method Automated Method   % Neutrophils 77.5   % Lymphocytes 13.9   % Monocytes 4.3   % Eosinophils 3.5   % Basophils 0.4   % Immature Granulocytes 0.4   Nucleated RBCs 0   Absolute Neutrophil 5.7   Absolute Lymphocytes 1.0   Absolute Monocytes 0.3   Absolute Eosinophils 0.3   Absolute Basophils 0.0   Abs Immature Granulocytes 0.0   Absolute Nucleated RBC 0.0     Labs were independently reviewed and interpreted by me    CT Chest/Abdomen/Pelvis w Contrast  Narrative: CT CHEST/ABDOMEN/PELVIS WITH CONTRAST April 12, 2021 12:23 PM    CLINICAL HISTORY: Restage IIIA (pT4N0) SCC LLL status post lobectomy,  MLND in Oct 2020 and four cycles of adjuvant chemo. Pulmonary  emphysema, unspecified emphysema type (H).    TECHNIQUE: CT scan of the chest,  abdomen, and pelvis was performed  following injection of IV contrast. Multiplanar reformats were  obtained. Dose reduction techniques were used.   CONTRAST: 83mL, Isovue 370.    COMPARISON: CT chest, abdomen and pelvis dated 2/15/2021.    FINDINGS:   LUNGS AND PLEURA: Postop changes status post left lower lobectomy  again noted. There is an area of soft tissue thickening in the  posterior medial left hemithorax (image 146 series 5) likely  representing postoperative scarring. This has slightly decreased in  prominence since the prior study dated 2/15/2021 and now measures up  to 3.0 x 1.6 cm in cross-section. This previously measured up to 3.6 x  2.2 cm in cross-section. There are surgical staples adjacent to this  region.    There is a small mildly indistinct nodule in the superolateral right  upper lung lobe (image 71 series 5) measuring up to 0.3 cm, unchanged  since the prior study.    Additionally, there is a small indistinct nodule in the posterolateral  right upper lobe just superior to the minor fissure and anterior to  the major fissure. This measures up to 0.5 cm in diameter. This has  significantly decreased in size since the prior study where a  ground-glass nodular density in this region measuring up to 1.6 cm in  maximum dimension. 0.5 cm nodule in the posterolateral right middle  lobe is stable 2/15/2021. Anterolateral nodule right middle lobe  (image 156 series 5) measuring up to 0.5 cm, is stable. 0.5 similar  nodule lateral inferior posterior right middle lobe (image 193 series  5) is stable. No other significant nodularity is seen in the lungs.    MEDIASTINUM/AXILLAE: Heart is normal in size. Postop changes left  hilum are noted similar to the prior study. No mediastinal, hilar, or  axillary lymphadenopathy is identified. Minimally prominent  pretracheal lymph node (image 15 series 2) may be slightly more  prominent than on the prior study and measures up to 0.7 cm in short  axis, but is still  well within the normal size criterion. Visualized  portions of the thyroid are unremarkable. No central pulmonary artery  filling defects to suggest central pulmonary artery embolism. The  study was not optimized for pulmonary artery evaluation. Thoracic  aorta is of normal caliber and demonstrates no evidence for  dissection. There are nonaneurysmal atherosclerotic calcifications in  the aorta.    HEPATOBILIARY: Large gallstones are again seen in the gallbladder.  Gallbladder wall is mildly irregular and thickened. Cholecystitis is  not excluded. Simple appearing cysts are again seen in the liver,  unchanged since the prior study. Liver otherwise enhances normally. No  biliary ductal dilatation or choledocholithiasis is seen.    PANCREAS: Cystic lesions in the head/neck of pancreas are again noted  and measure up to maximum of 0.8 cm. These have decreased in size  since the prior CT dated 2/15/2021 and are highly likely benign.  Pancreas otherwise enhances normally. No pancreatic ductal dilatation.    SPLEEN: Normal.    ADRENAL GLANDS: Normal.    KIDNEYS/BLADDER: Normal.    BOWEL: The colon is grossly of normal caliber without pericolonic  inflammatory change to suggest acute diverticulitis. Appendix extends  posteriorly and inferiorly from the cecum and is normal in appearance.  Small bowel is of normal caliber and appearance. The stomach contains  a moderate to large amount of ingested material and air. Radiopaque  structures in the anterior aspect of the stomach inferiorly are likely  due to ingested material. Stomach is otherwise unremarkable.    PELVIC ORGANS: Uterus appears somewhat atrophic but is otherwise  unremarkable. Ovaries are grossly unremarkable bilaterally.    ADDITIONAL FINDINGS: No adenopathy, free fluid, or free air is seen in  the peritoneal cavity. There is nonaneurysmal atherosclerosis.    MUSCULOSKELETAL: There are degenerative changes in the spine. No  aggressive osseous lesions or acute  "osseous fractures are seen.  Impression: IMPRESSION:  1. Postoperative changes status post left lower lobectomy with  associated scarring in the medial left hemithorax base. The  scarring  has decreased since the prior study.  2. One mildly indistinct, tiny, pulmonary nodule in the superolateral  right upper lung lobe has not significantly changed. Another nodule in  the inferior right upper lung lobe laterally has significantly  decreased in size which could represent good response to treatment or  possibly that this nodule represents an inflammatory nodule. Otherwise  the scattered nodules in the lungs are stable. No new or enlarging  nodules are seen.  3. Cystic lesions in the head/neck of the pancreas have decreased in  size.  4. Cholelithiasis with gallbladder wall thickening and large  gallstones again noted. Ultrasound of gallbladder is recommended for  further evaluation. No other significant abnormalities are identified.  No new evidence for metastasis is seen.    TARAN GARCIA MD       Imaging was personally reviewed and interpreted by me    Salima is a 70 year old who is being evaluated via a billable video visit.      How would you like to obtain your AVS? MyChart  If the video visit is dropped, the invitation should be resent by: Text to cell phone: [phone]  Will anyone else be joining your video visit? No     Vitals - Patient Reported  Weight (Patient Reported): 77.1 kg (170 lb)  Height (Patient Reported): 165.1 cm (5' 5\")  BMI (Based on Pt Reported Ht/Wt): 28.29  Pain Score: Mild Pain (3)(RIGHT LOWER BACK PAIN)    Missy MACARIO        Video Start Time: 1:26 PM    Video-Visit Details  Type of service:  Video Visit  Video End Time: 2:00 PM   Originating Location (pt. Location): Home  Distant Location (provider location):  [facility] CANCER [facility]   Platform used for Video Visit: Steven    Attending time on video visit: 20 minutes.        "

## 2021-04-29 ENCOUNTER — MYC MEDICAL ADVICE (OUTPATIENT)
Dept: FAMILY MEDICINE | Facility: CLINIC | Age: 71
End: 2021-04-29

## 2021-04-29 NOTE — TELEPHONE ENCOUNTER
Call placed to patient. She is provided the Specialty clinic number to discuss gall bladder removal and her next steps. She is aware she will need a preop. Once she has arranged surgical side she will reach back out to schedule pre op. She does prefer to see Dr Garcia.    Luz SWAIN RN

## 2021-05-03 ENCOUNTER — PATIENT OUTREACH (OUTPATIENT)
Dept: SURGERY | Facility: CLINIC | Age: 71
End: 2021-05-03

## 2021-05-03 ENCOUNTER — TELEPHONE (OUTPATIENT)
Dept: SURGERY | Facility: CLINIC | Age: 71
End: 2021-05-03

## 2021-05-03 DIAGNOSIS — K80.20 CALCULUS OF GALLBLADDER WITHOUT CHOLECYSTITIS WITHOUT OBSTRUCTION: Primary | ICD-10-CM

## 2021-05-03 RX ORDER — CEFAZOLIN SODIUM 2 G/50ML
2 SOLUTION INTRAVENOUS SEE ADMIN INSTRUCTIONS
Status: CANCELLED | OUTPATIENT
Start: 2021-05-03

## 2021-05-03 RX ORDER — CEFAZOLIN SODIUM 2 G/50ML
2 SOLUTION INTRAVENOUS
Status: CANCELLED | OUTPATIENT
Start: 2021-05-03

## 2021-05-03 NOTE — TELEPHONE ENCOUNTER
FUTURE VISIT INFORMATION      SURGERY INFORMATION:    Date: 21    Location: [facility] OR    Surgeon:  Gavin Castillo MD    Anesthesia Type:  general    Procedure: CHOLECYSTECTOMY, LAPAROSCOPIC    Consult: virtual visit 3/22    RECORDS REQUESTED FROM:       Primary Care Provider:    Jyoti Espinal MD   [facility]    Pertinent Medical History: copd, paroxysmal atrial fibrillation    Most recent EKG+ Tracin21    Most recent ECHO: 10/16/20    Most recent PFT's: 20

## 2021-05-03 NOTE — PROGRESS NOTES
Patient calling stating that she has been experiencing increase in gallbladder attacks and would like proceed with surgery.  CR placed.  Patient will need to see PAC and hold Xarelto.      Pre and Post op Patient Education/Teaching Flowsheet  Relevant Diagnosis:  Gallstones  Teaching Topic:  Pre and post op teaching  Person(s) Involved in teaching:  Patient     Motivation Level:  Asks Questions:  Yes  Eager to Learn:  Yes  Cooperative:  Yes  Receptive (willing/able to accept information):  Yes  Any cultural factors/Buddhist beliefs that may influence understanding or compliance?  No    Patient/caregiver/family demonstrates understanding of the following:  Reason for the appointment, diagnosis, and treatment plan:  Yes  Patient demonstrates understanding of the following:  Pre-op bowel prep:  No  Post-op pain management recommendations (medications, ice compress, binder/athletic supporter (if applicable), etc.:  Yes  Inguinal hernia patients:  Post-op urinary retention- discussed signs/symptoms and visit to ER for Jack catheter placement and to stay in place for at least 48 hours:  NA  Restrictions:  Yes  Medications to take the day of surgery:  Per PCP  Blood thinner medications discussed and when to stop (if applicable):  Yes  Wound care:  Yes  Diabetes medication management (if applicable):  Per PCP  Which situations necessitate calling provider and whom to contact:  Discussed how to contact the hospital, nurse, and clinic scheduling staff if necessary      Date and time of surgery:  TBD  Location of surgery: [address] [place] [address]  History and Physical and any other testing necessary prior to surgery:  Yes  Required time line for completion of History and Physical and any pre-op testing:  Yes- PAC  Discuss need for someone to drive patient home and stay with them for 24 hours:  Yes  Pre-op showering/scrub information with Surgical Scrub:  Yes  NPO Guidelines:  NPO per Anesthesia Guidelines  COVID-19 Testing:   Yes    Infection Prevention: Patient demonstrates understanding of the following:  Patient instructed on hand hygiene:  Yes  Surgical procedure site care will be taught and will be reviewed at the time of discharge  Signs and symptoms of infection taught:  Yes  Wound care reviewed and will be taught at the time of discharge  Central venous catheter care will be taught at the time of discharge (if applicable)    Post-op follow-up:  Instructional materials used/given/mailed:  [place] Surgery Booklet, post op teaching sheet, Map, Soap, and arrival/location information    Surgical instructions mailed to patient

## 2021-05-03 NOTE — TELEPHONE ENCOUNTER
Patient is scheduled for surgery with Dr. Castillo    Spoke with: Patient     Date of Surgery: Thursday 05/20/2021    Location: [place]    Informed patient they will need an adult  Yes    Pre-op with surgeon (if applicable): 03/22/21    H&P: Scheduled with PAC video visit Thursday 05/06/21 @ 3:00pm with Cortney Huston to discuss stopping and possible bridging of patient's blood thinner 10-14 days prior to surgery.    Pre-procedure COVID-19 Test: Tuesday 05/18/21 at 3:00pm at [facility]     Additional imaging/appointments: 7-10 day post op video visit with Dr. Castillo on Friday 05/28/21 at 2:30pm    Surgery packet: sent to patient via [employer] and mailed per patient reedith, verifed address on file is current and correct 05/03/21.     Additional comments: Patient had questions about pain management before surgery as she had an episode of pain after eating greasy foods the night before. Message sent to DEIDRE Rodas with patients concern and requested a callback to patient to discuss.

## 2021-05-03 NOTE — PATIENT INSTRUCTIONS
Reminder:  Surgery Requirements  1. Your surgery will be at [address] [place] [address]  2. You will need to arrive 1 1/2 to 2 hours early based on the location of your surgery.  3. You will need someone to drive you home (over 18 years old) and stay with you for 24 hours after the procedure  4. You will need a preop physical with your regular doctor (or PAC if requested by your surgeon) within 30 days of surgery- closer is always better  5. Stop any blood thinners, vitamins, minerals, or herbal supplements 5 days before surgery.  If you are taking a prescribed blood thinner please let us know for specific instructions  6. Fasting- a nurse from [facility] will call you 1-2 days before surgery to confirm your procedure and tell you when to stop eating and drinking  7. Wash with the soap (Antibacterial, Dial Complete Foam, Hibiclense, or soap given/mailed from the clinic) the night before surgery and morning of surgery. See instructions in the Surgery Packet.  8. If you would like a procedure estimate please call Elzbieta CAZARES Financial Counselor at [phone] or [phone].    At this time, any patient that does not have COVID-19 testing within 4 days of surgery and results available to the surgeon and anesthesia team before the procedure may have their procedure postponed or canceled. We do this to keep our patients, providers, and employees safe. If you decline to test, then you will need to contact your surgeon to determine when or if your procedure will still take place.    OR    We highly encourage patients to get tested for COVID-19 at one of our designated [facility] testing sites. We process the tests in our lab, which allows us to get the results quickly. If you choose to get tested at a non-[facility] location, you will need to contact your primary care provider to make those arrangements and ensure the results are available to your surgeon before you arrive for your procedure. If we do not  receive the results in time, your procedure may be postponed or canceled. Please make sure your test is collected 3-days prior to your procedure date. The results will need to get faxed to [phone].     If you have questions please contact Adrianna JIANG during regular clinic hours, Monday through Friday 7:30 AM - 4:00 PM, or you can contact us via [employer] at anytime.       If you have urgent needs after-hours, weekends, or holidays please call the hospital at [phone] and ask to speak with our on-call General Surgery Team.    Appointment [phone], option #1   Nurse Advice (Adrianna): [phone]   Surgery Scheduler (Julieth): [phone]  Fax: [phone]    After your Laparoscopic Cholecystectomy          Incision care     You may take a shower the day after surgery. Carefully wash your incision with soap and water. Do not submerge yourself in water (bath, whirlpool, hot tub, pool, lake) for 14 days after surgery.     Remove the bandage the day after surgery, but leave the medical tape (Steri-Strips) or glue in place. These will loosen and fall off on their own 5 to 7 days after surgery.       Always wash your hands before touching your incisions or removing bandages.     It is not unusual to form a collection of fluid or blood under your incision that may feel firm or squishy- it can take several weeks to months for your body to reabsorb it.  At times, it may even drain.  If that should happen keep the area clean with soap, water,  and cover with a clean gauze dressing. You can change this daily or as needed.       Other medicines     Wait to start aspirin or blood thinners until the day after surgery. You can continue your regular medicines at your normal time the day after surgery.      Your pain medicine may cause constipation (hard, dry stools). To help with this, take the stool softener your doctor gave you or an over-the-counter stool softener or laxative. You can stop taking this when you  are no longer taking pain medicine and your bowel movements are back to normal.      For pain or discomfort     Take the narcotic pain medicine your doctor gave you as needed and as instructed on the bottle. If you prefer to use over-the-counter medication, use acetaminophen (Tylenol) or ibuprofen (Advil, Motrin) as instructed on the box. Do not take Tylenol if it is in your narcotic pain medication.     Use an ice pack on your abdomen (belly) for 20 minutes at a time as needed for the first 24 hours. Be sure to protect your skin by putting a cloth between the ice pack and your skin.     After 24 hours you can switch to heat for 20 minutes as needed. Be sure to protect your skin by putting a cloth between the heat pack and your skin.       Activities     No driving until you feel it s safe to do so. Don t drive while taking narcotic pain medicine.     Don t lift anything heavier than 20 pounds for 3 to 4 weeks after surgery.      Special equipment     None     Diet     You can eat your regular meals after surgery.      When to call the doctor   Call your doctor if you have:     A fever above 101 F (38.3 C) (taken under the tongue), or a fever or chills lasting more than a day.     Redness at the incision site.     Any fluid or blood draining from the incision, especially if it smells bad.      Severe pain that doesn t improve with pain medicine.        We will call you 2 to 4 days after surgery to review this handout, answer questions and help arrange after-surgery care. If you have questions or concerns, please call [phone] during regular office hours. If you need to call after business hours, call [phone] and ask to page the surgeon on-call.         Transversus Abdominis Plane (TAP) Pain Block      What is a TAP block?   A TAP block can help you manage your pain after surgery. TAP stands for transversus abdominis plane, which is a muscle layer in your abdomen (belly). The TAP block uses numbing medicine  similar to Novocaine to block pain near the site of your surgery.       Why get a TAP block?     To better manage your pain after surgery. A tap block will help keep the pain from getting severe and out of control.     To block pain signals from the nerve, which helps decrease pain after surgery.     To help you sleep, easily breathe deeply, walk and visit with others.      How is it done?   You will lie still on a table. We will use an ultrasound machine to help us see the correct muscle layer of your abdomen. Then, we ll use a needle to inject the medicine. We may also give you some sleep medicine to lessen the pain of the injection.       The procedure takes between 5 and 15 minutes. It is usually done right before surgery, but will sometimes be after. It depends on your surgery and care needs.      What can I expect?     You may feel numbness, tingling or a heaviness in your abdomen.      You may have pain control up to 72 hours after surgery.     The TAP block may not lessen all of your surgery pain. But most patients feel 50 to 75 percent less pain than without the block.       Tell your nurse if you have:     Numbness or tingling in areas other than where the injection was     Blurry vision     Ringing in your ears     A metallic taste in your mouth

## 2021-05-05 NOTE — PROGRESS NOTES
Preoperative Assessment Center Medication History Note    Medication history completed on May 5, 2021 by this writer. See Epic admission navigator for prior to admission medications. Operating room staff will still need to confirm medications and last dose information on day of surgery.     Medication history interview sources:  patient, payor information    Changes made to PTA medication list (reason)  Added: none  Deleted: amiodarone (completed course of this, cardiologist aware not taking any longer).   breo - not taking, no plans to resume this.   atrovent nasal spray - not taking, no plan to resume this.   Changed: kenalog nizoral.     Additional medication history information (including reliability of information, actions taken by pharmacist):    -- No recent (within 30 days) course of antibiotics  -- No recent (within 30 days) course of systemic steroids  -- Patient declines being on any other prescription or over-the-counter medications    Prior to Admission medications    Medication Sig Last Dose Taking? Auth Provider   acetaminophen (TYLENOL) 325 MG tablet Take 1-2 tablets (325-650 mg) by mouth every 6 hours as needed for mild pain Taking Yes Andrea Sanabria MD   ketoconazole (NIZORAL) 2 % external cream Apply topically daily  Patient taking differently: Apply topically daily as needed  Taking Yes Jyoti Espinal MD   lisinopril (ZESTRIL) 20 MG tablet Take 1 tablet (20 mg) by mouth daily Taking Yes Alexa Goldstein MD   rivaroxaban ANTICOAGULANT (XARELTO) 20 MG TABS tablet Take 1 tablet (20 mg) by mouth daily (with dinner) Taking Yes Jose Luis Arrieta MD   triamcinolone (KENALOG) 0.1 % external ointment APPLY  OINTMENT TOPICALLY TWICE DAILY OR  AS  NEEDED  Patient taking differently: Apply topically 2 times daily as needed  Taking Yes Jyoti Espinal MD   albuterol (PROAIR HFA/PROVENTIL HFA/VENTOLIN HFA) 108 (90 Base) MCG/ACT inhaler Inhale 1-2 puffs into the lungs every 6 hours  Patient not  taking: Reported on 5/5/2021 Not Taking  Eneida De Leon MD            Medication history completed by: Min Thompson [facility]

## 2021-05-05 NOTE — PHARMACY - PREOPERATIVE ASSESSMENT CENTER
Anticoagulation Note - Preoperative Assessment Center (PAC) Pharmacist     Patient was interviewed on May 5, 2021 as a part of PAC clinic appointment. The purpose of this note is to document the perioperative anticoagulation plan outlined by the providers caring for Ijeoma Shah.     Current Regimen  Anticoagulation Regimen as of May 5, 2021: rivaroxaban (Xarelto) 20 mg by mouth with evening meal.   Indication: afib (CHADSVASC=3 HTN, female, age x1, no h/o stroke or DVT)  Prescriber:  Dr. Arrieta  Expected Duration of therapy: indefinite  Current medications that may interact with this include: none  Creatinine   Date Value Ref Range Status   04/12/2021 1.11 (H) 0.52 - 1.04 mg/dL Final   CrCl ~49 ml/min    Perioperative plan  Ijeoma Shah is scheduled for CHOLECYSTECTOMY, LAPAROSCOPIC on 5/20 with Dr. Castillo and the perioperative anticoagulation plan outlined by PAC staff is hold xarelto x48 hr pre op (last dose 5/17 PM).     Resumption of anticoagulation after procedure will be based on surgery team assessment of bleeding risks and complications.  This plan may require re-assessment and modification by her primary team in the perioperative setting depending on patients clinical situation.        Min Thompson [facility]  May 5, 2021  10:36 AM

## 2021-05-06 ENCOUNTER — PRE VISIT (OUTPATIENT)
Dept: SURGERY | Facility: CLINIC | Age: 71
End: 2021-05-06

## 2021-05-06 ENCOUNTER — VIRTUAL VISIT (OUTPATIENT)
Dept: SURGERY | Facility: CLINIC | Age: 71
End: 2021-05-06
Payer: MEDICARE

## 2021-05-06 ENCOUNTER — ANESTHESIA EVENT (OUTPATIENT)
Dept: SURGERY | Facility: CLINIC | Age: 71
End: 2021-05-06
Payer: MEDICARE

## 2021-05-06 DIAGNOSIS — Z01.818 PREOP EXAMINATION: Primary | ICD-10-CM

## 2021-05-06 PROCEDURE — 98968 PH1 ASSMT&MGMT NQHP 21-30: CPT | Performed by: CLINICAL NURSE SPECIALIST

## 2021-05-06 ASSESSMENT — LIFESTYLE VARIABLES: TOBACCO_USE: 1

## 2021-05-06 ASSESSMENT — ENCOUNTER SYMPTOMS: DYSRHYTHMIAS: 1

## 2021-05-06 ASSESSMENT — COPD QUESTIONNAIRES: COPD: 1

## 2021-05-06 ASSESSMENT — PAIN SCALES - GENERAL: PAINLEVEL: NO PAIN (0)

## 2021-05-06 NOTE — PATIENT INSTRUCTIONS
Preparing for Your Surgery      Name:  Ijeoma Shah   MRN:  [id number]   :  1950   Today's Date:  2021       Arriving for surgery:  Surgery date:  21  Arrival time:  9:00 am    Restrictions due to COVID 19:  One consistent visitor per patient is allowed.  The visitor will be allowed in the pre-op area.  Visitors are asked to leave the building during the surgery.  No ill visitors.  All visitors must wear face mask.     parking is available for anyone with mobility limitations or disabilities.  ([facility]  24 hours/ 7 days a week; [facility]  7 am- 3:30 pm, Mon- Fri)    Please come to:     [facility] [address]  [address] [place] [address]    - ? parking is available in front of the hospital      -    Please proceed to [address] on the 3rd floor. [phone]?     - ?If you are in need of directions, wheelchair or escort please stop at the Information Desk in the lobby.  Inform the information person that you are here for surgery; a wheelchair and escort to [address] will be provided.?     What can I eat or drink?  -  You may eat and drink normally for up to 8 hours before your surgery. (Until 3:00 am)  -  You may have clear liquids until 2 hours before surgery. (Until 9:00 am)    Examples of clear liquids:  Water  Clear broth  Juices (apple, white grape, white cranberry  and cider) without pulp  Noncarbonated, powder based beverages  (lemonade and Adam-Aid)  Sodas (Sprite, 7-Up, ginger ale and seltzer)  Coffee or tea (without milk or cream)  Gatorade    -  No Alcohol for at least 24 hours before surgery     Which medicines can I take?    Hold Aspirin for 7 days before surgery.   Hold Multivitamins for 7 days before surgery.  Hold Supplements for 7 days before surgery.  Hold Ibuprofen (Advil, Motrin) for 1 day before surgery--unless otherwise directed by surgeon.  Hold Naproxen (Aleve) for 4 days before  surgery.         **Hold xarelto 48 hours before surgery - take your last dose on 5/17/21.**        -  DO NOT take these medications the day of surgery:  Lisinopril (Zestril)    -  PLEASE TAKE these medications the day of surgery:  Acetaminophen (Tylenol)    How do I prepare myself?  - Please take 2 showers before surgery using Scrubcare or Hibiclens soap.    Use this soap only from the neck to your toes.     Leave the soap on your skin for one minute--then rinse thoroughly.      You may use your own shampoo and conditioner; no other hair products.   - Please remove all jewelry and body piercings.  - No lotions, deodorants or fragrance.  - No makeup or fingernail polish.   - Bring your ID and insurance card.    - All patients are required to have a Covid-19 test within 4 days of surgery/procedure.      -Patients will be contacted by the [facility] scheduling team within 1 week of surgery to make an appointment.      - Patients may call the Scheduling team at [phone] if they have not been scheduled within 4 days of  surgery.      ALL PATIENTS GOING HOME THE SAME DAY OF SURGERY ARE REQUIRED TO HAVE A RESPONSIBLE ADULT TO DRIVE AND BE IN ATTENDANCE WITH THEM FOR 24 HOURS FOLLOWING SURGERY.    IF THE RESPONSIBLE ADULT IS REQUIRED FOR POST OP TEACHING THE POST OP RN WILL ASK THEM TO COME BACK TO THE RECOVERY AREA.    Questions or Concerns:    - For any questions regarding the day of surgery or your hospital stay, please contact the Pre Admission Nursing Office at [phone].       - If you have health changes between today and your surgery please call your surgeon.       For questions after surgery please call your surgeons office.

## 2021-05-06 NOTE — PROGRESS NOTES
Salima is a 70 year old who is being evaluated via a billable video visit.      How would you like to obtain your AVS? MyChart    HPI         Review of Systems         Objective    Vitals - Patient Reported  Pain Score: No Pain (0)        Physical Exam     HUSSAIN Madera LPN

## 2021-05-06 NOTE — ANESTHESIA PREPROCEDURE EVALUATION
Anesthesia Pre-Procedure Evaluation    Patient: Ijeoma Shah   MRN: [id number] : 1950        Preoperative Diagnosis: Calculus of gallbladder without cholecystitis without obstruction [K80.20]   Procedure : Procedure(s):  CHOLECYSTECTOMY, LAPAROSCOPIC     Past Medical History:   Diagnosis Date     Arrhythmia     A-Fib     Benign essential hypertension      Calculus of gallbladder without cholecystitis without obstruction      COPD (chronic obstructive pulmonary disease) (H)      Lung cancer (H)      Simple chronic bronchitis (H)       Past Surgical History:   Procedure Laterality Date     ARTHROEREISIS, SUBTALAR       BRONCHOSCOPY RIGID OR FLEXIBLE W/TRANSENDOSCOPIC ENDOBRONCHIAL ULTRASOUND GUIDED N/A 2020    Procedure: Flexible bronchoscopy, endobronchial ultrasound with transbronchial biopsies;  Surgeon: Edin Castro MD;  Location: [facility] OR     CATARACT IOL, RT/LT Bilateral      COLONOSCOPY  2004    colonoscopy to the cecum     ESOPHAGOSCOPY, GASTROSCOPY, DUODENOSCOPY (EGD), COMBINED N/A 2020    Procedure: ESOPHAGOGASTRODUODENOSCOPY, WITH FINE NEEDLE ASPIRATION BIOPSY, WITH ENDOSCOPIC ULTRASOUND GUIDANCE;  Surgeon: Barber Hogan MD;  Location: [facility] GI     PICC TRIPLE LUMEN PLACEMENT Right 10/23/2020    5Fr - 36cm, Medial brachial vein     SURGICAL HISTORY OF -       nose surgery     THORACOSCOPIC RESECTION LUNG Left 10/22/2020    Procedure: Left thoracoscopic lobectomy converted to Left Thoracotomy, Medistinal lymph node dissection, Pulmonary Artery repair, flexible bronchoscopy, on-pump oxygenator on standy-by;  Surgeon: Andrea Sanabria MD;  Location: [facility] OR     THORACOTOMY N/A 10/22/2020    Procedure: Thoracotomy;  Surgeon: Andrea Sanabria MD;  Location: [facility] OR     TRANSCERVICAL EXTENDED MEDIASTINAL LYMPHADENECTOMY N/A 10/22/2020    Procedure: Transcervical extended mediastinal lymphadenectomy;  Surgeon: Andrea Sanabria MD;  Location: [facility] OR     TUBAL LIGATION       bilateral tubal ligation.  BTL      Allergies   Allergen Reactions     Bee Venom Hives     Hot and sweating       Social History     Tobacco Use     Smoking status: Former Smoker     Packs/day: 1.00     Years: 33.00     Pack years: 33.00     Types: Cigarettes     Quit date: 2014     Years since quittin.4     Smokeless tobacco: Never Used   Substance Use Topics     Alcohol use: Yes     Alcohol/week: 1.7 - 2.5 standard drinks     Types: 2 - 3 Standard drinks or equivalent per week     Comment: occ,social      Wt Readings from Last 1 Encounters:   21 76.7 kg (169 lb)        Anesthesia Evaluation   Pt has had prior anesthetic. Type: General and MAC.    No history of anesthetic complications       ROS/MED HX  ENT/Pulmonary: Comment: Is not using inhalers. Denies pulmonary symptoms. Some tightness under right breast after lung surgery. Some nasal congestion.    (+) allergic rhinitis, tobacco use, Past use, COPD,     Neurologic:  - neg neurologic ROS     Cardiovascular: Comment: Afib s/p cardioversion    (+) hypertension-----Taking blood thinners Pt has received instructions: Instructions Given to patient: Planned hold of Xarelto for 48 hours prior to surgery. dysrhythmias, a-fib, Previous cardiac testing   Echo: Date: 10/7/20 Results:    Stress Test: Date: Results:    ECG Reviewed: Date: 21 Results:  SR  Cath: Date: Results:      METS/Exercise Tolerance: >4 METS    Hematologic:  - neg hematologic  ROS     Musculoskeletal:  - neg musculoskeletal ROS     GI/Hepatic: Comment: Abdominal pain if eating greasy food    (+) cholecystitis/cholelithiasis,     Renal/Genitourinary:     (+) renal disease, type: CRI, Pt does not require dialysis,     Endo:  - neg endo ROS     Psychiatric/Substance Use:  - neg psychiatric ROS     Infectious Disease:  - neg infectious disease ROS     Malignancy:   (+) Malignancy, History of Lung.  Lung CA Remission status post Surgery and Chemo.        Other:  - neg other ROS           Physical Exam    Airway   unable to assess     Mallampati: II   TM distance: > 3 FB   Neck ROM: full   Mouth opening: > 3 cm    Respiratory Devices and Support         Dental  no notable dental history         Cardiovascular   cardiovascular exam normal          Pulmonary           breath sounds clear to auscultation       Other findings: Virtual visit    OUTSIDE LABS:  CBC:   Lab Results   Component Value Date    WBC 7.4 04/12/2021    WBC 2.9 (L) 03/10/2021    HGB 11.0 (L) 04/12/2021    HGB 8.3 (L) 03/10/2021    HCT 32.8 (L) 04/12/2021    HCT 25.9 (L) 03/10/2021     04/12/2021     03/10/2021     BMP:   Lab Results   Component Value Date     04/12/2021     03/10/2021    POTASSIUM 4.5 04/12/2021    POTASSIUM 4.4 03/10/2021    CHLORIDE 102 04/12/2021    CHLORIDE 107 03/10/2021    CO2 28 04/12/2021    CO2 29 03/10/2021    BUN 19 04/12/2021    BUN 12 03/10/2021    CR 1.11 (H) 04/12/2021    CR 1.17 (H) 03/10/2021     (H) 04/12/2021    GLC 96 03/10/2021     COAGS: No results found for: PTT, INR, FIBR  POC:   Lab Results   Component Value Date     (H) 10/28/2020     HEPATIC:   Lab Results   Component Value Date    ALBUMIN 3.7 04/12/2021    PROTTOTAL 6.9 04/12/2021    ALT 23 04/12/2021    AST 15 04/12/2021    ALKPHOS 77 04/12/2021    BILITOTAL 0.3 04/12/2021     OTHER:   Lab Results   Component Value Date    PH 7.42 10/22/2020    LACT 1.0 10/22/2020    A1C 5.6 10/23/2020    SABINO 8.7 04/12/2021    PHOS 3.7 10/28/2020    MAG 1.9 04/12/2021    TSH 1.94 02/18/2021       Anesthesia Plan    ASA Status:  2      Anesthesia Type: General.     - Airway: ETT   Induction: Intravenous.   Maintenance: Balanced.   Techniques and Equipment:     - Lines/Monitors: 2nd IV     Consents    Anesthesia Plan(s) and associated risks, benefits, and realistic alternatives discussed. Questions answered and patient/representative(s) expressed understanding.     - Discussed with:  Patient      - Extended  Intubation/Ventilatory Support Discussed: No.      - Patient is DNR/DNI Status: No    Use of blood products discussed: Yes.     - Discussed with: Patient.     - Consented: consented to blood products            Reason for refusal: other.     Postoperative Care    Pain management: Multi-modal analgesia.   PONV prophylaxis: Ondansetron (or other 5HT-3), Dexamethasone or Solumedrol, Background Propofol Infusion     Comments:              PAC Discussion and Assessment    ASA Classification: 3  Case is suitable for: [place]  Anesthetic techniques and relevant risks discussed: GA  Invasive monitoring and risk discussed: No    Possibility and Risk of blood transfusion discussed: No            PAC Resident/NP Anesthesia Assessment: Ijeoma Shah is a 70 year old female scheduled to undergo CHOLECYSTECTOMY, LAPAROSCOPIC with Dr. Castillo on 5/13/21. She has the following specific operative considerations:   - RCRI : 0.9% risk of major adverse cardiac event.   - VTE risk: 3%  - ALBERTO # of risks 3/8 = Intermediate risk  - If afib, [id number] score 3.  Risk category High.    - Risk of PONV score = 3.  If > 2, anti-emetic intervention recommended. If 3 or > anti emetic intervention recommended with two or more meds    --Calculus of gallbladder without cholecystitis without obstruction. Above procedure now planned.   --No history of problems with anesthesia.  --History of lung cancer, s/p thoracoscopic left lung resection on 10/23/20, followed by chemotherapy. Is recovering well.   --Former smoker. 33 pack years. COPD but denies pulmonary symptoms, use of inhalers or 02. She has been following with Pulmonologist who gave her Breo Ellipta but she didn't feel like it helped her.   --HYPERTENSION. Will hold lisinopril on DOS. History of atrial fibrillation prior to planned lung surgery. Later developed post op atrial fibrillation s/p unsuccessful cardioversion. Amiodarone was initiated and she continued to take this  through chemotherapy. She is no longer taking it. She does not feel that she has had a recurrence of atrial fibrillation. She continues on Xarelto for anticoagulation and will hold for 48 hours prior to surgery. All testing above. BNP on 9/29/20 191.   --Renal insufficiency. Cr 1.11.  --Denies history of blood transfusion.             Reviewed and Signed by PAC Mid-Level Provider/Resident  Mid-Level Provider/Resident: RUDY Benjamin, CNS  Date: 5/6/21  Time: 2:47pm                               RUDY Hernández CNS

## 2021-05-06 NOTE — H&P
Pre-Operative H & P     CC:  Preoperative exam to assess for increased cardiopulmonary risk while undergoing surgery and anesthesia.    Date of Encounter: 2021  Primary Care Physician:  Jyoti Espinal  Reason for visit: Calculus of gallbladder without cholecystitis without obstruction [K80.20]  HPI  Ijeoma Shah is a 70 year old female who presents for pre-operative H & P in preparation for CHOLECYSTECTOMY, LAPAROSCOPIC with Dr. Castillo on 21 at [facility]. History is obtained from the patient and medical records.    At the beginning of this visit patient ID was confirmed using name and .     Video-Visit Details    Type of service:  Video Visit    Patient verbally consented to video service today: YES      Video Start Time: 3:06pm  Video End Time (time video stopped): 3:28pm    Originating Location (pt. Location): Home    Distant Location (provider location):  [facility]    Mode of Communication:  Video Conference initiated via [employer], but patient unable to log in, converted to video visit with [employer] but that connection didn't work well either. I was able to see her though briefly and then finished visit over the phone.     Patient with SCC of the lung s/p LL lobectomy on 10/1/20. She has been following with Oncology and has just completed chemotherapy. She continues to recover. During her work up for her lung cancer she was found to have gallstones. She was referred to Dr. Castillo and was seen on 3/22/21. She was reporting some chronic right lateral rib pain but no nausea/RUQ pain, dyspepsia, or bowel irregularity. She was counseled for above procedures.     Her history is otherwise significant for HTN, atrial fibrillation with PAF with RVR diagnosed on 10/1/2020 before scheduled lung surgery. This caused her to be rescheduled. She then developed Afib w/ RVR post surgery and underwent 2 attempts at cardioversion  (DCCV @ 200J & 360J) without success. She has been maintained on amiodarone throughout chemotherapy and is anti coagulated with Xarelto. She has followed with Cardiology, Dr. Goldstein, last seen 11/23/20. She has now been able to stop the amiodarone.     Her history is otherwise significant for COPD, and chronic rhinitis. For her pulmonary issues she is followed by her PCP, Dr. Espinal last seen 3/18/21 and considered stable on Breo Ellipta. Today patient denies fever, shortness of breath, chest pain, irregular HR, or ankle edema. She has an occasional cough. Since her lung surgery she has felt nasal obstruction, especially when she lays down at night, and she has a feeling of tightness under her breast which she associates with the surgery. She is doing well after chemotherapy and is able to walk 1/2 mile. She reports not using Breo Ellipta now because she doesn't feel that it works. She never got the albuterol inhaler. She denies any respiratory issues. She does not have 02. She also denies abdominal pain unless she eats something greasy. No vomiting.       Past Medical History  Past Medical History:   Diagnosis Date     Arrhythmia     A-Fib     Benign essential hypertension      Calculus of gallbladder without cholecystitis without obstruction      COPD (chronic obstructive pulmonary disease) (H)      Lung cancer (H)      Simple chronic bronchitis (H)        Past Surgical History  Past Surgical History:   Procedure Laterality Date     ARTHROEREISIS, SUBTALAR       BRONCHOSCOPY RIGID OR FLEXIBLE W/TRANSENDOSCOPIC ENDOBRONCHIAL ULTRASOUND GUIDED N/A 07/27/2020    Procedure: Flexible bronchoscopy, endobronchial ultrasound with transbronchial biopsies;  Surgeon: Edin Castro MD;  Location: [facility] OR     CATARACT IOL, RT/LT Bilateral      COLONOSCOPY  06/29/2004    colonoscopy to the cecum     ESOPHAGOSCOPY, GASTROSCOPY, DUODENOSCOPY (EGD), COMBINED N/A 09/01/2020    Procedure: ESOPHAGOGASTRODUODENOSCOPY, WITH FINE  NEEDLE ASPIRATION BIOPSY, WITH ENDOSCOPIC ULTRASOUND GUIDANCE;  Surgeon: Barber Hogan MD;  Location: [facility] GI     PICC TRIPLE LUMEN PLACEMENT Right 10/23/2020    5Fr - 36cm, Medial brachial vein     SURGICAL HISTORY OF -       nose surgery     THORACOSCOPIC RESECTION LUNG Left 10/22/2020    Procedure: Left thoracoscopic lobectomy converted to Left Thoracotomy, Medistinal lymph node dissection, Pulmonary Artery repair, flexible bronchoscopy, on-pump oxygenator on standy-by;  Surgeon: Andrea Sanabria MD;  Location: [facility] OR     THORACOTOMY N/A 10/22/2020    Procedure: Thoracotomy;  Surgeon: Andrea Sanabria MD;  Location: [facility] OR     TRANSCERVICAL EXTENDED MEDIASTINAL LYMPHADENECTOMY N/A 10/22/2020    Procedure: Transcervical extended mediastinal lymphadenectomy;  Surgeon: Andrea Sanabria MD;  Location: [facility] OR     TUBAL LIGATION      bilateral tubal ligation.  BTL       Hx of Blood transfusions/reactions: Denies.      Hx of abnormal bleeding or anti-platelet use: Anticoagulated with Xarelto.    Menstrual history: No LMP recorded. Patient is postmenopausal.    Steroid use in the last year: Yes, with chemotherapy.    Personal or FH with difficulty with Anesthesia:  Denies.     Prior to Admission Medications  Current Outpatient Medications   Medication Sig Dispense Refill     acetaminophen (TYLENOL) 325 MG tablet Take 1-2 tablets (325-650 mg) by mouth every 6 hours as needed for mild pain 60 tablet 0     ketoconazole (NIZORAL) 2 % external cream Apply topically daily (Patient taking differently: Apply topically daily as needed ) 60 g 1     lisinopril (ZESTRIL) 20 MG tablet Take 1 tablet (20 mg) by mouth daily 30 tablet 11     rivaroxaban ANTICOAGULANT (XARELTO) 20 MG TABS tablet Take 1 tablet (20 mg) by mouth daily (with dinner) 90 tablet 3     triamcinolone (KENALOG) 0.1 % external ointment APPLY  OINTMENT TOPICALLY TWICE DAILY OR  AS  NEEDED (Patient taking differently: Apply topically 2 times daily as needed ) 45 g 0      albuterol (PROAIR HFA/PROVENTIL HFA/VENTOLIN HFA) 108 (90 Base) MCG/ACT inhaler Inhale 1-2 puffs into the lungs every 6 hours (Patient not taking: Reported on 2021) 1 Inhaler 11       Allergies  Allergies   Allergen Reactions     Bee Venom Hives     Hot and sweating        Social History  Social History     Socioeconomic History     Marital status:      Spouse name: Not on file     Number of children: 1     Years of education: Not on file     Highest education level: Not on file   Occupational History     Occupation: seasonal summer work only   Social Needs     Financial resource strain: Not on file     Food insecurity     Worry: Not on file     Inability: Not on file     Transportation needs     Medical: Not on file     Non-medical: Not on file   Tobacco Use     Smoking status: Former Smoker     Packs/day: 1.00     Years: 33.00     Pack years: 33.00     Types: Cigarettes     Quit date: 2014     Years since quittin.4     Smokeless tobacco: Never Used   Substance and Sexual Activity     Alcohol use: Yes     Alcohol/week: 1.7 - 2.5 standard drinks     Types: 2 - 3 Standard drinks or equivalent per week     Comment: occ,social     Drug use: No     Sexual activity: Yes     Partners: Male   Lifestyle     Physical activity     Days per week: Not on file     Minutes per session: Not on file     Stress: Not on file   Relationships     Social connections     Talks on phone: Not on file     Gets together: Not on file     Attends Shinto service: Not on file     Active member of club or organization: Not on file     Attends meetings of clubs or organizations: Not on file     Relationship status: Not on file     Intimate partner violence     Fear of current or ex partner: Not on file     Emotionally abused: Not on file     Physically abused: Not on file     Forced sexual activity: Not on file   Other Topics Concern     Parent/sibling w/ CABG, MI or angioplasty before 65F 55M? Not Asked   Social  History Narrative     Not on file       Family History  Family History   Problem Relation Age of Onset     Glaucoma Mother      LUNG DISEASE Mother      Melanoma Father      Down Syndrome Brother      Cancer Sister         bile duct     Coronary Artery Disease Brother      CABG Brother      No Known Problems Brother      No Known Problems Brother      No Known Problems Sister      Lung Cancer Sister         lung     No Known Problems Sister      No Known Problems Sister        ROS/MED HISTORY  The complete review of systems is negative other than noted in the HPI or here.    ENT/Pulmonary:     (+) allergic rhinitis, tobacco use, Past use, COPD,     Neurologic:  - neg neurologic ROS     Cardiovascular: Comment: Afib s/p cardioversion    (+) hypertension-----Taking blood thinners Pt has received instructions: dysrhythmias, a-fib, Previous cardiac testing   Echo: Date: 10/7/20 Results:    Stress Test: Date: Results:    ECG Reviewed: Date: 1/12/21 Results:  SR  Cath: Date: Results:      METS/Exercise Tolerance: 4 - Raking leaves, gardening    Hematologic:  - neg hematologic  ROS     Musculoskeletal:  - neg musculoskeletal ROS     GI/Hepatic:     (+) cholecystitis/cholelithiasis,     Renal/Genitourinary:  - neg Renal ROS     Endo:  - neg endo ROS     Psychiatric/Substance Use:  - neg psychiatric ROS     Infectious Disease:  - neg infectious disease ROS     Malignancy:   (+) Malignancy, History of Lung.  Lung CA Remission status post Surgery and Chemo.        Other:  - neg other ROS         LABS: Personally reviewed  CBC:   Lab Results   Component Value Date    WBC 7.4 04/12/2021    WBC 2.9 (L) 03/10/2021    HGB 11.0 (L) 04/12/2021    HGB 8.3 (L) 03/10/2021    HCT 32.8 (L) 04/12/2021    HCT 25.9 (L) 03/10/2021     04/12/2021     03/10/2021     BMP:   Lab Results   Component Value Date     04/12/2021     03/10/2021    POTASSIUM 4.5 04/12/2021    POTASSIUM 4.4 03/10/2021    CHLORIDE 102 04/12/2021     CHLORIDE 107 03/10/2021    CO2 28 04/12/2021    CO2 29 03/10/2021    BUN 19 04/12/2021    BUN 12 03/10/2021    CR 1.11 (H) 04/12/2021    CR 1.17 (H) 03/10/2021     (H) 04/12/2021    GLC 96 03/10/2021     COAGS: No results found for: PTT, INR, FIBR  POC:   Lab Results   Component Value Date     (H) 10/28/2020     HEPATIC:   Lab Results   Component Value Date    ALBUMIN 3.7 04/12/2021    PROTTOTAL 6.9 04/12/2021    ALT 23 04/12/2021    AST 15 04/12/2021    ALKPHOS 77 04/12/2021    BILITOTAL 0.3 04/12/2021     OTHER:   Lab Results   Component Value Date    PH 7.42 10/22/2020    LACT 1.0 10/22/2020    A1C 5.6 10/23/2020    SABINO 8.7 04/12/2021    PHOS 3.7 10/28/2020    MAG 1.9 04/12/2021    TSH 1.94 02/18/2021          Physical Exam  Constitutional: Awake, alert, no apparent distress, and appears stated age.  Respiratory: No cough or obvious dyspnea.  Neuropsychiatric: Calm, cooperative. Normal affect.   Please refer to the physical examination documented by the anesthesiologist in the anesthesia record on the day of surgery    EKG: Personally reviewed 1/12/21 Sinus rhythm  Cardiac echo: 10/7/20  Interpretation Summary     The left ventricle is normal in size. Left ventricular systolic function is  normal. The visual ejection fraction is estimated at 55-60%. Left ventricular  diastolic function is normal. No regional wall motion abnormalities noted.  The right ventricle is normal size. The right ventricular systolic function is  normal.  Trace mitral and tricuspid regurgitation.  No pericardial effusion.  No previous study for comparison.    CT CAP 4/12/21  IMPRESSION:  1. Postoperative changes status post left lower lobectomy with  associated scarring in the medial left hemithorax base. The  scarring  has decreased since the prior study.  2. One mildly indistinct, tiny, pulmonary nodule in the superolateral  right upper lung lobe has not significantly changed. Another nodule in  the inferior right upper  lung lobe laterally has significantly  decreased in size which could represent good response to treatment or  possibly that this nodule represents an inflammatory nodule. Otherwise  the scattered nodules in the lungs are stable. No new or enlarging  nodules are seen.  3. Cystic lesions in the head/neck of the pancreas have decreased in  size.  4. Cholelithiasis with gallbladder wall thickening and large  gallstones again noted. Ultrasound of gallbladder is recommended for  further evaluation. No other significant abnormalities are identified.  No new evidence for metastasis is seen.    US abd 3/10/21                                                                   IMPRESSION:  1. Abnormal thickened gallbladder wall is 0.6 cm. Upper normal limits  at 0.3 cm.  2. Gallbladder is contracted around multiple gallstones measuring up  to 3.0 cm.  3. No evidence for pericholecystic fluid or sonographic Blackburn's sign  to confirm acute cholecystitis although given the cholelithiasis and  abnormal thickening of the gallbladder wall, acute cholecystitis is  considered a possibility.  4. Hepatic cysts are again noted.  5. Cystic structures in the pancreatic head are again seen, similar to  the prior CT abdomen and CT/PET. These are highly likely benign.  Follow-up MRI versus CT of the pancreas is recommended in one year.   6. Diffuse fatty infiltration of the liver.    Imaging and cardiac testing reviewed by this provider    Outside records reviewed from: Care Everywhere    ASSESSMENT and PLAN  Ijeoma Shah is a 70 year old female scheduled to undergo CHOLECYSTECTOMY, LAPAROSCOPIC with Dr. Castillo on 5/13/21. She has the following specific operative considerations:   - RCRI : 0.9% risk of major adverse cardiac event.   - Anesthesia considerations:  Refer to PAC assessment in anesthesia records  - VTE risk: 3%  - ALBERTO # of risks 3/8 = Intermediate risk  - If afib, [id number] score 3.  Risk category High.    - Risk of  PONV score = 3.  If > 2, anti-emetic intervention recommended. If 3 or > anti emetic intervention recommended with two or more meds    --Calculus of gallbladder without cholecystitis without obstruction. Above procedure now planned.   --No history of problems with anesthesia.  --History of lung cancer, s/p thoracoscopic left lung resection on 10/23/20, followed by chemotherapy. Is recovering well.   --Former smoker. 33 pack years. COPD but denies pulmonary symptoms, use of inhalers or 02. She has been following with Pulmonologist who gave her Breo Ellipta but she didn't feel like it helped her.   --HYPERTENSION. Will hold lisinopril on DOS. History of atrial fibrillation prior to planned lung surgery. Later developed post op atrial fibrillation s/p unsuccessful cardioversion. Amiodarone was initiated and she continued to take this through chemotherapy. She is no longer taking it. She does not feel that she has had a recurrence of atrial fibrillation. She continues on Xarelto for anticoagulation and will hold for 48 hours prior to surgery. All testing above. BNP on 9/29/20 191.   --Renal insufficiency. Cr 1.11.  --Denies history of blood transfusion.     Arrival time, NPO, shower and medication instructions provided by nursing staff today. No further diagnostic testing needed.         RUDY Hernández CNS  Preoperative Assessment Center  [facility]  Phone: [phone]  Fax: [phone]

## 2021-05-07 DIAGNOSIS — Z11.59 ENCOUNTER FOR SCREENING FOR OTHER VIRAL DISEASES: ICD-10-CM

## 2021-05-10 ENCOUNTER — VIRTUAL VISIT (OUTPATIENT)
Dept: ONCOLOGY | Facility: CLINIC | Age: 71
End: 2021-05-10
Attending: INTERNAL MEDICINE
Payer: MEDICARE

## 2021-05-10 DIAGNOSIS — C34.32 SQUAMOUS CELL CARCINOMA OF BRONCHUS IN LEFT LOWER LOBE (H): ICD-10-CM

## 2021-05-10 PROCEDURE — 99214 OFFICE O/P EST MOD 30 MIN: CPT | Mod: 95 | Performed by: NURSE PRACTITIONER

## 2021-05-10 PROCEDURE — 999N001193 HC VIDEO/TELEPHONE VISIT; NO CHARGE

## 2021-05-10 NOTE — LETTER
5/10/2021         RE: Ijeoma Shah  [address] [facility] [address]        Dear Colleague,    Thank you for referring your patient, Ijeoma Shah, to the [facility]. Please see a copy of my visit note below.    Salima is a 70 year old who is being evaluated via a billable video visit.      How would you like to obtain your AVS? MyChart  If the video visit is dropped, the invitation should be resent by: Text to cell phone: [phone]  Will anyone else be joining your video visit? No      Video Start Time: 0929  Video-Visit Details    Type of service:  Video Visit    Video End Time:9:49 AM    Originating Location (pt. Location): Home    Distant Location (provider location):  [facility]     Platform used for Video Visit: The Library     Reason for Visit: Survivorship visit    Oncology HPI:   CANCER TYPE: SCC lung, poorly differentiated  STAGE: pT4N0 (IIIA)  ECOG PS: 1     Cancer Staging  Squamous cell carcinoma of left lung (H)  Staging form: Lung, AJCC 8th Edition  - Pathologic stage from 11/17/2020: Stage IIIA (pT4, pN0, cM0) - Signed by Eneida De Leon MD on 11/17/2020     PD-L1: TPS 15% on [id number] lobectomy, <1% on [id number] (LLL bx)  Lung panel: SMO [id number] on LLL bx, negative for fusions on lobectomy specimen.   NGS: N/A     SUMMARY  7/3/20                 CT chest (screening). 6.7 cm LLL mass, 0.6 cm RML nodule, mediastinal and L hilar LNs  7/9/20                PET/CT. 7.1 x 5.6 cm LLL mass (SUV 19.6), post obstructive pneumonitis LLL inferior to the mass (SUV 2.8), 3.5 x 1.7 cm 4L node (SUV 5.9), L adrenal nodule 1.1 cm (SUV 4), indeterminate pulmonary nodules, pancreatic cysts, not hypermetabolic  7/27/20              Bronch, EBUS (Dr. Castro). 10R, 11R, 4R too small to biopsy. Stations 7, 4L, 11L negative for malignancy. LLL mass bx: SCC  8/12/20              Brain MRI negative  9/1/20                 EUS to evaluate adrenal and 4L (Dr. Hogan). Both negative for malignancy. Adrenal with bland adrenal cells  10/22/20            L VATS, converted to thoracotomy, LLL lobectomy, MLND, R pulmonary arterioplasty (Dr. Sanabria, Dr. Davis). 8.3 cm poorly differentiated SCC, +angiolymphatic invasion  12/3/20~2/25/21             Cisplatin gemcitabine x 4. C2D8 delayed 1 week due to neutropenia, added neulasta      Interval history: Salima is feeling well. Energy is good. Continues to go to the office, works in sales. Walks a mile a few times a week. Has some chronic R back pain which has been attributed to her gallbladder disease. She is getting the gallbladder removed in 2 weeks.  Notes some chronic shortness of breath after surgery, but not much that limits her activity  -no fevers/chills, cough, wheezing  -bowel function is wnl. Notes her urination is slower than it was before chemo. Denies urgency/frequency/dysuria. Notes she is drinking less water now.  -appetite is good. Weight stable  -no hearing changes or neuropathy     Current Outpatient Medications   Medication Sig Dispense Refill     acetaminophen (TYLENOL) 325 MG tablet Take 1-2 tablets (325-650 mg) by mouth every 6 hours as needed for mild pain 60 tablet 0     albuterol (PROAIR HFA/PROVENTIL HFA/VENTOLIN HFA) 108 (90 Base) MCG/ACT inhaler Inhale 1-2 puffs into the lungs every 6 hours (Patient not taking: Reported on 5/5/2021) 1 Inhaler 11     ketoconazole (NIZORAL) 2 % external cream Apply topically daily (Patient taking differently: Apply topically daily as needed ) 60 g 1     lisinopril (ZESTRIL) 20 MG tablet Take 1 tablet (20 mg) by mouth daily 30 tablet 11     rivaroxaban ANTICOAGULANT (XARELTO) 20 MG TABS tablet Take 1 tablet (20 mg) by mouth daily (with dinner) 90 tablet 3     triamcinolone (KENALOG) 0.1 % external ointment APPLY  OINTMENT TOPICALLY TWICE DAILY OR  AS  NEEDED (Patient taking differently: Apply topically 2 times daily as needed )  45 g 0          Allergies   Allergen Reactions     Bee Venom Hives     Hot and sweating          Video physical exam  General: Patient appears well in no acute distress.   Skin: No visualized rash or lesions on visualized skin  Eyes: EOMI, no erythema, sclera icterus or discharge noted  Resp: Appears to be breathing comfortably without accessory muscle usage, speaking in full sentences, no cough  MSK: Appears to have normal range of motion based on visualized movements  Neurologic: No apparent tremors, facial movements symmetric  Psych: affect pleasant, alert and oriented    The rest of a comprehensive physical examination is deferred due to PHE (public health emergency) video restrictions     not currently breastfeeding.  Wt Readings from Last 4 Encounters:   03/22/21 76.7 kg (169 lb)   03/18/21 76.7 kg (169 lb)   02/25/21 77.2 kg (170 lb 1.6 oz)   02/18/21 78 kg (171 lb 14.4 oz)         Labs: Results for MAYCO VEGA (MRN [id number]) as of 5/10/2021 09:52   Ref. Range 4/12/2021 11:25   Sodium Latest Ref Range: 133 - 144 mmol/L 134   Potassium Latest Ref Range: 3.4 - 5.3 mmol/L 4.5   Chloride Latest Ref Range: 94 - 109 mmol/L 102   Carbon Dioxide Latest Ref Range: 20 - 32 mmol/L 28   Urea Nitrogen Latest Ref Range: 7 - 30 mg/dL 19   Creatinine Latest Ref Range: 0.52 - 1.04 mg/dL 1.11 (H)   GFR Estimate Latest Ref Range: >60 mL/min/1.73_m2 50 (L)   GFR Estimate If Black Latest Ref Range: >60 mL/min/1.73_m2 58 (L)   Calcium Latest Ref Range: 8.5 - 10.1 mg/dL 8.7   Anion Gap Latest Ref Range: 3 - 14 mmol/L 4   Magnesium Latest Ref Range: 1.6 - 2.3 mg/dL 1.9   Albumin Latest Ref Range: 3.4 - 5.0 g/dL 3.7   Protein Total Latest Ref Range: 6.8 - 8.8 g/dL 6.9   Bilirubin Total Latest Ref Range: 0.2 - 1.3 mg/dL 0.3   Alkaline Phosphatase Latest Ref Range: 40 - 150 U/L 77   ALT Latest Ref Range: 0 - 50 U/L 23   AST Latest Ref Range: 0 - 45 U/L 15   Glucose Latest Ref Range: 70 - 99 mg/dL 109 (H)   WBC Latest Ref  Range: 4.0 - 11.0 10e9/L 7.4   Hemoglobin Latest Ref Range: 11.7 - 15.7 g/dL 11.0 (L)   Hematocrit Latest Ref Range: 35.0 - 47.0 % 32.8 (L)   Platelet Count Latest Ref Range: 150 - 450 10e9/L 203   RBC Count Latest Ref Range: 3.8 - 5.2 10e12/L 3.49 (L)   MCV Latest Ref Range: 78 - 100 fl 94   MCH Latest Ref Range: 26.5 - 33.0 pg 31.5   MCHC Latest Ref Range: 31.5 - 36.5 g/dL 33.5   RDW Latest Ref Range: 10.0 - 15.0 % 13.8   Diff Method Unknown Automated Method   % Neutrophils Latest Units: % 77.5   % Lymphocytes Latest Units: % 13.9   % Monocytes Latest Units: % 4.3   % Eosinophils Latest Units: % 3.5   % Basophils Latest Units: % 0.4   % Immature Granulocytes Latest Units: % 0.4   Nucleated RBCs Latest Ref Range: 0 /100 0   Absolute Neutrophil Latest Ref Range: 1.6 - 8.3 10e9/L 5.7   Absolute Lymphocytes Latest Ref Range: 0.8 - 5.3 10e9/L 1.0   Absolute Monocytes Latest Ref Range: 0.0 - 1.3 10e9/L 0.3   Absolute Eosinophils Latest Ref Range: 0.0 - 0.7 10e9/L 0.3   Absolute Basophils Latest Ref Range: 0.0 - 0.2 10e9/L 0.0   Abs Immature Granulocytes Latest Ref Range: 0 - 0.4 10e9/L 0.0   Absolute Nucleated RBC Unknown 0.0       Imaging: n/a    Impression/plan:  Ijeoma Shah is a 70 year old female with a history of squamous cell carcinoma of the lung, here for cancer survivorship visit following completion of cancer treatment. Survivorship care plan completed, reviewed with the patient and mailed to her.    Cancer surveillance and follow-up schedule:   - The patient's history was reviewed with the patient and a cancer survivorship care plan was delivered.  - Patient should see medical oncology every 3-6 months with CT imaging and labs x 3 years, then every 6 months x 2 years, then annually    Potential long term side effects: Given treatment with surgery and chemotherapy , the patient is at risk for potential long term side effects, as follows:    Neurotoxicity: At risk for cisplatin-induced neuropathy and  ototoxicity.   - Consider pain management as needed for painful neuropathy  - Would not expect to develop as a late side effect  - Referral for audiogram for hearing changes    Renal toxicity:  - If renal impairment developed on treatment, monitor blood pressure and renal function as indicated by primary care team    Cardiovascular toxicity:   - Exposure to cisplatin can sometimes cause high cholesterol levels earlier that would be expected based on age. Recommend routine lipid testing annually. Cisplatin can also cause vascular changes that can affect blood circulation, and risk of hypertension, stroke, angina, MI.   - Patients should see primary care provider annually with aggressive management of cardiac risk factors (hypertension, blood glucose, lipids) as indicated, and should exercise regularly.    Kidney:  The treatment may have caused kidney injury. It is important to monitor your kidney function regularly. Maintain good control of other diseases that can impact kidney function including diabetes and hypertension    Pulmonary:  Chronic shortness of breath is not uncommon after pneumonectomy. Cancer rehabilitation (physicial therapy) can be helpful in building stamina  -Chronic pain post thoracotomy is not uncommon. Referral to an interventional pain specialist is recommended for severe/chronic pain.    Anxiety, depression, trauma, and distress:  -reviewed strategies for coping with a cancer diagnosis (see resources below)    Cognitive function:  - Encouraged organizational strategies (ie notebooks, planners, reminders, smart phone technology), routine physical activity, limiting use of alcohol, considering yoga, meditation, mindfulness-based stress reduction, and cognitive training (Ie brain games)    Fatigue:  - Can consider CBC, CPM, TSH  - Encourage exercise, consider referral to PT    Screening for subsequent new primary cancers:  - Recommended that the patient undergo routine screening for their gender  and age, to be arranged by primary care provider  - There is a rare risk of secondary cancers developing after chemothearpy, most commonly MDS, leukemia, or lymphoma. This typically occurs 4-10 years after therapy but can by as soon as 1-3 years. Recommend annual CBC. Monitoring for consitutional symptoms fevers, night sweats, fatigue, unexplained weight loss.       Healthy behavior recommendations:  - Recommend healthy BMI, engaging in at least 150 minutes of moderate activity weekly, and healthy diet high in vegetables, fruits, and whole grains; and low in sugars and saturated fats; practicing sun safety with use of sunscreen of at least 30, that protects against UVA and UVB rays, and is water resistant.  - Dental exams and cleanings every 6-12 months  - Eye exams every 1-2 years or as indicated  - PCP visit annually    Immunizations:  - Recommend influenza vaccination annually  - Recombinant zoster vaccination in all survivors aged 50 and over    Cancer screenings: We know that people who have a history of cancer can be at an increased risk for other cancers during their lifetime. It is important that you see your primary care provider at least annually to help coordinate this.     Promoting healthy lifestyle: Healthy lifestyle habits have been associated with improved overall health and quality of life. For some cancers, a healthy lifestyle has been associated with a reduced risk of recurrence and death. We recommend the following lifestyle habits:  - Maintaining a healthy BMI (body mass index) throughout life   -Engaging in at least 150 minutes of moderate activity weekly (moderate is described as any activity in which you could talk, but not sing, including dancing, biking on level ground, general gardening, baseball, tennis, brisk walking, water aerobics, yoga, pilates, etc), or 75 minutes of vigorous activity weekly (vigorous is described as being able to say a few words without stopping to catch a breath)  biking faster than 10 mph, hiking uphill, jogging, swimming, stair climbing, high-intensity yoga, etc)  -Maintaining a healthy diet high in vegetables, fruits, and whole grains; and low in refined sugars, red meat (less than 18 oz per week), and processed meats  - Limiting alcohol to no more than one drink per day for women, and two drinks per day for men  practicing sun safety with use of sunscreen of at least 30, that protects against UVA and UVB rays, and is water resistant.   - You should receive the influenza vaccine annually. You should receive the  pneumococcal pneumonia, shingles and COVID 19 vaccines.   - Dental cleanings every 6 months  - Eye examinations every 1-2 years or as indicated by your eye doctor    Cardiovascular toxicity:   - Sometimes, chemotherapy can cause vascular changes that can increase risk of high blood pressure, stroke, or heart attack. To help protect your heart, it is important to adhere to a healthy diet, get routine exercise, and see your primary care provider at least annually for monitoring and management of any other problems that can affect your heart (such as high blood pressure, diabetes, high cholesterol)  -continue to follow with cardiology for hx of atrial fibrillation    Kidney:  The treatment may have caused kidney injury. It is important to monitor your kidney function regularly. Maintain good control of other diseases that can impact kidney function including diabetes and hypertension    Mood:   Anxiety, depression, and post-traumatic stress disorder are common following cancer treatment. If you are struggling with any of these issues, it is important that you discuss with your care team, as we can often help manage these disorders. Exercise can help with all of these disorders. Consider looking into some of the resources listed, below.    Cognitive function:   Some people find that they have persistent  chemo brain  following treatment.   - Consider organizational  strategies (ie notebooks, planners, reminders, smart phone technology), routine physical activity, limiting use of alcohol, considering yoga, meditation, mindfulness-based stress reduction, and cognitive training (Ie brain games)      Hearing:  - The treatment may have affected your hearing. If you find that you are having difficulties hearing, discuss performing a hearing test (audiogram) your primary care provider       Resources:  National Coalition for Cancer Survivorship (NCCS)  http://www.canceradvocacy.org/    American Association for Cancer Research (AACR)    A six-part podcast series about survivorship in partnership with [employer] [place] and The  Wellness Community:  http://www.aacr.org/ http://www.crmagazine.org/archive/Crpodcasts/Pages/SurvivingThriving.aspx      American Cancer Society (ACS)    Survivorship information    Cancer Survivors Network    National Cancer Survivorship Resource Center    Physical side effects information, including sexual function  http://www.cancer.org/index http://www.cancer.org/treatment/survivorshipduringandaftertreatment/index http://csn.cancer.org/  http://www.cancer.org/SurvivorshipCenter http://www.cancer.org/treatment/treatmentsandsideeffects/physicalsideeffects/index        [facility] [place] [facility] Cancer Research (AICR): Survivorship information, Nutrition, physical activity, weight management  http://www.aicr.org/patients-survivors/     Cancer Care: Free, professional support services for anyone affected by cancer  www.cancercare.org    [employer]  http://www.livestrong.org/    National [facility] [place] [facility]: Cancer Survivorship Research    Springboard Beyond Cancer, Facing Forward series, designed to educate cancer  survivors, family members, and health care providers about the challenges associated with life after cancer treatment  http://survivorship.cancer.gov https://survivorship.cancer.gov/springboard http://cancercontrol.cancer.gov/ocs/resources/ffseries.html        National Comprehensive Cancer Network (NCCN)    Life After Cancer: Patient and Caregiver Resources and Information  http://www.nccn.org/index.asp http://www.nccn.org/patients/resources/life_after_cancer/     Help lines  American Cancer Society: 6.622.084.4734 http://www.cancer.org  American Psychosocical Oncology Society: 0.838.750.8779 http://apos-society.org/  Cancer Support Community 4.864.241.2330 http://www.cancersupportcommunity.org/  [employer] SurvivorCare 8.718.573.2169  National Suicide Prevention Lifeline 6-168-395-TALK http://suicidepreventionlifeline.org    60 minutes spent on the date of the encounter doing chart review, review of test results, interpretation of tests, patient visit and documentation         Again, thank you for allowing me to participate in the care of your patient.        Sincerely,        RUDY Carlson CNP

## 2021-05-10 NOTE — PROGRESS NOTES
Salima is a 70 year old who is being evaluated via a billable video visit.      How would you like to obtain your AVS? [email or web address]  If the video visit is dropped, the invitation should be resent by: Text to cell phone: [phone]  Will anyone else be joining your video visit? No      Video Start Time: 0929  Video-Visit Details    Type of service:  Video Visit    Video End Time:9:49 AM    Originating Location (pt. Location): Home    Distant Location (provider location):  [facility]     Platform used for Video Visit: [employer] SHREYAS     Reason for Visit: Survivorship visit    Oncology HPI:   CANCER TYPE: SCC lung, poorly differentiated  STAGE: pT4N0 (IIIA)  ECOG PS: 1     Cancer Staging  Squamous cell carcinoma of left lung (H)  Staging form: Lung, AJCC 8th Edition  - Pathologic stage from 11/17/2020: Stage IIIA (pT4, pN0, cM0) - Signed by Eneida De Leon MD on 11/17/2020     PD-L1: TPS 15% on [id number] lobectomy, <1% on [id number] (LLL bx)  Lung panel: SMO [id number] on LLL bx, negative for fusions on lobectomy specimen.   NGS: N/A     SUMMARY  7/3/20                 CT chest (screening). 6.7 cm LLL mass, 0.6 cm RML nodule, mediastinal and L hilar LNs  7/9/20                PET/CT. 7.1 x 5.6 cm LLL mass (SUV 19.6), post obstructive pneumonitis LLL inferior to the mass (SUV 2.8), 3.5 x 1.7 cm 4L node (SUV 5.9), L adrenal nodule 1.1 cm (SUV 4), indeterminate pulmonary nodules, pancreatic cysts, not hypermetabolic  7/27/20              Bronch, EBUS (Dr. Castro). 10R, 11R, 4R too small to biopsy. Stations 7, 4L, 11L negative for malignancy. LLL mass bx: SCC  8/12/20              Brain MRI negative  9/1/20                EUS to evaluate adrenal and 4L (Dr. Hogan). Both negative for malignancy. Adrenal with bland adrenal cells  10/22/20            L VATS, converted to thoracotomy, LLL lobectomy, MLND, R pulmonary arterioplasty (Dr. Sanabria, Dr. Davis). 8.3 cm poorly differentiated SCC,  +angiolymphatic invasion  12/3/20~2/25/21             Cisplatin gemcitabine x 4. C2D8 delayed 1 week due to neutropenia, added neulasta      Interval history: Salima is feeling well. Energy is good. Continues to go to the office, works in sales. Walks a mile a few times a week. Has some chronic R back pain which has been attributed to her gallbladder disease. She is getting the gallbladder removed in 2 weeks.  Notes some chronic shortness of breath after surgery, but not much that limits her activity  -no fevers/chills, cough, wheezing  -bowel function is wnl. Notes her urination is slower than it was before chemo. Denies urgency/frequency/dysuria. Notes she is drinking less water now.  -appetite is good. Weight stable  -no hearing changes or neuropathy     Current Outpatient Medications   Medication Sig Dispense Refill     acetaminophen (TYLENOL) 325 MG tablet Take 1-2 tablets (325-650 mg) by mouth every 6 hours as needed for mild pain 60 tablet 0     albuterol (PROAIR HFA/PROVENTIL HFA/VENTOLIN HFA) 108 (90 Base) MCG/ACT inhaler Inhale 1-2 puffs into the lungs every 6 hours (Patient not taking: Reported on 5/5/2021) 1 Inhaler 11     ketoconazole (NIZORAL) 2 % external cream Apply topically daily (Patient taking differently: Apply topically daily as needed ) 60 g 1     lisinopril (ZESTRIL) 20 MG tablet Take 1 tablet (20 mg) by mouth daily 30 tablet 11     rivaroxaban ANTICOAGULANT (XARELTO) 20 MG TABS tablet Take 1 tablet (20 mg) by mouth daily (with dinner) 90 tablet 3     triamcinolone (KENALOG) 0.1 % external ointment APPLY  OINTMENT TOPICALLY TWICE DAILY OR  AS  NEEDED (Patient taking differently: Apply topically 2 times daily as needed ) 45 g 0          Allergies   Allergen Reactions     Bee Venom Hives     Hot and sweating          Video physical exam  General: Patient appears well in no acute distress.   Skin: No visualized rash or lesions on visualized skin  Eyes: EOMI, no erythema, sclera icterus or  discharge noted  Resp: Appears to be breathing comfortably without accessory muscle usage, speaking in full sentences, no cough  MSK: Appears to have normal range of motion based on visualized movements  Neurologic: No apparent tremors, facial movements symmetric  Psych: affect pleasant, alert and oriented    The rest of a comprehensive physical examination is deferred due to PHE (public health emergency) video restrictions     not currently breastfeeding.  Wt Readings from Last 4 Encounters:   03/22/21 76.7 kg (169 lb)   03/18/21 76.7 kg (169 lb)   02/25/21 77.2 kg (170 lb 1.6 oz)   02/18/21 78 kg (171 lb 14.4 oz)         Labs: Results for MAYCO VEGA (MRN [id number]) as of 5/10/2021 09:52   Ref. Range 4/12/2021 11:25   Sodium Latest Ref Range: 133 - 144 mmol/L 134   Potassium Latest Ref Range: 3.4 - 5.3 mmol/L 4.5   Chloride Latest Ref Range: 94 - 109 mmol/L 102   Carbon Dioxide Latest Ref Range: 20 - 32 mmol/L 28   Urea Nitrogen Latest Ref Range: 7 - 30 mg/dL 19   Creatinine Latest Ref Range: 0.52 - 1.04 mg/dL 1.11 (H)   GFR Estimate Latest Ref Range: >60 mL/min/1.73_m2 50 (L)   GFR Estimate If Black Latest Ref Range: >60 mL/min/1.73_m2 58 (L)   Calcium Latest Ref Range: 8.5 - 10.1 mg/dL 8.7   Anion Gap Latest Ref Range: 3 - 14 mmol/L 4   Magnesium Latest Ref Range: 1.6 - 2.3 mg/dL 1.9   Albumin Latest Ref Range: 3.4 - 5.0 g/dL 3.7   Protein Total Latest Ref Range: 6.8 - 8.8 g/dL 6.9   Bilirubin Total Latest Ref Range: 0.2 - 1.3 mg/dL 0.3   Alkaline Phosphatase Latest Ref Range: 40 - 150 U/L 77   ALT Latest Ref Range: 0 - 50 U/L 23   AST Latest Ref Range: 0 - 45 U/L 15   Glucose Latest Ref Range: 70 - 99 mg/dL 109 (H)   WBC Latest Ref Range: 4.0 - 11.0 10e9/L 7.4   Hemoglobin Latest Ref Range: 11.7 - 15.7 g/dL 11.0 (L)   Hematocrit Latest Ref Range: 35.0 - 47.0 % 32.8 (L)   Platelet Count Latest Ref Range: 150 - 450 10e9/L 203   RBC Count Latest Ref Range: 3.8 - 5.2 10e12/L 3.49 (L)   MCV Latest Ref  Range: 78 - 100 fl 94   MCH Latest Ref Range: 26.5 - 33.0 pg 31.5   MCHC Latest Ref Range: 31.5 - 36.5 g/dL 33.5   RDW Latest Ref Range: 10.0 - 15.0 % 13.8   Diff Method Unknown Automated Method   % Neutrophils Latest Units: % 77.5   % Lymphocytes Latest Units: % 13.9   % Monocytes Latest Units: % 4.3   % Eosinophils Latest Units: % 3.5   % Basophils Latest Units: % 0.4   % Immature Granulocytes Latest Units: % 0.4   Nucleated RBCs Latest Ref Range: 0 /100 0   Absolute Neutrophil Latest Ref Range: 1.6 - 8.3 10e9/L 5.7   Absolute Lymphocytes Latest Ref Range: 0.8 - 5.3 10e9/L 1.0   Absolute Monocytes Latest Ref Range: 0.0 - 1.3 10e9/L 0.3   Absolute Eosinophils Latest Ref Range: 0.0 - 0.7 10e9/L 0.3   Absolute Basophils Latest Ref Range: 0.0 - 0.2 10e9/L 0.0   Abs Immature Granulocytes Latest Ref Range: 0 - 0.4 10e9/L 0.0   Absolute Nucleated RBC Unknown 0.0       Imaging: n/a    Impression/plan:  Ijeoma Shah is a 70 year old female with a history of squamous cell carcinoma of the lung, here for cancer survivorship visit following completion of cancer treatment. Survivorship care plan completed, reviewed with the patient and mailed to her.    Cancer surveillance and follow-up schedule:   - The patient's history was reviewed with the patient and a cancer survivorship care plan was delivered.  - Patient should see medical oncology every 3-6 months with CT imaging and labs x 3 years, then every 6 months x 2 years, then annually    Potential long term side effects: Given treatment with surgery and chemotherapy , the patient is at risk for potential long term side effects, as follows:    Neurotoxicity: At risk for cisplatin-induced neuropathy and ototoxicity.   - Consider pain management as needed for painful neuropathy  - Would not expect to develop as a late side effect  - Referral for audiogram for hearing changes    Renal toxicity:  - If renal impairment developed on treatment, monitor blood pressure and  renal function as indicated by primary care team    Cardiovascular toxicity:   - Exposure to cisplatin can sometimes cause high cholesterol levels earlier that would be expected based on age. Recommend routine lipid testing annually. Cisplatin can also cause vascular changes that can affect blood circulation, and risk of hypertension, stroke, angina, MI.   - Patients should see primary care provider annually with aggressive management of cardiac risk factors (hypertension, blood glucose, lipids) as indicated, and should exercise regularly.    Kidney:  The treatment may have caused kidney injury. It is important to monitor your kidney function regularly. Maintain good control of other diseases that can impact kidney function including diabetes and hypertension    Pulmonary:  Chronic shortness of breath is not uncommon after pneumonectomy. Cancer rehabilitation (physicial therapy) can be helpful in building stamina  -Chronic pain post thoracotomy is not uncommon. Referral to an interventional pain specialist is recommended for severe/chronic pain.    Anxiety, depression, trauma, and distress:  -reviewed strategies for coping with a cancer diagnosis (see resources below)    Cognitive function:  - Encouraged organizational strategies (ie notebooks, planners, reminders, smart phone technology), routine physical activity, limiting use of alcohol, considering yoga, meditation, mindfulness-based stress reduction, and cognitive training (Ie brain games)    Fatigue:  - Can consider CBC, CPM, TSH  - Encourage exercise, consider referral to PT    Screening for subsequent new primary cancers:  - Recommended that the patient undergo routine screening for their gender and age, to be arranged by primary care provider  - There is a rare risk of secondary cancers developing after chemothearpy, most commonly MDS, leukemia, or lymphoma. This typically occurs 4-10 years after therapy but can by as soon as 1-3 years. Recommend annual  CBC. Monitoring for consitutional symptoms fevers, night sweats, fatigue, unexplained weight loss.       Healthy behavior recommendations:  - Recommend healthy BMI, engaging in at least 150 minutes of moderate activity weekly, and healthy diet high in vegetables, fruits, and whole grains; and low in sugars and saturated fats; practicing sun safety with use of sunscreen of at least 30, that protects against UVA and UVB rays, and is water resistant.  - Dental exams and cleanings every 6-12 months  - Eye exams every 1-2 years or as indicated  - PCP visit annually    Immunizations:  - Recommend influenza vaccination annually  - Recombinant zoster vaccination in all survivors aged 50 and over    Cancer screenings: We know that people who have a history of cancer can be at an increased risk for other cancers during their lifetime. It is important that you see your primary care provider at least annually to help coordinate this.     Promoting healthy lifestyle: Healthy lifestyle habits have been associated with improved overall health and quality of life. For some cancers, a healthy lifestyle has been associated with a reduced risk of recurrence and death. We recommend the following lifestyle habits:  - Maintaining a healthy BMI (body mass index) throughout life   -Engaging in at least 150 minutes of moderate activity weekly (moderate is described as any activity in which you could talk, but not sing, including dancing, biking on level ground, general gardening, baseball, tennis, brisk walking, water aerobics, yoga, pilates, etc), or 75 minutes of vigorous activity weekly (vigorous is described as being able to say a few words without stopping to catch a breath) biking faster than 10 mph, hiking uphill, jogging, swimming, stair climbing, high-intensity yoga, etc)  -Maintaining a healthy diet high in vegetables, fruits, and whole grains; and low in refined sugars, red meat (less than 18 oz per week), and processed meats  -  Limiting alcohol to no more than one drink per day for women, and two drinks per day for men  practicing sun safety with use of sunscreen of at least 30, that protects against UVA and UVB rays, and is water resistant.   - You should receive the influenza vaccine annually. You should receive the  pneumococcal pneumonia, shingles and COVID 19 vaccines.   - Dental cleanings every 6 months  - Eye examinations every 1-2 years or as indicated by your eye doctor    Cardiovascular toxicity:   - Sometimes, chemotherapy can cause vascular changes that can increase risk of high blood pressure, stroke, or heart attack. To help protect your heart, it is important to adhere to a healthy diet, get routine exercise, and see your primary care provider at least annually for monitoring and management of any other problems that can affect your heart (such as high blood pressure, diabetes, high cholesterol)  -continue to follow with cardiology for hx of atrial fibrillation    Kidney:  The treatment may have caused kidney injury. It is important to monitor your kidney function regularly. Maintain good control of other diseases that can impact kidney function including diabetes and hypertension    Mood:   Anxiety, depression, and post-traumatic stress disorder are common following cancer treatment. If you are struggling with any of these issues, it is important that you discuss with your care team, as we can often help manage these disorders. Exercise can help with all of these disorders. Consider looking into some of the resources listed, below.    Cognitive function:   Some people find that they have persistent  chemo brain  following treatment.   - Consider organizational strategies (ie notebooks, planners, reminders, smart phone technology), routine physical activity, limiting use of alcohol, considering yoga, meditation, mindfulness-based stress reduction, and cognitive training (Ie brain games)      Hearing:  - The treatment may  have affected your hearing. If you find that you are having difficulties hearing, discuss performing a hearing test (audiogram) your primary care provider       Resources:  National Coalition for Cancer Survivorship (NCCS)  http://www.canceradvocacy.org/    American Association for Cancer Research (AACR)    A six-part podcast series about survivorship in partnership with [employer] and [employer]  [employer]:  http://www.aacr.org/ http://www.crmagazine.org/archive/Crpodcasts/Pages/SurvivingThriving.aspx      American Cancer Society (ACS)    Survivorship information    Cancer Survivors Network    National Cancer Survivorship Resource Center    Physical side effects information, including sexual function  http://www.cancer.org/index http://www.cancer.org/treatment/survivorshipduringandaftertreatment/index http://csn.cancer.org/  http://www.cancer.org/SurvivorshipCenter http://www.cancer.org/treatment/treatmentsandsideeffects/physicalsideeffects/index        American [place] for Cancer Research (AICR): Survivorship information, Nutrition, physical activity, weight management  http://www.aicr.org/patients-survivors/     Cancer Care: Free, professional support services for anyone affected by cancer  www.cancercare.org    [employer]  http://www.livestrong.org/    National Cancer [place]: Cancer Survivorship Research    Springboard Beyond Cancer, Facing Forward series, designed to educate cancer  survivors, family members, and health care providers about the challenges associated with life after cancer treatment  http://survivorship.cancer.gov https://survivorship.cancer.gov/springboard http://cancercontrol.cancer.gov/ocs/resources/ffseries.html       National Comprehensive Cancer Network (NCCN)    Life After Cancer: Patient and Caregiver Resources and Information  http://www.nccn.org/index.asp http://www.nccn.org/patients/resources/life_after_cancer/     Help lines  American Cancer Society: 7.690.747.7109  http://www.cancer.org  American Psychosocical Oncology Society: 8.939.439.8480 http://apos-society.org/  Cancer Support Community 8.217.175.8915 http://www.cancersupportcommunity.org/  [employer] SurvivorCare 1.168.090.9035  National Suicide Prevention Lifeline 9-594-245-TALK http://suicidepreventionlifeline.org    60 minutes spent on the date of the encounter doing chart review, review of test results, interpretation of tests, patient visit and documentation

## 2021-05-18 DIAGNOSIS — Z11.59 ENCOUNTER FOR SCREENING FOR OTHER VIRAL DISEASES: ICD-10-CM

## 2021-05-18 LAB
SARS-COV-2 RNA RESP QL NAA+PROBE: NORMAL
SPECIMEN SOURCE: NORMAL

## 2021-05-18 PROCEDURE — U0003 INFECTIOUS AGENT DETECTION BY NUCLEIC ACID (DNA OR RNA); SEVERE ACUTE RESPIRATORY SYNDROME CORONAVIRUS 2 (SARS-COV-2) (CORONAVIRUS DISEASE [COVID-19]), AMPLIFIED PROBE TECHNIQUE, MAKING USE OF HIGH THROUGHPUT TECHNOLOGIES AS DESCRIBED BY CMS-2020-01-R: HCPCS | Performed by: SURGERY

## 2021-05-18 PROCEDURE — U0005 INFEC AGEN DETEC AMPLI PROBE: HCPCS | Performed by: SURGERY

## 2021-05-19 LAB
LABORATORY COMMENT REPORT: NORMAL
SARS-COV-2 RNA RESP QL NAA+PROBE: NEGATIVE
SPECIMEN SOURCE: NORMAL

## 2021-05-20 ENCOUNTER — ANESTHESIA (OUTPATIENT)
Dept: SURGERY | Facility: CLINIC | Age: 71
End: 2021-05-20
Payer: MEDICARE

## 2021-05-20 ENCOUNTER — HOSPITAL ENCOUNTER (OUTPATIENT)
Facility: CLINIC | Age: 71
Discharge: HOME | End: 2021-05-20
Attending: SURGERY | Admitting: SURGERY
Payer: MEDICARE

## 2021-05-20 ENCOUNTER — ANCILLARY PROCEDURE (OUTPATIENT)
Dept: ULTRASOUND IMAGING | Facility: CLINIC | Age: 71
End: 2021-05-20
Payer: MEDICARE

## 2021-05-20 VITALS
OXYGEN SATURATION: 98 % | HEIGHT: 65 IN | BODY MASS INDEX: 27.44 KG/M2 | SYSTOLIC BLOOD PRESSURE: 132 MMHG | HEART RATE: 59 BPM | TEMPERATURE: 99.2 F | DIASTOLIC BLOOD PRESSURE: 64 MMHG | WEIGHT: 164.68 LBS | RESPIRATION RATE: 14 BRPM

## 2021-05-20 DIAGNOSIS — K80.20 CALCULUS OF GALLBLADDER WITHOUT CHOLECYSTITIS WITHOUT OBSTRUCTION: ICD-10-CM

## 2021-05-20 LAB
ABO + RH BLD: NORMAL
ABO + RH BLD: NORMAL
BLD GP AB SCN SERPL QL: NORMAL
BLOOD BANK CMNT PATIENT-IMP: NORMAL
CREAT SERPL-MCNC: 1.08 MG/DL (ref 0.52–1.04)
GFR SERPL CREATININE-BSD FRML MDRD: 52 ML/MIN/{1.73_M2}
GLUCOSE BLDC GLUCOMTR-MCNC: 84 MG/DL (ref 70–99)
HGB BLD-MCNC: 13.1 G/DL (ref 11.7–15.7)
POTASSIUM SERPL-SCNC: 4.2 MMOL/L (ref 3.4–5.3)
SPECIMEN EXP DATE BLD: NORMAL

## 2021-05-20 PROCEDURE — 250N000011 HC RX IP 250 OP 636: Performed by: ANESTHESIOLOGY

## 2021-05-20 PROCEDURE — 710N000011 HC RECOVERY PHASE 1, LEVEL 3, PER MIN: Performed by: SURGERY

## 2021-05-20 PROCEDURE — 250N000011 HC RX IP 250 OP 636: Performed by: STUDENT IN AN ORGANIZED HEALTH CARE EDUCATION/TRAINING PROGRAM

## 2021-05-20 PROCEDURE — 250N000013 HC RX MED GY IP 250 OP 250 PS 637: Performed by: ANESTHESIOLOGY

## 2021-05-20 PROCEDURE — 82565 ASSAY OF CREATININE: CPT | Performed by: ANESTHESIOLOGY

## 2021-05-20 PROCEDURE — 710N000012 HC RECOVERY PHASE 2, PER MINUTE: Performed by: SURGERY

## 2021-05-20 PROCEDURE — 272N000001 HC OR GENERAL SUPPLY STERILE: Performed by: SURGERY

## 2021-05-20 PROCEDURE — 258N000003 HC RX IP 258 OP 636: Performed by: STUDENT IN AN ORGANIZED HEALTH CARE EDUCATION/TRAINING PROGRAM

## 2021-05-20 PROCEDURE — 85018 HEMOGLOBIN: CPT | Performed by: ANESTHESIOLOGY

## 2021-05-20 PROCEDURE — 360N000076 HC SURGERY LEVEL 3, PER MIN: Performed by: SURGERY

## 2021-05-20 PROCEDURE — 370N000017 HC ANESTHESIA TECHNICAL FEE, PER MIN: Performed by: SURGERY

## 2021-05-20 PROCEDURE — 84132 ASSAY OF SERUM POTASSIUM: CPT | Performed by: ANESTHESIOLOGY

## 2021-05-20 PROCEDURE — 86850 RBC ANTIBODY SCREEN: CPT | Performed by: ANESTHESIOLOGY

## 2021-05-20 PROCEDURE — 250N000025 HC SEVOFLURANE, PER MIN: Performed by: SURGERY

## 2021-05-20 PROCEDURE — 88304 TISSUE EXAM BY PATHOLOGIST: CPT | Mod: TC | Performed by: SURGERY

## 2021-05-20 PROCEDURE — 250N000009 HC RX 250: Performed by: STUDENT IN AN ORGANIZED HEALTH CARE EDUCATION/TRAINING PROGRAM

## 2021-05-20 PROCEDURE — 82962 GLUCOSE BLOOD TEST: CPT

## 2021-05-20 PROCEDURE — 250N000011 HC RX IP 250 OP 636: Performed by: SURGERY

## 2021-05-20 PROCEDURE — 86900 BLOOD TYPING SEROLOGIC ABO: CPT | Performed by: ANESTHESIOLOGY

## 2021-05-20 PROCEDURE — 258N000003 HC RX IP 258 OP 636: Performed by: ANESTHESIOLOGY

## 2021-05-20 PROCEDURE — 36415 COLL VENOUS BLD VENIPUNCTURE: CPT | Performed by: ANESTHESIOLOGY

## 2021-05-20 PROCEDURE — 999N000141 HC STATISTIC PRE-PROCEDURE NURSING ASSESSMENT: Performed by: SURGERY

## 2021-05-20 PROCEDURE — 86901 BLOOD TYPING SEROLOGIC RH(D): CPT | Performed by: ANESTHESIOLOGY

## 2021-05-20 PROCEDURE — 88304 TISSUE EXAM BY PATHOLOGIST: CPT | Mod: 26 | Performed by: PATHOLOGY

## 2021-05-20 PROCEDURE — 250N000009 HC RX 250: Performed by: ANESTHESIOLOGY

## 2021-05-20 RX ORDER — ONDANSETRON 2 MG/ML
INJECTION INTRAMUSCULAR; INTRAVENOUS PRN
Status: DISCONTINUED | OUTPATIENT
Start: 2021-05-20 | End: 2021-05-20

## 2021-05-20 RX ORDER — ONDANSETRON 2 MG/ML
4 INJECTION INTRAMUSCULAR; INTRAVENOUS EVERY 30 MIN PRN
Status: DISCONTINUED | OUTPATIENT
Start: 2021-05-20 | End: 2021-05-20 | Stop reason: HOSPADM

## 2021-05-20 RX ORDER — BUPIVACAINE HYDROCHLORIDE 2.5 MG/ML
INJECTION, SOLUTION EPIDURAL; INFILTRATION; INTRACAUDAL
Status: COMPLETED | OUTPATIENT
Start: 2021-05-20 | End: 2021-05-20

## 2021-05-20 RX ORDER — CEFAZOLIN SODIUM 2 G/100ML
2 INJECTION, SOLUTION INTRAVENOUS SEE ADMIN INSTRUCTIONS
Status: DISCONTINUED | OUTPATIENT
Start: 2021-05-20 | End: 2021-05-20 | Stop reason: HOSPADM

## 2021-05-20 RX ORDER — EPHEDRINE SULFATE 50 MG/ML
INJECTION, SOLUTION INTRAMUSCULAR; INTRAVENOUS; SUBCUTANEOUS PRN
Status: DISCONTINUED | OUTPATIENT
Start: 2021-05-20 | End: 2021-05-20

## 2021-05-20 RX ORDER — SODIUM CHLORIDE, SODIUM LACTATE, POTASSIUM CHLORIDE, CALCIUM CHLORIDE 600; 310; 30; 20 MG/100ML; MG/100ML; MG/100ML; MG/100ML
INJECTION, SOLUTION INTRAVENOUS CONTINUOUS
Status: DISCONTINUED | OUTPATIENT
Start: 2021-05-20 | End: 2021-05-20 | Stop reason: HOSPADM

## 2021-05-20 RX ORDER — LABETALOL HYDROCHLORIDE 5 MG/ML
INJECTION, SOLUTION INTRAVENOUS PRN
Status: DISCONTINUED | OUTPATIENT
Start: 2021-05-20 | End: 2021-05-20

## 2021-05-20 RX ORDER — ONDANSETRON 4 MG/1
4 TABLET, ORALLY DISINTEGRATING ORAL EVERY 30 MIN PRN
Status: DISCONTINUED | OUTPATIENT
Start: 2021-05-20 | End: 2021-05-20 | Stop reason: HOSPADM

## 2021-05-20 RX ORDER — FENTANYL CITRATE 50 UG/ML
25-50 INJECTION, SOLUTION INTRAMUSCULAR; INTRAVENOUS
Status: DISCONTINUED | OUTPATIENT
Start: 2021-05-20 | End: 2021-05-20 | Stop reason: HOSPADM

## 2021-05-20 RX ORDER — CEFAZOLIN SODIUM 2 G/100ML
2 INJECTION, SOLUTION INTRAVENOUS
Status: COMPLETED | OUTPATIENT
Start: 2021-05-20 | End: 2021-05-20

## 2021-05-20 RX ORDER — DEXAMETHASONE SODIUM PHOSPHATE 10 MG/ML
INJECTION, SOLUTION INTRAMUSCULAR; INTRAVENOUS
Status: COMPLETED | OUTPATIENT
Start: 2021-05-20 | End: 2021-05-20

## 2021-05-20 RX ORDER — DEXAMETHASONE SODIUM PHOSPHATE 4 MG/ML
INJECTION, SOLUTION INTRA-ARTICULAR; INTRALESIONAL; INTRAMUSCULAR; INTRAVENOUS; SOFT TISSUE PRN
Status: DISCONTINUED | OUTPATIENT
Start: 2021-05-20 | End: 2021-05-20

## 2021-05-20 RX ORDER — HYDROMORPHONE HYDROCHLORIDE 1 MG/ML
.3-.5 INJECTION, SOLUTION INTRAMUSCULAR; INTRAVENOUS; SUBCUTANEOUS EVERY 10 MIN PRN
Status: DISCONTINUED | OUTPATIENT
Start: 2021-05-20 | End: 2021-05-20 | Stop reason: HOSPADM

## 2021-05-20 RX ORDER — ACETAMINOPHEN 325 MG/1
975 TABLET ORAL ONCE
Status: COMPLETED | OUTPATIENT
Start: 2021-05-20 | End: 2021-05-20

## 2021-05-20 RX ORDER — NALOXONE HYDROCHLORIDE 0.4 MG/ML
0.4 INJECTION, SOLUTION INTRAMUSCULAR; INTRAVENOUS; SUBCUTANEOUS
Status: DISCONTINUED | OUTPATIENT
Start: 2021-05-20 | End: 2021-05-20 | Stop reason: HOSPADM

## 2021-05-20 RX ORDER — MEPERIDINE HYDROCHLORIDE 25 MG/ML
12.5 INJECTION INTRAMUSCULAR; INTRAVENOUS; SUBCUTANEOUS
Status: DISCONTINUED | OUTPATIENT
Start: 2021-05-20 | End: 2021-05-20 | Stop reason: HOSPADM

## 2021-05-20 RX ORDER — NALOXONE HYDROCHLORIDE 0.4 MG/ML
0.2 INJECTION, SOLUTION INTRAMUSCULAR; INTRAVENOUS; SUBCUTANEOUS
Status: DISCONTINUED | OUTPATIENT
Start: 2021-05-20 | End: 2021-05-20 | Stop reason: HOSPADM

## 2021-05-20 RX ORDER — LIDOCAINE HYDROCHLORIDE 20 MG/ML
INJECTION, SOLUTION INFILTRATION; PERINEURAL PRN
Status: DISCONTINUED | OUTPATIENT
Start: 2021-05-20 | End: 2021-05-20

## 2021-05-20 RX ORDER — FLUMAZENIL 0.1 MG/ML
0.2 INJECTION, SOLUTION INTRAVENOUS
Status: DISCONTINUED | OUTPATIENT
Start: 2021-05-20 | End: 2021-05-20 | Stop reason: HOSPADM

## 2021-05-20 RX ORDER — FENTANYL CITRATE 50 UG/ML
INJECTION, SOLUTION INTRAMUSCULAR; INTRAVENOUS PRN
Status: DISCONTINUED | OUTPATIENT
Start: 2021-05-20 | End: 2021-05-20

## 2021-05-20 RX ORDER — LIDOCAINE 40 MG/G
CREAM TOPICAL
Status: DISCONTINUED | OUTPATIENT
Start: 2021-05-20 | End: 2021-05-20 | Stop reason: HOSPADM

## 2021-05-20 RX ORDER — OXYCODONE HYDROCHLORIDE 5 MG/1
5 TABLET ORAL EVERY 6 HOURS PRN
Qty: 12 TABLET | Refills: 0 | Status: SHIPPED | OUTPATIENT
Start: 2021-05-20 | End: 2021-05-23

## 2021-05-20 RX ORDER — ACETAMINOPHEN 325 MG/1
975 TABLET ORAL EVERY 4 HOURS PRN
Status: DISCONTINUED | OUTPATIENT
Start: 2021-05-20 | End: 2021-05-20 | Stop reason: HOSPADM

## 2021-05-20 RX ORDER — PROPOFOL 10 MG/ML
INJECTION, EMULSION INTRAVENOUS PRN
Status: DISCONTINUED | OUTPATIENT
Start: 2021-05-20 | End: 2021-05-20

## 2021-05-20 RX ADMIN — LABETALOL HYDROCHLORIDE 5 MG: 5 INJECTION INTRAVENOUS at 13:05

## 2021-05-20 RX ADMIN — Medication 5 MG: at 10:59

## 2021-05-20 RX ADMIN — ROCURONIUM BROMIDE 10 MG: 10 INJECTION INTRAVENOUS at 12:07

## 2021-05-20 RX ADMIN — ROCURONIUM BROMIDE 60 MG: 10 INJECTION INTRAVENOUS at 10:32

## 2021-05-20 RX ADMIN — FENTANYL CITRATE 50 MCG: 50 INJECTION, SOLUTION INTRAMUSCULAR; INTRAVENOUS at 12:53

## 2021-05-20 RX ADMIN — SUGAMMADEX 150 MG: 100 INJECTION, SOLUTION INTRAVENOUS at 12:50

## 2021-05-20 RX ADMIN — HYDROMORPHONE HYDROCHLORIDE 0.3 MG: 1 INJECTION, SOLUTION INTRAMUSCULAR; INTRAVENOUS; SUBCUTANEOUS at 14:11

## 2021-05-20 RX ADMIN — FENTANYL CITRATE 100 MCG: 50 INJECTION, SOLUTION INTRAMUSCULAR; INTRAVENOUS at 10:37

## 2021-05-20 RX ADMIN — DEXMEDETOMIDINE 40 MCG: 100 INJECTION, SOLUTION, CONCENTRATE INTRAVENOUS at 10:40

## 2021-05-20 RX ADMIN — FENTANYL CITRATE 50 MCG: 50 INJECTION, SOLUTION INTRAMUSCULAR; INTRAVENOUS at 10:31

## 2021-05-20 RX ADMIN — CEFAZOLIN 2 G: 10 INJECTION, POWDER, FOR SOLUTION INTRAVENOUS at 10:43

## 2021-05-20 RX ADMIN — SODIUM CHLORIDE, POTASSIUM CHLORIDE, SODIUM LACTATE AND CALCIUM CHLORIDE: 600; 310; 30; 20 INJECTION, SOLUTION INTRAVENOUS at 10:21

## 2021-05-20 RX ADMIN — PROPOFOL 150 MG: 10 INJECTION, EMULSION INTRAVENOUS at 10:31

## 2021-05-20 RX ADMIN — ACETAMINOPHEN 975 MG: 325 TABLET, FILM COATED ORAL at 10:08

## 2021-05-20 RX ADMIN — LABETALOL HYDROCHLORIDE 5 MG: 5 INJECTION INTRAVENOUS at 12:48

## 2021-05-20 RX ADMIN — LABETALOL HYDROCHLORIDE 5 MG: 5 INJECTION INTRAVENOUS at 12:36

## 2021-05-20 RX ADMIN — PHENYLEPHRINE HYDROCHLORIDE 100 MCG: 10 INJECTION INTRAVENOUS at 10:48

## 2021-05-20 RX ADMIN — ROCURONIUM BROMIDE 10 MG: 10 INJECTION INTRAVENOUS at 11:39

## 2021-05-20 RX ADMIN — DEXAMETHASONE SODIUM PHOSPHATE 2 MG: 10 INJECTION, SOLUTION INTRAMUSCULAR; INTRAVENOUS at 10:40

## 2021-05-20 RX ADMIN — SODIUM CHLORIDE, POTASSIUM CHLORIDE, SODIUM LACTATE AND CALCIUM CHLORIDE 500 ML: 600; 310; 30; 20 INJECTION, SOLUTION INTRAVENOUS at 14:31

## 2021-05-20 RX ADMIN — FENTANYL CITRATE 50 MCG: 50 INJECTION, SOLUTION INTRAMUSCULAR; INTRAVENOUS at 12:22

## 2021-05-20 RX ADMIN — FENTANYL CITRATE 25 MCG: 50 INJECTION, SOLUTION INTRAMUSCULAR; INTRAVENOUS at 13:49

## 2021-05-20 RX ADMIN — LIDOCAINE HYDROCHLORIDE 60 MG: 20 INJECTION, SOLUTION INFILTRATION; PERINEURAL at 10:31

## 2021-05-20 RX ADMIN — ONDANSETRON 4 MG: 2 INJECTION INTRAMUSCULAR; INTRAVENOUS at 12:17

## 2021-05-20 RX ADMIN — ROCURONIUM BROMIDE 20 MG: 10 INJECTION INTRAVENOUS at 11:03

## 2021-05-20 RX ADMIN — FENTANYL CITRATE 50 MCG: 50 INJECTION, SOLUTION INTRAMUSCULAR; INTRAVENOUS at 10:59

## 2021-05-20 RX ADMIN — FENTANYL CITRATE 50 MCG: 50 INJECTION, SOLUTION INTRAMUSCULAR; INTRAVENOUS at 13:35

## 2021-05-20 RX ADMIN — FENTANYL CITRATE 25 MCG: 50 INJECTION, SOLUTION INTRAMUSCULAR; INTRAVENOUS at 13:30

## 2021-05-20 RX ADMIN — BUPIVACAINE HYDROCHLORIDE 60 ML: 2.5 INJECTION, SOLUTION EPIDURAL; INFILTRATION; INTRACAUDAL; PERINEURAL at 10:40

## 2021-05-20 RX ADMIN — PROPOFOL 50 MG: 10 INJECTION, EMULSION INTRAVENOUS at 10:33

## 2021-05-20 RX ADMIN — DEXAMETHASONE SODIUM PHOSPHATE 6 MG: 4 INJECTION, SOLUTION INTRA-ARTICULAR; INTRALESIONAL; INTRAMUSCULAR; INTRAVENOUS; SOFT TISSUE at 10:45

## 2021-05-20 ASSESSMENT — MIFFLIN-ST. JEOR: SCORE: 1272.88

## 2021-05-20 NOTE — DISCHARGE INSTRUCTIONS
[facility]   Adult Discharge Orders & Instructions     For 24 hours after surgery    1. Get plenty of rest.  A responsible adult must stay with you for at least 24 hours after you leave the hospital.   2. Do not drive or use heavy equipment.  If you have weakness or tingling, don't drive or use heavy equipment until this feeling goes away.  3. Do not drink alcohol.  4. Avoid strenuous or risky activities.  Ask for help when climbing stairs.   5. You may feel lightheaded.  IF so, sit for a few minutes before standing.  Have someone help you get up.   6. If you have nausea (feel sick to your stomach): Drink only clear liquids such as apple juice, ginger ale, broth or 7-Up.  Rest may also help.  Be sure to drink enough fluids.  Move to a regular diet as you feel able.  7. You may have a slight fever. Call the doctor if your fever is over 100 F (37.7 C) (taken under the tongue) or lasts longer than 24 hours.  8. You may have a dry mouth, a sore throat, muscle aches or trouble sleeping.  These should go away after 24 hours.  9. Do not make important or legal decisions.   Call your doctor for any of the followin.  Signs of infection (fever, growing tenderness at the surgery site, a large amount of drainage or bleeding, severe pain, foul-smelling drainage, redness, swelling).    2. It has been over 8 to 10 hours since surgery and you are still not able to urinate (pass water).    3.  Headache for over 24 hours.    4.  Numbness, tingling or weakness the day after surgery (if you had spinal anesthesia).  To contact a doctor, call ________________________________________  Discharge Instructions for Laparoscopic Cholecystectomy     Restart your anticoagulant (xarelto) in 5 days.   (You have had a procedure called a laparoscopic cholecystectomy. This is a surgery to remove your gallbladder. People who have this procedure often recover more quickly and have less pain than with open gallbladder surgery  (called open cholecystectomy). Many surgeons advise a low-fat diet, staying away from fried food in particular, for the first month after surgery.    You can live a full and healthy life without your gallbladder. This includes eating the foods and doing the things you enjoyed before your gallbladder problems started.   Home care  Recommendations for home care include the following:      Ask someone to drive you to your appointments for the next 3 days. Don t drive until you are no longer taking pain medicine and can step on the brake pedal without hesitation.     Wash the skin around your cut (incision) daily with mild soap and water. It's OK to shower the day after your surgery.    No sutures need to be removed. The paper strips along the incision will fall off in a week or two.    Ice packs along the incision will help with pain control    Eat your regular diet. Try to stay away from rich, greasy, or spicy food for a few days.    Remember, it takes at least 1 week for you to get most of your strength and energy back.    If you are constipated, talk about a bowel regimen with your provider. Pain medicines can be constipating. Increased fiber and a stool softener are often helpful.    Make an office visit to talk with your healthcare provider if these symptoms don t go away in a week after your surgery:  ? Extreme tiredness (fatigue)  ? Pain around the incision  ? Diarrhea or constipation  ? Loss of appetite  When to call your healthcare provider  Call your healthcare provider right away if you have any of the following:     Yellowing of your eyes or skin (jaundice)    Chills    Fever of 100.4 F (38.0 C) or higher, or as directed by your provider     Redness, swelling, increasing pain, pus, or a bad smell at the incision site    Dark or rust-colored urine    Stool that is bev-colored or light in color instead of brown    Increasing belly pain    Rectal bleeding    Trouble breathing or shortness of breath    Leg  swelling  [employer] last reviewed this educational content on 6/1/2019 2000-2021 [employer]. All rights reserved. This information is not intended as a substitute for professional medical care. Always follow your healthcare professional's instructions.

## 2021-05-20 NOTE — PROGRESS NOTES
SPIRITUAL HEALTH SERVICES  [facility]   PRE-SURGERY VISIT    Had pre-surgery visit with pt.  Provided spiritual support, prayer. Spouse was present    Rev. Jasmin Romero MDiv, [facility]  Staff    Pager [phone]

## 2021-05-20 NOTE — ANESTHESIA PROCEDURE NOTES
TAP (QL) Procedure Note  Pre-Procedure   Staff -        Anesthesiologist:  Sonu Brown MD       Resident/Fellow: Juan Patel MD       Performed By: resident       Location: OR       Pre-Anesthestic Checklist: patient identified, IV checked, site marked, risks and benefits discussed, informed consent, monitors and equipment checked, pre-op evaluation, at physician/surgeon's request and post-op pain management  Timeout:       Correct Patient: Yes        Correct Procedure: Yes        Correct Site: Yes        Correct Position: Yes        Correct Laterality: Yes        Site Marked: Yes  Procedure Documentation  Procedure: TAP (QL)       Diagnosis: POST OPERATIVE PAIN       Laterality: bilateral       Patient Position: supine       Patient Prep/Sterile Barriers: sterile gloves, mask       Skin prep: Chloraprep       Needle Type: short bevel       Needle Gauge: 21.        Needle Length (millimeters): 110        Ultrasound guided       1. Ultrasound was used to identify targeted nerve, plexus, vascular marker, or fascial plane and place a needle adjacent to it in real-time.       2. Ultrasound was used to visualize the spread of anesthetic in close proximity to the above referenced structure.       3. A permanent image is entered into the patient's record.    Assessment/Narrative         The placement was negative for: blood aspirated, painful injection and site bleeding       Paresthesias: No.     Bolus given via needle..        Secured via.        Insertion/Infusion Method: Single Shot       Complications: none       Injection made incrementally with aspirations every 5 mL.    Medication(s) Administered   Bupivacaine 0.25% PF (Infiltration), 60 mL  Dexamethasone 10 mg/mL PF (Perineural), 2 mg  Dexmedetomidine 4 mcg/mL (Perineural), 40 mcg  Medication Administration Time: 5/20/2021 10:40 AM

## 2021-05-20 NOTE — OP NOTE
[facility] Operative Note    Pre-operative diagnosis: Calculus of gallbladder without cholecystitis without obstruction [K80.20]   Post-operative diagnosis Chronic cholecystitis   Procedure: Procedure(s):  CHOLECYSTECTOMY, LAPAROSCOPIC   Surgeon(s): Surgeon(s) and Role:     * Gavin Castillo MD - Primary     * Carine Oakes MD - Resident - Assisting   Estimated blood loss: 30 mL    Specimens: ID Type Source Tests Collected by Time Destination   A : Gallbladder and contents Tissue Gallbladder and Contents SURGICAL PATHOLOGY EXAM Gavin Castillo MD 5/20/2021 12:13 PM       Findings: Chronically inflamed gallbladder with adhesions from omentum.  Large stone.  A rent in the gallbladder was made with spillage of bile, not stones.      Description of procedure:    Ijeoma Shah was brought to the OR and placed supine on the operating table.  She was sedated and intubated without issue.  An OG tube was placed. A regional block was performed by anesthesia.  The abdomen was prepped and draped in sterile fashion and a time out was performed.      A supraumbilical incision was made.  A rakel port was placed in open fashion and the abdomen insufflated to 12mmhg. Three 5mm working ports were placed and the gallbladder grasped and retracted to the right shoulder.      A layer of fatty adhesions were attached to the dome of the gallbladder-these were taken down bluntly.  The stomach was adherent with thin filmy adhesions to the liver/gallbladder- these were taken down with cautery.   We scored the peritoneal lining of the gallbladder and dissected posteriorly.  A rent was made in the gallbladder neck with spillage of green bile, no stones.  We slowly developed our critical view of safety with the gallbladder able to be retracted off of the liver bed and two tubular structures entering.  These were double clipped and ligated with weck clips well past to the rent in the gallbladder..  The  gallbladder was then removed off of the liver- the gallbladder was placed into an endocatch bag.  Hemostasis was obtained and the abdomen irrigated with the saline aspirated clear.  The clips were examined and found to be intact without bleeding- but with small leakage of bowel at the most proximal bile duct clip seeming to have caused a small rent along a bend in the clip.  This was irrigated out and a 5mm metal clip placed just past this clip.  This stopped the observed bile leak.  The RUQ was irrigated and aspirated.  Clips were re-checked without bile leak seen.    Our ports were removed and the abdomen allowed to desufflate. Given the large stone we had to widen the umbilical port to remove the gallbladder.     Hemostasis was obtained.  The umbilical incision was closed with 0 vicryl fascial sutures in interrupted fashion and then skin all closed with 4.0 vicryl.  Steri-strips were applied.    I performed the operation

## 2021-05-20 NOTE — ANESTHESIA CARE TRANSFER NOTE
Patient: Ijeoma Shah    Procedure(s):  CHOLECYSTECTOMY, LAPAROSCOPIC    Diagnosis: Calculus of gallbladder without cholecystitis without obstruction [K80.20]  Diagnosis Additional Information: No value filed.    Anesthesia Type:   No value filed.     Note:    Oropharynx: oropharynx clear of all foreign objects and spontaneously breathing  Level of Consciousness: awake  Oxygen Supplementation: nasal cannula    Independent Airway: airway patency satisfactory and stable  Dentition: dentition unchanged  Vital Signs Stable: post-procedure vital signs reviewed and stable  Report to RN Given: handoff report given  Patient transferred to: PACU  Comments: Patient transferred to PACU in stable condition, breathing spontaneously on NC, VSS.  Report given to RN.  Handoff Report: Identifed the Patient, Identified the Reponsible Provider, Reviewed the pertinent medical history, Discussed the surgical course, Reviewed Intra-OP anesthesia mangement and issues during anesthesia, Set expectations for post-procedure period and Allowed opportunity for questions and acknowledgement of understanding      Vitals: (Last set prior to Anesthesia Care Transfer)  CRNA VITALS  5/20/2021 1235 - 5/20/2021 1311      5/20/2021             Pulse:  65    SpO2:  100 %        Electronically Signed By: RUDY Teresa CRNA  May 20, 2021  1:11 PM

## 2021-05-20 NOTE — ANESTHESIA POSTPROCEDURE EVALUATION
Patient: Ijeoma Shah    Procedure(s):  CHOLECYSTECTOMY, LAPAROSCOPIC    Diagnosis:Calculus of gallbladder without cholecystitis without obstruction [K80.20]  Diagnosis Additional Information: No value filed.    Anesthesia Type:  No value filed.    Note:  Disposition: Outpatient   Postop Pain Control: Uneventful            Sign Out: Well controlled pain   PONV: No   Neuro/Psych: Uneventful            Sign Out: Acceptable/Baseline neuro status   Airway/Respiratory: Uneventful            Sign Out: Acceptable/Baseline resp. status   CV/Hemodynamics: Uneventful            Sign Out: Acceptable CV status; No obvious hypovolemia; No obvious fluid overload   Other NRE: NONE   DID A NON-ROUTINE EVENT OCCUR? No           Last vitals:  Vitals:    05/20/21 0925   BP: (!) 148/98   Pulse: 72   Resp: 17   Temp: 36.7  C (98  F)   SpO2: 100%       Last vitals prior to Anesthesia Care Transfer:  CRNA VITALS  5/20/2021 1235 - 5/20/2021 1335      5/20/2021             NIBP:  142/63    Pulse:  53    NIBP Mean:  91    Temp:  35.7  C (96.3  F)    SpO2:  100 %    Resp Rate (observed):  16    EKG:  Sinus bradycardia          Electronically Signed By: Keith Roman MD  May 20, 2021  1:40 PM

## 2021-05-20 NOTE — ANESTHESIA PROCEDURE NOTES
Airway       Patient location during procedure: OR  Staff -        CRNA: Isaias Moreno APRN CRNA       Performed By: CRNA  Consent for Airway        Urgency: elective  Indications and Patient Condition       Indications for airway management: raul-procedural       Induction type:intravenous       Mask difficulty assessment: 2 - vent by mask + OA or adjuvant +/- NMBA    Final Airway Details       Final airway type: endotracheal airway       Successful airway: ETT - single  Endotracheal Airway Details        ETT size (mm): 7.0       Successful intubation technique: direct laryngoscopy and video laryngoscopy       VL Blade Size: MAC 4       Grade View of Cords: 1 (grade 1 view with DL, used VL for teaching purposes)       Adjucts: stylet       Position: Right       Measured from: lips       Secured at (cm): 22    Post intubation assessment        Placement verified by: capnometry, equal breath sounds and chest rise        Number of attempts at approach: 1       Secured with: pink tape       Ease of procedure: easy       Dentition: Intact and Unchanged    Medication(s) Administered   Medication Administration Time: 5/20/2021 10:34 AM

## 2021-05-24 ENCOUNTER — PATIENT OUTREACH (OUTPATIENT)
Dept: SURGERY | Facility: CLINIC | Age: 71
End: 2021-05-24

## 2021-05-24 LAB — COPATH REPORT: NORMAL

## 2021-05-24 NOTE — PROGRESS NOTES
Ijeoma Vega is a patient of Dr. Gavin Castillo that underwent robotic cholecystectomy approximately 4 days ago (5/20).  Attempted to contact patient via telephone for a status update and review post op teaching.  LM on VM to call office.  Await return call.      Of note:  Pathology:  Pending  Wound:  Steri-strips  Follow-up:  Video vs inperson  Restrictions:  - No strenuous exercise for 3-4 weeks  - No lifting, pushing, pulling more than 15-20 pounds for 3-4 weeks  New medications:  Oxycodone, resume Xarelto  Equipment/Supplies:  None    ADDENDUM  05/28/21  10:01 AM  Patient has a virtual visit arranged with Dr. Castillo today.  Encounter closed.      Pathology:  Copath Report Patient Name: IJEOMA VEGA   MR#: [id number]   Specimen #: [id number]   Collected: 5/20/2021   Received: 5/20/2021   Reported: 5/24/2021 10:14   Ordering Phy(s): GAVIN CASTILLO     For improved result formatting, select 'View Enhanced Report Format' under    Linked Documents section.     SPECIMEN(S):   Gallbladder and contents     FINAL DIAGNOSIS:   GALLBLADDER, CHOLECYSTECTOMY:   - Chronic cholecystitis and cholelithiasis

## 2021-05-27 ENCOUNTER — PATIENT OUTREACH (OUTPATIENT)
Dept: SURGERY | Facility: CLINIC | Age: 71
End: 2021-05-27

## 2021-05-27 NOTE — PROGRESS NOTES
Patient Telephone Reminder Call    Date of call:  05/27/21  Phone numbers:  Home number on file [phone] (home)    Reached patient/confirmed appointment:  Yes  Appointment with:   Dr. Gavin Castillo  Reason for visit:  Post op

## 2021-05-28 ENCOUNTER — VIRTUAL VISIT (OUTPATIENT)
Dept: SURGERY | Facility: CLINIC | Age: 71
End: 2021-05-28
Payer: MEDICARE

## 2021-05-28 VITALS — WEIGHT: 164 LBS | HEIGHT: 65 IN | BODY MASS INDEX: 27.32 KG/M2

## 2021-05-28 DIAGNOSIS — Z98.890 POST-OPERATIVE STATE: Primary | ICD-10-CM

## 2021-05-28 DIAGNOSIS — K80.20 GALLSTONES: ICD-10-CM

## 2021-05-28 PROCEDURE — 99024 POSTOP FOLLOW-UP VISIT: CPT | Mod: 95 | Performed by: SURGERY

## 2021-05-28 ASSESSMENT — MIFFLIN-ST. JEOR: SCORE: 1269.78

## 2021-05-28 ASSESSMENT — PAIN SCALES - GENERAL: PAINLEVEL: NO PAIN (1)

## 2021-05-28 NOTE — PATIENT INSTRUCTIONS
You met with Dr. Gavin Castillo.      Today's visit instructions:    Return to the Surgery Clinic on an as needed basis.        If you have questions please contact Adrianna RN, Jaja RN, or Gilbert RN during regular clinic hours, Monday through Friday 7:30 AM - 4:00 PM, or you can contact us via Yatra at anytime.       If you have urgent needs after-hours, weekends, or holidays please call the hospital at [phone] and ask to speak with our on-call General Surgery Team.    Appointment [phone], option #1   Nurse Advice (Jaja Rodas, or Gilbert): [phone]   Surgery Scheduler (Julieth): [phone]  Fax: [phone]

## 2021-05-28 NOTE — LETTER
5/28/2021       RE: Ijeoma ZAZUETA Pablo  [address] [facility] [address]     Dear Colleague,    Thank you for referring your patient, Ijeoma Shah, to the [facility] [place] [facility]. Please see a copy of my visit note below.    Salima is a 70 year old who is being evaluated via a billable telephone visit.      What phone number would you like to be contacted at? [phone]  How would you like to obtain your AVS? Mt    I spoke with Ijeoma Shah today in follow-up of an elective cholecystectomy- path benign.      Subjective   Salima is a 70 year old who presents for post op check.      She reports she is slowly feeling better.  Had a lot of pain immediately post op but this is improving.  No fever, chills, nausea, vomiting, diarrhea.  Tolerating diet.  She is bathing normally, not doing any heavy lifting.    HPI     She is about one week post op from lap cholecystectomy.  No acute issues, discharged home same day.    Review of Systems   No infectious symptoms  Appetite ok  Energy level ok  Tolerating diet       Objective    Vitals - Patient Reported  Pain Score: No Pain (1)      Physical Exam   healthy, alert and no distress  PSYCH: Alert and oriented times 3; coherent speech, normal   rate and volume, able to articulate logical thoughts, able   to abstract reason, no tangential thoughts, no hallucinations   or delusions  Her affect is normal  RESP: No cough, no audible wheezing, able to talk in full sentences  Remainder of exam unable to be completed due to telephone visits    Pathology reviewed- benign      A/P:  Doing well post operatively  She may follow-up as needed        Phone call duration: 10 minutes    Again, thank you for allowing me to participate in the care of your patient.      Sincerely,    Gavin Castillo MD

## 2021-05-28 NOTE — NURSING NOTE
"Chief Complaint   Patient presents with     Post-Op - General Surgery     Post op visit. - Telephone.       Vitals:    05/28/21 1402   Weight: 74.4 kg (164 lb)   Height: 1.651 m (5' 5\")       Body mass index is 27.29 kg/m .    Porter Cullen LPN      "

## 2021-05-28 NOTE — PROGRESS NOTES
Salima is a 70 year old who is being evaluated via a billable telephone visit.      What phone number would you like to be contacted at? [phone]  How would you like to obtain your AVS? Mt    I spoke with Ijeoma Shah today in follow-up of an elective cholecystectomy- path benign.      Subjective   Salima is a 70 year old who presents for post op check.      She reports she is slowly feeling better.  Had a lot of pain immediately post op but this is improving.  No fever, chills, nausea, vomiting, diarrhea.  Tolerating diet.  She is bathing normally, not doing any heavy lifting.    HPI     She is about one week post op from lap cholecystectomy.  No acute issues, discharged home same day.    Review of Systems   No infectious symptoms  Appetite ok  Energy level ok  Tolerating diet        Objective    Vitals - Patient Reported  Pain Score: No Pain (1)        Physical Exam   healthy, alert and no distress  PSYCH: Alert and oriented times 3; coherent speech, normal   rate and volume, able to articulate logical thoughts, able   to abstract reason, no tangential thoughts, no hallucinations   or delusions  Her affect is normal  RESP: No cough, no audible wheezing, able to talk in full sentences  Remainder of exam unable to be completed due to telephone visits    Pathology reviewed- benign      A/P:  Doing well post operatively  She may follow-up as needed        Phone call duration: 10 minutes

## 2021-06-15 ENCOUNTER — MYC MEDICAL ADVICE (OUTPATIENT)
Dept: CARDIOLOGY | Facility: CLINIC | Age: 71
End: 2021-06-15

## 2021-06-16 ENCOUNTER — VIRTUAL VISIT (OUTPATIENT)
Dept: CARDIOLOGY | Facility: CLINIC | Age: 71
End: 2021-06-16
Attending: INTERNAL MEDICINE
Payer: MEDICARE

## 2021-06-16 ENCOUNTER — MYC MEDICAL ADVICE (OUTPATIENT)
Dept: CARDIOLOGY | Facility: CLINIC | Age: 71
End: 2021-06-16

## 2021-06-16 DIAGNOSIS — I48.91 ATRIAL FIBRILLATION WITH RAPID VENTRICULAR RESPONSE (H): ICD-10-CM

## 2021-06-16 DIAGNOSIS — I48.91 NEW ONSET ATRIAL FIBRILLATION (H): ICD-10-CM

## 2021-06-16 PROCEDURE — 99214 OFFICE O/P EST MOD 30 MIN: CPT | Mod: 95 | Performed by: INTERNAL MEDICINE

## 2021-06-16 NOTE — LETTER
2021      RE: Ijeoma Shah  [address] [facility] [address]       Service Date: 2021    Jyoti Griffiths MD  [facility]  [address] [address]    RE:  Ijeoma Shah  MRN: [id number]  : 1950    Dear Dr. Griffiths:    It was a pleasure participating in the care of your patient, Ms. Salima Shah.  As you know, she is a 70-year-old lady who I see today for paroxysmal AFib, hypertension and hyperlipidemia.    Her past medical history is significant for the followin.  Hypertension.  2.  Hyperlipidemia.  3.  Left lung cancer, status post left lower lobe lobectomy and pulmonary artery repair 10/22/2020.  4.  Sinusitis.    5.  Uterine prolapse.  5.  Warts.    Her cardiac history is significant for an episode of AFib with rapid ventricular response when being prepped for lung surgery.  She was quite anxious and about to undergo anesthesia when she was found to be in AFib with a ventricular response of 161 beats per minute on EKG 10/01/2020.  The procedure was cancelled and she was referred here for further evaluation.    The patient, even through she was anxious at that time, did not know that she was in AFib and was completely asymptomatic and did not feel palpitations, rapid heartbeats, dizziness, lightheadedness or other symptomatology.    [id number] score is 2 for age and female gender.    She did have a postoperative episode of recurrent AFib after her surgery, which required ibutilide x2, which initially returned her to normal sinus rhythm, but she returned back into AFib.  She was started on amiodarone at that time.    She saw Alexa Goldstein on 2020 who decreased the amiodarone to 200 mg a day and discontinued metoprolol due to bradycardia.    Eventually after she finished her chemo, she discontinue the amiodarone as well and presents today for continuing care.    Since her last visit, her blood pressures have been stable at  127/84.  She has had some musculoskeletal chest discomfort that is worse when she lies on her left side, coughs or turns in a certain position.  However, when she walks up a hill or goes for walks greater than a mile several times a week, she does not have any chest discomfort whatsoever.    She has not had any palpitations.  She she denies exertional dyspnea, PND, orthopnea, edema, palpitations, syncope or near syncope.    REVIEW OF SYSTEMS:  A 10-point review of systems is significant for easy bruising from rivaroxaban.  She is not hypersomnolent.    CURRENT MEDICATIONS:      1.  Lisinopril 20 mg a day.  2.  Rivaroxaban 20 mg a day.    PHYSICAL EXAMINATION:      VITAL SIGNS:  Her blood pressure is running 127/84.  GENERAL:  Her decision-making capabilities are intact.  PSYCHIATRIC:  She is alert and oriented x3.  RESPIRATORY:  She is breathing comfortably without gross cough.    The remainder of the comprehensive physical exam was deferred secondary to the COVID-19 pandemic and secondary to telephone visit restrictions.    Echocardiogram on 10/16/2020, ejection fraction 55%-60% without gross valvular pathology.    Labs on 05/20/2021, GFR 52 mL per minute.  Hemoglobin normal.      IMPRESSION:      Salima is a 70-year-old lady who whose past medical history is significant for left lung cancer, status post left lower lobectomy and PA repair on 10/22/2020.  Procedure complicated by recurrent paroxysmal AFib with rapid rate.  She has several active issues:    1.  Paroxysmal atrial fibrillation with rapid ventricular response.    She had her first documented episode 10/01/2020 prior to going into her lung surgery, at which time she was completely asymptomatic, despite a ventricular response of 161 beats per minute.  Her second episode occurred in the postoperative setting, in which ibutilide was initially successful, but she reverted back into AFib and required amiodarone at that time.    She had her Toprol discontinued  by Dr. Goldstein due to bradycardia and her amiodarone also discontinued after her chemotherapy was completed.    She presents today inquiring about discontinuing rivaroxaban.  However, in the context of her being completely asymptomatic with AFib in the past and not being able to sense whether she is in atrial fibrillation and not sensing palpitations, rapid heart rates, dizziness, lightheadedness, or other symptomatology, further noninvasive evaluation and followup with her electrophysiologist, Dr. Goldstein, would be indicated.    2.  Blood pressure issues.      Blood pressures are well controlled in the 127/84 range on 20 mg of lisinopril.  Continue to monitor.    3.  Hyperlipidemia.      Her lipids have been elevated in the past.  She is due for lipids and fasting lipid profile.      PLAN:      1.  A 14-day Zio patch monitor.    2.  Dr. Goldstein mentioned in his note that he wanted to follow up with the patient in 6 months from his prior visit.     Therefore, after the results of the 14-day monitor are available, we will have her follow up with Dr. Goldstein at that time for further discussion regarding her AFib and her rivaroxaban.    3.  Pending these results and followup visit, we can plan our long-term followup in the future.    4.  We will plan on checking a fasting lipid profile prior to our next appointment as well.    Once again, it was a pleasure participating in the care of your patient, Ms. Salima Vega.  Please feel free to contact me at any time if any questions regarding her care in the future.    Sincerely,        Jose Luis Arrieta MD      D: 2021   T: 2021   MT: AURA  Name:     YOGI VEGA  MRN:      [id number]        Account:      [id number]   :      1950           Service Date: 2021   Document: [id number]

## 2021-06-16 NOTE — PROGRESS NOTES
Service Date: 2021    Jyoti Griffiths MD  [facility]  [address] [place] [address]    RE:  Ijeoma Shah  MRN: [id number]  : 1950    Dear Dr. Griffiths:    It was a pleasure participating in the care of your patient, Ms. Salima Shah.  As you know, she is a 70-year-old lady who I see today for paroxysmal AFib, hypertension and hyperlipidemia.    Her past medical history is significant for the followin.  Hypertension.  2.  Hyperlipidemia.  3.  Left lung cancer, status post left lower lobe lobectomy and pulmonary artery repair 10/22/2020.  4.  Sinusitis.    5.  Uterine prolapse.  5.  Warts.    Her cardiac history is significant for an episode of AFib with rapid ventricular response when being prepped for lung surgery.  She was quite anxious and about to undergo anesthesia when she was found to be in AFib with a ventricular response of 161 beats per minute on EKG 10/01/2020.  The procedure was cancelled and she was referred here for further evaluation.    The patient, even through she was anxious at that time, did not know that she was in AFib and was completely asymptomatic and did not feel palpitations, rapid heartbeats, dizziness, lightheadedness or other symptomatology.    [id number]-HIBi score is 2 for age and female gender.    She did have a postoperative episode of recurrent AFib after her surgery, which required ibutilide x2, which initially returned her to normal sinus rhythm, but she returned back into AFib.  She was started on amiodarone at that time.    She saw Alexa Goldstein on 2020 who decreased the amiodarone to 200 mg a day and discontinued metoprolol due to bradycardia.    Eventually after she finished her chemo, she discontinue the amiodarone as well and presents today for continuing care.    Since her last visit, her blood pressures have been stable at 127/84.  She has had some musculoskeletal chest discomfort that is worse when she lies on her left  side, coughs or turns in a certain position.  However, when she walks up a hill or goes for walks greater than a mile several times a week, she does not have any chest discomfort whatsoever.    She has not had any palpitations.  She she denies exertional dyspnea, PND, orthopnea, edema, palpitations, syncope or near syncope.    REVIEW OF SYSTEMS:  A 10-point review of systems is significant for easy bruising from rivaroxaban.  She is not hypersomnolent.    CURRENT MEDICATIONS:      1.  Lisinopril 20 mg a day.  2.  Rivaroxaban 20 mg a day.    PHYSICAL EXAMINATION:      VITAL SIGNS:  Her blood pressure is running 127/84.  GENERAL:  Her decision-making capabilities are intact.  PSYCHIATRIC:  She is alert and oriented x3.  RESPIRATORY:  She is breathing comfortably without gross cough.    The remainder of the comprehensive physical exam was deferred secondary to the COVID-19 pandemic and secondary to telephone visit restrictions.    Echocardiogram on 10/16/2020, ejection fraction 55%-60% without gross valvular pathology.    Labs on 05/20/2021, GFR 52 mL per minute.  Hemoglobin normal.      IMPRESSION:      Salima is a 70-year-old lady who whose past medical history is significant for left lung cancer, status post left lower lobectomy and PA repair on 10/22/2020.  Procedure complicated by recurrent paroxysmal AFib with rapid rate.  She has several active issues:    1.  Paroxysmal atrial fibrillation with rapid ventricular response.    She had her first documented episode 10/01/2020 prior to going into her lung surgery, at which time she was completely asymptomatic, despite a ventricular response of 161 beats per minute.  Her second episode occurred in the postoperative setting, in which ibutilide was initially successful, but she reverted back into AFib and required amiodarone at that time.    She had her Toprol discontinued by Dr. Goldstein due to bradycardia and her amiodarone also discontinued after her chemotherapy was  completed.    She presents today inquiring about discontinuing rivaroxaban.  However, in the context of her being completely asymptomatic with AFib in the past and not being able to sense whether she is in atrial fibrillation and not sensing palpitations, rapid heart rates, dizziness, lightheadedness, or other symptomatology, further noninvasive evaluation and followup with her electrophysiologist, Dr. Goldstein, would be indicated.    2.  Blood pressure issues.      Blood pressures are well controlled in the 127/84 range on 20 mg of lisinopril.  Continue to monitor.    3.  Hyperlipidemia.      Her lipids have been elevated in the past.  She is due for lipids and fasting lipid profile.      PLAN:      1.  A 14-day Zio patch monitor.    2.  Dr. Goldstein mentioned in his note that he wanted to follow up with the patient in 6 months from his prior visit.     Therefore, after the results of the 14-day monitor are available, we will have her follow up with Dr. Goldstein at that time for further discussion regarding her AFib and her rivaroxaban.    3.  Pending these results and followup visit, we can plan our long-term followup in the future.    4.  We will plan on checking a fasting lipid profile prior to our next appointment as well.    Once again, it was a pleasure participating in the care of your patient, Ms. Salima Shah.  Please feel free to contact me at any time if any questions regarding her care in the future.    Sincerely,              Jose Luis Arrieta MD    Addendum 4/6/22:    Patient had planned colonoscopy, but was documented to be in afib RVR at 146bpm    Sent to ED, spontaneously converted with Dilt IV and po     Impression:    Paroxysmal afib, CHADS-VASc2 score 2 (age, gender)    Plan:    1.  Echocardiogram, TSH, EKG    2.  Record home BPs several hours after meds, after resting quietly for 10 min (Take Lisinopril in am)    2.  Virtual followup visit after above test available for review to consider beta  blocker and clinical progress      Addendum 22:    TSH nl    EKG reveals NSR    Echo shows normal LV systolic function without gross valvular pathology, nl left atrial size    Plan:    Virtual followup as scheduled in May        Addendum 22:    Recent Zio prelim report reveals rapid afib >200bpm    Overall 5% burden, longest episode approx 2 hours    No history or information regarding symptoms    Impression:    Paroxysmal afib with RVR, on full anticoagulation for CHADS-VASc2 score of 2  Echo structurally unremarkable    No show for last appt    Plan:    1.  Patient is overdue for followup with Dr. Goldstein, please schedule     2.  Restart Toprol XL 25mg po qam for now and decrease Lisinopril to 10mg po every day    3.  Track home BPs several hours after morning meds and after resting quietly for 10 min              D: 2021   T: 2021   MT: AURA    Name:     YOGI VEGA  MRN:      [id number]        Account:      [id number]   :      1950           Service Date: 2021       Document: [id number]

## 2021-06-16 NOTE — PATIENT INSTRUCTIONS
1.  14 day Ziopatch monitor nextg week  2.  Once Ziopatch monitor results available, followup with Dr. Alexa Goldstein   3.  Followup TBD based on above       Patient Instructions:  It was a pleasure to see you in the cardiology clinic today.      If you have any questions, you can reach my nurse, Maryanne CARLOS LPN, at [phone].  Press Option #1 for the [facility], and then press Option #4 for nursing.    We are encouraging the use of Flowboxt to communicate with your HealthCare Provider    Medication Changes: None.    Recommendations: None.    Studies Ordered: 14 day cardiac event monitor.    The results from today include: None.    Please follow up: With Dr. Arrieta based on results of testing.  Follow up with Dr Goldstein once monitor results are back (~1 month).    Sincerely,    Jose Luis Arrieta MD     If you have an urgent need after hours (8:00 am to 4:30 pm) please call [phone] and ask for the cardiology fellow on call.

## 2021-06-16 NOTE — PROGRESS NOTES
"Salima is a 70 year old who is being evaluated via a billable telephone visit.      What phone number would you like to be contacted at? [phone]  How would you like to obtain your AVS? Mt      Vitals - Patient Reported  Systolic (Patient Reported): 127  Diastolic (Patient Reported): 84  Weight (Patient Reported): 72.6 kg (160 lb)  Height (Patient Reported): 165.1 cm (5' 5\")  BMI (Based on Pt Reported Ht/Wt): 26.63  Pain Score: No Pain (0)(Pt has a little SOB)      Vitals were taken and medications where reconciled.   Donaldo Graves, EMT  12:28 PM    The patient has been notified of following:     \"This video visit will be conducted via a call between you and your physician/provider. We have found that certain health care needs can be provided without the need for an in-person physical exam.  This service lets us provide the care you need with a video conversation.  If a prescription is necessary we can send it directly to your pharmacy.  If lab work is needed we can place an order for that and you can then stop by our lab to have the test done at a later time.    Video visits are billed at different rates depending on your insurance coverage.  Please reach out to your insurance provider with any questions.    If during the course of the call the physician/provider feels a video visit is not appropriate, you will not be charged for this service.\"    Patient has given verbal consent for video visit? Yes    How would you like to obtain your AVS? Mail    The patient has been notified of following:     \"This phone visit will be conducted via a call between you and your physician/provider. We have found that certain health care needs can be provided without the need for an in-person physical exam.  This service lets us provide the care you need with a phone conversation.  If a prescription is necessary we can send it directly to your pharmacy.  If lab work is needed we can place an order for that and you can then " "stop by our lab to have the test done at a later time.    Phone visits are billed at different rates depending on your insurance coverage.  Please reach out to your insurance provider with any questions.    If during the course of the call the physician/provider feels a phone visit is not appropriate, you will not be charged for this service.\"    Patient has given verbal consent for phone visit? Yes    How would you like to obtain your AVS? MyChart    Duration of call:18 min    Video intially for 3-4 minutes, but switched to phone due to poor internet connection    Total visit time (including visit time, charting, chart review, coordination of care, etc.=44min    See dictation #[id number]    CSN#:[id number]  "

## 2021-06-16 NOTE — LETTER
"6/16/2021      RE: Ijeoma ZAZUETA Pablo  [address] [facility] [address]       Dear Colleague,    Thank you for the opportunity to participate in the care of your patient, Ijeoma Shah, at the [facility] [place] [facility]. Please see a copy of my visit note below.    Salima is a 70 year old who is being evaluated via a billable telephone visit.      What phone number would you like to be contacted at? [phone]  How would you like to obtain your AVS? MyChart      Vitals - Patient Reported  Systolic (Patient Reported): 127  Diastolic (Patient Reported): 84  Weight (Patient Reported): 72.6 kg (160 lb)  Height (Patient Reported): 165.1 cm (5' 5\")  BMI (Based on Pt Reported Ht/Wt): 26.63  Pain Score: No Pain (0)(Pt has a little SOB)      Vitals were taken and medications where reconciled.   Donaldo Graves, EMT  12:28 PM    The patient has been notified of following:     \"This video visit will be conducted via a call between you and your physician/provider. We have found that certain health care needs can be provided without the need for an in-person physical exam.  This service lets us provide the care you need with a video conversation.  If a prescription is necessary we can send it directly to your pharmacy.  If lab work is needed we can place an order for that and you can then stop by our lab to have the test done at a later time.    Video visits are billed at different rates depending on your insurance coverage.  Please reach out to your insurance provider with any questions.    If during the course of the call the physician/provider feels a video visit is not appropriate, you will not be charged for this service.\"    Patient has given verbal consent for video visit? Yes    How would you like to obtain your AVS? Mail    The patient has been notified of following:     \"This phone visit will be conducted via a call between " "you and your physician/provider. We have found that certain health care needs can be provided without the need for an in-person physical exam.  This service lets us provide the care you need with a phone conversation.  If a prescription is necessary we can send it directly to your pharmacy.  If lab work is needed we can place an order for that and you can then stop by our lab to have the test done at a later time.    Phone visits are billed at different rates depending on your insurance coverage.  Please reach out to your insurance provider with any questions.    If during the course of the call the physician/provider feels a phone visit is not appropriate, you will not be charged for this service.\"    Patient has given verbal consent for phone visit? Yes    How would you like to obtain your AVS? MyChart    Duration of call:18 min    Video intially for 3-4 minutes, but switched to phone due to poor internet connection    Total visit time (including visit time, charting, chart review, coordination of care, etc.=44min    See dictation #[id number]    CSN#:[id number]      Please do not hesitate to contact me if you have any questions/concerns.     Sincerely,     Jose Luis Arrieta MD    "

## 2021-06-23 ENCOUNTER — MYC MEDICAL ADVICE (OUTPATIENT)
Dept: CARDIOLOGY | Facility: CLINIC | Age: 71
End: 2021-06-23

## 2021-06-23 NOTE — TELEPHONE ENCOUNTER
Called, velma crump, sent letter to schedule zio patch for 14 days ( either can get it placed on in clinic or mailed out) then follow up with Angelita 1 month afterwards ( NOT DR PACKER ERROR)     Please reinstate orders to schedule and link .    Thank you

## 2021-06-23 NOTE — TELEPHONE ENCOUNTER
Called velma crump, sent letter to schedule appointment to get the Zio patch( EKG monitor)  put on OR to get mailed out . Then a follow up a month afterwards with Dr. PACKER to get the results in time.     Please reinstate orders to schedule and link.    Thank you

## 2021-06-24 NOTE — ANESTHESIA PROCEDURE NOTES
Airway   Date/Time: 10/22/2020 7:41 AM   Patient location during procedure: OR    Staff -   Performed By: MARINA    Consent for Airway   Urgency: elective    Indications and Patient Condition  Indications for airway management: raul-procedural  Induction type:intravenousMask difficulty assessment: 2 - vent by mask + OA or adjuvant +/- NMBA    Final Airway Details  Final airway type: endotracheal airway  Successful airway:ETT - double lumen left  Endotracheal Airway Details   Cuffed: yes  Successful intubation technique: direct laryngoscopy  Grade View of Cords: 1  Placement verification comments: (FOB)  Adjucts: stylet  Measured from: lips  Secured at (cm): 29  Secured with: plastic tape  Bite block used: None  ETT Double lumen (fr): 37    Post intubation assessment   Placement verified by: capnometry, equal breath sounds and chest rise   Number of attempts at approach: 1  Secured with:plastic tape  Ease of procedure: easy  Dentition: Intact and UnchangedAdditional Comments  MARINA Ko  TIM placement verified by Dr. Webster with fiberoptic bronchoscopy.           Heart-Healthy Diet: Care Instructions  Your Care Instructions     A heart-healthy diet has lots of vegetables, fruits, nuts, beans, and whole grains, and is low in salt. It limits foods that are high in saturated fat, such as meats, cheeses, and fried foods. It may be hard to change your diet, but even small changes can lower your risk of heart attack and heart disease. Follow-up care is a key part of your treatment and safety. Be sure to make and go to all appointments, and call your doctor if you are having problems. It's also a good idea to know your test results and keep a list of the medicines you take. How can you care for yourself at home? Watch your portions  · Learn what a serving is. A \"serving\" and a \"portion\" are not always the same thing. Make sure that you are not eating larger portions than are recommended. For example, a serving of pasta is ½ cup. A serving size of meat is 2 to 3 ounces. A 3-ounce serving is about the size of a deck of cards. Measure serving sizes until you are good at [place]" them. Keep in mind that restaurants often serve portions that are 2 or 3 times the size of one serving. · To keep your energy level up and keep you from feeling hungry, eat often but in smaller portions. · Eat only the number of calories you need to stay at a healthy weight. If you need to lose weight, eat fewer calories than your body burns (through exercise and other physical activity). Eat more fruits and vegetables  · Eat a variety of fruit and vegetables every day. Dark green, deep orange, red, or yellow fruits and vegetables are especially good for you. Examples include spinach, carrots, peaches, and berries. · Keep carrots, celery, and other veggies handy for snacks. Buy fruit that is in season and store it where you can see it so that you will be tempted to eat it. · Cook dishes that have a lot of veggies in them, such as stir-fries and soups.   Limit saturated and trans fat  · Read food labels, and try to avoid saturated and trans fats. They increase your risk of heart disease. · Use olive or canola oil when you cook. · Bake, broil, grill, or steam foods instead of frying them. · Choose lean meats instead of high-fat meats such as hot dogs and sausages. Cut off all visible fat when you prepare meat. · Eat fish, skinless poultry, and meat alternatives such as soy products instead of high-fat meats. Soy products, such as tofu, may be especially good for your heart. · Choose low-fat or fat-free milk and dairy products. Eat foods high in fiber  · Eat a variety of grain products every day. Include whole-grain foods that have lots of fiber and nutrients. Examples of whole-grain foods include oats, whole wheat bread, and brown rice. · Buy whole-grain breads and cereals, instead of white bread or pastries. Limit salt and sodium  · Limit how much salt and sodium you eat to help lower your blood pressure. · Taste food before you salt it. Add only a little salt when you think you need it. With time, your taste buds will adjust to less salt. · Eat fewer snack items, fast foods, and other high-salt, processed foods. Check food labels for the amount of sodium in packaged foods. · Choose low-sodium versions of canned goods (such as soups, vegetables, and beans). Limit sugar  · Limit drinks and foods with added sugar. These include candy, desserts, and soda pop. Limit alcohol  · Limit alcohol to no more than 2 drinks a day for men and 1 drink a day for women. Too much alcohol can cause health problems. When should you call for help? Watch closely for changes in your health, and be sure to contact your doctor if:    · You would like help planning heart-healthy meals. Where can you learn more? Go to http://www.[employer].com/  Enter V137 in the search box to learn more about \"Heart-Healthy Diet: Care Instructions. \"  Current as of: August 22, 2019               Content Version: 12.6  © 1480-4158 [employer]. Care instructions adapted under license by [employer] (which disclaims liability or warranty for this information). If you have questions about a medical condition or this instruction, always ask your healthcare professional. [address] any warranty or liability for your use of this information. High Cholesterol: Care Instructions  Your Care Instructions     Cholesterol is a type of fat in your blood. It is needed for many body functions, such as making new cells. Cholesterol is made by your body. It also comes from food you eat. High cholesterol means that you have too much of the fat in your blood. This raises your risk of a heart attack and stroke. LDL and HDL are part of your total cholesterol. LDL is the \"bad\" cholesterol. High LDL can raise your risk for heart disease, heart attack, and stroke. HDL is the \"good\" cholesterol. It helps clear bad cholesterol from the body. High HDL is linked with a lower risk of heart disease, heart attack, and stroke. Your cholesterol levels help your doctor find out your risk for having a heart attack or stroke. You and your doctor can talk about whether you need to lower your risk and what treatment is best for you. A heart-healthy lifestyle along with medicines can help lower your cholesterol and your risk. The way you choose to lower your risk will depend on how high your risk is for heart attack and stroke. It will also depend on how you feel about taking medicines. Follow-up care is a key part of your treatment and safety. Be sure to make and go to all appointments, and call your doctor if you are having problems. It's also a good idea to know your test results and keep a list of the medicines you take. How can you care for yourself at home? · Eat a variety of foods every day.  Good choices include fruits, vegetables, whole grains (like oatmeal), dried beans and peas, nuts and seeds, soy products (like tofu), and fat-free or low-fat dairy products. · Replace butter, margarine, and hydrogenated or partially hydrogenated oils with olive and canola oils. (Canola oil margarine without trans fat is fine.)  · Replace red meat with fish, poultry, and soy protein (like tofu). · Limit processed and packaged foods like chips, crackers, and cookies. · Bake, broil, or steam foods. Don't iqbal them. · Be physically active. Get at least 30 minutes of exercise on most days of the week. Walking is a good choice. You also may want to do other activities, such as running, swimming, cycling, or playing tennis or team sports. · Stay at a healthy weight or lose weight by making the changes in eating and physical activity listed above. Losing just a small amount of weight, even 5 to 10 pounds, can reduce your risk for having a heart attack or stroke. · Do not smoke. When should you call for help? Watch closely for changes in your health, and be sure to contact your doctor if:    · You need help making lifestyle changes. · You have questions about your medicine. Where can you learn more? Go to http://www.gray.com/  Enter I482 in the search box to learn more about \"High Cholesterol: Care Instructions. \"  Current as of: December 16, 2019               Content Version: 12.6  © 6528-2133 "[employer]. Care instructions adapted under license by [employer] (which disclaims liability or warranty for this information). If you have questions about a medical condition or this instruction, always ask your healthcare professional. Laura Olivera any warranty or liability for your use of this information.

## 2021-07-01 ENCOUNTER — ALLIED HEALTH/NURSE VISIT (OUTPATIENT)
Dept: CARDIOLOGY | Facility: CLINIC | Age: 71
End: 2021-07-01
Attending: INTERNAL MEDICINE
Payer: MEDICARE

## 2021-07-01 DIAGNOSIS — I48.91 ATRIAL FIBRILLATION WITH RAPID VENTRICULAR RESPONSE (H): ICD-10-CM

## 2021-07-06 ENCOUNTER — MYC MEDICAL ADVICE (OUTPATIENT)
Dept: FAMILY MEDICINE | Facility: CLINIC | Age: 71
End: 2021-07-06

## 2021-07-08 ENCOUNTER — OFFICE VISIT (OUTPATIENT)
Dept: FAMILY MEDICINE | Facility: CLINIC | Age: 71
End: 2021-07-08
Payer: MEDICARE

## 2021-07-08 VITALS
HEART RATE: 75 BPM | SYSTOLIC BLOOD PRESSURE: 118 MMHG | BODY MASS INDEX: 28.49 KG/M2 | OXYGEN SATURATION: 98 % | WEIGHT: 171 LBS | HEIGHT: 65 IN | DIASTOLIC BLOOD PRESSURE: 60 MMHG | TEMPERATURE: 97.8 F

## 2021-07-08 DIAGNOSIS — M54.50 CHRONIC RIGHT-SIDED LOW BACK PAIN WITHOUT SCIATICA: Primary | ICD-10-CM

## 2021-07-08 DIAGNOSIS — R20.2 TINGLING OF BOTH FEET: ICD-10-CM

## 2021-07-08 DIAGNOSIS — I48.0 PAROXYSMAL ATRIAL FIBRILLATION (H): ICD-10-CM

## 2021-07-08 DIAGNOSIS — B35.1 ONYCHOMYCOSIS: ICD-10-CM

## 2021-07-08 DIAGNOSIS — C34.92 SQUAMOUS CELL CARCINOMA OF LEFT LUNG (H): ICD-10-CM

## 2021-07-08 DIAGNOSIS — G89.29 CHRONIC RIGHT-SIDED LOW BACK PAIN WITHOUT SCIATICA: Primary | ICD-10-CM

## 2021-07-08 PROBLEM — N18.30 CHRONIC KIDNEY DISEASE, STAGE 3 (H): Status: ACTIVE | Noted: 2021-07-08

## 2021-07-08 PROCEDURE — 93248 EXT ECG>7D<15D REV&INTERPJ: CPT | Performed by: INTERNAL MEDICINE

## 2021-07-08 PROCEDURE — 99215 OFFICE O/P EST HI 40 MIN: CPT | Performed by: FAMILY MEDICINE

## 2021-07-08 RX ORDER — CYCLOBENZAPRINE HCL 5 MG
5 TABLET ORAL 3 TIMES DAILY PRN
Qty: 30 TABLET | Refills: 1 | Status: SHIPPED | OUTPATIENT
Start: 2021-07-08 | End: 2021-08-24

## 2021-07-08 RX ORDER — TERBINAFINE HYDROCHLORIDE 250 MG/1
250 TABLET ORAL DAILY
Qty: 90 TABLET | Refills: 0 | Status: SHIPPED | OUTPATIENT
Start: 2021-07-08 | End: 2021-08-24

## 2021-07-08 ASSESSMENT — MIFFLIN-ST. JEOR: SCORE: 1301.53

## 2021-07-08 NOTE — PATIENT INSTRUCTIONS
CT scan as planned    Physical therapy     Muscle relaxer  Heat/ice    Return to clinic or call if symptoms persist or worsen.

## 2021-07-08 NOTE — PROGRESS NOTES
Assessment & Plan     Chronic right-sided low back pain without sciatica  Likely muscular  - PHYSICAL THERAPY REFERRAL; Future  - cyclobenzaprine (FLEXERIL) 5 MG tablet; Take 1 tablet (5 mg) by mouth 3 times daily as needed for muscle spasms  If not improvement, MRI  She does have an upcoming CT scan of abdomen/pelvis and that will rule out any fractures    Onychomycosis  Several nail involvement  - terbinafine (LAMISIL) 250 MG tablet; Take 1 tablet (250 mg) by mouth daily  She will have her liver enzymes evaluated with oncology    Paroxysmal atrial fibrillation (H)  Followed by cardiology  I helped her place the Zio patch today    Squamous cell carcinoma of left lung (H)  Followed by oncology    Tingling of both feet  May be from chemo  Will check B12 next lab draw  - **Vitamin B12 FUTURE 2mo; Future    Patient Instructions   CT scan as planned    Physical therapy     Muscle relaxer  Heat/ice    Return to clinic or call if symptoms persist or worsen.      I spent a total of 52 minutes on the day of the visit.   Time spent doing chart review, history and exam, documentation and further activities per the note        No follow-ups on file.    Jyoti Garcia MD  [facility]    Samantha Borrego is a 70 year old who presents for the following health issues     HPI     Back Pain  Onset/Duration: about one month  Description:   Location of pain: low back right  Character of pain: dull ache  Pain radiation: radiates into the right leg  New numbness or weakness in legs, not attributed to pain: no   Intensity: moderate  Progression of Symptoms: worsening  History:   Specific cause: none  Pain interferes with job: YES- sometimes  History of back problems: no prior back problems  Any previous MRI or X-rays: None  Sees a specialist for back pain: No  Alleviating factors:   Improved by: nothing    Precipitating factors:  Worsened by: twisting  Therapies tried and outcome: cold and  "heat    Accompanying Signs & Symptoms:  Risk of Fracture: None  Risk of Cauda Equina: None  Risk of Infection: None  Risk of Cancer: History of cancer  Risk of Ankylosing Spondylitis: Onset at age <35, male, AND morning back stiffness no    A few years ago had some back and right hip pain, and would happen again every few months and go away after a few days. Now started a month ago, and the last two weeks progressively worsening. Every day hurts. Is in the right side, tender to twist. Does not go into her legs. Is painful from her mid thoracic area on right, down into the lumbar region. She wonders if it is her hips.   Usually feels better with walking but not last night.     Every since her lung surgery she has had pain in her left rib cage. Sometimes she feels it from the right upper quadrant and goes around to the right flank. No radiation in to legs. Will have a CT scan on the 20th. She did have a painful chest tube    Toe nail fungus x years  She would like to get rid of it    Zio patch  She had episodes of afib just before her surgery and after. She is on Xarelto and doesn't want to be on it anymore. The cardiologist gave her a Zio patch, she has it with her today and would like me to help her put it on.     She has tingling in the bottoms of her feet.   Did have chemotherapy for her lung cancer.   Followed by oncology with frequent labs    Wt Readings from Last 5 Encounters:   07/08/21 77.6 kg (171 lb)   05/28/21 74.4 kg (164 lb)   05/20/21 74.7 kg (164 lb 10.9 oz)   03/22/21 76.7 kg (169 lb)   03/18/21 76.7 kg (169 lb)      Review of Systems   Constitutional, HEENT, cardiovascular, pulmonary, gi and gu systems are negative, except as otherwise noted.      Objective    /60 (BP Location: Right arm, Cuff Size: Adult Large)   Pulse 75   Temp 97.8  F (36.6  C) (Tympanic)   Ht 1.651 m (5' 5\")   Wt 77.6 kg (171 lb)   SpO2 98%   BMI 28.46 kg/m    Body mass index is 28.46 kg/m .  Physical Exam   GENERAL: " healthy, alert and no distress  NECK: no adenopathy, no asymmetry, masses, or scars and thyroid normal to palpation  RESP: lungs clear to auscultation - no rales, rhonchi or wheezes  CV: regular rate and rhythm, normal S1 S2, no S3 or S4, no murmur, click or rub, no peripheral edema and peripheral pulses strong  ABDOMEN: soft, nontender, no hepatosplenomegaly, no masses and bowel sounds normal  MS: back exam: walks with a normal gait,  There is tenderness to palpation of back a long the right paraspinal muscles, good range of motion at waist, lower extremities with good strength and sensation  Feet: several toenails including large and pinkie are thickened, crumbly and discolored

## 2021-07-21 ENCOUNTER — HOSPITAL ENCOUNTER (OUTPATIENT)
Dept: PHYSICAL THERAPY | Facility: CLINIC | Age: 71
Setting detail: THERAPIES SERIES
End: 2021-07-21
Attending: FAMILY MEDICINE
Payer: MEDICARE

## 2021-07-21 DIAGNOSIS — M54.50 CHRONIC RIGHT-SIDED LOW BACK PAIN WITHOUT SCIATICA: ICD-10-CM

## 2021-07-21 DIAGNOSIS — G89.29 CHRONIC RIGHT-SIDED LOW BACK PAIN WITHOUT SCIATICA: ICD-10-CM

## 2021-07-21 PROCEDURE — 97161 PT EVAL LOW COMPLEX 20 MIN: CPT | Mod: GP | Performed by: PHYSICAL THERAPIST

## 2021-07-21 PROCEDURE — 97110 THERAPEUTIC EXERCISES: CPT | Mod: GP | Performed by: PHYSICAL THERAPIST

## 2021-07-21 NOTE — PROGRESS NOTES
[facility]          OUTPATIENT PHYSICAL THERAPY ORTHOPEDIC EVALUATION  PLAN OF TREATMENT FOR OUTPATIENT REHABILITATION  (COMPLETE FOR INITIAL CLAIMS ONLY)  Patient's Last Name, First Name, M.I.  YOB: 1950  Ijeoma Shah    Provider s Name:  [facility]   Medical Record No.  [id number]   Start of Care Date:  07/21/21   Onset Date:  04/21/21   Type:     _X__PT   ___OT   ___SLP Medical Diagnosis:  Chronic right-sided low back pain without sciatica     PT Diagnosis:  chronic low back pain   Visits from SOC:  1      _________________________________________________________________________________  Plan of Treatment/Functional Goals:  balance training, joint mobilization, manual therapy, neuromuscular re-education, ROM, stretching, strengthening     Electrical stimulation, TENS, Traction, Ultrasound  only if needed  Goals  Goal Identifier: 1  Goal Description: Pt will be able to roll in bed without increased symptomns  Target Date: 08/18/21    Goal Identifier: 2  Goal Description: Pt will report able to complete yard work x 30 minutes without increased symptoms  Target Date: 09/01/21    Goal Identifier: 3  Goal Description: Pt will be independant with HEP for long term self management  Target Date: 09/01/21      Therapy Frequency:  1 time/week  Predicted Duration of Therapy Intervention:  6 weeks    Reanna Tomas PT                 I CERTIFY THE NEED FOR THESE SERVICES FURNISHED UNDER        THIS PLAN OF TREATMENT AND WHILE UNDER MY CARE     (Physician co-signature of this document indicates review and certification of the therapy plan).                       Certification Date From:  07/21/21   Certification Date To:  09/01/21    Referring Provider:  Dr Garcia    Initial Assessment        See Epic Evaluation Start of Care Date: 07/21/21

## 2021-07-21 NOTE — PROGRESS NOTES
07/21/21 1300   General Information   Type of Visit Initial OP Ortho PT Evaluation   Start of Care Date 07/21/21   Referring Physician Dr Garcia   Patient/Family Goals Statement less pain    Orders Evaluate and Treat   Date of Order 07/15/21   Certification Required? Yes   Medical Diagnosis Chronic right-sided low back pain without sciatica   Surgical/Medical history reviewed Yes  (see chart. Lung cancer, COPD, HTN)   Precautions/Limitations no known precautions/limitations   Body Part(s)   Body Part(s) Lumbar Spine/SI   Presentation and Etiology   Pertinent history of current problem (include personal factors and/or comorbidities that impact the POC) Pt notes that over past few years intermittant right hip pain that would wrap to front especially with walking, shopping. Is happening more often.notes that since lung surgery last October has been having left sided rib pain/pressure .Notes continues to have right sided back pain, has had workup with gallstones removed 3-4 weeks ago. Minimal change in pain since then. Pain is wrapping some to right side ribs. Worse with bending, yardwork etc.  Has tried new mattress no change. Does not wake at night but is sore with turning in bed. Notes some tingling/numbness in toes however reports Dr Garcia feels is due to chemo medicine.    Impairments A. Pain   Functional Limitations perform activities of daily living;perform required work activities;perform desired leisure / sports activities   Symptom Location Back, right hip   How/Where did it occur From insidious onset   Onset date of current episode/exacerbation 04/21/21   Chronicity Recurrent   Pain rating (0-10 point scale) Best (/10);Worst (/10)   Best (/10) 0   Worst (/10) 10   Pain quality A. Sharp;B. Dull;C. Aching;E. Shooting   Frequency of pain/symptoms A. Constant   Pain/symptoms are:   (stiff in mornings, worse again at end of day)   Pain/symptoms exacerbated by I. Bending   Pain/symptoms eased by C. Rest;H.  "Cold;I. OTC medication(s)   Prior Level of Function   Prior Level of Function-Mobility Independant   Prior Level of Function-ADLs Independant   Current Level of Function   Patient role/employment history A. Employed   Employment Comments Desk job   Fall Risk Screen   Have you fallen 2 or more times in the past year? No   Have you fallen and had an injury in the past year? No   Is patient a fall risk? No   Lumbar Spine/SI Objective Findings   Balance/Proprioception (Single Leg Stance) 10+ seconds   Flexion ROM fingertips 8\" from floor, pain with return to stand   Extension ROM limited 75%, mild pain   Right Side Bending ROM fingertips 2\" down lateral thigh w/ pulling   Left Side Bending ROM fingertips 3\" down lateral thigh   Lumbar ROM Comment + quadrant to R   Hip Screen hip ROM wnl, R ER 50, IR 15. L ER 40, IR 15   Transversus Abdominus Strength (Enmanuel Leg Lowering-deg) poor   Ankle Dorsiflexion (L4) Strength able heel walk   Ankle Plantar Flexion (S1) Strength able toe walk   Hamstring Flexibility wnl   Hip Flexor Flexibility mild tightness    Quadricep Flexibility mild tightness   SLR negative   Slump Test negative   Sensation Testing intact with light touch   Palpation ttp sp T 10. mild ttp ql R> L   Planned Therapy Interventions   Planned Therapy Interventions balance training;joint mobilization;manual therapy;neuromuscular re-education;ROM;stretching;strengthening   Planned Modality Interventions   Planned Modality Interventions Electrical stimulation;TENS;Traction;Ultrasound   Planned Modality Interventions Comments only if needed   Clinical Impression   Criteria for Skilled Therapeutic Interventions Met yes, treatment indicated   PT Diagnosis chronic low back pain   Influenced by the following impairments pain, decreased mobility, decreased ROM   Functional limitations due to impairments decreased activity tolerance   Clinical Presentation Stable/Uncomplicated   Clinical Presentation Rationale PMH, " comorbidities   Clinical Decision Making (Complexity) Low complexity   Therapy Frequency 1 time/week   Predicted Duration of Therapy Intervention (days/wks) 6 weeks   Risk & Benefits of therapy have been explained Yes   Patient, Family & other staff in agreement with plan of care Yes   Education Assessment   Preferred Learning Style Demonstration;Pictures/video   Barriers to Learning No barriers   ORTHO GOALS   PT Ortho Eval Goals 1;2;3   Ortho Goal 1   Goal Identifier 1   Goal Description Pt will be able to roll in bed without increased symptomns   Target Date 08/18/21   Ortho Goal 2   Goal Identifier 2   Goal Description Pt will report able to complete yard work x 30 minutes without increased symptoms   Target Date 09/01/21   Ortho Goal 3   Goal Identifier 3   Goal Description Pt will be independant with HEP for long term self management   Target Date 09/01/21   Total Evaluation Time   PT Eval, Low Complexity Minutes (36508) 22   Therapy Certification   Certification date from 07/21/21   Certification date to 09/01/21   Medical Diagnosis Chronic right-sided low back pain without sciatica

## 2021-07-23 ENCOUNTER — HOSPITAL ENCOUNTER (OUTPATIENT)
Dept: CT IMAGING | Facility: CLINIC | Age: 71
Discharge: HOME | End: 2021-07-23
Attending: INTERNAL MEDICINE | Admitting: INTERNAL MEDICINE
Payer: MEDICARE

## 2021-07-23 ENCOUNTER — LAB (OUTPATIENT)
Dept: LAB | Facility: CLINIC | Age: 71
End: 2021-07-23
Payer: MEDICARE

## 2021-07-23 DIAGNOSIS — C34.32 SQUAMOUS CELL CARCINOMA OF BRONCHUS IN LEFT LOWER LOBE (H): ICD-10-CM

## 2021-07-23 DIAGNOSIS — E83.42 HYPOMAGNESEMIA: ICD-10-CM

## 2021-07-23 DIAGNOSIS — R20.2 TINGLING OF BOTH FEET: ICD-10-CM

## 2021-07-23 LAB
ALBUMIN SERPL-MCNC: 3.6 G/DL (ref 3.4–5)
ALP SERPL-CCNC: 71 U/L (ref 40–150)
ALT SERPL W P-5'-P-CCNC: 23 U/L (ref 0–50)
ANION GAP SERPL CALCULATED.3IONS-SCNC: 5 MMOL/L (ref 3–14)
AST SERPL W P-5'-P-CCNC: 15 U/L (ref 0–45)
BASOPHILS # BLD AUTO: 0 10E3/UL (ref 0–0.2)
BASOPHILS NFR BLD AUTO: 0 %
BILIRUB SERPL-MCNC: 0.4 MG/DL (ref 0.2–1.3)
BUN SERPL-MCNC: 24 MG/DL (ref 7–30)
CALCIUM SERPL-MCNC: 8.7 MG/DL (ref 8.5–10.1)
CHLORIDE BLD-SCNC: 104 MMOL/L (ref 94–109)
CO2 SERPL-SCNC: 28 MMOL/L (ref 20–32)
CREAT BLD-MCNC: 1.1 MG/DL (ref 0.5–1)
CREAT SERPL-MCNC: 1.08 MG/DL (ref 0.52–1.04)
EOSINOPHIL # BLD AUTO: 0.2 10E3/UL (ref 0–0.7)
EOSINOPHIL NFR BLD AUTO: 5 %
ERYTHROCYTE [DISTWIDTH] IN BLOOD BY AUTOMATED COUNT: 12.9 % (ref 10–15)
GFR SERPL CREATININE-BSD FRML MDRD: 51 ML/MIN/1.73M2
GFR SERPL CREATININE-BSD FRML MDRD: 53 ML/MIN/1.73M2
GLUCOSE BLD-MCNC: 84 MG/DL (ref 70–99)
HCT VFR BLD AUTO: 29.8 % (ref 35–47)
HGB BLD-MCNC: 10 G/DL (ref 11.7–15.7)
LYMPHOCYTES # BLD AUTO: 1.1 10E3/UL (ref 0.8–5.3)
LYMPHOCYTES NFR BLD AUTO: 24 %
MAGNESIUM SERPL-MCNC: 1.8 MG/DL (ref 1.6–2.3)
MCH RBC QN AUTO: 30.7 PG (ref 26.5–33)
MCHC RBC AUTO-ENTMCNC: 33.6 G/DL (ref 31.5–36.5)
MCV RBC AUTO: 91 FL (ref 78–100)
MONOCYTES # BLD AUTO: 0.3 10E3/UL (ref 0–1.3)
MONOCYTES NFR BLD AUTO: 7 %
NEUTROPHILS # BLD AUTO: 2.9 10E3/UL (ref 1.6–8.3)
NEUTROPHILS NFR BLD AUTO: 65 %
PLATELET # BLD AUTO: 212 10E3/UL (ref 150–450)
POTASSIUM BLD-SCNC: 4.3 MMOL/L (ref 3.4–5.3)
PROT SERPL-MCNC: 6.5 G/DL (ref 6.8–8.8)
RBC # BLD AUTO: 3.26 10E6/UL (ref 3.8–5.2)
SODIUM SERPL-SCNC: 137 MMOL/L (ref 133–144)
VIT B12 SERPL-MCNC: 392 PG/ML (ref 193–986)
WBC # BLD AUTO: 4.5 10E3/UL (ref 4–11)

## 2021-07-23 PROCEDURE — 250N000009 HC RX 250: Performed by: RADIOLOGY

## 2021-07-23 PROCEDURE — 82565 ASSAY OF CREATININE: CPT

## 2021-07-23 PROCEDURE — 36415 COLL VENOUS BLD VENIPUNCTURE: CPT

## 2021-07-23 PROCEDURE — 250N000011 HC RX IP 250 OP 636: Performed by: RADIOLOGY

## 2021-07-23 PROCEDURE — 82607 VITAMIN B-12: CPT

## 2021-07-23 PROCEDURE — 71260 CT THORAX DX C+: CPT | Mod: MG

## 2021-07-23 PROCEDURE — 85025 COMPLETE CBC W/AUTO DIFF WBC: CPT

## 2021-07-23 PROCEDURE — 83735 ASSAY OF MAGNESIUM: CPT

## 2021-07-23 RX ORDER — IOPAMIDOL 755 MG/ML
83 INJECTION, SOLUTION INTRAVASCULAR ONCE
Status: COMPLETED | OUTPATIENT
Start: 2021-07-23 | End: 2021-07-23

## 2021-07-23 RX ADMIN — IOPAMIDOL 83 ML: 755 INJECTION, SOLUTION INTRAVENOUS at 08:59

## 2021-07-23 RX ADMIN — SODIUM CHLORIDE 61 ML: 9 INJECTION, SOLUTION INTRAVENOUS at 09:00

## 2021-07-27 ENCOUNTER — VIRTUAL VISIT (OUTPATIENT)
Dept: ONCOLOGY | Facility: CLINIC | Age: 71
End: 2021-07-27
Attending: NURSE PRACTITIONER
Payer: MEDICARE

## 2021-07-27 DIAGNOSIS — M54.50 CHRONIC RIGHT-SIDED LOW BACK PAIN, UNSPECIFIED WHETHER SCIATICA PRESENT: ICD-10-CM

## 2021-07-27 DIAGNOSIS — G89.29 CHRONIC RIGHT-SIDED LOW BACK PAIN, UNSPECIFIED WHETHER SCIATICA PRESENT: ICD-10-CM

## 2021-07-27 DIAGNOSIS — D64.9 ANEMIA, UNSPECIFIED TYPE: ICD-10-CM

## 2021-07-27 DIAGNOSIS — I48.0 PAROXYSMAL ATRIAL FIBRILLATION (H): ICD-10-CM

## 2021-07-27 DIAGNOSIS — C34.32 SQUAMOUS CELL CARCINOMA OF BRONCHUS IN LEFT LOWER LOBE (H): Primary | ICD-10-CM

## 2021-07-27 PROCEDURE — 99443 PR PHYSICIAN TELEPHONE EVALUATION 21-30 MIN: CPT | Mod: 95 | Performed by: NURSE PRACTITIONER

## 2021-07-27 NOTE — LETTER
"    7/27/2021         RE: Ijeoma Shah  [address] [facility] [address]        Dear Colleague,    Thank you for referring your patient, Ijeoma Shah, to the [facility]. Please see a copy of my visit note below.    Salima is a 70 year old who is being evaluated via a billable telephone visit.      What phone number would you like to be contacted at? [phone]  How would you like to obtain your AVS? MyChart   Vitals - Patient Reported  Weight (Patient Reported): 77.1 kg (170 lb)  Height (Patient Reported): 165.1 cm (5' 5\")  BMI (Based on Pt Reported Ht/Wt): 28.29  Pain Score: Mild Pain (3)  Pain Loc: Low Back  Phone call duration: 25 minutes    Chloé Zamudio MA    Reason for Visit: seen in f/u of lung cancer    Oncology HPI:   CANCER TYPE: SCC lung, poorly differentiated  STAGE: pT4N0 (IIIA)  ECOG PS: 1     Cancer Staging  Squamous cell carcinoma of left lung (H)  Staging form: Lung, AJCC 8th Edition  - Pathologic stage from 11/17/2020: Stage IIIA (pT4, pN0, cM0) - Signed by Eneida DeL eon MD on 11/17/2020     PD-L1: TPS 15% on [id number] lobectomy, <1% on [id number] (LLL bx)  Lung panel: SMO [id number] on LLL bx, negative for fusions on lobectomy specimen.   NGS: N/A     SUMMARY  7/3/20                 CT chest (screening). 6.7 cm LLL mass, 0.6 cm RML nodule, mediastinal and L hilar LNs  7/9/20                PET/CT. 7.1 x 5.6 cm LLL mass (SUV 19.6), post obstructive pneumonitis LLL inferior to the mass (SUV 2.8), 3.5 x 1.7 cm 4L node (SUV 5.9), L adrenal nodule 1.1 cm (SUV 4), indeterminate pulmonary nodules, pancreatic cysts, not hypermetabolic  7/27/20              Bronch, EBUS (Dr. Castro). 10R, 11R, 4R too small to biopsy. Stations 7, 4L, 11L negative for malignancy. LLL mass bx: SCC  8/12/20              Brain MRI negative  9/1/20                EUS to evaluate adrenal and 4L (Dr. Hogan). Both negative for malignancy. Adrenal with bland adrenal " cells  10/22/20            L VATS, converted to thoracotomy, LLL lobectomy, MLND, R pulmonary arterioplasty (Dr. Sanabria, Dr. Davis). 8.3 cm poorly differentiated SCC, +angiolymphatic invasion  12/3/20~2/25/21             Cisplatin gemcitabine x 4. C2D8 delayed 1 week due to neutropenia, added neulasta  5/10/21: survivorship visit      Interval history: Salima is feeling well. Today's visit is conducted via phone. She is at work. Appetite is good. Has gained about 6 lb since stopping chemo. Bowels are regular. No melena or hematochezia. No difficulty with urination. No vaginal bleeding/spotting. No bruises, rash, edema. Breathing is stable. Feels some dyspnea with exertion, but is able take walks and not stop. No CP, palpitation, dizziness.  -has some chronic pressure/tightness in the L rib cage at her surgical site  -has chronic low R back pain,  Saw her PCP and is starting PT  -has noted fungal infections in the toe nails. Soaked in hydrogen peroxide. Since then has some intermittent tingling in the toes and extending up the dorsal foot. Was worse on the right, but is now bilateral.      Past Medical History:   Diagnosis Date     Arrhythmia     A-Fib     Benign essential hypertension      Calculus of gallbladder without cholecystitis without obstruction      COPD (chronic obstructive pulmonary disease) (H)      Lung cancer (H)      Simple chronic bronchitis (H)          Current Outpatient Medications   Medication Sig Dispense Refill     acetaminophen (TYLENOL) 325 MG tablet Take 1-2 tablets (325-650 mg) by mouth every 6 hours as needed for mild pain 60 tablet 0     cyclobenzaprine (FLEXERIL) 5 MG tablet Take 1 tablet (5 mg) by mouth 3 times daily as needed for muscle spasms 30 tablet 1     ketoconazole (NIZORAL) 2 % external cream Apply topically daily 60 g 1     lisinopril (ZESTRIL) 20 MG tablet Take 1 tablet (20 mg) by mouth daily 30 tablet 11     rivaroxaban ANTICOAGULANT (XARELTO) 20 MG TABS tablet Take  1 tablet (20 mg) by mouth daily (with dinner) 90 tablet 3     terbinafine (LAMISIL) 250 MG tablet Take 1 tablet (250 mg) by mouth daily 90 tablet 0     triamcinolone (KENALOG) 0.1 % external ointment APPLY  OINTMENT TOPICALLY TWICE DAILY OR  AS  NEEDED 45 g 0          Allergies   Allergen Reactions     Bee Venom Hives     Hot and sweating      Exam: speech is clear. Oriented, answers appropriately    Labs:   Results for MAYCO VEGA (MRN [id number]) as of 7/27/2021 11:23   Ref. Range 7/23/2021 08:25   Sodium Latest Ref Range: 133 - 144 mmol/L 137   Potassium Latest Ref Range: 3.4 - 5.3 mmol/L 4.3   Chloride Latest Ref Range: 94 - 109 mmol/L 104   Carbon Dioxide Latest Ref Range: 20 - 32 mmol/L 28   Urea Nitrogen Latest Ref Range: 7 - 30 mg/dL 24   Creatinine Latest Ref Range: 0.52 - 1.04 mg/dL 1.08 (H)   GFR Estimate Latest Ref Range: >60 mL/min/1.73m2 53 (L)   Calcium Latest Ref Range: 8.5 - 10.1 mg/dL 8.7   Anion Gap Latest Ref Range: 3 - 14 mmol/L 5   Magnesium Latest Ref Range: 1.6 - 2.3 mg/dL 1.8   Albumin Latest Ref Range: 3.4 - 5.0 g/dL 3.6   Protein Total Latest Ref Range: 6.8 - 8.8 g/dL 6.5 (L)   Bilirubin Total Latest Ref Range: 0.2 - 1.3 mg/dL 0.4   Alkaline Phosphatase Latest Ref Range: 40 - 150 U/L 71   ALT Latest Ref Range: 0 - 50 U/L 23   AST Latest Ref Range: 0 - 45 U/L 15   Vitamin B12 Latest Ref Range: 193 - 986 pg/mL 392   Glucose Latest Ref Range: 70 - 99 mg/dL 84   WBC Latest Ref Range: 4.0 - 11.0 10e3/uL 4.5   Hemoglobin Latest Ref Range: 11.7 - 15.7 g/dL 10.0 (L)   Hematocrit Latest Ref Range: 35.0 - 47.0 % 29.8 (L)   Platelet Count Latest Ref Range: 150 - 450 10e3/uL 212   RBC Count Latest Ref Range: 3.80 - 5.20 10e6/uL 3.26 (L)   MCV Latest Ref Range: 78 - 100 fL 91   MCH Latest Ref Range: 26.5 - 33.0 pg 30.7   MCHC Latest Ref Range: 31.5 - 36.5 g/dL 33.6   RDW Latest Ref Range: 10.0 - 15.0 % 12.9   % Neutrophils Latest Units: % 65   % Lymphocytes Latest Units: % 24   % Monocytes  Latest Units: % 7   % Eosinophils Latest Units: % 5   Absolute Basophils Latest Ref Range: 0.0 - 0.2 10e3/uL 0.0   % Basophils Latest Units: % 0   Absolute Eosinophils Latest Ref Range: 0.0 - 0.7 10e3/uL 0.2   Absolute Lymphocytes Latest Ref Range: 0.8 - 5.3 10e3/uL 1.1   Absolute Monocytes Latest Ref Range: 0.0 - 1.3 10e3/uL 0.3   Absolute Neutrophils Latest Ref Range: 1.6 - 8.3 10e3/uL 2.9       Imaging:   CT CHEST AND ABDOMEN WITH CONTRAST 7/23/2021 9:16 AM     CLINICAL HISTORY: Restage SCC left lung, status post surgery and  adjuvant chemotherapy completed about 5-6 months ago. Squamous cell  carcinoma of bronchus in left lower lobe (H).     TECHNIQUE: CT scan of the chest and abdomen was performed following  injection of IV contrast. Multiplanar reformats were obtained. Dose  reduction techniques were used.      CONTRAST: 83 mL Isovue-200     COMPARISON: Multiple previous, most recent dated April 12, 2021     FINDINGS:   LUNGS AND PLEURA: Stable 3 mm right upper lobe pulmonary nodule (60/4)  small cluster of nodules in the right upper lobe measuring up to 5 mm,  unchanged (132/4) probable fissural node on the right measuring 6 mm,  unchanged (143/4). Irregular, spiculated nodule in the right middle  lobe (178/4 this appears unchanged. However, does have a slightly  spiculated margin. No new pulmonary nodules are identified.     MEDIASTINUM/AXILLAE: Normal heart size. No aneurysmal dilatation of  the aorta. Mild coronary artery calcifications. No pericardial  effusion. No adenopathy.     HEPATOBILIARY: Stable hepatic cysts for which no further evaluation  recommended. No hepatic masses are identified. No biliary dilatation.  Cholecystectomy.     PANCREAS: Small pancreatic cysts which are subcentimeter in size.  These are better demonstrated on the previous CT due to the phase of  contrast enhancement. They appear unchanged.     SPLEEN: Normal.     ADRENAL GLANDS: Mild nodularity to the left adrenal gland,  unchanged.     KIDNEYS: The kidneys have normal size, shape and position. There is no  hydronephrosis. The bladder is not evaluated.     BOWEL: The stomach and small bowel loops are decompressed. Moderate  amount of stool in the visualized colon.     ADDITIONAL FINDINGS: Scattered aortic calcifications. No aneurysmal  dilatation of the abdominal aorta.     MUSCULOSKELETAL: Extensive osteophytes throughout the spine.                                                                      IMPRESSION:  1.  Stable changes of left upper lobectomy.  2.  Stable right pulmonary nodules. No new pulmonary nodules, masses  or infiltrates are seen.  3.  Small, subcentimeter cysts in the neck of the pancreas, better  demonstrated on the previous CT due to phase of contrast enhancement.  4.  Stable hepatic cyst.  5.  Cholecystectomy.     ADRIANA HUERTA MD     Impression/plan:   SCC lung, [id number], IIIA, R0 resection, discrepant PD-L1, s/p resection and 4 cycles of adjuvant cisplatin/gemcitabine ending in Feb 2021  -reviewed CT imaging and labs with the patient. There is no evidence of recurrence. Pulm nodules are unchanged  -recommended she continue surveillance at 3 month intervals with CT imaging and labs  -she is in agreement, will f/u with Dr. De Leon at that time.    Chronic back pain  -has degenerative changes on the CT, no suspicious mets. Monitor toe neuropathy. Would be unusual to develop neuropathy from cisplatin this late  -if worsening, recommend MR spine    Paroxysmal A. fib  -previously on toprol, discontinued due to bradycardia and amiodarone, discontinued after chemotherapy was completed  -met with Dr. Arrieta on 6/16/21, recommended a 14 day zio patch and f/u with Dr. Goldstein to discuss results and discuss whether she needs to continue rivaroxaban.    Normocytic anemia  -hgb had corrected post surgery to 13.1, but recent values again low at 10.  -no bleeding. B12 was checked and is wnl. Nutrition appears good by  history.  -mild CKD could contribute, but Cr is improving.  -recheck in one month. If further decline or change in indices, may prompt further work-up    40 minutes spent on the date of the encounter doing chart review, review of test results, interpretation of tests, patient visit and documentation           Again, thank you for allowing me to participate in the care of your patient.        Sincerely,        RUDY Carlson CNP

## 2021-07-27 NOTE — PROGRESS NOTES
"Salima is a 70 year old who is being evaluated via a billable telephone visit.      What phone number would you like to be contacted at? [phone]  How would you like to obtain your AVS? MyChart   Vitals - Patient Reported  Weight (Patient Reported): 77.1 kg (170 lb)  Height (Patient Reported): 165.1 cm (5' 5\")  BMI (Based on Pt Reported Ht/Wt): 28.29  Pain Score: Mild Pain (3)  Pain Loc: Low Back  Phone call duration: 25 minutes    Chloé Zamudio MA    Reason for Visit: seen in f/u of lung cancer    Oncology HPI:   CANCER TYPE: SCC lung, poorly differentiated  STAGE: pT4N0 (IIIA)  ECOG PS: 1     Cancer Staging  Squamous cell carcinoma of left lung (H)  Staging form: Lung, AJCC 8th Edition  - Pathologic stage from 11/17/2020: Stage IIIA (pT4, pN0, cM0) - Signed by Eneida De Leon MD on 11/17/2020     PD-L1: TPS 15% on [id number] lobectomy, <1% on [id number] (LLL bx)  Lung panel: SMO [id number] on LLL bx, negative for fusions on lobectomy specimen.   NGS: N/A     SUMMARY  7/3/20                 CT chest (screening). 6.7 cm LLL mass, 0.6 cm RML nodule, mediastinal and L hilar LNs  7/9/20                PET/CT. 7.1 x 5.6 cm LLL mass (SUV 19.6), post obstructive pneumonitis LLL inferior to the mass (SUV 2.8), 3.5 x 1.7 cm 4L node (SUV 5.9), L adrenal nodule 1.1 cm (SUV 4), indeterminate pulmonary nodules, pancreatic cysts, not hypermetabolic  7/27/20              Bronch, EBUS (Dr. Castro). 10R, 11R, 4R too small to biopsy. Stations 7, 4L, 11L negative for malignancy. LLL mass bx: SCC  8/12/20              Brain MRI negative  9/1/20                EUS to evaluate adrenal and 4L (Dr. Hogan). Both negative for malignancy. Adrenal with bland adrenal cells  10/22/20            L VATS, converted to thoracotomy, LLL lobectomy, MLND, R pulmonary arterioplasty (Dr. Sanabrai, Dr. Davis). 8.3 cm poorly differentiated SCC, +angiolymphatic invasion  12/3/20~2/25/21             Cisplatin gemcitabine x 4. C2D8 delayed 1 week due " to neutropenia, added neulasta  5/10/21: survivorship visit      Interval history: Salima is feeling well. Today's visit is conducted via phone. She is at work. Appetite is good. Has gained about 6 lb since stopping chemo. Bowels are regular. No melena or hematochezia. No difficulty with urination. No vaginal bleeding/spotting. No bruises, rash, edema. Breathing is stable. Feels some dyspnea with exertion, but is able take walks and not stop. No CP, palpitation, dizziness.  -has some chronic pressure/tightness in the L rib cage at her surgical site  -has chronic low R back pain,  Saw her PCP and is starting PT  -has noted fungal infections in the toe nails. Soaked in hydrogen peroxide. Since then has some intermittent tingling in the toes and extending up the dorsal foot. Was worse on the right, but is now bilateral.      Past Medical History:   Diagnosis Date     Arrhythmia     A-Fib     Benign essential hypertension      Calculus of gallbladder without cholecystitis without obstruction      COPD (chronic obstructive pulmonary disease) (H)      Lung cancer (H)      Simple chronic bronchitis (H)          Current Outpatient Medications   Medication Sig Dispense Refill     acetaminophen (TYLENOL) 325 MG tablet Take 1-2 tablets (325-650 mg) by mouth every 6 hours as needed for mild pain 60 tablet 0     cyclobenzaprine (FLEXERIL) 5 MG tablet Take 1 tablet (5 mg) by mouth 3 times daily as needed for muscle spasms 30 tablet 1     ketoconazole (NIZORAL) 2 % external cream Apply topically daily 60 g 1     lisinopril (ZESTRIL) 20 MG tablet Take 1 tablet (20 mg) by mouth daily 30 tablet 11     rivaroxaban ANTICOAGULANT (XARELTO) 20 MG TABS tablet Take 1 tablet (20 mg) by mouth daily (with dinner) 90 tablet 3     terbinafine (LAMISIL) 250 MG tablet Take 1 tablet (250 mg) by mouth daily 90 tablet 0     triamcinolone (KENALOG) 0.1 % external ointment APPLY  OINTMENT TOPICALLY TWICE DAILY OR  AS  NEEDED 45 g 0          Allergies    Allergen Reactions     Bee Venom Hives     Hot and sweating      Exam: speech is clear. Oriented, answers appropriately    Labs:   Results for MAYCO VEGA (MRN [id number]) as of 7/27/2021 11:23   Ref. Range 7/23/2021 08:25   Sodium Latest Ref Range: 133 - 144 mmol/L 137   Potassium Latest Ref Range: 3.4 - 5.3 mmol/L 4.3   Chloride Latest Ref Range: 94 - 109 mmol/L 104   Carbon Dioxide Latest Ref Range: 20 - 32 mmol/L 28   Urea Nitrogen Latest Ref Range: 7 - 30 mg/dL 24   Creatinine Latest Ref Range: 0.52 - 1.04 mg/dL 1.08 (H)   GFR Estimate Latest Ref Range: >60 mL/min/1.73m2 53 (L)   Calcium Latest Ref Range: 8.5 - 10.1 mg/dL 8.7   Anion Gap Latest Ref Range: 3 - 14 mmol/L 5   Magnesium Latest Ref Range: 1.6 - 2.3 mg/dL 1.8   Albumin Latest Ref Range: 3.4 - 5.0 g/dL 3.6   Protein Total Latest Ref Range: 6.8 - 8.8 g/dL 6.5 (L)   Bilirubin Total Latest Ref Range: 0.2 - 1.3 mg/dL 0.4   Alkaline Phosphatase Latest Ref Range: 40 - 150 U/L 71   ALT Latest Ref Range: 0 - 50 U/L 23   AST Latest Ref Range: 0 - 45 U/L 15   Vitamin B12 Latest Ref Range: 193 - 986 pg/mL 392   Glucose Latest Ref Range: 70 - 99 mg/dL 84   WBC Latest Ref Range: 4.0 - 11.0 10e3/uL 4.5   Hemoglobin Latest Ref Range: 11.7 - 15.7 g/dL 10.0 (L)   Hematocrit Latest Ref Range: 35.0 - 47.0 % 29.8 (L)   Platelet Count Latest Ref Range: 150 - 450 10e3/uL 212   RBC Count Latest Ref Range: 3.80 - 5.20 10e6/uL 3.26 (L)   MCV Latest Ref Range: 78 - 100 fL 91   MCH Latest Ref Range: 26.5 - 33.0 pg 30.7   MCHC Latest Ref Range: 31.5 - 36.5 g/dL 33.6   RDW Latest Ref Range: 10.0 - 15.0 % 12.9   % Neutrophils Latest Units: % 65   % Lymphocytes Latest Units: % 24   % Monocytes Latest Units: % 7   % Eosinophils Latest Units: % 5   Absolute Basophils Latest Ref Range: 0.0 - 0.2 10e3/uL 0.0   % Basophils Latest Units: % 0   Absolute Eosinophils Latest Ref Range: 0.0 - 0.7 10e3/uL 0.2   Absolute Lymphocytes Latest Ref Range: 0.8 - 5.3 10e3/uL 1.1   Absolute  Monocytes Latest Ref Range: 0.0 - 1.3 10e3/uL 0.3   Absolute Neutrophils Latest Ref Range: 1.6 - 8.3 10e3/uL 2.9       Imaging:   CT CHEST AND ABDOMEN WITH CONTRAST 7/23/2021 9:16 AM     CLINICAL HISTORY: Restage SCC left lung, status post surgery and  adjuvant chemotherapy completed about 5-6 months ago. Squamous cell  carcinoma of bronchus in left lower lobe (H).     TECHNIQUE: CT scan of the chest and abdomen was performed following  injection of IV contrast. Multiplanar reformats were obtained. Dose  reduction techniques were used.      CONTRAST: 83 mL Isovue-200     COMPARISON: Multiple previous, most recent dated April 12, 2021     FINDINGS:   LUNGS AND PLEURA: Stable 3 mm right upper lobe pulmonary nodule (60/4)  small cluster of nodules in the right upper lobe measuring up to 5 mm,  unchanged (132/4) probable fissural node on the right measuring 6 mm,  unchanged (143/4). Irregular, spiculated nodule in the right middle  lobe (178/4 this appears unchanged. However, does have a slightly  spiculated margin. No new pulmonary nodules are identified.     MEDIASTINUM/AXILLAE: Normal heart size. No aneurysmal dilatation of  the aorta. Mild coronary artery calcifications. No pericardial  effusion. No adenopathy.     HEPATOBILIARY: Stable hepatic cysts for which no further evaluation  recommended. No hepatic masses are identified. No biliary dilatation.  Cholecystectomy.     PANCREAS: Small pancreatic cysts which are subcentimeter in size.  These are better demonstrated on the previous CT due to the phase of  contrast enhancement. They appear unchanged.     SPLEEN: Normal.     ADRENAL GLANDS: Mild nodularity to the left adrenal gland, unchanged.     KIDNEYS: The kidneys have normal size, shape and position. There is no  hydronephrosis. The bladder is not evaluated.     BOWEL: The stomach and small bowel loops are decompressed. Moderate  amount of stool in the visualized colon.     ADDITIONAL FINDINGS: Scattered aortic  calcifications. No aneurysmal  dilatation of the abdominal aorta.     MUSCULOSKELETAL: Extensive osteophytes throughout the spine.                                                                      IMPRESSION:  1.  Stable changes of left upper lobectomy.  2.  Stable right pulmonary nodules. No new pulmonary nodules, masses  or infiltrates are seen.  3.  Small, subcentimeter cysts in the neck of the pancreas, better  demonstrated on the previous CT due to phase of contrast enhancement.  4.  Stable hepatic cyst.  5.  Cholecystectomy.     ADRIANA HUERTA MD     Impression/plan:   SCC lung, [id number], IIIA, R0 resection, discrepant PD-L1, s/p resection and 4 cycles of adjuvant cisplatin/gemcitabine ending in Feb 2021  -reviewed CT imaging and labs with the patient. There is no evidence of recurrence. Pulm nodules are unchanged  -recommended she continue surveillance at 3 month intervals with CT imaging and labs  -she is in agreement, will f/u with Dr. De Leon at that time.    Chronic back pain  -has degenerative changes on the CT, no suspicious mets. Monitor toe neuropathy. Would be unusual to develop neuropathy from cisplatin this late  -if worsening, recommend MR spine    Paroxysmal A. fib  -previously on toprol, discontinued due to bradycardia and amiodarone, discontinued after chemotherapy was completed  -met with Dr. Arrieta on 6/16/21, recommended a 14 day zio patch and f/u with Dr. Goldstein to discuss results and discuss whether she needs to continue rivaroxaban.    Normocytic anemia  -hgb had corrected post surgery to 13.1, but recent values again low at 10.  -no bleeding. B12 was checked and is wnl. Nutrition appears good by history.  -mild CKD could contribute, but Cr is improving.  -recheck in one month. If further decline or change in indices, may prompt further work-up    40 minutes spent on the date of the encounter doing chart review, review of test results, interpretation of tests, patient visit and  documentation

## 2021-07-28 ENCOUNTER — HOSPITAL ENCOUNTER (OUTPATIENT)
Dept: PHYSICAL THERAPY | Facility: CLINIC | Age: 71
Setting detail: THERAPIES SERIES
End: 2021-07-28
Attending: FAMILY MEDICINE
Payer: MEDICARE

## 2021-07-28 PROCEDURE — 97110 THERAPEUTIC EXERCISES: CPT | Mod: GP | Performed by: PHYSICAL THERAPIST

## 2021-08-04 ENCOUNTER — MYC MEDICAL ADVICE (OUTPATIENT)
Dept: FAMILY MEDICINE | Facility: CLINIC | Age: 71
End: 2021-08-04

## 2021-08-19 ENCOUNTER — MYC MEDICAL ADVICE (OUTPATIENT)
Dept: FAMILY MEDICINE | Facility: CLINIC | Age: 71
End: 2021-08-19

## 2021-08-19 ENCOUNTER — HOSPITAL ENCOUNTER (OUTPATIENT)
Dept: PHYSICAL THERAPY | Facility: CLINIC | Age: 71
Setting detail: THERAPIES SERIES
End: 2021-08-19
Attending: FAMILY MEDICINE
Payer: MEDICARE

## 2021-08-19 PROCEDURE — 97110 THERAPEUTIC EXERCISES: CPT | Mod: GP | Performed by: PHYSICAL THERAPIST

## 2021-08-23 ENCOUNTER — TELEPHONE (OUTPATIENT)
Dept: CARDIOLOGY | Facility: CLINIC | Age: 71
End: 2021-08-23

## 2021-08-23 NOTE — TELEPHONE ENCOUNTER
Health Call Center    Phone Message    May a detailed message be left on voicemail: yes     Reason for Call: Other: Salima calling in regarding heart monitor results. She states she was sent a [employer] message from [employer] to schedule with Dr. Goldstein to go over the results, she works 8-4:30 so prefers vitural visit or later in evening in person visit. She asked if Dr. Goldstein could just communicate with her on [employer] being no virtual visits available.  She also wonders if she can stop taking the xarelto? Please call and discuss or send [employer] message. Thanks!    Action Taken: Message routed to:  [facility] Cardio    Travel Screening: Not Applicable

## 2021-08-24 ENCOUNTER — OFFICE VISIT (OUTPATIENT)
Dept: FAMILY MEDICINE | Facility: CLINIC | Age: 71
End: 2021-08-24
Payer: MEDICARE

## 2021-08-24 VITALS
RESPIRATION RATE: 20 BRPM | TEMPERATURE: 97.6 F | DIASTOLIC BLOOD PRESSURE: 80 MMHG | BODY MASS INDEX: 29.45 KG/M2 | HEART RATE: 83 BPM | OXYGEN SATURATION: 100 % | SYSTOLIC BLOOD PRESSURE: 122 MMHG | WEIGHT: 177 LBS

## 2021-08-24 DIAGNOSIS — D64.9 ANEMIA, UNSPECIFIED TYPE: ICD-10-CM

## 2021-08-24 DIAGNOSIS — G89.29 CHRONIC RIGHT-SIDED LOW BACK PAIN WITH RIGHT-SIDED SCIATICA: Primary | ICD-10-CM

## 2021-08-24 DIAGNOSIS — M54.16 LUMBAR RADICULOPATHY: ICD-10-CM

## 2021-08-24 DIAGNOSIS — M54.41 CHRONIC RIGHT-SIDED LOW BACK PAIN WITH RIGHT-SIDED SCIATICA: Primary | ICD-10-CM

## 2021-08-24 DIAGNOSIS — C34.32 SQUAMOUS CELL CARCINOMA OF BRONCHUS IN LEFT LOWER LOBE (H): ICD-10-CM

## 2021-08-24 LAB
ALBUMIN SERPL-MCNC: 3.9 G/DL (ref 3.4–5)
ALP SERPL-CCNC: 80 U/L (ref 40–150)
ALT SERPL W P-5'-P-CCNC: 19 U/L (ref 0–50)
ANION GAP SERPL CALCULATED.3IONS-SCNC: 5 MMOL/L (ref 3–14)
AST SERPL W P-5'-P-CCNC: 16 U/L (ref 0–45)
BASOPHILS # BLD AUTO: 0 10E3/UL (ref 0–0.2)
BASOPHILS NFR BLD AUTO: 0 %
BILIRUB SERPL-MCNC: 0.3 MG/DL (ref 0.2–1.3)
BUN SERPL-MCNC: 19 MG/DL (ref 7–30)
CALCIUM SERPL-MCNC: 9.1 MG/DL (ref 8.5–10.1)
CHLORIDE BLD-SCNC: 102 MMOL/L (ref 94–109)
CO2 SERPL-SCNC: 29 MMOL/L (ref 20–32)
CREAT SERPL-MCNC: 1.01 MG/DL (ref 0.52–1.04)
EOSINOPHIL # BLD AUTO: 0.2 10E3/UL (ref 0–0.7)
EOSINOPHIL NFR BLD AUTO: 4 %
ERYTHROCYTE [DISTWIDTH] IN BLOOD BY AUTOMATED COUNT: 13.8 % (ref 10–15)
GFR SERPL CREATININE-BSD FRML MDRD: 57 ML/MIN/1.73M2
GLUCOSE BLD-MCNC: 74 MG/DL (ref 70–99)
HCT VFR BLD AUTO: 31 % (ref 35–47)
HGB BLD-MCNC: 10.6 G/DL (ref 11.7–15.7)
LYMPHOCYTES # BLD AUTO: 1 10E3/UL (ref 0.8–5.3)
LYMPHOCYTES NFR BLD AUTO: 18 %
MCH RBC QN AUTO: 31.1 PG (ref 26.5–33)
MCHC RBC AUTO-ENTMCNC: 34.2 G/DL (ref 31.5–36.5)
MCV RBC AUTO: 91 FL (ref 78–100)
MONOCYTES # BLD AUTO: 0.5 10E3/UL (ref 0–1.3)
MONOCYTES NFR BLD AUTO: 8 %
NEUTROPHILS # BLD AUTO: 3.9 10E3/UL (ref 1.6–8.3)
NEUTROPHILS NFR BLD AUTO: 70 %
PLATELET # BLD AUTO: 222 10E3/UL (ref 150–450)
POTASSIUM BLD-SCNC: 4.1 MMOL/L (ref 3.4–5.3)
PROT SERPL-MCNC: 7.1 G/DL (ref 6.8–8.8)
RBC # BLD AUTO: 3.41 10E6/UL (ref 3.8–5.2)
SODIUM SERPL-SCNC: 136 MMOL/L (ref 133–144)
WBC # BLD AUTO: 5.5 10E3/UL (ref 4–11)

## 2021-08-24 PROCEDURE — 36415 COLL VENOUS BLD VENIPUNCTURE: CPT | Performed by: FAMILY MEDICINE

## 2021-08-24 PROCEDURE — 96372 THER/PROPH/DIAG INJ SC/IM: CPT | Performed by: FAMILY MEDICINE

## 2021-08-24 PROCEDURE — 85025 COMPLETE CBC W/AUTO DIFF WBC: CPT | Performed by: FAMILY MEDICINE

## 2021-08-24 PROCEDURE — 99215 OFFICE O/P EST HI 40 MIN: CPT | Mod: 25 | Performed by: FAMILY MEDICINE

## 2021-08-24 PROCEDURE — 80053 COMPREHEN METABOLIC PANEL: CPT | Performed by: FAMILY MEDICINE

## 2021-08-24 RX ORDER — COVID-19 ANTIGEN TEST
220 KIT MISCELLANEOUS 2 TIMES DAILY WITH MEALS
COMMUNITY
End: 2021-09-03

## 2021-08-24 RX ORDER — GABAPENTIN 300 MG/1
300 CAPSULE ORAL AT BEDTIME
Qty: 60 CAPSULE | Refills: 1 | Status: SHIPPED | OUTPATIENT
Start: 2021-08-24 | End: 2021-10-14

## 2021-08-24 RX ORDER — KETOROLAC TROMETHAMINE 30 MG/ML
30 INJECTION, SOLUTION INTRAMUSCULAR; INTRAVENOUS ONCE
Status: COMPLETED | OUTPATIENT
Start: 2021-08-24 | End: 2021-08-24

## 2021-08-24 RX ADMIN — KETOROLAC TROMETHAMINE 30 MG: 30 INJECTION, SOLUTION INTRAMUSCULAR; INTRAVENOUS at 10:18

## 2021-08-24 NOTE — NURSING NOTE
"Chief Complaint   Patient presents with     Musculoskeletal Problem     rt waist down x 3 weeks       Initial /80 (BP Location: Right arm)   Pulse 83   Temp 97.6  F (36.4  C) (Tympanic)   Resp 20   Wt 80.3 kg (177 lb)   SpO2 100%   BMI 29.45 kg/m   Estimated body mass index is 29.45 kg/m  as calculated from the following:    Height as of 7/8/21: 1.651 m (5' 5\").    Weight as of this encounter: 80.3 kg (177 lb).    Patient presents to the clinic using No DME    Health Maintenance that is potentially due pending provider review:  NONE    n/a    Is there anyone who you would like to be able to receive your results? No  If yes have patient fill out HARPAL    "

## 2021-08-24 NOTE — PROGRESS NOTES
Assessment & Plan     Chronic right-sided low back pain with right-sided sciatica  Much worse  Recommend MRI  - MR Lumbar Spine w/o Contrast; Future  - gabapentin (NEURONTIN) 300 MG capsule; Take 1 capsule (300 mg) by mouth At Bedtime If needed, go to 2    Lumbar radiculopathy  As above  - gabapentin (NEURONTIN) 300 MG capsule; Take 1 capsule (300 mg) by mouth At Bedtime If needed, go to 2  - ketorolac (TORADOL) injection 30 mg    Anemia, unspecified type  normocytic  - CBC with Platelets & Differential    Squamous cell carcinoma of bronchus in left lower lobe (H)  Followed by oncology  Releasing labs of theirs today  - Comprehensive metabolic panel    I spent a total of 41 minutes on the day of the visit.   Time spent doing chart review, history and exam, documentation and further activities per the note         Return in about 2 weeks (around 9/7/2021), or if symptoms worsen or fail to improve.    Jyoti Garcia MD  [facility]    Samantha Borrego is a 70 year old who presents for the following health issues     HPI     Musculoskeletal problem/pain  Onset/Duration: 3 weeks  Description  Location: rt side -   Joint Swelling: no  Redness: no  Pain: YES  Warmth: no  Intensity:  severe  Progression of Symptoms:  worsening  Accompanying signs and symptoms:   Fevers: no  Numbness/tingling/weakness: YES  History  Trauma to the area: no  Recent illness:  no  Previous similar problem: YES  Previous evaluation:  YES  Precipitating or alleviating factors:  Aggravating factors include: worse after non movement  Therapies tried and outcome: rest/inactivity, heat, ice, stretching, exercises, physical therapy and Aleve    Started with back pain for a while, I saw her in July and she did physical therapy, helped a bit, got way worse 3 weeks ago. Is having trouble walking, pain goes down the right buttock, goes down the back of the leg and into the right shin.   No weakness. Has numbness and  tingling in the toes.   No bowel or bladder problems.   Is quite disabling.     She was diagnosed with squamous cell lung cancer last summer after screening CT scan. Went through chemo    Review of Systems   See above      Objective    /80 (BP Location: Right arm)   Pulse 83   Temp 97.6  F (36.4  C) (Tympanic)   Resp 20   Wt 80.3 kg (177 lb)   SpO2 100%   BMI 29.45 kg/m    Body mass index is 29.45 kg/m .  Physical Exam   General: appears well, in no distress   Neck: supple, no adenopathy  Heart: regular rate and rhythm, normal S1S2, no murmur  Lungs: clear to ascultation   MS: back exam: walks with an antalgicl gait,  There is mild tenderness to palpation of back, poor range of motion at waist, mainly extension, lower extremities with good strength and sensation, DTRs difficult to obtain in either legs

## 2021-08-26 ENCOUNTER — HOSPITAL ENCOUNTER (OUTPATIENT)
Dept: MRI IMAGING | Facility: CLINIC | Age: 71
Discharge: HOME | End: 2021-08-26
Attending: FAMILY MEDICINE | Admitting: FAMILY MEDICINE
Payer: MEDICARE

## 2021-08-26 DIAGNOSIS — M54.41 CHRONIC RIGHT-SIDED LOW BACK PAIN WITH RIGHT-SIDED SCIATICA: ICD-10-CM

## 2021-08-26 DIAGNOSIS — G89.29 CHRONIC RIGHT-SIDED LOW BACK PAIN WITH RIGHT-SIDED SCIATICA: ICD-10-CM

## 2021-08-26 PROCEDURE — G1004 CDSM NDSC: HCPCS

## 2021-08-26 PROCEDURE — 72148 MRI LUMBAR SPINE W/O DYE: CPT | Mod: ME

## 2021-08-29 ENCOUNTER — MYC MEDICAL ADVICE (OUTPATIENT)
Dept: FAMILY MEDICINE | Facility: CLINIC | Age: 71
End: 2021-08-29

## 2021-08-29 DIAGNOSIS — G89.29 CHRONIC RIGHT-SIDED LOW BACK PAIN WITH RIGHT-SIDED SCIATICA: ICD-10-CM

## 2021-08-29 DIAGNOSIS — M54.16 LUMBAR RADICULOPATHY: Primary | ICD-10-CM

## 2021-08-29 DIAGNOSIS — M54.41 CHRONIC RIGHT-SIDED LOW BACK PAIN WITH RIGHT-SIDED SCIATICA: ICD-10-CM

## 2021-09-01 ENCOUNTER — MYC MEDICAL ADVICE (OUTPATIENT)
Dept: FAMILY MEDICINE | Facility: CLINIC | Age: 71
End: 2021-09-01

## 2021-09-02 ENCOUNTER — TELEPHONE (OUTPATIENT)
Dept: PALLIATIVE MEDICINE | Facility: CLINIC | Age: 71
End: 2021-09-02

## 2021-09-02 NOTE — TELEPHONE ENCOUNTER
Pt declined to schedule LESI. Did not want to wait until next week for an appointment.    Julieth DUENAS    [facility]

## 2021-09-02 NOTE — TELEPHONE ENCOUNTER
Dr. Garcia put in referral for pain clinic for injection. Patient called and informed. Diana Pérez MA

## 2021-09-03 ENCOUNTER — TELEPHONE (OUTPATIENT)
Dept: PALLIATIVE MEDICINE | Facility: CLINIC | Age: 71
End: 2021-09-03

## 2021-09-03 DIAGNOSIS — Z20.822 ENCOUNTER FOR LABORATORY TESTING FOR COVID-19 VIRUS: Primary | ICD-10-CM

## 2021-09-03 NOTE — TELEPHONE ENCOUNTER
Screening Questions for Radiology Injections:    Injection to be done at which interventional clinic site? [facility]    If [facility] location, tell patient that this procedure requires a COVID-19 lab test be done within 4 days of the procedure. Would you still like to move forward with scheduling the procedure?  YES: ok   If YES, let patient know that someone will call them to schedule the COVID-19 test and that they will only receive a call back if the result is positive. Route to nursing to enter order.     Instruct patient to arrive as directed prior to the scheduled appointment time:    [place] minutes before      [place]: 30 minutes before; if IV needed 1 hour before     Procedure ordered by  [place]     Procedure ordered? L4 nerve compression      Transforaminal Cervical JC -  [place] does NOT have providers that do these- [facility] and [facility] do have providers that do    As a reminder, receiving steroids can decrease your body's ability to fight infection.   Would you still like to move forward with scheduling the injection?  Yes    What insurance would patient like us to bill for this procedure? Medicare/BC      Worker's comp or MVA (motor vehicle accident) -Any injection DO NOT SCHEDULE and route to Karen Peralta.      [employer] insurance - For SI joint injections, DO NOT SCHEDULE and route Karen Peralta.       ALL BCBS, [employer] and [employer] CIGNA-Route to Karen for review DO NOT SCHEDULE      IF SCHEDULING IN [place] AND NEEDS A PA, IT IS OKAY TO SCHEDULE. [place] HANDLES THEIR OWN PA'S AFTER THE PATIENT IS SCHEDULED. PLEASE SCHEDULE AT LEAST 1 WEEK OUT SO A PA CAN BE OBTAINED.    Any chance of pregnancy? NO   If YES, do NOT schedule and route to RN pool    Is an  needed? No     Patient has a drive home? (mandatory) YES: ok    Is patient taking any blood thinners (i.e. plavix, coumadin, jantoven, warfarin, heparin, pradaxa or dabigatran, etc)? Yes - Xarelto   If hold  needed, do NOT schedule, route to RN pool     Is patient taking any aspirin products (includes Excedrin and Fiorinal)? No     If more than 325mg/day, OK to schedule; Instruct pt to decrease to less than 325 mg for 7 days AND route to RN pool    For CERVICAL procedures, hold all aspirin products for 6 days.     Tell pt that if aspirin product is not held for 6 days, the procedure WILL BE cancelled.      Does the patient have a bleeding or clotting disorder? No     If YES, okay to schedule AND route to RN nurse pool    For any patients with platelet count <100, must be forwarded to provider    Any allergies to contrast dye, iodine, shellfish, or numbing and steroid medications? No    If YES, add allergy information to appointment notes AND route to the RN pool     If JC and Contrast Dye / Iodine Allergy? DO NOT SCHEDULE, route to RN pool    Allergies: Bee venom     Is patient diabetic?  No  If YES, instruct them to bring their glucometer.    Does patient have an active infection or treated for one within the past week? No     Is patient currently taking any antibiotics?  No     For patients on chronic, preventative, or prophylactic antibiotics, procedures may be scheduled.     For patients on antibiotics for active or recent infection:antibiotic course must have been completed for 4 days    Is patient currently taking any steroid medications? (i.e. Prednisone, Medrol)  No     For patients on steroid medications, course must have been completed for 4 days    Is patient actively being treated for cancer or immunocompromised? No  If YES, do NOT schedule and route to RN pool     Are you able to get on and off an exam table with minimal or no assistance? Yes  If NO, do NOT schedule and route to RN pool    Are you able to roll over and lay on your stomach with minimal or no assistance? Yes  If NO, do NOT schedule and route to RN pool     Has the patient had a flu shot or any other vaccinations within 7 days before or after  the procedure.  No     Have you recently had a COVID vaccine or have plans to get it in the near future? No    If yes, explain that for the vaccine to work best they need to:       wait 1 week before and 1 week after getting Vaccine #1    wait 1 week before and 2 weeks after getting Vaccine #2    If patient has concerns about the timing, send to RN pool     Does patient have an MRI/CT?  YES: MRI  Check Procedure Scheduling Grid to see if required.      Was the MRI done within the last 3 years?  Yes    If yes, where was the MRI done i.e.[facility], [facility], [place], [facility] etc? [facility]      If no, do not schedule and route to RN pool    If MRI was not done at [place], [facility] or [facility] do NOT schedule and route to RN pool.      If pt has an imaging disc, the injection MAY be scheduled but pt has to bring disc to appt.     If they show up without the disc the injection cannot be done    Procedure Specific Instructions:      If celiac plexus block, informed patient NPO for 6 hours and that it is okay to take medications with sips of water, especially blood pressure medications  Not Applicable         If this is for a cervical procedure, informed patient that aspirin needs to be held for 6 days.   Not Applicable      If IV needed:    Do not schedule procedures requiring IV placement in the first appointment of the day or first appointment after lunch. Do NOT schedule at 0745, 0815 or 1245. ok    Instructed pt to arrive 30 minutes early for IV start if required. (Check Procedure Scheduling Grid)  Not Applicable    Reminders:      If you are started on any steroids or antibiotics between now and your appointment, you must contact us because the procedure may need to be cancelled.  Yes      For all procedures except radiofrequency ablations (RFAs) and spinal cord stimulator (SCS) trials, informed patient:    IV sedation is not provided for this procedure.  If you feel that an oral anti-anxiety  medication is needed, you can discuss this further with your referring provider or primary care provider.  The Pain Clinic provider will discuss specifics of what the procedure includes at your appointment.  Most procedures last 10-20 minutes.  We use numbing medications to help with any discomfort during the procedure.  Not Applicable      For patients 85 or older we recommend having an adult stay w/ them for the remainder of the day.   ok    Does the patient have any questions?  NO  Glendy Zhang  [facility]

## 2021-09-03 NOTE — TELEPHONE ENCOUNTER
Patient is requesting to schedule an injection with the [facility].     This would require the patient to hold:                 Rivaroxagan (Xarelto)         Hold 3 days prior to procedure    Restart 24 hours after procedure      We are requesting your approval to hold the medication for this time frame.    Please keep call open and route back to the PAIN NURSE [[id number]] pool, Pt will be scheduled by Pain clinic after hold approved.       If hold approved, we will contact the patient for scheduling.    Thank you.    Routed to: Jose Luis Torres RN  Patient Care Supervisor   [facility]

## 2021-09-07 ENCOUNTER — MYC MEDICAL ADVICE (OUTPATIENT)
Dept: FAMILY MEDICINE | Facility: CLINIC | Age: 71
End: 2021-09-07

## 2021-09-07 NOTE — TELEPHONE ENCOUNTER
She is looking for advice on when to stop blood thinner, can you help with this or should she wait to hear from cardiology? This is a copy of the Mychart she sent to cardiology:    Could your office please contact the pain clinic [phone] about an injection dr Espinal has prescribed for me.  They said they are waiting for someone from your office to contact them about stopping my xarelto before the injection. I had a video conference with Dr Otero on Friday.  He told me I would just need to stop taking it a couple days before.  I ve been in a lot of pain and would like to get this shot ASAP.     Thank you.   Salima. [phone]

## 2021-09-08 NOTE — TELEPHONE ENCOUNTER
Called pt.     Injection scheduled for:  9/14/21  xarelto hold starts on:  9/11/21  Was pt informed to contact his INR clinic once scheduled: Not Applicable  Is lovenox bridging needed?  Not Applicable  INR order in?: Not Applicable  Lab appointment done?  Not Applicable  Injection intake questions reviewed?  YES  Infection/antibiotic information reviewed?  YES  Location confirmed: :Yes /fv [place]  Is pt arriving early?:No  COVID test order in? Yes   Has pt had COVID vaccine?  :Yes 3/2021    It is recommended that the?scheduling?of elective steroid injections (for joint pain, tendinopathies, and spine procedures) should be  by at least one week before or after receiving the 1st COVID vaccine and at least 2 weeks after receiving the 2nd COVID vaccine in order to allow for one's body to fully respond to the vaccine.     Cherie Armstrong RN-BSN  [facility]

## 2021-09-08 NOTE — TELEPHONE ENCOUNTER
Patient called wondering when someone will call her to schedule her injection        Routing to nursing to review         Caron Holder    [facility]

## 2021-09-08 NOTE — TELEPHONE ENCOUNTER
See the 9/8/21 telephone  encounter for more information.    Cherie Armstrong RN-BSN  [facility]

## 2021-09-10 ENCOUNTER — LAB (OUTPATIENT)
Dept: LAB | Facility: CLINIC | Age: 71
End: 2021-09-10
Payer: MEDICARE

## 2021-09-10 DIAGNOSIS — Z20.822 ENCOUNTER FOR LABORATORY TESTING FOR COVID-19 VIRUS: ICD-10-CM

## 2021-09-10 PROCEDURE — U0005 INFEC AGEN DETEC AMPLI PROBE: HCPCS

## 2021-09-10 PROCEDURE — U0003 INFECTIOUS AGENT DETECTION BY NUCLEIC ACID (DNA OR RNA); SEVERE ACUTE RESPIRATORY SYNDROME CORONAVIRUS 2 (SARS-COV-2) (CORONAVIRUS DISEASE [COVID-19]), AMPLIFIED PROBE TECHNIQUE, MAKING USE OF HIGH THROUGHPUT TECHNOLOGIES AS DESCRIBED BY CMS-2020-01-R: HCPCS

## 2021-09-11 LAB — SARS-COV-2 RNA RESP QL NAA+PROBE: NEGATIVE

## 2021-09-14 ENCOUNTER — HOSPITAL ENCOUNTER (OUTPATIENT)
Dept: PALLIATIVE MEDICINE | Facility: CLINIC | Age: 71
Discharge: HOME | End: 2021-09-14
Attending: FAMILY MEDICINE | Admitting: FAMILY MEDICINE
Payer: MEDICARE

## 2021-09-14 VITALS
TEMPERATURE: 98.3 F | SYSTOLIC BLOOD PRESSURE: 168 MMHG | DIASTOLIC BLOOD PRESSURE: 96 MMHG | HEART RATE: 93 BPM | RESPIRATION RATE: 16 BRPM

## 2021-09-14 DIAGNOSIS — G89.29 CHRONIC RIGHT-SIDED LOW BACK PAIN WITH RIGHT-SIDED SCIATICA: ICD-10-CM

## 2021-09-14 DIAGNOSIS — M54.41 CHRONIC RIGHT-SIDED LOW BACK PAIN WITH RIGHT-SIDED SCIATICA: ICD-10-CM

## 2021-09-14 DIAGNOSIS — M54.16 LUMBAR RADICULOPATHY: ICD-10-CM

## 2021-09-14 PROCEDURE — 64483 NJX AA&/STRD TFRM EPI L/S 1: CPT | Mod: RT | Performed by: STUDENT IN AN ORGANIZED HEALTH CARE EDUCATION/TRAINING PROGRAM

## 2021-09-14 PROCEDURE — 250N000011 HC RX IP 250 OP 636: Performed by: STUDENT IN AN ORGANIZED HEALTH CARE EDUCATION/TRAINING PROGRAM

## 2021-09-14 PROCEDURE — 255N000002 HC RX 255 OP 636: Performed by: STUDENT IN AN ORGANIZED HEALTH CARE EDUCATION/TRAINING PROGRAM

## 2021-09-14 RX ORDER — DEXAMETHASONE SODIUM PHOSPHATE 10 MG/ML
10 INJECTION, SOLUTION INTRAMUSCULAR; INTRAVENOUS ONCE
Status: COMPLETED | OUTPATIENT
Start: 2021-09-14 | End: 2021-09-14

## 2021-09-14 RX ORDER — BUPIVACAINE HYDROCHLORIDE 2.5 MG/ML
10 INJECTION, SOLUTION INFILTRATION; PERINEURAL ONCE
Status: COMPLETED | OUTPATIENT
Start: 2021-09-14 | End: 2021-09-14

## 2021-09-14 RX ORDER — IOPAMIDOL 408 MG/ML
10 INJECTION, SOLUTION INTRATHECAL ONCE
Status: COMPLETED | OUTPATIENT
Start: 2021-09-14 | End: 2021-09-14

## 2021-09-14 RX ADMIN — DEXAMETHASONE SODIUM PHOSPHATE 10 MG: 10 INJECTION, SOLUTION INTRAMUSCULAR; INTRAVENOUS at 11:33

## 2021-09-14 RX ADMIN — IOPAMIDOL 1 ML: 408 INJECTION, SOLUTION INTRATHECAL at 11:21

## 2021-09-14 RX ADMIN — BUPIVACAINE HYDROCHLORIDE 1 ML: 2.5 INJECTION, SOLUTION EPIDURAL; INFILTRATION; INTRACAUDAL; PERINEURAL at 11:21

## 2021-09-14 NOTE — PROGRESS NOTES
Pre-procedure Intake    Have you been fasting? No     If yes, for how long?     Are you taking a prescribed blood thinner such as coumadin, Plavix, Xarelto?    Yes -   Other blood thinners    If yes, when did you take your last dose? 9/10    Do you take aspirin?  No    If cervical procedure, have you held aspirin for 6 days?   NA    Do you have any allergies to contrast dye, iodine, steroid and/or numbing medications?  NO    Are you currently taking antibiotics or have an active infection?  NO    Have you had a fever/elevated temperature within the past week? NO    Are you currently taking oral steroids? NO    Do you have a ? Yes       Are you pregnant or breastfeeding?  NO    Have you received the COVID-19 vaccine? Yes       If yes, was it your 1st, 2nd or only dose needed? 2nd    Date of most recent vaccine: 3/2021    Notify provider and RNs if systolic BP >170, diastolic BP >100, P >100 or O2 sats < 90%

## 2021-09-14 NOTE — DISCHARGE INSTRUCTIONS
[facility]   Procedure Discharge Instructions    Today you saw:    Dr. Cora Young    You had an:  Lumbar Epidural steroid injection       Medications used:  Lidocaine   Bupivacaine   Dexamethasone IsoVue Normal saline            Be cautious when walking. Numbness and/or weakness in the lower extremities may occur for up to 6-8 hours after the procedure due to effect of the local anesthetic    Do not drive for 6 hours. The effect of the local anesthetic could slow your reflexes.     You may resume your regular activities after 24 hours    Avoid strenuous activity for the first 24 hours    You may shower, however avoid swimming, tub baths or hot tubs for 24 hours following your procedure    You may have a mild to moderate increase in pain for several days following the injection.    It may take up to 14 days for the steroid medication to start working although you may feel the effect as early as a few days after the procedure.       You may use ice packs for 10-15 minutes, 3 to 4 times a day at the injection site for comfort    Do not use heat to painful areas for 6 to 8 hours. This will give the local anesthetic time to wear off and prevent you from accidentally burning your skin.     Unless you have been directed to avoid the use of anti-inflammatory medications (NSAIDS), you may use medications such as ibuprofen, Aleve or Tylenol for pain control if needed.     Possible side effects of steroids that you may experience include flushing, elevated blood pressure, increased appetite, mild headaches and restlessness.  All of these symptoms will get better with time.    If you experience any of the following, call the Pain Clinic during work hours (Mon-Friday 8-4:30 pm) at [phone] or the Provider Line after hours at [phone]:  -Fever over 100 degree F  -Swelling, bleeding, redness, drainage, warmth at the injection site  -Progressive weakness or numbness in your legs   -Loss  of bowel or bladder function  -Unusual new onset of pain that is not improving

## 2021-09-15 NOTE — PROGRESS NOTES
"Pre procedure Diagnosis: lumbar radiculopathy   Post procedure Diagnosis: Same  Procedure performed: Right L3 transforaminal epidural steroid injection   Anesthesia: none  Complications: none  Operators: Cora Young MD     Needle: 25g 5\" spinal  Injectate: 1ml 0.25% bupivacaine, 10mg of dexamethasone    Indications:   Ijeoma Shah is a 70 year old female was sent by Jyoti Espinal for lumbar epidural steroid injection.  she has a history of right sided leg pain.  Exam shows positive seated slump on the right and she has tried conservative treatment including medications.    MRI was done on 8/26/2021 which showed   IMPRESSION:       1. Severe bilateral facet arthropathy at L4-L5 with grade 1  anterolisthesis of L4 on L5.  2. Asymmetric narrowing of the right lateral recess at L3-L4 with  possible impingement of the traversing right L4 nerve root.  3. Other degenerative change as detailed.    Options/alternatives, benefits and risks were discussed with the patient including bleeding, infection, tissue trauma, numbness, weakness, paralysis, spinal cord injury, radiation exposure, headache and reaction to medications. Questions were answered to her satisfaction and she agrees to proceed. Voluntary informed consent was obtained and signed.     Vitals were reviewed: Yes  Allergies were reviewed:  Yes   Medications were reviewed:  Yes   Pre-procedure pain score: 5 in the back and leg/10    Procedure:  After getting informed consent, patient was brought into the procedure suite and was placed in a prone position on the procedure table.   A Pause for the Cause was performed.  Patient was prepped and draped in sterile fashion.     After identifying the right L3 neuroforamen, the C-arm was rotated to a right lateral oblique angle.  A 25 gauge 5 inch spinal needle was placed coaxial with the fluoroscopy beam and overriding the superior aspect of the neuroforamen. The spinal needle was advanced under " intermittent fluoroscopy until it entered the foramen superiorly.    The position was then inspected from anteroposterior and lateral views, and the needle adjusted appropriately.  A total of 1ml of Isovue 200 was injected, confirming appropriate position, with spread into the epidural space, with no intravascular uptake. 9ml was wasted    1ml of 0.25% bupivacaine and 10mg of dexamethasone were injected from separate syringes. The tubing was flushed with contrast dye between administering injectates.  The needle was removed.    During the procedure, there was not a paresthesia.   Hemostasis was achieved, the area was cleaned, and bandaids were placed when appropriate.  The patient tolerated the procedure well, and was taken to the recovery room.    Images were saved to PACS.    Post-procedure pain score: 4 in the back and leg/10  Follow-up includes:   -f/u with referring provider    Cora Young MD  [facility]

## 2021-09-17 ENCOUNTER — TELEPHONE (OUTPATIENT)
Dept: PALLIATIVE MEDICINE | Facility: CLINIC | Age: 71
End: 2021-09-17

## 2021-09-17 NOTE — TELEPHONE ENCOUNTER
Call back to Pt.    Pt stated that she is experiencing slight increase in pain after her injection.    Writer reviewed her AVS and that this can be a normal response to the injection.  Discussed pain flair and irritation after injection and we are hopefull that she will still see benefit from the injection as time goes by.    Pt verbalized understanding    Evgeny Torres, RN  Patient Care Supervisor   [facility] [place]

## 2021-09-17 NOTE — TELEPHONE ENCOUNTER
Reason for call:  Other   Patient called regarding (reason for call): call back  Additional comments: Per pt she is in pain after her injection that was done 9/14 w/Hector. She is asking to speak with nursing.    Phone number to reach patient:  Home number on file [phone] (home)    Can we leave a detailed message on this number?  YES    Travel screening: Not Applicable         Rachel ZAZUETA    [facility] [place]

## 2021-09-19 ENCOUNTER — HEALTH MAINTENANCE LETTER (OUTPATIENT)
Age: 71
End: 2021-09-19

## 2021-10-04 ENCOUNTER — MYC MEDICAL ADVICE (OUTPATIENT)
Dept: FAMILY MEDICINE | Facility: CLINIC | Age: 71
End: 2021-10-04

## 2021-10-04 DIAGNOSIS — G89.29 CHRONIC RIGHT-SIDED LOW BACK PAIN WITH RIGHT-SIDED SCIATICA: ICD-10-CM

## 2021-10-04 DIAGNOSIS — M54.16 LUMBAR RADICULOPATHY: Primary | ICD-10-CM

## 2021-10-04 DIAGNOSIS — M54.41 CHRONIC RIGHT-SIDED LOW BACK PAIN WITH RIGHT-SIDED SCIATICA: ICD-10-CM

## 2021-10-04 NOTE — PROGRESS NOTES
"Outpatient Physical Therapy Discharge Note     Patient: Ijeoma Shah  : 1950    Beginning/End Dates of Reporting Period:  21 to 21    Referring Provider: Dr Griffiths    Therapy Diagnosis: chronic low back pain     Client Self Report: Pt notes that back has been feeling better but has been having significant right hip and leg pain. Has been going on for a week. Notes  that cancer dr told her that the numbness in her foot is not from the meds. Feels calf muscles are tight. Laying ok and and standing/walking seems to be ok but does seem worse after sitting which has to do with work.     Objective Measurements:  Objective Measure: Lumbar ROM  Details: fingertips 8\" from floor, tightness, extension limited to neutral, SB to L 4\" SB 2\" w/ stiffness  Objective Measure: Slump   Details: mild + R     Goals:  Goal Identifier 1   Goal Description Pt will be able to roll in bed without increased symptomns   Target Date 21   Date Met      Progress (detail required for progress note):  dependant     Goal Identifier 2   Goal Description Pt will report able to complete yard work x 30 minutes without increased symptoms   Target Date 21   Date Met      Progress (detail required for progress note):   Increased LE symptoms     Goal Identifier 3   Goal Description Pt will be independant with HEP for long term self management   Target Date 21   Date Met      Progress (detail required for progress note):   I current program       Pt completed x 3 sessions PT to address chronic low back pain. At most recent visit, pt presenting with subacute RLE symptoms secondary to lumbar radiculopathy. At time of last visit, pt had upcoming MRI and did not return for additional follow up treatments.  Goal status and current objective information is therefore unknown.  Discharge from PT services at this time for this episode of treatment. Please see attached documentation under this episode of care for " further information including dates of service, start of care date, referring physician, Dx, treatment plan, treatments, etc.    Plan:  Discharge from therapy.    Discharge:    Reason for Discharge: Patient has failed to schedule further appointments.    Equipment Issued: na    Discharge Plan: Patient to continue home program.  Please contact me with any questions or concerns.    Thank you for your referral.    Reanna Tomas, PT, DPT  Physical Therapist   [facility]  [phone]

## 2021-10-07 ENCOUNTER — MYC MEDICAL ADVICE (OUTPATIENT)
Dept: FAMILY MEDICINE | Facility: CLINIC | Age: 71
End: 2021-10-07

## 2021-10-07 DIAGNOSIS — M54.16 LUMBAR RADICULOPATHY: Primary | ICD-10-CM

## 2021-10-08 RX ORDER — OXYCODONE HYDROCHLORIDE 5 MG/1
5 TABLET ORAL EVERY 6 HOURS PRN
Qty: 20 TABLET | Refills: 0 | Status: SHIPPED | OUTPATIENT
Start: 2021-10-08 | End: 2021-10-11

## 2021-10-11 ENCOUNTER — MYC MEDICAL ADVICE (OUTPATIENT)
Dept: FAMILY MEDICINE | Facility: CLINIC | Age: 71
End: 2021-10-11

## 2021-10-11 DIAGNOSIS — Z20.822 SUSPECTED 2019 NOVEL CORONAVIRUS INFECTION: Primary | ICD-10-CM

## 2021-10-14 ENCOUNTER — LAB (OUTPATIENT)
Dept: LAB | Facility: CLINIC | Age: 71
End: 2021-10-14
Attending: FAMILY MEDICINE
Payer: MEDICARE

## 2021-10-14 ENCOUNTER — OFFICE VISIT (OUTPATIENT)
Dept: NEUROSURGERY | Facility: CLINIC | Age: 71
End: 2021-10-14
Attending: FAMILY MEDICINE
Payer: MEDICARE

## 2021-10-14 VITALS
WEIGHT: 183.2 LBS | TEMPERATURE: 97.8 F | SYSTOLIC BLOOD PRESSURE: 132 MMHG | DIASTOLIC BLOOD PRESSURE: 70 MMHG | HEIGHT: 65 IN | BODY MASS INDEX: 30.52 KG/M2

## 2021-10-14 DIAGNOSIS — G89.29 CHRONIC RIGHT-SIDED LOW BACK PAIN WITH RIGHT-SIDED SCIATICA: ICD-10-CM

## 2021-10-14 DIAGNOSIS — Z20.822 SUSPECTED 2019 NOVEL CORONAVIRUS INFECTION: ICD-10-CM

## 2021-10-14 DIAGNOSIS — M54.16 LUMBAR RADICULOPATHY: ICD-10-CM

## 2021-10-14 DIAGNOSIS — M54.41 CHRONIC RIGHT-SIDED LOW BACK PAIN WITH RIGHT-SIDED SCIATICA: ICD-10-CM

## 2021-10-14 PROCEDURE — 99203 OFFICE O/P NEW LOW 30 MIN: CPT | Performed by: NEUROLOGICAL SURGERY

## 2021-10-14 PROCEDURE — 86769 SARS-COV-2 COVID-19 ANTIBODY: CPT | Mod: 90

## 2021-10-14 PROCEDURE — 36415 COLL VENOUS BLD VENIPUNCTURE: CPT

## 2021-10-14 ASSESSMENT — MIFFLIN-ST. JEOR: SCORE: 1356.87

## 2021-10-14 ASSESSMENT — PAIN SCALES - GENERAL: PAINLEVEL: EXTREME PAIN (8)

## 2021-10-14 NOTE — PROGRESS NOTES
I was asked by Dr. Griffiths to see this patient in consultation    70F w/ severe right leg pain, right L3-4 lateral recess stenosis.  6 months of worsening, throbbing, right hip pain radiating to the lateral thigh, shin, and foot.  Worse with activity and pain.  PT and JC without relief.  MR shows right L3-4 disc bulge and lateral recess stenosis, and slight L4-5 spondylolisthesis.       Past Medical History:   Diagnosis Date     Arrhythmia     A-Fib     Benign essential hypertension      Calculus of gallbladder without cholecystitis without obstruction      COPD (chronic obstructive pulmonary disease) (H)      Lung cancer (H)      Simple chronic bronchitis (H)      Past Surgical History:   Procedure Laterality Date     ARTHROEREISIS, SUBTALAR       BRONCHOSCOPY RIGID OR FLEXIBLE W/TRANSENDOSCOPIC ENDOBRONCHIAL ULTRASOUND GUIDED N/A 07/27/2020    Procedure: Flexible bronchoscopy, endobronchial ultrasound with transbronchial biopsies;  Surgeon: Edin Castro MD;  Location: [facility] OR     CATARACT IOL, RT/LT Bilateral      COLONOSCOPY  06/29/2004    colonoscopy to the cecum     ESOPHAGOSCOPY, GASTROSCOPY, DUODENOSCOPY (EGD), COMBINED N/A 09/01/2020    Procedure: ESOPHAGOGASTRODUODENOSCOPY, WITH FINE NEEDLE ASPIRATION BIOPSY, WITH ENDOSCOPIC ULTRASOUND GUIDANCE;  Surgeon: Barber Hogan MD;  Location: [facility]     LAPAROSCOPIC CHOLECYSTECTOMY N/A 5/20/2021    Procedure: CHOLECYSTECTOMY, LAPAROSCOPIC;  Surgeon: Gavin Castillo MD;  Location: [facility] OR     PICC TRIPLE LUMEN PLACEMENT Right 10/23/2020    5Fr - 36cm, Medial brachial vein     SURGICAL HISTORY OF -       nose surgery     THORACOSCOPIC RESECTION LUNG Left 10/22/2020    Procedure: Left thoracoscopic lobectomy converted to Left Thoracotomy, Medistinal lymph node dissection, Pulmonary Artery repair, flexible bronchoscopy, on-pump oxygenator on standy-by;  Surgeon: Andrea Sanabria MD;  Location: [facility] OR     THORACOTOMY N/A 10/22/2020    Procedure:  Thoracotomy;  Surgeon: Andrea Sanabria MD;  Location: [facility]     TRANSCERVICAL EXTENDED MEDIASTINAL LYMPHADENECTOMY N/A 10/22/2020    Procedure: Transcervical extended mediastinal lymphadenectomy;  Surgeon: Andrea Sanabria MD;  Location: [facility]     TUBAL LIGATION      bilateral tubal ligation.  BTL     Social History     Socioeconomic History     Marital status:      Spouse name: Not on file     Number of children: 1     Years of education: Not on file     Highest education level: Not on file   Occupational History     Occupation: seasonal summer work only   Tobacco Use     Smoking status: Former Smoker     Packs/day: 1.00     Years: 33.00     Pack years: 33.00     Types: Cigarettes     Quit date: 2014     Years since quittin.8     Smokeless tobacco: Never Used   Substance and Sexual Activity     Alcohol use: Yes     Alcohol/week: 1.7 - 2.5 standard drinks     Types: 2 - 3 Standard drinks or equivalent per week     Comment: occ,social     Drug use: No     Sexual activity: Yes     Partners: Male   Other Topics Concern     Parent/sibling w/ CABG, MI or angioplasty before 65F 55M? Not Asked   Social History Narrative     Not on file     Social Determinants of Health     Financial Resource Strain:      Difficulty of Paying Living Expenses:    Food Insecurity:      Worried About Running Out of Food in the Last Year:      Ran Out of Food in the Last Year:    Transportation Needs:      Lack of Transportation (Medical):      Lack of Transportation (Non-Medical):    Physical Activity:      Days of Exercise per Week:      Minutes of Exercise per Session:    Stress:      Feeling of Stress :    Social Connections:      Frequency of Communication with Friends and Family:      Frequency of Social Gatherings with Friends and Family:      Attends Judaism Services:      Active Member of Clubs or Organizations:      Attends Club or Organization Meetings:      Marital Status:    Intimate Partner Violence:      Fear of  "Current or Ex-Partner:      Emotionally Abused:      Physically Abused:      Sexually Abused:      Family History   Problem Relation Age of Onset     Glaucoma Mother      LUNG DISEASE Mother      Melanoma Father      Down Syndrome Brother      Cancer Sister         bile duct     Coronary Artery Disease Brother      CABG Brother      No Known Problems Brother      No Known Problems Brother      No Known Problems Sister      Lung Cancer Sister         lung     No Known Problems Sister      No Known Problems Sister         ROS: 10 point ROS neg other than the symptoms noted above in the HPI.    Physical Exam  /70   Temp 97.8  F (36.6  C) (Temporal)   Ht 1.651 m (5' 5\")   Wt 83.1 kg (183 lb 3.2 oz)   BMI 30.49 kg/m    HEENT:  Normocephalic, atraumatic.  PERRLA.  EOM s intact.  Visual fields full to gross exam  Neck:  Supple, non-tender, without lymphadenopathy.  Heart:  No peripheral edema  Lungs:  No SOB  Abdomen:  Non-distended.   Skin:  Warm and dry.  Extremities:  No edema, cyanosis or clubbing.  Psychiatric:  No apparent distress  Musculoskeletal:  Normal bulk and tone    NEUROLOGICAL EXAMINATION:     Mental status:  Alert and Oriented x 3, speech is fluent.  Cranial nerves:  II-XII intact.   Motor:    Shoulder Abduction:  Right:  5/5   Left:  5/5  Biceps:                      Right:  5/5   Left:  5/5  Triceps:                     Right:  5/5   Left:  5/5  Wrist Extensors:       Right:  5/5   Left:  5/5  Wrist Flexors:           Right:  5/5   Left:  5/5  interosseus :            Right:  5/5   Left:  5/5  Hip Flexion:                Right: 5/5  Left:  5/5  Quadriceps:             Right:  5/5  Left:  5/5  Hamstrings:             Right:  5/5  Left:  5/5  Gastroc Soleus:        Right:  5/5  Left:  5/5  Tib/Ant:                      Right:  5/5  Left:  5/5  EHL:                     Right:  5/5  Left:  5/5  Sensation:  Intact  Reflexes:  Negative Babinski.  Negative Clonus.  Negative Du's.  Coordination:  " Smooth finger to nose testing.   Negative pronator drift.  Smooth tandem walking.    A/P:  70F w/ severe right leg pain, right L3-4 lateral recess stenosis    I had a discussion with the patient, reviewing the history, symptoms, and imaging  Discussed options, including surgical minimally invasive decompression  She is leaning towards continuing PT or getting another JC at this point, but she will think things over and contact us when she makes a decision

## 2021-10-14 NOTE — LETTER
"    10/14/2021         RE: Ijeoma Shah  [address] [facility] [address]        Dear Colleague,    Thank you for referring your patient, Ijeoma Shah, to the [facility] [place]. Please see a copy of my visit note below.    Ijeoma Shah is a 70 year old female who presents for:  Chief Complaint   Patient presents with     Neurologic Problem     Lumbar Radiculopathy         Initial Vitals:  There were no vitals taken for this visit. Estimated body mass index is 29.45 kg/m  as calculated from the following:    Height as of 7/8/21: 5' 5\" (1.651 m).    Weight as of 8/24/21: 177 lb (80.3 kg).. There is no height or weight on file to calculate BSA. BP completed using cuff size: regular  Data Unavailable    Nursing Comments:     Daphnie Worrell      I was asked by Dr. Griffiths to see this patient in consultation    70F w/ severe right leg pain, right L3-4 lateral recess stenosis.  6 months of worsening, throbbing, right hip pain radiating to the lateral thigh, shin, and foot.  Worse with activity and pain.  PT and JC without relief.  MR shows right L3-4 disc bulge and lateral recess stenosis, and slight L4-5 spondylolisthesis.       Past Medical History:   Diagnosis Date     Arrhythmia     A-Fib     Benign essential hypertension      Calculus of gallbladder without cholecystitis without obstruction      COPD (chronic obstructive pulmonary disease) (H)      Lung cancer (H)      Simple chronic bronchitis (H)      Past Surgical History:   Procedure Laterality Date     ARTHROEREISIS, SUBTALAR       BRONCHOSCOPY RIGID OR FLEXIBLE W/TRANSENDOSCOPIC ENDOBRONCHIAL ULTRASOUND GUIDED N/A 07/27/2020    Procedure: Flexible bronchoscopy, endobronchial ultrasound with transbronchial biopsies;  Surgeon: Edin Castro MD;  Location: [facility]     CATARACT IOL, RT/LT Bilateral      COLONOSCOPY  06/29/2004    colonoscopy to the cecum     ESOPHAGOSCOPY, " GASTROSCOPY, DUODENOSCOPY (EGD), COMBINED N/A 2020    Procedure: ESOPHAGOGASTRODUODENOSCOPY, WITH FINE NEEDLE ASPIRATION BIOPSY, WITH ENDOSCOPIC ULTRASOUND GUIDANCE;  Surgeon: Barber Hogan MD;  Location[facility]     LAPAROSCOPIC CHOLECYSTECTOMY N/A 2021    Procedure: CHOLECYSTECTOMY, LAPAROSCOPIC;  Surgeon: Gavin Castillo MD;  [facility]     PICC TRIPLE LUMEN PLACEMENT Right 10/23/2020    5Fr - 36cm, Medial brachial vein     SURGICAL HISTORY OF -       nose surgery     THORACOSCOPIC RESECTION LUNG Left 10/22/2020    Procedure: Left thoracoscopic lobectomy converted to Left Thoracotomy, Medistinal lymph node dissection, Pulmonary Artery repair, flexible bronchoscopy, on-pump oxygenator on standy-by;  Surgeon: Andrea Sanabria MD;  [facility]     THORACOTOMY N/A 10/22/2020    Procedure: Thoracotomy;  Surgeon: Andrea Sanabria MD;  [facility]: [facility]     TRANSCERVICAL EXTENDED MEDIASTINAL LYMPHADENECTOMY N/A 10/22/2020    Procedure: Transcervical extended mediastinal lymphadenectomy;  Surgeon: Andrea Sanabria MD;  Location: [facility]     TUBAL LIGATION      bilateral tubal ligation.  BTL     Social History     Socioeconomic History     Marital status:      Spouse name: Not on file     Number of children: 1     Years of education: Not on file     Highest education level: Not on file   Occupational History     Occupation: seasonal summer work only   Tobacco Use     Smoking status: Former Smoker     Packs/day: 1.00     Years: 33.00     Pack years: 33.00     Types: Cigarettes     Quit date: 2014     Years since quittin.8     Smokeless tobacco: Never Used   Substance and Sexual Activity     Alcohol use: Yes     Alcohol/week: 1.7 - 2.5 standard drinks     Types: 2 - 3 Standard drinks or equivalent per week     Comment: occ,social     Drug use: No     Sexual activity: Yes     Partners: Male   Other Topics Concern     Parent/sibling w/ CABG, MI or angioplasty before 65F 55M? Not Asked  "  Social History Narrative     Not on file     Social Determinants of Health     Financial Resource Strain:      Difficulty of Paying Living Expenses:    Food Insecurity:      Worried About Running Out of Food in the Last Year:      Ran Out of Food in the Last Year:    Transportation Needs:      Lack of Transportation (Medical):      Lack of Transportation (Non-Medical):    Physical Activity:      Days of Exercise per Week:      Minutes of Exercise per Session:    Stress:      Feeling of Stress :    Social Connections:      Frequency of Communication with Friends and Family:      Frequency of Social Gatherings with Friends and Family:      Attends Sabianism Services:      Active Member of Clubs or Organizations:      Attends Club or Organization Meetings:      Marital Status:    Intimate Partner Violence:      Fear of Current or Ex-Partner:      Emotionally Abused:      Physically Abused:      Sexually Abused:      Family History   Problem Relation Age of Onset     Glaucoma Mother      LUNG DISEASE Mother      Melanoma Father      Down Syndrome Brother      Cancer Sister         bile duct     Coronary Artery Disease Brother      CABG Brother      No Known Problems Brother      No Known Problems Brother      No Known Problems Sister      Lung Cancer Sister         lung     No Known Problems Sister      No Known Problems Sister         ROS: 10 point ROS neg other than the symptoms noted above in the HPI.    Physical Exam  /70   Temp 97.8  F (36.6  C) (Temporal)   Ht 1.651 m (5' 5\")   Wt 83.1 kg (183 lb 3.2 oz)   BMI 30.49 kg/m    HEENT:  Normocephalic, atraumatic.  PERRLA.  EOM s intact.  Visual fields full to gross exam  Neck:  Supple, non-tender, without lymphadenopathy.  Heart:  No peripheral edema  Lungs:  No SOB  Abdomen:  Non-distended.   Skin:  Warm and dry.  Extremities:  No edema, cyanosis or clubbing.  Psychiatric:  No apparent distress  Musculoskeletal:  Normal bulk and tone    NEUROLOGICAL " EXAMINATION:     Mental status:  Alert and Oriented x 3, speech is fluent.  Cranial nerves:  II-XII intact.   Motor:    Shoulder Abduction:  Right:  5/5   Left:  5/5  Biceps:                      Right:  5/5   Left:  5/5  Triceps:                     Right:  5/5   Left:  5/5  Wrist Extensors:       Right:  5/5   Left:  5/5  Wrist Flexors:           Right:  5/5   Left:  5/5  interosseus :            Right:  5/5   Left:  5/5  Hip Flexion:                Right: 5/5  Left:  5/5  Quadriceps:             Right:  5/5  Left:  5/5  Hamstrings:             Right:  5/5  Left:  5/5  Gastroc Soleus:        Right:  5/5  Left:  5/5  Tib/Ant:                      Right:  5/5  Left:  5/5  EHL:                     Right:  5/5  Left:  5/5  Sensation:  Intact  Reflexes:  Negative Babinski.  Negative Clonus.  Negative Du's.  Coordination:  Smooth finger to nose testing.   Negative pronator drift.  Smooth tandem walking.    A/P:  70F w/ severe right leg pain, right L3-4 lateral recess stenosis    I had a discussion with the patient, reviewing the history, symptoms, and imaging  Discussed options, including surgical minimally invasive decompression  She is leaning towards continuing PT or getting another JC at this point, but she will think things over and contact us when she makes a decision         Again, thank you for allowing me to participate in the care of your patient.        Sincerely,        Venkat Hewitt MD

## 2021-10-14 NOTE — PROGRESS NOTES
"Ijeoma Shah is a 70 year old female who presents for:  Chief Complaint   Patient presents with     Neurologic Problem     Lumbar Radiculopathy         Initial Vitals:  There were no vitals taken for this visit. Estimated body mass index is 29.45 kg/m  as calculated from the following:    Height as of 7/8/21: 5' 5\" (1.651 m).    Weight as of 8/24/21: 177 lb (80.3 kg).. There is no height or weight on file to calculate BSA. BP completed using cuff size: regular  Data Unavailable    Nursing Comments:     Daphnie Worrell    "

## 2021-10-16 ENCOUNTER — MYC MEDICAL ADVICE (OUTPATIENT)
Dept: FAMILY MEDICINE | Facility: CLINIC | Age: 71
End: 2021-10-16

## 2021-10-16 LAB
SARS-COV-2 AB SERPL IA-ACNC: >250 U/ML
SARS-COV-2 AB SERPL QL IA: POSITIVE

## 2021-10-25 ENCOUNTER — APPOINTMENT (OUTPATIENT)
Dept: LAB | Facility: CLINIC | Age: 71
End: 2021-10-25
Payer: MEDICARE

## 2021-10-25 ENCOUNTER — HOSPITAL ENCOUNTER (OUTPATIENT)
Dept: CT IMAGING | Facility: CLINIC | Age: 71
Discharge: HOME | End: 2021-10-25
Attending: NURSE PRACTITIONER | Admitting: NURSE PRACTITIONER
Payer: MEDICARE

## 2021-10-25 DIAGNOSIS — C34.32 SQUAMOUS CELL CARCINOMA OF BRONCHUS IN LEFT LOWER LOBE (H): ICD-10-CM

## 2021-10-25 LAB
ALBUMIN SERPL-MCNC: 4 G/DL (ref 3.4–5)
ALP SERPL-CCNC: 69 U/L (ref 40–150)
ALT SERPL W P-5'-P-CCNC: 18 U/L (ref 0–50)
ANION GAP SERPL CALCULATED.3IONS-SCNC: 3 MMOL/L (ref 3–14)
AST SERPL W P-5'-P-CCNC: 13 U/L (ref 0–45)
BASOPHILS # BLD AUTO: 0 10E3/UL (ref 0–0.2)
BASOPHILS NFR BLD AUTO: 0 %
BILIRUB SERPL-MCNC: 0.3 MG/DL (ref 0.2–1.3)
BUN SERPL-MCNC: 20 MG/DL (ref 7–30)
CALCIUM SERPL-MCNC: 9.1 MG/DL (ref 8.5–10.1)
CHLORIDE BLD-SCNC: 97 MMOL/L (ref 94–109)
CO2 SERPL-SCNC: 29 MMOL/L (ref 20–32)
CREAT SERPL-MCNC: 0.99 MG/DL (ref 0.52–1.04)
EOSINOPHIL # BLD AUTO: 0.2 10E3/UL (ref 0–0.7)
EOSINOPHIL NFR BLD AUTO: 4 %
ERYTHROCYTE [DISTWIDTH] IN BLOOD BY AUTOMATED COUNT: 12.2 % (ref 10–15)
GFR SERPL CREATININE-BSD FRML MDRD: 58 ML/MIN/1.73M2
GLUCOSE BLD-MCNC: 104 MG/DL (ref 70–99)
HCT VFR BLD AUTO: 33.3 % (ref 35–47)
HGB BLD-MCNC: 11.7 G/DL (ref 11.7–15.7)
LYMPHOCYTES # BLD AUTO: 1.2 10E3/UL (ref 0.8–5.3)
LYMPHOCYTES NFR BLD AUTO: 21 %
MCH RBC QN AUTO: 31 PG (ref 26.5–33)
MCHC RBC AUTO-ENTMCNC: 35.1 G/DL (ref 31.5–36.5)
MCV RBC AUTO: 88 FL (ref 78–100)
MONOCYTES # BLD AUTO: 0.6 10E3/UL (ref 0–1.3)
MONOCYTES NFR BLD AUTO: 10 %
NEUTROPHILS # BLD AUTO: 3.8 10E3/UL (ref 1.6–8.3)
NEUTROPHILS NFR BLD AUTO: 66 %
PLATELET # BLD AUTO: 178 10E3/UL (ref 150–450)
POTASSIUM BLD-SCNC: 4.2 MMOL/L (ref 3.4–5.3)
PROT SERPL-MCNC: 7.1 G/DL (ref 6.8–8.8)
RBC # BLD AUTO: 3.77 10E6/UL (ref 3.8–5.2)
SODIUM SERPL-SCNC: 129 MMOL/L (ref 133–144)
WBC # BLD AUTO: 5.8 10E3/UL (ref 4–11)

## 2021-10-25 PROCEDURE — 74177 CT ABD & PELVIS W/CONTRAST: CPT | Mod: MG

## 2021-10-25 PROCEDURE — 85025 COMPLETE CBC W/AUTO DIFF WBC: CPT

## 2021-10-25 PROCEDURE — 250N000009 HC RX 250: Performed by: RADIOLOGY

## 2021-10-25 PROCEDURE — 250N000011 HC RX IP 250 OP 636: Performed by: RADIOLOGY

## 2021-10-25 PROCEDURE — 80053 COMPREHEN METABOLIC PANEL: CPT

## 2021-10-25 PROCEDURE — 36415 COLL VENOUS BLD VENIPUNCTURE: CPT

## 2021-10-25 RX ORDER — IOPAMIDOL 755 MG/ML
90 INJECTION, SOLUTION INTRAVASCULAR ONCE
Status: COMPLETED | OUTPATIENT
Start: 2021-10-25 | End: 2021-10-25

## 2021-10-25 RX ADMIN — SODIUM CHLORIDE 62 ML: 9 INJECTION, SOLUTION INTRAVENOUS at 13:34

## 2021-10-25 RX ADMIN — IOPAMIDOL 90 ML: 755 INJECTION, SOLUTION INTRAVENOUS at 13:34

## 2021-11-03 ENCOUNTER — ONCOLOGY VISIT (OUTPATIENT)
Dept: ONCOLOGY | Facility: CLINIC | Age: 71
End: 2021-11-03
Attending: INTERNAL MEDICINE
Payer: MEDICARE

## 2021-11-03 ENCOUNTER — TELEPHONE (OUTPATIENT)
Dept: PALLIATIVE MEDICINE | Facility: CLINIC | Age: 71
End: 2021-11-03

## 2021-11-03 VITALS
HEIGHT: 65 IN | BODY MASS INDEX: 30.44 KG/M2 | WEIGHT: 182.7 LBS | RESPIRATION RATE: 16 BRPM | HEART RATE: 101 BPM | TEMPERATURE: 98.3 F | DIASTOLIC BLOOD PRESSURE: 89 MMHG | SYSTOLIC BLOOD PRESSURE: 145 MMHG | OXYGEN SATURATION: 99 %

## 2021-11-03 DIAGNOSIS — C34.32 SQUAMOUS CELL CARCINOMA OF BRONCHUS IN LEFT LOWER LOBE (H): Primary | ICD-10-CM

## 2021-11-03 DIAGNOSIS — I10 BENIGN ESSENTIAL HYPERTENSION: ICD-10-CM

## 2021-11-03 DIAGNOSIS — I48.91 ATRIAL FIBRILLATION WITH RAPID VENTRICULAR RESPONSE (H): ICD-10-CM

## 2021-11-03 DIAGNOSIS — Z13.21 ENCOUNTER FOR VITAMIN DEFICIENCY SCREENING: ICD-10-CM

## 2021-11-03 DIAGNOSIS — E55.9 VITAMIN D DEFICIENCY: ICD-10-CM

## 2021-11-03 DIAGNOSIS — Z13.29 SCREENING FOR HYPOTHYROIDISM: ICD-10-CM

## 2021-11-03 LAB
ANION GAP SERPL CALCULATED.3IONS-SCNC: 6 MMOL/L (ref 3–14)
BUN SERPL-MCNC: 21 MG/DL (ref 7–30)
CALCIUM SERPL-MCNC: 8.9 MG/DL (ref 8.5–10.1)
CHLORIDE BLD-SCNC: 98 MMOL/L (ref 94–109)
CO2 SERPL-SCNC: 28 MMOL/L (ref 20–32)
CREAT SERPL-MCNC: 1 MG/DL (ref 0.52–1.04)
GFR SERPL CREATININE-BSD FRML MDRD: 57 ML/MIN/1.73M2
GLUCOSE BLD-MCNC: 93 MG/DL (ref 70–99)
POTASSIUM BLD-SCNC: 4.1 MMOL/L (ref 3.4–5.3)
SODIUM SERPL-SCNC: 132 MMOL/L (ref 133–144)
TSH SERPL DL<=0.005 MIU/L-ACNC: 0.57 MU/L (ref 0.4–4)

## 2021-11-03 PROCEDURE — 82306 VITAMIN D 25 HYDROXY: CPT | Performed by: INTERNAL MEDICINE

## 2021-11-03 PROCEDURE — 99215 OFFICE O/P EST HI 40 MIN: CPT | Performed by: INTERNAL MEDICINE

## 2021-11-03 PROCEDURE — 80048 BASIC METABOLIC PNL TOTAL CA: CPT | Performed by: INTERNAL MEDICINE

## 2021-11-03 PROCEDURE — 36415 COLL VENOUS BLD VENIPUNCTURE: CPT | Performed by: INTERNAL MEDICINE

## 2021-11-03 PROCEDURE — G0463 HOSPITAL OUTPT CLINIC VISIT: HCPCS

## 2021-11-03 PROCEDURE — 84443 ASSAY THYROID STIM HORMONE: CPT | Performed by: INTERNAL MEDICINE

## 2021-11-03 RX ORDER — LISINOPRIL 20 MG/1
20 TABLET ORAL DAILY
Qty: 90 TABLET | Refills: 0 | Status: SHIPPED | OUTPATIENT
Start: 2021-11-03 | End: 2022-03-02

## 2021-11-03 ASSESSMENT — PAIN SCALES - GENERAL: PAINLEVEL: MILD PAIN (3)

## 2021-11-03 ASSESSMENT — MIFFLIN-ST. JEOR: SCORE: 1349.6

## 2021-11-03 NOTE — TELEPHONE ENCOUNTER
Order received for Injection to be Determined by Pain Management Specialist.    Routing to review.    Julieth DUENAS    [facility]

## 2021-11-03 NOTE — LETTER
11/3/2021         RE: Ijeoma Shah  [address] [address]        Dear Colleague,    Thank you for referring your patient, Ijeoma Shah, to the [facility]. Please see a copy of my visit note below.       [facility] CANCER [facility]  FOLLOW-UP VISIT NOTE    PATIENT NAME: Ijeoma Shah  MRN # [id number]   DATE OF VISIT: November 3, 2021  YOB: 1950     CANCER TYPE: SCC lung, poorly differentiated  STAGE: pT4N0 (IIIA)  ECOG PS: 1    Cancer Staging  Squamous cell carcinoma of left lung (H)  Staging form: Lung, AJCC 8th Edition  - Pathologic stage from 11/17/2020: Stage IIIA (pT4, pN0, cM0) - Signed by Eneida De Leon MD on 11/17/2020    PD-L1: TPS 15% on [id number] lobectomy, <1% on [id number] (LLL bx)  Lung panel: SMO R562Q on LLL bx, negative for fusions on lobectomy specimen.   NGS: N/A    SUMMARY  7/3/20  CT chest (screening). 6.7 cm LLL mass, 0.6 cm RML nodule, mediastinal and L hilar LNs  7/9/20 PET/CT. 7.1 x 5.6 cm LLL mass (SUV 19.6), post obstructive pneumonitis LLL inferior to the mass (SUV 2.8), 3.5 x 1.7 cm 4L node (SUV 5.9), L adrenal nodule 1.1 cm (SUV 4), indeterminate pulmonary nodules, pancreatic cysts, not hypermetabolic  7/27/20 Bronch, EBUS (Dr. Castro). 10R, 11R, 4R too small to biopsy. Stations 7, 4L, 11L negative for malignancy. LLL mass bx: SCC  8/12/20 Brain MRI negative  9/1/20 EUS to evaluate adrenal and 4L (Dr. Hogan). Both negative for malignancy. Adrenal with bland adrenal cells  10/22/20 L VATS, converted to thoracotomy, LLL lobectomy, MLND, R pulmonary arterioplasty (Dr. Sanabria, Dr. Davis). 8.3 cm poorly differentiated SCC, +angiolymphatic invasion  12/3/20~2/25/21 Cisplatin gemcitabine x 4. C2D8 delayed 1 week due to neutropenia, added neulasta    ASSESSMENT AND PLAN   SCC lung, [id number], IIIA, R0 resection, discrepant PD-L1: 8 months after completing adjuvant chemo and 1 year after  lobectomy. CT looks stable on my review - this is important considering the new nodules that showed up on the 2/15/21 CT. Compared to April, the R sided nodules are stable; one is resolved. The mediastinal LNs are very small and stable. CT CAP in 4 months.     R L4 nerve root impingement: On MRI 8/26, injection 9/14    Cough: She was exposed to multiple people in Aug who had covid and while she didn't get tested, she thinks she had it too. Cough is dry, still somewhat persistent. I don't see anything on the scan to explain.    Mild hyponatremia: I can't identify obvious factors although she was a bit dehydrated last week when the labs were checked. Recheck today, will let her know if abnormal.     Shortness of breath: Due to surgery, some degree of deconditioning and COPD, not improved as she's not been able to stay active due to her back.     COPD: Per Dr. Espinal    Afib: Off amio. Dr. Goldstein in Cardiology recommending to stay on the rivaroxaban. She wonders if it's really necessary. Encouraged her to discuss with Dr. Espinal. Encouraged her to let us know if she's interested in a second opinion.     RHM: Recommended that she get back on track with mammography, etc., once her back is feeling better. 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 B12 was checked earlier this year and TSH about 6 months ago. Not improving at all. Recheck TSH. Also pointed out she had pretty severe vitamin D deficiency years ago, and so she's likely to continue to be deficient. Discussed health implications of this. Check level again today but encouraged supplementation with Ca. I don't see a dexa scan. Again, encouraged her to be proactive and get back in with Dr. Espinal in the coming months to get back on track with this.     Tobacco dependence; Having some cravings, asks me about the risks of resuming, which we discussed and I strongly recommended that she  "not restart.     Review of the result(s) of each unique test - CMP, CBC Pd, BMP, TSH, B12  Independent interpretation of a test performed by another physician/other qualified health care professional (not separately reported) - CT CAP    40 minutes spent on the date of the encounter doing chart review, history and exam, documentation and further activities per the note     Eneida De Leon MD  Associate Professor of Medicine  Hematology, Oncology and Transplantation    SUBJECTIVE  Salima is here for follow up, now about 10 months after completing adjuvant cisplatin and gemcitabine.  Doing ok except for her lower back pain - planning another injection  Still having cough since about Aug - people in her office had covid  Afib - wondering about needing to continue rivaroxaban  Otherwise doing ok  No new aches or pains    PAST MEDICAL HISTORY  SCC as above  Afib with RVR. Initially when she presented for surgery 10/1, then post-op in 10/2020. DCCV x 2 10/23/20, ibutilide --> NSR, dig load, amio gtt. TTE 10/16/20 unremarkable.   Dyslipidemia  H/o bronchitis  Nose surgery  Tubal ligation  Adrenal nodule, bx 9/1/20, negative for malignancy  Cholelithiasis incidentally identified on CT  Cataract repair 2011?    PFT 8/20/20. FEV1 1.33 (58%), FVC 1.76 (60%), DLCO 17.72 (90%)    CURRENT OUTPATIENT MEDICATIONS  Current Outpatient Medications   Medication Sig Dispense Refill     lisinopril (ZESTRIL) 20 MG tablet Take 1 tablet (20 mg) by mouth daily 90 tablet 0     rivaroxaban ANTICOAGULANT (XARELTO) 20 MG TABS tablet Take 1 tablet (20 mg) by mouth daily (with dinner) 90 tablet 0     ALLERGIES  Allergies   Allergen Reactions     Bee Venom Hives     Hot and sweating       REVIEW OF SYSTEMS  As above in the HPI, o/w complete 12-point ROS was negative.    PHYSICAL EXAM  BP (!) 145/89   Pulse 101   Temp 98.3  F (36.8  C)   Resp 16   Ht 1.651 m (5' 5\")   Wt 82.9 kg (182 lb 11.2 oz)   SpO2 99%   BMI 30.40 kg/m    Wt Readings from " Last 3 Encounters:   05/25/21 75 kg (165 lb 6.4 oz)   05/05/21 74.4 kg (164 lb)   05/05/21 74.8 kg (164 lb 14.4 oz)     GEN: NAD  HEENT: EOMI, no icterus, injection or pallor  LUNGS: clear bilaterally  CV: regular, no murmurs, rubs, or gallops  ABDOMEN: soft, non-tender, non-distended, normal bowel sounds  EXT: warm, no edema  NEURO: alert  SKIN: no rashes    LABORATORY AND IMAGING STUDIES   10/25/2021 12:46   Sodium 129 (L)   Potassium 4.2   Chloride 97   Carbon Dioxide 29   Urea Nitrogen 20   Creatinine 0.99   GFR Estimate 58 (L)   Calcium 9.1   Anion Gap 3   Albumin 4.0   Protein Total 7.1   Bilirubin Total 0.3   Alkaline Phosphatase 69   ALT 18   AST 13   Glucose 104 (H)   WBC 5.8   Hemoglobin 11.7   Hematocrit 33.3 (L)   Platelet Count 178   RBC Count 3.77 (L)   MCV 88   MCH 31.0   MCHC 35.1   RDW 12.2   % Neutrophils 66   % Lymphocytes 21   % Monocytes 10   % Eosinophils 4   % Basophils 0   Absolute Basophils 0.0   Absolute Eosinophils 0.2   Absolute Lymphocytes 1.2   Absolute Monocytes 0.6   Absolute Neutrophils 3.8     Results for MAYCO VEGA (MRN [id number]) as of 11/3/2021 18:21   11/3/2021 17:31   Sodium 132 (L)   Potassium 4.1   Chloride 98   Carbon Dioxide 28   Urea Nitrogen 21   Creatinine 1.00   GFR Estimate 57 (L)   Calcium 8.9   Anion Gap 6   TSH 0.57   Glucose 93     Labs were independently reviewed and interpreted by me    CT Chest/Abdomen/Pelvis w Contrast  Narrative: CT CHEST/ABDOMEN/PELVIS WITH CONTRAST 10/25/2021 2:01 PM    CLINICAL HISTORY: 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 Squamous cell carcinoma of bronchus in left  lower lobe (H).    TECHNIQUE: CT scan of the chest, abdomen, and pelvis was performed  following injection of IV contrast. Multiplanar reformats were  obtained. Dose reduction techniques were used.   CONTRAST: 90mL Isovue-370    COMPARISON: 7/23/2021 and 4/12/2021    FINDINGS:   LUNGS AND PLEURA: Stable changes of left lower  lobectomy. Stable  scarring in the medial left base. Multiple small nodules are stable  through the right lung including a 2 mm lateral right upper lobe  nodule on image 71 of series 5, a 5 mm right middle lobe nodule on  image 156, and a 7 mm right middle lobe nodule on image 192. No  evidence of new or enlarging nodules. The central airways are patent.  No effusions.    MEDIASTINUM/AXILLAE: Prominent lymph nodes are seen through the  mediastinum. No adenopathy. No pericardial effusion.    HEPATOBILIARY: Stable cysts are seen through the liver. No evidence of  enhancing mass or biliary dilation. The gallbladder is not visualized.    Pancreas: Stable cystic lesions are seen in the head of pancreas. One  measures 8 mm near the uncinate process on image 66 of series 2 and  the other measures 11 mm on image 64. These are unchanged from April 2021 and appear unchanged from September 20, 2020. These may represent  sidebranch IPMNs.    SPLEEN: Normal.    ADRENAL GLANDS: Normal.    KIDNEYS/BLADDER: Normal.    BOWEL: Normal.    PELVIC ORGANS: Normal.    ADDITIONAL FINDINGS: None.    MUSCULOSKELETAL: Normal.  Impression: IMPRESSION:  1.  Stable changes of left lower lobectomy.  2.  Stable nodules through the right lung. No evidence of new or  enlarging nodule.  3.  Stable cystic lesions in the head of the pancreas may represent  sidebranch IPMNs.  4.  Absent gallbladder.    NILO BURRIS MD         SYSTEM ID:  [id number]     Imaging was personally reviewed and interpreted by me       Sincerely,  Eneida De Leon MD

## 2021-11-03 NOTE — NURSING NOTE
Labs drawn in clinic today per provider order. Patient name and date of birth verified by LPN. Labs drawn without complication. Patient was discharged after lab draw. Please see flow sheet for additional details.       Trupti Murcia LPN November 3, 2021 5:37 PM

## 2021-11-03 NOTE — TELEPHONE ENCOUNTER
LVM to schedule REPEAT RIGHT L3-4 transforaminal epidural steroid injection .          Rachel ZAZUETA    [facility]

## 2021-11-03 NOTE — TELEPHONE ENCOUNTER
The patient had a right L3-4 transforaminal epidural steroid injection done on 9/14/2021 with Dr. Young.  It seems from the neurosurgery consult that she is looking to repeat this.     REPEAT RIGHT L3-4 transforaminal epidural steroid injection     Seda Berg MD

## 2021-11-03 NOTE — PROGRESS NOTES
[facility] CANCER CLINIC  FOLLOW-UP VISIT NOTE    PATIENT NAME: Ijeoma Shah  MRN # [id number]   DATE OF VISIT: November 3, 2021  YOB: 1950     CANCER TYPE: SCC lung, poorly differentiated  STAGE: pT4N0 (IIIA)  ECOG PS: 1    Cancer Staging  Squamous cell carcinoma of left lung (H)  Staging form: Lung, AJCC 8th Edition  - Pathologic stage from 11/17/2020: Stage IIIA (pT4, pN0, cM0) - Signed by Eneida De Leon MD on 11/17/2020    PD-L1: TPS 15% on [id number] lobectomy, <1% on [id number] (LLL bx)  Lung panel: SMO R562Q on LLL bx, negative for fusions on lobectomy specimen.   NGS: N/A    SUMMARY  7/3/20  CT chest (screening). 6.7 cm LLL mass, 0.6 cm RML nodule, mediastinal and L hilar LNs  7/9/20 PET/CT. 7.1 x 5.6 cm LLL mass (SUV 19.6), post obstructive pneumonitis LLL inferior to the mass (SUV 2.8), 3.5 x 1.7 cm 4L node (SUV 5.9), L adrenal nodule 1.1 cm (SUV 4), indeterminate pulmonary nodules, pancreatic cysts, not hypermetabolic  7/27/20 Bronch, EBUS (Dr. Castro). 10R, 11R, 4R too small to biopsy. Stations 7, 4L, 11L negative for malignancy. LLL mass bx: SCC  8/12/20 Brain MRI negative  9/1/20 EUS to evaluate adrenal and 4L (Dr. Hogan). Both negative for malignancy. Adrenal with bland adrenal cells  10/22/20 L VATS, converted to thoracotomy, LLL lobectomy, MLND, R pulmonary arterioplasty (Dr. Sanabria, Dr. Davis). 8.3 cm poorly differentiated SCC, +angiolymphatic invasion  12/3/20~2/25/21 Cisplatin gemcitabine x 4. C2D8 delayed 1 week due to neutropenia, added neulasta    ASSESSMENT AND PLAN   SCC lung, [id number], IIIA, R0 resection, discrepant PD-L1: 8 months after completing adjuvant chemo and 1 year after lobectomy. CT looks stable on my review - this is important considering the new nodules that showed up on the 2/15/21 CT. Compared to April, the R sided nodules are stable; one is resolved. The mediastinal LNs are very small and stable. CT CAP in 4 months.     R L4 nerve root  impingement: On MRI 8/26, injection 9/14    Cough: She was exposed to multiple people in Aug who had covid and while she didn't get tested, she thinks she had it too. Cough is dry, still somewhat persistent. I don't see anything on the scan to explain.    Mild hyponatremia: I can't identify obvious factors although she was a bit dehydrated last week when the labs were checked. Recheck today, will let her know if abnormal.     Shortness of breath: Due to surgery, some degree of deconditioning and COPD, not improved as she's not been able to stay active due to her back.     COPD: Per Dr. Espinal    Afib: Off amio. Dr. Goldstein in Cardiology recommending to stay on the rivaroxaban. She wonders if it's really necessary. Encouraged her to discuss with Dr. Espinal. Encouraged her to let us know if she's interested in a second opinion.     RHM: Recommended that she get back on track with mammography, etc., once her back is feeling better. 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 B12 was checked earlier this year and TSH about 6 months ago. Not improving at all. Recheck TSH. Also pointed out she had pretty severe vitamin D deficiency years ago, and so she's likely to continue to be deficient. Discussed health implications of this. Check level again today but encouraged supplementation with Ca. I don't see a dexa scan. Again, encouraged her to be proactive and get back in with Dr. Espinal in the coming months to get back on track with this.     Tobacco dependence; Having some cravings, asks me about the risks of resuming, which we discussed and I strongly recommended that she not restart.     Review of the result(s) of each unique test - CMP, CBC Pd, BMP, TSH, B12  Independent interpretation of a test performed by another physician/other qualified health care professional (not separately reported) - CT CAP    40 minutes spent on the date of the  "encounter doing chart review, history and exam, documentation and further activities per the note     Eneida De Leon MD  Associate Professor of Medicine  Hematology, Oncology and Transplantation    SUBJECTIVE  Salima is here for follow up, now about 10 months after completing adjuvant cisplatin and gemcitabine.  Doing ok except for her lower back pain - planning another injection  Still having cough since about Aug - people in her office had covid  Afib - wondering about needing to continue rivaroxaban  Otherwise doing ok  No new aches or pains    PAST MEDICAL HISTORY  SCC as above  Afib with RVR. Initially when she presented for surgery 10/1, then post-op in 10/2020. DCCV x 2 10/23/20, ibutilide --> NSR, dig load, amio gtt. TTE 10/16/20 unremarkable.   Dyslipidemia  H/o bronchitis  Nose surgery  Tubal ligation  Adrenal nodule, bx 9/1/20, negative for malignancy  Cholelithiasis incidentally identified on CT  Cataract repair 2011?    PFT 8/20/20. FEV1 1.33 (58%), FVC 1.76 (60%), DLCO 17.72 (90%)    CURRENT OUTPATIENT MEDICATIONS  Current Outpatient Medications   Medication Sig Dispense Refill     lisinopril (ZESTRIL) 20 MG tablet Take 1 tablet (20 mg) by mouth daily 90 tablet 0     rivaroxaban ANTICOAGULANT (XARELTO) 20 MG TABS tablet Take 1 tablet (20 mg) by mouth daily (with dinner) 90 tablet 0     ALLERGIES  Allergies   Allergen Reactions     Bee Venom Hives     Hot and sweating       REVIEW OF SYSTEMS  As above in the HPI, o/w complete 12-point ROS was negative.    PHYSICAL EXAM  BP (!) 145/89   Pulse 101   Temp 98.3  F (36.8  C)   Resp 16   Ht 1.651 m (5' 5\")   Wt 82.9 kg (182 lb 11.2 oz)   SpO2 99%   BMI 30.40 kg/m    Wt Readings from Last 3 Encounters:   05/25/21 75 kg (165 lb 6.4 oz)   05/05/21 74.4 kg (164 lb)   05/05/21 74.8 kg (164 lb 14.4 oz)     GEN: NAD  HEENT: EOMI, no icterus, injection or pallor  LUNGS: clear bilaterally  CV: regular, no murmurs, rubs, or gallops  ABDOMEN: soft, non-tender, " non-distended, normal bowel sounds  EXT: warm, no edema  NEURO: alert  SKIN: no rashes    LABORATORY AND IMAGING STUDIES   10/25/2021 12:46   Sodium 129 (L)   Potassium 4.2   Chloride 97   Carbon Dioxide 29   Urea Nitrogen 20   Creatinine 0.99   GFR Estimate 58 (L)   Calcium 9.1   Anion Gap 3   Albumin 4.0   Protein Total 7.1   Bilirubin Total 0.3   Alkaline Phosphatase 69   ALT 18   AST 13   Glucose 104 (H)   WBC 5.8   Hemoglobin 11.7   Hematocrit 33.3 (L)   Platelet Count 178   RBC Count 3.77 (L)   MCV 88   MCH 31.0   MCHC 35.1   RDW 12.2   % Neutrophils 66   % Lymphocytes 21   % Monocytes 10   % Eosinophils 4   % Basophils 0   Absolute Basophils 0.0   Absolute Eosinophils 0.2   Absolute Lymphocytes 1.2   Absolute Monocytes 0.6   Absolute Neutrophils 3.8     Results for MAYCO VEGA (MRN [id number]) as of 11/3/2021 18:21   11/3/2021 17:31   Sodium 132 (L)   Potassium 4.1   Chloride 98   Carbon Dioxide 28   Urea Nitrogen 21   Creatinine 1.00   GFR Estimate 57 (L)   Calcium 8.9   Anion Gap 6   TSH 0.57   Glucose 93       Labs were independently reviewed and interpreted by me    CT Chest/Abdomen/Pelvis w Contrast  Narrative: CT CHEST/ABDOMEN/PELVIS WITH CONTRAST 10/25/2021 2:01 PM    CLINICAL HISTORY: 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 Squamous cell carcinoma of bronchus in left  lower lobe (H).    TECHNIQUE: CT scan of the chest, abdomen, and pelvis was performed  following injection of IV contrast. Multiplanar reformats were  obtained. Dose reduction techniques were used.   CONTRAST: 90mL Isovue-370    COMPARISON: 7/23/2021 and 4/12/2021    FINDINGS:   LUNGS AND PLEURA: Stable changes of left lower lobectomy. Stable  scarring in the medial left base. Multiple small nodules are stable  through the right lung including a 2 mm lateral right upper lobe  nodule on image 71 of series 5, a 5 mm right middle lobe nodule on  image 156, and a 7 mm right middle lobe nodule on image  192. No  evidence of new or enlarging nodules. The central airways are patent.  No effusions.    MEDIASTINUM/AXILLAE: Prominent lymph nodes are seen through the  mediastinum. No adenopathy. No pericardial effusion.    HEPATOBILIARY: Stable cysts are seen through the liver. No evidence of  enhancing mass or biliary dilation. The gallbladder is not visualized.    Pancreas: Stable cystic lesions are seen in the head of pancreas. One  measures 8 mm near the uncinate process on image 66 of series 2 and  the other measures 11 mm on image 64. These are unchanged from April 2021 and appear unchanged from September 20, 2020. These may represent  sidebranch IPMNs.    SPLEEN: Normal.    ADRENAL GLANDS: Normal.    KIDNEYS/BLADDER: Normal.    BOWEL: Normal.    PELVIC ORGANS: Normal.    ADDITIONAL FINDINGS: None.    MUSCULOSKELETAL: Normal.  Impression: IMPRESSION:  1.  Stable changes of left lower lobectomy.  2.  Stable nodules through the right lung. No evidence of new or  enlarging nodule.  3.  Stable cystic lesions in the head of the pancreas may represent  sidebranch IPMNs.  4.  Absent gallbladder.    NILO BURRIS MD         SYSTEM ID:  [id number]     Imaging was personally reviewed and interpreted by me

## 2021-11-03 NOTE — NURSING NOTE
"Oncology Rooming Note    November 3, 2021 3:47 PM   Ijeoma Shah is a 71 year old female who presents for:    Chief Complaint   Patient presents with     Oncology Clinic Visit     Squamous cell carcinoma of bronchus in left lower lobe      Initial Vitals: BP (!) 145/89   Pulse 101   Temp 98.3  F (36.8  C)   Resp 16   Ht 1.651 m (5' 5\")   Wt 82.9 kg (182 lb 11.2 oz)   SpO2 99%   BMI 30.40 kg/m   Estimated body mass index is 30.4 kg/m  as calculated from the following:    Height as of this encounter: 1.651 m (5' 5\").    Weight as of this encounter: 82.9 kg (182 lb 11.2 oz). Body surface area is 1.95 meters squared.  Mild Pain (3) Comment: Data Unavailable   No LMP recorded. Patient is postmenopausal.  Allergies reviewed: Yes  Medications reviewed: Yes    Medications: MEDICATION REFILLS NEEDED TODAY. Provider was notified.  Pharmacy name entered into [facility]:    [id number] - [place], [place] - [address] [id number] - [place], [place] - [address]    Clinical concerns: Requests medication refill       Tuan Herrera MA            "

## 2021-11-04 LAB — DEPRECATED CALCIDIOL+CALCIFEROL SERPL-MC: 27 UG/L (ref 20–75)

## 2021-11-04 NOTE — TELEPHONE ENCOUNTER
Screening Questions for Radiology Injections:    Injection to be done at which interventional clinic site? [facility]    If [facility] location, tell patient that this procedure requires a COVID-19 lab test be done within 4 days of the procedure. Would you still like to move forward with scheduling the procedure?  YES: ok   If YES, let patient know that someone will call them to schedule the COVID-19 test and that they will only receive a call back if the result is positive. Route to nursing to enter order.     Instruct patient to arrive as directed prior to the scheduled appointment time:    [place] minutes before      [place]: 30 minutes before; if IV needed 1 hour before     Procedure ordered by     Procedure ordered? REPEAT RIGHT L3-4 transforaminal epidural steroid injection .      Transforaminal Cervical JC -  [place] does NOT have providers that do these- [facility] and [facility] do have providers that do    As a reminder, receiving steroids can decrease your body's ability to fight infection.   Would you still like to move forward with scheduling the injection?  Yes    What insurance would patient like us to bill for this procedure? Medicare/BC      Worker's comp or MVA (motor vehicle accident) -Any injection DO NOT SCHEDULE and route to Karen Peralta.      [employer] insurance - For SI joint injections, DO NOT SCHEDULE and route Karen Peralta.       ALL BCBS, [employer] and [employer]-Route to Karen for review DO NOT SCHEDULE      IF SCHEDULING IN [place] AND NEEDS A PA, IT IS OKAY TO SCHEDULE. [place] HANDLES THEIR OWN PA'S AFTER THE PATIENT IS SCHEDULED. PLEASE SCHEDULE AT LEAST 1 WEEK OUT SO A PA CAN BE OBTAINED.    Any chance of pregnancy? NO   If YES, do NOT schedule and route to RN pool    Is an  needed? No     Patient has a drive home? (mandatory) YES: ok    Is patient taking any blood thinners (That is: Coumadin, Warfarin, Jantoven, Pradaxa Xarelto, Eliquis, Edoxaban,  Enoxaparin, Lovenox, Heparin, Arixtra, Fondaparinux, or Fragmin? OR Antiplatelet medication such as Plavix, Brilinta, or Effient? )? YES- Xarelto   If hold needed, do NOT schedule, route to RN pool     Is patient taking any aspirin products (includes Excedrin and Fiorinal)? No     If more than 325mg/day, OK to schedule; Instruct pt to decrease to less than 325 mg for 7 days AND route to RN pool    For CERVICAL procedures, hold all aspirin products for 6 days.     Tell pt that if aspirin product is not held for 6 days, the procedure WILL BE cancelled.      Does the patient have a bleeding or clotting disorder? No     If YES, okay to schedule AND route to RN nurse pool    For any patients with platelet count <100, must be forwarded to provider    Any allergies to contrast dye, iodine, shellfish, or numbing and steroid medications? No    If YES, add allergy information to appointment notes AND route to the RN pool     If JC and Contrast Dye / Iodine Allergy? DO NOT SCHEDULE, route to RN pool    Allergies: Bee venom     Is patient diabetic?  No  If YES, instruct them to bring their glucometer.    Does patient have an active infection or treated for one within the past week? No     Is patient currently taking any antibiotics?  No     For patients on chronic, preventative, or prophylactic antibiotics, procedures may be scheduled.     For patients on antibiotics for active or recent infection:antibiotic course must have been completed for 4 days    Is patient currently taking any steroid medications? (i.e. Prednisone, Medrol)  No     For patients on steroid medications, course must have been completed for 4 days    Is patient actively being treated for cancer or immunocompromised? No  If YES, do NOT schedule and route to RN pool     Are you able to get on and off an exam table with minimal or no assistance? Yes  If NO, do NOT schedule and route to RN pool    Are you able to roll over and lay on your stomach with minimal  or no assistance? Yes  If NO, do NOT schedule and route to RN pool     Has the patient had a flu shot or any other vaccinations within 7 days before or after the procedure.  No     Have you recently had a COVID vaccine or have plans to get it in the near future? No    If yes, explain that for the vaccine to work best they need to:       wait 1 week before and 1 week after getting Vaccine #1    wait 1 week before and 2 weeks after getting Vaccine #2    wait 1 week before and 2 weeks after getting Vaccine BOOSTER    If patient has concerns about the timing, send to RN pool     Does patient have an MRI/CT?  YES: MRI  Check Procedure Scheduling Grid to see if required.      Was the MRI done within the last 3 years?  Yes    If yes, where was the MRI done i.e.[facility], [facility], [facility], [facility] etc? [facility]      If no, do not schedule and route to RN pool    If MRI was not done at [facility], [facility] or [facility] do NOT schedule and route to RN pool.      If pt has an imaging disc, the injection MAY be scheduled but pt has to bring disc to appt.     If they show up without the disc the injection cannot be done    Procedure Specific Instructions:      If celiac plexus block, informed patient NPO for 6 hours and that it is okay to take medications with sips of water, especially blood pressure medications  Not Applicable         If this is for a cervical procedure, informed patient that aspirin needs to be held for 6 days.   Not Applicable      If IV needed:    Do not schedule procedures requiring IV placement in the first appointment of the day or first appointment after lunch. Do NOT schedule at 0745, 0815 or 1245. ok    Instructed pt to arrive 30 minutes early for IV start if required. (Check Procedure Scheduling Grid)  Not Applicable    Reminders:      If you are started on any steroids or antibiotics between now and your appointment, you must contact us because the procedure may need to be cancelled.   Yes      For all procedures except radiofrequency ablations (RFAs) and spinal cord stimulator (SCS) trials, informed patient:    IV sedation is not provided for this procedure.  If you feel that an oral anti-anxiety medication is needed, you can discuss this further with your referring provider or primary care provider.  The Pain Clinic provider will discuss specifics of what the procedure includes at your appointment.  Most procedures last 10-20 minutes.  We use numbing medications to help with any discomfort during the procedure.  Not Applicable      For patients 85 or older we recommend having an adult stay w/ them for the remainder of the day.   ok    Does the patient have any questions?  NO  Glendy Zhang  [facility]

## 2021-11-04 NOTE — TELEPHONE ENCOUNTER
Patient is requesting to schedule an injection with the [facility].     This would require the patient to hold:                 Rivaroxagan (Xarelto)         For 3 days prior to procedure    Restart 24 hours after procedure      We are requesting your approval to hold the medication for this time frame.    Please keep call open and route back to the PAIN NURSE [[id number]] pool, Pt will be scheduled by Pain clinic after hold approved.    PROVIDER REMINDER:  For Coumadin, please also route to Pt's INR clinic     If hold approved, we will contact the patient for scheduling.    Thank you.

## 2021-11-04 NOTE — TELEPHONE ENCOUNTER
Ok to hold Xarelto as planned   Telephone Encounter by Clare Verdin at 05/18/17 04:55 PM     Author:  Clare Verdin Service:  (none) Author Type:       Filed:  05/18/17 04:56 PM Encounter Date:  5/18/2017 Status:  Signed     :  Clare Verdin ()            Patient left her daughters  form with a letter explaining what still needed to be done.  Call [phone] when completed[SD1.1M]      Revision History        User Key Date/Time User Provider Type Action    > SD1.1 05/18/17 04:56 PM Clare Verdin  Sign    M - Manual

## 2021-11-09 NOTE — TELEPHONE ENCOUNTER
"Patient called back to schedule her injection, patient is upset that no one has called her. Informed patient we are short staffed and we are doing our best. Patient stated \" well hire more people then\" then patient disconnected the phone        Caron Holder    [facility]     "

## 2021-11-09 NOTE — TELEPHONE ENCOUNTER
Pt needs call back from nursing to schedule at [place] with Merissa esquivel.     Brandie MERCEDES RN Care Coordinator  [facility]

## 2021-11-10 NOTE — TELEPHONE ENCOUNTER
Called pt.   Offered next available in [place] and this was too far out for her.   Pt scheduled in [place] w/ Dr. Hernandez     Injection scheduled for:  11/15/21  xarelto hold starts on:  11/12/21. Pt says she already stopped this yesterday. Pt advised to resume now and hold on 11/12.  Was pt informed to contact his INR clinic once scheduled: Not Applicable  Is lovenox bridging needed?  Not Applicable  INR order in?: Not Applicable  Lab appointment done?  Not Applicable  Injection intake questions reviewed?  YES  Infection/antibiotic information reviewed?  YES  Location confirmed: :Yes [facility]  Address given  Is pt arriving early?:No  COVID test order in? N/A  Pt notified that COVID test needs to be done w/in 4 days of the procedure: N/A   If pt getting outside [place] test the results can be faxed to: [facility] radiology #:[phone] attn Dr. Young    Has pt had COVID vaccine?  :Yes 3/2021  Booster? No. Informed not to have this until 14 days fter the procedure.  (same as initial vaccine: 7days before steroid and 14 days after)    It is recommended that the?scheduling?of elective steroid injections (for joint pain, tendinopathies, and spine procedures) should be  by at least one week before or after receiving the 1st COVID vaccine and at least 2 weeks after receiving the 2nd COVID vaccine in order to allow for one's body to fully respond to the vaccine.       Cherie RN-BSN  [facility]

## 2021-11-15 ENCOUNTER — RADIOLOGY INJECTION OFFICE VISIT (OUTPATIENT)
Dept: PALLIATIVE MEDICINE | Facility: CLINIC | Age: 71
End: 2021-11-15
Payer: MEDICARE

## 2021-11-15 VITALS — SYSTOLIC BLOOD PRESSURE: 142 MMHG | HEART RATE: 88 BPM | DIASTOLIC BLOOD PRESSURE: 88 MMHG | OXYGEN SATURATION: 99 %

## 2021-11-15 DIAGNOSIS — M54.41 CHRONIC RIGHT-SIDED LOW BACK PAIN WITH RIGHT-SIDED SCIATICA: ICD-10-CM

## 2021-11-15 DIAGNOSIS — G89.29 CHRONIC RIGHT-SIDED LOW BACK PAIN WITH RIGHT-SIDED SCIATICA: ICD-10-CM

## 2021-11-15 DIAGNOSIS — M54.16 LUMBAR RADICULOPATHY: Primary | ICD-10-CM

## 2021-11-15 PROCEDURE — 62323 NJX INTERLAMINAR LMBR/SAC: CPT | Performed by: PSYCHIATRY & NEUROLOGY

## 2021-11-15 RX ORDER — DEXAMETHASONE SODIUM PHOSPHATE 10 MG/ML
10 INJECTION INTRAMUSCULAR; INTRAVENOUS ONCE
Status: COMPLETED | OUTPATIENT
Start: 2021-11-15 | End: 2021-11-15

## 2021-11-15 RX ADMIN — DEXAMETHASONE SODIUM PHOSPHATE 10 MG: 10 INJECTION INTRAMUSCULAR; INTRAVENOUS at 15:36

## 2021-11-15 ASSESSMENT — PAIN SCALES - GENERAL: PAINLEVEL: MODERATE PAIN (5)

## 2021-11-15 NOTE — PATIENT INSTRUCTIONS
[facility]   Procedure Discharge Instructions    Today you saw: Dr. Maria Dolores Hernandez      You had an: Repeat L3-4 T  Epidural steroid injection       Medications used:  Lidocaine   Bupivacaine   Dexamethasone Omnipaque        *If you were holding your blood thinning medication, please restart taking it: Restart 24 hours after procedure    Be cautious when walking. Numbness and/or weakness in the lower extremities may occur for up to 6-8 hours after the procedure due to effect of the local anesthetic    Do not drive for 6 hours. The effect of the local anesthetic could slow your reflexes.     You may resume your regular activities after 24 hours    Avoid strenuous activity for the first 24 hours    You may shower, however avoid swimming, tub baths or hot tubs for 24 hours following your procedure    You may have a mild to moderate increase in pain for several days following the injection.    It may take up to 14 days for the steroid medication to start working although you may feel the effect as early as a few days after the procedure.       You may use ice packs for 10-15 minutes, 3 to 4 times a day at the injection site for comfort    Do not use heat to painful areas for 6 to 8 hours. This will give the local anesthetic time to wear off and prevent you from accidentally burning your skin.     Unless you have been directed to avoid the use of anti-inflammatory medications (NSAIDS), you may use medications such as ibuprofen, Aleve or Tylenol for pain control if needed.     If you were fasting, you may resume your normal diet and medications after the procedure    If you have diabetes, check your blood sugar more frequently than usual as your blood sugar may be higher than normal for 10-14 days following a steroid injection. Contact your doctor who manages your diabetes if your blood sugar is higher than usual    Possible side effects of steroids that you may experience include flushing,  elevated blood pressure, increased appetite, mild headaches and restlessness.  All of these symptoms will get better with time.    If you experience any of the following, call the Pain Clinic during work hours (Mon-Friday 8-4:30 pm) at [phone] or the Provider Line after hours at [phone]:  -Fever over 100 degree F  -Swelling, bleeding, redness, drainage, warmth at the injection site  -Progressive weakness or numbness in your legs or arms  -Loss of bowel or bladder function  -Unusual headache that is not relieved by Tylenol or other pain reliever  -Unusual new onset of pain that is not improving

## 2021-11-15 NOTE — PROGRESS NOTES
Pre procedure Diagnosis: lumbar radiculopathy   Post procedure Diagnosis: Same  Procedure performed: L5-S1 Interlaminar lumbar epidural steroid injection   Anesthesia: none  Complications: none  Operators: Cortney Hernandez MD; Mika Modi DO    Indications:   Ijeoma Shah is a 71 year old female was sent by Dr. Espinal for repeat epidural steroid injection.  They have a history of right-sided low back pain with radiation to the posterior aspect of the right lower extremity and right heel.  They had a previous right L3 transforaminal epidural steroid injection on 9/14/21, with minimal relief. Exam shows tenderness over the right lower lumbar paraspinals and positive right SLR and they have tried conservative treatment including medications and physical therapy.    MRI was done on 8/26/21 which showed   1. Severe bilateral facet arthropathy at L4-L5 with grade 1 anterolisthesis of L4 on L5.  2. Asymmetric narrowing of the right lateral recess at L3-L4 with possible impingement of the traversing right L4 nerve root.  3. Other degenerative change as detailed.    Options/alternatives, benefits and risks were discussed with the patient including bleeding, infection, tissue trauma, numbness, weakness, paralysis, spinal cord injury, radiation exposure, headache and reaction to medications. Questions were answered to her satisfaction and she agrees to proceed. Voluntary informed consent was obtained and signed.     Vitals were reviewed: Yes  Allergies were reviewed:  Yes  Medications were reviewed:  Yes  Pre-procedure pain score: Moderate Pain (5)    Procedure:  After getting informed consent, patient was brought into the procedure suite and was placed in a prone position on the procedure table.   A Pause for the Cause was performed.  Patient was prepped and draped in sterile fashion.     The L5-S1 interspace was identified with AP fluoroscopy.  A total of 4ml of 1% lidocaine was used to anesthetize the skin for  a right paramedian approach.    A 20gauge 3.5inch Touhy needle was advanced under intermittent fluoroscopy.  A JAMIL syringe was used to advance the needle into the epidural space.   After loss of resistance, there was no evidence of blood or CSF on aspiration. Location was verified with both AP and lateral fluoroscopic imaging.    A total of 2.5ml of Omnipaque 300 was injected demonstrating appropriate epidural spread and was checked in both the AP and lateral views. 7.5ml was wasted. There was no evidence of intravascular spread.    1ml of 0.5% bupivacaine with 10mg of dexamethasone and 2ml of preservative free saline was injected.  The needle was removed from the epidural space, flushed with 1% lidocaine and removed.   Hemostasis was achieved, the area was cleaned, and bandaids were placed when appropriate.  The patient tolerated the procedure well, and was taken to the recovery room.    Images were saved to PACS.    Post-procedure pain score: 4/10  Follow-up includes:   -f/u with referring provider    Cortney Hernandez MD  [facility]

## 2021-11-15 NOTE — NURSING NOTE
Pre-procedure Intake  If YES to any questions or NO to having a   Please complete laminated checklist and leave on the computer keyboard for Provider, verbally inform provider if able.    For SCS Trial, RFA's or any sedation procedure:  Have you been fasting? NA    If yes, for how long?     Are you taking any any blood thinners such as Coumadin, Warfarin, Jantoven, Pradaxa Xarelto, Eliquis, Edoxaban, Enoxaparin, Lovenox, Heparin, Arixtra, Fondaparinux, or Fragmin? OR Antiplatelet medication such as Plavix, Brilinta, or Effient? yes     If yes, when did you take your last dose? 11/11/21    Do you take aspirin?  No    If cervical procedure, have you held aspirin for 6 days?   No     Do you have any allergies to contrast dye, iodine, steroid and/or numbing medications?  NO    Are you currently taking antibiotics or have an active infection?  NO    Have you had a fever/elevated temperature within the past week? NO    Are you currently taking oral steroids? NO    Do you have a ? Yes    Are you pregnant or breastfeeding?  NO    Have you received the COVID-19 vaccine? Yes    If yes, was it your 1st, 2nd or only dose needed? 2nd dose     Date of most recent vaccine: 3/26/21    Notify provider and RNs if systolic BP >170, diastolic BP >100, P >100 or O2 sats < 90%

## 2021-11-15 NOTE — NURSING NOTE
Discharge Information    IV Discontiued Time:  NA    Amount of Fluid Infused:  NA    Discharge Criteria = When patient returns to baseline or as per MD order    Consciousness:  Pt is fully awake    Circulation:  BP +/- 20% of pre-procedure level    Respiration:  Patient is able to breathe deeply    O2 Sat:  Patient is able to maintain O2 Sat >92% on room air    Activity:  Moves 4 extremities on command    Ambulation:  Patient is able to stand and walk or stand and pivot into wheelchair    Dressing:  Clean/dry or No Dressing    Notes:   Discharge instructions and AVS given to patient    Patient meets criteria for discharge?  YES    Admitted to PCU?  No    Responsible adult present to accompany patient home?  Yes    Signature/Title:    Stephie Fernández RN  RN Care Coordinator  [facility]

## 2022-01-09 ENCOUNTER — HEALTH MAINTENANCE LETTER (OUTPATIENT)
Age: 72
End: 2022-01-09

## 2022-01-10 ENCOUNTER — MYC REFILL (OUTPATIENT)
Dept: ONCOLOGY | Facility: CLINIC | Age: 72
End: 2022-01-10
Payer: MEDICARE

## 2022-01-10 DIAGNOSIS — I48.91 ATRIAL FIBRILLATION WITH RAPID VENTRICULAR RESPONSE (H): ICD-10-CM

## 2022-01-12 NOTE — TELEPHONE ENCOUNTER
xarelto refill   Last prescribing provider: Dr De Leon     Last clinic visit date: 11/3/21 Dr De Leon     Any missed appointments or no-shows since last clinic visit?: No     Recommendations for requested medication (if none, N/A): Copied from chart note 11/3/21 Dr eD Leon   Afib: Off amio. Dr. Goldstein in Cardiology recommending to stay on the rivaroxaban. She wonders if it's really necessary. Encouraged her to discuss with Dr. Espinal. Encouraged her to let us know if she's interested in a second opinion.       Next clinic visit date: 3/9/22 Dr De Leon     Any other pertinent information (if none, N/A): N/A

## 2022-02-08 ASSESSMENT — ENCOUNTER SYMPTOMS
SORE THROAT: 0
BREAST MASS: 0
DIARRHEA: 0
WEAKNESS: 0
PALPITATIONS: 0
PARESTHESIAS: 0
CHILLS: 0
FREQUENCY: 0
NAUSEA: 0
ARTHRALGIAS: 0
SHORTNESS OF BREATH: 0
HEADACHES: 0
EYE PAIN: 0
HEMATURIA: 0
FEVER: 0
MYALGIAS: 0
JOINT SWELLING: 0
ABDOMINAL PAIN: 0
COUGH: 0
DYSURIA: 0
CONSTIPATION: 0
HEMATOCHEZIA: 0
DIZZINESS: 0
HEARTBURN: 0
NERVOUS/ANXIOUS: 0

## 2022-02-08 ASSESSMENT — ACTIVITIES OF DAILY LIVING (ADL): CURRENT_FUNCTION: NO ASSISTANCE NEEDED

## 2022-02-10 ENCOUNTER — OFFICE VISIT (OUTPATIENT)
Dept: FAMILY MEDICINE | Facility: CLINIC | Age: 72
End: 2022-02-10
Payer: MEDICARE

## 2022-02-10 VITALS
DIASTOLIC BLOOD PRESSURE: 84 MMHG | RESPIRATION RATE: 18 BRPM | BODY MASS INDEX: 31.29 KG/M2 | TEMPERATURE: 98.4 F | OXYGEN SATURATION: 97 % | HEART RATE: 90 BPM | WEIGHT: 176.6 LBS | SYSTOLIC BLOOD PRESSURE: 130 MMHG | HEIGHT: 63 IN

## 2022-02-10 DIAGNOSIS — E78.5 HYPERLIPIDEMIA LDL GOAL <160: ICD-10-CM

## 2022-02-10 DIAGNOSIS — Z12.11 SCREEN FOR COLON CANCER: ICD-10-CM

## 2022-02-10 DIAGNOSIS — Z00.00 ROUTINE GENERAL MEDICAL EXAMINATION AT A HEALTH CARE FACILITY: Primary | ICD-10-CM

## 2022-02-10 DIAGNOSIS — C34.32 SQUAMOUS CELL CARCINOMA OF BRONCHUS IN LEFT LOWER LOBE (H): ICD-10-CM

## 2022-02-10 DIAGNOSIS — Z13.220 SCREENING FOR HYPERLIPIDEMIA: ICD-10-CM

## 2022-02-10 DIAGNOSIS — I48.0 PAROXYSMAL ATRIAL FIBRILLATION (H): ICD-10-CM

## 2022-02-10 DIAGNOSIS — J44.9 CHRONIC OBSTRUCTIVE PULMONARY DISEASE, UNSPECIFIED COPD TYPE (H): ICD-10-CM

## 2022-02-10 DIAGNOSIS — N18.31 STAGE 3A CHRONIC KIDNEY DISEASE (H): ICD-10-CM

## 2022-02-10 DIAGNOSIS — I10 BENIGN ESSENTIAL HYPERTENSION: ICD-10-CM

## 2022-02-10 PROCEDURE — 90662 IIV NO PRSV INCREASED AG IM: CPT | Performed by: FAMILY MEDICINE

## 2022-02-10 PROCEDURE — G0008 ADMIN INFLUENZA VIRUS VAC: HCPCS | Performed by: FAMILY MEDICINE

## 2022-02-10 PROCEDURE — G0439 PPPS, SUBSEQ VISIT: HCPCS | Performed by: FAMILY MEDICINE

## 2022-02-10 ASSESSMENT — ENCOUNTER SYMPTOMS
NERVOUS/ANXIOUS: 0
ARTHRALGIAS: 0
CHILLS: 0
WEAKNESS: 0
HEADACHES: 0
FREQUENCY: 0
JOINT SWELLING: 0
PALPITATIONS: 0
HEMATURIA: 0
DIZZINESS: 0
SHORTNESS OF BREATH: 0
SORE THROAT: 0
HEARTBURN: 0
DYSURIA: 0
PARESTHESIAS: 0
EYE PAIN: 0
BREAST MASS: 0
HEMATOCHEZIA: 0
FEVER: 0
CONSTIPATION: 0
COUGH: 0
ABDOMINAL PAIN: 0
NAUSEA: 0
DIARRHEA: 0
MYALGIAS: 0

## 2022-02-10 ASSESSMENT — ACTIVITIES OF DAILY LIVING (ADL): CURRENT_FUNCTION: NO ASSISTANCE NEEDED

## 2022-02-10 ASSESSMENT — MIFFLIN-ST. JEOR: SCORE: 1290.05

## 2022-02-10 ASSESSMENT — PAIN SCALES - GENERAL: PAINLEVEL: NO PAIN (0)

## 2022-02-10 NOTE — PROGRESS NOTES
"SUBJECTIVE:   Ijeoma Shah is a 71 year old female who presents for Preventive Visit.      Patient has been advised of split billing requirements and indicates understanding: Yes  Are you in the first 12 months of your Medicare coverage?  No    Healthy Habits:     In general, how would you rate your overall health?  Good    Frequency of exercise:  2-3 days/week    Do you usually eat at least 4 servings of fruit and vegetables a day, include whole grains    & fiber and avoid regularly eating high fat or \"junk\" foods?  No    Taking medications regularly:  Yes    Medication side effects:  None    Ability to successfully perform activities of daily living:  No assistance needed    Home Safety:  No safety concerns identified    Hearing Impairment:  No hearing concerns    In the past 6 months, have you been bothered by leaking of urine?  No    In general, how would you rate your overall mental or emotional health?  Good      PHQ-2 Total Score: 0    Additional concerns today:  No    Do you feel safe in your environment? Yes    Have you ever done Advance Care Planning? (For example, a Health Directive, POLST, or a discussion with a medical provider or your loved ones about your wishes): No, advance care planning information given to patient to review.  Patient declined advance care planning discussion at this time.       Fall risk  Fallen 2 or more times in the past year?: No  Any fall with injury in the past year?: No    Cognitive Screening   1) Repeat 3 items (Leader, Season, Table)    2) Clock draw: NORMAL  3) 3 item recall: Recalls 3 objects  Results: NORMAL clock, 1-2 items recalled: COGNITIVE IMPAIRMENT LESS LIKELY    Mini-CogTM Emma Alicea. Licensed by the author for use in [facility]; reprinted with permission (mayela@.[email or web address]). All rights reserved.      Do you have sleep apnea, excessive snoring or daytime drowsiness?: no    Reviewed and updated as needed this visit by clinical " staff  Tobacco  Allergies    Med Hx  Surg Hx  Fam Hx  Soc Hx       Reviewed and updated as needed this visit by Provider               Social History     Tobacco Use     Smoking status: Former Smoker     Packs/day: 1.00     Years: 33.00     Pack years: 33.00     Types: Cigarettes     Quit date: 2014     Years since quittin.1     Smokeless tobacco: Never Used   Substance Use Topics     Alcohol use: Yes     Alcohol/week: 1.7 - 2.5 standard drinks     Types: 2 - 3 Standard drinks or equivalent per week     Comment: occ,social     If you drink alcohol do you typically have >3 drinks per day or >7 drinks per week? No    Alcohol Use 2/10/2022   Prescreen: >3 drinks/day or >7 drinks/week? -   Prescreen: >3 drinks/day or >7 drinks/week? No     Recent Labs   Lab Test 20  1002 07/13/15  1045   CHOL 238* 261*   HDL 68 67   * 166*   TRIG 117 141   CHOLHDLRATIO  --  3.9        Hypertension  On lisinopril  BP Readings from Last 6 Encounters:   02/10/22 130/84   11/15/21 (!) 142/88   21 (!) 145/89   10/14/21 132/70   21 (!) 168/96   21 122/80      paroxysmal atrial fibrillation   On Xarelto  Doesn't feel when in it  Her cardiologist recommended to stay on it    Lung cancer   So far has been clear  Next scan is next month and will have her labs and see oncology    Current providers sharing in care for this patient include:   Patient Care Team:  Jyoti Espinal MD as PCP - General  Jyoti Espinal MD as Assigned PCP  Edin Castro MD as Referring Physician (Pulmonary Disease)  Andrea Sanabria MD as MD (Thoracic Surgery)  Eneida De Leon MD as MD (Hematology & Oncology)  Vicenta Pearce, RN as Specialty Care Coordinator (Hematology & Oncology)  Michelle Carlisle, DEIDRE as Specialty Care Coordinator (Cardiology)  Alexa Goldstein MD (Cardiovascular Disease)  Gavin Castillo MD as Assigned Surgical Provider  [facility], Jose Luis Barroso MD as Assigned Heart and Vascular Provider  Kash  "Venkat Garcia MD as Assigned Neuroscience Provider  Eneida De Leon MD as Assigned Cancer Care Provider    The following health maintenance items are reviewed in Epic and correct as of today:  Health Maintenance Due   Topic Date Due     DEXA  Never done     MICROALBUMIN  Never done     COPD ACTION PLAN  Never done     URINALYSIS  Never done     HEPATITIS C SCREENING  Never done     COLORECTAL CANCER SCREENING  06/09/2019     COVID-19 Vaccine (3 - Pfizer risk 4-dose series) 04/23/2021     LIPID  06/23/2021     INFLUENZA VACCINE (1) 09/01/2021     ZOSTER IMMUNIZATION (3 of 3) 02/24/2022     BP Readings from Last 3 Encounters:   02/10/22 130/84   11/15/21 (!) 142/88   11/03/21 (!) 145/89    Wt Readings from Last 3 Encounters:   02/10/22 80.1 kg (176 lb 9.6 oz)   11/03/21 82.9 kg (182 lb 11.2 oz)   10/14/21 83.1 kg (183 lb 3.2 oz)           Review of Systems   Constitutional: Negative for chills and fever.   HENT: Negative for congestion, ear pain, hearing loss and sore throat.    Eyes: Negative for pain and visual disturbance.   Respiratory: Negative for cough and shortness of breath.    Cardiovascular: Negative for chest pain, palpitations and peripheral edema.   Gastrointestinal: Negative for abdominal pain, constipation, diarrhea, heartburn, hematochezia and nausea.   Breasts:  Negative for tenderness, breast mass and discharge.   Genitourinary: Negative for dysuria, frequency, genital sores, hematuria, pelvic pain, urgency, vaginal bleeding and vaginal discharge.   Musculoskeletal: Negative for arthralgias, joint swelling and myalgias.   Skin: Negative for rash.   Neurological: Negative for dizziness, weakness, headaches and paresthesias.   Psychiatric/Behavioral: Negative for mood changes. The patient is not nervous/anxious.          OBJECTIVE:   /84 (BP Location: Right arm, Patient Position: Sitting, Cuff Size: Adult Regular)   Pulse 90   Temp 98.4  F (36.9  C) (Tympanic)   Resp 18   Ht 1.6 m (5' 2.99\")  " " Wt 80.1 kg (176 lb 9.6 oz)   SpO2 97%   BMI 31.29 kg/m   Estimated body mass index is 31.29 kg/m  as calculated from the following:    Height as of this encounter: 1.6 m (5' 2.99\").    Weight as of this encounter: 80.1 kg (176 lb 9.6 oz).  Physical Exam  GENERAL: healthy, alert and no distress  ENT: mask in place  NECK: no adenopathy, no asymmetry, masses, or scars and thyroid normal to palpation  RESP: lungs clear to auscultation - no rales, rhonchi or wheezes  CV: regular rate and rhythm, normal S1 S2, no S3 or S4, no murmur, click or rub, no peripheral edema   Breast exam: no lesions or mass, axillary normal  ABDOMEN: soft, nontender, no hepatosplenomegaly, no masses and bowel sounds normal  MS: no gross musculoskeletal defects noted, no edema    Diagnostic Test Results:  Labs reviewed in Epic    ASSESSMENT / PLAN:   Ijeoma was seen today for physical.    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)  Currently monitoring  Will follow up with scans, labs and oncology apt next month    Paroxysmal atrial fibrillation (H)  Stable  On Xarelto  Followed by cardiology    Benign essential hypertension  Well controlled  Continue lisinopril    Hyperlipidemia LDL goal <160  Will check next lab draw    Chronic obstructive pulmonary disease, unspecified COPD type (H)  No symptoms currently    Stage 3a chronic kidney disease (H)  Has persisted a few times, recheck today    Other orders  -     REVIEW OF HEALTH MAINTENANCE PROTOCOL ORDERS  -     VA FLU VACCINE, INCREASED ANTIGEN, PRESV FREE ([id number])  -     Cancel: INFLUENZA, QUAD, HIGH DOSE, PF, 65YR + (FLUZONE HD)        Patient has been advised of split billing " "requirements and indicates understanding: Yes    COUNSELING:  Reviewed preventive health counseling, as reflected in patient instructions       Regular exercise       Healthy diet/nutrition       Osteoporosis prevention/bone health       Colon cancer screening    Estimated body mass index is 31.29 kg/m  as calculated from the following:    Height as of this encounter: 1.6 m (5' 2.99\").    Weight as of this encounter: 80.1 kg (176 lb 9.6 oz).    Weight management plan: Discussed healthy diet and exercise guidelines    She reports that she quit smoking about 7 years ago. Her smoking use included cigarettes. She has a 33.00 pack-year smoking history. She has never used smokeless tobacco.      Appropriate preventive services were discussed with this patient, including applicable screening as appropriate for cardiovascular disease, diabetes, osteopenia/osteoporosis, and glaucoma.  As appropriate for age/gender, discussed screening for colorectal cancer, prostate cancer, breast cancer, and cervical cancer. Checklist reviewing preventive services available has been given to the patient.    Reviewed patients plan of care and provided an AVS. The Basic Care Plan (routine screening as documented in Health Maintenance) for Ijeoma meets the Care Plan requirement. This Care Plan has been established and reviewed with the Patient.    Counseling Resources:  ATP IV Guidelines  Pooled Cohorts Equation Calculator  Breast Cancer Risk Calculator  Breast Cancer: Medication to Reduce Risk  FRAX Risk Assessment  ICSI Preventive Guidelines  Dietary Guidelines for Americans, 2010  USDA's MyPlate  ASA Prophylaxis  Lung CA Screening    Jyoti Garcia MD  [facility]    Identified Health Risks:    "

## 2022-02-10 NOTE — PATIENT INSTRUCTIONS
Consider trying off lisinopril for a week or two and see if cough resolves. Monitor blood pressure. Let me know.       Preventive Health Recommendations    See your health care provider every year to    Review health changes.     Discuss preventive care.      Review your medicines if your doctor has prescribed any.      You no longer need a yearly Pap test unless you've had an abnormal Pap test in the past 10 years. If you have vaginal symptoms, such as bleeding or discharge, be sure to talk with your provider about a Pap test.      Every 1 to 2 years, have a mammogram.  If you are over 69, talk with your health care provider about whether or not you want to continue having screening mammograms.      Every 10 years, have a colonoscopy. Or, have a yearly FIT test (stool test). These exams will check for colon cancer.       Have a cholesterol test every 5 years, or more often if your doctor advises it.       Have a diabetes test (fasting glucose) every three years. If you are at risk for diabetes, you should have this test more often.       At age 65, have a bone density scan (DEXA) to check for osteoporosis (brittle bone disease).    Shots:    Get a flu shot each year.    Get a tetanus shot every 10 years.    Talk to your doctor about your pneumonia vaccines. There are now two you should receive - Pneumovax (PPSV 23) and Prevnar (PCV 13).    Talk to your pharmacist about the shingles vaccine.    Talk to your doctor about the hepatitis B vaccine.    Nutrition:     Eat at least 5 servings of fruits and vegetables each day.      Eat whole-grain bread, whole-wheat pasta and brown rice instead of white grains and rice.      Get adequate about Calcium and Vitamin D.     Lifestyle    Exercise at least 150 minutes a week (30 minutes a day, 5 days a week). This will help you control your weight and prevent disease.      Limit alcohol to one drink per day.      No smoking.       Wear sunscreen to prevent skin cancer.       See  your dentist twice a year for an exam and cleaning.      See your eye doctor every 1 to 2 years to screen for conditions such as glaucoma, macular degeneration, cataracts, etc.    Personalized Prevention Plan  You are due for the preventive services outlined below.  Your care team is available to assist you in scheduling these services.  If you have already completed any of these items, please share that information with your care team to update in your medical record.    Health Maintenance Due   Topic Date Due     Osteoporosis Screening  Never done     Kidney Microalbumin Urine Test  Never done     ANNUAL REVIEW OF HM ORDERS  Never done     COPD Action Plan  Never done     Urine Test  Never done     Hepatitis C Screening  Never done     Colorectal Cancer Screening  06/09/2019     COVID-19 Vaccine (3 - Pfizer risk 4-dose series) 04/23/2021     Cholesterol Lab  06/23/2021     Flu Vaccine (1) 09/01/2021     Zoster (Shingles) Vaccine (3 of 3) 02/24/2022

## 2022-02-11 PROBLEM — E27.8 ADRENAL MASS (H): Status: RESOLVED | Noted: 2021-03-18 | Resolved: 2022-02-11

## 2022-03-01 ENCOUNTER — LAB (OUTPATIENT)
Dept: LAB | Facility: CLINIC | Age: 72
End: 2022-03-01
Payer: MEDICARE

## 2022-03-01 ENCOUNTER — MYC MEDICAL ADVICE (OUTPATIENT)
Dept: ONCOLOGY | Facility: CLINIC | Age: 72
End: 2022-03-01
Payer: MEDICARE

## 2022-03-01 DIAGNOSIS — Z13.220 SCREENING FOR HYPERLIPIDEMIA: ICD-10-CM

## 2022-03-01 DIAGNOSIS — R30.0 DYSURIA: ICD-10-CM

## 2022-03-01 DIAGNOSIS — C34.32 SQUAMOUS CELL CARCINOMA OF BRONCHUS IN LEFT LOWER LOBE (H): Primary | ICD-10-CM

## 2022-03-01 DIAGNOSIS — Z11.59 NEED FOR HEPATITIS C SCREENING TEST: ICD-10-CM

## 2022-03-01 DIAGNOSIS — C34.32 SQUAMOUS CELL CARCINOMA OF BRONCHUS IN LEFT LOWER LOBE (H): ICD-10-CM

## 2022-03-01 DIAGNOSIS — N18.30 STAGE 3 CHRONIC KIDNEY DISEASE, UNSPECIFIED WHETHER STAGE 3A OR 3B CKD (H): ICD-10-CM

## 2022-03-01 LAB
ALBUMIN UR-MCNC: NEGATIVE MG/DL
APPEARANCE UR: CLEAR
BACTERIA #/AREA URNS HPF: ABNORMAL /HPF
BILIRUB UR QL STRIP: NEGATIVE
COLOR UR AUTO: YELLOW
CREAT UR-MCNC: 22 MG/DL
GLUCOSE UR STRIP-MCNC: NEGATIVE MG/DL
HGB UR QL STRIP: ABNORMAL
KETONES UR STRIP-MCNC: NEGATIVE MG/DL
LEUKOCYTE ESTERASE UR QL STRIP: ABNORMAL
MICROALBUMIN UR-MCNC: 101 MG/L
MICROALBUMIN/CREAT UR: 459.09 MG/G CR (ref 0–25)
NITRATE UR QL: NEGATIVE
PH UR STRIP: 6.5 [PH] (ref 5–7)
RBC #/AREA URNS AUTO: ABNORMAL /HPF
SP GR UR STRIP: 1.01 (ref 1–1.03)
SQUAMOUS #/AREA URNS AUTO: ABNORMAL /LPF
UROBILINOGEN UR STRIP-ACNC: 0.2 E.U./DL
WBC #/AREA URNS AUTO: ABNORMAL /HPF

## 2022-03-01 PROCEDURE — 87086 URINE CULTURE/COLONY COUNT: CPT

## 2022-03-01 PROCEDURE — 82043 UR ALBUMIN QUANTITATIVE: CPT

## 2022-03-01 PROCEDURE — 81001 URINALYSIS AUTO W/SCOPE: CPT

## 2022-03-01 NOTE — TELEPHONE ENCOUNTER
[facility] Triage    MyChart: UTI symptoms - not in active treatment, who PCC is out so she called oncology      No fever  Dysuria  Frequent urination  Urgency  Sense of incomplete voiding  Not cloudy no odor  Not in active chemotherapy  No back pain  No flank pain    No suprapubic pain  No chills  No fatigue  No hematuria    [facility] - is where patient will go.    Placed UA/UC per scope of practice and standing order for Grade 1.    Routing to Segundo Estrada and Triage

## 2022-03-02 DIAGNOSIS — I10 BENIGN ESSENTIAL HYPERTENSION: ICD-10-CM

## 2022-03-02 DIAGNOSIS — N30.00 ACUTE CYSTITIS WITHOUT HEMATURIA: Primary | ICD-10-CM

## 2022-03-02 RX ORDER — LISINOPRIL 20 MG/1
TABLET ORAL
Qty: 90 TABLET | Refills: 0 | Status: SHIPPED | OUTPATIENT
Start: 2022-03-02 | End: 2022-06-21

## 2022-03-02 RX ORDER — SULFAMETHOXAZOLE/TRIMETHOPRIM 800-160 MG
1 TABLET ORAL 2 TIMES DAILY
Qty: 6 TABLET | Refills: 0 | Status: SHIPPED | OUTPATIENT
Start: 2022-03-02 | End: 2022-03-05

## 2022-03-02 NOTE — CONFIDENTIAL NOTE
lisinopril (ZESTRIL) Refill   Last prescribing provider: Dr De Leon     Last clinic visit date: 11/3/21 Dr De Leon     Any missed appointments or no-shows since last clinic visit?: No     Recommendations for requested medication (if none, N/A): Copied from chart note 11/3/21 Dr De Leon   lisinopril (ZESTRIL) 20 MG tablet Take 1 tablet (20 mg) by mouth daily 90 tablet 0       Next clinic visit date: 3/9/22 DR De Leon     Any other pertinent information (if none, N/A): N/A

## 2022-03-03 LAB — BACTERIA UR CULT: NO GROWTH

## 2022-03-08 ENCOUNTER — HOSPITAL ENCOUNTER (OUTPATIENT)
Dept: CT IMAGING | Facility: CLINIC | Age: 72
Discharge: HOME | End: 2022-03-08
Attending: INTERNAL MEDICINE | Admitting: INTERNAL MEDICINE
Payer: MEDICARE

## 2022-03-08 ENCOUNTER — LAB (OUTPATIENT)
Dept: LAB | Facility: CLINIC | Age: 72
End: 2022-03-08
Payer: MEDICARE

## 2022-03-08 DIAGNOSIS — C34.32 SQUAMOUS CELL CARCINOMA OF BRONCHUS IN LEFT LOWER LOBE (H): ICD-10-CM

## 2022-03-08 DIAGNOSIS — Z11.59 NEED FOR HEPATITIS C SCREENING TEST: ICD-10-CM

## 2022-03-08 DIAGNOSIS — Z13.220 SCREENING FOR HYPERLIPIDEMIA: ICD-10-CM

## 2022-03-08 LAB
ALBUMIN SERPL-MCNC: 3.5 G/DL (ref 3.4–5)
ALP SERPL-CCNC: 93 U/L (ref 40–150)
ALT SERPL W P-5'-P-CCNC: 21 U/L (ref 0–50)
ANION GAP SERPL CALCULATED.3IONS-SCNC: 5 MMOL/L (ref 3–14)
AST SERPL W P-5'-P-CCNC: 19 U/L (ref 0–45)
BASOPHILS # BLD AUTO: 0 10E3/UL (ref 0–0.2)
BASOPHILS NFR BLD AUTO: 0 %
BILIRUB SERPL-MCNC: 0.4 MG/DL (ref 0.2–1.3)
BUN SERPL-MCNC: 18 MG/DL (ref 7–30)
CALCIUM SERPL-MCNC: 9 MG/DL (ref 8.5–10.1)
CHLORIDE BLD-SCNC: 98 MMOL/L (ref 94–109)
CHOLEST SERPL-MCNC: 251 MG/DL
CO2 SERPL-SCNC: 28 MMOL/L (ref 20–32)
CREAT BLD-MCNC: 1 MG/DL (ref 0.5–1)
CREAT SERPL-MCNC: 1.04 MG/DL (ref 0.52–1.04)
EOSINOPHIL # BLD AUTO: 0.5 10E3/UL (ref 0–0.7)
EOSINOPHIL NFR BLD AUTO: 10 %
ERYTHROCYTE [DISTWIDTH] IN BLOOD BY AUTOMATED COUNT: 12.4 % (ref 10–15)
FASTING STATUS PATIENT QL REPORTED: YES
GFR SERPL CREATININE-BSD FRML MDRD: 58 ML/MIN/1.73M2
GFR SERPL CREATININE-BSD FRML MDRD: 60 ML/MIN/1.73M2
GLUCOSE BLD-MCNC: 90 MG/DL (ref 70–99)
HCT VFR BLD AUTO: 36.3 % (ref 35–47)
HCV AB SERPL QL IA: NONREACTIVE
HDLC SERPL-MCNC: 76 MG/DL
HGB BLD-MCNC: 12.1 G/DL (ref 11.7–15.7)
LDLC SERPL CALC-MCNC: 152 MG/DL
LYMPHOCYTES # BLD AUTO: 1.4 10E3/UL (ref 0.8–5.3)
LYMPHOCYTES NFR BLD AUTO: 27 %
MCH RBC QN AUTO: 30 PG (ref 26.5–33)
MCHC RBC AUTO-ENTMCNC: 33.3 G/DL (ref 31.5–36.5)
MCV RBC AUTO: 90 FL (ref 78–100)
MONOCYTES # BLD AUTO: 0.5 10E3/UL (ref 0–1.3)
MONOCYTES NFR BLD AUTO: 9 %
NEUTROPHILS # BLD AUTO: 2.7 10E3/UL (ref 1.6–8.3)
NEUTROPHILS NFR BLD AUTO: 54 %
NONHDLC SERPL-MCNC: 175 MG/DL
PLATELET # BLD AUTO: 208 10E3/UL (ref 150–450)
POTASSIUM BLD-SCNC: 4 MMOL/L (ref 3.4–5.3)
PROT SERPL-MCNC: 7 G/DL (ref 6.8–8.8)
RBC # BLD AUTO: 4.04 10E6/UL (ref 3.8–5.2)
SODIUM SERPL-SCNC: 131 MMOL/L (ref 133–144)
TRIGL SERPL-MCNC: 115 MG/DL
WBC # BLD AUTO: 5.1 10E3/UL (ref 4–11)

## 2022-03-08 PROCEDURE — 250N000009 HC RX 250: Performed by: INTERNAL MEDICINE

## 2022-03-08 PROCEDURE — 82565 ASSAY OF CREATININE: CPT

## 2022-03-08 PROCEDURE — 36415 COLL VENOUS BLD VENIPUNCTURE: CPT

## 2022-03-08 PROCEDURE — 250N000011 HC RX IP 250 OP 636: Performed by: INTERNAL MEDICINE

## 2022-03-08 PROCEDURE — 85025 COMPLETE CBC W/AUTO DIFF WBC: CPT

## 2022-03-08 PROCEDURE — 74177 CT ABD & PELVIS W/CONTRAST: CPT | Mod: MG

## 2022-03-08 PROCEDURE — 80061 LIPID PANEL: CPT

## 2022-03-08 PROCEDURE — 86803 HEPATITIS C AB TEST: CPT

## 2022-03-08 RX ORDER — IOPAMIDOL 755 MG/ML
86 INJECTION, SOLUTION INTRAVASCULAR ONCE
Status: COMPLETED | OUTPATIENT
Start: 2022-03-08 | End: 2022-03-08

## 2022-03-08 RX ADMIN — SODIUM CHLORIDE 62 ML: 9 INJECTION, SOLUTION INTRAVENOUS at 09:55

## 2022-03-08 RX ADMIN — IOPAMIDOL 86 ML: 755 INJECTION, SOLUTION INTRAVENOUS at 09:55

## 2022-03-09 ENCOUNTER — ONCOLOGY VISIT (OUTPATIENT)
Dept: ONCOLOGY | Facility: CLINIC | Age: 72
End: 2022-03-09
Attending: INTERNAL MEDICINE
Payer: MEDICARE

## 2022-03-09 ENCOUNTER — LAB (OUTPATIENT)
Dept: LAB | Facility: CLINIC | Age: 72
End: 2022-03-09

## 2022-03-09 VITALS
SYSTOLIC BLOOD PRESSURE: 130 MMHG | WEIGHT: 176.59 LBS | BODY MASS INDEX: 31.29 KG/M2 | DIASTOLIC BLOOD PRESSURE: 86 MMHG | OXYGEN SATURATION: 99 % | HEIGHT: 63 IN | HEART RATE: 100 BPM | TEMPERATURE: 98.2 F

## 2022-03-09 DIAGNOSIS — C34.32 SQUAMOUS CELL CARCINOMA OF BRONCHUS IN LEFT LOWER LOBE (H): ICD-10-CM

## 2022-03-09 DIAGNOSIS — R31.29 MICROSCOPIC HEMATURIA: Primary | ICD-10-CM

## 2022-03-09 DIAGNOSIS — R31.29 MICROSCOPIC HEMATURIA: ICD-10-CM

## 2022-03-09 PROCEDURE — G0463 HOSPITAL OUTPT CLINIC VISIT: HCPCS

## 2022-03-09 PROCEDURE — 99214 OFFICE O/P EST MOD 30 MIN: CPT | Performed by: INTERNAL MEDICINE

## 2022-03-09 ASSESSMENT — PAIN SCALES - GENERAL: PAINLEVEL: NO PAIN (0)

## 2022-03-09 NOTE — LETTER
3/9/2022         RE: Ijeoma Shah  [address] [facility] [id number]        Dear Colleague,    Thank you for referring your patient, Ijeoma Shah, to the [facility]. Please see a copy of my visit note below.       [facility]  FOLLOW-UP VISIT NOTE    PATIENT NAME: Ijeoma Shah  MRN # [id number]   DATE OF VISIT: March 9, 2022  YOB: 1950     CANCER TYPE: SCC lung, poorly differentiated  STAGE: pT4N0 (IIIA)  ECOG PS: 1    PD-L1: TPS 15% on [id number] lobectomy, <1% on [id number] (LLL bx)  Lung panel: SMO [id number] on LLL bx, negative for fusions on lobectomy specimen.   NGS: N/A    SUMMARY  7/3/20  CT chest (screening). 6.7 cm LLL mass, 0.6 cm RML nodule, mediastinal and L hilar LNs  7/9/20 PET/CT. 7.1 x 5.6 cm LLL mass (SUV 19.6), post obstructive pneumonitis LLL inferior to the mass (SUV 2.8), 3.5 x 1.7 cm 4L node (SUV 5.9), L adrenal nodule 1.1 cm (SUV 4), indeterminate pulmonary nodules, pancreatic cysts, not hypermetabolic  7/27/20 Bronch, EBUS (Dr. Castro). 10R, 11R, 4R too small to biopsy. Stations 7, 4L, 11L negative for malignancy. LLL mass bx: SCC  8/12/20 Brain MRI negative  9/1/20 EUS to evaluate adrenal and 4L (Dr. Hogan). Both negative for malignancy. Adrenal with bland adrenal cells  10/22/20 L VATS, converted to thoracotomy, LLL lobectomy, MLND, R pulmonary arterioplasty (Dr. Sanabria, Dr. Davis). 8.3 cm poorly differentiated SCC, +angiolymphatic invasion  12/3/20~2/25/21 Cisplatin gemcitabine x 4. C2D8 delayed 1 week due to neutropenia, added neulasta    ASSESSMENT AND PLAN  SCC lung, [id number], IIIA, R0 resection, discrepant PD-L1: 1 year after completing adjuvant chemo and 1 1/2 years after lobectomy. CT showing one new ARMOND nodule; the context is new R sided nodules that showed up on the 2/15/21 CT, one of which resolved by the next scan. Suspect we're in the same situation at this time and that it  doesn't represent cancer. To be sure, will get CT chest abd with contrast in 3 months rather than 4. Persistent prominent mediastinal nodes look stable to me compared to Oct and compared to April.     Microscopic hematuria, dysuria: UC was negative. Resolved with TMp_SMX and/or time. Explained that since the UC was negative, best to make sure it's nothing serious like bladder cancer. No stones on CT. Had a discussion. Urine cytology and refer to Urology. Will  a urine collection kit today.    Musculoskeletal tightness: Post op. PT referral discsused; she'd rather hold off for the time being.     R L4 nerve root impingement: Better    Afib: Off amio, remains on apixaban. Tells me another ziopatch was negative for afib, but I hear irregularly irregular HR today.     Mild hyponatremia: Continue to monitor for now.     Shortness of breath: Multifactorial - deconditioning - wasn;'t able to be active very much due to her back, decreased lung function after surgery in the setting of mild underlying COPD. Recommended repeating PFTs and if c/w COPD, refer back to Dr. Espinal for consideration of inhalers.     Review of the result(s) of each unique test - CMp, CBC pd, UC, UA  Independent interpretation of a test performed by another physician/other qualified health care professional (not separately reported) - CT CAP    30 minutes spent on the date of the encounter doing chart review, history and exam, documentation and further activities per the note     Eneida De Leon MD  Associate Professor of Medicine  Hematology, Oncology and Transplantation      BELA Borrego is here for follow up, now about 1 year after completing adjuvant cisplatin and gemcitabine and 1 1/2 years since surgery.  Dysuria, TMP_SMX 3/1. Resolved. A little tiny drop of blood on the toilet paper once. No other hematuria.  UC negative  Back injection 11/15 - didn't help too much acutely but improving now  Feels well otherwise. Overall more short  "of breath than prior to lung surgery. Still has tightness around laterally but does stretches.   No other new problems    PAST MEDICAL HISTORY  SCC as above  Afib with RVR. Initially when she presented for surgery 10/1, then post-op in 10/2020. DCCV x 2 10/23/20, ibutilide --> NSR, dig load, amio gtt. TTE 10/16/20 unremarkable.   Low back pain, R radiculopathy, L5-S1 interlaminar lumbar epidural steroid injection 11/15/21  Sp cholecystectomy  Dyslipidemia  H/o bronchitis  Nose surgery  Tubal ligation  Adrenal nodule, bx 9/1/20, negative for malignancy  Cholelithiasis  Cataract repair 2011?    PFT 8/20/20 (preop). FEV1 1.33 (58%), FVC 1.76 (60%), DLCO 17.72 (90%)    CURRENT OUTPATIENT MEDICATIONS  Current Outpatient Medications   Medication Sig Dispense Refill     lisinopril (ZESTRIL) 20 MG tablet Take 1 tablet by mouth once daily 90 tablet 0     rivaroxaban ANTICOAGULANT (XARELTO) 20 MG TABS tablet Take 1 tablet (20 mg) by mouth daily (with dinner) 90 tablet 0     ALLERGIES  Allergies   Allergen Reactions     Bee Venom Hives     Hot and sweating       REVIEW OF SYSTEMS  As above in the HPI, o/w complete 12-point ROS was negative.    PHYSICAL EXAM  /86 (BP Location: Right arm, Patient Position: Sitting, Cuff Size: Adult Regular)   Pulse 100   Temp 98.2  F (36.8  C) (Oral)   Ht 1.6 m (5' 2.99\")   Wt 80.1 kg (176 lb 9.4 oz)   SpO2 99%   BMI 31.29 kg/m    GEN: NAD  HEENT: EOMI, no icterus, injection or pallor  LUNGS: clear bilaterally  CV: irregularly irregular, systolic murmur  EXT: no edema  NEURO: alert    LABORATORY AND IMAGING STUDIES   3/8/2022 09:09   Sodium 131 (L)   Potassium 4.0   Chloride 98   Carbon Dioxide 28   Urea Nitrogen 18   Creatinine 1.04   GFR Estimate 58 (L)   Calcium 9.0   Anion Gap 5   Albumin 3.5   Protein Total 7.0   Bilirubin Total 0.4   Alkaline Phosphatase 93   ALT 21   AST 19   Glucose 90   WBC 5.1   Hemoglobin 12.1   Hematocrit 36.3   Platelet Count 208   RBC Count 4.04   MCV " 90   MCH 30.0   MCHC 33.3   RDW 12.4   % Neutrophils 54   % Lymphocytes 27   % Monocytes 9   % Eosinophils 10   % Basophils 0   Absolute Basophils 0.0   Absolute Eosinophils 0.5   Absolute Lymphocytes 1.4   Absolute Monocytes 0.5   Absolute Neutrophils 2.7     Labs were independently reviewed and interpreted by zabrina VILLAGOMEZ 3/1 negative    CT Chest/Abdomen/Pelvis w Contrast  Narrative: CT CHEST/ABDOMEN/PELVIS WITH CONTRAST March 8, 2022 10:19 AM    CLINICAL HISTORY: Restage poorly diff 8 cm SCC status post lobectomy  and adjuvant chemo completed 2/2021. New nodules in 2/2021, some have  resolved since. Squamous cell carcinoma of bronchus in left lower lobe  (H).    TECHNIQUE: CT scan of the chest, abdomen, and pelvis was performed  following injection of IV contrast. Multiplanar reformats were  obtained. Dose reduction techniques were used.   CONTRAST: 86 mL Isovue-370.    COMPARISON: 10/25/2021 and 7/23/2021.    FINDINGS:   LUNGS AND PLEURA: There is a 4 mm nodule in the posterior left apex on  image 61 of series 4 that is new from the previous exam. Multiple  stable nodules are seen through the right lung including a 3 mm right  upper lobe nodule on image 80, a 5 mm right middle lobe nodule on  image 168, and a 6 mm right middle lobe nodule on image 205. Stable  changes of left lower lobectomy with some chronic volume loss in the  posteromedial left lower lobe.    MEDIASTINUM/AXILLAE: Several prominent lymph nodes are seen through  the mediastinum which are unchanged and still within normal limits. No  pericardial effusion.    HEPATOBILIARY: Multiple cysts are seen through the liver which are  unchanged. No follow-up necessary for these.    PANCREAS: Multiple small subcentimeter cystic lesions are seen through  the head of the pancreas that may represent sidebranch IPMNs.    SPLEEN: Normal.    ADRENAL GLANDS: Stable mild nodularity of the left adrenal gland.    KIDNEYS/BLADDER: Mild diffuse bladder wall thickening is  noted without  focal mass.    BOWEL: Normal.    PELVIC ORGANS: Normal.    ADDITIONAL FINDINGS: The aorta is normal in caliber with moderate  atherosclerotic calcification.    MUSCULOSKELETAL: Mild degenerative changes are noted through the  spine.  Impression: IMPRESSION:  1.  Stable appearance of left lower lobectomy.  2.  Stable nodules through the right lung. There is a new 4 mm nodule  in the left apex.  3.  Both cysts in the liver and cystic lesions through the head of the  pancreas that may represent sidebranch IPMNs.  4.  Diffuse bladder wall thickening without focal mass.    NILO BURRIS MD         SYSTEM ID:  [id number]     Imaging was personally reviewed and interpreted by me     4:61            Again, thank you for allowing me to participate in the care of your patient.      Sincerely,    Eneida De Leon MD

## 2022-03-09 NOTE — NURSING NOTE
"Oncology Rooming Note    March 9, 2022 2:09 PM   Ijeoma Shah is a 71 year old female who presents for:    Chief Complaint   Patient presents with     Oncology Clinic Visit     SCC of left lung      Initial Vitals: /86 (BP Location: Right arm, Patient Position: Sitting, Cuff Size: Adult Regular)   Pulse 100   Temp 98.2  F (36.8  C) (Oral)   Ht 1.6 m (5' 2.99\")   Wt 80.1 kg (176 lb 9.4 oz)   SpO2 99%   BMI 31.29 kg/m   Estimated body mass index is 31.29 kg/m  as calculated from the following:    Height as of this encounter: 1.6 m (5' 2.99\").    Weight as of this encounter: 80.1 kg (176 lb 9.4 oz). Body surface area is 1.89 meters squared.  No Pain (0) Comment: Data Unavailable   No LMP recorded. Patient is postmenopausal.  Allergies reviewed: Yes  Medications reviewed: Yes    Medications: Medication refills not needed today.  Pharmacy name entered into [facility]):    [id number] - [place], [place] - [address] [id number] - [place], [place] - [address] [facility]    Clinical concerns:     Pt would like some take-home exercises discussed in her previous visit.     Thien Costello            "

## 2022-03-09 NOTE — PROGRESS NOTES
[facility] CANCER CLINIC  FOLLOW-UP VISIT NOTE    PATIENT NAME: Ijeoma Shah  MRN # [id number]   DATE OF VISIT: March 9, 2022  YOB: 1950     CANCER TYPE: SCC lung, poorly differentiated  STAGE: pT4N0 (IIIA)  ECOG PS: 1    PD-L1: TPS 15% on [id number] lobectomy, <1% on [id number] (LLL bx)  Lung panel: SMO [id number] on LLL bx, negative for fusions on lobectomy specimen.   NGS: N/A    SUMMARY  7/3/20  CT chest (screening). 6.7 cm LLL mass, 0.6 cm RML nodule, mediastinal and L hilar LNs  7/9/20 PET/CT. 7.1 x 5.6 cm LLL mass (SUV 19.6), post obstructive pneumonitis LLL inferior to the mass (SUV 2.8), 3.5 x 1.7 cm 4L node (SUV 5.9), L adrenal nodule 1.1 cm (SUV 4), indeterminate pulmonary nodules, pancreatic cysts, not hypermetabolic  7/27/20 Bronch, EBUS (Dr. Castro). 10R, 11R, 4R too small to biopsy. Stations 7, 4L, 11L negative for malignancy. LLL mass bx: SCC  8/12/20 Brain MRI negative  9/1/20 EUS to evaluate adrenal and 4L (Dr. Hogan). Both negative for malignancy. Adrenal with bland adrenal cells  10/22/20 L VATS, converted to thoracotomy, LLL lobectomy, MLND, R pulmonary arterioplasty (Dr. Sanabria, Dr. Davis). 8.3 cm poorly differentiated SCC, +angiolymphatic invasion  12/3/20~2/25/21 Cisplatin gemcitabine x 4. C2D8 delayed 1 week due to neutropenia, added neulasta    ASSESSMENT AND PLAN  SCC lung, [id number], IIIA, R0 resection, discrepant PD-L1: 1 year after completing adjuvant chemo and 1 1/2 years after lobectomy. CT showing one new ARMOND nodule; the context is new R sided nodules that showed up on the 2/15/21 CT, one of which resolved by the next scan. Suspect we're in the same situation at this time and that it doesn't represent cancer. To be sure, will get CT chest abd with contrast in 3 months rather than 4. Persistent prominent mediastinal nodes look stable to me compared to Oct and compared to April.     Microscopic hematuria, dysuria: UC was negative. Resolved with TMp_SMX and/or  time. Explained that since the UC was negative, best to make sure it's nothing serious like bladder cancer. No stones on CT. Had a discussion. Urine cytology and refer to Urology. Will  a urine collection kit today.    Musculoskeletal tightness: Post op. PT referral discsused; she'd rather hold off for the time being.     R L4 nerve root impingement: Better    Afib: Off amio, remains on apixaban. Tells me another ziopatch was negative for afib, but I hear irregularly irregular HR today.     Mild hyponatremia: Continue to monitor for now.     Shortness of breath: Multifactorial - deconditioning - wasn;'t able to be active very much due to her back, decreased lung function after surgery in the setting of mild underlying COPD. Recommended repeating PFTs and if c/w COPD, refer back to Dr. Espinal for consideration of inhalers.     Review of the result(s) of each unique test - CMp, CBC pd, UC, UA  Independent interpretation of a test performed by another physician/other qualified health care professional (not separately reported) - CT CAP    30 minutes spent on the date of the encounter doing chart review, history and exam, documentation and further activities per the note     Eneida De Leon MD  Associate Professor of Medicine  Hematology, Oncology and Transplantation      BELA Borrego is here for follow up, now about 1 year after completing adjuvant cisplatin and gemcitabine and 1 1/2 years since surgery.  Dysuria, TMP_SMX 3/1. Resolved. A little tiny drop of blood on the toilet paper once. No other hematuria.  UC negative  Back injection 11/15 - didn't help too much acutely but improving now  Feels well otherwise. Overall more short of breath than prior to lung surgery. Still has tightness around laterally but does stretches.   No other new problems    PAST MEDICAL HISTORY  SCC as above  Afib with RVR. Initially when she presented for surgery 10/1, then post-op in 10/2020. DCCV x 2 10/23/20, ibutilide -->  "NSR, dig load, amio gtt. TTE 10/16/20 unremarkable.   Low back pain, R radiculopathy, L5-S1 interlaminar lumbar epidural steroid injection 11/15/21  Sp cholecystectomy  Dyslipidemia  H/o bronchitis  Nose surgery  Tubal ligation  Adrenal nodule, bx 9/1/20, negative for malignancy  Cholelithiasis  Cataract repair 2011?    PFT 8/20/20 (preop). FEV1 1.33 (58%), FVC 1.76 (60%), DLCO 17.72 (90%)    CURRENT OUTPATIENT MEDICATIONS  Current Outpatient Medications   Medication Sig Dispense Refill     lisinopril (ZESTRIL) 20 MG tablet Take 1 tablet by mouth once daily 90 tablet 0     rivaroxaban ANTICOAGULANT (XARELTO) 20 MG TABS tablet Take 1 tablet (20 mg) by mouth daily (with dinner) 90 tablet 0     ALLERGIES  Allergies   Allergen Reactions     Bee Venom Hives     Hot and sweating       REVIEW OF SYSTEMS  As above in the HPI, o/w complete 12-point ROS was negative.    PHYSICAL EXAM  /86 (BP Location: Right arm, Patient Position: Sitting, Cuff Size: Adult Regular)   Pulse 100   Temp 98.2  F (36.8  C) (Oral)   Ht 1.6 m (5' 2.99\")   Wt 80.1 kg (176 lb 9.4 oz)   SpO2 99%   BMI 31.29 kg/m    GEN: NAD  HEENT: EOMI, no icterus, injection or pallor  LUNGS: clear bilaterally  CV: irregularly irregular, systolic murmur  EXT: no edema  NEURO: alert    LABORATORY AND IMAGING STUDIES   3/8/2022 09:09   Sodium 131 (L)   Potassium 4.0   Chloride 98   Carbon Dioxide 28   Urea Nitrogen 18   Creatinine 1.04   GFR Estimate 58 (L)   Calcium 9.0   Anion Gap 5   Albumin 3.5   Protein Total 7.0   Bilirubin Total 0.4   Alkaline Phosphatase 93   ALT 21   AST 19   Glucose 90   WBC 5.1   Hemoglobin 12.1   Hematocrit 36.3   Platelet Count 208   RBC Count 4.04   MCV 90   MCH 30.0   MCHC 33.3   RDW 12.4   % Neutrophils 54   % Lymphocytes 27   % Monocytes 9   % Eosinophils 10   % Basophils 0   Absolute Basophils 0.0   Absolute Eosinophils 0.5   Absolute Lymphocytes 1.4   Absolute Monocytes 0.5   Absolute Neutrophils 2.7     Labs were " independently reviewed and interpreted by zabrina VILLAGOMEZ 3/1 negative    CT Chest/Abdomen/Pelvis w Contrast  Narrative: CT CHEST/ABDOMEN/PELVIS WITH CONTRAST March 8, 2022 10:19 AM    CLINICAL HISTORY: Restage poorly diff 8 cm SCC status post lobectomy  and adjuvant chemo completed 2/2021. New nodules in 2/2021, some have  resolved since. Squamous cell carcinoma of bronchus in left lower lobe  (H).    TECHNIQUE: CT scan of the chest, abdomen, and pelvis was performed  following injection of IV contrast. Multiplanar reformats were  obtained. Dose reduction techniques were used.   CONTRAST: 86 mL Isovue-370.    COMPARISON: 10/25/2021 and 7/23/2021.    FINDINGS:   LUNGS AND PLEURA: There is a 4 mm nodule in the posterior left apex on  image 61 of series 4 that is new from the previous exam. Multiple  stable nodules are seen through the right lung including a 3 mm right  upper lobe nodule on image 80, a 5 mm right middle lobe nodule on  image 168, and a 6 mm right middle lobe nodule on image 205. Stable  changes of left lower lobectomy with some chronic volume loss in the  posteromedial left lower lobe.    MEDIASTINUM/AXILLAE: Several prominent lymph nodes are seen through  the mediastinum which are unchanged and still within normal limits. No  pericardial effusion.    HEPATOBILIARY: Multiple cysts are seen through the liver which are  unchanged. No follow-up necessary for these.    PANCREAS: Multiple small subcentimeter cystic lesions are seen through  the head of the pancreas that may represent sidebranch IPMNs.    SPLEEN: Normal.    ADRENAL GLANDS: Stable mild nodularity of the left adrenal gland.    KIDNEYS/BLADDER: Mild diffuse bladder wall thickening is noted without  focal mass.    BOWEL: Normal.    PELVIC ORGANS: Normal.    ADDITIONAL FINDINGS: The aorta is normal in caliber with moderate  atherosclerotic calcification.    MUSCULOSKELETAL: Mild degenerative changes are noted through the  spine.  Impression:  IMPRESSION:  1.  Stable appearance of left lower lobectomy.  2.  Stable nodules through the right lung. There is a new 4 mm nodule  in the left apex.  3.  Both cysts in the liver and cystic lesions through the head of the  pancreas that may represent sidebranch IPMNs.  4.  Diffuse bladder wall thickening without focal mass.    NILO BURRIS MD         SYSTEM ID:  [id number]     Imaging was personally reviewed and interpreted by me     4:61

## 2022-03-11 ENCOUNTER — LAB (OUTPATIENT)
Dept: LAB | Facility: CLINIC | Age: 72
End: 2022-03-11
Payer: MEDICARE

## 2022-03-11 DIAGNOSIS — Z12.11 SCREEN FOR COLON CANCER: ICD-10-CM

## 2022-03-11 LAB — HEMOCCULT STL QL IA: POSITIVE

## 2022-03-11 PROCEDURE — 82274 ASSAY TEST FOR BLOOD FECAL: CPT

## 2022-03-13 DIAGNOSIS — R19.5 OCCULT BLOOD POSITIVE STOOL: Primary | ICD-10-CM

## 2022-03-14 ENCOUNTER — TELEPHONE (OUTPATIENT)
Dept: GASTROENTEROLOGY | Facility: CLINIC | Age: 72
End: 2022-03-14
Payer: MEDICARE

## 2022-03-14 ENCOUNTER — MYC MEDICAL ADVICE (OUTPATIENT)
Dept: FAMILY MEDICINE | Facility: CLINIC | Age: 72
End: 2022-03-14
Payer: MEDICARE

## 2022-03-14 DIAGNOSIS — Z11.59 ENCOUNTER FOR SCREENING FOR OTHER VIRAL DISEASES: Primary | ICD-10-CM

## 2022-03-14 PROCEDURE — 88120 CYTP URNE 3-5 PROBES EA SPEC: CPT | Mod: TC | Performed by: INTERNAL MEDICINE

## 2022-03-14 PROCEDURE — 88120 CYTP URNE 3-5 PROBES EA SPEC: CPT | Mod: 26 | Performed by: PATHOLOGY

## 2022-03-14 PROCEDURE — 88112 CYTOPATH CELL ENHANCE TECH: CPT | Mod: TC | Performed by: INTERNAL MEDICINE

## 2022-03-14 NOTE — TELEPHONE ENCOUNTER
Screening Questions  BlueKIND OF PREP RedLOCATION [review exclusion criteria] GreenSEDATION TYPE    1. Have you had a positive covid test in the last 90 days? n     2. Are you active on mychart? y    3. What insurance is in the chart? BXBS     3.  Ordering/Referring Provider: Aureliano    4. BMI 31.2  [BMI OVER 40-EXTENDED PREP]  If greater than 40 review exclusion criteria [PAC APPT IF @ [facility]]    5.  Respiratory Screening :  [If yes to any of the following HOSPITAL setting only]     Do you use daily home oxygen? n    Do you have mod to severe Obstructive Sleep Apnea? n  [OKAY @ [facility] SH PH RI]   Do you have Pulmonary Hypertension? n     Do you have UNCONTROLLED asthma? n      6. Have you had a heart or lung transplant? n      7. Are you currently on dialysis? n [ If yes, G-PREP & HOSPITAL setting only]     8. Do you have chronic kidney disease? n [ If yes, G-PREP ]    9. Have you had a stroke or Transient ischemic attack (TIA) within 6 months? n (If yes, please review exclusion criteria)    10. In the past 6 months, have you had any heart related issues including cardiomyopathy or heart attack? n        If yes, did it require cardiac stenting or other implantable device? n      11. Do you have any implantable devices in your body (pacemaker, defib, LVAD)? n (If yes, please review exclusion criteria)    12. Do you take nitroglycerin? n   If yes, how often? n  (if yes, HOSPITAL setting ONLY)    13. Are you currently taking any blood thinners? n   [IF YES, INFORM PATIENT TO FOLLOW UP W/ ORDERING PROVIDER FOR BRIDGING INSTRUCTIONS]     14. Do you have a diagnosis of diabetes? n   [ If yes, G-PREP ]    15. [FEMALES] Are you currently pregnant?     If yes, how many weeks?     16. Are you taking any prescription pain medications on a routine schedule?  n  [ If yes, EXTENDED PREP.] [If yes, MAC]    17. Do you have any chemical dependencies such as alcohol, street drugs, or methadone?  n [If yes, MAC]    18. Do you  have any history of post-traumatic stress syndrome, severe anxiety or history of psychosis?  n  [If yes, MAC]    19. Do you transfer independently?  y    20.  Do you have any issues with constipation?  n  [ If yes, EXTENDED PREP.]    21. Preferred LOCAL Pharmacy for Pre Prescription     [facility] [address] [place], [place] - [address]  [id number] - [place], [place] - [address]  Scheduling Details      Caller : Ijeoma Shah  (Please ask for phone number if not scheduled by patient)    Type of Procedure Scheduled: Colonoscopy  Which Colonoscopy Prep was Sent?: Miralax  KHORUTS CF PATIENTS & GROEN'S PATIENTS NEEDS EXTENDED PREP  Surgeon: Gualberto  Date of Procedure: 4/5  Location:       Sedation Type: MAC  Conscious Sedation- Needs  for 6 hours after the procedure  MAC/General-Needs  for 24 hours after procedure    Pre-op Required at [facility], [place], [place] and OR for MAC sedation: n  (advise patient they will need a pre-op prior to procedure -)      Informed patient they will need an adult  y  Cannot take any type of public or medical transportation alone    Pre-Procedure Covid test to be completed at [facility] or Externally: y    Confirmed Nurse will call to complete assessment y    Additional comments:

## 2022-03-15 LAB
PATH REPORT.COMMENTS IMP SPEC: NORMAL
PATH REPORT.FINAL DX SPEC: NORMAL
PATH REPORT.GROSS SPEC: NORMAL
PATH REPORT.MICROSCOPIC SPEC OTHER STN: NORMAL
PATH REPORT.RELEVANT HX SPEC: NORMAL

## 2022-03-15 PROCEDURE — 88112 CYTOPATH CELL ENHANCE TECH: CPT | Mod: 26 | Performed by: PATHOLOGY

## 2022-03-31 ENCOUNTER — TELEPHONE (OUTPATIENT)
Dept: FAMILY MEDICINE | Facility: CLINIC | Age: 72
End: 2022-03-31
Payer: MEDICARE

## 2022-03-31 NOTE — TELEPHONE ENCOUNTER
----- Message from Michelle Barboza RN sent at 3/30/2022 10:54 AM CDT -----  Regarding: Xeralto and covid screen  Pt scheduled for colonoscopy 4/5.  I advised her that Xeralto needs to be held for at least 24 hours prior to this procedure.  I told her I would let you know and that your staff will call her if she is not able to hold this.      Also, pt is having trouble getting scheduled for her Covid screening. She thought someone from your team was looking in to it.      Thank you,     Cristal Barboza RN  Same Day Surgery

## 2022-04-01 ENCOUNTER — LAB (OUTPATIENT)
Dept: LAB | Facility: CLINIC | Age: 72
End: 2022-04-01
Payer: MEDICARE

## 2022-04-01 DIAGNOSIS — Z11.59 ENCOUNTER FOR SCREENING FOR OTHER VIRAL DISEASES: ICD-10-CM

## 2022-04-01 PROCEDURE — U0005 INFEC AGEN DETEC AMPLI PROBE: HCPCS

## 2022-04-01 PROCEDURE — U0003 INFECTIOUS AGENT DETECTION BY NUCLEIC ACID (DNA OR RNA); SEVERE ACUTE RESPIRATORY SYNDROME CORONAVIRUS 2 (SARS-COV-2) (CORONAVIRUS DISEASE [COVID-19]), AMPLIFIED PROBE TECHNIQUE, MAKING USE OF HIGH THROUGHPUT TECHNOLOGIES AS DESCRIBED BY CMS-2020-01-R: HCPCS

## 2022-04-02 LAB — SARS-COV-2 RNA RESP QL NAA+PROBE: NEGATIVE

## 2022-04-04 ENCOUNTER — ANESTHESIA EVENT (OUTPATIENT)
Dept: GASTROENTEROLOGY | Facility: CLINIC | Age: 72
End: 2022-04-04
Payer: MEDICARE

## 2022-04-04 ASSESSMENT — LIFESTYLE VARIABLES: TOBACCO_USE: 1

## 2022-04-04 ASSESSMENT — ENCOUNTER SYMPTOMS: DYSRHYTHMIAS: 1

## 2022-04-04 ASSESSMENT — COPD QUESTIONNAIRES: COPD: 1

## 2022-04-04 NOTE — H&P
[facility] History and Physical    Ijeoma Shah MRN# [id number]   Age: 71 year old YOB: 1950     Date of Admission:  (Not on file)    Home clinic: [facility]  Primary care provider: Jyoti Esipnal          Impression and Plan:   Impression:   Occult blood positive stool [R19.5]  Last colonoscopy-2008.  Normal      Plan:   Proceed to Colonoscopy as planned.  The procedure, risks(bleeding, perforation), benefits and alternatives were discussed and the patient agrees to proceed. Cleared for Anesthesia             Chief Complaint:   Occult blood positive stool [R19.5]    History is obtained from the patient          History of Present Illness:   This 71 year old female is being seen at this time for evaluation for colonoscopy.  Heme (+) stool.  No family hx or other complaints.           Past Medical History:     Past Medical History:   Diagnosis Date     Arrhythmia     A-Fib     Benign essential hypertension      Calculus of gallbladder without cholecystitis without obstruction      COPD (chronic obstructive pulmonary disease) (H)      Lung cancer (H)      Simple chronic bronchitis (H)             Past Surgical History:     Past Surgical History:   Procedure Laterality Date     ARTHROEREISIS, SUBTALAR       BRONCHOSCOPY RIGID OR FLEXIBLE W/TRANSENDOSCOPIC ENDOBRONCHIAL ULTRASOUND GUIDED N/A 07/27/2020    Procedure: Flexible bronchoscopy, endobronchial ultrasound with transbronchial biopsies;  Surgeon: Edin Castro MD;  Location:  OR     CATARACT IOL, RT/LT Bilateral      COLONOSCOPY  06/29/2004    colonoscopy to the cecum     ESOPHAGOSCOPY, GASTROSCOPY, DUODENOSCOPY (EGD), COMBINED N/A 09/01/2020    Procedure: ESOPHAGOGASTRODUODENOSCOPY, WITH FINE NEEDLE ASPIRATION BIOPSY, WITH ENDOSCOPIC ULTRASOUND GUIDANCE;  Surgeon: Barber Hogan MD;  Location:  GI     LAPAROSCOPIC CHOLECYSTECTOMY N/A 5/20/2021    Procedure: CHOLECYSTECTOMY,  LAPAROSCOPIC;  Surgeon: Gavin Castillo MD;  Location: [facility] OR     PICC TRIPLE LUMEN PLACEMENT Right 10/23/2020    5Fr - 36cm, Medial brachial vein     SURGICAL HISTORY OF -       nose surgery     THORACOSCOPIC RESECTION LUNG Left 10/22/2020    Procedure: Left thoracoscopic lobectomy converted to Left Thoracotomy, Medistinal lymph node dissection, Pulmonary Artery repair, flexible bronchoscopy, on-pump oxygenator on standy-by;  Surgeon: Andrea Sanabria MD;  Location: [facility] OR     THORACOTOMY N/A 10/22/2020    Procedure: Thoracotomy;  Surgeon: Andrea Sanabria MD;  Location: [facility] OR     TRANSCERVICAL EXTENDED MEDIASTINAL LYMPHADENECTOMY N/A 10/22/2020    Procedure: Transcervical extended mediastinal lymphadenectomy;  Surgeon: Andrea Sanabria MD;  Location: [facility] OR     TUBAL LIGATION      bilateral tubal ligation.  BTL            Social History:     Social History     Tobacco Use     Smoking status: Former Smoker     Packs/day: 1.00     Years: 33.00     Pack years: 33.00     Types: Cigarettes     Quit date: 2014     Years since quittin.3     Smokeless tobacco: Never Used   Substance Use Topics     Alcohol use: Yes     Alcohol/week: 1.7 - 2.5 standard drinks     Types: 2 - 3 Standard drinks or equivalent per week     Comment: occ,social            Family History:     Family History   Problem Relation Age of Onset     Glaucoma Mother      LUNG DISEASE Mother      Melanoma Father      Down Syndrome Brother      Cancer Sister         bile duct     Coronary Artery Disease Brother      CABG Brother      No Known Problems Brother      No Known Problems Brother      No Known Problems Sister      Lung Cancer Sister         lung     No Known Problems Sister      No Known Problems Sister             Immunizations:     VACCINE/DOSE   Diptheria   DPT   DTAP   HBIG   Hepatitis A   Hepatitis B   HIB   Influenza   Measles   Meningococcal   MMR   Mumps   Pneumococcal   Polio   Rubella   Small Pox   TDAP   Varicella   Zoster             Allergies:     Allergies   Allergen Reactions     Bee Venom Hives     Hot and sweating             Medications:     No current facility-administered medications for this encounter.     Current Outpatient Medications   Medication Sig     lisinopril (ZESTRIL) 20 MG tablet Take 1 tablet by mouth once daily     rivaroxaban ANTICOAGULANT (XARELTO) 20 MG TABS tablet Take 1 tablet (20 mg) by mouth daily (with dinner)             Review of Systems:   The review of systems was positive for the following findings.  None.  The remainder of the review of systems was unremarkable.          Physical Exam:   All vitals have been reviewed  not currently breastfeeding.  No intake or output data in the 24 hours ending 04/04/22 0809  SHEENT examination revealed NC/aT, EOMI.  Examination of the chest revealed CTA, Decreased BS right.  Examination of the heart revealed RRR.  Examination of the abdomen revealed soft, non tender.  The neuromuscular examination was NL.          Data:   All laboratory data reviewed  No results found for any visits on 04/05/22.  -     Odell Burciaga MD, FACS

## 2022-04-05 ENCOUNTER — HOSPITAL ENCOUNTER (EMERGENCY)
Facility: CLINIC | Age: 72
Discharge: HOME | End: 2022-04-05
Attending: FAMILY MEDICINE | Admitting: FAMILY MEDICINE
Payer: MEDICARE

## 2022-04-05 ENCOUNTER — ANESTHESIA (OUTPATIENT)
Dept: GASTROENTEROLOGY | Facility: CLINIC | Age: 72
End: 2022-04-05
Payer: MEDICARE

## 2022-04-05 ENCOUNTER — HOSPITAL ENCOUNTER (OUTPATIENT)
Facility: CLINIC | Age: 72
Discharge: ANOTHER HOSPITAL | End: 2022-04-05
Attending: SPECIALIST | Admitting: SPECIALIST
Payer: MEDICARE

## 2022-04-05 VITALS
RESPIRATION RATE: 18 BRPM | SYSTOLIC BLOOD PRESSURE: 115 MMHG | BODY MASS INDEX: 31.01 KG/M2 | DIASTOLIC BLOOD PRESSURE: 73 MMHG | HEART RATE: 67 BPM | TEMPERATURE: 98 F | OXYGEN SATURATION: 100 % | WEIGHT: 175 LBS

## 2022-04-05 VITALS
HEART RATE: 90 BPM | OXYGEN SATURATION: 99 % | SYSTOLIC BLOOD PRESSURE: 155 MMHG | BODY MASS INDEX: 31.01 KG/M2 | WEIGHT: 175 LBS | TEMPERATURE: 98 F | RESPIRATION RATE: 16 BRPM | DIASTOLIC BLOOD PRESSURE: 98 MMHG

## 2022-04-05 DIAGNOSIS — I48.0 PAROXYSMAL ATRIAL FIBRILLATION (H): ICD-10-CM

## 2022-04-05 LAB
ALBUMIN SERPL-MCNC: 3.8 G/DL (ref 3.4–5)
ALP SERPL-CCNC: 77 U/L (ref 40–150)
ALT SERPL W P-5'-P-CCNC: 18 U/L (ref 0–50)
ANION GAP SERPL CALCULATED.3IONS-SCNC: 5 MMOL/L (ref 3–14)
AST SERPL W P-5'-P-CCNC: 15 U/L (ref 0–45)
BASOPHILS # BLD AUTO: 0 10E3/UL (ref 0–0.2)
BASOPHILS NFR BLD AUTO: 0 %
BILIRUB SERPL-MCNC: 0.3 MG/DL (ref 0.2–1.3)
BUN SERPL-MCNC: 10 MG/DL (ref 7–30)
CALCIUM SERPL-MCNC: 8.7 MG/DL (ref 8.5–10.1)
CHLORIDE BLD-SCNC: 109 MMOL/L (ref 94–109)
CO2 SERPL-SCNC: 25 MMOL/L (ref 20–32)
CREAT SERPL-MCNC: 0.89 MG/DL (ref 0.52–1.04)
EOSINOPHIL # BLD AUTO: 0.2 10E3/UL (ref 0–0.7)
EOSINOPHIL NFR BLD AUTO: 4 %
ERYTHROCYTE [DISTWIDTH] IN BLOOD BY AUTOMATED COUNT: 12.8 % (ref 10–15)
GFR SERPL CREATININE-BSD FRML MDRD: 69 ML/MIN/1.73M2
GLUCOSE BLD-MCNC: 99 MG/DL (ref 70–99)
HCT VFR BLD AUTO: 36.6 % (ref 35–47)
HGB BLD-MCNC: 12.8 G/DL (ref 11.7–15.7)
IMM GRANULOCYTES # BLD: 0 10E3/UL
IMM GRANULOCYTES NFR BLD: 0 %
LYMPHOCYTES # BLD AUTO: 1.2 10E3/UL (ref 0.8–5.3)
LYMPHOCYTES NFR BLD AUTO: 24 %
MAGNESIUM SERPL-MCNC: 2.2 MG/DL (ref 1.6–2.3)
MCH RBC QN AUTO: 30.4 PG (ref 26.5–33)
MCHC RBC AUTO-ENTMCNC: 35 G/DL (ref 31.5–36.5)
MCV RBC AUTO: 87 FL (ref 78–100)
MONOCYTES # BLD AUTO: 0.4 10E3/UL (ref 0–1.3)
MONOCYTES NFR BLD AUTO: 7 %
NEUTROPHILS # BLD AUTO: 3.2 10E3/UL (ref 1.6–8.3)
NEUTROPHILS NFR BLD AUTO: 65 %
NRBC # BLD AUTO: 0 10E3/UL
NRBC BLD AUTO-RTO: 0 /100
PLATELET # BLD AUTO: 175 10E3/UL (ref 150–450)
POTASSIUM BLD-SCNC: 3.9 MMOL/L (ref 3.4–5.3)
PROT SERPL-MCNC: 6.6 G/DL (ref 6.8–8.8)
RBC # BLD AUTO: 4.21 10E6/UL (ref 3.8–5.2)
SODIUM SERPL-SCNC: 139 MMOL/L (ref 133–144)
TROPONIN I SERPL HS-MCNC: 6 NG/L
WBC # BLD AUTO: 5 10E3/UL (ref 4–11)

## 2022-04-05 PROCEDURE — 250N000009 HC RX 250: Performed by: FAMILY MEDICINE

## 2022-04-05 PROCEDURE — 84484 ASSAY OF TROPONIN QUANT: CPT | Performed by: FAMILY MEDICINE

## 2022-04-05 PROCEDURE — 96374 THER/PROPH/DIAG INJ IV PUSH: CPT | Performed by: EMERGENCY MEDICINE

## 2022-04-05 PROCEDURE — 96376 TX/PRO/DX INJ SAME DRUG ADON: CPT | Performed by: EMERGENCY MEDICINE

## 2022-04-05 PROCEDURE — 36415 COLL VENOUS BLD VENIPUNCTURE: CPT | Performed by: FAMILY MEDICINE

## 2022-04-05 PROCEDURE — 99284 EMERGENCY DEPT VISIT MOD MDM: CPT | Mod: 25 | Performed by: FAMILY MEDICINE

## 2022-04-05 PROCEDURE — 93010 ELECTROCARDIOGRAM REPORT: CPT | Performed by: FAMILY MEDICINE

## 2022-04-05 PROCEDURE — 250N000013 HC RX MED GY IP 250 OP 250 PS 637: Performed by: FAMILY MEDICINE

## 2022-04-05 PROCEDURE — 85025 COMPLETE CBC W/AUTO DIFF WBC: CPT | Performed by: FAMILY MEDICINE

## 2022-04-05 PROCEDURE — 93005 ELECTROCARDIOGRAM TRACING: CPT | Performed by: EMERGENCY MEDICINE

## 2022-04-05 PROCEDURE — 250N000009 HC RX 250: Performed by: NURSE ANESTHETIST, CERTIFIED REGISTERED

## 2022-04-05 PROCEDURE — 96374 THER/PROPH/DIAG INJ IV PUSH: CPT | Performed by: FAMILY MEDICINE

## 2022-04-05 PROCEDURE — 93005 ELECTROCARDIOGRAM TRACING: CPT | Performed by: FAMILY MEDICINE

## 2022-04-05 PROCEDURE — 258N000003 HC RX IP 258 OP 636: Performed by: NURSE ANESTHETIST, CERTIFIED REGISTERED

## 2022-04-05 PROCEDURE — 96361 HYDRATE IV INFUSION ADD-ON: CPT | Performed by: FAMILY MEDICINE

## 2022-04-05 PROCEDURE — 83735 ASSAY OF MAGNESIUM: CPT | Performed by: FAMILY MEDICINE

## 2022-04-05 PROCEDURE — 80053 COMPREHEN METABOLIC PANEL: CPT | Performed by: FAMILY MEDICINE

## 2022-04-05 PROCEDURE — 258N000003 HC RX IP 258 OP 636: Performed by: FAMILY MEDICINE

## 2022-04-05 PROCEDURE — 96361 HYDRATE IV INFUSION ADD-ON: CPT | Performed by: EMERGENCY MEDICINE

## 2022-04-05 PROCEDURE — 99284 EMERGENCY DEPT VISIT MOD MDM: CPT | Mod: 25 | Performed by: EMERGENCY MEDICINE

## 2022-04-05 PROCEDURE — 96376 TX/PRO/DX INJ SAME DRUG ADON: CPT | Performed by: FAMILY MEDICINE

## 2022-04-05 RX ORDER — DILTIAZEM HYDROCHLORIDE 5 MG/ML
20 INJECTION INTRAVENOUS ONCE
Status: COMPLETED | OUTPATIENT
Start: 2022-04-05 | End: 2022-04-05

## 2022-04-05 RX ORDER — DILTIAZEM HYDROCHLORIDE 30 MG/1
60 TABLET, FILM COATED ORAL EVERY 6 HOURS SCHEDULED
Status: DISCONTINUED | OUTPATIENT
Start: 2022-04-05 | End: 2022-04-05 | Stop reason: HOSPADM

## 2022-04-05 RX ORDER — SODIUM CHLORIDE, SODIUM LACTATE, POTASSIUM CHLORIDE, CALCIUM CHLORIDE 600; 310; 30; 20 MG/100ML; MG/100ML; MG/100ML; MG/100ML
INJECTION, SOLUTION INTRAVENOUS CONTINUOUS
Status: DISCONTINUED | OUTPATIENT
Start: 2022-04-05 | End: 2022-04-05 | Stop reason: HOSPADM

## 2022-04-05 RX ORDER — MORPHINE SULFATE 2 MG/ML
INJECTION, SOLUTION INTRAMUSCULAR; INTRAVENOUS
Status: DISCONTINUED
Start: 2022-04-05 | End: 2022-04-05 | Stop reason: WASHOUT

## 2022-04-05 RX ORDER — SODIUM CHLORIDE 9 MG/ML
INJECTION, SOLUTION INTRAVENOUS CONTINUOUS
Status: DISCONTINUED | OUTPATIENT
Start: 2022-04-05 | End: 2022-04-05 | Stop reason: HOSPADM

## 2022-04-05 RX ADMIN — DILTIAZEM HYDROCHLORIDE 60 MG: 30 TABLET, FILM COATED ORAL at 16:08

## 2022-04-05 RX ADMIN — DILTIAZEM HYDROCHLORIDE 20 MG: 5 INJECTION INTRAVENOUS at 17:05

## 2022-04-05 RX ADMIN — DILTIAZEM HYDROCHLORIDE 20 MG: 5 INJECTION INTRAVENOUS at 15:42

## 2022-04-05 RX ADMIN — LIDOCAINE HYDROCHLORIDE 1 ML: 10 INJECTION, SOLUTION EPIDURAL; INFILTRATION; INTRACAUDAL; PERINEURAL at 13:04

## 2022-04-05 RX ADMIN — SODIUM CHLORIDE: 9 INJECTION, SOLUTION INTRAVENOUS at 14:57

## 2022-04-05 RX ADMIN — SODIUM CHLORIDE, POTASSIUM CHLORIDE, SODIUM LACTATE AND CALCIUM CHLORIDE: 600; 310; 30; 20 INJECTION, SOLUTION INTRAVENOUS at 13:03

## 2022-04-05 NOTE — DISCHARGE INSTRUCTIONS
-please restart you xarelto  -touch base with Dr. Arrieta regarding if you should be taking your metoprolol or not in order to prep for your colonoscopy.  -return to er if new or worsening symptoms.  -it was a pleasure to meet you.

## 2022-04-05 NOTE — ANESTHESIA CARE TRANSFER NOTE
Patient: Ijeoma Shah    Procedure: Procedure(s):  COLONOSCOPY       Diagnosis: Occult blood positive stool [R19.5]  Diagnosis Additional Information: No value filed.    Anesthesia Type:   MAC     Note:    Oropharynx: spontaneously breathing  Level of Consciousness: awake  Oxygen Supplementation: face mask  Level of Supplemental Oxygen (L/min / FiO2): 6 L/M  Independent Airway: airway patency satisfactory and stable  Dentition: dentition unchanged  Vital Signs Stable: post-procedure vital signs reviewed and stable  Report to RN Given: handoff report given  Patient transferred to: Emergency Department    Handoff Report: Identifed the Patient, Identified the Reponsible Provider, Reviewed the pertinent medical history, Discussed the surgical course, Reviewed Intra-OP anesthesia mangement and issues during anesthesia, Set expectations for post-procedure period and Allowed opportunity for questions and acknowledgement of understanding      Vitals:  Vitals Value Taken Time   BP     Temp     Pulse     Resp     SpO2         Electronically Signed By: RUDY Xiong CRNA  April 5, 2022  2:13 PM

## 2022-04-05 NOTE — ED PROVIDER NOTES
Patient signed over to me at change of shift. 71 yr old with atrial fibrillation with rvr.  Sent over from preop.  She was getting a colonoscopy bc of blood in stool.  She has a history of lung cancer and probably copd.  She doesn't know when she is in atrial fibrillation.  Please see Dr. Duran's note for further details.     Not cardioverted due to being off  Anticoagulation (xarelto).     Diltiazem bolus, given oral 60 mg of diltiazem which helped but still tachycardic.    Plan:  If rate below 110 consistently discharge home on oral diltiazem  If rate continued not to be controlled, start drip and transfer to a facility with an icu bed     Around 6:20 pm I was notified that the patient had converted to nsr.  I evaluated the patient and she is doing well.  She informed me that she previously was on metoprolol but allowed to go off of it after chemotherapy was over.  She sees Dr. Arrieta of [facility]ology at the [facility].  We discussed putting her on a rate control medication but decided to hold off given her rate right now is 60 and she feels well in a nsr.  She will send a my chart message to Dr. Arrieta regarding how to proceed in order to reschedule her colonoscopy.  All questions answered and the patient was discharged home in stable condition.       Kadeem Mckinney MD  04/05/22 [id number]

## 2022-04-05 NOTE — PROGRESS NOTES
Surgery    Pt with HR 160s-190s, irreg in endo.  Will abort procedure and send patient to ER.  Spoke to Dr. Powell.    Odell Burciaga MD, FACS

## 2022-04-05 NOTE — ED PROVIDER NOTES
History     Chief Complaint   Patient presents with     Palpitations     HPI  Ijeoma Shah is a 71 year old female who presents from the [facility] area with a rapid heart rate.  Patient has a known history of paroxysmal atrial fibrillation.  Patient is actually on Xarelto that she takes for this.  She has not taken it for the last 2 days because of preparation for her colonoscopy.  She does not always know when she goes in and out of A. fib.  When she arrived here today they found her in this rapid rate on the monitor.  Patient denies any current chest pain or shortness of breath.  Denies feeling lightheaded or dizzy.  Patient is wondering if this could be from all the bowel prep that she was using for this procedure.  Denies any recent dysuria or hematuria.  Patient has not really drank anything for the last 8 hours.    Allergies:  Allergies   Allergen Reactions     Bee Venom Hives     Hot and sweating        Problem List:    Patient Active Problem List    Diagnosis Date Noted     Chronic kidney disease, stage 3 (H) 07/08/2021     Priority: Medium     Calculus of gallbladder without cholecystitis without obstruction 05/03/2021     Priority: Medium     Added automatically from request for surgery [id number]       Benign essential hypertension 03/18/2021     Priority: Medium     Gallstones 03/18/2021     Priority: Medium     Chronic rhinitis 03/18/2021     Priority: Medium     Chronic obstructive pulmonary disease, unspecified COPD type (H) 03/18/2021     Priority: Medium     Hypomagnesemia 11/17/2020     Priority: Medium     Chemotherapy-induced neutropenia (H) 11/17/2020     Priority: Medium     Paroxysmal atrial fibrillation (H) 10/25/2020     Priority: Medium     Mass of left lung 10/01/2020     Priority: Medium     Added automatically from request for surgery [id number]       Malignant neoplasm of lower lobe of left lung (H) 09/17/2020     Priority: Medium     Added automatically from request for surgery  [id number]       Mediastinal adenopathy 07/03/2020     Priority: Medium     Squamous cell carcinoma of left lung (H) 07/03/2020     Priority: Medium     Check 6.21 for f/u Fujioka       Squamous cell carcinoma of bronchus in left lower lobe (H) 07/03/2020     Priority: Medium     Acute sinusitis with symptoms > 10 days 03/06/2020     Priority: Medium     Uterine prolapse 02/18/2018     Priority: Medium     Bee sting reaction 08/28/2012     Priority: Medium     Advanced directives, counseling/discussion 04/06/2011     Priority: Medium     Pt took packet home to fill out        HYPERLIPIDEMIA LDL GOAL <160 10/31/2010     Priority: Medium     Dermatophytosis of foot 05/26/2006     Priority: Medium     Viral warts 05/26/2006     Priority: Medium     Problem list name updated by automated process. Provider to review          Past Medical History:    Past Medical History:   Diagnosis Date     Arrhythmia      Benign essential hypertension      Calculus of gallbladder without cholecystitis without obstruction      COPD (chronic obstructive pulmonary disease) (H)      Lung cancer (H)      Simple chronic bronchitis (H)        Past Surgical History:    Past Surgical History:   Procedure Laterality Date     ARTHROEREISIS, SUBTALAR       BRONCHOSCOPY RIGID OR FLEXIBLE W/TRANSENDOSCOPIC ENDOBRONCHIAL ULTRASOUND GUIDED N/A 07/27/2020    Procedure: Flexible bronchoscopy, endobronchial ultrasound with transbronchial biopsies;  Surgeon: Edin Castro MD;  [facility]     CATARACT IOL, RT/LT Bilateral      COLONOSCOPY  06/29/2004    colonoscopy to the cecum     ESOPHAGOSCOPY, GASTROSCOPY, DUODENOSCOPY (EGD), COMBINED N/A 09/01/2020    Procedure: ESOPHAGOGASTRODUODENOSCOPY, WITH FINE NEEDLE ASPIRATION BIOPSY, WITH ENDOSCOPIC ULTRASOUND GUIDANCE;  Surgeon: Barber Hogan MD;  [facility]     LAPAROSCOPIC CHOLECYSTECTOMY N/A 5/20/2021    Procedure: CHOLECYSTECTOMY, LAPAROSCOPIC;  Surgeon: Gavin Castillo MD;   [facility]     PICC TRIPLE LUMEN PLACEMENT Right 10/23/2020    5Fr - 36cm, Medial brachial vein     SURGICAL HISTORY OF -       nose surgery     THORACOSCOPIC RESECTION LUNG Left 10/22/2020    Procedure: Left thoracoscopic lobectomy converted to Left Thoracotomy, Medistinal lymph node dissection, Pulmonary Artery repair, flexible bronchoscopy, on-pump oxygenator on standy-by;  Surgeon: Andrea Sanabria MD;  [facility]     THORACOTOMY N/A 10/22/2020    Procedure: Thoracotomy;  Surgeon: Andrea Sanabria MD;  [facility]     TRANSCERVICAL EXTENDED MEDIASTINAL LYMPHADENECTOMY N/A 10/22/2020    Procedure: Transcervical extended mediastinal lymphadenectomy;  Surgeon: Andrea Sanabria MD;  [facility]     TUBAL LIGATION      bilateral tubal ligation.  BTL       Family History:    Family History   Problem Relation Age of Onset     Glaucoma Mother      LUNG DISEASE Mother      Melanoma Father      Down Syndrome Brother      Cancer Sister         bile duct     Coronary Artery Disease Brother      CABG Brother      No Known Problems Brother      No Known Problems Brother      No Known Problems Sister      Lung Cancer Sister         lung     No Known Problems Sister      No Known Problems Sister        Social History:  Marital Status:   [2]  Social History     Tobacco Use     Smoking status: Former Smoker     Packs/day: 1.00     Years: 33.00     Pack years: 33.00     Types: Cigarettes     Quit date: 2014     Years since quittin.3     Smokeless tobacco: Never Used   Vaping Use     Vaping Use: Never used   Substance Use Topics     Alcohol use: Yes     Alcohol/week: 1.7 - 2.5 standard drinks     Types: 2 - 3 Standard drinks or equivalent per week     Comment: occ,social     Drug use: No        Medications:    lisinopril (ZESTRIL) 20 MG tablet  rivaroxaban ANTICOAGULANT (XARELTO) 20 MG TABS tablet          Review of Systems   All other systems reviewed and are negative.      Physical Exam   BP: (!)  119/92  Pulse: (!) 158  Temp: 98  F (36.7  C)  Resp: 18  Weight: 79.4 kg (175 lb)  SpO2: 98 %      Physical Exam  Vitals and nursing note reviewed.   Constitutional:       General: She is not in acute distress.     Appearance: She is well-developed. She is not diaphoretic.   HENT:      Head: Normocephalic and atraumatic.      Nose: Nose normal.      Mouth/Throat:      Pharynx: No oropharyngeal exudate.   Eyes:      Conjunctiva/sclera: Conjunctivae normal.   Cardiovascular:      Rate and Rhythm: Regular rhythm. Tachycardia present.      Heart sounds: Normal heart sounds. No murmur heard.    No friction rub.   Pulmonary:      Effort: Pulmonary effort is normal. No respiratory distress.      Breath sounds: Normal breath sounds. No stridor. No wheezing or rales.   Abdominal:      General: Bowel sounds are normal. There is no distension.      Palpations: Abdomen is soft. There is no mass.      Tenderness: There is no abdominal tenderness. There is no guarding.   Musculoskeletal:         General: No tenderness. Normal range of motion.      Cervical back: Normal range of motion and neck supple.   Skin:     General: Skin is warm and dry.      Capillary Refill: Capillary refill takes less than 2 seconds.      Findings: No erythema.   Neurological:      Mental Status: She is alert and oriented to person, place, and time.   Psychiatric:         Judgment: Judgment normal.         ED Course          EKG:  Reviewed by me  Rate of 140  No ST segment changes  No Q waves noted  Interpretation: Atrial fibrillation with RVR       Procedures      Results for orders placed or performed during the hospital encounter of 04/05/22 (from the past 24 hour(s))   CBC with platelets differential    Narrative    The following orders were created for panel order CBC with platelets differential.  Procedure                               Abnormality         Status                     ---------                               -----------         ------                      CBC with platelets and d...[[id number]]                      Final result                 Please view results for these tests on the individual orders.   Comprehensive metabolic panel   Result Value Ref Range    Sodium 139 133 - 144 mmol/L    Potassium 3.9 3.4 - 5.3 mmol/L    Chloride 109 94 - 109 mmol/L    Carbon Dioxide (CO2) 25 20 - 32 mmol/L    Anion Gap 5 3 - 14 mmol/L    Urea Nitrogen 10 7 - 30 mg/dL    Creatinine 0.89 0.52 - 1.04 mg/dL    Calcium 8.7 8.5 - 10.1 mg/dL    Glucose 99 70 - 99 mg/dL    Alkaline Phosphatase 77 40 - 150 U/L    AST 15 0 - 45 U/L    ALT 18 0 - 50 U/L    Protein Total 6.6 (L) 6.8 - 8.8 g/dL    Albumin 3.8 3.4 - 5.0 g/dL    Bilirubin Total 0.3 0.2 - 1.3 mg/dL    GFR Estimate 69 >60 mL/min/1.73m2   Troponin I   Result Value Ref Range    Troponin I High Sensitivity 6 <54 ng/L   Magnesium   Result Value Ref Range    Magnesium 2.2 1.6 - 2.3 mg/dL   CBC with platelets and differential   Result Value Ref Range    WBC Count 5.0 4.0 - 11.0 10e3/uL    RBC Count 4.21 3.80 - 5.20 10e6/uL    Hemoglobin 12.8 11.7 - 15.7 g/dL    Hematocrit 36.6 35.0 - 47.0 %    MCV 87 78 - 100 fL    MCH 30.4 26.5 - 33.0 pg    MCHC 35.0 31.5 - 36.5 g/dL    RDW 12.8 10.0 - 15.0 %    Platelet Count 175 150 - 450 10e3/uL    % Neutrophils 65 %    % Lymphocytes 24 %    % Monocytes 7 %    % Eosinophils 4 %    % Basophils 0 %    % Immature Granulocytes 0 %    NRBCs per 100 WBC 0 <1 /100    Absolute Neutrophils 3.2 1.6 - 8.3 10e3/uL    Absolute Lymphocytes 1.2 0.8 - 5.3 10e3/uL    Absolute Monocytes 0.4 0.0 - 1.3 10e3/uL    Absolute Eosinophils 0.2 0.0 - 0.7 10e3/uL    Absolute Basophils 0.0 0.0 - 0.2 10e3/uL    Absolute Immature Granulocytes 0.0 <=0.4 10e3/uL    Absolute NRBCs 0.0 10e3/uL       Medications   0.9% sodium chloride BOLUS (has no administration in time range)     Followed by   sodium chloride 0.9% infusion ( Intravenous New Bag 4/5/22 8994)   diltiazem (CARDIZEM) tablet 60 mg (60 mg Oral Given  4/5/22 1608)   diltiazem (CARDIZEM) injection 20 mg (20 mg Intravenous Given 4/5/22 1542)   diltiazem (CARDIZEM) injection 20 mg (20 mg Intravenous Given 4/5/22 1705)       Labs reviewed and were unremarkable.  We initially tried a fluid bolus to see if the heart rate will come down and it did not.  I then proceeded to give the patient a point to milligram per kilogram bolus of diltiazem which did bring the heart rate down.  I quickly followed this up with some oral diltiazem at 60 mg orally.  After patient got up to go the bathroom she was back up in the 170s and stayed there consistently.  We will go ahead and give 1 more bolus and hopefully the oral diltiazem will be kicking in at that point.  Patient will be signed out at change of shift to Dr. Mckinney to follow-up on this repeat bolus and see if we need to discharge the patient or will need to admit the patient on a diltiazem drip.    Assessments & Plan (with Medical Decision Making)  REKHA mccarthy     I have reviewed the nursing notes.    I have reviewed the findings, diagnosis, plan and need for follow up with the patient.              4/5/2022   [facility] EMERGENCY DEPT     Titi Duran MD  04/05/22 1415       Titi Duran MD  04/13/22 0949

## 2022-04-05 NOTE — ANESTHESIA PREPROCEDURE EVALUATION
Anesthesia Pre-Procedure Evaluation    Patient: Ijeoma Shah   MRN: [id number] : 1950        Procedure : Procedure(s):  COLONOSCOPY          Past Medical History:   Diagnosis Date     Arrhythmia     A-Fib     Benign essential hypertension      Calculus of gallbladder without cholecystitis without obstruction      COPD (chronic obstructive pulmonary disease) (H)      Lung cancer (H)      Simple chronic bronchitis (H)       Past Surgical History:   Procedure Laterality Date     ARTHROEREISIS, SUBTALAR       BRONCHOSCOPY RIGID OR FLEXIBLE W/TRANSENDOSCOPIC ENDOBRONCHIAL ULTRASOUND GUIDED N/A 2020    Procedure: Flexible bronchoscopy, endobronchial ultrasound with transbronchial biopsies;  Surgeon: Edin Castro MD;  [facility]     CATARACT IOL, RT/LT Bilateral      COLONOSCOPY  2004    colonoscopy to the cecum     ESOPHAGOSCOPY, GASTROSCOPY, DUODENOSCOPY (EGD), COMBINED N/A 2020    Procedure: ESOPHAGOGASTRODUODENOSCOPY, WITH FINE NEEDLE ASPIRATION BIOPSY, WITH ENDOSCOPIC ULTRASOUND GUIDANCE;  Surgeon: Barber Hogan MD;  [facility]     LAPAROSCOPIC CHOLECYSTECTOMY N/A 2021    Procedure: CHOLECYSTECTOMY, LAPAROSCOPIC;  Surgeon: Gavin Castillo MD;  [facility]     PICC TRIPLE LUMEN PLACEMENT Right 10/23/2020    5Fr - 36cm, Medial brachial vein     SURGICAL HISTORY OF -       nose surgery     THORACOSCOPIC RESECTION LUNG Left 10/22/2020    Procedure: Left thoracoscopic lobectomy converted to Left Thoracotomy, Medistinal lymph node dissection, Pulmonary Artery repair, flexible bronchoscopy, on-pump oxygenator on standy-by;  Surgeon: Andrea Sanabria MD;  [facility]     THORACOTOMY N/A 10/22/2020    Procedure: Thoracotomy;  Surgeon: Andrea Sanabria MD;  [facility]     TRANSCERVICAL EXTENDED MEDIASTINAL LYMPHADENECTOMY N/A 10/22/2020    Procedure: Transcervical extended mediastinal lymphadenectomy;  Surgeon: Andrea Sanabria MD;  [facility]     TUBAL LIGATION      bilateral tubal ligation.  BTL      Allergies   Allergen Reactions     Bee Venom Hives     Hot and sweating       Social History     Tobacco Use     Smoking status: Former Smoker     Packs/day: 1.00     Years: 33.00     Pack years: 33.00     Types: Cigarettes     Quit date: 2014     Years since quittin.3     Smokeless tobacco: Never Used   Substance Use Topics     Alcohol use: Yes     Alcohol/week: 1.7 - 2.5 standard drinks     Types: 2 - 3 Standard drinks or equivalent per week     Comment: occ,social      Wt Readings from Last 1 Encounters:   22 80.1 kg (176 lb 9.4 oz)        Anesthesia Evaluation   Pt has had prior anesthetic. Type: General and MAC.    No history of anesthetic complications       ROS/MED HX  ENT/Pulmonary: Comment: Is not using inhalers. Denies pulmonary symptoms. Some tightness under right breast after lung surgery. Some nasal congestion.    (+) allergic rhinitis, tobacco use, Past use, COPD,     Neurologic:  - neg neurologic ROS     Cardiovascular: Comment: Afib s/p cardioversion    (+) hypertension-----Taking blood thinners Pt has received instructions: Instructions Given to patient: Planned hold of Xarelto for 72 hours prior to surgery. dysrhythmias, a-fib, Previous cardiac testing   Echo: Date: 10/7/20 Results:    Stress Test: Date: Results:    ECG Reviewed: Date: 21 Results:  SR  Cath: Date: Results:      METS/Exercise Tolerance: >4 METS    Hematologic:  - neg hematologic  ROS     Musculoskeletal:  - neg musculoskeletal ROS     GI/Hepatic: Comment: Abdominal pain if eating greasy food    (+) cholecystitis/cholelithiasis,     Renal/Genitourinary:     (+) renal disease, type: CRI, Pt does not require dialysis,     Endo:  - neg endo ROS     Psychiatric/Substance Use:  - neg psychiatric ROS     Infectious Disease:  - neg infectious disease ROS     Malignancy:   (+) Malignancy, History of Lung.  Lung CA Remission status post Surgery and  Chemo.        Other:  - neg other ROS          Physical Exam    Airway   unable to assess     Mallampati: II   TM distance: > 3 FB   Neck ROM: full   Mouth opening: > 3 cm    Respiratory Devices and Support         Dental  no notable dental history         Cardiovascular       Comment: Hx AF.   upon auscultation and regularly irregular.   Rhythm and rate: irregular and tachycardia     Pulmonary           breath sounds clear to auscultation       Other findings: Stopped Lisinopril and Xarelto 72 hrs ago.    OUTSIDE LABS:  CBC:   Lab Results   Component Value Date    WBC 5.1 03/08/2022    WBC 5.8 10/25/2021    HGB 12.1 03/08/2022    HGB 11.7 10/25/2021    HCT 36.3 03/08/2022    HCT 33.3 (L) 10/25/2021     03/08/2022     10/25/2021     BMP:   Lab Results   Component Value Date     (L) 03/08/2022     (L) 11/03/2021    POTASSIUM 4.0 03/08/2022    POTASSIUM 4.1 11/03/2021    CHLORIDE 98 03/08/2022    CHLORIDE 98 11/03/2021    CO2 28 03/08/2022    CO2 28 11/03/2021    BUN 18 03/08/2022    BUN 21 11/03/2021    CR 1.0 03/08/2022    CR 1.04 03/08/2022    GLC 90 03/08/2022    GLC 93 11/03/2021     COAGS: No results found for: PTT, INR, FIBR  POC:   Lab Results   Component Value Date    BGM 84 05/20/2021     HEPATIC:   Lab Results   Component Value Date    ALBUMIN 3.5 03/08/2022    PROTTOTAL 7.0 03/08/2022    ALT 21 03/08/2022    AST 19 03/08/2022    ALKPHOS 93 03/08/2022    BILITOTAL 0.4 03/08/2022     OTHER:   Lab Results   Component Value Date    PH 7.42 10/22/2020    LACT 1.0 10/22/2020    A1C 5.6 10/23/2020    SABINO 9.0 03/08/2022    PHOS 3.7 10/28/2020    MAG 1.8 07/23/2021    TSH 0.57 11/03/2021       Anesthesia Plan    ASA Status:  2   NPO Status:  NPO Appropriate    Anesthesia Type: MAC.     - Reason for MAC: straight local not clinically adequate   Induction: Intravenous.   Maintenance: Balanced.        Consents    Anesthesia Plan(s) and associated risks, benefits, and realistic  alternatives discussed. Questions answered and patient/representative(s) expressed understanding.    - Discussed:     - Discussed with:  Patient      - Extended Intubation/Ventilatory Support Discussed: No.      - Patient is DNR/DNI Status: No    Use of blood products discussed: No .     Postoperative Care    Pain management: Oral pain medications.        Comments:    Other Comments: The risks and benefits of anesthesia, and the alternatives where applicable, have been discussed with the patient, and they wish to proceed.            RUDY Xiong CRNA

## 2022-04-05 NOTE — OR NURSING
Upon arrival to Procedure room pt attached to EKG and -190's  Pt Appears to be in Atrial Fib.  Md and Anesthesia provider aware, spoke with pt and decided to abort Procedure and sent pt to ED for Further Evaluation.  Report given to Francoise in ED

## 2022-04-06 ENCOUNTER — PATIENT OUTREACH (OUTPATIENT)
Dept: CARE COORDINATION | Facility: CLINIC | Age: 72
End: 2022-04-06
Payer: MEDICARE

## 2022-04-06 DIAGNOSIS — Z71.89 OTHER SPECIFIED COUNSELING: ICD-10-CM

## 2022-04-06 DIAGNOSIS — I48.0 PAROXYSMAL ATRIAL FIBRILLATION (H): Primary | ICD-10-CM

## 2022-04-06 NOTE — PROGRESS NOTES
"Clinic Care Coordination Contact  [facility]: Post-Discharge Note  SITUATION                                                      Admission:    Admission Date: 04/05/22   Reason for Admission: Palpitations  Discharge:   Discharge Date: 04/05/22  Discharge Diagnosis: Paroxysmal atrial fibrillation    BACKGROUND                                                      Per hospital discharge summary and inpatient provider notes:Ijeoma Shah is a 71 year old female who presents from the preop area with a rapid heart rate.  Patient has a known history of paroxysmal atrial fibrillation.  Patient is actually on Xarelto that she takes for this.  She has not taken it for the last 2 days because of preparation for her colonoscopy.  She does not always know when she goes in and out of A. fib.  When she arrived here today they found her in this rapid rate on the monitor.  Patient denies any current chest pain or shortness of breath.  Denies feeling lightheaded or dizzy.  Patient is wondering if this could be from all the bowel prep that she was using for this procedure.  Denies any recent dysuria or hematuria.  Patient has not really drank anything for the last 8 hours.      ASSESSMENT           Discharge Assessment  How are you doing now that you are home?: \" I am doing ok \"  How are your symptoms? (Red Flag symptoms escalate to triage hotline per guidelines): Improved  Do you feel your condition is stable enough to be safe at home until your provider visit?: Yes  Does the patient have their discharge instructions? : Yes  Does the patient have questions regarding their discharge instructions? : No  Were you started on any new medications or were there changes to any of your previous medications? : No  Does the patient have all of their medications?: Yes  Do you have questions regarding any of your medications? : No  Do you have all of your needed medical supplies or equipment (DME)?  (i.e. oxygen tank, CPAP, cane, " etc.): Yes  Discharge follow-up appointment scheduled within 14 calendar days? : No  Is patient agreeable to assistance with scheduling? :  (pt waiting for a response from PCP and cardiology team)    Post-op ([facility] Only)  If the patient had a surgery or procedure, do they have any questions for a nurse?: No             PLAN                                                      Outpatient Plan:Schedule an appointment with Jose Luis Arrieta MD as soon as possible for a visit      Future Appointments   Date Time Provider Department [place]   4/12/2022 10:15 AM Jame Leyva MD [facility]   6/8/2022  9:30 AM [facility]   6/8/2022 10:20 AM [facility]   6/9/2022 12:15 PM Eneida De Leon MD [facility]         For any urgent concerns, please contact our 24 hour nurse triage line: [phone] ([phone])         Hanane Enrique

## 2022-04-06 NOTE — ANESTHESIA POSTPROCEDURE EVALUATION
Patient: Ijeoma Shah    Procedure: Procedure(s):  COLONOSCOPY not completed pt in Afib       Anesthesia Type:  MAC    Note:  Disposition: Disposition Change/Cancellation            Disposition Change: Procedure canceled   Postop Pain Control: Uneventful            Sign Out: Well controlled pain   PONV: No   Neuro/Psych: Uneventful            Sign Out: Acceptable/Baseline neuro status   Airway/Respiratory: Uneventful            Sign Out: Acceptable/Baseline resp. status   CV/Hemodynamics: Uneventful            Sign Out: Acceptable CV status; No obvious hypovolemia; No obvious fluid overload   Other NRE:    DID A NON-ROUTINE EVENT OCCUR? YES    Event details/Postop Comments:  Case was CX due to HR in 150's-190's once pt placed on cardiac monitor.  AF with fast ventricular response was noted.  Pt taken from Endo suite directly to ER for medical evaluation cardiac work up and intervention to get her HR rate to return to normal.  Phone visit at this time finds the pt at home with return of NSR.  To follow up with her cardiologist and PCP to reschedule colonoscopy again when reasonable.  No apparent problems shared with the CRNA at this time.           Last vitals:  Vitals Value Taken Time   /73 04/05/22 1836   Temp 98  F (36.7  C) 04/05/22 1422   Pulse 67 04/05/22 1836   Resp 18 04/05/22 1836   SpO2 100 % 04/05/22 1836       Electronically Signed By: RUDY Xiong CRNA  April 6, 2022  12:48 PM

## 2022-04-12 ENCOUNTER — OFFICE VISIT (OUTPATIENT)
Dept: UROLOGY | Facility: CLINIC | Age: 72
End: 2022-04-12
Attending: INTERNAL MEDICINE
Payer: MEDICARE

## 2022-04-12 VITALS
WEIGHT: 176.2 LBS | SYSTOLIC BLOOD PRESSURE: 116 MMHG | DIASTOLIC BLOOD PRESSURE: 72 MMHG | HEIGHT: 63 IN | BODY MASS INDEX: 31.22 KG/M2

## 2022-04-12 DIAGNOSIS — R31.29 MICROSCOPIC HEMATURIA: Primary | ICD-10-CM

## 2022-04-12 LAB
ALBUMIN UR-MCNC: NEGATIVE MG/DL
APPEARANCE UR: CLEAR
BILIRUB UR QL STRIP: NEGATIVE
COLOR UR AUTO: NORMAL
GLUCOSE UR STRIP-MCNC: NEGATIVE MG/DL
HGB UR QL STRIP: NEGATIVE
KETONES UR STRIP-MCNC: NEGATIVE MG/DL
LEUKOCYTE ESTERASE UR QL STRIP: NEGATIVE
NITRATE UR QL: NEGATIVE
PH UR STRIP: 5 [PH] (ref 5–7)
SP GR UR STRIP: 1 (ref 1–1.03)
UROBILINOGEN UR STRIP-MCNC: NORMAL MG/DL

## 2022-04-12 PROCEDURE — 99204 OFFICE O/P NEW MOD 45 MIN: CPT | Performed by: UROLOGY

## 2022-04-12 PROCEDURE — 81003 URINALYSIS AUTO W/O SCOPE: CPT | Performed by: UROLOGY

## 2022-04-12 NOTE — PROGRESS NOTES
Ijeoma Shah is a 71 year old female seen in consultation for microhematuria. Consult from Jyoti Espinal.      Recently found to have AF, now on Xarelto      Concern today is 'microhematuria' associated with urgency, freq and dysuria; see negative urine below.     Presently without  sx's. Specifically denies dysuria, gross hematuria, frequency; nocturia x 0-1. No significant incontinence.     Denies prior  eval, hx bladder surgery, use of bladder meds, personal or FH of stones or other  disease, significant occupational exposure hx.    Hx 2 vag deliveries, not on HRT. BM's are satisfactory; recently had pos hemocult; awaiting further eval for this.    Does have tobacco hx; none x several yrs; hx lung ca noted, presently PAGE.    Reviewed PMH in detail including AF, COPD, lung CA with lobectomy and chemo, bronchitis, cardiac dysrythmia, back pain with lumbar radiculopathy        Past Medical History:   Diagnosis Date     Arrhythmia     A-Fib     Benign essential hypertension      Calculus of gallbladder without cholecystitis without obstruction      COPD (chronic obstructive pulmonary disease) (H)      Lung cancer (H)      Simple chronic bronchitis (H)        Past Surgical History:   Procedure Laterality Date     ARTHROEREISIS, SUBTALAR       BRONCHOSCOPY RIGID OR FLEXIBLE W/TRANSENDOSCOPIC ENDOBRONCHIAL ULTRASOUND GUIDED N/A 07/27/2020    Procedure: Flexible bronchoscopy, endobronchial ultrasound with transbronchial biopsies;  Surgeon: Edin Castro MD;  Location: [facility]     CATARACT IOL, RT/LT Bilateral      COLONOSCOPY  06/29/2004    colonoscopy to the cecum     ESOPHAGOSCOPY, GASTROSCOPY, DUODENOSCOPY (EGD), COMBINED N/A 09/01/2020    Procedure: ESOPHAGOGASTRODUODENOSCOPY, WITH FINE NEEDLE ASPIRATION BIOPSY, WITH ENDOSCOPIC ULTRASOUND GUIDANCE;  Surgeon: Barber Hogan MD;  Location: [facility]     LAPAROSCOPIC CHOLECYSTECTOMY N/A 5/20/2021    Procedure: CHOLECYSTECTOMY, LAPAROSCOPIC;   Surgeon: Gavin Castillo MD;  Location: [facility]     PICC TRIPLE LUMEN PLACEMENT Right 10/23/2020    5Fr - 36cm, Medial brachial vein     SURGICAL HISTORY OF -       nose surgery     THORACOSCOPIC RESECTION LUNG Left 10/22/2020    Procedure: Left thoracoscopic lobectomy converted to Left Thoracotomy, Medistinal lymph node dissection, Pulmonary Artery repair, flexible bronchoscopy, on-pump oxygenator on standy-by;  Surgeon: Andrea Sanabria MD;  Location: [facility]     THORACOTOMY N/A 10/22/2020    Procedure: Thoracotomy;  Surgeon: Andrea Sanabria MD;  Location: [facility]     TRANSCERVICAL EXTENDED MEDIASTINAL LYMPHADENECTOMY N/A 10/22/2020    Procedure: Transcervical extended mediastinal lymphadenectomy;  Surgeon: Andrea Sanabria MD;  Location: [facility]     TUBAL LIGATION      bilateral tubal ligation.  BTL       Social History     Socioeconomic History     Marital status:      Spouse name: Not on file     Number of children: 1     Years of education: Not on file     Highest education level: Not on file   Occupational History     Occupation: seasonal summer work only   Tobacco Use     Smoking status: Former Smoker     Packs/day: 1.00     Years: 33.00     Pack years: 33.00     Types: Cigarettes     Quit date: 2014     Years since quittin.3     Smokeless tobacco: Never Used   Vaping Use     Vaping Use: Never used   Substance and Sexual Activity     Alcohol use: Yes     Alcohol/week: 1.7 - 2.5 standard drinks     Types: 2 - 3 Standard drinks or equivalent per week     Comment: occ,social     Drug use: No     Sexual activity: Yes     Partners: Male   Other Topics Concern     Parent/sibling w/ CABG, MI or angioplasty before 65F 55M? Not Asked   Social History Narrative     Not on file     Social Determinants of Health     Financial Resource Strain: Not on file   Food Insecurity: Not on file   Transportation Needs: Not on file   Physical Activity: Not on file   Stress: Not on file   Social Connections: Not on  file   Intimate Partner Violence: Not on file   Housing Stability: Not on file       Current Outpatient Medications   Medication Sig Dispense Refill     lisinopril (ZESTRIL) 20 MG tablet Take 1 tablet by mouth once daily (Patient taking differently: Take 20 mg by mouth daily (with dinner) ) 90 tablet 0     rivaroxaban ANTICOAGULANT (XARELTO) 20 MG TABS tablet Take 1 tablet (20 mg) by mouth daily (with dinner) 90 tablet 0       Physical Exam:    GENL: NAD. (Remainder of exam deferred)        3/1/22 Urine:                      Urine today:  Results for orders placed or performed in visit on 04/12/22   UA reflex to Microscopic     Status: Normal   Result Value Ref Range    Color Urine Straw Colorless, Straw, Light Yellow, Yellow    Appearance Urine Clear Clear    Glucose Urine Negative Negative mg/dL    Bilirubin Urine Negative Negative    Ketones Urine Negative Negative mg/dL    Specific Gravity Urine 1.003 1.003 - 1.035    Blood Urine Negative Negative    pH Urine 5.0 5.0 - 7.0    Protein Albumin Urine Negative Negative mg/dL    Urobilinogen Urine Normal Normal, 2.0 mg/dL    Nitrite Urine Negative Negative    Leukocyte Esterase Urine Negative Negative    Narrative    Microscopic not indicated       3/8/22  CT abd/pelvis with contrast: benign; perhaps some bladder wall thickening      Reviewed images in detail    3/14/22:  FISH: negative          IMP:  1. Minimal microhematuria, resolved      PLAN:  1. Discussed situation with patient in detail. In face of her negative urine today as well as essentially negative urine last month and negative contrast CT and FISH I do not believe that further evaluation (including cysto) is necessarily warrented at this point. I offered pt choice between cysto today vs conservative following with RTC for development of gross or significant microhematuria; pt elects conservative management at this point.    2. RTC per significant increase in microhematuria or development of gross  hematuria    3. Total time spent in reviewing patient records, discussing history, performing exam, discussing diagnosis, outlining treatment plan and documentation: 30 minutes

## 2022-04-20 ENCOUNTER — HOSPITAL ENCOUNTER (OUTPATIENT)
Dept: CARDIOLOGY | Facility: CLINIC | Age: 72
Discharge: HOME | End: 2022-04-20
Attending: INTERNAL MEDICINE | Admitting: INTERNAL MEDICINE
Payer: MEDICARE

## 2022-04-20 ENCOUNTER — LAB (OUTPATIENT)
Dept: LAB | Facility: CLINIC | Age: 72
End: 2022-04-20
Payer: MEDICARE

## 2022-04-20 DIAGNOSIS — I48.0 PAROXYSMAL ATRIAL FIBRILLATION (H): ICD-10-CM

## 2022-04-20 LAB
LVEF ECHO: NORMAL
TSH SERPL DL<=0.005 MIU/L-ACNC: 0.68 MU/L (ref 0.4–4)

## 2022-04-20 PROCEDURE — 93005 ELECTROCARDIOGRAM TRACING: CPT | Performed by: INTERNAL MEDICINE

## 2022-04-20 PROCEDURE — 93306 TTE W/DOPPLER COMPLETE: CPT

## 2022-04-20 PROCEDURE — 93005 ELECTROCARDIOGRAM TRACING: CPT

## 2022-04-20 PROCEDURE — 84443 ASSAY THYROID STIM HORMONE: CPT

## 2022-04-20 PROCEDURE — 93306 TTE W/DOPPLER COMPLETE: CPT | Mod: 26 | Performed by: INTERNAL MEDICINE

## 2022-04-20 PROCEDURE — 36415 COLL VENOUS BLD VENIPUNCTURE: CPT

## 2022-05-10 ENCOUNTER — TELEPHONE (OUTPATIENT)
Dept: CARDIOLOGY | Facility: CLINIC | Age: 72
End: 2022-05-10

## 2022-05-16 ENCOUNTER — MYC REFILL (OUTPATIENT)
Dept: ONCOLOGY | Facility: CLINIC | Age: 72
End: 2022-05-16
Payer: MEDICARE

## 2022-05-16 DIAGNOSIS — I48.91 ATRIAL FIBRILLATION WITH RAPID VENTRICULAR RESPONSE (H): ICD-10-CM

## 2022-05-16 NOTE — TELEPHONE ENCOUNTER
Xarelto refill   Last prescribing provider: 1/12/22 Dr De Leon     Last clinic visit date: 3/9/22 Dr De Leon     Any missed appointments or no-shows since last clinic visit?: No     Recommendations for requested medication (if none, N/A): Copied from chart note 3/9/22 Dr De Leon     rivaroxaban ANTICOAGULANT (XARELTO) 20 MG TABS tablet Take 1 tablet (20 mg) by mouth daily (with dinner) 90 tablet 0       Next clinic visit date: 6/9/22 Dr De Leon     Any other pertinent information (if none, N/A): N/A

## 2022-05-18 ENCOUNTER — ANCILLARY PROCEDURE (OUTPATIENT)
Dept: CARDIOLOGY | Facility: CLINIC | Age: 72
End: 2022-05-18
Attending: INTERNAL MEDICINE
Payer: MEDICARE

## 2022-05-18 DIAGNOSIS — I48.0 PAROXYSMAL ATRIAL FIBRILLATION (H): ICD-10-CM

## 2022-05-18 PROCEDURE — 93245 EXT ECG>7D<15D REC SCAN A/R: CPT | Performed by: INTERNAL MEDICINE

## 2022-05-18 NOTE — PATIENT INSTRUCTIONS
Patient has been prescribed a ZioPatch holter for 14 days.  Patient was instructed regarding the indication, function, care and prompt return of the ZioPatch holter monitor. The monitor was home enrolled and the patient was provided with instructions regarding care of the skin, electrodes, and monitor, as well as documentation in the patient diary. Patient demonstrated understanding of this information and agreed to call [employer] with further questions or concerns.

## 2022-06-08 ENCOUNTER — LAB (OUTPATIENT)
Dept: LAB | Facility: CLINIC | Age: 72
End: 2022-06-08
Payer: MEDICARE

## 2022-06-08 ENCOUNTER — HOSPITAL ENCOUNTER (OUTPATIENT)
Dept: CT IMAGING | Facility: CLINIC | Age: 72
Discharge: HOME | End: 2022-06-08
Attending: INTERNAL MEDICINE | Admitting: INTERNAL MEDICINE
Payer: MEDICARE

## 2022-06-08 DIAGNOSIS — R25.2 CRAMP OF LIMB: ICD-10-CM

## 2022-06-08 DIAGNOSIS — Z13.29 SCREENING FOR HYPOTHYROIDISM: ICD-10-CM

## 2022-06-08 DIAGNOSIS — C34.32 SQUAMOUS CELL CARCINOMA OF BRONCHUS IN LEFT LOWER LOBE (H): ICD-10-CM

## 2022-06-08 LAB
ALBUMIN SERPL-MCNC: 3.3 G/DL (ref 3.4–5)
ALP SERPL-CCNC: 91 U/L (ref 40–150)
ALT SERPL W P-5'-P-CCNC: 15 U/L (ref 0–50)
ANION GAP SERPL CALCULATED.3IONS-SCNC: 5 MMOL/L (ref 3–14)
AST SERPL W P-5'-P-CCNC: 12 U/L (ref 0–45)
BASOPHILS # BLD AUTO: 0 10E3/UL (ref 0–0.2)
BASOPHILS NFR BLD AUTO: 0 %
BILIRUB SERPL-MCNC: 0.4 MG/DL (ref 0.2–1.3)
BUN SERPL-MCNC: 16 MG/DL (ref 7–30)
CALCIUM SERPL-MCNC: 9.2 MG/DL (ref 8.5–10.1)
CHLORIDE BLD-SCNC: 101 MMOL/L (ref 94–109)
CO2 SERPL-SCNC: 30 MMOL/L (ref 20–32)
CREAT SERPL-MCNC: 0.89 MG/DL (ref 0.52–1.04)
EOSINOPHIL # BLD AUTO: 0.4 10E3/UL (ref 0–0.7)
EOSINOPHIL NFR BLD AUTO: 6 %
ERYTHROCYTE [DISTWIDTH] IN BLOOD BY AUTOMATED COUNT: 12.1 % (ref 10–15)
GFR SERPL CREATININE-BSD FRML MDRD: 69 ML/MIN/1.73M2
GLUCOSE BLD-MCNC: 116 MG/DL (ref 70–99)
HCT VFR BLD AUTO: 37 % (ref 35–47)
HGB BLD-MCNC: 12.2 G/DL (ref 11.7–15.7)
LYMPHOCYTES # BLD AUTO: 0.9 10E3/UL (ref 0.8–5.3)
LYMPHOCYTES NFR BLD AUTO: 12 %
MCH RBC QN AUTO: 29 PG (ref 26.5–33)
MCHC RBC AUTO-ENTMCNC: 33 G/DL (ref 31.5–36.5)
MCV RBC AUTO: 88 FL (ref 78–100)
MONOCYTES # BLD AUTO: 0.6 10E3/UL (ref 0–1.3)
MONOCYTES NFR BLD AUTO: 9 %
NEUTROPHILS # BLD AUTO: 5 10E3/UL (ref 1.6–8.3)
NEUTROPHILS NFR BLD AUTO: 73 %
PLATELET # BLD AUTO: 254 10E3/UL (ref 150–450)
POTASSIUM BLD-SCNC: 4.4 MMOL/L (ref 3.4–5.3)
PROT SERPL-MCNC: 7.4 G/DL (ref 6.8–8.8)
RBC # BLD AUTO: 4.2 10E6/UL (ref 3.8–5.2)
SODIUM SERPL-SCNC: 136 MMOL/L (ref 133–144)
WBC # BLD AUTO: 6.9 10E3/UL (ref 4–11)

## 2022-06-08 PROCEDURE — 80053 COMPREHEN METABOLIC PANEL: CPT

## 2022-06-08 PROCEDURE — 250N000009 HC RX 250: Performed by: RADIOLOGY

## 2022-06-08 PROCEDURE — 85025 COMPLETE CBC W/AUTO DIFF WBC: CPT

## 2022-06-08 PROCEDURE — 74177 CT ABD & PELVIS W/CONTRAST: CPT | Mod: MG

## 2022-06-08 PROCEDURE — 83735 ASSAY OF MAGNESIUM: CPT

## 2022-06-08 PROCEDURE — 36415 COLL VENOUS BLD VENIPUNCTURE: CPT

## 2022-06-08 PROCEDURE — 84443 ASSAY THYROID STIM HORMONE: CPT

## 2022-06-08 PROCEDURE — 250N000011 HC RX IP 250 OP 636: Performed by: RADIOLOGY

## 2022-06-08 RX ORDER — IOPAMIDOL 755 MG/ML
85 INJECTION, SOLUTION INTRAVASCULAR ONCE
Status: COMPLETED | OUTPATIENT
Start: 2022-06-08 | End: 2022-06-08

## 2022-06-08 RX ADMIN — SODIUM CHLORIDE 61 ML: 9 INJECTION, SOLUTION INTRAVENOUS at 07:59

## 2022-06-08 RX ADMIN — IOPAMIDOL 85 ML: 755 INJECTION, SOLUTION INTRAVENOUS at 07:59

## 2022-06-09 ENCOUNTER — VIRTUAL VISIT (OUTPATIENT)
Dept: ONCOLOGY | Facility: CLINIC | Age: 72
End: 2022-06-09
Attending: INTERNAL MEDICINE
Payer: MEDICARE

## 2022-06-09 DIAGNOSIS — Z13.29 SCREENING FOR HYPOTHYROIDISM: ICD-10-CM

## 2022-06-09 DIAGNOSIS — Z11.52 ENCOUNTER FOR SCREENING FOR COVID-19: Primary | ICD-10-CM

## 2022-06-09 DIAGNOSIS — C34.32 SQUAMOUS CELL CARCINOMA OF BRONCHUS IN LEFT LOWER LOBE (H): ICD-10-CM

## 2022-06-09 DIAGNOSIS — R25.2 CRAMP OF LIMB: ICD-10-CM

## 2022-06-09 LAB
MAGNESIUM SERPL-MCNC: 2 MG/DL (ref 1.6–2.3)
TSH SERPL DL<=0.005 MIU/L-ACNC: 0.69 MU/L (ref 0.4–4)

## 2022-06-09 PROCEDURE — 99215 OFFICE O/P EST HI 40 MIN: CPT | Mod: 95 | Performed by: INTERNAL MEDICINE

## 2022-06-09 PROCEDURE — G0463 HOSPITAL OUTPT CLINIC VISIT: HCPCS | Mod: CS,PN,RTG | Performed by: INTERNAL MEDICINE

## 2022-06-09 NOTE — LETTER
6/9/2022         RE: Ijeoma Shah  [address] [id number]        Dear Colleague,    Thank you for referring your patient, Ijeoma Shah, to the [facility]. Please see a copy of my visit note below.       [facility]    PATIENT NAME: Ijeoma Shah  MRN # [id number]   DATE OF VISIT: June 9, 2022  YOB: 1950     CANCER TYPE: SCC lung, poorly differentiated  STAGE: pT4N0 (IIIA)  ECOG PS: 1    PD-L1: TPS 15% on L51-93117 lobectomy, <1% on HS69-6654 (LLL bx)  Lung panel: SMO [id number] on LLL bx, negative for fusions on lobectomy specimen.   NGS: N/A    SUMMARY  7/3/20  CT chest (screening). 6.7 cm LLL mass, 0.6 cm RML nodule, mediastinal and L hilar LNs  7/9/20 PET/CT. 7.1 x 5.6 cm LLL mass (SUV 19.6), post obstructive pneumonitis LLL inferior to the mass (SUV 2.8), 3.5 x 1.7 cm 4L node (SUV 5.9), L adrenal nodule 1.1 cm (SUV 4), indeterminate pulmonary nodules, pancreatic cysts, not hypermetabolic  7/27/20 Bronch, EBUS (Dr. Castro). 10R, 11R, 4R too small to biopsy. Stations 7, 4L, 11L negative for malignancy. LLL mass bx: SCC  8/12/20 Brain MRI negative  9/1/20 EUS to evaluate adrenal and 4L (Dr. Hogan). Both negative for malignancy. Adrenal with bland adrenal cells  10/22/20 L VATS, converted to thoracotomy, LLL lobectomy, MLND, R pulmonary arterioplasty (Dr. Sanabria, Dr. Davis). 8.3 cm poorly differentiated SCC, +angiolymphatic invasion  12/3/20~2/25/21 Cisplatin gemcitabine x 4. C2D8 delayed 1 week due to neutropenia, added neulasta    ASSESSMENT AND PLAN  SCC lung, [id number], IIIA, R0 resection, discrepant PD-L1: Just over a year after completing adjuvant chemo and just over 1 1/2 years after lobectomy. The tiny ARMOND nodule on the CT in March is smaller. There's two nodules on the right that are noticeably larger but still very small. The mediastinal nodes are also a little larger but it's possible she had  covid or another URI in the last few weeks and they might be reactive. Morphologically, they're very homogeneous and not really enhancing. Discussed that the nodules on the right are worrisome, although SCC don't typically look like that, she's at risk for adenocarcinoma as well, but that they're too small to  on PET or to biopsy. Therefore, I recommended repeating a CT chest non contrast in 2 months. We should see the mediastinal nodes well enough to figure out whether they're getting bigger or not    Malaise, fatigue, appetite change: Might have had covid based on exposure history, timing of testing - did a rapid home antigen test. Recommended pcr testing, although likely will be negative given it's been 3 weeks since symptoms started. Nonetheless, I think it's best to try our best to find out. I placed an order and she'll go to the [facility] to get it done. Covid would be a reasonable explanation for these symptoms, but I asked her to let me know if it doesn't improve in the next couple of weeks since cancers, while unlikely with the scan results, can also do the same. I'd also ask her to see her PCP to ensure we're doing a thorough evaluation.     Microscopic hematuria, dysuria: Urine cytology and FISH negative in March-April, met with Dr. Leyva in Urology, discussed cystoscopy vs monitoring, Salima opted for monitoring. Dr. Leyva's note 4/12/22 reviewed     R L4 nerve root impingement: Better since last year but still with chronic LBP    Afib: Has a ziopatch monitor right now.    FOBT +laura: Couldn't get colonoscopy due to afib. Will see if she's rescheduled at the next visit.     40 minutes spent on the date of the encounter doing chart review, history and exam, documentation and further activities per the note     Eneida De Leon MD  Associate Professor of Medicine  Hematology, Oncology and Transplantation      SUBJECTIVE  Salima is here for follow up. Feeling more tired in the last couple  of weeks, appetite down. No fevers but feeling more chilled than usual, lack from motivation for a few weeks. Sometimes a little runny nose.   No HA  No vision changes  Breathing ok  Minimal cough  No problems with constipation, diarrhea, upset stomach  No leg swelling, but having more leg cramps or tingling at night  Chronic LBP - MRI 8/2021 showed severe DJD L4-5. Had a hard time walking on her R leg, injections helped, and moving around helped quite a bit.     PAST MEDICAL HISTORY  SCC as above  Afib with RVR. Initially when she presented for surgery 10/1, then post-op in 10/2020. DCCV x 2 10/23/20, ibutilide --> NSR, dig load, amio gtt. TTE 10/16/20 unremarkable.   Low back pain, R radiculopathy, L5-S1 interlaminar lumbar epidural steroid injection 11/15/21  Sp cholecystectomy  Dyslipidemia  H/o bronchitis  Nose surgery  Tubal ligation  Adrenal nodule, bx 9/1/20, negative for malignancy  Cholelithiasis  Cataract repair 2011?    PFT 8/20/20 (preop). FEV1 1.33 (58%), FVC 1.76 (60%), DLCO 17.72 (90%)    CURRENT OUTPATIENT MEDICATIONS  Current Outpatient Medications   Medication Sig Dispense Refill     lisinopril (ZESTRIL) 20 MG tablet Take 1 tablet by mouth once daily (Patient taking differently: Take 20 mg by mouth daily (with dinner)) 90 tablet 0     rivaroxaban ANTICOAGULANT (XARELTO) 20 MG TABS tablet Take 1 tablet (20 mg) by mouth daily (with dinner) 90 tablet 0     ALLERGIES  Allergies   Allergen Reactions     Bee Venom Hives     Hot and sweating        REVIEW OF SYSTEMS  As above in the HPI, o/w complete 12-point ROS was negative.    PHYSICAL EXAM  There were no vitals taken for this visit.  GEN: NAD  HEENT: EOMI, no icterus, injection or pallor  NEURO: alert    Remainder of physical exam deferred due to public health emergency and limitations of video visit.    LABORATORY AND IMAGING STUDIES   06/08/22 07:16   Sodium 136   Potassium 4.4   Chloride 101   Carbon Dioxide 30   Urea Nitrogen 16   Creatinine 0.89    GFR Estimate 69 [1]   Calcium 9.2   Anion Gap 5   Albumin 3.3 (L)   Protein Total 7.4   Bilirubin Total 0.4   Alkaline Phosphatase 91   ALT 15   AST 12   Glucose 116 (H)   WBC 6.9   Hemoglobin 12.2   Hematocrit 37.0   Platelet Count 254   RBC Count 4.20   MCV 88   MCH 29.0   MCHC 33.0   RDW 12.1   % Neutrophils 73   % Lymphocytes 12   % Monocytes 9   % Eosinophils 6   % Basophils 0   Absolute Basophils 0.0   Absolute Eosinophils 0.4   Absolute Lymphocytes 0.9   Absolute Monocytes 0.6   Absolute Neutrophils 5.0     Labs were independently reviewed and interpreted by me    CT Chest/Abdomen/Pelvis w Contrast  Narrative: CT CHEST/ABDOMEN/PELVIS WITH CONTRAST June 8, 2022 8:11 AM    CLINICAL HISTORY: Restage SCC LLL status post lobectomy and adjuvant  therapy. Note to new ARMOND nodule on March 2022 CT. Also note to IPMN  and mediastinal LN. Squamous cell carcinoma of bronchus in left lower  lobe (H).    TECHNIQUE: CT scan of the chest, abdomen, and pelvis was performed  following injection of IV contrast. Multiplanar reformats were  obtained. Dose reduction techniques were used.   CONTRAST: 85mL of Isovue 370.    COMPARISON: 3/8/2022, 10/25/2021, 7/23/2021.    FINDINGS:   LUNGS AND PLEURA: Similar appearing postsurgical change of left lower  lobectomy with chronic volume loss and trace pleural fluid. Minimal  biapical pleural-parenchymal scarring. Subtle centrilobular  ground-glass nodularity in the upper lobes is noted, which is  nonspecific but may represent an infectious or inflammatory etiology.  No airspace consolidation. Central airways are patent. There are a few  scattered subcentimeter pulmonary nodules seen bilaterally, which are  similar to comparison. Previously indexed nodules are as follows:  -3 mm right upper lobe nodule (4-64), unchanged.  -5 mm right middle lobe nodule (4-152), unchanged.  -6 mm right middle lobe nodule (4-189), unchanged.  Previously seen 4 mm nodule in the posterior left upper lobe  apex no  longer present. No new or enlarging pulmonary nodules.    MEDIASTINUM/AXILLAE: There are several prominent, but not technically  enlarged, mediastinal lymph nodes present, which have increased in  size to comparison. A couple examples are as follows:  -Precarinal lymph node measuring 9 mm in short axis (3-50), previously  7 mm.  -AP window lymph node measuring 7 mm in short axis (3-51), previously  6 mm.    Heart size is normal. No pericardial effusion. Thoracic aorta is  normal in course and caliber. Minimal thoracic aortic atherosclerosis  is present. No coronary artery atherosclerosis.    HEPATOBILIARY: Several similar-appearing probable hepatic cysts are  present. Largest in the anterior aspect of the left hepatic lobe  measures 3.9 x 2.7 cm. No enhancing hepatic observations. Gallbladder  is absent. No bile duct dilation.    PANCREAS: Similar-appearing small cystic lesions in the head and neck  of the pancreas measuring up to 9 mm are unchanged to comparison and,  as before, may represent side branch IPMNs or focal side branch ductal  dilation. No inflammatory changes of the pancreas. No dilation of the  main pancreatic duct.    SPLEEN: Normal.    ADRENAL GLANDS: There is mild thickening of the left adrenal gland  without discrete nodule or mass, which is similar to comparison and  does not require follow-up. Right adrenal gland is grossly  unremarkable in appearance.    KIDNEYS/BLADDER: Kidneys are normal-appearing. No cystic or solid  renal mass. No nephrolithiasis or hydronephrosis. There is mild wall  thickening in the anterior aspect of the urinary bladder measuring up  to 8 mm in thickness. Urinary bladder wall thickness has improved to  comparison. No gas in the urinary bladder wall or lumen. No urinary  bladder stones.    BOWEL: Normal with no obstruction or acute inflammatory change.  Nothing for appendicitis.    PELVIC ORGANS: Normal.    ADDITIONAL FINDINGS: Moderate severe atherosclerosis of  the abdominal  aorta and iliac arteries. No aneurysmal dilation.    MUSCULOSKELETAL: Multilevel hypertrophic and degenerative changes of  the spine are present, including multilevel bridging osteophytes.  There is increased kyphotic curvature of the thoracic spine. No acute  osseous abnormality.  Impression: IMPRESSION:  1.  No findings specific for recurrent or metastatic disease.  2.  Status post left lower lobectomy with similar-appearing chronic  volume loss and minimal pleural fluid.  3.  Subtle centrilobular ground-glass nodularity in the upper lobes,  possibly representing an infectious or inflammatory etiology.  4.  Scattered prominent mediastinal lymph nodes, increased in size to  comparison, possibly reactive in nature.  5.  Similar-appearing subcentimeter pulmonary nodules. Recommend  attention on surveillance imaging.  6.  Similar-appearing subcentimeter pancreatic head and neck cystic  lesions, favoring IPMNs and/or sidebranch ductal dilation. Recommend  attention on surveillance imaging.  7.  Mild urinary bladder wall thickening, improved to comparison.    SOBEIDA PAUL MD         SYSTEM ID:  [id number]     Imaging was personally reviewed and interpreted by me and I compared to the last CT         Sincerely,    Eneida De Leon MD

## 2022-06-10 ENCOUNTER — LAB (OUTPATIENT)
Dept: FAMILY MEDICINE | Facility: CLINIC | Age: 72
End: 2022-06-10
Attending: INTERNAL MEDICINE
Payer: MEDICARE

## 2022-06-10 DIAGNOSIS — Z11.52 ENCOUNTER FOR SCREENING FOR COVID-19: ICD-10-CM

## 2022-06-10 PROCEDURE — U0003 INFECTIOUS AGENT DETECTION BY NUCLEIC ACID (DNA OR RNA); SEVERE ACUTE RESPIRATORY SYNDROME CORONAVIRUS 2 (SARS-COV-2) (CORONAVIRUS DISEASE [COVID-19]), AMPLIFIED PROBE TECHNIQUE, MAKING USE OF HIGH THROUGHPUT TECHNOLOGIES AS DESCRIBED BY CMS-2020-01-R: HCPCS

## 2022-06-10 PROCEDURE — U0005 INFEC AGEN DETEC AMPLI PROBE: HCPCS

## 2022-06-11 LAB — SARS-COV-2 RNA RESP QL NAA+PROBE: NEGATIVE

## 2022-06-20 ENCOUNTER — MYC REFILL (OUTPATIENT)
Dept: ONCOLOGY | Facility: CLINIC | Age: 72
End: 2022-06-20
Payer: MEDICARE

## 2022-06-20 DIAGNOSIS — I10 BENIGN ESSENTIAL HYPERTENSION: ICD-10-CM

## 2022-06-20 RX ORDER — LISINOPRIL 20 MG/1
20 TABLET ORAL DAILY
Qty: 90 TABLET | Refills: 0 | Status: CANCELLED | OUTPATIENT
Start: 2022-06-20

## 2022-06-20 RX ORDER — LISINOPRIL 20 MG/1
TABLET ORAL
Qty: 90 TABLET | Refills: 0 | OUTPATIENT
Start: 2022-06-20

## 2022-06-20 NOTE — TELEPHONE ENCOUNTER
Lisinopril Refill  Last prescribing provider: Dr De Leon     Last clinic visit date: 6/9/22 Dr De Leon     Any missed appointments or no-shows since last clinic visit?: No     Recommendations for requested medication (if none, N/A): Copied from chart note 6/9/22 Dr De Leon     lisinopril (ZESTRIL) 20 MG tablet Take 1 tablet by mouth once daily (Patient taking differently: Take 20 mg by mouth daily (with dinner)) 90 tablet 0      Next clinic visit date: 8/24/22 Dr De Leon     Any other pertinent information (if none, N/A): N/A

## 2022-06-21 RX ORDER — LISINOPRIL 20 MG/1
20 TABLET ORAL DAILY
Qty: 90 TABLET | Refills: 0 | Status: SHIPPED | OUTPATIENT
Start: 2022-06-21 | End: 2022-06-23 | Stop reason: DRUGHIGH

## 2022-06-21 NOTE — TELEPHONE ENCOUNTER
Lisinopril refills should go to patients PCP DR Espinal. Dr De Leon will refill on one time basis.   Call placed to Salima to let her know that future refills of lisinopril should go to Dr Espinal. She states she just went on to my chart to request a refill of the medication and it must of gotten sent to Dr De Leon. She will try and remember to call the clinic for a refill next time.

## 2022-06-22 ENCOUNTER — TELEPHONE (OUTPATIENT)
Dept: CARDIOLOGY | Facility: CLINIC | Age: 72
End: 2022-06-22

## 2022-06-22 DIAGNOSIS — I10 BENIGN ESSENTIAL HYPERTENSION: Primary | ICD-10-CM

## 2022-06-22 DIAGNOSIS — I48.0 PAROXYSMAL ATRIAL FIBRILLATION (H): ICD-10-CM

## 2022-06-22 NOTE — TELEPHONE ENCOUNTER
Type of call: Monitor    Monitor  Company: [employer]  Type of monitor:   Rhythm: Rapid Afib 235 BPM  Duration: 60 Duration  Symptomatic:    Was the patient notified?: no  Was the report downloaded or faxed?:

## 2022-06-23 ENCOUNTER — TELEPHONE (OUTPATIENT)
Dept: FAMILY MEDICINE | Facility: CLINIC | Age: 72
End: 2022-06-23

## 2022-06-23 RX ORDER — LISINOPRIL 10 MG/1
10 TABLET ORAL EVERY MORNING
Qty: 90 TABLET | Refills: 1 | Status: SHIPPED | OUTPATIENT
Start: 2022-06-23 | End: 2022-08-01

## 2022-06-23 RX ORDER — METOPROLOL SUCCINATE 25 MG/1
25 TABLET, EXTENDED RELEASE ORAL EVERY MORNING
Qty: 90 TABLET | Refills: 1 | Status: SHIPPED | OUTPATIENT
Start: 2022-06-23 | End: 2022-12-13

## 2022-06-23 NOTE — TELEPHONE ENCOUNTER
"Addendum 6/23/22:     Recent Zio prelim report reveals rapid afib >200bpm     Overall 5% burden, longest episode approx 2 hours     No history or information regarding symptoms     Impression:     Paroxysmal afib with RVR, on full anticoagulation for CHADS-VASc2 score of 2  Echo structurally unremarkable     No show for last appt     Plan:     1.  Patient is overdue for followup with Dr. Goldstein, please schedule      2.  Restart Toprol XL 25mg po qam for now and decrease Lisinopril to 10mg po every day     3.  Track home BPs several hours after morning meds and after resting quietly for 10 min    Called and spoke to patient. Reviewed above information from Dr Arrieta. She would prefer to see someone in [place] as she lives in [place]. She will check her BP but cannot check it 2-3 hours after she takes her meds because she is at work. She can check it on her lunch hour.    She requests a new prescription for 10 mg tablets of lisinopril and a refill for the toprol.     She also said she has had some mornings when she wakes up that her legs are numb or she has a feeling of \"charley horse\"  The feeling does not last long.     Will plan to check back with her to get BP readings in about 2 weeks. Will route to [facility] for assistance in scheduling an appointment but also gave her phone number if she does not get a call. Rx sent to pharmacy.     Demetira Harris RN  Cardiology Care Coordinator  [facility]  Phone: [phone]    "

## 2022-06-23 NOTE — TELEPHONE ENCOUNTER
----- Message from Velvet Kirkpatrick sent at 6/23/2022  3:05 PM CDT -----  Regarding: RE: Needs scheduling    ----- Message -----  From: Jasmin Szymanski CMA  Sent: 6/23/2022  12:58 PM CDT  To: / [facility] Heart Scheduling  Subject: FW: Needs scheduling                               ----- Message -----  From: Demetria Harris RN  Sent: 6/23/2022  12:57 PM CDT  To:  Cardiology Adult [facility], #  Subject: Needs scheduling                                 Hello     I am not sure who this would go to.   Patient has PAF and needs follow up . Has seen Dr Goldstein at the [facility] but wants to transfer care to [place] because it is closer.   Can someone please call her to schedule an appointment?  Thank you    Demetria Harris, RN  Cardiology Care Coordinator  [facility]  Phone: [phone]

## 2022-07-13 DIAGNOSIS — I48.91 ATRIAL FIBRILLATION WITH RAPID VENTRICULAR RESPONSE (H): ICD-10-CM

## 2022-08-01 ENCOUNTER — OFFICE VISIT (OUTPATIENT)
Dept: CARDIOLOGY | Facility: CLINIC | Age: 72
End: 2022-08-01
Payer: MEDICARE

## 2022-08-01 VITALS
HEIGHT: 63 IN | OXYGEN SATURATION: 97 % | SYSTOLIC BLOOD PRESSURE: 106 MMHG | DIASTOLIC BLOOD PRESSURE: 62 MMHG | HEART RATE: 64 BPM | BODY MASS INDEX: 29.77 KG/M2 | WEIGHT: 168 LBS

## 2022-08-01 DIAGNOSIS — I48.91 ATRIAL FIBRILLATION WITH RAPID VENTRICULAR RESPONSE (H): ICD-10-CM

## 2022-08-01 DIAGNOSIS — C34.92 SQUAMOUS CELL CARCINOMA OF LEFT LUNG (H): ICD-10-CM

## 2022-08-01 DIAGNOSIS — I48.0 PAROXYSMAL ATRIAL FIBRILLATION (H): ICD-10-CM

## 2022-08-01 DIAGNOSIS — I10 BENIGN ESSENTIAL HYPERTENSION: Primary | ICD-10-CM

## 2022-08-01 PROCEDURE — 99214 OFFICE O/P EST MOD 30 MIN: CPT | Performed by: INTERNAL MEDICINE

## 2022-08-01 RX ORDER — LISINOPRIL 10 MG/1
10 TABLET ORAL EVERY MORNING
Qty: 90 TABLET | Refills: 1 | Status: SHIPPED | OUTPATIENT
Start: 2022-08-01 | End: 2023-05-26

## 2022-08-01 NOTE — PROGRESS NOTES
Service Date: 2022    Jyoti Espinal MD  [facility]  [address] [place], [place] [id number]    RE:  Ijeoma Shah  MRN:  [id number]  :  1950    Dear Doctors:    Ijeoma Shah, a 71-year-old woman with paroxysmal atrial fibrillation, hypertension, dyslipidemia and squamous cell carcinoma of the lung, was seen today at your request for followup of paroxysmal atrial fibrillation.  In 2020, prior to undergoing lung surgery, the patient was found to be in atrial fibrillation with a rapid ventricular response rate.  Echocardiography showed normal ejection fraction with no significant valvular stenosis or insufficiency.  The patient was placed on rivaroxaban.    Subsequent ambulatory monitoring in 2022 showed infrequent episodes of paroxysmal atrial fibrillation.  The patient was placed on beta blocker therapy.    Presently, Ms. Shah is free of any palpitations, dizziness, syncope, lightheadedness or unusual dyspnea.  She expresses a great deal of reluctance to take medications but remains on rivaroxaban.  She had been prescribed metoprolol XL 25 mg b.i.d., but is currently takes  25 daily.  Her blood pressure is well controlled with lisinopril.    She was recently seen by her oncologist in 2022 , and followup imaging is planned for small Right  lung nodules noted on her last chest CT . She reports she was to undergo colonoscopy, but has not received clearance from [facility] cardiology.     PAST MEDICAL HISTORY:    1.  Paroxysmal atrial fibrillation.  CHADS-VASc score of 3 based on female gender, hypertension and age.  On rivaroxaban and low-dose metoprolol XL.  2.  Hypertension, well controlled.  3.  Dyslipidemia -- currently not on a statin drug.  Declined statin therapy today.  4.  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  a.  Stage pT4 N0(3a).  b.  Status post left lower lobectomy and mediastinal lymph node dissection.  Evidence of  angiolymphatic invasion.  Brain MRA negative.  Status post cisplatin and gemcitabine x4 (completed 02/2021).  c.  Recent CT scan showing small nodules on the right side.  Awaiting followup CT scan with noncontrast in 2 months.    PHYSICAL EXAMINATION:    GENERAL:  Exam today demonstrates a pleasant, cooperative, anxious 71-year-old woman.  VITAL SIGNS:  Her blood pressure is 106/62, her heart rate 64 and regular.  Her height is 1.6 meters, her weight 76.2 kg, her BMI is 29.8.  LUNGS:  Clear to percussion and auscultation with dullness in the left lower lobe.  CARDIOVASCULAR:  Shows a normal S1 with a normal S2.  There is no S3.  There are no murmurs, gallops or rubs.     MEDICATIONS:    1.  Lisinopril 10 mg daily.  2.  Metoprolol ER 25 daily.  3.  Rivaroxaban 20 mg daily.    LABORATORY STUDIES:  Her most recent LDL cholesterol in March was 152.  Her most recent creatinine is 0.89.  Most recent hemoglobin is 12.2 in June.    Ambulatory monitor from 05/2022 showed a paroxysmal atrial fibrillation burden of 5% with no clear association between episodes of atrial fibrillation and symptoms.    ASSESSMENT:  I agree with the need for chronic anticoagulation in view of a CHADS-VASc score of 3.  We discussed the pros and cons of rivaroxaban versus warfarin.  For now, the patient will remain on rivaroxaban but may consider a switch to warfarin depending on her preferences.    As she is free of symptoms and her atrial fibrillation burden is low, low-dose beta blocker therapy is quite reasonable.  Should the symptoms become more frequent, we could consider increasing her beta blocker therapy and or EP consultation, but the patient is very loath to consider any change in medical therapy.    Her blood pressure is well controlled on lisinopril.    We reviewed the features of a Mediterranean-style weight loss diet and a regular exercise program.  The patient is unwilling to consider a statin drug at this time.    RECOMMENDATIONS:     1.  Continue anticoagulation with either rivaroxaban or warfarin, depending on the patient's preference.  She will notify us if she wishes to change from Xarelto  to warfarin.  2.  Continue beta blocker at current dose.  If the patient's symptoms become more frequent, we could consider increasing the drug/formal EP evaluation.  3.  I see no contraindication to colonoscopy.  There is a small risk she could have recurrent atrial fibrillation, which usually can be well controlled with beta blocker therapy.  If she is uncomfortable to have the procedure done locally, she could consider coming to the [facility] or to [facility] for this procedure.   4.  Followup visit with advanced level practitioner in about 6 months.  5.  Followup of lung nodules per the oncologist.  6.  The patient has declined statin therapy at this time.  We will continue lifestyle intervention and would be happy to prescribe should she change her mind.     We greatly appreciate the opportunity to care for your patient, Yogi Shah.    Sincerely,    Warren Pisano MD    cc:  Eneida De Leon MD  [address]  [address]      Warren Pisano MD        D: 2022   T: 2022   MT: reji    Name:     YOGI SHAH  MRN:      [id number]        Account:      [id number]   :      1950           Service Date: 2022       Document: [id number]

## 2022-08-01 NOTE — LETTER
"8/1/2022    Jyoti Garcia MD  [address] [place] [id number]    RE: Ijeoma Shah       Dear Colleague,     I had the pleasure of seeing Ijeoma Shah in the [facility].  HISTORY OF PRESENT ILLNESS:  Chronically fatigued distrustful of \"lots of drugs\" Planning to have colonoscopy but has not received OK to proceed.     Orders this Visit:  No orders of the defined types were placed in this encounter.    No orders of the defined types were placed in this encounter.    There are no discontinued medications.    No diagnosis found.    CURRENT MEDICATIONS:  Current Outpatient Medications   Medication Sig Dispense Refill     lisinopril (ZESTRIL) 10 MG tablet Take 1 tablet (10 mg) by mouth every morning 90 tablet 1     metoprolol succinate ER (TOPROL XL) 25 MG 24 hr tablet Take 1 tablet (25 mg) by mouth every morning (Patient taking differently: Take 25 mg by mouth At Bedtime) 90 tablet 1     rivaroxaban ANTICOAGULANT (XARELTO) 20 MG TABS tablet Take 1 tablet (20 mg) by mouth daily (with dinner) 90 tablet 3       ALLERGIES     Allergies   Allergen Reactions     Bee Venom Hives     Hot and sweating        PAST MEDICAL, SURGICAL, FAMILY, SOCIAL HISTORY:  History was reviewed and updated as needed, see medical record.    Review of Systems:  A 12-point review of systems was completed, see medical record for detailed review of systems information.    Physical Exam:  Vitals: /62 (BP Location: Right arm, Patient Position: Sitting, Cuff Size: Adult Regular)   Pulse 64   Ht 1.6 m (5' 3\")   Wt 76.2 kg (168 lb)   SpO2 97%   BMI 29.76 kg/m      Constitutional:           Skin:           Head:           Eyes:           ENT:           Neck:           Chest:           Cardiac:                    Abdomen:           Vascular:                                        Extremities and Back:           Neurological:           ASSESSMENT: CHADSVASc  Needs anticoagulant  Declines statin     "     RECOMMENDATIONS:   Continue  Anticoagulants and rate control  Colonoscopy  Ok  Follow-up with TAYLOR inb 6 months  Declines statin        Recent Lab Results:  LIPID RESULTS:  Lab Results   Component Value Date    CHOL 251 (H) 03/08/2022    CHOL 238 (H) 06/23/2020    HDL 76 03/08/2022    HDL 68 06/23/2020     (H) 03/08/2022     (H) 06/23/2020    TRIG 115 03/08/2022    TRIG 117 06/23/2020    CHOLHDLRATIO 3.9 07/13/2015       LIVER ENZYME RESULTS:  Lab Results   Component Value Date    AST 12 06/08/2022    AST 15 04/12/2021    ALT 15 06/08/2022    ALT 23 04/12/2021       CBC RESULTS:  Lab Results   Component Value Date    WBC 6.9 06/08/2022    WBC 7.4 04/12/2021    RBC 4.20 06/08/2022    RBC 3.49 (L) 04/12/2021    HGB 12.2 06/08/2022    HGB 13.1 05/20/2021    HCT 37.0 06/08/2022    HCT 32.8 (L) 04/12/2021    MCV 88 06/08/2022    MCV 94 04/12/2021    MCH 29.0 06/08/2022    MCH 31.5 04/12/2021    MCHC 33.0 06/08/2022    MCHC 33.5 04/12/2021    RDW 12.1 06/08/2022    RDW 13.8 04/12/2021     06/08/2022     04/12/2021       BMP RESULTS:  Lab Results   Component Value Date     06/08/2022     04/12/2021    POTASSIUM 4.4 06/08/2022    POTASSIUM 4.2 05/20/2021    CHLORIDE 101 06/08/2022    CHLORIDE 102 04/12/2021    CO2 30 06/08/2022    CO2 28 04/12/2021    ANIONGAP 5 06/08/2022    ANIONGAP 4 04/12/2021     (H) 06/08/2022     (H) 04/12/2021    BUN 16 06/08/2022    BUN 19 04/12/2021    CR 0.89 06/08/2022    CR 1.08 (H) 05/20/2021    GFRESTIMATED 69 06/08/2022    GFRESTIMATED 60 (L) 03/08/2022    GFRESTIMATED 52 (L) 05/20/2021    GFRESTBLACK 60 (L) 05/20/2021    SABINO 9.2 06/08/2022    SABINO 8.7 04/12/2021        A1C RESULTS:  Lab Results   Component Value Date    A1C 5.6 10/23/2020       INR RESULTS:  No results found for: INR    We greatly appreciate the opportunity to be involved in the care of your patient, Ijeoma Shah.    Sincerely,  Warren Pisano,  MD [facility]  No referring provider defined for this encounter.                                                                       Service Date: 2022    Jyoti Espinal MD  [place] [id number]    RE:  Ijeoma Shah  MRN:  [id number]  :  1950    Dear Doctors:    Ijeoma Shah, a 71-year-old woman with paroxysmal atrial fibrillation, hypertension, dyslipidemia and squamous cell carcinoma of the lung, was seen today at your request for followup of paroxysmal atrial fibrillation.  In 2020, prior to undergoing lung surgery, the patient was found to be in atrial fibrillation with a rapid ventricular response rate.  Echocardiography showed normal ejection fraction with no significant valvular stenosis or insufficiency.  The patient was placed on rivaroxaban.    Subsequent ambulatory monitoring in 2022 showed infrequent episodes of paroxysmal atrial fibrillation.  The patient was placed on beta blocker therapy.    Presently, Ms. Shah is free of any palpitations, dizziness, syncope, lightheadedness or unusual dyspnea.  She expresses a great deal of reluctance to take medications but has remained on rivaroxaban.  She had been prescribed metoprolol XL 25 mg b.i.d., but is currently only taking 25 daily.  Her blood pressure is well controlled with lisinopril.    She was recently seen by her oncologist in , and followup imaging is planned for some lung nodules noted on her last screening tests.    PAST MEDICAL HISTORY:    1.  Paroxysmal atrial fibrillation.  CHADS-VASc score of 3 based on female gender, hypertension and age.  On rivaroxaban and low-dose metoprolol XL.  2.  Hypertension, well controlled.  3.  Dyslipidemia -- currently not on a statin drug.  Declined statin therapy today.  4.  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  a.  Stage pT4 N0(3a).  b.  Status post left lower lobectomy and mediastinal lymph  node dissection.  Evidence of angiolymphatic invasion.  Brain MRA negative.  Status post cisplatin and gemcitabine x4 (completed 02/2021).  c.  Recent CT scan showing small nodules on the right side.  Awaiting followup CT scan with noncontrast in 2 months.    PHYSICAL EXAMINATION:    GENERAL:  Exam today demonstrates a pleasant, cooperative, anxious 71-year-old woman.  VITAL SIGNS:  Her blood pressure is 106/62, her heart rate 64 and regular.  Her height is 1.6 meters, her weight 76.2 kg, her BMI is 29.8.  LUNGS:  Clear to percussion and auscultation with dullness in the left lower lobe.  CARDIOVASCULAR:  Shows a normal S1 with a normal S2.  There is no S3.  There are no murmurs, gallops or rubs.     MEDICATIONS:    1.  Lisinopril 10 mg daily.  2.  Metoprolol ER 25 daily.  3.  Rivaroxaban 20 mg daily.    LABORATORY STUDIES:  Her most recent LDL cholesterol in March was 152.  Her most recent creatinine is 0.89.  Most recent hemoglobin is 12.2 in June.    Ambulatory monitor from 05/2022 showed a paroxysmal atrial fibrillation burden of 5% with no clear association between episodes of atrial fibrillation and symptoms.    ASSESSMENT:  I agree with the need for chronic anticoagulation in view of a CHADS-VASc score of 3.  We discussed the pros and cons of rivaroxaban versus warfarin.  For now, the patient will remain on rivaroxaban but may consider a switch to warfarin depending on her preferences.    As she is free of symptoms and her atrial fibrillation burden is low, low-dose beta blocker therapy is quite reasonable.  Should the symptoms become more frequent, we could consider increasing her beta blocker therapy, but the patient is very loath to consider any change in medical therapy.    Her blood pressure is well controlled on lisinopril.    We reviewed the features of a Mediterranean-style weight loss diet and a regular exercise program.  The patient is unwilling to consider a statin drug at this  time.    RECOMMENDATIONS:    1.  Continue anticoagulation with either rivaroxaban or warfarin, depending on the patient's preference.  She will notify us if she wishes to change from Xarelto back to warfarin.  2.  Continue beta blocker at current dose.  If the patient's symptoms become more frequent, we could consider increasing the drug.  3.  I see no contraindication to colonoscopy.  There is a small risk she could have recurrent atrial fibrillation, which usually can be well controlled with beta blocker therapy.  If she is uncomfortable to have the procedure done locally, she could consider coming to the [facility] or to [facility] to have the procedure performed.  4.  Followup visit with advanced level practitioner in about 6 months.  5.  Followup of lung nodules per the oncologist.  6.  The patient has declined statin therapy at this time.  We will continue lifestyle intervention.    We greatly appreciate the opportunity to care for your patient, Yogi Shah.    Sincerely,    Warren Pisano MD    cc:  Eneida De Leon MD  [address]  [address]      Warren Pisano MD        D: 2022   T: 2022   MT: reji    Name:     YOGI SHAH  MRN:      [id number]        Account:      [id number]   :      1950           Service Date: 2022       Document: [id number]      Thank you for allowing me to participate in the care of your patient.      Sincerely,     Warren Pisano MD     [facility]  cc:   No referring provider defined for this encounter.

## 2022-08-01 NOTE — PROGRESS NOTES
"HISTORY OF PRESENT ILLNESS:  Chronically fatigued distrustful of \"lots of drugs\" Planning to have colonoscopy but has not received OK to proceed.     Orders this Visit:  No orders of the defined types were placed in this encounter.    No orders of the defined types were placed in this encounter.    There are no discontinued medications.    No diagnosis found.    CURRENT MEDICATIONS:  Current Outpatient Medications   Medication Sig Dispense Refill     lisinopril (ZESTRIL) 10 MG tablet Take 1 tablet (10 mg) by mouth every morning 90 tablet 1     metoprolol succinate ER (TOPROL XL) 25 MG 24 hr tablet Take 1 tablet (25 mg) by mouth every morning (Patient taking differently: Take 25 mg by mouth At Bedtime) 90 tablet 1     rivaroxaban ANTICOAGULANT (XARELTO) 20 MG TABS tablet Take 1 tablet (20 mg) by mouth daily (with dinner) 90 tablet 3       ALLERGIES     Allergies   Allergen Reactions     Bee Venom Hives     Hot and sweating        PAST MEDICAL, SURGICAL, FAMILY, SOCIAL HISTORY:  History was reviewed and updated as needed, see medical record.    Review of Systems:  A 12-point review of systems was completed, see medical record for detailed review of systems information.    Physical Exam:  Vitals: /62 (BP Location: Right arm, Patient Position: Sitting, Cuff Size: Adult Regular)   Pulse 64   Ht 1.6 m (5' 3\")   Wt 76.2 kg (168 lb)   SpO2 97%   BMI 29.76 kg/m      Constitutional:           Skin:           Head:           Eyes:           ENT:           Neck:           Chest:           Cardiac:                    Abdomen:           Vascular:                                        Extremities and Back:           Neurological:           ASSESSMENT: CHADSVASc  Needs anticoagulant  Declines statin         RECOMMENDATIONS:   Continue  Anticoagulants and rate control  Colonoscopy  Ok  Follow-up with TAYLOR inb 6 months  Declines statin        Recent Lab Results:  LIPID RESULTS:  Lab Results   Component Value Date    CHOL " 251 (H) 03/08/2022    CHOL 238 (H) 06/23/2020    HDL 76 03/08/2022    HDL 68 06/23/2020     (H) 03/08/2022     (H) 06/23/2020    TRIG 115 03/08/2022    TRIG 117 06/23/2020    CHOLHDLRATIO 3.9 07/13/2015       LIVER ENZYME RESULTS:  Lab Results   Component Value Date    AST 12 06/08/2022    AST 15 04/12/2021    ALT 15 06/08/2022    ALT 23 04/12/2021       CBC RESULTS:  Lab Results   Component Value Date    WBC 6.9 06/08/2022    WBC 7.4 04/12/2021    RBC 4.20 06/08/2022    RBC 3.49 (L) 04/12/2021    HGB 12.2 06/08/2022    HGB 13.1 05/20/2021    HCT 37.0 06/08/2022    HCT 32.8 (L) 04/12/2021    MCV 88 06/08/2022    MCV 94 04/12/2021    MCH 29.0 06/08/2022    MCH 31.5 04/12/2021    MCHC 33.0 06/08/2022    MCHC 33.5 04/12/2021    RDW 12.1 06/08/2022    RDW 13.8 04/12/2021     06/08/2022     04/12/2021       BMP RESULTS:  Lab Results   Component Value Date     06/08/2022     04/12/2021    POTASSIUM 4.4 06/08/2022    POTASSIUM 4.2 05/20/2021    CHLORIDE 101 06/08/2022    CHLORIDE 102 04/12/2021    CO2 30 06/08/2022    CO2 28 04/12/2021    ANIONGAP 5 06/08/2022    ANIONGAP 4 04/12/2021     (H) 06/08/2022     (H) 04/12/2021    BUN 16 06/08/2022    BUN 19 04/12/2021    CR 0.89 06/08/2022    CR 1.08 (H) 05/20/2021    GFRESTIMATED 69 06/08/2022    GFRESTIMATED 60 (L) 03/08/2022    GFRESTIMATED 52 (L) 05/20/2021    GFRESTBLACK 60 (L) 05/20/2021    SABINO 9.2 06/08/2022    ASBINO 8.7 04/12/2021        A1C RESULTS:  Lab Results   Component Value Date    A1C 5.6 10/23/2020       INR RESULTS:  No results found for: INR    We greatly appreciate the opportunity to be involved in the care of your patient, Ijeoma Shah.    Sincerely,  Warren Pisano MD      CC  No referring provider defined for this encounter.

## 2022-08-19 ENCOUNTER — HOSPITAL ENCOUNTER (OUTPATIENT)
Dept: CT IMAGING | Facility: CLINIC | Age: 72
Discharge: HOME | End: 2022-08-19
Attending: INTERNAL MEDICINE | Admitting: INTERNAL MEDICINE
Payer: MEDICARE

## 2022-08-19 ENCOUNTER — LAB (OUTPATIENT)
Dept: LAB | Facility: CLINIC | Age: 72
End: 2022-08-19
Payer: MEDICARE

## 2022-08-19 DIAGNOSIS — C34.32 SQUAMOUS CELL CARCINOMA OF BRONCHUS IN LEFT LOWER LOBE (H): ICD-10-CM

## 2022-08-19 LAB
ALBUMIN SERPL-MCNC: 3.6 G/DL (ref 3.4–5)
ALP SERPL-CCNC: 78 U/L (ref 40–150)
ALT SERPL W P-5'-P-CCNC: 20 U/L (ref 0–50)
ANION GAP SERPL CALCULATED.3IONS-SCNC: 3 MMOL/L (ref 3–14)
AST SERPL W P-5'-P-CCNC: 18 U/L (ref 0–45)
BASOPHILS # BLD AUTO: 0 10E3/UL (ref 0–0.2)
BASOPHILS NFR BLD AUTO: 0 %
BILIRUB SERPL-MCNC: 0.4 MG/DL (ref 0.2–1.3)
BUN SERPL-MCNC: 15 MG/DL (ref 7–30)
CALCIUM SERPL-MCNC: 9.3 MG/DL (ref 8.5–10.1)
CHLORIDE BLD-SCNC: 101 MMOL/L (ref 94–109)
CO2 SERPL-SCNC: 31 MMOL/L (ref 20–32)
CREAT SERPL-MCNC: 0.9 MG/DL (ref 0.52–1.04)
EOSINOPHIL # BLD AUTO: 0.3 10E3/UL (ref 0–0.7)
EOSINOPHIL NFR BLD AUTO: 5 %
ERYTHROCYTE [DISTWIDTH] IN BLOOD BY AUTOMATED COUNT: 13.2 % (ref 10–15)
GFR SERPL CREATININE-BSD FRML MDRD: 68 ML/MIN/1.73M2
GLUCOSE BLD-MCNC: 129 MG/DL (ref 70–99)
HCT VFR BLD AUTO: 38 % (ref 35–47)
HGB BLD-MCNC: 12.7 G/DL (ref 11.7–15.7)
IMM GRANULOCYTES # BLD: 0 10E3/UL
IMM GRANULOCYTES NFR BLD: 0 %
LYMPHOCYTES # BLD AUTO: 1.1 10E3/UL (ref 0.8–5.3)
LYMPHOCYTES NFR BLD AUTO: 19 %
MCH RBC QN AUTO: 28.2 PG (ref 26.5–33)
MCHC RBC AUTO-ENTMCNC: 33.4 G/DL (ref 31.5–36.5)
MCV RBC AUTO: 84 FL (ref 78–100)
MONOCYTES # BLD AUTO: 0.4 10E3/UL (ref 0–1.3)
MONOCYTES NFR BLD AUTO: 6 %
NEUTROPHILS # BLD AUTO: 4.1 10E3/UL (ref 1.6–8.3)
NEUTROPHILS NFR BLD AUTO: 70 %
NRBC # BLD AUTO: 0 10E3/UL
NRBC BLD AUTO-RTO: 0 /100
PLATELET # BLD AUTO: 187 10E3/UL (ref 150–450)
POTASSIUM BLD-SCNC: 4.5 MMOL/L (ref 3.4–5.3)
PROT SERPL-MCNC: 6.9 G/DL (ref 6.8–8.8)
RBC # BLD AUTO: 4.5 10E6/UL (ref 3.8–5.2)
SODIUM SERPL-SCNC: 135 MMOL/L (ref 133–144)
WBC # BLD AUTO: 5.9 10E3/UL (ref 4–11)

## 2022-08-19 PROCEDURE — 80053 COMPREHEN METABOLIC PANEL: CPT

## 2022-08-19 PROCEDURE — 36415 COLL VENOUS BLD VENIPUNCTURE: CPT

## 2022-08-19 PROCEDURE — 71250 CT THORAX DX C-: CPT

## 2022-08-19 PROCEDURE — 85025 COMPLETE CBC W/AUTO DIFF WBC: CPT

## 2022-08-24 ENCOUNTER — VIRTUAL VISIT (OUTPATIENT)
Dept: ONCOLOGY | Facility: CLINIC | Age: 72
End: 2022-08-24
Attending: INTERNAL MEDICINE
Payer: MEDICARE

## 2022-08-24 DIAGNOSIS — C34.32 SQUAMOUS CELL CARCINOMA OF BRONCHUS IN LEFT LOWER LOBE (H): ICD-10-CM

## 2022-08-24 DIAGNOSIS — Z13.29 SCREENING FOR HYPOTHYROIDISM: Primary | ICD-10-CM

## 2022-08-24 PROCEDURE — 99214 OFFICE O/P EST MOD 30 MIN: CPT | Mod: 95 | Performed by: INTERNAL MEDICINE

## 2022-08-24 PROCEDURE — G0463 HOSPITAL OUTPT CLINIC VISIT: HCPCS | Mod: PN,RTG | Performed by: INTERNAL MEDICINE

## 2022-08-24 NOTE — NURSING NOTE
Patient verified medications and allergies are correct via eCheck-in. Patient confirms no changes at this time and/or since last reviewed by clinic staff.    Mame Goyal, Virtual Facilitator

## 2022-08-24 NOTE — PROGRESS NOTES
Salima is a 71 year old who is being evaluated via a billable video visit.      Patient stated she is in the state of [place] for the visit today.    How would you like to obtain your AVS? MyChart  If the video visit is dropped, the invitation should be resent by: Text to cell phone: [phone]  Will anyone else be joining your video visit? No      Mame Goyal, Virtual Visit Facilitator    Video-Visit Details  Video Start Time: 3:42 PM  Type of service:  Video Visit  Video End Time:3:54 PM  Originating Location (pt. Location): Home  Distant Location (provider location):  [facility]   Platform used for Video Visit: [facility]    PATIENT NAME: Ijeoma Shah  MRN # [id number]   DATE OF VISIT: August 24, 2022  YOB: 1950     CANCER TYPE: SCC lung, poorly differentiated  STAGE: pT4N0 (IIIA)  ECOG PS: 1    PD-L1: TPS 15% on [id number] lobectomy, <1% on GH90-4483 (LLL bx)  Lung panel: SMO R562Q on LLL bx, negative for fusions on lobectomy specimen.   NGS: N/A    SUMMARY  7/3/20  CT chest (screening). 6.7 cm LLL mass, 0.6 cm RML nodule, mediastinal and L hilar LNs  7/9/20 PET/CT. 7.1 x 5.6 cm LLL mass (SUV 19.6), post obstructive pneumonitis LLL inferior to the mass (SUV 2.8), 3.5 x 1.7 cm 4L node (SUV 5.9), L adrenal nodule 1.1 cm (SUV 4), indeterminate pulmonary nodules, pancreatic cysts, not hypermetabolic  7/27/20 Bronch, EBUS (Dr. Castro). 10R, 11R, 4R too small to biopsy. Stations 7, 4L, 11L negative for malignancy. LLL mass bx: SCC  8/12/20 Brain MRI negative  9/1/20 EUS to evaluate adrenal and 4L (Dr. Hogan). Both negative for malignancy. Adrenal with bland adrenal cells  10/22/20 L VATS, converted to thoracotomy, LLL lobectomy, MLND, R pulmonary arterioplasty (Dr. Sanabria, Dr. Davis). 8.3 cm poorly differentiated SCC, +angiolymphatic invasion  12/3/20~2/25/21 Cisplatin gemcitabine x 4. C2D8 delayed 1 week due to neutropenia, added  neulasta    ASSESSMENT AND PLAN  SCC lung, [id number], IIIA, R0 resection, discrepant PD-L1: 1 1/2 years after completion of therapy. Nodule on 4:156 a little smaller than last CT, wasn't present on 3/8/22 CT. Otherwise stable.   CT chest abd w/contrast in 3-4 months, labs, TAYLOR appt afterward, appt with me in 8 months with the subsequent scan.    Microscopic hematuria, dysuria: Urine cytology and FISH negative in March-April, met with Dr. Leyva in Urology, discussed cystoscopy vs monitoring, Salima opted for monitoring.    R L4 nerve root impingement: Better since last year but still with chronic LBP. Will keep an eye on L numbness and maverick horses.    Afib: Per Cardiology    FOBT +laura: Couldn't get colonoscopy due to afib. She will call to reschedule. Order updated.     30 minutes spent on the date of the encounter doing chart review, history and exam, documentation and further activities per the note     Eneida De Leon MD  Associate Professor of Medicine  Hematology, Oncology and Transplantation      SUBJECTIVE  Salima returns for closer interim follow up for SCC lung after new nodules showed up on CT.   Doing well  Left maverick horses, intermittently. Some numbness on the top of her foot as well. Knows she doesn't drink a lot of water.   No other new symptoms    PAST MEDICAL HISTORY  SCC as above  Afib with RVR. Initially when she presented for surgery 10/1, then post-op in 10/2020. DCCV x 2 10/23/20, ibutilide --> NSR, dig load, amio gtt. TTE 10/16/20 unremarkable.   Low back pain, R radiculopathy, L5-S1 interlaminar lumbar epidural steroid injection 11/15/21  Sp cholecystectomy  Dyslipidemia  H/o bronchitis  Nose surgery  Tubal ligation  Adrenal nodule, bx 9/1/20, negative for malignancy  Cholelithiasis  Cataract repair 2011?    PFT 8/20/20 (preop). FEV1 1.33 (58%), FVC 1.76 (60%), DLCO 17.72 (90%)    CURRENT OUTPATIENT MEDICATIONS  Current Outpatient Medications   Medication Sig Dispense Refill      lisinopril (ZESTRIL) 10 MG tablet Take 1 tablet (10 mg) by mouth every morning 90 tablet 1     metoprolol succinate ER (TOPROL XL) 25 MG 24 hr tablet Take 1 tablet (25 mg) by mouth every morning (Patient taking differently: Take 25 mg by mouth At Bedtime) 90 tablet 1     rivaroxaban ANTICOAGULANT (XARELTO) 20 MG TABS tablet Take 1 tablet (20 mg) by mouth daily (with dinner) 90 tablet 3     ALLERGIES  Allergies   Allergen Reactions     Bee Venom Hives     Hot and sweating        REVIEW OF SYSTEMS  As above in the HPI, o/w complete 12-point ROS was negative.    PHYSICAL EXAM  There were no vitals taken for this visit.  GEN: NAD  HEENT: EOMI, no icterus, injection or pallor  NEURO: alert    Remainder of physical exam deferred due to public health emergency and limitations of video visit.    LABORATORY AND IMAGING STUDIES   08/19/22 09:51   Sodium 135   Potassium 4.5   Chloride 101   Carbon Dioxide 31   Urea Nitrogen 15   Creatinine 0.90   GFR Estimate 68   Calcium 9.3   Anion Gap 3   Albumin 3.6   Protein Total 6.9   Alkaline Phosphatase 78   ALT 20   AST 18   Bilirubin Total 0.4   Glucose 129 (H)   WBC 5.9   Hemoglobin 12.7   Hematocrit 38.0   Platelet Count 187   RBC Count 4.50   MCV 84   MCH 28.2   MCHC 33.4   RDW 13.2   % Neutrophils 70   % Lymphocytes 19   % Monocytes 6   % Eosinophils 5   % Basophils 0   Absolute Basophils 0.0   Absolute Eosinophils 0.3   Absolute Immature Granulocytes 0.0   Absolute Lymphocytes 1.1   Absolute Monocytes 0.4   % Immature Granulocytes 0   Absolute Neutrophils 4.1   Absolute NRBCs 0.0   NRBCs per 100 WBC 0     Labs were independently reviewed and interpreted by me    CT Chest w/o Contrast  Narrative: CT CHEST W/O CONTRAST 8/19/2022 10:07 AM    CLINICAL HISTORY: History of left lower lobe lung cancer.    TECHNIQUE: CT chest without IV contrast. Multiplanar reformats were  obtained. Dose reduction techniques were used.  CONTRAST: None.    COMPARISON: 6/8/2022, 3/8/2022    FINDINGS:    LUNGS AND PLEURA: Stable left lower lobectomy benign postoperative  appearance. Stable small focal site to medial left lung base  atelectasis. No evidence of pneumonitis or pleural effusions. Stable 5  mm right middle lobe pulmonary nodule on image 156 of series 4. Stable  3 mm lateral right upper lobe pulmonary nodule on image 68 of series  4. Stable 5 mm nodular density in the lateral right mid lung adjacent  to the minor fissure on image 190 of series 4. Mild apical pleural  scarring.    MEDIASTINUM/AXILLAE: No lymphadenopathy. No thoracic aortic aneurysm.  No evidence of pericardial effusion. Moderate coronary artery  atherosclerosis.  Stable mild nodularity of the left adrenal gland.  UPPER ABDOMEN: Cholecystectomy. Stable left hepatic lobe cysts.  Suboptimal visualization of the cystic pancreatic lesions due to  absence of intravenous contrast.    MUSCULOSKELETAL: No evidence of pathologic bone lesions. Thoracic  spine dorsal kyphosis and hypertrophic degenerative changes.  Impression: IMPRESSION:   1.  No interval change.  2.  Stable small right lung pulmonary nodules measuring up to 5 mm in  largest size.  3.  Left lower lobectomy.  4.  No evidence of lymphadenopathy.    JOCELYN TIPTON MD         SYSTEM ID:  [id number]     Imaging was personally reviewed and interpreted by me

## 2022-11-20 ENCOUNTER — HEALTH MAINTENANCE LETTER (OUTPATIENT)
Age: 72
End: 2022-11-20

## 2022-11-21 ENCOUNTER — TELEPHONE (OUTPATIENT)
Dept: GASTROENTEROLOGY | Facility: CLINIC | Age: 72
End: 2022-11-21

## 2022-11-21 NOTE — TELEPHONE ENCOUNTER
Screening Questions  BLUE  KIND OF PREP RED  LOCATION [review exclusion criteria] GREEN  SEDATION TYPE    Y  Are you active on mychart?   GATO PAIGE  Ordering/Referring Provider?    BCBS/MEDICARE  What type of coverage do you have?  N Have you had a positive covid test in the last 90 days?     28.3  1. BMI  [BMI 40+ - review exclusion criteria]    SELF   2. Are you able to give consent for your medical care? [IF NO,RN REVIEW]        N  3. Are you taking any prescription pain medications on a routine schedule?        N 3a. EXTENDED PREP What kind of prescription?     N 4. Do you have any chemical dependencies such as alcohol, street drugs, or methadone?    N 5. Do you have any history of post-traumatic stress syndrome, severe anxiety or history of psychosis?      **If yes 3- 5 , please schedule with MAC sedation.**          IF YES TO ANY 6 - 10 - HOSPITAL SETTING ONLY.     N 6.   Do you need assistance transferring?     N 7.   Have you had a heart or lung transplant?    N 8.   Are you currently on dialysis?   N 9.   Do you use daily home oxygen?   N 10. Do you take nitroglycerin?   10a. N If yes, how often?     11. [FEMALES]   Are you currently pregnant?     11a.  If yes, how many weeks? [ Greater than 12 weeks, OR NEEDED]    N 12. [review exclusion criteria]  Do you have any implantable devices in your body (pacemaker, defib, LVAD)?    N 13. Do you have Pulmonary Hypertension?             *NEED PAC APPT AT [facility]*     N 14. In the past 6 months, have you had any heart related issues including cardiomyopathy or heart attack?     N 14a. If yes, did it require cardiac stenting if so when?     N 15. Have you had a stroke or Transient ischemic attack (TIA - aka  mini stroke ) within 6 months?      N 16. Do you have mod to severe Obstructive Sleep Apnea?  [Hospital only - Ok at [facility]]    N 17. Do you have SEVERE AND UNCONTROLLED asthma?              *NEED PAC APPT AT [facility]*     N 18. Are you currently taking  "any blood thinners?     18a. If yes, inform patient to \"follow up w/ ordering provider for bridging instructions.\"    N 19. Do you take the medication Phentermine?    19a. If yes, \"Hold for 7 days before procedure.  Please consult your prescribing provider if you have questions about holding this medication.\"     N  20. Do you have chronic kidney disease?      N  21. Do you have a diagnosis of diabetes?     N  22. On a regular basis do you go 3-5 days between bowel movements?     23. Preferred LOCAL Pharmacy for Pre Prescription    [ LIST ONLY ONE PHARMACY]        [facility] [address]          - CLOSING REMINDERS -    Informed patient they will need an adult    Cannot take any type of public or medical transportation alone    Conscious Sedation- Needs  for 6 hours after the procedure       MAC/General-Needs  for 24 hours after procedure    Pre-Procedure Covid test to be completed [[facility]]    Confirmed Nurse will call to complete assessment       - SCHEDULING DETAILS -    Additional comments:  N      LONG   Surgeon   1/25/23   Date of Procedure  MAC  Sedation Type     N PAC / Pre-op Required   Location  [facility]        Type of Procedure Scheduled  Lower Endoscopy [Colonoscopy]      Which Colonoscopy Prep was Sent?     STANDARD GOLYTELY-If you answer yes to questions #8, #20, #21          "

## 2022-12-11 DIAGNOSIS — I48.0 PAROXYSMAL ATRIAL FIBRILLATION (H): ICD-10-CM

## 2022-12-13 RX ORDER — METOPROLOL SUCCINATE 25 MG/1
TABLET, EXTENDED RELEASE ORAL
Qty: 90 TABLET | Refills: 0 | Status: SHIPPED | OUTPATIENT
Start: 2022-12-13 | End: 2023-05-22

## 2022-12-27 ENCOUNTER — LAB (OUTPATIENT)
Dept: LAB | Facility: CLINIC | Age: 72
End: 2022-12-27
Payer: MEDICARE

## 2022-12-27 ENCOUNTER — HOSPITAL ENCOUNTER (OUTPATIENT)
Dept: MAMMOGRAPHY | Facility: CLINIC | Age: 72
Discharge: HOME | End: 2022-12-27
Attending: FAMILY MEDICINE
Payer: MEDICARE

## 2022-12-27 ENCOUNTER — HOSPITAL ENCOUNTER (OUTPATIENT)
Dept: CT IMAGING | Facility: CLINIC | Age: 72
Discharge: HOME | End: 2022-12-27
Attending: INTERNAL MEDICINE
Payer: MEDICARE

## 2022-12-27 DIAGNOSIS — C34.32 SQUAMOUS CELL CARCINOMA OF BRONCHUS IN LEFT LOWER LOBE (H): ICD-10-CM

## 2022-12-27 DIAGNOSIS — Z12.31 VISIT FOR SCREENING MAMMOGRAM: ICD-10-CM

## 2022-12-27 DIAGNOSIS — Z13.29 SCREENING FOR HYPOTHYROIDISM: ICD-10-CM

## 2022-12-27 LAB
ALBUMIN SERPL-MCNC: 3.7 G/DL (ref 3.4–5)
ALP SERPL-CCNC: 80 U/L (ref 40–150)
ALT SERPL W P-5'-P-CCNC: 27 U/L (ref 0–50)
ANION GAP SERPL CALCULATED.3IONS-SCNC: 5 MMOL/L (ref 3–14)
AST SERPL W P-5'-P-CCNC: 17 U/L (ref 0–45)
BASOPHILS # BLD AUTO: 0 10E3/UL (ref 0–0.2)
BASOPHILS NFR BLD AUTO: 0 %
BILIRUB SERPL-MCNC: 0.4 MG/DL (ref 0.2–1.3)
BUN SERPL-MCNC: 18 MG/DL (ref 7–30)
CALCIUM SERPL-MCNC: 9 MG/DL (ref 8.5–10.1)
CHLORIDE BLD-SCNC: 101 MMOL/L (ref 94–109)
CO2 SERPL-SCNC: 31 MMOL/L (ref 20–32)
CREAT SERPL-MCNC: 0.9 MG/DL (ref 0.52–1.04)
EOSINOPHIL # BLD AUTO: 0.5 10E3/UL (ref 0–0.7)
EOSINOPHIL NFR BLD AUTO: 6 %
ERYTHROCYTE [DISTWIDTH] IN BLOOD BY AUTOMATED COUNT: 13 % (ref 10–15)
GFR SERPL CREATININE-BSD FRML MDRD: 68 ML/MIN/1.73M2
GLUCOSE BLD-MCNC: 102 MG/DL (ref 70–99)
HCT VFR BLD AUTO: 42.1 % (ref 35–47)
HGB BLD-MCNC: 13.9 G/DL (ref 11.7–15.7)
IMM GRANULOCYTES # BLD: 0 10E3/UL
IMM GRANULOCYTES NFR BLD: 1 %
LYMPHOCYTES # BLD AUTO: 1.4 10E3/UL (ref 0.8–5.3)
LYMPHOCYTES NFR BLD AUTO: 17 %
MCH RBC QN AUTO: 29.1 PG (ref 26.5–33)
MCHC RBC AUTO-ENTMCNC: 33 G/DL (ref 31.5–36.5)
MCV RBC AUTO: 88 FL (ref 78–100)
MONOCYTES # BLD AUTO: 0.5 10E3/UL (ref 0–1.3)
MONOCYTES NFR BLD AUTO: 6 %
NEUTROPHILS # BLD AUTO: 5.6 10E3/UL (ref 1.6–8.3)
NEUTROPHILS NFR BLD AUTO: 70 %
NRBC # BLD AUTO: 0 10E3/UL
NRBC BLD AUTO-RTO: 0 /100
PLATELET # BLD AUTO: 192 10E3/UL (ref 150–450)
POTASSIUM BLD-SCNC: 4.4 MMOL/L (ref 3.4–5.3)
PROT SERPL-MCNC: 7 G/DL (ref 6.8–8.8)
RBC # BLD AUTO: 4.77 10E6/UL (ref 3.8–5.2)
SODIUM SERPL-SCNC: 137 MMOL/L (ref 133–144)
TSH SERPL DL<=0.005 MIU/L-ACNC: 1.11 MU/L (ref 0.4–4)
WBC # BLD AUTO: 8 10E3/UL (ref 4–11)

## 2022-12-27 PROCEDURE — 250N000009 HC RX 250: Performed by: INTERNAL MEDICINE

## 2022-12-27 PROCEDURE — 84443 ASSAY THYROID STIM HORMONE: CPT

## 2022-12-27 PROCEDURE — 85025 COMPLETE CBC W/AUTO DIFF WBC: CPT

## 2022-12-27 PROCEDURE — 80053 COMPREHEN METABOLIC PANEL: CPT

## 2022-12-27 PROCEDURE — G1010 CDSM STANSON: HCPCS

## 2022-12-27 PROCEDURE — 77067 SCR MAMMO BI INCL CAD: CPT

## 2022-12-27 PROCEDURE — 36415 COLL VENOUS BLD VENIPUNCTURE: CPT

## 2022-12-27 PROCEDURE — 250N000011 HC RX IP 250 OP 636: Performed by: INTERNAL MEDICINE

## 2022-12-27 RX ORDER — IOPAMIDOL 755 MG/ML
500 INJECTION, SOLUTION INTRAVASCULAR ONCE
Status: COMPLETED | OUTPATIENT
Start: 2022-12-27 | End: 2022-12-27

## 2022-12-27 RX ADMIN — IOPAMIDOL 80 ML: 755 INJECTION, SOLUTION INTRAVENOUS at 10:35

## 2022-12-27 RX ADMIN — SODIUM CHLORIDE 60 ML: 9 INJECTION, SOLUTION INTRAVENOUS at 10:35

## 2022-12-29 ENCOUNTER — VIRTUAL VISIT (OUTPATIENT)
Dept: ONCOLOGY | Facility: CLINIC | Age: 72
End: 2022-12-29
Attending: NURSE PRACTITIONER
Payer: MEDICARE

## 2022-12-29 DIAGNOSIS — C34.32 SQUAMOUS CELL CARCINOMA OF BRONCHUS IN LEFT LOWER LOBE (H): Primary | ICD-10-CM

## 2022-12-29 PROCEDURE — 99214 OFFICE O/P EST MOD 30 MIN: CPT | Mod: 95 | Performed by: NURSE PRACTITIONER

## 2022-12-29 NOTE — LETTER
12/29/2022         RE: Ijeoma Shah  [address] [facility] [address]        Dear Colleague,    Thank you for referring your patient, Ijeoma Shah, to the [facility]. Please see a copy of my visit note below.    Salima is a 72 year old who is being evaluated via a billable video visit.      Patient stated she is in the state of [place] for the visit today.    How would you like to obtain your AVS? MyChart  If the video visit is dropped, the invitation should be resent by: Text to cell phone: [phone]  Will anyone else be joining your video visit? Naz Goyal, Virtual Visit Facilitator      Video-Visit Details    Type of service:  Video Visit   Video Start Time: 1052  Video End Time:11:02 AM    Originating Location (pt. Location): Home    Distant Location (provider location):  Off-site  Platform used for Video Visit: [employer]        Reason for Visit: seen in f/u of lung cancer    Oncology HPI:   CANCER TYPE: SCC lung, poorly differentiated  STAGE: pT4N0 (IIIA)  ECOG PS: 1     PD-L1: TPS 15% on E24-34980 lobectomy, <1% on BW14-4639 (LLL bx)  Lung panel: SMO R562Q on LLL bx, negative for fusions on lobectomy specimen.   NGS: N/A     SUMMARY  7/3/20                 CT chest (screening). 6.7 cm LLL mass, 0.6 cm RML nodule, mediastinal and L hilar LNs  7/9/20                PET/CT. 7.1 x 5.6 cm LLL mass (SUV 19.6), post obstructive pneumonitis LLL inferior to the mass (SUV 2.8), 3.5 x 1.7 cm 4L node (SUV 5.9), L adrenal nodule 1.1 cm (SUV 4), indeterminate pulmonary nodules, pancreatic cysts, not hypermetabolic  7/27/20              Bronch, EBUS (Dr. Castro). 10R, 11R, 4R too small to biopsy. Stations 7, 4L, 11L negative for malignancy. LLL mass bx: SCC  8/12/20              Brain MRI negative  9/1/20                EUS to evaluate adrenal and 4L (Dr. Hogan). Both negative for malignancy. Adrenal with bland adrenal cells  10/22/20            L  VATS, converted to thoracotomy, LLL lobectomy, MLND, R pulmonary arterioplasty (Dr. Sanabria, Dr. Davis). 8.3 cm poorly differentiated SCC, +angiolymphatic invasion  12/3/20~2/25/21             Cisplatin gemcitabine x 4. C2D8 delayed 1 week due to neutropenia, added neulasta    Interval history: Feeling well. No new issues or concerns. Breathing is stable. Has had a tight band feeling around the ribs since the time of surgery. No significant change. No recent issues with atrial fibrillation or palpitations. No headaches or focal neurologic changes. Has chronic numbness in the balls of the feet and toes since completing chemotherapy.  -recently developed a stye on the left eye lid, using warm compresses for that  -bowels are regular. No melena or hematchezia. Is scheduled for colonoscopy in January  -urination is unchanged, sometimes slow, no dysuria/hematuria  -no new bone aches/pains.    Current Outpatient Medications   Medication Sig Dispense Refill     ketoconazole (NIZORAL) 2 % external cream Apply topically daily For rash on feet. 60 g 0     lisinopril (ZESTRIL) 10 MG tablet Take 1 tablet (10 mg) by mouth every morning 90 tablet 1     metoprolol succinate ER (TOPROL XL) 25 MG 24 hr tablet TAKE 1 TABLET BY MOUTH IN THE MORNING 90 tablet 0     rivaroxaban ANTICOAGULANT (XARELTO) 20 MG TABS tablet Take 1 tablet (20 mg) by mouth daily (with dinner) 90 tablet 3          Allergies   Allergen Reactions     Bee Venom Hives     Hot and sweating          Video physical exam  General: Patient appears well in no acute distress.   Skin: No visualized rash or lesions on visualized skin  Eyes: EOMI, no erythema, sclera icterus or discharge noted  Resp: Appears to be breathing comfortably without accessory muscle usage, speaking in full sentences, no cough  MSK: Appears to have normal range of motion based on visualized movements  Neurologic: No apparent tremors, facial movements symmetric  Psych: affect engaged,  pleasant, alert and oriented      Labs:    Latest Reference Range & Units 12/27/22 09:50   Sodium 133 - 144 mmol/L 137   Potassium 3.4 - 5.3 mmol/L 4.4   Chloride 94 - 109 mmol/L 101   Carbon Dioxide 20 - 32 mmol/L 31   Urea Nitrogen 7 - 30 mg/dL 18   Creatinine 0.52 - 1.04 mg/dL 0.90   GFR Estimate >60 mL/min/1.73m2 68   Calcium 8.5 - 10.1 mg/dL 9.0   Anion Gap 3 - 14 mmol/L 5   Albumin 3.4 - 5.0 g/dL 3.7   Protein Total 6.8 - 8.8 g/dL 7.0   Alkaline Phosphatase 40 - 150 U/L 80   ALT 0 - 50 U/L 27   AST 0 - 45 U/L 17   Bilirubin Total 0.2 - 1.3 mg/dL 0.4   TSH 0.40 - 4.00 mU/L 1.11   Glucose 70 - 99 mg/dL 102 (H)   WBC 4.0 - 11.0 10e3/uL 8.0   Hemoglobin 11.7 - 15.7 g/dL 13.9   Hematocrit 35.0 - 47.0 % 42.1   Platelet Count 150 - 450 10e3/uL 192   RBC Count 3.80 - 5.20 10e6/uL 4.77   MCV 78 - 100 fL 88   MCH 26.5 - 33.0 pg 29.1   MCHC 31.5 - 36.5 g/dL 33.0   RDW 10.0 - 15.0 % 13.0   % Neutrophils % 70   % Lymphocytes % 17   % Monocytes % 6   % Eosinophils % 6   % Basophils % 0   Absolute Basophils 0.0 - 0.2 10e3/uL 0.0   Absolute Eosinophils 0.0 - 0.7 10e3/uL 0.5   Absolute Immature Granulocytes <=0.4 10e3/uL 0.0   Absolute Lymphocytes 0.8 - 5.3 10e3/uL 1.4   Absolute Monocytes 0.0 - 1.3 10e3/uL 0.5   % Immature Granulocytes % 1   Absolute Neutrophils 1.6 - 8.3 10e3/uL 5.6   Absolute NRBCs 10e3/uL 0.0   NRBCs per 100 WBC <1 /100 0   (H): Data is abnormally high    Imaging:   Narrative & Impression   CT CHEST ABDOMEN W CONTRAST 12/27/2022 10:44 AM     CLINICAL HISTORY: SCC lung s/p L lobectomy in 2021, restaging. 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 Multiplanar reformats were obtained. Dose  reduction techniques were used.   CONTRAST: 80mL, Isovue 370     COMPARISON: 8/19/2022, 6/8/2022, 3/8/2022, 10/25/2021     FINDINGS:   LUNGS AND PLEURA: Left  lower lobectomy with stable scarring in the  posterior medial left lower lobe. Unchanged 3 mm right upper lobe  pulmonary nodule on series 4 image 65 a 5 mm oval nodule in the right  middle lobe on image 153 is unchanged. A 5 mm triangular nodule in the  right middle lobe on image 188 is unchanged. No new nodules are  identified. No pleural effusions.     MEDIASTINUM/AXILLAE: No adenopathy. Normal heart size. No pericardial  effusions. Minimal calcified plaque in the thoracic aorta. Moderate  calcified plaque in the coronary arteries.     HEPATOBILIARY: Small benign hepatic cysts. No follow-up needed.  Cholecystectomy.     PANCREAS: A 9 mm cyst in the head of the pancreas on series 2 image  154 and a 7 mm cyst at the junction of the head and uncinate portion  are unchanged. The pancreas is otherwise unremarkable.     SPLEEN: Normal.     ADRENAL GLANDS: Normal.     KIDNEYS: Normal.     BOWEL: Visualized bowel loops are normal caliber.     ADDITIONAL FINDINGS: No adenopathy. Moderate calcified plaque in the  abdominal aorta.     MUSCULOSKELETAL: Degenerative hypertrophic changes in the visualized  spine.                                                                      IMPRESSION:  1.  Left lower lobectomy.  2.  Pulmonary nodules in both lungs measuring up to 5 mm remain  unchanged.  3.  There are 2 pancreatic cysts measuring up to 9 mm which remain  unchanged. These can be monitored on future surveillance exams.     ANTONIO HERNANDES MD          Impression/plan:    SCC lung, [id number], IIIA, R0 resection, discrepant PD-L1:   I reviewed her CT and labs. There are no changes to the pulmonary nodules or pancreatic cysts. No new findings to suggest recurrence.  -recommend follow up with Dr. De Leon in 4 months with a CT CAP and labs.      Microscopic hematuria, dysuria: Urine cytology and FISH negative in March-April, met with Dr. Leyva in Urology, discussed cystoscopy vs monitoring, Salima opted for monitoring. No  gross hematuria     R L4 nerve root imp  -no significant back pain or changes to neuropathy. 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 Will monitor.     Afib: Per Cardiology, last seen in August, anticipate followup in Feb 2023     FOBT +laura:   -scheduled for colonoscopy in January.       RUDY Carlson CNP

## 2022-12-29 NOTE — PROGRESS NOTES
Salima is a 72 year old who is being evaluated via a billable video visit.      Patient stated she is in the state of [place] for the visit today.    How would you like to obtain your AVS? [employer]  If the video visit is dropped, the invitation should be resent by: Text to cell phone: [phone]  Will anyone else be joining your video visit? Naz Goyal, Virtual Visit Facilitator      Video-Visit Details    Type of service:  Video Visit   Video Start Time: 1052  Video End Time:11:02 AM    Originating Location (pt. Location): Home    Distant Location (provider location):  Off-site  Platform used for Video Visit: Steven        Reason for Visit: seen in f/u of lung cancer    Oncology HPI:   CANCER TYPE: SCC lung, poorly differentiated  STAGE: pT4N0 (IIIA)  ECOG PS: 1     PD-L1: TPS 15% on [id number] lobectomy, <1% on [id number] (LLL bx)  Lung panel: SMO [id number] on LLL bx, negative for fusions on lobectomy specimen.   NGS: N/A     SUMMARY  7/3/20                 CT chest (screening). 6.7 cm LLL mass, 0.6 cm RML nodule, mediastinal and L hilar LNs  7/9/20                PET/CT. 7.1 x 5.6 cm LLL mass (SUV 19.6), post obstructive pneumonitis LLL inferior to the mass (SUV 2.8), 3.5 x 1.7 cm 4L node (SUV 5.9), L adrenal nodule 1.1 cm (SUV 4), indeterminate pulmonary nodules, pancreatic cysts, not hypermetabolic  7/27/20              Bronch, EBUS (Dr. Castro). 10R, 11R, 4R too small to biopsy. Stations 7, 4L, 11L negative for malignancy. LLL mass bx: SCC  8/12/20              Brain MRI negative  9/1/20                EUS to evaluate adrenal and 4L (Dr. Hogan). Both negative for malignancy. Adrenal with bland adrenal cells  10/22/20            L VATS, converted to thoracotomy, LLL lobectomy, MLND, R pulmonary arterioplasty (Dr. Sanabria, Dr. Davis). 8.3 cm poorly differentiated SCC, +angiolymphatic invasion  12/3/20~2/25/21             Cisplatin gemcitabine x 4. C2D8 delayed 1 week due to neutropenia, added  neulasta    Interval history: Feeling well. No new issues or concerns. Breathing is stable. Has had a tight band feeling around the ribs since the time of surgery. No significant change. No recent issues with atrial fibrillation or palpitations. No headaches or focal neurologic changes. Has chronic numbness in the balls of the feet and toes since completing chemotherapy.  -recently developed a stye on the left eye lid, using warm compresses for that  -bowels are regular. No melena or hematchezia. Is scheduled for colonoscopy in January  -urination is unchanged, sometimes slow, no dysuria/hematuria  -no new bone aches/pains.    Current Outpatient Medications   Medication Sig Dispense Refill     ketoconazole (NIZORAL) 2 % external cream Apply topically daily For rash on feet. 60 g 0     lisinopril (ZESTRIL) 10 MG tablet Take 1 tablet (10 mg) by mouth every morning 90 tablet 1     metoprolol succinate ER (TOPROL XL) 25 MG 24 hr tablet TAKE 1 TABLET BY MOUTH IN THE MORNING 90 tablet 0     rivaroxaban ANTICOAGULANT (XARELTO) 20 MG TABS tablet Take 1 tablet (20 mg) by mouth daily (with dinner) 90 tablet 3          Allergies   Allergen Reactions     Bee Venom Hives     Hot and sweating          Video physical exam  General: Patient appears well in no acute distress.   Skin: No visualized rash or lesions on visualized skin  Eyes: EOMI, no erythema, sclera icterus or discharge noted  Resp: Appears to be breathing comfortably without accessory muscle usage, speaking in full sentences, no cough  MSK: Appears to have normal range of motion based on visualized movements  Neurologic: No apparent tremors, facial movements symmetric  Psych: affect engaged, pleasant, alert and oriented      Labs:    Latest Reference Range & Units 12/27/22 09:50   Sodium 133 - 144 mmol/L 137   Potassium 3.4 - 5.3 mmol/L 4.4   Chloride 94 - 109 mmol/L 101   Carbon Dioxide 20 - 32 mmol/L 31   Urea Nitrogen 7 - 30 mg/dL 18   Creatinine 0.52 - 1.04 mg/dL  0.90   GFR Estimate >60 mL/min/1.73m2 68   Calcium 8.5 - 10.1 mg/dL 9.0   Anion Gap 3 - 14 mmol/L 5   Albumin 3.4 - 5.0 g/dL 3.7   Protein Total 6.8 - 8.8 g/dL 7.0   Alkaline Phosphatase 40 - 150 U/L 80   ALT 0 - 50 U/L 27   AST 0 - 45 U/L 17   Bilirubin Total 0.2 - 1.3 mg/dL 0.4   TSH 0.40 - 4.00 mU/L 1.11   Glucose 70 - 99 mg/dL 102 (H)   WBC 4.0 - 11.0 10e3/uL 8.0   Hemoglobin 11.7 - 15.7 g/dL 13.9   Hematocrit 35.0 - 47.0 % 42.1   Platelet Count 150 - 450 10e3/uL 192   RBC Count 3.80 - 5.20 10e6/uL 4.77   MCV 78 - 100 fL 88   MCH 26.5 - 33.0 pg 29.1   MCHC 31.5 - 36.5 g/dL 33.0   RDW 10.0 - 15.0 % 13.0   % Neutrophils % 70   % Lymphocytes % 17   % Monocytes % 6   % Eosinophils % 6   % Basophils % 0   Absolute Basophils 0.0 - 0.2 10e3/uL 0.0   Absolute Eosinophils 0.0 - 0.7 10e3/uL 0.5   Absolute Immature Granulocytes <=0.4 10e3/uL 0.0   Absolute Lymphocytes 0.8 - 5.3 10e3/uL 1.4   Absolute Monocytes 0.0 - 1.3 10e3/uL 0.5   % Immature Granulocytes % 1   Absolute Neutrophils 1.6 - 8.3 10e3/uL 5.6   Absolute NRBCs 10e3/uL 0.0   NRBCs per 100 WBC <1 /100 0   (H): Data is abnormally high    Imaging:   Narrative & Impression   CT CHEST ABDOMEN W CONTRAST 12/27/2022 10:44 AM     CLINICAL HISTORY: SCC lung s/p L lobectomy in 2021, restaging. 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 Multiplanar reformats were obtained. Dose  reduction techniques were used.   CONTRAST: 80mL, Isovue 370     COMPARISON: 8/19/2022, 6/8/2022, 3/8/2022, 10/25/2021     FINDINGS:   LUNGS AND PLEURA: Left lower lobectomy with stable scarring in the  posterior medial left lower lobe. Unchanged 3 mm right upper lobe  pulmonary nodule on series 4 image 65 a 5 mm oval nodule in the right  middle lobe on image 153 is unchanged. A 5 mm triangular nodule in the  right middle lobe on image 188  is unchanged. No new nodules are  identified. No pleural effusions.     MEDIASTINUM/AXILLAE: No adenopathy. Normal heart size. No pericardial  effusions. Minimal calcified plaque in the thoracic aorta. Moderate  calcified plaque in the coronary arteries.     HEPATOBILIARY: Small benign hepatic cysts. No follow-up needed.  Cholecystectomy.     PANCREAS: A 9 mm cyst in the head of the pancreas on series 2 image  154 and a 7 mm cyst at the junction of the head and uncinate portion  are unchanged. The pancreas is otherwise unremarkable.     SPLEEN: Normal.     ADRENAL GLANDS: Normal.     KIDNEYS: Normal.     BOWEL: Visualized bowel loops are normal caliber.     ADDITIONAL FINDINGS: No adenopathy. Moderate calcified plaque in the  abdominal aorta.     MUSCULOSKELETAL: Degenerative hypertrophic changes in the visualized  spine.                                                                      IMPRESSION:  1.  Left lower lobectomy.  2.  Pulmonary nodules in both lungs measuring up to 5 mm remain  unchanged.  3.  There are 2 pancreatic cysts measuring up to 9 mm which remain  unchanged. These can be monitored on future surveillance exams.     ANTONIO HERNANDES MD          Impression/plan:    SCC lung, kP2G3C6, IIIA, R0 resection, discrepant PD-L1:   I reviewed her CT and labs. There are no changes to the pulmonary nodules or pancreatic cysts. No new findings to suggest recurrence.  -recommend follow up with Dr. De Leon in 4 months with a CT CAP and labs.      Microscopic hematuria, dysuria: Urine cytology and FISH negative in March-April, met with Dr. Leyva in Urology, discussed cystoscopy vs monitoring, Salima opted for monitoring. No gross hematuria     R L4 nerve root imp  -no significant back pain or changes to neuropathy. 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 Will monitor.     Afib: Per Cardiology, last seen in August, anticipate followup in  Feb 2023     FOBT +laura:   -scheduled for colonoscopy in January.

## 2023-01-05 ENCOUNTER — MYC MEDICAL ADVICE (OUTPATIENT)
Dept: CARDIOLOGY | Facility: CLINIC | Age: 73
End: 2023-01-05

## 2023-01-05 NOTE — TELEPHONE ENCOUNTER
Per HELGA pt. Is due to see an TAYLOR in February, Office visit recommendations are attached below    RECOMMENDATIONS:    1.  Continue anticoagulation with either rivaroxaban or warfarin, depending on the patient's preference.  She will notify us if she wishes to change from Xarelto  to warfarin.  2.  Continue beta blocker at current dose.  If the patient's symptoms become more frequent, we could consider increasing the drug/formal EP evaluation.  3.  I see no contraindication to colonoscopy.  There is a small risk she could have recurrent atrial fibrillation, which usually can be well controlled with beta blocker therapy.  If she is uncomfortable to have the procedure done locally, she could consider coming to the [facility] or to [facility] for this procedure.   4.  Followup visit with advanced level practitioner in about 6 months.  5.  Followup of lung nodules per the oncologist.  6.  The patient has declined statin therapy at this time.  We will continue lifestyle intervention and would be happy to prescribe should she change her mind.      We greatly appreciate the opportunity to care for your patient, Ijeoma Shah.     Sincerely,     Warren Pisano MD

## 2023-01-24 ENCOUNTER — ANESTHESIA EVENT (OUTPATIENT)
Dept: GASTROENTEROLOGY | Facility: CLINIC | Age: 73
End: 2023-01-24
Payer: MEDICARE

## 2023-01-24 ENCOUNTER — TELEPHONE (OUTPATIENT)
Dept: CARDIOLOGY | Facility: CLINIC | Age: 73
End: 2023-01-24
Payer: MEDICARE

## 2023-01-24 ASSESSMENT — COPD QUESTIONNAIRES
CAT_SEVERITY: MILD
COPD: 1

## 2023-01-24 ASSESSMENT — LIFESTYLE VARIABLES: TOBACCO_USE: 1

## 2023-01-24 ASSESSMENT — ENCOUNTER SYMPTOMS: DYSRHYTHMIAS: 1

## 2023-01-24 NOTE — H&P
Dena Boston is a 9year old male who was brought in for this visit. History was provided by the father  HPI:   Patient presents with:  Warts: Left foot wart.       Wart on the side of the left foot for the last few months  OTC freezing treatment isn't h [facility] Anesthesia Pre-op History and Physical    Ijeoma Shah MRN# [id number]   Age: 72 year old YOB: 1950      Date of Surgery: 1/25/2023 Location [facility]      Date of Exam 1/25/2023 Facility (Same day)       Home clinic: [facility]  Primary care provider: Jyoti Espinal         Chief Complaint and/or Reason for Procedure:   No chief complaint on file.  Colonoscopy.  FIT test positive.  June 2004 exam.         Active problem list:     Patient Active Problem List    Diagnosis Date Noted     Chronic kidney disease, stage 3 (H) 07/08/2021     Priority: Medium     Calculus of gallbladder without cholecystitis without obstruction 05/03/2021     Priority: Medium     Added automatically from request for surgery [id number]       Benign essential hypertension 03/18/2021     Priority: Medium     Gallstones 03/18/2021     Priority: Medium     Chronic rhinitis 03/18/2021     Priority: Medium     Chronic obstructive pulmonary disease, unspecified COPD type (H) 03/18/2021     Priority: Medium     Hypomagnesemia 11/17/2020     Priority: Medium     Chemotherapy-induced neutropenia (H) 11/17/2020     Priority: Medium     Paroxysmal atrial fibrillation (H) 10/25/2020     Priority: Medium     Mass of left lung 10/01/2020     Priority: Medium     Added automatically from request for surgery [id number]       Malignant neoplasm of lower lobe of left lung (H) 09/17/2020     Priority: Medium     Added automatically from request for surgery [id number]       Mediastinal adenopathy 07/03/2020     Priority: Medium     Squamous cell carcinoma of left lung (H) 07/03/2020     Priority: Medium     Check 6.21 for f/u Fujioka       Squamous cell carcinoma of bronchus in left lower lobe (H) 07/03/2020     Priority: Medium     Acute sinusitis with symptoms > 10 days 03/06/2020     Priority: Medium     Uterine prolapse 02/18/2018     Priority: Medium     Bee sting  reaction 08/28/2012     Priority: Medium     Advanced directives, counseling/discussion 04/06/2011     Priority: Medium     Pt took packet home to fill out        HYPERLIPIDEMIA LDL GOAL <160 10/31/2010     Priority: Medium     Dermatophytosis of foot 05/26/2006     Priority: Medium     Viral warts 05/26/2006     Priority: Medium     Problem list name updated by automated process. Provider to review              Medications (include herbals and vitamins):   Any Plavix use in the last 7 days? No     No current facility-administered medications for this encounter.     Current Outpatient Medications   Medication Sig     bisacodyl (DULCOLAX) 5 MG EC tablet Take 2 tablets at 3 pm the day before your procedure. If your procedure is before 11 am, take 2 additional tablets at 11 pm. If your procedure is after 11 am, take 2 additional tablets at 6 am. For additional instructions refer to your colonoscopy prep instructions.     ketoconazole (NIZORAL) 2 % external cream Apply topically daily For rash on feet.     lisinopril (ZESTRIL) 10 MG tablet Take 1 tablet (10 mg) by mouth every morning     metoprolol succinate ER (TOPROL XL) 25 MG 24 hr tablet TAKE 1 TABLET BY MOUTH IN THE MORNING     polyethylene glycol (GOLYTELY) 236 g suspension The night before the exam at 6 pm drink an 8-ounce glass every 15 minutes until the jug is half empty. If you arrive before 11 AM: Drink the other half of the Golytely jug at 11 PM night before procedure. If you arrive after 11 AM: Drink the other half of the Golytely jug at 6 AM day of procedure. For additional instructions refer to your colonoscopy prep instructions.     rivaroxaban ANTICOAGULANT (XARELTO) 20 MG TABS tablet Take 1 tablet (20 mg) by mouth daily (with dinner)             Allergies:      Allergies   Allergen Reactions     Bee Venom Hives     Hot and sweating      Allergy to Latex? No  Allergy to tape?   No  Intolerances:             Physical Exam:   All vitals have been  reviewed  No data found.  No intake/output data recorded.  Lungs:   No increased work of breathing, good air exchange, clear to auscultation bilaterally, no crackles or wheezing     Cardiovascular:   Normal apical impulse, regular rate and rhythm, normal S1 and S2, no S3 or S4, and no murmur noted             Lab / Radiology Results:            Anesthetic risk and/or ASA classification:       Warren Soto MD

## 2023-01-24 NOTE — TELEPHONE ENCOUNTER
Pt sent a [employer] message late Monday night (yesterday 1/23/23) asking when she should stop her Xarelto for Colonoscopy on 1/25/23. Sent reply back on 1/23/23 advising pt call her performing doctor or PMD for directions on medications.     Called pt today to follow up on question. Pt is very frustrated because she has not been given any instructions on her Colonoscopy for tomorrow. Pt states Cardiology should give instructions for holding Xarelto.     Explained to patient that we are not the provider that establishes the hold times for medications on a non-cardiac procedure. This is the responsibility of the performing physician and their group.    If they request a hold for anticoagulation then Cardiology can review and state if we are okay with holding for that timeframe from a cardiac standpoint. Pt also states she did not have a pre-op before procedure. Advised usually these questions are reviewed by the PMD or the GI ordering physician. Encouraged pt to reach out to these groups first. Callback number provided. DEIDRE Logan

## 2023-01-25 ENCOUNTER — ANESTHESIA (OUTPATIENT)
Dept: GASTROENTEROLOGY | Facility: CLINIC | Age: 73
End: 2023-01-25
Payer: MEDICARE

## 2023-01-25 ENCOUNTER — HOSPITAL ENCOUNTER (OUTPATIENT)
Facility: CLINIC | Age: 73
Discharge: HOME | End: 2023-01-25
Attending: INTERNAL MEDICINE | Admitting: INTERNAL MEDICINE
Payer: MEDICARE

## 2023-01-25 VITALS
BODY MASS INDEX: 29.76 KG/M2 | WEIGHT: 168 LBS | OXYGEN SATURATION: 97 % | SYSTOLIC BLOOD PRESSURE: 119 MMHG | TEMPERATURE: 98.1 F | HEART RATE: 56 BPM | RESPIRATION RATE: 16 BRPM | DIASTOLIC BLOOD PRESSURE: 56 MMHG

## 2023-01-25 LAB
ANION GAP SERPL CALCULATED.3IONS-SCNC: 11 MMOL/L (ref 7–15)
BUN SERPL-MCNC: 14.9 MG/DL (ref 8–23)
CALCIUM SERPL-MCNC: 9.1 MG/DL (ref 8.8–10.2)
CHLORIDE SERPL-SCNC: 100 MMOL/L (ref 98–107)
COLONOSCOPY: NORMAL
CREAT SERPL-MCNC: 0.89 MG/DL (ref 0.51–0.95)
DEPRECATED HCO3 PLAS-SCNC: 27 MMOL/L (ref 22–29)
GFR SERPL CREATININE-BSD FRML MDRD: 69 ML/MIN/1.73M2
GLUCOSE SERPL-MCNC: 83 MG/DL (ref 70–99)
MAGNESIUM SERPL-MCNC: 1.5 MG/DL (ref 1.7–2.3)
POTASSIUM SERPL-SCNC: 4.9 MMOL/L (ref 3.4–5.3)
SODIUM SERPL-SCNC: 138 MMOL/L (ref 136–145)

## 2023-01-25 PROCEDURE — 88305 TISSUE EXAM BY PATHOLOGIST: CPT | Mod: TC | Performed by: INTERNAL MEDICINE

## 2023-01-25 PROCEDURE — 370N000017 HC ANESTHESIA TECHNICAL FEE, PER MIN: Performed by: INTERNAL MEDICINE

## 2023-01-25 PROCEDURE — 83735 ASSAY OF MAGNESIUM: CPT | Performed by: NURSE ANESTHETIST, CERTIFIED REGISTERED

## 2023-01-25 PROCEDURE — 45380 COLONOSCOPY AND BIOPSY: CPT | Mod: PT,XU | Performed by: INTERNAL MEDICINE

## 2023-01-25 PROCEDURE — 258N000003 HC RX IP 258 OP 636: Performed by: NURSE ANESTHETIST, CERTIFIED REGISTERED

## 2023-01-25 PROCEDURE — 250N000009 HC RX 250: Performed by: NURSE ANESTHETIST, CERTIFIED REGISTERED

## 2023-01-25 PROCEDURE — 36415 COLL VENOUS BLD VENIPUNCTURE: CPT | Performed by: NURSE ANESTHETIST, CERTIFIED REGISTERED

## 2023-01-25 PROCEDURE — 250N000011 HC RX IP 250 OP 636: Performed by: NURSE ANESTHETIST, CERTIFIED REGISTERED

## 2023-01-25 PROCEDURE — 80048 BASIC METABOLIC PNL TOTAL CA: CPT | Performed by: NURSE ANESTHETIST, CERTIFIED REGISTERED

## 2023-01-25 PROCEDURE — 45385 COLONOSCOPY W/LESION REMOVAL: CPT | Mod: PT

## 2023-01-25 RX ORDER — SODIUM CHLORIDE, SODIUM LACTATE, POTASSIUM CHLORIDE, CALCIUM CHLORIDE 600; 310; 30; 20 MG/100ML; MG/100ML; MG/100ML; MG/100ML
INJECTION, SOLUTION INTRAVENOUS CONTINUOUS
Status: DISCONTINUED | OUTPATIENT
Start: 2023-01-25 | End: 2023-01-25 | Stop reason: HOSPADM

## 2023-01-25 RX ORDER — LIDOCAINE 40 MG/G
CREAM TOPICAL
Status: DISCONTINUED | OUTPATIENT
Start: 2023-01-25 | End: 2023-01-25 | Stop reason: HOSPADM

## 2023-01-25 RX ORDER — LIDOCAINE HYDROCHLORIDE 20 MG/ML
INJECTION, SOLUTION INFILTRATION; PERINEURAL PRN
Status: DISCONTINUED | OUTPATIENT
Start: 2023-01-25 | End: 2023-01-25

## 2023-01-25 RX ORDER — METOPROLOL TARTRATE 1 MG/ML
INJECTION, SOLUTION INTRAVENOUS PRN
Status: DISCONTINUED | OUTPATIENT
Start: 2023-01-25 | End: 2023-01-25

## 2023-01-25 RX ORDER — ESMOLOL HYDROCHLORIDE 10 MG/ML
INJECTION INTRAVENOUS PRN
Status: DISCONTINUED | OUTPATIENT
Start: 2023-01-25 | End: 2023-01-25

## 2023-01-25 RX ORDER — FENTANYL CITRATE 50 UG/ML
50 INJECTION, SOLUTION INTRAMUSCULAR; INTRAVENOUS
Status: DISCONTINUED | OUTPATIENT
Start: 2023-01-25 | End: 2023-01-25 | Stop reason: HOSPADM

## 2023-01-25 RX ORDER — PROPOFOL 10 MG/ML
INJECTION, EMULSION INTRAVENOUS CONTINUOUS PRN
Status: DISCONTINUED | OUTPATIENT
Start: 2023-01-25 | End: 2023-01-25

## 2023-01-25 RX ORDER — PROPOFOL 10 MG/ML
INJECTION, EMULSION INTRAVENOUS PRN
Status: DISCONTINUED | OUTPATIENT
Start: 2023-01-25 | End: 2023-01-25

## 2023-01-25 RX ADMIN — SODIUM CHLORIDE, POTASSIUM CHLORIDE, SODIUM LACTATE AND CALCIUM CHLORIDE: 600; 310; 30; 20 INJECTION, SOLUTION INTRAVENOUS at 12:58

## 2023-01-25 RX ADMIN — PROPOFOL 150 MCG/KG/MIN: 10 INJECTION, EMULSION INTRAVENOUS at 15:09

## 2023-01-25 RX ADMIN — PROPOFOL 50 MG: 10 INJECTION, EMULSION INTRAVENOUS at 15:09

## 2023-01-25 RX ADMIN — LIDOCAINE HYDROCHLORIDE 40 MG: 20 INJECTION, SOLUTION INFILTRATION; PERINEURAL at 15:08

## 2023-01-25 RX ADMIN — METOPROLOL TARTRATE 1 MG: 5 INJECTION INTRAVENOUS at 13:47

## 2023-01-25 RX ADMIN — METOPROLOL TARTRATE 1 MG: 5 INJECTION INTRAVENOUS at 13:38

## 2023-01-25 RX ADMIN — ESMOLOL HYDROCHLORIDE 30 MG: 10 INJECTION, SOLUTION INTRAVENOUS at 13:34

## 2023-01-25 RX ADMIN — SODIUM CHLORIDE, POTASSIUM CHLORIDE, SODIUM LACTATE AND CALCIUM CHLORIDE: 600; 310; 30; 20 INJECTION, SOLUTION INTRAVENOUS at 14:58

## 2023-01-25 ASSESSMENT — ACTIVITIES OF DAILY LIVING (ADL)
ADLS_ACUITY_SCORE: 35
ADLS_ACUITY_SCORE: 35

## 2023-01-25 NOTE — ANESTHESIA PREPROCEDURE EVALUATION
Anesthesia Pre-Procedure Evaluation    Patient: Ijeoma Shah   MRN: [id number] : 1950        Procedure : Procedure(s):  COLONOSCOPY          Past Medical History:   Diagnosis Date     Arrhythmia     A-Fib     Benign essential hypertension      Calculus of gallbladder without cholecystitis without obstruction      COPD (chronic obstructive pulmonary disease) (H)      Lung cancer (H)      Simple chronic bronchitis (H)       Past Surgical History:   Procedure Laterality Date     ARTHROEREISIS, SUBTALAR       BRONCHOSCOPY RIGID OR FLEXIBLE W/TRANSENDOSCOPIC ENDOBRONCHIAL ULTRASOUND GUIDED N/A 2020    Procedure: Flexible bronchoscopy, endobronchial ultrasound with transbronchial biopsies;  Surgeon: Edin Castro MD;  Location: [facility] OR     CATARACT IOL, RT/LT Bilateral      COLONOSCOPY  2004    colonoscopy to the cecum     ESOPHAGOSCOPY, GASTROSCOPY, DUODENOSCOPY (EGD), COMBINED N/A 2020    Procedure: ESOPHAGOGASTRODUODENOSCOPY, WITH FINE NEEDLE ASPIRATION BIOPSY, WITH ENDOSCOPIC ULTRASOUND GUIDANCE;  Surgeon: Barber Hogan MD;  Location: [facility] GI     LAPAROSCOPIC CHOLECYSTECTOMY N/A 2021    Procedure: CHOLECYSTECTOMY, LAPAROSCOPIC;  Surgeon: Gavin Castillo MD;  Location: [facility] OR     PICC TRIPLE LUMEN PLACEMENT Right 10/23/2020    5Fr - 36cm, Medial brachial vein     SURGICAL HISTORY OF -       nose surgery     THORACOSCOPIC RESECTION LUNG Left 10/22/2020    Procedure: Left thoracoscopic lobectomy converted to Left Thoracotomy, Medistinal lymph node dissection, Pulmonary Artery repair, flexible bronchoscopy, on-pump oxygenator on standy-by;  Surgeon: Andrea Sanabria MD;  Location: [facility] OR     THORACOTOMY N/A 10/22/2020    Procedure: Thoracotomy;  Surgeon: Andrea Sanabria MD;  Location: [facility] OR     TRANSCERVICAL EXTENDED MEDIASTINAL LYMPHADENECTOMY N/A 10/22/2020    Procedure: Transcervical extended mediastinal lymphadenectomy;  Surgeon: Andrea Sanabria MD;  Location:  [facility] OR     TUBAL LIGATION      bilateral tubal ligation.  BTL      Allergies   Allergen Reactions     Bee Venom Hives     Hot and sweating       Social History     Tobacco Use     Smoking status: Former     Packs/day: 1.00     Years: 33.00     Pack years: 33.00     Types: Cigarettes     Quit date: 2014     Years since quittin.1     Smokeless tobacco: Never   Substance Use Topics     Alcohol use: Yes     Alcohol/week: 1.7 - 2.5 standard drinks     Types: 2 - 3 Standard drinks or equivalent per week     Comment: occ,social      Wt Readings from Last 1 Encounters:   22 76.2 kg (168 lb)        Anesthesia Evaluation   Pt has had prior anesthetic. Type: General and MAC.    No history of anesthetic complications       ROS/MED HX  ENT/Pulmonary: Comment: Is not using inhalers. Denies pulmonary symptoms. Some tightness under right breast after lung surgery. Some nasal congestion.  H/O Lung CA    (+) allergic rhinitis, tobacco use, Past use, mild,  COPD,     Neurologic:  - neg neurologic ROS     Cardiovascular: Comment: Afib s/p cardioversion    (+) hypertension-----Taking blood thinners Pt has received instructions: Instructions Given to patient: Planned hold of Xarelto for 72 hours prior to surgery. dysrhythmias, a-fib, Previous cardiac testing   Echo: Date: 22 Results:  ______________________________________________________________________________  Interpretation Summary     The left ventricle is normal in size.  The visual ejection fraction is 65-70%.  Grade I or early diastolic dysfunction.  The right ventricle is normal in structure, function and size.  Normal left atrial size.  No significant valve issues.     Compared to prior study, there is no significant change.  Stress Test: Date: Results:    ECG Reviewed: Date: 22 Results:  SR  Cath: Date: Results:      METS/Exercise Tolerance: >4 METS    Hematologic:  - neg hematologic  ROS     Musculoskeletal:  - neg musculoskeletal ROS     GI/Hepatic:  Comment: Abdominal pain if eating greasy food - neg GI/hepatic ROS     Renal/Genitourinary:     (+) renal disease, type: CRI, Pt does not require dialysis,     Endo:  - neg endo ROS     Psychiatric/Substance Use:  - neg psychiatric ROS     Infectious Disease:  - neg infectious disease ROS     Malignancy:   (+) Malignancy, History of Lung.  Lung CA Remission status post Surgery and Chemo.        Other:  - neg other ROS          Physical Exam    Airway  airway exam normal      Mallampati: II   TM distance: > 3 FB   Neck ROM: full   Mouth opening: > 3 cm    Respiratory Devices and Support         Dental       (+) Completely normal teeth      Cardiovascular   cardiovascular exam normal       Rhythm and rate: regular and normal     Pulmonary   pulmonary exam normal        breath sounds clear to auscultation           OUTSIDE LABS:  CBC:   Lab Results   Component Value Date    WBC 8.0 12/27/2022    WBC 5.9 08/19/2022    HGB 13.9 12/27/2022    HGB 12.7 08/19/2022    HCT 42.1 12/27/2022    HCT 38.0 08/19/2022     12/27/2022     08/19/2022     BMP:   Lab Results   Component Value Date     12/27/2022     08/19/2022    POTASSIUM 4.4 12/27/2022    POTASSIUM 4.5 08/19/2022    CHLORIDE 101 12/27/2022    CHLORIDE 101 08/19/2022    CO2 31 12/27/2022    CO2 31 08/19/2022    BUN 18 12/27/2022    BUN 15 08/19/2022    CR 0.90 12/27/2022    CR 0.90 08/19/2022     (H) 12/27/2022     (H) 08/19/2022     COAGS: No results found for: PTT, INR, FIBR  POC:   Lab Results   Component Value Date    BGM 84 05/20/2021     HEPATIC:   Lab Results   Component Value Date    ALBUMIN 3.7 12/27/2022    PROTTOTAL 7.0 12/27/2022    ALT 27 12/27/2022    AST 17 12/27/2022    ALKPHOS 80 12/27/2022    BILITOTAL 0.4 12/27/2022     OTHER:   Lab Results   Component Value Date    PH 7.42 10/22/2020    LACT 1.0 10/22/2020    A1C 5.6 10/23/2020    SABINO 9.0 12/27/2022    PHOS 3.7 10/28/2020    MAG 2.0 06/08/2022    TSH 1.11  12/27/2022       Anesthesia Plan    ASA Status:  3   NPO Status:  NPO Appropriate    Anesthesia Type: MAC.     - Reason for MAC: straight local not clinically adequate      Maintenance: TIVA.        Consents    Anesthesia Plan(s) and associated risks, benefits, and realistic alternatives discussed. Questions answered and patient/representative(s) expressed understanding.    - Discussed:     - Discussed with:  Patient    Use of blood products discussed: No .     Postoperative Care            Comments:    Other Comments: The risks and benefits of anesthesia, and the alternatives where applicable, have been discussed with the patient, and they wish to proceed.            Gavin Womack, APRN CRNA

## 2023-01-25 NOTE — ANESTHESIA POSTPROCEDURE EVALUATION
Patient: Ijeoma Shah    Procedure: Procedure(s):  COLONOSCOPY, WITH POLYPECTOMY       Anesthesia Type:  MAC    Note:  Disposition: Outpatient   Postop Pain Control: Uneventful            Sign Out: Well controlled pain   PONV: No   Neuro/Psych: Uneventful            Sign Out: Acceptable/Baseline neuro status   Airway/Respiratory: Uneventful            Sign Out: Acceptable/Baseline resp. status   CV/Hemodynamics: Uneventful            Sign Out: Acceptable CV status   Other NRE: NONE   DID A NON-ROUTINE EVENT OCCUR? No    Event details/Postop Comments:  Pt was happy with anesthesia care.  No complications.  I will follow up with the pt if needed.           Last vitals:  Vitals Value Taken Time   /61 01/25/23 1550   Temp     Pulse 77 01/25/23 1550   Resp     SpO2 86 % 01/25/23 1546   Vitals shown include unvalidated device data.    Electronically Signed By: RUDY Alfredo CRNA  January 25, 2023  3:56 PM

## 2023-01-25 NOTE — DISCHARGE INSTRUCTIONS
[facility]    Home Care Following Endoscopy          Activity:  You have just undergone an endoscopic procedure usually performed with conscious sedation.  Do not work or operate machinery (including a car) for at least 12 hours.    I encourage you to walk and attempt to pass this air as soon as possible.    Diet:  Return to the diet you were on before your procedure but eat lightly for the first 12-24 hours.  Drink plenty of water.  Resume any regular medications unless otherwise advised by your physician.  Please begin any new medication prescribed as a result of your procedure as directed by your physician.   If you had any biopsy or polyp removed please refrain from aspirin or aspirin products for 2 days.  If on Coumadin please restart as instructed by your physician.   Pain:  You may take Tylenol as needed for pain.  Expected Recovery:  You can expect some mild abdominal fullness and/or discomfort due to the air used to inflate your intestinal tract.     Call Your Physician if You Have:    After Colonoscopy:  Worsening persisting abdominal pain which is worse with activity.  Fevers (>101 degrees F), chills or shakes.  Passage of continued blood with bowel movements.     Any questions or concerns about your recovery, please call [phone] or after hours [phone] Nurse Advice Line.    Follow-up Care:  You did have polyps removed.  The polyps will be sent to pathology.  You should receive letter in your My Chart from Dr Soto with your results within 1-2 weeks.   Please call if you have not received a notification of your results.             Call [phone].

## 2023-01-25 NOTE — ANESTHESIA CARE TRANSFER NOTE
Patient: Ijeoma Shah    Procedure: Procedure(s):  COLONOSCOPY, WITH POLYPECTOMY       Diagnosis: Occult blood positive stool [R19.5]  Diagnosis Additional Information: No value filed.    Anesthesia Type:   MAC     Note:    Oropharynx: oropharynx clear of all foreign objects and spontaneously breathing  Level of Consciousness: awake  Oxygen Supplementation: room air    Independent Airway: airway patency satisfactory and stable  Dentition: dentition unchanged  Vital Signs Stable: post-procedure vital signs reviewed and stable  Report to RN Given: handoff report given  Patient transferred to: Phase II    Handoff Report: Identifed the Patient, Identified the Reponsible Provider, Reviewed the pertinent medical history, Discussed the surgical course, Reviewed Intra-OP anesthesia mangement and issues during anesthesia, Set expectations for post-procedure period and Allowed opportunity for questions and acknowledgement of understanding      Vitals:  Vitals Value Taken Time   /61 01/25/23 1550   Temp     Pulse 77 01/25/23 1550   Resp     SpO2 86 % 01/25/23 1546   Vitals shown include unvalidated device data.    Electronically Signed By: RUDY Alfredo CRNA  January 25, 2023  3:53 PM

## 2023-01-25 NOTE — LETTER
"January 30, 2023      Salima M Pablo  [address]  [place] [address]        Dear ,    We are writing to inform you of your test results.    Mixture of hyperplastic and adenoma polyps, which are benign.  A repeat exam in 5 yrs is suggested.    Resulted Orders   Surgical Pathology Exam   Result Value Ref Range    Case Report       Surgical Pathology Report                         Case: [id number]                                  Authorizing Provider:  Warren Soto MD        Collected:           01/25/2023 03:18 PM          Ordering Location:     [facility]          Received:            01/25/2023 03:47 PM                                 [facility]                                                          Pathologist:           Aminta Argueta MD                                                           Specimens:   A) - Large Intestine, Colon, Cecum                                                                  B) - Large Intestine, Colon, Descending                                                             C) - Rectum                                                                                Final Diagnosis       A.  Cecum, polyp, biopsy/polypectomy:  -Tubular adenoma; negative for high-grade dysplasia and malignancy.    B.  Descending colon, polyp, biopsy/polypectomy:  -Mild surface hyperplastic change without diagnostic polyp formation, dysplasia, and malignancy.    C.  Rectum, polyp, polypectomy:  -Hyperplastic polyp(s).      Clinical Information       Occult blood positive stool.      Gross Description       A(1). Large Intestine, Colon, Cecum, :  The specimen is received in formalin, labeled with the patient's name, medical record number and other identifying information designated \"cecal polyp\". It consists of 3 tan soft tissue fragments, 0.2-0.4 cm.  Entirely submitted in 1 cassette.    B(2). Large Intestine, Colon, Descending, :  The specimen is " "received in formalin, labeled with the patient's name, medical record number and other identifying information designated \"descending colon polyp\". It consists of a single 0.4 cm tan soft tissue fragment.  Entirely submitted in 1 cassette.    C(3). Rectum, :  The specimen is received in formalin, labeled with the patient's name, medical record number and other identifying information designated \"rectal polyp\". It consists of 2 tan soft tissue fragments, each 0.4 cm.  Entirely submitted in 1 cassette.  (Soin Pérez Biopsy Tech)      Microscopic Description       A, B, and C.  Microscopic examination is performed.        Performing Labs       The technical component of this testing was completed at [facility]      Case Images         If you have any questions or concerns, please call the clinic at the number listed above.       Sincerely,      Warren Soto MD          "

## 2023-01-25 NOTE — PROGRESS NOTES
Event Note    D: Pt brought back to procedure room for colonoscopy, presented with elevated heart rate in the 150's-A-fib.    A: Pt brought back to preop room for EKG monitoring per anesthesia.     R: Continue to monitor for possibility of colonoscopy later today.

## 2023-01-27 LAB
PATH REPORT.COMMENTS IMP SPEC: NORMAL
PATH REPORT.COMMENTS IMP SPEC: NORMAL
PATH REPORT.FINAL DX SPEC: NORMAL
PATH REPORT.GROSS SPEC: NORMAL
PATH REPORT.MICROSCOPIC SPEC OTHER STN: NORMAL
PATH REPORT.RELEVANT HX SPEC: NORMAL
PHOTO IMAGE: NORMAL

## 2023-01-27 PROCEDURE — 88305 TISSUE EXAM BY PATHOLOGIST: CPT | Mod: 26

## 2023-04-15 ENCOUNTER — HEALTH MAINTENANCE LETTER (OUTPATIENT)
Age: 73
End: 2023-04-15

## 2023-04-24 ENCOUNTER — LAB (OUTPATIENT)
Dept: LAB | Facility: CLINIC | Age: 73
End: 2023-04-24
Payer: MEDICARE

## 2023-04-24 ENCOUNTER — HOSPITAL ENCOUNTER (OUTPATIENT)
Dept: CT IMAGING | Facility: CLINIC | Age: 73
Discharge: HOME | End: 2023-04-24
Attending: NURSE PRACTITIONER | Admitting: NURSE PRACTITIONER
Payer: MEDICARE

## 2023-04-24 DIAGNOSIS — C34.32 SQUAMOUS CELL CARCINOMA OF BRONCHUS IN LEFT LOWER LOBE (H): ICD-10-CM

## 2023-04-24 LAB
ALBUMIN SERPL BCG-MCNC: 4.2 G/DL (ref 3.5–5.2)
ALP SERPL-CCNC: 79 U/L (ref 35–104)
ALT SERPL W P-5'-P-CCNC: 15 U/L (ref 10–35)
ANION GAP SERPL CALCULATED.3IONS-SCNC: 10 MMOL/L (ref 7–15)
AST SERPL W P-5'-P-CCNC: 19 U/L (ref 10–35)
BASOPHILS # BLD AUTO: 0 10E3/UL (ref 0–0.2)
BASOPHILS NFR BLD AUTO: 0 %
BILIRUB SERPL-MCNC: 0.4 MG/DL
BUN SERPL-MCNC: 15.6 MG/DL (ref 8–23)
CALCIUM SERPL-MCNC: 9.2 MG/DL (ref 8.8–10.2)
CHLORIDE SERPL-SCNC: 93 MMOL/L (ref 98–107)
CREAT SERPL-MCNC: 0.93 MG/DL (ref 0.51–0.95)
DEPRECATED HCO3 PLAS-SCNC: 30 MMOL/L (ref 22–29)
EOSINOPHIL # BLD AUTO: 0.3 10E3/UL (ref 0–0.7)
EOSINOPHIL NFR BLD AUTO: 5 %
ERYTHROCYTE [DISTWIDTH] IN BLOOD BY AUTOMATED COUNT: 12.5 % (ref 10–15)
GFR SERPL CREATININE-BSD FRML MDRD: 65 ML/MIN/1.73M2
GLUCOSE SERPL-MCNC: 156 MG/DL (ref 70–99)
HCT VFR BLD AUTO: 43.2 % (ref 35–47)
HGB BLD-MCNC: 14.5 G/DL (ref 11.7–15.7)
IMM GRANULOCYTES # BLD: 0 10E3/UL
IMM GRANULOCYTES NFR BLD: 0 %
LYMPHOCYTES # BLD AUTO: 1.5 10E3/UL (ref 0.8–5.3)
LYMPHOCYTES NFR BLD AUTO: 20 %
MCH RBC QN AUTO: 30 PG (ref 26.5–33)
MCHC RBC AUTO-ENTMCNC: 33.6 G/DL (ref 31.5–36.5)
MCV RBC AUTO: 89 FL (ref 78–100)
MONOCYTES # BLD AUTO: 0.4 10E3/UL (ref 0–1.3)
MONOCYTES NFR BLD AUTO: 6 %
NEUTROPHILS # BLD AUTO: 5.2 10E3/UL (ref 1.6–8.3)
NEUTROPHILS NFR BLD AUTO: 69 %
NRBC # BLD AUTO: 0 10E3/UL
NRBC BLD AUTO-RTO: 0 /100
PLATELET # BLD AUTO: 229 10E3/UL (ref 150–450)
POTASSIUM SERPL-SCNC: 4.3 MMOL/L (ref 3.4–5.3)
PROT SERPL-MCNC: 6.8 G/DL (ref 6.4–8.3)
RBC # BLD AUTO: 4.84 10E6/UL (ref 3.8–5.2)
SODIUM SERPL-SCNC: 133 MMOL/L (ref 136–145)
WBC # BLD AUTO: 7.5 10E3/UL (ref 4–11)

## 2023-04-24 PROCEDURE — G1010 CDSM STANSON: HCPCS

## 2023-04-24 PROCEDURE — 85025 COMPLETE CBC W/AUTO DIFF WBC: CPT

## 2023-04-24 PROCEDURE — 250N000011 HC RX IP 250 OP 636: Performed by: NURSE PRACTITIONER

## 2023-04-24 PROCEDURE — 36415 COLL VENOUS BLD VENIPUNCTURE: CPT

## 2023-04-24 PROCEDURE — 80053 COMPREHEN METABOLIC PANEL: CPT

## 2023-04-24 PROCEDURE — 250N000009 HC RX 250: Performed by: NURSE PRACTITIONER

## 2023-04-24 RX ORDER — IOPAMIDOL 755 MG/ML
500 INJECTION, SOLUTION INTRAVASCULAR ONCE
Status: COMPLETED | OUTPATIENT
Start: 2023-04-24 | End: 2023-04-24

## 2023-04-24 RX ADMIN — IOPAMIDOL 80 ML: 755 INJECTION, SOLUTION INTRAVENOUS at 13:14

## 2023-04-24 RX ADMIN — SODIUM CHLORIDE 60 ML: 9 INJECTION, SOLUTION INTRAVENOUS at 13:14

## 2023-04-26 ENCOUNTER — VIRTUAL VISIT (OUTPATIENT)
Dept: ONCOLOGY | Facility: CLINIC | Age: 73
End: 2023-04-26
Attending: NURSE PRACTITIONER
Payer: MEDICARE

## 2023-04-26 DIAGNOSIS — C34.32 SQUAMOUS CELL CARCINOMA OF BRONCHUS IN LEFT LOWER LOBE (H): Primary | ICD-10-CM

## 2023-04-26 PROCEDURE — 99207 PR NO BILLABLE SERVICE THIS VISIT: CPT | Mod: VID | Performed by: INTERNAL MEDICINE

## 2023-04-26 NOTE — LETTER
4/26/2023         RE: Ijeoma Shah  [address] [place] [address]        Dear Colleague,    Thank you for referring your patient, Ijeoma Shah, to the [facility]. Please see a copy of my visit note below.    No show          Again, thank you for allowing me to participate in the care of your patient.        Sincerely,        Eneida De Leon MD

## 2023-04-27 ENCOUNTER — PATIENT OUTREACH (OUTPATIENT)
Dept: ONCOLOGY | Facility: CLINIC | Age: 73
End: 2023-04-27
Payer: MEDICARE

## 2023-04-27 NOTE — PROGRESS NOTES
[facility]: Cancer Care                                                                                          Call placed to Salima regarding her missed appointment yesterday.  Salima was having problems with her phone battery and was unable to connect to her video visit.      Dr. De Leon stated that the scan results were fine and she did not have any concerns.  she would like her to get  another CT chest abd in 4 months and I or TAYLOR would see her afterward.     Salima was informed of the above information and agrees to a video visit on 5/4/2023      Signature:  Michelle Andrade RN

## 2023-05-02 NOTE — PROGRESS NOTES
=   [facility]    PATIENT NAME: Ijeoma Shah  MRN # [id number]   DATE OF VISIT: May 2, 2023  YOB: 1950     CANCER TYPE: SCC lung, poorly differentiated  STAGE: pT4N0 (IIIA)  ECOG PS: 1    PD-L1: TPS 15% on [id number] lobectomy, <1% on XH22-2652 (LLL bx)  Lung panel: SMO R562Q on LLL bx, negative for fusions on lobectomy specimen.   NGS: N/A    SUMMARY  7/3/20  CT chest (screening). 6.7 cm LLL mass, 0.6 cm RML nodule, mediastinal and L hilar LNs  7/9/20 PET/CT. 7.1 x 5.6 cm LLL mass (SUV 19.6), post obstructive pneumonitis LLL inferior to the mass (SUV 2.8), 3.5 x 1.7 cm 4L node (SUV 5.9), L adrenal nodule 1.1 cm (SUV 4), indeterminate pulmonary nodules, pancreatic cysts, not hypermetabolic  7/27/20 Bronch, EBUS (Dr. Castro). 10R, 11R, 4R too small to biopsy. Stations 7, 4L, 11L negative for malignancy. LLL mass bx: SCC  8/12/20 Brain MRI negative  9/1/20 EUS to evaluate adrenal and 4L (Dr. Hogan). Both negative for malignancy. Adrenal with bland adrenal cells  10/22/20 L VATS, converted to thoracotomy, LLL lobectomy, MLND, R pulmonary arterioplasty (Dr. Sanabria, Dr. Davis). 8.3 cm poorly differentiated SCC, +angiolymphatic invasion  12/3/20~2/25/21 Cisplatin gemcitabine x 4. C2D8 delayed 1 week due to neutropenia, added neulasta    ASSESSMENT AND PLAN  SCC lung, [id number], IIIA, R0 resection, discrepant PD-L1: Just over 2 years after completion of therapy. Nodule on 4:156 on Dec CT, 4:164 on current scan unchanged. As before, this one was not present on 3/8/22 CT. Otherwise stable. CT chest abd w/contrast in 4-5 months, labs, TAYLOR appt afterward. Continue ~ q4h months through year 3 then q6 months. She asked questions about risk of recurrence. Discussed not insubstantial - could be same cancer returning, although less likely now that it's been more than 2 years, vs new cancer given the likely field effect from prior damage. Discussed that the risk decreases as time goes on.      Pulm nodules: stable     Microscopic hematuria, dysuria: Urine cytology and FISH negative in March-April, met with Dr. Leyva in Urology, discussed cystoscopy vs monitoring, Salima opted for monitoring. Follow up prn, will defer to Dr. Espinal on this matter.    Colon polyps: Colonoscopy Jan 2023.     30 minutes spent by me on the date of the encounter doing chart review, history and exam, documentation and further activities per the note     Eneida De Leon MD  Associate Professor of Medicine  Hematology, Oncology and Transplantation      SUBJECTIVE  Salima returns for closer interim follow up for SCC lung after new nodules showed up on CT.   Has had a bad URI for a couple of weeks - cough, runny nose, fatigue. Got abx.   Has an appt with Cardiology next week - routine    PAST MEDICAL HISTORY  SCC as above  Afib with RVR. Initially when she presented for surgery 10/1, then post-op in 10/2020. DCCV x 2 10/23/20, ibutilide --> NSR, dig load, amio gtt. TTE 10/16/20 unremarkable.   Low back pain, R radiculopathy, L5-S1 interlaminar lumbar epidural steroid injection 11/15/21  Sp cholecystectomy  Dyslipidemia  H/o bronchitis  Nose surgery  Tubal ligation  Adrenal nodule, bx 9/1/20, negative for malignancy  Cholelithiasis  Cataract repair 2011?    PFT 8/20/20 (preop). FEV1 1.33 (58%), FVC 1.76 (60%), DLCO 17.72 (90%)  Colonoscopy 1/25/23. Hyperplastic polyp and tubular adenoma    CURRENT OUTPATIENT MEDICATIONS  Current Outpatient Medications   Medication Sig Dispense Refill     bisacodyl (DULCOLAX) 5 MG EC tablet Take 2 tablets at 3 pm the day before your procedure. If your procedure is before 11 am, take 2 additional tablets at 11 pm. If your procedure is after 11 am, take 2 additional tablets at 6 am. For additional instructions refer to your colonoscopy prep instructions. 4 tablet 0     ketoconazole (NIZORAL) 2 % external cream Apply topically daily For rash on feet. 60 g 0     lisinopril (ZESTRIL) 10 MG tablet  Take 1 tablet (10 mg) by mouth every morning 90 tablet 1     metoprolol succinate ER (TOPROL XL) 25 MG 24 hr tablet TAKE 1 TABLET BY MOUTH IN THE MORNING 90 tablet 0     polyethylene glycol (GOLYTELY) 236 g suspension The night before the exam at 6 pm drink an 8-ounce glass every 15 minutes until the jug is half empty. If you arrive before 11 AM: Drink the other half of the Golytely jug at 11 PM night before procedure. If you arrive after 11 AM: Drink the other half of the Golytely jug at 6 AM day of procedure. For additional instructions refer to your colonoscopy prep instructions. 4000 mL 0     rivaroxaban ANTICOAGULANT (XARELTO) 20 MG TABS tablet Take 1 tablet (20 mg) by mouth daily (with dinner) 90 tablet 3     ALLERGIES  Allergies   Allergen Reactions     Bee Venom Hives     Hot and sweating       REVIEW OF SYSTEMS  As above in the HPI, o/w complete 12-point ROS was negative.    PHYSICAL EXAM  There were no vitals taken for this visit.  GEN: NAD  HEENT: EOMI, no icterus, injection or pallor.  NEURO: alert    Remainder of physical exam deferred due to public health emergency and limitations of video visit.    LABORATORY AND IMAGING STUDIES   04/24/23 11:56   Sodium 133 (L)   Potassium 4.3   Chloride 93 (L)   Carbon Dioxide (CO2) 30 (H)   Urea Nitrogen 15.6   Creatinine 0.93   GFR Estimate 65   Calcium 9.2   Anion Gap 10   Albumin 4.2   Protein Total 6.8   Alkaline Phosphatase 79   ALT 15   AST 19   Bilirubin Total 0.4   Glucose 156 (H)   WBC 7.5   Hemoglobin 14.5   Hematocrit 43.2   Platelet Count 229   RBC Count 4.84   MCV 89   MCH 30.0   MCHC 33.6   RDW 12.5   % Neutrophils 69   % Lymphocytes 20   % Monocytes 6   % Eosinophils 5   % Basophils 0   Absolute Basophils 0.0   Absolute Eosinophils 0.3   Absolute Immature Granulocytes 0.0   Absolute Lymphocytes 1.5   Absolute Monocytes 0.4   % Immature Granulocytes 0   Absolute Neutrophils 5.2   Absolute NRBCs 0.0   NRBCs per 100 WBC 0     Labs were independently  reviewed and interpreted by me    CT Chest/Abdomen/Pelvis w Contrast  Narrative: CT CHEST/ABDOMEN/PELVIS W CONTRAST 4/24/2023 1:20 PM    CLINICAL HISTORY: resected stage III lung cancer, assess for  recurrence; Squamous cell carcinoma of bronchus in left lower lobe (H)    TECHNIQUE: CT scan of the chest, abdomen, and pelvis was performed  following injection of IV contrast. Multiplanar reformats were  obtained. Dose reduction techniques were used.   CONTRAST: Isovue-370, 80mL    COMPARISON: 12/27/2022    FINDINGS:   LUNGS AND PLEURA: Left lower lobectomy with stable scarring in the  posterior medial left lower lobe. Stable 3 mm nodule right upper lobe  (series 4, image 73). Stable 6 mm nodule right middle lobe (series 4,  image 164). Stable 6 mm triangular nodule in right middle lobe (series  4, image 200). Stable 4 mm nodule right middle lobe (series 4, image  149). No new nodules are identified. No focal consolidation or  effusion.    MEDIASTINUM/AXILLAE: Heart is normal in size. No adenopathy. No  significant coronary artery calcification.    HEPATOBILIARY: Multiple hepatic cysts are noted. Liver otherwise  unremarkable. Cholecystectomy.    PANCREAS: Stable 9 mm cyst in the head of the pancreas and a 7 mm cyst  within the uncinate. Pancreas otherwise unremarkable.    SPLEEN: Normal.    ADRENAL GLANDS: Normal.    KIDNEYS/BLADDER: No significant mass, stones, or hydronephrosis.    BOWEL: No obstruction or inflammatory change. Diverticulosis.    PELVIC ORGANS: Bladder and uterus within normal limits.    ADDITIONAL FINDINGS: Moderate atherosclerotic disease of the abdominal  aorta and its branches.    MUSCULOSKELETAL: Degenerative changes of the spine.  Impression: IMPRESSION:  1.  No significant interval change when compared to the prior  examination. No evidence of recurrence or new metastatic disease  within the chest, abdomen, and pelvis.  2.  Left lower lobectomy.  3.  Stable pulmonary nodules measuring up to 6  mm.  4.  Stable to this within the pancreas measuring up to 9 mm.    JOSE J BRASWELL MD         SYSTEM ID:  [id number]      Imaging was personally reviewed and interpreted by me    Virtual Visit Details  Type of service:  Video Visit   Video Start Time: 8:18 AM  Video End Time: 8:45 AM  Originating Location (pt. Location): Home  Platform used for Video Visit: [facility]

## 2023-05-04 ENCOUNTER — VIRTUAL VISIT (OUTPATIENT)
Dept: ONCOLOGY | Facility: CLINIC | Age: 73
End: 2023-05-04
Attending: INTERNAL MEDICINE
Payer: MEDICARE

## 2023-05-04 DIAGNOSIS — C34.32 SQUAMOUS CELL CARCINOMA OF BRONCHUS IN LEFT LOWER LOBE (H): Primary | ICD-10-CM

## 2023-05-04 PROCEDURE — 99214 OFFICE O/P EST MOD 30 MIN: CPT | Mod: VID | Performed by: INTERNAL MEDICINE

## 2023-05-04 NOTE — NURSING NOTE
Is the patient currently in the state of [place]? YES    Visit mode:VIDEO    If the visit is dropped, the patient can be reconnected by: VIDEO VISIT: Text to cell phone: [phone]    Will anyone else be joining the visit? NO      How would you like to obtain your AVS? MyChart    Are changes needed to the allergy or medication list? NO    Reason for visit: Oncology Video Visit Return (Squamous cell carcinoma of bronchus in left lower lobe, f/u)  Ijeoma Shah 72 year old female presents today for f/u on lung cancer and review labs/CT results.  Mame Goyal, Virtual Facilitator

## 2023-05-04 NOTE — LETTER
5/4/2023         RE: Ijeoma Shah  [address] [facility] [address] [address]        Dear Colleague,    Thank you for referring your patient, Ijeoma Shah, to the [facility]. Please see a copy of my visit note below.    =   [facility]    PATIENT NAME: Ijeoma Shah  MRN # [id number]   DATE OF VISIT: May 2, 2023  YOB: 1950     CANCER TYPE: SCC lung, poorly differentiated  STAGE: pT4N0 (IIIA)  ECOG PS: 1    PD-L1: TPS 15% on S35-90250 lobectomy, <1% on QR23-5759 (LLL bx)  Lung panel: SMO R562Q on LLL bx, negative for fusions on lobectomy specimen.   NGS: N/A    SUMMARY  7/3/20  CT chest (screening). 6.7 cm LLL mass, 0.6 cm RML nodule, mediastinal and L hilar LNs  7/9/20 PET/CT. 7.1 x 5.6 cm LLL mass (SUV 19.6), post obstructive pneumonitis LLL inferior to the mass (SUV 2.8), 3.5 x 1.7 cm 4L node (SUV 5.9), L adrenal nodule 1.1 cm (SUV 4), indeterminate pulmonary nodules, pancreatic cysts, not hypermetabolic  7/27/20 Bronch, EBUS (Dr. Castro). 10R, 11R, 4R too small to biopsy. Stations 7, 4L, 11L negative for malignancy. LLL mass bx: SCC  8/12/20 Brain MRI negative  9/1/20 EUS to evaluate adrenal and 4L (Dr. Hogan). Both negative for malignancy. Adrenal with bland adrenal cells  10/22/20 L VATS, converted to thoracotomy, LLL lobectomy, MLND, R pulmonary arterioplasty (Dr. Sanabria, Dr. Davis). 8.3 cm poorly differentiated SCC, +angiolymphatic invasion  12/3/20~2/25/21 Cisplatin gemcitabine x 4. C2D8 delayed 1 week due to neutropenia, added neulasta    ASSESSMENT AND PLAN  SCC lung, [id number], IIIA, R0 resection, discrepant PD-L1: Just over 2 years after completion of therapy. Nodule on 4:156 on Dec CT, 4:164 on current scan unchanged. As before, this one was not present on 3/8/22 CT. Otherwise stable. CT chest abd w/contrast in 4-5 months, labs, TAYLOR appt afterward. Continue ~ q4h months through year 3 then q6 months. She  asked questions about risk of recurrence. Discussed not insubstantial - could be same cancer returning, although less likely now that it's been more than 2 years, vs new cancer given the likely field effect from prior damage. Discussed that the risk decreases as time goes on.     Pulm nodules: stable     Microscopic hematuria, dysuria: Urine cytology and FISH negative in March-April, met with Dr. Leyva in Urology, discussed cystoscopy vs monitoring, Salima opted for monitoring. Follow up prn, will defer to Dr. Espinal on this matter.    Colon polyps: Colonoscopy Jan 2023.     30 minutes spent by me on the date of the encounter doing chart review, history and exam, documentation and further activities per the note     Eneida De Leon MD  Associate Professor of Medicine  Hematology, Oncology and Transplantation      SUBJECTIVE  Salima returns for closer interim follow up for SCC lung after new nodules showed up on CT.   Has had a bad URI for a couple of weeks - cough, runny nose, fatigue. Got abx.   Has an appt with Cardiology next week - routine    PAST MEDICAL HISTORY  SCC as above  Afib with RVR. Initially when she presented for surgery 10/1, then post-op in 10/2020. DCCV x 2 10/23/20, ibutilide --> NSR, dig load, amio gtt. TTE 10/16/20 unremarkable.   Low back pain, R radiculopathy, L5-S1 interlaminar lumbar epidural steroid injection 11/15/21  Sp cholecystectomy  Dyslipidemia  H/o bronchitis  Nose surgery  Tubal ligation  Adrenal nodule, bx 9/1/20, negative for malignancy  Cholelithiasis  Cataract repair 2011?    PFT 8/20/20 (preop). FEV1 1.33 (58%), FVC 1.76 (60%), DLCO 17.72 (90%)  Colonoscopy 1/25/23. Hyperplastic polyp and tubular adenoma    CURRENT OUTPATIENT MEDICATIONS  Current Outpatient Medications   Medication Sig Dispense Refill    bisacodyl (DULCOLAX) 5 MG EC tablet Take 2 tablets at 3 pm the day before your procedure. If your procedure is before 11 am, take 2 additional tablets at 11 pm. If your  procedure is after 11 am, take 2 additional tablets at 6 am. For additional instructions refer to your colonoscopy prep instructions. 4 tablet 0    ketoconazole (NIZORAL) 2 % external cream Apply topically daily For rash on feet. 60 g 0    lisinopril (ZESTRIL) 10 MG tablet Take 1 tablet (10 mg) by mouth every morning 90 tablet 1    metoprolol succinate ER (TOPROL XL) 25 MG 24 hr tablet TAKE 1 TABLET BY MOUTH IN THE MORNING 90 tablet 0    polyethylene glycol (GOLYTELY) 236 g suspension The night before the exam at 6 pm drink an 8-ounce glass every 15 minutes until the jug is half empty. If you arrive before 11 AM: Drink the other half of the Golytely jug at 11 PM night before procedure. If you arrive after 11 AM: Drink the other half of the Golytely jug at 6 AM day of procedure. For additional instructions refer to your colonoscopy prep instructions. 4000 mL 0    rivaroxaban ANTICOAGULANT (XARELTO) 20 MG TABS tablet Take 1 tablet (20 mg) by mouth daily (with dinner) 90 tablet 3     ALLERGIES  Allergies   Allergen Reactions    Bee Venom Hives     Hot and sweating       REVIEW OF SYSTEMS  As above in the HPI, o/w complete 12-point ROS was negative.    PHYSICAL EXAM  There were no vitals taken for this visit.  GEN: NAD  HEENT: EOMI, no icterus, injection or pallor.  NEURO: alert    Remainder of physical exam deferred due to public health emergency and limitations of video visit.    LABORATORY AND IMAGING STUDIES   04/24/23 11:56   Sodium 133 (L)   Potassium 4.3   Chloride 93 (L)   Carbon Dioxide (CO2) 30 (H)   Urea Nitrogen 15.6   Creatinine 0.93   GFR Estimate 65   Calcium 9.2   Anion Gap 10   Albumin 4.2   Protein Total 6.8   Alkaline Phosphatase 79   ALT 15   AST 19   Bilirubin Total 0.4   Glucose 156 (H)   WBC 7.5   Hemoglobin 14.5   Hematocrit 43.2   Platelet Count 229   RBC Count 4.84   MCV 89   MCH 30.0   MCHC 33.6   RDW 12.5   % Neutrophils 69   % Lymphocytes 20   % Monocytes 6   % Eosinophils 5   % Basophils 0    Absolute Basophils 0.0   Absolute Eosinophils 0.3   Absolute Immature Granulocytes 0.0   Absolute Lymphocytes 1.5   Absolute Monocytes 0.4   % Immature Granulocytes 0   Absolute Neutrophils 5.2   Absolute NRBCs 0.0   NRBCs per 100 WBC 0     Labs were independently reviewed and interpreted by me    CT Chest/Abdomen/Pelvis w Contrast  Narrative: CT CHEST/ABDOMEN/PELVIS W CONTRAST 4/24/2023 1:20 PM    CLINICAL HISTORY: resected stage III lung cancer, assess for  recurrence; Squamous cell carcinoma of bronchus in left lower lobe (H)    TECHNIQUE: CT scan of the chest, abdomen, and pelvis was performed  following injection of IV contrast. Multiplanar reformats were  obtained. Dose reduction techniques were used.   CONTRAST: Isovue-370, 80mL    COMPARISON: 12/27/2022    FINDINGS:   LUNGS AND PLEURA: Left lower lobectomy with stable scarring in the  posterior medial left lower lobe. Stable 3 mm nodule right upper lobe  (series 4, image 73). Stable 6 mm nodule right middle lobe (series 4,  image 164). Stable 6 mm triangular nodule in right middle lobe (series  4, image 200). Stable 4 mm nodule right middle lobe (series 4, image  149). No new nodules are identified. No focal consolidation or  effusion.    MEDIASTINUM/AXILLAE: Heart is normal in size. No adenopathy. No  significant coronary artery calcification.    HEPATOBILIARY: Multiple hepatic cysts are noted. Liver otherwise  unremarkable. Cholecystectomy.    PANCREAS: Stable 9 mm cyst in the head of the pancreas and a 7 mm cyst  within the uncinate. Pancreas otherwise unremarkable.    SPLEEN: Normal.    ADRENAL GLANDS: Normal.    KIDNEYS/BLADDER: No significant mass, stones, or hydronephrosis.    BOWEL: No obstruction or inflammatory change. Diverticulosis.    PELVIC ORGANS: Bladder and uterus within normal limits.    ADDITIONAL FINDINGS: Moderate atherosclerotic disease of the abdominal  aorta and its branches.    MUSCULOSKELETAL: Degenerative changes of the  spine.  Impression: IMPRESSION:  1.  No significant interval change when compared to the prior  examination. No evidence of recurrence or new metastatic disease  within the chest, abdomen, and pelvis.  2.  Left lower lobectomy.  3.  Stable pulmonary nodules measuring up to 6 mm.  4.  Stable to this within the pancreas measuring up to 9 mm.    JOSE J BRASWELL MD         SYSTEM ID:  [id number]      Imaging was personally reviewed and interpreted by me    Virtual Visit Details  Type of service:  Video Visit   Video Start Time: 8:18 AM  Video End Time: 8:45 AM  Originating Location (pt. Location): Home  Platform used for Video Visit: BERD

## 2023-05-09 ENCOUNTER — OFFICE VISIT (OUTPATIENT)
Dept: CARDIOLOGY | Facility: CLINIC | Age: 73
End: 2023-05-09
Payer: MEDICARE

## 2023-05-09 VITALS
BODY MASS INDEX: 30.11 KG/M2 | HEIGHT: 63 IN | SYSTOLIC BLOOD PRESSURE: 106 MMHG | OXYGEN SATURATION: 97 % | DIASTOLIC BLOOD PRESSURE: 64 MMHG | HEART RATE: 76 BPM | WEIGHT: 169.9 LBS

## 2023-05-09 DIAGNOSIS — I10 BENIGN ESSENTIAL HYPERTENSION: ICD-10-CM

## 2023-05-09 DIAGNOSIS — I48.91 ATRIAL FIBRILLATION WITH RAPID VENTRICULAR RESPONSE (H): ICD-10-CM

## 2023-05-09 DIAGNOSIS — C34.92 SQUAMOUS CELL CARCINOMA OF LEFT LUNG (H): ICD-10-CM

## 2023-05-09 DIAGNOSIS — I48.0 PAROXYSMAL ATRIAL FIBRILLATION (H): ICD-10-CM

## 2023-05-09 PROCEDURE — 99214 OFFICE O/P EST MOD 30 MIN: CPT | Performed by: INTERNAL MEDICINE

## 2023-05-09 RX ORDER — WARFARIN SODIUM 2.5 MG/1
5 TABLET ORAL DAILY
Qty: 90 TABLET | Refills: 11 | Status: SHIPPED | OUTPATIENT
Start: 2023-05-09 | End: 2023-06-13

## 2023-05-09 NOTE — LETTER
5/9/2023    Jyoti Garcia MD  [address] [place] [id number]    RE: Ijeoma Shah       Dear Colleague,     I had the pleasure of seeing Ijeoma Shah in the [facility].  HISTORY OF PRESENT ILLNESS:  Since last seen 8/1/2022 the patient remains free of sustained palpitations.  Because of cost concerns, the patient is in the process of deciding whether she wishes to switch from rivaroxaban to warfarin.  Her lung cancer is closely followed by the oncology service, and most recent CT scans have remained stable.  She is free of chest pain syncope palpitation or focal neurologic deficit    PAST MEDICAL HISTORY:    1.  Paroxysmal atrial fibrillation.  CHADS-VASc score of 3 based on female gender, hypertension and age.  On rivaroxaban and low-dose metoprolol XL.May switch from rivaroxaban to warfarin.   2.  Hypertension, well controlled.  3.  Dyslipidemia -- currently not on a statin drug.  Declined statin therapy today.  4.  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  a.  Stage pT4 N0MO IIIA   b.  Status post left lower lobectomy and mediastinal lymph node dissection.  Evidence of angiolymphatic invasion.  Brain MRA negative.  Status post cisplatin and gemcitabine x4 (completed 02/2021).  C. Followed by Oncology with serial CT scans. Last scan unchanged since 12/2022    Orders this Visit:  No orders of the defined types were placed in this encounter.    No orders of the defined types were placed in this encounter.    Medications Discontinued During This Encounter   Medication Reason    bisacodyl (DULCOLAX) 5 MG EC tablet Therapy completed (No AVS)    polyethylene glycol (GOLYTELY) 236 g suspension Therapy completed (No AVS)       Encounter Diagnoses   Name Primary?    Benign essential hypertension     Squamous cell carcinoma of left lung (H)     Paroxysmal atrial fibrillation (H)        CURRENT MEDICATIONS:  Current Outpatient Medications   Medication Sig  "Dispense Refill    ketoconazole (NIZORAL) 2 % external cream Apply topically daily For rash on feet. 60 g 0    lisinopril (ZESTRIL) 10 MG tablet Take 1 tablet (10 mg) by mouth every morning 90 tablet 1    metoprolol succinate ER (TOPROL XL) 25 MG 24 hr tablet TAKE 1 TABLET BY MOUTH IN THE MORNING 90 tablet 0    rivaroxaban ANTICOAGULANT (XARELTO) 20 MG TABS tablet Take 1 tablet (20 mg) by mouth daily (with dinner) 90 tablet 3       ALLERGIES     Allergies   Allergen Reactions    Bee Venom Hives     Hot and sweating        PAST MEDICAL, SURGICAL, FAMILY, SOCIAL HISTORY:  History was reviewed and updated as needed, see medical record.    Review of Systems:  A 12-point review of systems was completed, see medical record for detailed review of systems information.    Physical Exam:  Vitals: /64 (BP Location: Right arm, Patient Position: Sitting, Cuff Size: Adult Regular)   Pulse 76   Ht 1.6 m (5' 3\")   Wt 77.1 kg (169 lb 14.4 oz)   SpO2 97%   BMI 30.10 kg/m    Pleasant, cooperative, intelligent,  Cardiovascular exam: Normal S1 and S2 no murmur rub or click rhythm is regular  Lungs clear to percussion auscultation with left lower lobe dullness    ASSESSMENT: I am pleased the patient remains symptomatic since last seen.  Because she was uncertain as to whether she wishes to switch from rivaroxaban to warfarin, I have provided prescriptions for both drugs and she will notify us if she plans to stop rivaroxaban and begin warfarin.  The patient plans to call back our clinic if she elects to start warfarin and will be enrolled in the warfarin clinic.       RECOMMENDATIONS:   1) await patient decision in regards to anticoagulation.  Have provided prescriptions for both and will enroll in warfarin clinic if the patient elects warfari  2) follow-up in about 1 year      Recent Lab Results:  LIPID RESULTS:  Lab Results   Component Value Date    CHOL 251 (H) 03/08/2022    CHOL 238 (H) 06/23/2020    HDL 76 03/08/2022    " HDL 68 06/23/2020     (H) 03/08/2022     (H) 06/23/2020    TRIG 115 03/08/2022    TRIG 117 06/23/2020    CHOLHDLRATIO 3.9 07/13/2015       LIVER ENZYME RESULTS:  Lab Results   Component Value Date    AST 19 04/24/2023    AST 15 04/12/2021    ALT 15 04/24/2023    ALT 23 04/12/2021       CBC RESULTS:  Lab Results   Component Value Date    WBC 7.5 04/24/2023    WBC 7.4 04/12/2021    RBC 4.84 04/24/2023    RBC 3.49 (L) 04/12/2021    HGB 14.5 04/24/2023    HGB 13.1 05/20/2021    HCT 43.2 04/24/2023    HCT 32.8 (L) 04/12/2021    MCV 89 04/24/2023    MCV 94 04/12/2021    MCH 30.0 04/24/2023    MCH 31.5 04/12/2021    MCHC 33.6 04/24/2023    MCHC 33.5 04/12/2021    RDW 12.5 04/24/2023    RDW 13.8 04/12/2021     04/24/2023     04/12/2021       BMP RESULTS:  Lab Results   Component Value Date     (L) 04/24/2023     04/12/2021    POTASSIUM 4.3 04/24/2023    POTASSIUM 4.4 12/27/2022    POTASSIUM 4.2 05/20/2021    CHLORIDE 93 (L) 04/24/2023    CHLORIDE 101 12/27/2022    CHLORIDE 102 04/12/2021    CO2 30 (H) 04/24/2023    CO2 31 12/27/2022    CO2 28 04/12/2021    ANIONGAP 10 04/24/2023    ANIONGAP 5 12/27/2022    ANIONGAP 4 04/12/2021     (H) 04/24/2023     (H) 12/27/2022     (H) 04/12/2021    BUN 15.6 04/24/2023    BUN 18 12/27/2022    BUN 19 04/12/2021    CR 0.93 04/24/2023    CR 1.08 (H) 05/20/2021    GFRESTIMATED 65 04/24/2023    GFRESTIMATED 60 (L) 03/08/2022    GFRESTIMATED 52 (L) 05/20/2021    GFRESTBLACK 60 (L) 05/20/2021    SABINO 9.2 04/24/2023    SABINO 8.7 04/12/2021        A1C RESULTS:  Lab Results   Component Value Date    A1C 5.6 10/23/2020       INR RESULTS:  No results found for: INR    We greatly appreciate the opportunity to be involved in the care of your patient, Ijeoma ZAZUETA Pablo.    Sincerely,  Warren Pisano MD      CC  Warren Pisano MD  [address] [facility] [address]  [place] [address]          Thank you for allowing me to participate in the  care of your patient.      Sincerely,     Warren Pisano MD     [facility]

## 2023-05-09 NOTE — PROGRESS NOTES
HISTORY OF PRESENT ILLNESS:  Since last seen 8/1/2022 the patient remains free of sustained palpitations.  Because of cost concerns, the patient is in the process of deciding whether she wishes to switch from rivaroxaban to warfarin.  Her lung cancer is closely followed by the oncology service, and most recent CT scans have remained stable.  She is free of chest pain syncope palpitation or focal neurologic deficit    PAST MEDICAL HISTORY:    1.  Paroxysmal atrial fibrillation.  CHADS-VASc score of 3 based on female gender, hypertension and age.  On rivaroxaban and low-dose metoprolol XL.May switch from rivaroxaban to warfarin.   2.  Hypertension, well controlled.  3.  Dyslipidemia -- currently not on a statin drug.  Declined statin therapy today.  4.  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  a.  Stage pT4 N0MO IIIA   b.  Status post left lower lobectomy and mediastinal lymph node dissection.  Evidence of angiolymphatic invasion.  Brain MRA negative.  Status post cisplatin and gemcitabine x4 (completed 02/2021).  C. Followed by Oncology with serial CT scans. Last scan unchanged since 12/2022    Orders this Visit:  No orders of the defined types were placed in this encounter.    No orders of the defined types were placed in this encounter.    Medications Discontinued During This Encounter   Medication Reason     bisacodyl (DULCOLAX) 5 MG EC tablet Therapy completed (No AVS)     polyethylene glycol (GOLYTELY) 236 g suspension Therapy completed (No AVS)       Encounter Diagnoses   Name Primary?     Benign essential hypertension      Squamous cell carcinoma of left lung (H)      Paroxysmal atrial fibrillation (H)        CURRENT MEDICATIONS:  Current Outpatient Medications   Medication Sig Dispense Refill     ketoconazole (NIZORAL) 2 % external cream Apply topically daily For rash on feet. 60 g 0     lisinopril (ZESTRIL) 10 MG tablet Take 1 tablet (10 mg) by mouth every morning 90 tablet 1      "metoprolol succinate ER (TOPROL XL) 25 MG 24 hr tablet TAKE 1 TABLET BY MOUTH IN THE MORNING 90 tablet 0     rivaroxaban ANTICOAGULANT (XARELTO) 20 MG TABS tablet Take 1 tablet (20 mg) by mouth daily (with dinner) 90 tablet 3       ALLERGIES     Allergies   Allergen Reactions     Bee Venom Hives     Hot and sweating        PAST MEDICAL, SURGICAL, FAMILY, SOCIAL HISTORY:  History was reviewed and updated as needed, see medical record.    Review of Systems:  A 12-point review of systems was completed, see medical record for detailed review of systems information.    Physical Exam:  Vitals: /64 (BP Location: Right arm, Patient Position: Sitting, Cuff Size: Adult Regular)   Pulse 76   Ht 1.6 m (5' 3\")   Wt 77.1 kg (169 lb 14.4 oz)   SpO2 97%   BMI 30.10 kg/m    Pleasant, cooperative, intelligent,  Cardiovascular exam: Normal S1 and S2 no murmur rub or click rhythm is regular  Lungs clear to percussion auscultation with left lower lobe dullness    ASSESSMENT: I am pleased the patient remains symptomatic since last seen.  Because she was uncertain as to whether she wishes to switch from rivaroxaban to warfarin, I have provided prescriptions for both drugs and she will notify us if she plans to stop rivaroxaban and begin warfarin.  The patient plans to call back our clinic if she elects to start warfarin and will be enrolled in the warfarin clinic.       RECOMMENDATIONS:   1) await patient decision in regards to anticoagulation.  Have provided prescriptions for both and will enroll in warfarin clinic if the patient elects warfari  2) follow-up in about 1 year      Recent Lab Results:  LIPID RESULTS:  Lab Results   Component Value Date    CHOL 251 (H) 03/08/2022    CHOL 238 (H) 06/23/2020    HDL 76 03/08/2022    HDL 68 06/23/2020     (H) 03/08/2022     (H) 06/23/2020    TRIG 115 03/08/2022    TRIG 117 06/23/2020    CHOLHDLRATIO 3.9 07/13/2015       LIVER ENZYME RESULTS:  Lab Results   Component " Value Date    AST 19 04/24/2023    AST 15 04/12/2021    ALT 15 04/24/2023    ALT 23 04/12/2021       CBC RESULTS:  Lab Results   Component Value Date    WBC 7.5 04/24/2023    WBC 7.4 04/12/2021    RBC 4.84 04/24/2023    RBC 3.49 (L) 04/12/2021    HGB 14.5 04/24/2023    HGB 13.1 05/20/2021    HCT 43.2 04/24/2023    HCT 32.8 (L) 04/12/2021    MCV 89 04/24/2023    MCV 94 04/12/2021    MCH 30.0 04/24/2023    MCH 31.5 04/12/2021    MCHC 33.6 04/24/2023    MCHC 33.5 04/12/2021    RDW 12.5 04/24/2023    RDW 13.8 04/12/2021     04/24/2023     04/12/2021       BMP RESULTS:  Lab Results   Component Value Date     (L) 04/24/2023     04/12/2021    POTASSIUM 4.3 04/24/2023    POTASSIUM 4.4 12/27/2022    POTASSIUM 4.2 05/20/2021    CHLORIDE 93 (L) 04/24/2023    CHLORIDE 101 12/27/2022    CHLORIDE 102 04/12/2021    CO2 30 (H) 04/24/2023    CO2 31 12/27/2022    CO2 28 04/12/2021    ANIONGAP 10 04/24/2023    ANIONGAP 5 12/27/2022    ANIONGAP 4 04/12/2021     (H) 04/24/2023     (H) 12/27/2022     (H) 04/12/2021    BUN 15.6 04/24/2023    BUN 18 12/27/2022    BUN 19 04/12/2021    CR 0.93 04/24/2023    CR 1.08 (H) 05/20/2021    GFRESTIMATED 65 04/24/2023    GFRESTIMATED 60 (L) 03/08/2022    GFRESTIMATED 52 (L) 05/20/2021    GFRESTBLACK 60 (L) 05/20/2021    SABINO 9.2 04/24/2023    SABINO 8.7 04/12/2021        A1C RESULTS:  Lab Results   Component Value Date    A1C 5.6 10/23/2020       INR RESULTS:  No results found for: INR    We greatly appreciate the opportunity to be involved in the care of your patient, Ijeoma Shah.    Sincerely,  Warren Pisano MD      CC  Warren Pisano MD  [address] [facility] [address]  [place] [id number]

## 2023-05-10 ENCOUNTER — ANTICOAGULATION THERAPY VISIT (OUTPATIENT)
Dept: ANTICOAGULATION | Facility: CLINIC | Age: 73
End: 2023-05-10
Payer: MEDICARE

## 2023-05-10 ENCOUNTER — TELEPHONE (OUTPATIENT)
Dept: CARDIOLOGY | Facility: CLINIC | Age: 73
End: 2023-05-10
Payer: MEDICARE

## 2023-05-10 DIAGNOSIS — I48.0 PAROXYSMAL ATRIAL FIBRILLATION (H): ICD-10-CM

## 2023-05-10 DIAGNOSIS — I48.91 ATRIAL FIBRILLATION WITH RAPID VENTRICULAR RESPONSE (H): ICD-10-CM

## 2023-05-10 DIAGNOSIS — Z79.01 LONG TERM CURRENT USE OF ANTICOAGULANT THERAPY: Primary | ICD-10-CM

## 2023-05-10 NOTE — TELEPHONE ENCOUNTER
Warren Pisano MD Koch, Brenna M RN  Caller: Unspecified (Today,  9:05 AM)  Karthik King   As always I deeply appreciate how well you communicate with our team and the patient!! I think it's fine for her to be on warfarin  INR target 2 to 3 and managed by the warfarin clinic closest to her home.

## 2023-05-10 NOTE — PROGRESS NOTES
"ANTICOAGULATION  MANAGEMENT: NEW REFERRAL      SUBJECTIVE/OBJECTIVE     Ijeoma Shah, a 72 year old female  is newly referred to [facility].    Anticoagulation:    Previously on warfarin: No, has been on Rivaroxaban (Xarelto); transitioning to warfarin.  Warfarin initiation date (approximate): 5/10/23   Indication(s): Atrial Fibrillation   Goal Range: 2.0-3.0   Anticoagulation Bridge/Overlap: No, completed therapy with Rivaroxaban (Xarelto) and transitioning to warfarin. took her last dose of xarelto last night, does not have anymore.   Referring provider: from heartcare provider    General Dietary/Social Hx:    Typical vitamin K intake: low; consistent     Other dietary considerations: None     Social History: occasional etoh use 2-3 beers or 2-3 glasses of wine    In the past 2 weeks, patient estimates taking medications as instructed % of time: 100    Results:        No results for input(s): INR, [id number], F2, ALMWH in the last 168 hours.    Wt Readings from Last 2 Encounters:   05/09/23 77.1 kg (169 lb 14.4 oz)   01/25/23 76.2 kg (168 lb)      Estimated body mass index is 30.1 kg/m  as calculated from the following:    Height as of 5/9/23: 1.6 m (5' 3\").    Weight as of 5/9/23: 77.1 kg (169 lb 14.4 oz).  Lab Results   Component Value Date    AST 19 04/24/2023     Lab Results   Component Value Date    CR 0.93 04/24/2023     Estimated Creatinine Clearance: 54.6 mL/min (based on SCr of 0.93 mg/dL).    ASSESSMENT       Goal INR 2-3, standard for indication(s) above  Establishing initial warfarin maintenance dose (on warfarin < 30 days)     Starting warfarin dose is appropriate for patient's anticipated sensitivity to warfarin    PLAN     Dosing Instructions: Continue your current warfarin dose with INR in 2 days       Summary  As of 5/10/2023    Full warfarin instructions:  5 mg every day   Next INR check:  5/12/2023             Education provided:     Please call back if " any changes to your diet, medications or how you've been taking warfarin    Taking warfarin: purpose of warfarin and how it works, take warfarin at same time each day; preferably in the evening, prescribed tablet strength and color, importance of following [facility] instructions vs instructions on the prescription bottle and Importance of taking warfarin as instructed    Goal range and lab monitoring: goal range and significance of current result and frequency of lab work when starting warfarin and importance of following up when instructed (extends after stability established)    Dietary considerations: importance of consistent vitamin K intake, impact of vitamin K foods on INR, vitamin K content of foods, Impact of protein intake on INR  and importance of notifying ACC to changes in diet    Healthy lifestyle considerations: potential interaction between warfarin and alcohol    Importance of notifying anticoagulation clinic for: changes in medications; a sooner lab recheck maybe needed, diarrhea, nausea/vomiting, reduced intake, cold/flu, and/or infections; a sooner lab recheck maybe needed and upcoming surgeries and procedures 2 weeks in advance    Contact [phone]  with any changes, questions or concerns.     Education still needed:     Symptom monitoring: travel related clotting risk and prevention    Importance of notifying anticoagulation clinic for: if you did not receive dosing instructions on the same day as your labs were checked      Telephone call with Salima who verbalizes understanding and agrees to plan    Lab visit scheduled    Standing orders placed in Epic: Point of Care INR (Lab 5000)    Plan made per [facility] anticoagulation protocol    Jocelyn Bose RN  Anticoagulation Clinic  5/10/2023

## 2023-05-10 NOTE — TELEPHONE ENCOUNTER
Patient called and said she decided to go with coumadin. Will take xarelto off medication list. Will send update to INR clinic nurses so they can contact patient to further discuss when her first INR check should be. Patient verbalized understanding and will wait for the call.     Will also route to Dr. Pisano as an update.

## 2023-05-10 NOTE — TELEPHONE ENCOUNTER
9:09 AM    Patient's primary PCP is at the [facility] so writer will forward this to CARLOS jones [place]. The [facility] nurses for that pool will manage the patient. Please change the episode to [place]. Thank you!    Ting Paiz RN, BSN, PHN  Anticoagulation Clinic   [phone]

## 2023-05-12 ENCOUNTER — LAB (OUTPATIENT)
Dept: LAB | Facility: CLINIC | Age: 73
End: 2023-05-12
Payer: MEDICARE

## 2023-05-12 ENCOUNTER — DOCUMENTATION ONLY (OUTPATIENT)
Dept: ANTICOAGULATION | Facility: CLINIC | Age: 73
End: 2023-05-12

## 2023-05-12 ENCOUNTER — ANTICOAGULATION THERAPY VISIT (OUTPATIENT)
Dept: ANTICOAGULATION | Facility: CLINIC | Age: 73
End: 2023-05-12

## 2023-05-12 DIAGNOSIS — I48.91 ATRIAL FIBRILLATION WITH RAPID VENTRICULAR RESPONSE (H): ICD-10-CM

## 2023-05-12 DIAGNOSIS — I48.0 PAROXYSMAL ATRIAL FIBRILLATION (H): ICD-10-CM

## 2023-05-12 DIAGNOSIS — Z79.01 LONG TERM CURRENT USE OF ANTICOAGULANT THERAPY: ICD-10-CM

## 2023-05-12 DIAGNOSIS — I48.0 PAROXYSMAL ATRIAL FIBRILLATION (H): Primary | ICD-10-CM

## 2023-05-12 LAB — INR BLD: 1.1 (ref 0.9–1.1)

## 2023-05-12 PROCEDURE — 36416 COLLJ CAPILLARY BLOOD SPEC: CPT

## 2023-05-12 PROCEDURE — 85610 PROTHROMBIN TIME: CPT

## 2023-05-12 NOTE — PROGRESS NOTES
ANTICOAGULATION MANAGEMENT     Ijeoma Shah 72 year old female is on warfarin with subtherapeutic INR result. (Goal INR 2.0-3.0)    Recent labs: (last 7 days)     05/12/23  1003   INR 1.1       ASSESSMENT     Warfarin Lab Questionnaire    Warfarin Doses Last 7 Days      5/11/2023     8:54 PM   Dose in Tablet or Mg   TAB or MG? tablet (tab)     Pt Rptd Dose WED THURS 5/11/2023   8:54 PM 2 2         5/11/2023   Warfarin Lab Questionnaire   Missed doses within past 14 days? No   Changes in diet or alcohol within past 14 days? No   Medication changes since last result? No   Injuries or illness since last result? No   New shortness of breath, severe headaches or sudden changes in vision since last result? No   Abnormal bleeding since last result? No   Upcoming surgery, procedure? No        Previous result: Subtherapeutic  Additional findings: New start on day 3 of warfarin       PLAN     Recommended plan for no diet, medication or health factor changes affecting INR     Dosing Instructions: Increase your warfarin dose (35.7% change) with next INR in 3 days       Summary  As of 5/12/2023    Full warfarin instructions:  5 mg every Mon, Thu; 7.5 mg all other days   Next INR check:  5/15/2023             Telephone call with Salima who verbalizes understanding and agrees to plan    Lab visit scheduled    Education provided:     Please call back if any changes to your diet, medications or how you've been taking warfarin    Importance of notifying anticoagulation clinic for: if you did not receive dosing instructions on the same day as your labs were checked    Contact [phone]  with any changes, questions or concerns.     Plan made per [facility] anticoagulation protocol    Sherie Esposito RN  Anticoagulation Clinic  5/12/2023    _______________________________________________________________________     Anticoagulation Episode Summary     Current INR goal:  2.0-3.0   TTR:  --   Target end date:  Indefinite   Send INR  reminders to:  [facility]    Indications    Paroxysmal atrial fibrillation (H) [I48.0]  Atrial fibrillation with rapid ventricular response (H) [I48.91]           Comments:  RAJESH garibay         Anticoagulation Care Providers     Provider Role Specialty Phone number    Warren Pisano MD Referring Cardiovascular Disease [phone]

## 2023-05-12 NOTE — PROGRESS NOTES
Dr. Espinal,    Your patient, Ijeoma Shah, is under the care of [facility]. Previously referred by cardiology.  Anticoagulation clinic needing a new referring provider due to cardiology usually does not help manage warfarin     Indication(s): Atrial Fibrillation   Goal Range: 2.0-3.0  Duration: indefinite     Anticoagulation clinic requires a referral from one of [facility]'s [facility] ambulatory provider (PCP or specialist) in order to provide anticoagulation management. The referring provider should anticipate seeing the patient on an ongoing basis, will have INRs and warfarin Rx ordered under their name per protocol, and will be point of contact for [facility] questions on patient's anticoagulation care (procedural holds, critical results, etc).     Please review and sign the pended referral order for Ijeoma Shah if you are willing to oversee anticoagulation care.         Thank you,  Sherie Esposito RN  Anticoagulation clinic         Please advise - to DR. OTTO

## 2023-05-15 ENCOUNTER — ANTICOAGULATION THERAPY VISIT (OUTPATIENT)
Dept: ANTICOAGULATION | Facility: CLINIC | Age: 73
End: 2023-05-15

## 2023-05-15 ENCOUNTER — LAB (OUTPATIENT)
Dept: LAB | Facility: CLINIC | Age: 73
End: 2023-05-15
Payer: MEDICARE

## 2023-05-15 DIAGNOSIS — I48.91 ATRIAL FIBRILLATION WITH RAPID VENTRICULAR RESPONSE (H): ICD-10-CM

## 2023-05-15 DIAGNOSIS — I48.0 PAROXYSMAL ATRIAL FIBRILLATION (H): ICD-10-CM

## 2023-05-15 DIAGNOSIS — Z79.01 LONG TERM CURRENT USE OF ANTICOAGULANT THERAPY: ICD-10-CM

## 2023-05-15 DIAGNOSIS — I48.0 PAROXYSMAL ATRIAL FIBRILLATION (H): Primary | ICD-10-CM

## 2023-05-15 LAB — INR BLD: 1.9 (ref 0.9–1.1)

## 2023-05-15 PROCEDURE — 36416 COLLJ CAPILLARY BLOOD SPEC: CPT

## 2023-05-15 PROCEDURE — 85610 PROTHROMBIN TIME: CPT

## 2023-05-15 NOTE — PROGRESS NOTES
ANTICOAGULATION MANAGEMENT     Ijeoma Shah 72 year old female is on warfarin with subtherapeutic INR result. (Goal INR 2.0-3.0)    Recent labs: (last 7 days)     05/15/23  1512   INR 1.9*       ASSESSMENT     Warfarin Lab Questionnaire    Warfarin Doses Last 7 Days    Pt Rptd Dose MONDAY TUESDAY WED THURS FRIDAY SATURDAY 5/14/2023   6:33 PM 2 2 2 2 3 3         5/14/2023   Warfarin Lab Questionnaire   Missed doses within past 14 days? No   Changes in diet or alcohol within past 14 days? No   Medication changes since last result? No   Injuries or illness since last result? No   New shortness of breath, severe headaches or sudden changes in vision since last result? No   Abnormal bleeding since last result? No   Upcoming surgery, procedure? No      Previous result: Subtherapeutic  Additional findings: New start on day 6 of warfarin     PLAN     Recommended plan for no diet, medication or health factor changes affecting INR     Dosing Instructions: Continue your current warfarin dose with next INR in 3 days       Summary  As of 5/15/2023    Full warfarin instructions:  5 mg every Mon, Thu; 7.5 mg all other days   Next INR check:  5/18/2023             Telephone call with Salima who verbalizes understanding and agrees to plan    Lab visit scheduled    Education provided:     Please call back if any changes to your diet, medications or how you've been taking warfarin    Plan made per [facility] anticoagulation protocol    Stephie Yost RN  Anticoagulation Clinic  5/15/2023    _______________________________________________________________________     Anticoagulation Episode Summary     Current INR goal:  2.0-3.0   TTR:  --   Target end date:  Indefinite   Send INR reminders to:  [facility]    Indications    Paroxysmal atrial fibrillation (H) [I48.0]  Atrial fibrillation with rapid ventricular response (H) [I48.91]           Comments:  DVM okay         Anticoagulation Care Providers     Provider Role  Specialty Phone number    SanchoiamWarren MD Referring Cardiovascular Disease [phone]    Jyoti Espinal MD Referring Family Medicine [phone]

## 2023-05-18 ENCOUNTER — ANTICOAGULATION THERAPY VISIT (OUTPATIENT)
Dept: ANTICOAGULATION | Facility: CLINIC | Age: 73
End: 2023-05-18

## 2023-05-18 ENCOUNTER — LAB (OUTPATIENT)
Dept: LAB | Facility: CLINIC | Age: 73
End: 2023-05-18
Payer: MEDICARE

## 2023-05-18 DIAGNOSIS — I48.0 PAROXYSMAL ATRIAL FIBRILLATION (H): ICD-10-CM

## 2023-05-18 DIAGNOSIS — Z79.01 LONG TERM CURRENT USE OF ANTICOAGULANT THERAPY: ICD-10-CM

## 2023-05-18 DIAGNOSIS — I48.91 ATRIAL FIBRILLATION WITH RAPID VENTRICULAR RESPONSE (H): ICD-10-CM

## 2023-05-18 LAB — INR BLD: 2.3 (ref 0.9–1.1)

## 2023-05-18 PROCEDURE — 85610 PROTHROMBIN TIME: CPT

## 2023-05-18 PROCEDURE — 36416 COLLJ CAPILLARY BLOOD SPEC: CPT

## 2023-05-18 NOTE — PROGRESS NOTES
ANTICOAGULATION MANAGEMENT     Ijeoma Shah 72 year old female is on warfarin with therapeutic INR result. (Goal INR 2.0-3.0)    Recent labs: (last 7 days)     05/18/23  1135   INR 2.3*       ASSESSMENT     Warfarin Lab Questionnaire    Warfarin Doses Last 7 Days      5/17/2023     4:06 PM   Dose in Tablet or Mg   TAB or MG? tablet (tab)     Pt Rptd Dose SUNDAY MONDAY TUESDAY WED 5/17/2023   4:06 PM 2 2 3 3         5/17/2023   Warfarin Lab Questionnaire   Missed doses within past 14 days? No   Changes in diet or alcohol within past 14 days? No   Medication changes since last result? No   Injuries or illness since last result? No   New shortness of breath, severe headaches or sudden changes in vision since last result? No   Abnormal bleeding since last result? No   Upcoming surgery, procedure? No        Previous result: Subtherapeutic  Additional findings: New start day 9. Pt confirmed correct dosing and followed as directed. According to initiation protocol, pt advised to continue same dosing. Pt advised that the INR level could continue to elevate or stay therapeutic. We won't know until Monday at next INR check.       PLAN     Recommended plan for ongoing change(s) affecting INR     Dosing Instructions: Continue your current warfarin dose with next INR in 4 days       Summary  As of 5/18/2023    Full warfarin instructions:  5 mg every Mon, Thu; 7.5 mg all other days   Next INR check:  5/22/2023             Telephone call with Salima who agrees to plan and repeated back plan correctly. [employer] message sent as well.    Lab visit scheduled    Education provided:     Please call back if any changes to your diet, medications or how you've been taking warfarin    Goal range and lab monitoring: goal range and significance of current result, Importance of therapeutic range and frequency of lab work when starting warfarin and importance of following up when instructed (extends after stability  established)    Contact [phone]  with any changes, questions or concerns.     Plan made per [facility] anticoagulation protocol    Nkechi Moreno, RN  Anticoagulation Clinic  5/18/2023    _______________________________________________________________________     Anticoagulation Episode Summary     Current INR goal:  2.0-3.0   TTR:  --   Target end date:  Indefinite   Send INR reminders to:  [facility]    Indications    Paroxysmal atrial fibrillation (H) [I48.0]  Atrial fibrillation with rapid ventricular response (H) [I48.91]           Comments:  RAJESH garibay         Anticoagulation Care Providers     Provider Role Specialty Phone number    Warren Pisano MD Referring Cardiovascular Disease [phone]    Jyoti Espinal MD Referring Family Medicine [phone]

## 2023-05-22 ENCOUNTER — LAB (OUTPATIENT)
Dept: LAB | Facility: CLINIC | Age: 73
End: 2023-05-22
Payer: MEDICARE

## 2023-05-22 ENCOUNTER — ANTICOAGULATION THERAPY VISIT (OUTPATIENT)
Dept: ANTICOAGULATION | Facility: CLINIC | Age: 73
End: 2023-05-22

## 2023-05-22 DIAGNOSIS — I48.0 PAROXYSMAL ATRIAL FIBRILLATION (H): ICD-10-CM

## 2023-05-22 DIAGNOSIS — I48.91 ATRIAL FIBRILLATION WITH RAPID VENTRICULAR RESPONSE (H): ICD-10-CM

## 2023-05-22 DIAGNOSIS — Z79.01 LONG TERM CURRENT USE OF ANTICOAGULANT THERAPY: ICD-10-CM

## 2023-05-22 DIAGNOSIS — I48.0 PAROXYSMAL ATRIAL FIBRILLATION (H): Primary | ICD-10-CM

## 2023-05-22 LAB — INR BLD: 2.6 (ref 0.9–1.1)

## 2023-05-22 PROCEDURE — 85610 PROTHROMBIN TIME: CPT

## 2023-05-22 PROCEDURE — 36416 COLLJ CAPILLARY BLOOD SPEC: CPT

## 2023-05-22 RX ORDER — METOPROLOL SUCCINATE 25 MG/1
25 TABLET, EXTENDED RELEASE ORAL EVERY MORNING
Qty: 90 TABLET | Refills: 3 | Status: SHIPPED | OUTPATIENT
Start: 2023-05-22 | End: 2024-05-13

## 2023-05-22 NOTE — PROGRESS NOTES
ANTICOAGULATION MANAGEMENT     Ijeoma Shah 72 year old female is on warfarin with therapeutic INR result. (Goal INR 2.0-3.0)    Recent labs: (last 7 days)     05/22/23  1146   INR 2.6*       ASSESSMENT     Warfarin Lab Questionnaire    Warfarin Doses Last 7 Days      5/21/2023     6:51 PM   Dose in Tablet or Mg   TAB or MG? tablet (tab)     Pt Rptd Dose MONDAY TUESDAY WED THURS FRIDAY SATURDAY 5/21/2023   6:51 PM 2 3 3 2 3 3         5/21/2023   Warfarin Lab Questionnaire   Missed doses within past 14 days? No   Changes in diet or alcohol within past 14 days? No   Medication changes since last result? No   Injuries or illness since last result? No   New shortness of breath, severe headaches or sudden changes in vision since last result? No   Abnormal bleeding since last result? No   Upcoming surgery, procedure? No   Best number to call with results? [phone]      Previous result: Therapeutic last visit; previously outside of goal range  Additional findings: None     PLAN     Recommended plan for no diet, medication or health factor changes affecting INR     Dosing Instructions: Continue your current warfarin dose with next INR in 3 days       Summary  As of 5/22/2023    Full warfarin instructions:  5 mg every Mon, Thu; 7.5 mg all other days   Next INR check:  5/25/2023             Telephone call with Salima who verbalizes understanding and agrees to plan    Lab visit scheduled    Education provided:     Please call back if any changes to your diet, medications or how you've been taking warfarin    Plan made per [facility] anticoagulation protocol    Stephie Yost RN  Anticoagulation Clinic  5/22/2023    _______________________________________________________________________     Anticoagulation Episode Summary     Current INR goal:  2.0-3.0   TTR:  100.0 % (3 d)   Target end date:  Indefinite   Send INR reminders to:  [facility]    Indications    Paroxysmal atrial fibrillation (H) [I48.0]  Atrial  fibrillation with rapid ventricular response (H) [I48.91]           Comments:  DVM okay         Anticoagulation Care Providers     Provider Role Specialty Phone number    Warren Pisano MD Referring Cardiovascular Disease [phone]    Jyoti Espinal MD Referring Family Medicine [phone]

## 2023-05-24 ENCOUNTER — TELEPHONE (OUTPATIENT)
Dept: FAMILY MEDICINE | Facility: CLINIC | Age: 73
End: 2023-05-24
Payer: MEDICARE

## 2023-05-24 DIAGNOSIS — I48.91 ATRIAL FIBRILLATION WITH RAPID VENTRICULAR RESPONSE (H): ICD-10-CM

## 2023-05-24 DIAGNOSIS — I48.0 PAROXYSMAL ATRIAL FIBRILLATION (H): Primary | ICD-10-CM

## 2023-05-24 NOTE — TELEPHONE ENCOUNTER
Reason for call:  Other   Patient called regarding (reason for call): pt would like to know what her warfarin dosage is. Please reach out to pt asap       Phone number to reach patient:  Cell number on file:    Telephone Information:   Mobile [phone]       Best Time:  anytime    Can we leave a detailed message on this number?  YES    Travel screening: Not Applicable

## 2023-05-25 ENCOUNTER — ANTICOAGULATION THERAPY VISIT (OUTPATIENT)
Dept: ANTICOAGULATION | Facility: CLINIC | Age: 73
End: 2023-05-25

## 2023-05-25 ENCOUNTER — LAB (OUTPATIENT)
Dept: LAB | Facility: CLINIC | Age: 73
End: 2023-05-25
Payer: MEDICARE

## 2023-05-25 DIAGNOSIS — Z79.01 LONG TERM CURRENT USE OF ANTICOAGULANT THERAPY: ICD-10-CM

## 2023-05-25 DIAGNOSIS — I48.0 PAROXYSMAL ATRIAL FIBRILLATION (H): Primary | ICD-10-CM

## 2023-05-25 DIAGNOSIS — I48.91 ATRIAL FIBRILLATION WITH RAPID VENTRICULAR RESPONSE (H): ICD-10-CM

## 2023-05-25 DIAGNOSIS — I48.0 PAROXYSMAL ATRIAL FIBRILLATION (H): ICD-10-CM

## 2023-05-25 LAB — INR BLD: 2.2 (ref 0.9–1.1)

## 2023-05-25 PROCEDURE — 36416 COLLJ CAPILLARY BLOOD SPEC: CPT

## 2023-05-25 PROCEDURE — 85610 PROTHROMBIN TIME: CPT

## 2023-05-25 NOTE — PROGRESS NOTES
ANTICOAGULATION MANAGEMENT     Ijeoma Shah 72 year old female is on warfarin with therapeutic INR result. (Goal INR 2.0-3.0)    Recent labs: (last 7 days)     05/25/23  1353   INR 2.2*       ASSESSMENT       Source(s): Chart Review and Patient/Caregiver Call       Warfarin doses taken: Warfarin taken as instructed    Diet: No new diet changes identified    Medication/supplement changes: None noted    New illness, injury, or hospitalization: No    Signs or symptoms of bleeding or clotting: No    Previous result: Therapeutic last 2(+) visits    Additional findings: None         PLAN     Recommended plan for no diet, medication or health factor changes affecting INR     Dosing Instructions: Continue your current warfarin dose with next INR in 1 week       Summary  As of 5/25/2023    Full warfarin instructions:  5 mg every Mon, Thu; 7.5 mg all other days   Next INR check:  6/1/2023             Telephone call with Salima who verbalizes understanding and agrees to plan    Lab visit scheduled    Education provided:     Please call back if any changes to your diet, medications or how you've been taking warfarin    Plan made per [facility] anticoagulation protocol    Dina Lr RN  Anticoagulation Clinic  5/25/2023    _______________________________________________________________________     Anticoagulation Episode Summary     Current INR goal:  2.0-3.0   TTR:  100.0 % (6 d)   Target end date:  Indefinite   Send INR reminders to:  [facility]    Indications    Paroxysmal atrial fibrillation (H) [I48.0]  Atrial fibrillation with rapid ventricular response (H) [I48.91]           Comments:  RAJESH garibay         Anticoagulation Care Providers     Provider Role Specialty Phone number    Warren Pisano MD Referring Cardiovascular Disease [phone]    Jyoti Espinal MD Referring Family Medicine [phone]

## 2023-05-26 DIAGNOSIS — I10 BENIGN ESSENTIAL HYPERTENSION: ICD-10-CM

## 2023-05-26 RX ORDER — LISINOPRIL 10 MG/1
10 TABLET ORAL EVERY MORNING
Qty: 90 TABLET | Refills: 4 | Status: SHIPPED | OUTPATIENT
Start: 2023-05-26 | End: 2024-05-16 | Stop reason: ALTCHOICE

## 2023-06-01 ENCOUNTER — ANTICOAGULATION THERAPY VISIT (OUTPATIENT)
Dept: ANTICOAGULATION | Facility: CLINIC | Age: 73
End: 2023-06-01

## 2023-06-01 ENCOUNTER — OFFICE VISIT (OUTPATIENT)
Dept: FAMILY MEDICINE | Facility: CLINIC | Age: 73
End: 2023-06-01
Payer: MEDICARE

## 2023-06-01 VITALS
SYSTOLIC BLOOD PRESSURE: 124 MMHG | TEMPERATURE: 98.3 F | HEIGHT: 63 IN | HEART RATE: 109 BPM | OXYGEN SATURATION: 96 % | RESPIRATION RATE: 20 BRPM | WEIGHT: 170 LBS | DIASTOLIC BLOOD PRESSURE: 80 MMHG | BODY MASS INDEX: 30.12 KG/M2

## 2023-06-01 DIAGNOSIS — Z79.01 LONG TERM CURRENT USE OF ANTICOAGULANT THERAPY: ICD-10-CM

## 2023-06-01 DIAGNOSIS — L30.9 DERMATITIS: ICD-10-CM

## 2023-06-01 DIAGNOSIS — I48.91 ATRIAL FIBRILLATION WITH RAPID VENTRICULAR RESPONSE (H): ICD-10-CM

## 2023-06-01 DIAGNOSIS — I10 BENIGN ESSENTIAL HYPERTENSION: ICD-10-CM

## 2023-06-01 DIAGNOSIS — Z78.0 ASYMPTOMATIC MENOPAUSE: ICD-10-CM

## 2023-06-01 DIAGNOSIS — I48.0 PAROXYSMAL ATRIAL FIBRILLATION (H): Primary | ICD-10-CM

## 2023-06-01 DIAGNOSIS — I48.0 PAROXYSMAL ATRIAL FIBRILLATION (H): ICD-10-CM

## 2023-06-01 DIAGNOSIS — Z00.00 MEDICARE ANNUAL WELLNESS VISIT, SUBSEQUENT: Primary | ICD-10-CM

## 2023-06-01 PROBLEM — D70.1 CHEMOTHERAPY-INDUCED NEUTROPENIA (H): Status: RESOLVED | Noted: 2020-11-17 | Resolved: 2023-06-01

## 2023-06-01 PROBLEM — T45.1X5A CHEMOTHERAPY-INDUCED NEUTROPENIA (H): Status: RESOLVED | Noted: 2020-11-17 | Resolved: 2023-06-01

## 2023-06-01 LAB — INR BLD: 1.9 (ref 0.9–1.1)

## 2023-06-01 PROCEDURE — G0439 PPPS, SUBSEQ VISIT: HCPCS | Performed by: FAMILY MEDICINE

## 2023-06-01 PROCEDURE — 85610 PROTHROMBIN TIME: CPT | Performed by: FAMILY MEDICINE

## 2023-06-01 PROCEDURE — 99214 OFFICE O/P EST MOD 30 MIN: CPT | Mod: 25 | Performed by: FAMILY MEDICINE

## 2023-06-01 PROCEDURE — 36416 COLLJ CAPILLARY BLOOD SPEC: CPT | Performed by: FAMILY MEDICINE

## 2023-06-01 RX ORDER — TRIAMCINOLONE ACETONIDE 1 MG/G
OINTMENT TOPICAL 2 TIMES DAILY
Qty: 80 G | Refills: 1 | Status: SHIPPED | OUTPATIENT
Start: 2023-06-01 | End: 2024-01-22

## 2023-06-01 ASSESSMENT — PAIN SCALES - GENERAL: PAINLEVEL: NO PAIN (0)

## 2023-06-01 ASSESSMENT — ENCOUNTER SYMPTOMS
FEVER: 0
SORE THROAT: 0
EYE PAIN: 0
PALPITATIONS: 0
SHORTNESS OF BREATH: 1
CHILLS: 0
JOINT SWELLING: 0
FREQUENCY: 0
MYALGIAS: 0
NAUSEA: 0
HEADACHES: 0
HEARTBURN: 0
COUGH: 1
CONSTIPATION: 0
DIZZINESS: 0
WEAKNESS: 0
NERVOUS/ANXIOUS: 0
HEMATURIA: 0
PARESTHESIAS: 0
DIARRHEA: 0
ARTHRALGIAS: 0
HEMATOCHEZIA: 0
BREAST MASS: 0
ABDOMINAL PAIN: 0
DYSURIA: 0

## 2023-06-01 ASSESSMENT — ACTIVITIES OF DAILY LIVING (ADL): CURRENT_FUNCTION: NO ASSISTANCE NEEDED

## 2023-06-01 NOTE — PROGRESS NOTES
ANTICOAGULATION MANAGEMENT     Ijeoma Shah 72 year old female is on warfarin with subtherapeutic INR result. (Goal INR 2.0-3.0)    Recent labs: (last 7 days)     06/01/23  1332   INR 1.9*       ASSESSMENT       Source(s): Chart Review and Patient/Caregiver Call       Warfarin doses taken: Missed dose(s) may be affecting INR    Diet: No new diet changes identified    Medication/supplement changes: None noted    New illness, injury, or hospitalization: No    Signs or symptoms of bleeding or clotting: No    Previous result: Therapeutic last 2(+) visits    Additional findings: None         PLAN     Recommended plan for no diet, medication or health factor changes affecting INR     Dosing Instructions: Continue your current warfarin dose with next INR in 2 weeks       Summary  As of 6/1/2023    Full warfarin instructions:  5 mg every Mon, Thu; 7.5 mg all other days   Next INR check:  6/8/2023             Telephone call with Salima who verbalizes understanding and agrees to plan    Lab visit scheduled    Education provided:     Goal range and lab monitoring: goal range and significance of current result    Plan made per [facility] anticoagulation protocol    Becky Capellan RN  Anticoagulation Clinic  6/1/2023    _______________________________________________________________________     Anticoagulation Episode Summary     Current INR goal:  2.0-3.0   TTR:  82.8 % (1.9 wk)   Target end date:  Indefinite   Send INR reminders to:  [facility]    Indications    Paroxysmal atrial fibrillation (H) [I48.0]  Atrial fibrillation with rapid ventricular response (H) [I48.91]           Comments:  RAJESH garibay         Anticoagulation Care Providers     Provider Role Specialty Phone number    Warren Pisano MD Referring Cardiovascular Disease [phone]    Jyoti Espinal MD Referring Family Medicine [phone]

## 2023-06-01 NOTE — PROGRESS NOTES
"SUBJECTIVE:   Salima is a 72 year old who presents for Preventive Visit.      6/1/2023    12:53 PM   Additional Questions   Roomed by Diana     Are you in the first 12 months of your Medicare coverage?  No    Healthy Habits:    In general, how would you rate your overall health?  Good    Frequency of exercise:  4-5 days/week    Duration of exercise:  30-45 minutes    Do you usually eat at least 4 servings of fruit and vegetables a day, include whole grains    & fiber and avoid regularly eating high fat or \"junk\" foods?  No    Taking medications regularly:  Yes    Medication side effects:  None    Ability to successfully perform activities of daily living:  No assistance needed    Home Safety:  No safety concerns identified    Hearing Impairment:  No hearing concerns    In the past 6 months, have you been bothered by leaking of urine?  No    In general, how would you rate your overall mental or emotional health?  Good      PHQ-2 Total Score:    Additional concerns today:  No        Have you ever done Advance Care Planning? (For example, a Health Directive, POLST, or a discussion with a medical provider or your loved ones about your wishes): Yes, advance care planning is on file.       Fall risk  Fallen 2 or more times in the past year?: No  Any fall with injury in the past year?: No    Cognitive Screening   1) Repeat 3 items (Leader, Season, Table)    2) Clock draw: NORMAL  3) 3 item recall: Recalls NO objects   Results: 0 items recalled: PROBABLE COGNITIVE IMPAIRMENT, **INFORM PROVIDER**  Remembers 2     Mini-CogTM Copyright TOM [place]. Licensed by the author for use in [facility]; reprinted with permission (mayela@.[facility]). All rights reserved.      Do you have sleep apnea, excessive snoring or daytime drowsiness?: no    Reviewed and updated as needed this visit by clinical staff   Tobacco  Allergies  Meds  Problems  Med Hx  Surg Hx  Fam Hx          Reviewed and updated as needed this visit by " Provider   Tobacco  Allergies  Meds  Problems  Med Hx  Surg Hx  Fam Hx         Social History     Tobacco Use     Smoking status: Former     Packs/day: 1.00     Years: 33.00     Pack years: 33.00     Types: Cigarettes     Quit date: 2014     Years since quittin.4     Smokeless tobacco: Never   Vaping Use     Vaping status: Never Used   Substance Use Topics     Alcohol use: Yes     Alcohol/week: 1.7 - 2.5 standard drinks of alcohol     Types: 2 - 3 Standard drinks or equivalent per week     Comment: Beer once in a while             2023     9:25 AM   Alcohol Use   Prescreen: >3 drinks/day or >7 drinks/week? No     Do you have a current opioid prescription? No  Do you use any other controlled substances or medications that are not prescribed by a provider? None    Recent Labs   Lab Test 22  0909 20  1002 07/13/15  1045   CHOL 251* 238* 261*   HDL 76 68 67   * 147* 166*   TRIG 115 117 141   CHOLHDLRATIO  --   --  3.9          Current providers sharing in care for this patient include:   Patient Care Team:  Jyoti Espinal MD as PCP - General  Jyoti Espinal MD as Assigned PCP  Edin Castro MD as Referring Physician (Pulmonary Disease)  Andrea Sanabria MD as MD (Thoracic Surgery)  Eneida De Leon MD as MD (Hematology & Oncology)  Michelle Carlisle RN as Specialty Care Coordinator (Cardiology)  Alexa Goldstein MD (Cardiovascular Disease)  Eneida De Leon MD as Assigned Cancer Care Provider  Warren Pisano MD as Assigned Heart and Vascular Provider  Jame Leyva MD as Assigned Surgical Provider  Roeglio Rodriguez RN as Registered Nurse    The following health maintenance items are reviewed in Epic and correct as of today:  Health Maintenance   Topic Date Due     DEXA  Never done     COPD ACTION PLAN  Never done     COVID-19 Vaccine (3 - Pfizer risk series) 2021     DTAP/TDAP/TD IMMUNIZATION (2 - Td or Tdap) 2022     ANNUAL REVIEW OF  ORDERS   "02/10/2023     MICROALBUMIN  03/01/2023     LIPID  03/08/2023     BMP  04/24/2024     HEMOGLOBIN  04/24/2024     MEDICARE ANNUAL WELLNESS VISIT  06/01/2024     FALL RISK ASSESSMENT  06/01/2024     MAMMO SCREENING  12/27/2024     COLORECTAL CANCER SCREENING  01/25/2028     ADVANCE CARE PLANNING  06/01/2028     SPIROMETRY  Completed     HEPATITIS C SCREENING  Completed     PHQ-2 (once per calendar year)  Completed     INFLUENZA VACCINE  Completed     Pneumococcal Vaccine: 65+ Years  Completed     URINALYSIS  Completed     ZOSTER IMMUNIZATION  Completed     IPV IMMUNIZATION  Aged Out     MENINGITIS IMMUNIZATION  Aged Out     LUNG CANCER SCREENING  Discontinued     BP Readings from Last 3 Encounters:   06/01/23 124/80   05/09/23 106/64   01/25/23 119/56    Wt Readings from Last 3 Encounters:   06/01/23 77.1 kg (170 lb)   05/09/23 77.1 kg (169 lb 14.4 oz)   01/25/23 76.2 kg (168 lb)             Review of Systems   Constitutional: Negative for chills and fever.   HENT: Negative for congestion, ear pain, hearing loss and sore throat.    Eyes: Negative for pain and visual disturbance.   Respiratory: Positive for cough and shortness of breath.    Cardiovascular: Negative for chest pain, palpitations and peripheral edema.   Gastrointestinal: Negative for abdominal pain, constipation, diarrhea, heartburn, hematochezia and nausea.   Breasts:  Negative for tenderness, breast mass and discharge.   Genitourinary: Negative for dysuria, frequency, genital sores, hematuria, pelvic pain, urgency, vaginal bleeding and vaginal discharge.   Musculoskeletal: Negative for arthralgias, joint swelling and myalgias.   Skin: Negative for rash.   Neurological: Negative for dizziness, weakness, headaches and paresthesias.   Psychiatric/Behavioral: Negative for mood changes. The patient is not nervous/anxious.        OBJECTIVE:   /80   Pulse 109   Temp 98.3  F (36.8  C) (Tympanic)   Resp 20   Ht 1.6 m (5' 3\")   Wt 77.1 kg (170 lb)   " "SpO2 96%   BMI 30.11 kg/m   Estimated body mass index is 30.11 kg/m  as calculated from the following:    Height as of this encounter: 1.6 m (5' 3\").    Weight as of this encounter: 77.1 kg (170 lb).  Physical Exam  GENERAL: healthy, alert and no distress  EYES: Eyes grossly normal to inspection, PERRL and conjunctivae and sclerae normal  HENT: ear canals and TM's normal, nose and mouth without ulcers or lesions  NECK: no adenopathy, no asymmetry, masses, or scars and thyroid normal to palpation  RESP: lungs clear to auscultation - no rales, rhonchi or wheezes  BREAST: normal without masses, tenderness or nipple discharge and no palpable axillary masses or adenopathy  CV: regular rate and rhythm, normal S1 S2, no S3 or S4, no murmur, click or rub, no peripheral edema  ABDOMEN: soft, nontender, no hepatosplenomegaly, no masses and bowel sounds normal  MS: no gross musculoskeletal defects noted, no edema  SKIN: no suspicious lesions or rashes  NEURO: Normal strength and tone, mentation intact and speech normal  PSYCH: mentation appears normal, affect normal/bright      ASSESSMENT / PLAN:   Ijeoma was seen today for medicare visit.    Diagnoses and all orders for this visit:    Medicare annual wellness visit, subsequent    Asymptomatic menopause  -     DEXA HIP/PELVIS/SPINE - Future; Future    Paroxysmal atrial fibrillation (H)  -     INR point of care    Benign essential hypertension  Well controlled  Continue on lisinopril, metoprolol. Just filled by cardiology    Dermatitis  -     triamcinolone (KENALOG) 0.1 % external ointment; Apply topically 2 times daily To heels as needed    Long term current use of anticoagulant therapy  -     INR point of care        Patient has been advised of split billing requirements and indicates understanding: Yes      COUNSELING:  Reviewed preventive health counseling, as reflected in patient instructions      BMI:   Estimated body mass index is 30.11 kg/m  as calculated from the " "following:    Height as of this encounter: 1.6 m (5' 3\").    Weight as of this encounter: 77.1 kg (170 lb).         She reports that she quit smoking about 8 years ago. Her smoking use included cigarettes. She has a 33.00 pack-year smoking history. She has never used smokeless tobacco.      Appropriate preventive services were discussed with this patient, including applicable screening as appropriate for cardiovascular disease, diabetes, osteopenia/osteoporosis, and glaucoma.  As appropriate for age/gender, discussed screening for colorectal cancer, prostate cancer, breast cancer, and cervical cancer. Checklist reviewing preventive services available has been given to the patient.    Reviewed patients plan of care and provided an AVS. The Basic Care Plan (routine screening as documented in Health Maintenance) for Ijeoma meets the Care Plan requirement. This Care Plan has been established and reviewed with the Patient.          Jyoti Garcia MD  [facility]    Identified Health Risks: none  "

## 2023-06-02 PROBLEM — Z79.01 LONG TERM CURRENT USE OF ANTICOAGULANT THERAPY: Status: ACTIVE | Noted: 2023-06-02

## 2023-06-05 DIAGNOSIS — H00.012 HORDEOLUM EXTERNUM OF RIGHT LOWER EYELID: ICD-10-CM

## 2023-06-05 RX ORDER — POLYMYXIN B SULFATE AND TRIMETHOPRIM 1; 10000 MG/ML; [USP'U]/ML
1-2 SOLUTION OPHTHALMIC EVERY 4 HOURS
Qty: 10 ML | Status: CANCELLED | OUTPATIENT
Start: 2023-06-05

## 2023-06-05 RX ORDER — OFLOXACIN 3 MG/ML
1 SOLUTION/ DROPS OPHTHALMIC 4 TIMES DAILY
Qty: 10 ML | Refills: 0 | Status: SHIPPED | OUTPATIENT
Start: 2023-06-05 | End: 2024-01-22

## 2023-06-05 NOTE — TELEPHONE ENCOUNTER
Polymyxin Op Sol - This medication is on Back order, Please Consider change to Ofloxacin   Svtelana Orn Station Sec

## 2023-06-09 ENCOUNTER — LAB (OUTPATIENT)
Dept: LAB | Facility: CLINIC | Age: 73
End: 2023-06-09
Payer: MEDICARE

## 2023-06-09 ENCOUNTER — ANTICOAGULATION THERAPY VISIT (OUTPATIENT)
Dept: ANTICOAGULATION | Facility: CLINIC | Age: 73
End: 2023-06-09

## 2023-06-09 DIAGNOSIS — Z79.01 LONG TERM CURRENT USE OF ANTICOAGULANT THERAPY: ICD-10-CM

## 2023-06-09 DIAGNOSIS — I48.0 PAROXYSMAL ATRIAL FIBRILLATION (H): Primary | ICD-10-CM

## 2023-06-09 DIAGNOSIS — I48.91 ATRIAL FIBRILLATION WITH RAPID VENTRICULAR RESPONSE (H): ICD-10-CM

## 2023-06-09 DIAGNOSIS — I48.0 PAROXYSMAL ATRIAL FIBRILLATION (H): ICD-10-CM

## 2023-06-09 LAB — INR BLD: 1.6 (ref 0.9–1.1)

## 2023-06-09 PROCEDURE — 36416 COLLJ CAPILLARY BLOOD SPEC: CPT

## 2023-06-09 PROCEDURE — 85610 PROTHROMBIN TIME: CPT

## 2023-06-09 NOTE — PROGRESS NOTES
ANTICOAGULATION MANAGEMENT     Ijeoma Shah 72 year old female is on warfarin with subtherapeutic INR result. (Goal INR 2.0-3.0)    Recent labs: (last 7 days)     06/09/23  1302   INR 1.6*       ASSESSMENT     Warfarin Lab Questionnaire    Warfarin Doses Last 7 Days      6/9/2023    10:19 AM   Dose in Tablet or Mg   TAB or MG? tablet (tab)     Pt Rptd Dose SUNDAY MONDAY TUESDAY WED THURS 6/9/2023  10:19 AM 3 2 3 3 2         6/9/2023   Warfarin Lab Questionnaire   Missed doses within past 14 days? No   Changes in diet or alcohol within past 14 days? No   Medication changes since last result? No   Injuries or illness since last result? No   New shortness of breath, severe headaches or sudden changes in vision since last result? No   Abnormal bleeding since last result? No   Upcoming surgery, procedure? No        Previous result: Subtherapeutic  Additional findings: None       PLAN     Recommended plan for no diet, medication or health factor changes affecting INR     Dosing Instructions: Increase your warfarin dose (10% change) with next INR in 1 week       Summary  As of 6/9/2023    Full warfarin instructions:  7.5 mg every day   Next INR check:  6/16/2023             Telephone call with Salima who verbalizes understanding and agrees to plan    Lab visit scheduled    Education provided:     Please call back if any changes to your diet, medications or how you've been taking warfarin    Goal range and lab monitoring: goal range and significance of current result, Importance of therapeutic range and Importance of following up at instructed interval    Plan made per [facility] anticoagulation protocol    Jocelyn Bose RN  Anticoagulation Clinic  6/9/2023    _______________________________________________________________________     Anticoagulation Episode Summary     Current INR goal:  2.0-3.0   TTR:  52.2 % (3 wk)   Target end date:  Indefinite   Send INR reminders to:  [facility]    Indications     Paroxysmal atrial fibrillation (H) [I48.0]  Atrial fibrillation with rapid ventricular response (H) [I48.91]           Comments:  DVM okay         Anticoagulation Care Providers     Provider Role Specialty Phone number    Warren Pisano MD Referring Cardiovascular Disease [phone]    Jyoti Espinal MD Referring Family Medicine [phone]

## 2023-06-13 ENCOUNTER — TELEPHONE (OUTPATIENT)
Dept: FAMILY MEDICINE | Facility: CLINIC | Age: 73
End: 2023-06-13
Payer: MEDICARE

## 2023-06-13 DIAGNOSIS — C34.92 SQUAMOUS CELL CARCINOMA OF LEFT LUNG (H): ICD-10-CM

## 2023-06-13 DIAGNOSIS — I10 BENIGN ESSENTIAL HYPERTENSION: ICD-10-CM

## 2023-06-13 DIAGNOSIS — I48.0 PAROXYSMAL ATRIAL FIBRILLATION (H): ICD-10-CM

## 2023-06-13 RX ORDER — WARFARIN SODIUM 2.5 MG/1
5 TABLET ORAL DAILY
Qty: 90 TABLET | Refills: 3 | Status: SHIPPED | OUTPATIENT
Start: 2023-06-13 | End: 2023-06-14

## 2023-06-13 NOTE — TELEPHONE ENCOUNTER
ANTICOAGULATION MANAGEMENT:  Medication Refill    Anticoagulation Summary  As of 6/13/2023    Warfarin maintenance plan:  7.5 mg (2.5 mg x 3) every day   Next INR check:     Target end date:  Indefinite    Indications    Paroxysmal atrial fibrillation (H) [I48.0]  Atrial fibrillation with rapid ventricular response (H) [I48.91]             Anticoagulation Care Providers     Provider Role Specialty Phone number    Warren Pisano MD Referring Cardiovascular Disease [phone]    Jyoti Espinal MD Referring Family Medicine [phone]          Visit with referring provider/group within last year: Yes    [facility] referral signed last signed: 05/12/2023; within last year: Yes    Ijeoma meets all criteria for refill (current [facility] referral, office visit with referring provider/group in last year, lab monitoring up to date or not exceeding 2 weeks overdue). Rx instructions and quantity supplied updated to match patient's current dosing plan. 30 day supply with 3 refills granted per [facility] protocol     Michelle Watson RN  Anticoagulation Clinic

## 2023-06-13 NOTE — TELEPHONE ENCOUNTER
Medication Question or Refill    Contacts       Type Contact Phone/Fax    06/13/2023 11:05 AM CDT Phone (Incoming) Kevin Shahie CARLITA (Self) [phone] (M)          What medication are you calling about (include dose and sig)?: warfarin ANTICOAGULANT (COUMADIN) 2.5 MG tablet    Preferred Pharmacy:   [facility] [address] [place], [place] - [address] [facility]  [address] [facility] [address]  Phone: [phone] Fax: [phone]    [id number] [address] [facility]  [address] [place] [address]  Phone: [phone] Fax: [phone]      Controlled Substance Agreement on file:   CSA -- Patient Level:    CSA: None found at the patient level.       Who prescribed the medication?:     Do you need a refill? No    When did you use the medication last? 06/11/23    Patient offered an appointment? No    Do you have any questions or concerns?  Yes: Missed taking medication last night , wondering if she can take it this morning and take tonight's still      Could we send this information to you in [facility] or would you prefer to receive a phone call?:   Patient would prefer a phone call   Okay to leave a detailed message?: Yes at Home number on file [phone] (home)

## 2023-06-14 ENCOUNTER — TELEPHONE (OUTPATIENT)
Dept: FAMILY MEDICINE | Facility: CLINIC | Age: 73
End: 2023-06-14
Payer: COMMERCIAL

## 2023-06-14 DIAGNOSIS — I10 BENIGN ESSENTIAL HYPERTENSION: ICD-10-CM

## 2023-06-14 DIAGNOSIS — I48.0 PAROXYSMAL ATRIAL FIBRILLATION (H): ICD-10-CM

## 2023-06-14 DIAGNOSIS — C34.92 SQUAMOUS CELL CARCINOMA OF LEFT LUNG (H): ICD-10-CM

## 2023-06-14 RX ORDER — WARFARIN SODIUM 2.5 MG/1
TABLET ORAL
Qty: 90 TABLET | Refills: 1 | Status: SHIPPED | OUTPATIENT
Start: 2023-06-14 | End: 2023-07-14

## 2023-06-14 NOTE — TELEPHONE ENCOUNTER
Received fax from pharmacy needing to confirm direction for patients Warfarin Sodium 2.5ng oral tablet.    Current directions are - Take 2 tablets (5mg) by mouth daily Take 7.5mg every day unless direxted other wise [facility].    Should patient be taking 5mg or 7.5mg?      Preferred Pharmacy:  [facility] [address] [place] [address] [address] [facility]  [address] [facility] [address]  Phone: [phone] Fax: [phone]      Could we send this information to you in [employer] or would you prefer to receive a phone call?:   Patient would prefer a phone call   Okay to leave a detailed message?: Yes at Home number on file [phone] (home)

## 2023-06-14 NOTE — TELEPHONE ENCOUNTER
ANTICOAGULATION MANAGEMENT:  Medication Refill    Anticoagulation Summary  As of 6/13/2023    Warfarin maintenance plan:  7.5 mg (2.5 mg x 3) every day   Next INR check:  6/16/2023   Target end date:  Indefinite    Indications    Paroxysmal atrial fibrillation (H) [I48.0]  Atrial fibrillation with rapid ventricular response (H) [I48.91]             Anticoagulation Care Providers     Provider Role Specialty Phone number    Warren Pisano MD Referring Cardiovascular Disease [phone]    Jyoti Espinal MD Referring Family Medicine [phone]          Visit with referring provider/group within last year: Yes    [facility] referral signed last signed: 05/12/2023; within last year: Yes    Ijeoma meets all criteria for refill (current [facility] referral, office visit with referring provider/group in last year, lab monitoring up to date or not exceeding 2 weeks overdue). Rx instructions and quantity supplied updated to match patient's current dosing plan. Warfarin 90 day supply with 1 refill granted per [facility] protocol     Becky Capellan RN  Anticoagulation Clinic

## 2023-06-16 ENCOUNTER — LAB (OUTPATIENT)
Dept: LAB | Facility: CLINIC | Age: 73
End: 2023-06-16
Payer: MEDICARE

## 2023-06-16 ENCOUNTER — ANTICOAGULATION THERAPY VISIT (OUTPATIENT)
Dept: ANTICOAGULATION | Facility: CLINIC | Age: 73
End: 2023-06-16

## 2023-06-16 DIAGNOSIS — I48.0 PAROXYSMAL ATRIAL FIBRILLATION (H): Primary | ICD-10-CM

## 2023-06-16 DIAGNOSIS — I48.0 PAROXYSMAL ATRIAL FIBRILLATION (H): ICD-10-CM

## 2023-06-16 DIAGNOSIS — I48.91 ATRIAL FIBRILLATION WITH RAPID VENTRICULAR RESPONSE (H): ICD-10-CM

## 2023-06-16 DIAGNOSIS — Z79.01 LONG TERM CURRENT USE OF ANTICOAGULANT THERAPY: ICD-10-CM

## 2023-06-16 LAB — INR BLD: 1.8 (ref 0.9–1.1)

## 2023-06-16 PROCEDURE — 85610 PROTHROMBIN TIME: CPT

## 2023-06-16 PROCEDURE — 36416 COLLJ CAPILLARY BLOOD SPEC: CPT

## 2023-06-16 NOTE — PROGRESS NOTES
ANTICOAGULATION MANAGEMENT     Ijeoma Shah 72 year old female is on warfarin with subtherapeutic INR result. (Goal INR 2.0-3.0)    Recent labs: (last 7 days)     06/16/23  1317   INR 1.8*       ASSESSMENT     Warfarin Lab Questionnaire    Warfarin Doses Last 7 Days      6/16/2023     9:28 AM   Dose in Tablet or Mg   TAB or MG? tablet (tab)     Pt Rptd Dose SUNDAY MONDAY TUESDAY WED THURS FRIDAY SATURDAY 6/16/2023   9:28 AM 3 0 6 3 3 3 3         6/16/2023   Warfarin Lab Questionnaire   Missed doses within past 14 days? No   Changes in diet or alcohol within past 14 days? No   Medication changes since last result? No   Injuries or illness since last result? No   New shortness of breath, severe headaches or sudden changes in vision since last result? No   Abnormal bleeding since last result? No   Upcoming surgery, procedure? No        Previous result: Subtherapeutic  Additional findings: patient ran out of warfarin on Monday so took double dose on Tues as advised. This weeks dosing is a new MD for her. Writer is unsure if she would have been in range had she not missed her dose and taken extra the next day. Will continue current MD for now and re evaluate in one week       PLAN     Recommended plan for temporary change(s) affecting INR     Dosing Instructions: Continue your current warfarin dose with next INR in 1 week       Summary  As of 6/16/2023    Full warfarin instructions:  7.5 mg every day   Next INR check:  6/23/2023             Telephone call with Salima who verbalizes understanding and agrees to plan    Lab visit scheduled    Education provided:     Please call back if any changes to your diet, medications or how you've been taking warfarin    Contact [phone]  with any changes, questions or concerns.     Plan made per [facility] anticoagulation protocol    Sherie Esposito, RN  Anticoagulation Clinic  6/16/2023    _______________________________________________________________________      Anticoagulation Episode Summary     Current INR goal:  2.0-3.0   TTR:  39.3 % (4 wk)   Target end date:  Indefinite   Send INR reminders to:  [facility]    Indications    Paroxysmal atrial fibrillation (H) [I48.0]  Atrial fibrillation with rapid ventricular response (H) [I48.91]           Comments:  DVM okay         Anticoagulation Care Providers     Provider Role Specialty Phone number    Warren Pisano MD Referring Cardiovascular Disease [phone]    Jyoti Espinal MD Referring Family Medicine [phone]

## 2023-06-23 ENCOUNTER — ANTICOAGULATION THERAPY VISIT (OUTPATIENT)
Dept: ANTICOAGULATION | Facility: CLINIC | Age: 73
End: 2023-06-23

## 2023-06-23 ENCOUNTER — LAB (OUTPATIENT)
Dept: LAB | Facility: CLINIC | Age: 73
End: 2023-06-23
Payer: MEDICARE

## 2023-06-23 DIAGNOSIS — Z79.01 LONG TERM CURRENT USE OF ANTICOAGULANT THERAPY: ICD-10-CM

## 2023-06-23 DIAGNOSIS — I48.0 PAROXYSMAL ATRIAL FIBRILLATION (H): Primary | ICD-10-CM

## 2023-06-23 DIAGNOSIS — I48.91 ATRIAL FIBRILLATION WITH RAPID VENTRICULAR RESPONSE (H): ICD-10-CM

## 2023-06-23 DIAGNOSIS — I48.0 PAROXYSMAL ATRIAL FIBRILLATION (H): ICD-10-CM

## 2023-06-23 LAB — INR BLD: 1.5 (ref 0.9–1.1)

## 2023-06-23 PROCEDURE — 36416 COLLJ CAPILLARY BLOOD SPEC: CPT

## 2023-06-23 PROCEDURE — 85610 PROTHROMBIN TIME: CPT

## 2023-06-23 NOTE — PROGRESS NOTES
ANTICOAGULATION MANAGEMENT     Ijeoma Shah 72 year old female is on warfarin with subtherapeutic INR result. (Goal INR 2.0-3.0)    Recent labs: (last 7 days)     06/23/23  1327   INR 1.5*       ASSESSMENT     Warfarin Lab Questionnaire    Warfarin Doses Last 7 Days      6/22/2023     9:43 PM   Dose in Tablet or Mg   TAB or MG? tablet (tab)     Pt Rptd Dose SUNDAY MONDAY TUESDAY WED THURS FRIDAY SATURDAY 6/22/2023   9:43 PM 3 3 3 3 0 3 3         6/22/2023   Warfarin Lab Questionnaire   Missed doses within past 14 days? Yes   If yes; please list when: Thursday   Changes in diet or alcohol within past 14 days? No   Medication changes since last result? No   Injuries or illness since last result? No   New shortness of breath, severe headaches or sudden changes in vision since last result? No   Abnormal bleeding since last result? No   Upcoming surgery, procedure? No     Previous result: Subtherapeutic  Additional findings: None       PLAN     Recommended plan for temporary change(s) affecting INR     Dosing Instructions: booster dose then continue your current warfarin dose with next INR in 1 week       Summary  As of 6/23/2023    Full warfarin instructions:  6/23: 15 mg; Otherwise 7.5 mg every day   Next INR check:  6/30/2023             Telephone call with Salima who verbalizes understanding and agrees to plan    Lab visit scheduled    Education provided:     Please call back if any changes to your diet, medications or how you've been taking warfarin    Taking warfarin: Importance of taking warfarin as instructed    Contact [phone]  with any changes, questions or concerns.     Plan made per [facility] anticoagulation protocol    Sherie Esposito RN  Anticoagulation Clinic  6/23/2023    _______________________________________________________________________     Anticoagulation Episode Summary     Current INR goal:  2.0-3.0   TTR:  31.6 % (1.2 mo)   Target end date:  Indefinite   Send INR reminders to:   [facility]    Indications    Paroxysmal atrial fibrillation (H) [I48.0]  Atrial fibrillation with rapid ventricular response (H) [I48.91]           Comments:  RAJESH garibay         Anticoagulation Care Providers     Provider Role Specialty Phone number    Warren Pisano MD Referring Cardiovascular Disease [phone]    Jyoti Espinal MD Referring Family Medicine [phone]

## 2023-06-30 ENCOUNTER — ANTICOAGULATION THERAPY VISIT (OUTPATIENT)
Dept: ANTICOAGULATION | Facility: CLINIC | Age: 73
End: 2023-06-30

## 2023-06-30 ENCOUNTER — LAB (OUTPATIENT)
Dept: LAB | Facility: CLINIC | Age: 73
End: 2023-06-30
Payer: MEDICARE

## 2023-06-30 DIAGNOSIS — Z79.01 LONG TERM CURRENT USE OF ANTICOAGULANT THERAPY: ICD-10-CM

## 2023-06-30 DIAGNOSIS — I48.0 PAROXYSMAL ATRIAL FIBRILLATION (H): ICD-10-CM

## 2023-06-30 DIAGNOSIS — I48.91 ATRIAL FIBRILLATION WITH RAPID VENTRICULAR RESPONSE (H): ICD-10-CM

## 2023-06-30 DIAGNOSIS — I48.0 PAROXYSMAL ATRIAL FIBRILLATION (H): Primary | ICD-10-CM

## 2023-06-30 LAB — INR BLD: 2.1 (ref 0.9–1.1)

## 2023-06-30 PROCEDURE — 85610 PROTHROMBIN TIME: CPT

## 2023-06-30 PROCEDURE — 36416 COLLJ CAPILLARY BLOOD SPEC: CPT

## 2023-06-30 NOTE — PROGRESS NOTES
ANTICOAGULATION MANAGEMENT     Ijeoma Shah 72 year old female is on warfarin with therapeutic INR result. (Goal INR 2.0-3.0)    Recent labs: (last 7 days)     06/30/23  1135   INR 2.1*       ASSESSMENT     Warfarin Lab Questionnaire    Warfarin Doses Last 7 Days      6/29/2023     2:14 PM   Dose in Tablet or Mg   TAB or MG? tablet (tab)     Pt Rptd Dose SUNDAY MONDAY TUESDAY WED THURS FRIDAY SATURDAY 6/29/2023   2:14 PM 3 3 3 3 3 3 3         6/29/2023   Warfarin Lab Questionnaire   Missed doses within past 14 days? No   Changes in diet or alcohol within past 14 days? No   Medication changes since last result? No   Injuries or illness since last result? No   New shortness of breath, severe headaches or sudden changes in vision since last result? No   Abnormal bleeding since last result? No   Upcoming surgery, procedure? No     Previous result: Subtherapeutic  Additional findings: None       PLAN     Recommended plan for no diet, medication or health factor changes affecting INR     Dosing Instructions: Continue your current warfarin dose with next INR in 2 weeks       Summary  As of 6/30/2023    Full warfarin instructions:  7.5 mg every day   Next INR check:  7/14/2023             Telephone call with Salima who verbalizes understanding and agrees to plan    Lab visit scheduled    Education provided:     Goal range and lab monitoring: goal range and significance of current result    Plan made per [facility] anticoagulation protocol    Becky Capellan RN  Anticoagulation Clinic  6/30/2023    _______________________________________________________________________     Anticoagulation Episode Summary     Current INR goal:  2.0-3.0   TTR:  29.2 % (1.4 mo)   Target end date:  Indefinite   Send INR reminders to:  [facility]    Indications    Paroxysmal atrial fibrillation (H) [I48.0]  Atrial fibrillation with rapid ventricular response (H) [I48.91]           Comments:  DVM okay         Anticoagulation Care  Providers     Provider Role Specialty Phone number    Warren Pisano MD Referring Cardiovascular Disease [phone]    Jyoti Espinal MD Referring Family Medicine [phone]

## 2023-07-14 ENCOUNTER — ANTICOAGULATION THERAPY VISIT (OUTPATIENT)
Dept: ANTICOAGULATION | Facility: CLINIC | Age: 73
End: 2023-07-14

## 2023-07-14 ENCOUNTER — LAB (OUTPATIENT)
Dept: LAB | Facility: CLINIC | Age: 73
End: 2023-07-14
Payer: MEDICARE

## 2023-07-14 DIAGNOSIS — C34.92 SQUAMOUS CELL CARCINOMA OF LEFT LUNG (H): ICD-10-CM

## 2023-07-14 DIAGNOSIS — Z79.01 LONG TERM CURRENT USE OF ANTICOAGULANT THERAPY: ICD-10-CM

## 2023-07-14 DIAGNOSIS — I10 BENIGN ESSENTIAL HYPERTENSION: ICD-10-CM

## 2023-07-14 DIAGNOSIS — I48.0 PAROXYSMAL ATRIAL FIBRILLATION (H): ICD-10-CM

## 2023-07-14 DIAGNOSIS — I48.0 PAROXYSMAL ATRIAL FIBRILLATION (H): Primary | ICD-10-CM

## 2023-07-14 DIAGNOSIS — I48.91 ATRIAL FIBRILLATION WITH RAPID VENTRICULAR RESPONSE (H): ICD-10-CM

## 2023-07-14 LAB — INR BLD: 1.8 (ref 0.9–1.1)

## 2023-07-14 PROCEDURE — 85610 PROTHROMBIN TIME: CPT

## 2023-07-14 PROCEDURE — 36416 COLLJ CAPILLARY BLOOD SPEC: CPT

## 2023-07-14 RX ORDER — WARFARIN SODIUM 2.5 MG/1
TABLET ORAL
Qty: 280 TABLET | Refills: 1 | Status: SHIPPED | OUTPATIENT
Start: 2023-07-14 | End: 2023-08-18

## 2023-07-14 NOTE — PROGRESS NOTES
ANTICOAGULATION MANAGEMENT     Ijeoma Shah 72 year old female is on warfarin with subtherapeutic INR result. (Goal INR 2.0-3.0)    Recent labs: (last 7 days)     07/14/23  1036   INR 1.8*       ASSESSMENT     Warfarin Lab Questionnaire    Warfarin Doses Last 7 Days      7/13/2023    10:16 PM   Dose in Tablet or Mg   TAB or MG? tablet (tab)     Pt Rptd Dose SUNDAY MONDAY TUESDAY WED THURS FRIDAY SATURDAY 7/13/2023  10:16 PM 3 3 3 3 3 3 3         7/13/2023   Warfarin Lab Questionnaire   Missed doses within past 14 days? No   Changes in diet or alcohol within past 14 days? No   Medication changes since last result? No   Injuries or illness since last result? No   New shortness of breath, severe headaches or sudden changes in vision since last result? No   Abnormal bleeding since last result? No   Upcoming surgery, procedure? No     Previous result: Therapeutic last visit; previously outside of goal range  Additional findings: None       PLAN     Recommended plan for no diet, medication or health factor changes affecting INR     Dosing Instructions: Increase your warfarin dose (9.5% change) with next INR in 2 weeks       Summary  As of 7/14/2023    Full warfarin instructions:  10 mg every Mon, Fri; 7.5 mg all other days   Next INR check:  7/28/2023             Telephone call with Salima who verbalizes understanding and agrees to plan    Lab visit scheduled    Education provided:     Goal range and lab monitoring: goal range and significance of current result    Plan made per [facility] anticoagulation protocol    Becky Capellan RN  Anticoagulation Clinic  7/14/2023    _______________________________________________________________________     Anticoagulation Episode Summary     Current INR goal:  2.0-3.0   TTR:  30.2 % (1.9 mo)   Target end date:  Indefinite   Send INR reminders to:  [facility] [place]    Indications    Paroxysmal atrial fibrillation (H) [I48.0]  Atrial fibrillation with rapid ventricular  response (H) [I48.91]           Comments:  DVM okay         Anticoagulation Care Providers     Provider Role Specialty Phone number    Warren Pisano MD Referring Cardiovascular Disease [phone]    Jyoti Espinal MD Referring Family Medicine [phone]

## 2023-07-24 ENCOUNTER — TELEPHONE (OUTPATIENT)
Dept: FAMILY MEDICINE | Facility: CLINIC | Age: 73
End: 2023-07-24
Payer: MEDICARE

## 2023-07-24 NOTE — TELEPHONE ENCOUNTER
Spoke with patient and INR appointment rescheduled.    Lynn Sapp RN  [facility]tico[facility]

## 2023-07-24 NOTE — TELEPHONE ENCOUNTER
Patient Returning Call    Reason for call:  Pt would like to know if it is ok to change her INR apt     Information relayed to patient:      Patient has additional questions:  No      Could we send this information to you in [place] or would you prefer to receive a phone call?:   Patient would prefer a phone call   Okay to leave a detailed message?: Yes at Home number on file [phone] (home)

## 2023-07-27 ENCOUNTER — LAB (OUTPATIENT)
Dept: LAB | Facility: CLINIC | Age: 73
End: 2023-07-27
Payer: MEDICARE

## 2023-07-27 ENCOUNTER — ANTICOAGULATION THERAPY VISIT (OUTPATIENT)
Dept: ANTICOAGULATION | Facility: CLINIC | Age: 73
End: 2023-07-27

## 2023-07-27 DIAGNOSIS — Z79.01 LONG TERM CURRENT USE OF ANTICOAGULANT THERAPY: ICD-10-CM

## 2023-07-27 DIAGNOSIS — I48.0 PAROXYSMAL ATRIAL FIBRILLATION (H): Primary | ICD-10-CM

## 2023-07-27 DIAGNOSIS — I48.0 PAROXYSMAL ATRIAL FIBRILLATION (H): ICD-10-CM

## 2023-07-27 DIAGNOSIS — I48.91 ATRIAL FIBRILLATION WITH RAPID VENTRICULAR RESPONSE (H): ICD-10-CM

## 2023-07-27 LAB — INR BLD: 2.1 (ref 0.9–1.1)

## 2023-07-27 PROCEDURE — 36416 COLLJ CAPILLARY BLOOD SPEC: CPT

## 2023-07-27 PROCEDURE — 85610 PROTHROMBIN TIME: CPT

## 2023-07-27 NOTE — PROGRESS NOTES
ANTICOAGULATION MANAGEMENT     Ijeoma Shah 72 year old female is on warfarin with therapeutic INR result. (Goal INR 2.0-3.0)    Recent labs: (last 7 days)     07/27/23  1256   INR 2.1*       ASSESSMENT     Warfarin Lab Questionnaire    Warfarin Doses Last 7 Days      7/26/2023     1:47 PM   Dose in Tablet or Mg   TAB or MG? tablet (tab)     Pt Rptd Dose SUNDAY MONDAY TUESDAY WED THURS FRIDAY SATURDAY 7/26/2023   1:47 PM 3 4 3 3 3 4 3         7/26/2023   Warfarin Lab Questionnaire   Missed doses within past 14 days? No   Changes in diet or alcohol within past 14 days? No   Medication changes since last result? No   Injuries or illness since last result? No   New shortness of breath, severe headaches or sudden changes in vision since last result? No   Abnormal bleeding since last result? No   Upcoming surgery, procedure? No   Best number to call with results? [phone]     Previous result: Therapeutic last 2(+) visits  Additional findings: None       PLAN     Recommended plan for no diet, medication or health factor changes affecting INR     Dosing Instructions: Continue your current warfarin dose with next INR in 3 weeks       Summary  As of 7/27/2023      Full warfarin instructions:  10 mg every Mon, Fri; 7.5 mg all other days   Next INR check:  8/17/2023               Telephone call with Salima who verbalizes understanding and agrees to plan    Lab visit scheduled    Education provided:   Goal range and lab monitoring: goal range and significance of current result    Plan made per [facility] anticoagulation protocol    Becky Capellan RN  Anticoagulation Clinic  7/27/2023    _______________________________________________________________________     Anticoagulation Episode Summary       Current INR goal:  2.0-3.0   TTR:  30.8 % (2.3 mo)   Target end date:  Indefinite   Send INR reminders to:  [facility]    Indications    Paroxysmal atrial fibrillation (H) [I48.0]  Atrial fibrillation with rapid  ventricular response (H) [I48.91]             Comments:  DVM okay             Anticoagulation Care Providers       Provider Role Specialty Phone number    Warren Pisano MD Referring Cardiovascular Disease [phone]    Jyoti Espinal MD Referring Family Medicine [phone]

## 2023-07-28 ENCOUNTER — TELEPHONE (OUTPATIENT)
Dept: FAMILY MEDICINE | Facility: CLINIC | Age: 73
End: 2023-07-28

## 2023-07-28 DIAGNOSIS — Z91.030 BEE STING ALLERGY: Primary | ICD-10-CM

## 2023-07-28 RX ORDER — EPINEPHRINE 0.3 MG/.3ML
0.3 INJECTION SUBCUTANEOUS PRN
Qty: 2 EACH | Refills: 1 | Status: SHIPPED | OUTPATIENT
Start: 2023-07-28 | End: 2024-01-22

## 2023-07-28 NOTE — TELEPHONE ENCOUNTER
New Medication Request    Contacts         Type Contact Phone/Fax    07/28/2023 09:35 AM CDT Phone (Incoming) Salima Shah (Self) [phone] (M)            What medication are you requesting?: Epi Pen     Reason for medication request: Pt has a Bee Allergy- Pt has not been stung by one yet.     Have you taken this medication before?: Yes:     Controlled Substance Agreement on file:   CSA -- Patient Level:    CSA: None found at the patient level.         Preferred Pharmacy:   [facility] [address] [place] [address] [address] [facility]  [address] [facility] [address]  Phone: [phone] Fax: [phone]      Could we send this information to you in [place] or would you prefer to receive a phone call?:   Patient would prefer a phone call   Okay to leave a detailed message?: Yes at Home number on file [phone] (home)    [facility]

## 2023-07-28 NOTE — TELEPHONE ENCOUNTER
Patient needs refill of epi pens, medication is not  active on current medication list.  Date of last OV with Dr. Espinal: 6/1/23.  RX pended and message routed to covering provider to advise.    Julie Behrendt RN

## 2023-07-31 ENCOUNTER — TELEPHONE (OUTPATIENT)
Dept: FAMILY MEDICINE | Facility: CLINIC | Age: 73
End: 2023-07-31
Payer: MEDICARE

## 2023-08-18 ENCOUNTER — LAB (OUTPATIENT)
Dept: LAB | Facility: CLINIC | Age: 73
End: 2023-08-18
Payer: MEDICARE

## 2023-08-18 ENCOUNTER — ANTICOAGULATION THERAPY VISIT (OUTPATIENT)
Dept: ANTICOAGULATION | Facility: CLINIC | Age: 73
End: 2023-08-18

## 2023-08-18 DIAGNOSIS — C34.92 SQUAMOUS CELL CARCINOMA OF LEFT LUNG (H): ICD-10-CM

## 2023-08-18 DIAGNOSIS — I48.91 ATRIAL FIBRILLATION WITH RAPID VENTRICULAR RESPONSE (H): ICD-10-CM

## 2023-08-18 DIAGNOSIS — Z79.01 LONG TERM CURRENT USE OF ANTICOAGULANT THERAPY: ICD-10-CM

## 2023-08-18 DIAGNOSIS — I48.0 PAROXYSMAL ATRIAL FIBRILLATION (H): ICD-10-CM

## 2023-08-18 DIAGNOSIS — I10 BENIGN ESSENTIAL HYPERTENSION: ICD-10-CM

## 2023-08-18 DIAGNOSIS — I48.0 PAROXYSMAL ATRIAL FIBRILLATION (H): Primary | ICD-10-CM

## 2023-08-18 LAB — INR BLD: 2.1 (ref 0.9–1.1)

## 2023-08-18 PROCEDURE — 36416 COLLJ CAPILLARY BLOOD SPEC: CPT

## 2023-08-18 PROCEDURE — 85610 PROTHROMBIN TIME: CPT

## 2023-08-18 RX ORDER — WARFARIN SODIUM 5 MG/1
TABLET ORAL
Qty: 140 TABLET | Refills: 1 | Status: SHIPPED | OUTPATIENT
Start: 2023-08-18 | End: 2024-02-08

## 2023-08-18 NOTE — PROGRESS NOTES
ANTICOAGULATION MANAGEMENT     Ijeoma Shah 72 year old female is on warfarin with therapeutic INR result. (Goal INR 2.0-3.0)    Recent labs: (last 7 days)     08/18/23  1302   INR 2.1*       ASSESSMENT     Warfarin Lab Questionnaire    Warfarin Doses Last 7 Days      8/18/2023    11:59 AM   Dose in Tablet or Mg   TAB or MG? tablet (tab)     Pt Rptd Dose SUNDAY MONDAY TUESDAY WED THURS FRIDAY SATURDAY 8/18/2023  11:59 AM 3 4 3 3 3 4 3         8/18/2023   Warfarin Lab Questionnaire   Missed doses within past 14 days? No   Changes in diet or alcohol within past 14 days? No   Medication changes since last result? No   Injuries or illness since last result? No   New shortness of breath, severe headaches or sudden changes in vision since last result? No   Abnormal bleeding since last result? No   Upcoming surgery, procedure? No     Previous result: Therapeutic last visit; previously outside of goal range  Additional findings: change dose size of tablet so patient wouldn't have to take so many tablets at once per her request       PLAN     Recommended plan for no diet, medication or health factor changes affecting INR     Dosing Instructions: Continue your current warfarin dose with next INR in 4 weeks       Summary  As of 8/18/2023      Full warfarin instructions:  10 mg every Mon, Fri; 7.5 mg all other days   Next INR check:  9/15/2023               Telephone call with Salima who verbalizes understanding and agrees to plan    Lab visit scheduled    Education provided:   Taking warfarin: warfarin tablet strength change; remove and/or discard previous strength from medication supply  Goal range and lab monitoring: goal range and significance of current result    Plan made per [facility] anticoagulation protocol    Becky Capellan RN  Anticoagulation Clinic  8/18/2023    _______________________________________________________________________     Anticoagulation Episode Summary       Current INR goal:  2.0-3.0   TTR:   47.4 % (3 mo)   Target end date:  Indefinite   Send INR reminders to:  [facility] [place]    Indications    Paroxysmal atrial fibrillation (H) [I48.0]  Atrial fibrillation with rapid ventricular response (H) [I48.91]             Comments:  RAJESH garibay             Anticoagulation Care Providers       Provider Role Specialty Phone number    Warren Pisano MD Referring Cardiovascular Disease [phone]    Jyoti Espinal MD Referring Family Medicine [phone]

## 2023-09-15 ENCOUNTER — ANTICOAGULATION THERAPY VISIT (OUTPATIENT)
Dept: ANTICOAGULATION | Facility: CLINIC | Age: 73
End: 2023-09-15

## 2023-09-15 ENCOUNTER — LAB (OUTPATIENT)
Dept: LAB | Facility: CLINIC | Age: 73
End: 2023-09-15
Payer: MEDICARE

## 2023-09-15 DIAGNOSIS — Z79.01 LONG TERM CURRENT USE OF ANTICOAGULANT THERAPY: ICD-10-CM

## 2023-09-15 DIAGNOSIS — I48.91 ATRIAL FIBRILLATION WITH RAPID VENTRICULAR RESPONSE (H): ICD-10-CM

## 2023-09-15 DIAGNOSIS — I48.0 PAROXYSMAL ATRIAL FIBRILLATION (H): ICD-10-CM

## 2023-09-15 DIAGNOSIS — I48.0 PAROXYSMAL ATRIAL FIBRILLATION (H): Primary | ICD-10-CM

## 2023-09-15 LAB — INR BLD: 2.5 (ref 0.9–1.1)

## 2023-09-15 PROCEDURE — 85610 PROTHROMBIN TIME: CPT

## 2023-09-15 PROCEDURE — 36416 COLLJ CAPILLARY BLOOD SPEC: CPT

## 2023-09-15 NOTE — PROGRESS NOTES
ANTICOAGULATION MANAGEMENT     Ijeoma Shah 72 year old female is on warfarin with therapeutic INR result. (Goal INR 2.0-3.0)    Recent labs: (last 7 days)     09/15/23  1309   INR 2.5*       ASSESSMENT     Warfarin Lab Questionnaire    Warfarin Doses Last 7 Days      9/14/2023    11:09 AM   Dose in Tablet or Mg   TAB or MG? milligram (mg)     Pt Rptd Dose SUNDAY MONDAY TUESDAY WED THURS FRIDAY SATURDAY 9/14/2023  11:09 AM 7.5 10 7.5 7.5 7.5 10 7.5         9/14/2023   Warfarin Lab Questionnaire   Missed doses within past 14 days? No   Changes in diet or alcohol within past 14 days? No   Medication changes since last result? No   Injuries or illness since last result? No   New shortness of breath, severe headaches or sudden changes in vision since last result? No   Abnormal bleeding since last result? No   Upcoming surgery, procedure? No     Previous result: Therapeutic last 2(+) visits  Additional findings: None       PLAN     Recommended plan for no diet, medication or health factor changes affecting INR     Dosing Instructions: Continue your current warfarin dose with next INR in 5 weeks       Summary  As of 9/15/2023      Full warfarin instructions:  10 mg every Mon, Fri; 7.5 mg all other days   Next INR check:  10/20/2023               Telephone call with Salima who verbalizes understanding and agrees to plan    Lab visit scheduled    Education provided:   Goal range and lab monitoring: goal range and significance of current result    Plan made per [facility] anticoagulation protocol    Becky Capellan RN  Anticoagulation Clinic  9/15/2023    _______________________________________________________________________     Anticoagulation Episode Summary       Current INR goal:  2.0-3.0   TTR:  59.7 % (4 mo)   Target end date:  Indefinite   Send INR reminders to:  [facility]    Indications    Paroxysmal atrial fibrillation (H) [I48.0]  Atrial fibrillation with rapid ventricular response (H) [I48.91]              Comments:  DVM okay             Anticoagulation Care Providers       Provider Role Specialty Phone number    Warren Pisano MD Referring Cardiovascular Disease [phone]    Jyoti Espinal MD Referring Family Medicine [phone]

## 2023-09-24 NOTE — OP NOTE
Name: Semaj Herrera ADMIT: 2023   : 1939  PCP: Axel Guardado MD    MRN: [id number] LOS: 6 days   AGE/SEX: 83 y.o. male  ROOM: [facility]/     Subjective   Subjective   No new complaints. He feels like he is about to have a bowel movement. No chest pain or shortness of breath. Son at bedside     Objective   Objective   Vital Signs  Temp:  [97 °F (36.1 °C)] 97 °F (36.1 °C)  Heart Rate:  [47-65] 47  Resp:  [12-16] 12  BP: (145-159)/(54-71) 145/54  SpO2:  [96 %-100 %] 100 %  on   ;   Device (Oxygen Therapy): room air  Body mass index is 28.97 kg/m².    Physical Exam  Constitutional:       General: He is not in acute distress.     Appearance: He is ill-appearing (chronic). He is not toxic-appearing.   Cardiovascular:      Rate and Rhythm: Normal rate and regular rhythm.   Pulmonary:      Effort: Pulmonary effort is normal. No respiratory distress.      Breath sounds: Normal breath sounds. No wheezing or rhonchi.   Abdominal:      General: Bowel sounds are normal.      Palpations: Abdomen is soft.      Tenderness: There is no abdominal tenderness. There is no guarding or rebound.   Skin:     General: Skin is warm and dry.   Neurological:      Mental Status: He is alert and oriented to person, place, and time.   Psychiatric:         Mood and Affect: Mood normal.         Behavior: Behavior normal.     Results Review:       I reviewed the patient's new clinical results.  Results from last 7 days   Lab Units 23  0531 23  0712 23  0634 23  0631   WBC 10*3/mm3 6.13 8.20 8.13 7.77   HEMOGLOBIN g/dL 10.9* 11.4* 10.8* 7.1*   PLATELETS 10*3/mm3 95* 105* 96* 102*       Results from last 7 days   Lab Units 23  0531 23  1132 23  0823 23  1449 23  0712   SODIUM mmol/L 137  --  137 137 133*   POTASSIUM mmol/L 5.2 5.2 5.9* 4.4 5.4*   CHLORIDE mmol/L 105  --  105 103 105   CO2 mmol/L 18.6*  --  22.0 21.0* 17.0*   BUN mg/dL 30*  --  35* 33* 29*   CREATININE mg/dL 0.60*  --   Preoperative diagnosis:                         Lung cancer  Postoperative diagnosis:                       Lung cancer  Procedure:   1. TEMLA  2. Left VATS, conversion to thoracotomy  3. Left lower lobectom  4. Mediastinal lymph node dissection  5. Pulmonary artery repair    Anesthesia: General   Surgeon: Andrea Sanabria   Co-surgeon:  Kurt Davis  Assistant:  Yodit Alston  EBL: 300 mL    Complications: none immediate    Description of procedure   The patient was brought to the room and placed supine upon the table.  After confirming the patient's identity and verifying the consent, appropriate monitoring devices were placed as well as SCD boots.  General anesthesia was administered and the patient was intubated with a double-lumen endotracheal tube.  Proper position was confirmed by fiberoptic bronchoscopy.   Intravenous antibiotic was administered within one hour prior to the incision.  A shoulder roll was placed to extend the neck.  The neck and chest were prepped with Chlorhexidine and draped in the standard surgical fashion.     A 3 cm incision was made a fingerbreadth above the sternal notch.  This was carried down through the platysma and between the strap muscles to the pretracheal fascia.  The fascia was incised and the pretracheal space was developed with blunt dissection.  The retrosternal space was developed and the retraction hook was placed to retract the sternum skyward.  The TEMLA scope was inserted into the pretracheal space.    Lymph nodes from level 7, right and left level 4 stations were biopsied.  Hemostasis was achieved with Snow and packing.  Once hemostasis was noted, the scope was withdrawn.       The strap muscles were reapproximated with 3-0 vicryl, as was the platysma.  The skin was closed with 4-0 monocryl.  Each layer was irrigated prior to closure.  The area was cleaned and dried.  Exofin was applied.     The patient was turned to the right lateral decubitus  0.73* 0.83 0.78   GLUCOSE mg/dL 197*  --  170* 206* 203*     Estimated Creatinine Clearance: 88.9 mL/min (A) (by C-G formula based on SCr of 0.6 mg/dL (L)).  Results from last 7 days   Lab Units 09/21/23  0712 09/20/23  0634 09/19/23  0631 09/17/23  1207   ALBUMIN g/dL 3.5 3.7 3.9 3.9   BILIRUBIN mg/dL 0.4 0.4 0.3 0.4   ALK PHOS U/L 308* 300* 351* 385*   AST (SGOT) U/L 14 13 12 16   ALT (SGPT) U/L 9 8 7 10       Results from last 7 days   Lab Units 09/23/23  0531 09/22/23  0823 09/21/23  1449 09/21/23  0712 09/20/23  0634 09/19/23  0631 09/18/23  0412 09/17/23  1207   CALCIUM mg/dL 7.8* 7.8* 8.1* 7.9* 8.1* 8.3*   < > 8.1*   ALBUMIN g/dL  --   --   --  3.5 3.7 3.9  --  3.9   MAGNESIUM mg/dL  --   --   --   --   --   --   --  2.8*    < > = values in this interval not displayed.         Glucose   Date/Time Value Ref Range Status   09/24/2023 0819 164 (H) 70 - 130 mg/dL Final   09/24/2023 0733 155 (H) 70 - 130 mg/dL Final   09/23/2023 2105 328 (H) 70 - 130 mg/dL Final   09/23/2023 1639 203 (H) 70 - 130 mg/dL Final   09/23/2023 1138 201 (H) 70 - 130 mg/dL Final   09/23/2023 0745 195 (H) 70 - 130 mg/dL Final   09/22/2023 2136 190 (H) 70 - 130 mg/dL Final       Scheduled Meds  aspirin, 81 mg, Oral, Daily  fentaNYL, 1 patch, Transdermal, Q72H   And  Check Fentanyl Patch Placement, 1 each, Does not apply, Q12H  cholecalciferol, 2,000 Units, Oral, Daily  dexAMETHasone, 4 mg, Oral, Q6H  dilTIAZem CD, 120 mg, Oral, Daily  gabapentin, 900 mg, Oral, Nightly  insulin glargine, 20 Units, Subcutaneous, Nightly  insulin lispro, 2-9 Units, Subcutaneous, 4x Daily AC & at Bedtime  insulin lispro, 6 Units, Subcutaneous, TID With Meals  levothyroxine, 176 mcg, Oral, Once per day on Sun Wed  levothyroxine, 88 mcg, Oral, Once per day on Mon Tue Thu Sat  modafinil, 200 mg, Oral, Daily  pantoprazole, 40 mg, Oral, BID AC  senna-docusate sodium, 2 tablet, Oral, BID   And  polyethylene glycol, 17 g, Oral, Daily  pravastatin, 40 mg, Oral,  position and all pressure points were padded.  Stabilizing rolls were placed, the bed was flexed, and the bean bag was deflated into position.  The patient was secured to the table and the left arm was placed on an airplane board.  The left chest was prepped with chlorhexidine and draped in the standard surgical fashion.    A 10 mm incision was made in the seventh intercostal space in line with the anterior superior iliac spine.  This was carried down to the pleural cavity.  A 10 mm port was placed and a 10 mm, 30-degree thoracoscope was inserted into the pleural cavity.  A 3 cm anterior utility incision was made opposite the hilum.  There were adhesions of the lower lobe to the lower part of the chest cavity and these were taken down. The adhesions in the fissure were taken down.  It was apparent that the large lower lobe mass was adherent to a portion of the upper lobe and a small wedge was divided to remove en bloc with the lower lobe.  Having done this, it was apparent that the ongoing pulmonary artery was intimately related with the cancer.  Therefore, a decision was made to convert to thoracotomy.  The utility incision was enlarged,dividing the latissimus dorsi muscle and the serratus anterior muscle was spared.    The fifth intercostal space was entered and retractor was used to open the chest cavity. Proximal control of the left main pulmonary artery was obtained with a double looped vessel loop.  Dissection of the ongoing pulmonary artery was continued and the lingular branches were identified.  A small entry into the ongoing pulmonary artery was immediately controlled with the loop and repaired with a pledgeted 4-0 prolene.  At this time, I asked Dr. Davis to join the case in order to perform a pulmonary arterioplasty so the lobectomy could be performed.  Please refer to his operative note as well.  The superior pulmonary vein was double looped.  A clamp was placed on the left main pulmonary artery.   Daily  rOPINIRole, 1 mg, Oral, Nightly    Continuous Infusions   PRN Meds    acetaminophen    senna-docusate sodium **AND** polyethylene glycol **AND** bisacodyl **AND** bisacodyl    calcium carbonate    dextrose    dextrose    glucagon (human recombinant)    HYDROcodone-acetaminophen    HYDROmorphone    melatonin    ondansetron **OR** ondansetron    traZODone    Diet: Diabetic Diets, Renal Diets; Consistent Carbohydrate; Low Potassium; Texture: Regular Texture (IDDSI 7); Fluid Consistency: Thin (IDDSI 0)    I have personally reviewed:  [x]  Medications  [x]  Laboratory   []  Microbiology   []  Radiology   []  EKG/Telemetry   []  Cardiology/Vascular   []  Pathology   []  Records        Assessment/Plan     Active Hospital Problems    Diagnosis  POA    **Fecal impaction in rectum [K56.41]  Yes    Moderate malnutrition [E44.0]  Yes    Lower extremity weakness [R29.898]  Yes    Anemia, chronic disease [D63.8]  Yes    Metastatic cancer to bone [C79.51]  Yes    Restless legs syndrome (RLS) [G25.81]  Yes    Diastolic dysfunction [I51.89]  Yes    Neuropathy [G62.9]  Yes    Prostate cancer metastatic to bone [C61, C79.51]  Yes    Obstructive sleep apnea syndrome treated auto BiPAP [G47.33]  Yes    Type 2 diabetes mellitus with kidney complication, with long-term current use of insulin [E11.29, Z79.4]  Not Applicable    Hypertension [I10]  Yes    Chronic kidney disease, stage III (moderate) [N18.30]  Yes      Resolved Hospital Problems   No resolved problems to display.     Mr. Herrera is a 83 y.o. that presented to the hospital with a 2 to 3-day history of increasing bilateral leg weakness as well as constipation in the setting of known metastatic prostate cancer to the bone and lymph nodes.     Fecal impaction in the rectum  -CT A/P on admission with large amount of dense fecal material within the low sigmoid and rectum with rectal wall thickening.  Continued note of widespread osseous metastasis that was more pronounced  The ongoing artery dissection was continued.  The inferior pulmonary vein was divided with a vascular load of the endoGIA stapler.  The ongoing pulmonary artery was divided sharply.  The pulmonary artery stump was oversewn and the branches to the upper lobe were open.  The left lower lobe bronchus was then divided with a purple load of the stapler.  During the course of dissection, levels 5, 9L and 11L were harvested.  The cavity was irrigated copiously with warm saline and a leak test showed no bronchial stump leak.  A 28 Czech chest tube was then placed going posteriorly to the apex through the initial camera port site.  This was secured to the skin using 0 silk suture.  The lung was observed to inflate appropriately.  The chest wall was closed with #5 Vicryl.  The serratus anterior was reapproximated using 0 Vicryl.  The latissimus dorsi was closed with 0 Vicryl.  Each layer was irrigated prior to closure.  The remainder of layers were closed with 2-0 Vicryl, 3-0 Vicryl and 4-0 Monocryl.  The areas were cleaned and dried and exofin was applied.     At the end of the case, sponge, needle, and instrument counts were correct.        compared to prior.  -GI consulted. Appreciate their recommendations. Recommend daily Miralax and potential OIC medication if non-responsive to first line drugs.  -We will increase MiraLAX to twice a day     Lower extremity weakness  Neuropathy  -MRI of the lumbar spine with metastatic disease. MRI cervical spine with metastatic disease but no significant compression. MRI thoracic spine with metastatic disease as well as severe canal stenosis at T10 and extraosseous epidural metastatic disease.   -Some improvement with IV steroids now on PO  -Radiation oncology and Neurology consulted by oncology.   -Given significant cord compression at T10- neurosurgery evaluated.  [place] that due to the extent of his metastatic disease a thoracic decompression would likely destabilize the patient and therefore radiation therapy was recommended.   -PT/OT following-plans for home health versus SNF- patient/son want home. DME ordered and awaiting delivery before discharge  -Continue home gabapentin.  -Radiation oncology planning radiation treatments  -Neurology briefly evaluated but deferred to neurosurgery.     Prostate cancer metastatic to bone  -Oncology following. Appreciate their assistance.  -Palliative consulted for further goals of care-plans to continue radiation and current treatment.  -Continue pain medications.  -Hospice outpatient has been recommended by oncology- son has their information to call and set up.     DM2  -Typically on insulin pump that is not currently being used-DM educator met with patient and son and they do not have all the supplies he would need to resume this while inpatient.  -Acceptable control with correctional sliding scale insulin here on Lantus nightly to 20 units and meal time humalog with 6 units TID  -Titrate pending glucose trends and oral intake. Control is currently acceptable     RONNY  -Home CPAP if available.  -Avoid oversedation.     Diastolic dysfunction  Hypertension  -Established with  Dr. Lee for mild aortic stenosis.  -Continue outpatient follow-up.  -Continue home regimen.  -He has some bradycardia but he is asymptomatic. diltiazem has been held will discontinue altogether and monitor     CKD 3  -Renal function/electrolytes overall stable.  -Has some chronic high normal potassiums now on low potassium diet.  -Recheck BMP tomorrow  -He is established with Dr. Vicky Duran.     Anemia  Thrombocytopenia  -S/P 2 units of PRBC on 9/19 for hemoglobin 7.1   -Hemoglobin stable     Discharge pending delivery of hospital bed/lift. May be tomorrow    Discussed with patient, family, and nursing staff      VTE Prophylaxis - SCDs  Code Status - NO CPR/intubation.     Martinez Dewey MD  [facility]  09/24/23

## 2023-10-03 ENCOUNTER — HOSPITAL ENCOUNTER (OUTPATIENT)
Dept: CT IMAGING | Facility: CLINIC | Age: 73
Discharge: HOME | End: 2023-10-03
Attending: INTERNAL MEDICINE | Admitting: INTERNAL MEDICINE
Payer: MEDICARE

## 2023-10-03 ENCOUNTER — LAB (OUTPATIENT)
Dept: LAB | Facility: CLINIC | Age: 73
End: 2023-10-03
Payer: MEDICARE

## 2023-10-03 DIAGNOSIS — C34.32 SQUAMOUS CELL CARCINOMA OF BRONCHUS IN LEFT LOWER LOBE (H): ICD-10-CM

## 2023-10-03 LAB
ALBUMIN SERPL BCG-MCNC: 4.3 G/DL (ref 3.5–5.2)
ALP SERPL-CCNC: 76 U/L (ref 35–104)
ALT SERPL W P-5'-P-CCNC: 14 U/L (ref 0–50)
ANION GAP SERPL CALCULATED.3IONS-SCNC: 10 MMOL/L (ref 7–15)
AST SERPL W P-5'-P-CCNC: 21 U/L (ref 0–45)
BASO+EOS+MONOS # BLD AUTO: NORMAL 10*3/UL
BASO+EOS+MONOS NFR BLD AUTO: NORMAL %
BASOPHILS # BLD AUTO: 0 10E3/UL (ref 0–0.2)
BASOPHILS NFR BLD AUTO: 0 %
BILIRUB SERPL-MCNC: 0.4 MG/DL
BUN SERPL-MCNC: 17.2 MG/DL (ref 8–23)
CALCIUM SERPL-MCNC: 9.3 MG/DL (ref 8.8–10.2)
CHLORIDE SERPL-SCNC: 99 MMOL/L (ref 98–107)
CREAT SERPL-MCNC: 0.98 MG/DL (ref 0.51–0.95)
DEPRECATED HCO3 PLAS-SCNC: 27 MMOL/L (ref 22–29)
EGFRCR SERPLBLD CKD-EPI 2021: 61 ML/MIN/1.73M2
EOSINOPHIL # BLD AUTO: 0.5 10E3/UL (ref 0–0.7)
EOSINOPHIL NFR BLD AUTO: 7 %
ERYTHROCYTE [DISTWIDTH] IN BLOOD BY AUTOMATED COUNT: 12.6 % (ref 10–15)
GLUCOSE SERPL-MCNC: 63 MG/DL (ref 70–99)
HCT VFR BLD AUTO: 39.8 % (ref 35–47)
HGB BLD-MCNC: 13.8 G/DL (ref 11.7–15.7)
IMM GRANULOCYTES # BLD: 0 10E3/UL
IMM GRANULOCYTES NFR BLD: 0 %
LYMPHOCYTES # BLD AUTO: 1.3 10E3/UL (ref 0.8–5.3)
LYMPHOCYTES NFR BLD AUTO: 21 %
MCH RBC QN AUTO: 30.3 PG (ref 26.5–33)
MCHC RBC AUTO-ENTMCNC: 34.7 G/DL (ref 31.5–36.5)
MCV RBC AUTO: 88 FL (ref 78–100)
MONOCYTES # BLD AUTO: 0.5 10E3/UL (ref 0–1.3)
MONOCYTES NFR BLD AUTO: 7 %
NEUTROPHILS # BLD AUTO: 4 10E3/UL (ref 1.6–8.3)
NEUTROPHILS NFR BLD AUTO: 65 %
NRBC # BLD AUTO: 0 10E3/UL
NRBC BLD AUTO-RTO: 0 /100
PLATELET # BLD AUTO: 174 10E3/UL (ref 150–450)
POTASSIUM SERPL-SCNC: 4.3 MMOL/L (ref 3.4–5.3)
PROT SERPL-MCNC: 6.7 G/DL (ref 6.4–8.3)
RBC # BLD AUTO: 4.55 10E6/UL (ref 3.8–5.2)
SODIUM SERPL-SCNC: 136 MMOL/L (ref 135–145)
WBC # BLD AUTO: 6.3 10E3/UL (ref 4–11)

## 2023-10-03 PROCEDURE — 250N000011 HC RX IP 250 OP 636: Performed by: INTERNAL MEDICINE

## 2023-10-03 PROCEDURE — 250N000009 HC RX 250: Performed by: INTERNAL MEDICINE

## 2023-10-03 PROCEDURE — 80053 COMPREHEN METABOLIC PANEL: CPT

## 2023-10-03 PROCEDURE — G1010 CDSM STANSON: HCPCS

## 2023-10-03 PROCEDURE — 85025 COMPLETE CBC W/AUTO DIFF WBC: CPT

## 2023-10-03 PROCEDURE — 36415 COLL VENOUS BLD VENIPUNCTURE: CPT

## 2023-10-03 RX ORDER — IOPAMIDOL 755 MG/ML
500 INJECTION, SOLUTION INTRAVASCULAR ONCE
Status: COMPLETED | OUTPATIENT
Start: 2023-10-03 | End: 2023-10-03

## 2023-10-03 RX ADMIN — IOPAMIDOL 85 ML: 755 INJECTION, SOLUTION INTRAVENOUS at 11:10

## 2023-10-03 RX ADMIN — SODIUM CHLORIDE 61 ML: 9 INJECTION, SOLUTION INTRAVENOUS at 11:10

## 2023-10-06 ENCOUNTER — VIRTUAL VISIT (OUTPATIENT)
Dept: ONCOLOGY | Facility: CLINIC | Age: 73
End: 2023-10-06
Attending: INTERNAL MEDICINE
Payer: MEDICARE

## 2023-10-06 VITALS — BODY MASS INDEX: 27.49 KG/M2 | WEIGHT: 165 LBS | HEIGHT: 65 IN

## 2023-10-06 DIAGNOSIS — C34.32 SQUAMOUS CELL CARCINOMA OF BRONCHUS IN LEFT LOWER LOBE (H): Primary | ICD-10-CM

## 2023-10-06 PROCEDURE — 99214 OFFICE O/P EST MOD 30 MIN: CPT | Mod: 95 | Performed by: NURSE PRACTITIONER

## 2023-10-06 ASSESSMENT — PAIN SCALES - GENERAL: PAINLEVEL: NO PAIN (0)

## 2023-10-06 NOTE — NURSING NOTE
Is the patient currently in the state of [place]? YES    Visit mode:VIDEO    If the visit is dropped, the patient can be reconnected by: VIDEO VISIT: Send to e-mail at: [email or web address]    Will anyone else be joining the visit? NO  (If patient encounters technical issues they should call [phone]    How would you like to obtain your AVS? MyChart    Are changes needed to the allergy or medication list? No    Reason for visit: Video Visit (Follow Up )    Roselyn SANZ

## 2023-10-06 NOTE — LETTER
10/6/2023         RE: Ijeoma Shah  [address] [facility] [id number]        Dear Colleague,    Thank you for referring your patient, Ijeoma Shah, to the [facility]. Please see a copy of my visit note below.    Virtual Visit Details    Type of service:  Video Visit   Video Start Time:  414  Video End Time:4:37 PM    Originating Location (pt. Location): Home    Distant Location (provider location):  On-site  Platform used for Video Visit: Steven    Reason for Visit: seen in follow-up of SCC of the lung    Oncology HPI:     CANCER TYPE: SCC lung, poorly differentiated  STAGE: pT4N0 (IIIA)  ECOG PS: 1     PD-L1: TPS 15% on A17-09224 lobectomy, <1% on NB16-1301 (LLL bx)  Lung panel: SMO R562Q on LLL bx, negative for fusions on lobectomy specimen.   NGS: N/A     SUMMARY  7/3/20                 CT chest (screening). 6.7 cm LLL mass, 0.6 cm RML nodule, mediastinal and L hilar LNs  7/9/20                PET/CT. 7.1 x 5.6 cm LLL mass (SUV 19.6), post obstructive pneumonitis LLL inferior to the mass (SUV 2.8), 3.5 x 1.7 cm 4L node (SUV 5.9), L adrenal nodule 1.1 cm (SUV 4), indeterminate pulmonary nodules, pancreatic cysts, not hypermetabolic  7/27/20              Bronch, EBUS (Dr. Castro). 10R, 11R, 4R too small to biopsy. Stations 7, 4L, 11L negative for malignancy. LLL mass bx: SCC  8/12/20              Brain MRI negative  9/1/20                EUS to evaluate adrenal and 4L (Dr. Hogan). Both negative for malignancy. Adrenal with bland adrenal cells  10/22/20            L VATS, converted to thoracotomy, LLL lobectomy, MLND, R pulmonary arterioplasty (Dr. Sanabria, Dr. Davis). 8.3 cm poorly differentiated SCC, +angiolymphatic invasion  12/3/20~2/25/21 Cisplatin gemcitabine x 4. C2D8 delayed 1 week due to neutropenia, added neulasta    Salima has been followed without evidence of disease recurrence.      Interval history: Salima has generally been  feeling well, but feels like she has a bad cold today. Had some trouble breathing from the nose last night. Has a history of chronic nasal congestion, but it is more severe now. Increased post-nasal drainage when she lays down. No chest congestion or shortness of breath, but does cough with the post-nasal drainage. No fevers/chills or body aches. No headaches, sinuses feel full.  -no palpitations. Remains on metoprolol and warfarin for atrial fibrillation. No dizziness  -no leg swelling  -bowels are regular. No melena or hematochezia. No abdominal pain, N/V or reflux  -urination wnl  -no rashes, bleeding or bruising  Has been very active through the summer, gardening. Is helping her brother out in his shop and has been more sedentary the last month.  Appetite is good, weight is stable    Current Outpatient Medications   Medication Sig Dispense Refill    EPINEPHrine (ANY BX GENERIC EQUIV) 0.3 MG/0.3ML injection 2-pack Inject 0.3 mLs (0.3 mg) into the muscle as needed for anaphylaxis May repeat one time in 5-15 minutes if response to initial dose is inadequate. 2 each 1    ketoconazole (NIZORAL) 2 % external cream Apply topically daily For rash on feet. 60 g 0    lisinopril (ZESTRIL) 10 MG tablet Take 1 tablet (10 mg) by mouth every morning 90 tablet 4    metoprolol succinate ER (TOPROL XL) 25 MG 24 hr tablet Take 1 tablet (25 mg) by mouth every morning 90 tablet 3    ofloxacin (OCUFLOX) 0.3 % ophthalmic solution Place 1 drop into the right eye 4 times daily X 7 days 10 mL 0    triamcinolone (KENALOG) 0.1 % external ointment Apply topically 2 times daily To heels as needed 80 g 1    trimethoprim-polymyxin b (POLYTRIM) 72897-7.1 UNIT/ML-% ophthalmic solution Place 1-2 drops into the right eye every 4 hours 10 mL 0    warfarin ANTICOAGULANT (COUMADIN) 5 MG tablet Take 10 mg (2 tablets) Mon/Fri and 7.5 mg (1 1/2 tablets) all other days of the week unless directed other wise by  tablet 1          Allergies    Allergen Reactions    Bee Venom Hives     Hot and sweating          Video physical exam  General: Patient appears well in no acute distress.   Skin: No visualized rash or lesions on visualized skin  Eyes: EOMI, no erythema, sclera icterus or discharge noted  Resp: Appears to be breathing comfortably without accessory muscle usage, speaking in full sentences, no cough  MSK: Appears to have normal range of motion based on visualized movements  Neurologic: No apparent tremors, facial movements symmetric  Psych: affect engaged, pleasant, alert and oriented   not currently breastfeeding.  Wt Readings from Last 4 Encounters:   06/01/23 77.1 kg (170 lb)   05/09/23 77.1 kg (169 lb 14.4 oz)   01/25/23 76.2 kg (168 lb)   08/01/22 76.2 kg (168 lb)         Labs:    Latest Reference Range & Units 10/03/23 10:23   Sodium 135 - 145 mmol/L 136   Potassium 3.4 - 5.3 mmol/L 4.3   Chloride 98 - 107 mmol/L 99   Carbon Dioxide (CO2) 22 - 29 mmol/L 27   Urea Nitrogen 8.0 - 23.0 mg/dL 17.2   Creatinine 0.51 - 0.95 mg/dL 0.98 (H)   GFR Estimate >60 mL/min/1.73m2 61   Calcium 8.8 - 10.2 mg/dL 9.3   Anion Gap 7 - 15 mmol/L 10   Albumin 3.5 - 5.2 g/dL 4.3   Protein Total 6.4 - 8.3 g/dL 6.7   Alkaline Phosphatase 35 - 104 U/L 76   ALT 0 - 50 U/L 14   AST 0 - 45 U/L 21   Bilirubin Total <=1.2 mg/dL 0.4   Glucose 70 - 99 mg/dL 63 (L)   WBC 4.0 - 11.0 10e3/uL 6.3   Hemoglobin 11.7 - 15.7 g/dL 13.8   Hematocrit 35.0 - 47.0 % 39.8   Platelet Count 150 - 450 10e3/uL 174   RBC Count 3.80 - 5.20 10e6/uL 4.55   MCV 78 - 100 fL 88   MCH 26.5 - 33.0 pg 30.3   MCHC 31.5 - 36.5 g/dL 34.7   RDW 10.0 - 15.0 % 12.6   % Neutrophils % 65   % Lymphocytes % 21   % Monocytes % 7   % Eosinophils % 7   % Basophils % 0   Absolute Basophils 0.0 - 0.2 10e3/uL 0.0   Absolute Eosinophils 0.0 - 0.7 10e3/uL 0.5   Absolute Immature Granulocytes <=0.4 10e3/uL 0.0   Absolute Lymphocytes 0.8 - 5.3 10e3/uL 1.3   Absolute Monocytes 0.0 - 1.3 10e3/uL 0.5   % Immature  Granulocytes % 0   Absolute Neutrophils 1.6 - 8.3 10e3/uL 4.0   Absolute NRBCs 10e3/uL 0.0   NRBCs per 100 WBC <1 /100 0   (H): Data is abnormally high  (L): Data is abnormally low    Imaging:   CT CHEST AND ABDOMEN WITH CONTRAST 10/3/2023 11:21 AM     CLINICAL HISTORY: Restage poorly diff NSCLC, IIIA, status post surgery  and adjuvant chemo, completed 2 1/2 years ago. Squamous cell carcinoma  of bronchus in left lower lobe (H).     TECHNIQUE: CT scan of the chest and abdomen was performed following  injection of IV contrast. Multiplanar reformats were obtained. Dose  reduction techniques were used.   CONTRAST: Isovue-370, 85 mL     COMPARISON: 4/24/2023, 12/27/2022, and 7/23/2021     FINDINGS:   LUNGS AND PLEURA: 4 mm nodule in the anterolateral right upper lobe on  image 73 of series 4 is minimally increased from previous exams where  it measured 3 mm and was less dense. No significant change in 5 mm  nodule in the lateral right middle lobe on image 147. No significant  change in 6 mm nodule in the lateral right middle lobe on image 161.  Stable 6 mm nodule in the lateral right middle lobe on image 197.  Stable 3 mm posterior lingular nodule on image 162. Biapical pleural  scarring is noted. Stable appearance of left lower lobectomy with some  scarring in the medial left lower lung. The central airways are  patent.     MEDIASTINUM/AXILLAE: No axillary or mediastinal adenopathy. No  pericardial effusion.     CORONARY ARTERY CALCIFICATION: Mild     HEPATOBILIARY: Stable cysts are seen through the left hepatic lobe. No  follow-up necessary. Multiple subcentimeter low-attenuation lesions  are seen through the right lobe that are too small to definitely  characterize, but likely represent cysts as well. The portal vein is  patent.     PANCREAS: Stable 8 mm cyst in the head of the pancreas on image 164 of  series 3. Another cyst is seen along the pancreatic neck measuring 7  mm on image 157. These are unchanged for  greater than 2 years which is  reassuring.     SPLEEN: Normal.     ADRENAL GLANDS: Stable diffuse thickening of the left adrenal gland.     KIDNEYS: No evidence of enhancing mass or hydronephrosis on either  side.     BOWEL: No bowel obstruction. Wall thickening through the proximal  transverse colon without adjacent inflammatory change could be related  to mild colitis.     ADDITIONAL FINDINGS: The aorta is normal in caliber with moderate  atherosclerotic calcification. No adenopathy or free fluid through the  abdomen.     MUSCULOSKELETAL: Degenerative changes are noted through the spine.                                                                      IMPRESSION:  1.  Multiple pulmonary nodules are unchanged. One nodule in the right  upper lobe measures 4 mm compared to 3 mm previously and appears more  solid than on the previous study. Continued follow-up is recommended.  2.  Stable appearance of left lower lobectomy.  3.  Stable small cystic lesions in the head and neck of the pancreas.  4.  Wall thickening through the proximal transverse colon could be  related to colitis. No adjacent inflammatory change.     NILO BURRIS MD    Impression/plan:   SCC lung, [id number], IIIA, R0 resection, discrepant PD-L1: About 2 1/2 years from completion of therapy.  I reviewed the CT scan images there is minimal change to the pulmonary nodules.  The RUL nodule is minor. At this time , there is no evidence of recurrence. Recommend repeating a CT in 3 months rather than 4 to follow-up on the minor nodule change noted. If stable, would be at 3 years and could increased the interval between surveillance scans/labs to every 6 months    Transerve colon thickening  -noted on CT, but asymptomatic. Had a colonoscopy in January.  Will review with Dr. Soto to see if further work-up is recommended now or continued monitoring on imaging     Sinus congestion, acute on chronic  -recommended testing for COVID now. She is not up to  date on COVID or influenza vaccinations and has been reluctant to do this. I recommended that for her this year, but first she should test for COVID now. If positive, she should be considered for paxlovid (would need to review med interactions)  -for symptom relief of sinus congestion, may try flonase. Has ipatropium bromide nasal spray at home that is . She hasn't used that regularly.     Microscopic hematuria, dysuria: Urine cytology and FISH negative in March-April, met with Dr. Leyva in Urology, discussed cystoscopy vs monitoring, Salima opted for monitoring. Dr. Velazco monitoring     Colon polyps: Colonoscopy 2023.       Again, thank you for allowing me to participate in the care of your patient.        Sincerely,        RUDY Carlson CNP

## 2023-10-06 NOTE — PROGRESS NOTES
Virtual Visit Details    Type of service:  Video Visit   Video Start Time:  414  Video End Time:4:37 PM    Originating Location (pt. Location): Home    Distant Location (provider location):  On-site  Platform used for Video Visit: Steven    Reason for Visit: seen in follow-up of SCC of the lung    Oncology HPI:     CANCER TYPE: SCC lung, poorly differentiated  STAGE: pT4N0 (IIIA)  ECOG PS: 1     PD-L1: TPS 15% on C29-26977 lobectomy, <1% on JJ46-7401 (LLL bx)  Lung panel: SMO R562Q on LLL bx, negative for fusions on lobectomy specimen.   NGS: N/A     SUMMARY  7/3/20                 CT chest (screening). 6.7 cm LLL mass, 0.6 cm RML nodule, mediastinal and L hilar LNs  7/9/20                PET/CT. 7.1 x 5.6 cm LLL mass (SUV 19.6), post obstructive pneumonitis LLL inferior to the mass (SUV 2.8), 3.5 x 1.7 cm 4L node (SUV 5.9), L adrenal nodule 1.1 cm (SUV 4), indeterminate pulmonary nodules, pancreatic cysts, not hypermetabolic  7/27/20              Bronch, EBUS (Dr. Castro). 10R, 11R, 4R too small to biopsy. Stations 7, 4L, 11L negative for malignancy. LLL mass bx: SCC  8/12/20              Brain MRI negative  9/1/20                EUS to evaluate adrenal and 4L (Dr. Hogan). Both negative for malignancy. Adrenal with bland adrenal cells  10/22/20            L VATS, converted to thoracotomy, LLL lobectomy, MLND, R pulmonary arterioplasty (Dr. Sanabria, Dr. Davis). 8.3 cm poorly differentiated SCC, +angiolymphatic invasion  12/3/20~2/25/21 Cisplatin gemcitabine x 4. C2D8 delayed 1 week due to neutropenia, added neulasta    Salima has been followed without evidence of disease recurrence.      Interval history: Salima has generally been feeling well, but feels like she has a bad cold today. Had some trouble breathing from the nose last night. Has a history of chronic nasal congestion, but it is more severe now. Increased post-nasal drainage when she lays down. No chest congestion or shortness of breath, but does  cough with the post-nasal drainage. No fevers/chills or body aches. No headaches, sinuses feel full.  -no palpitations. Remains on metoprolol and warfarin for atrial fibrillation. No dizziness  -no leg swelling  -bowels are regular. No melena or hematochezia. No abdominal pain, N/V or reflux  -urination wnl  -no rashes, bleeding or bruising  Has been very active through the summer, gardening. Is helping her brother out in his shop and has been more sedentary the last month.  Appetite is good, weight is stable    Current Outpatient Medications   Medication Sig Dispense Refill    EPINEPHrine (ANY BX GENERIC EQUIV) 0.3 MG/0.3ML injection 2-pack Inject 0.3 mLs (0.3 mg) into the muscle as needed for anaphylaxis May repeat one time in 5-15 minutes if response to initial dose is inadequate. 2 each 1    ketoconazole (NIZORAL) 2 % external cream Apply topically daily For rash on feet. 60 g 0    lisinopril (ZESTRIL) 10 MG tablet Take 1 tablet (10 mg) by mouth every morning 90 tablet 4    metoprolol succinate ER (TOPROL XL) 25 MG 24 hr tablet Take 1 tablet (25 mg) by mouth every morning 90 tablet 3    ofloxacin (OCUFLOX) 0.3 % ophthalmic solution Place 1 drop into the right eye 4 times daily X 7 days 10 mL 0    triamcinolone (KENALOG) 0.1 % external ointment Apply topically 2 times daily To heels as needed 80 g 1    trimethoprim-polymyxin b (POLYTRIM) 54174-7.1 UNIT/ML-% ophthalmic solution Place 1-2 drops into the right eye every 4 hours 10 mL 0    warfarin ANTICOAGULANT (COUMADIN) 5 MG tablet Take 10 mg (2 tablets) Mon/Fri and 7.5 mg (1 1/2 tablets) all other days of the week unless directed other wise by  tablet 1          Allergies   Allergen Reactions    Bee Venom Hives     Hot and sweating          Video physical exam  General: Patient appears well in no acute distress.   Skin: No visualized rash or lesions on visualized skin  Eyes: EOMI, no erythema, sclera icterus or discharge noted  Resp: Appears to be  breathing comfortably without accessory muscle usage, speaking in full sentences, no cough  MSK: Appears to have normal range of motion based on visualized movements  Neurologic: No apparent tremors, facial movements symmetric  Psych: affect engaged, pleasant, alert and oriented   not currently breastfeeding.  Wt Readings from Last 4 Encounters:   06/01/23 77.1 kg (170 lb)   05/09/23 77.1 kg (169 lb 14.4 oz)   01/25/23 76.2 kg (168 lb)   08/01/22 76.2 kg (168 lb)         Labs:    Latest Reference Range & Units 10/03/23 10:23   Sodium 135 - 145 mmol/L 136   Potassium 3.4 - 5.3 mmol/L 4.3   Chloride 98 - 107 mmol/L 99   Carbon Dioxide (CO2) 22 - 29 mmol/L 27   Urea Nitrogen 8.0 - 23.0 mg/dL 17.2   Creatinine 0.51 - 0.95 mg/dL 0.98 (H)   GFR Estimate >60 mL/min/1.73m2 61   Calcium 8.8 - 10.2 mg/dL 9.3   Anion Gap 7 - 15 mmol/L 10   Albumin 3.5 - 5.2 g/dL 4.3   Protein Total 6.4 - 8.3 g/dL 6.7   Alkaline Phosphatase 35 - 104 U/L 76   ALT 0 - 50 U/L 14   AST 0 - 45 U/L 21   Bilirubin Total <=1.2 mg/dL 0.4   Glucose 70 - 99 mg/dL 63 (L)   WBC 4.0 - 11.0 10e3/uL 6.3   Hemoglobin 11.7 - 15.7 g/dL 13.8   Hematocrit 35.0 - 47.0 % 39.8   Platelet Count 150 - 450 10e3/uL 174   RBC Count 3.80 - 5.20 10e6/uL 4.55   MCV 78 - 100 fL 88   MCH 26.5 - 33.0 pg 30.3   MCHC 31.5 - 36.5 g/dL 34.7   RDW 10.0 - 15.0 % 12.6   % Neutrophils % 65   % Lymphocytes % 21   % Monocytes % 7   % Eosinophils % 7   % Basophils % 0   Absolute Basophils 0.0 - 0.2 10e3/uL 0.0   Absolute Eosinophils 0.0 - 0.7 10e3/uL 0.5   Absolute Immature Granulocytes <=0.4 10e3/uL 0.0   Absolute Lymphocytes 0.8 - 5.3 10e3/uL 1.3   Absolute Monocytes 0.0 - 1.3 10e3/uL 0.5   % Immature Granulocytes % 0   Absolute Neutrophils 1.6 - 8.3 10e3/uL 4.0   Absolute NRBCs 10e3/uL 0.0   NRBCs per 100 WBC <1 /100 0   (H): Data is abnormally high  (L): Data is abnormally low    Imaging:   CT CHEST AND ABDOMEN WITH CONTRAST 10/3/2023 11:21 AM     CLINICAL HISTORY: Restage poorly diff  NSCLC, IIIA, status post surgery  and adjuvant chemo, completed 2 1/2 years ago. Squamous cell carcinoma  of bronchus in left lower lobe (H).     TECHNIQUE: CT scan of the chest and abdomen was performed following  injection of IV contrast. Multiplanar reformats were obtained. Dose  reduction techniques were used.   CONTRAST: Isovue-370, 85 mL     COMPARISON: 4/24/2023, 12/27/2022, and 7/23/2021     FINDINGS:   LUNGS AND PLEURA: 4 mm nodule in the anterolateral right upper lobe on  image 73 of series 4 is minimally increased from previous exams where  it measured 3 mm and was less dense. No significant change in 5 mm  nodule in the lateral right middle lobe on image 147. No significant  change in 6 mm nodule in the lateral right middle lobe on image 161.  Stable 6 mm nodule in the lateral right middle lobe on image 197.  Stable 3 mm posterior lingular nodule on image 162. Biapical pleural  scarring is noted. Stable appearance of left lower lobectomy with some  scarring in the medial left lower lung. The central airways are  patent.     MEDIASTINUM/AXILLAE: No axillary or mediastinal adenopathy. No  pericardial effusion.     CORONARY ARTERY CALCIFICATION: Mild     HEPATOBILIARY: Stable cysts are seen through the left hepatic lobe. No  follow-up necessary. Multiple subcentimeter low-attenuation lesions  are seen through the right lobe that are too small to definitely  characterize, but likely represent cysts as well. The portal vein is  patent.     PANCREAS: Stable 8 mm cyst in the head of the pancreas on image 164 of  series 3. Another cyst is seen along the pancreatic neck measuring 7  mm on image 157. These are unchanged for greater than 2 years which is  reassuring.     SPLEEN: Normal.     ADRENAL GLANDS: Stable diffuse thickening of the left adrenal gland.     KIDNEYS: No evidence of enhancing mass or hydronephrosis on either  side.     BOWEL: No bowel obstruction. Wall thickening through the proximal  transverse  colon without adjacent inflammatory change could be related  to mild colitis.     ADDITIONAL FINDINGS: The aorta is normal in caliber with moderate  atherosclerotic calcification. No adenopathy or free fluid through the  abdomen.     MUSCULOSKELETAL: Degenerative changes are noted through the spine.                                                                      IMPRESSION:  1.  Multiple pulmonary nodules are unchanged. One nodule in the right  upper lobe measures 4 mm compared to 3 mm previously and appears more  solid than on the previous study. Continued follow-up is recommended.  2.  Stable appearance of left lower lobectomy.  3.  Stable small cystic lesions in the head and neck of the pancreas.  4.  Wall thickening through the proximal transverse colon could be  related to colitis. No adjacent inflammatory change.     NILO BURRIS MD    Impression/plan:   SCC lung, [id number], IIIA, R0 resection, discrepant PD-L1: About 2 1/2 years from completion of therapy.  I reviewed the CT scan images there is minimal change to the pulmonary nodules.  The RUL nodule is minor. At this time , there is no evidence of recurrence. Recommend repeating a CT in 3 months rather than 4 to follow-up on the minor nodule change noted. If stable, would be at 3 years and could increased the interval between surveillance scans/labs to every 6 months    Transerve colon thickening  -noted on CT, but asymptomatic. Had a colonoscopy in January.  Will review with Dr. Soto to see if further work-up is recommended now or continued monitoring on imaging     Sinus congestion, acute on chronic  -recommended testing for COVID now. She is not up to date on COVID or influenza vaccinations and has been reluctant to do this. I recommended that for her this year, but first she should test for COVID now. If positive, she should be considered for paxlovid (would need to review med interactions)  -for symptom relief of sinus congestion, may try  flonase. Has ipatropium bromide nasal spray at home that is . She hasn't used that regularly.     Microscopic hematuria, dysuria: Urine cytology and FISH negative in March-April, met with Dr. Leyva in Urology, discussed cystoscopy vs monitoring, Salima opted for monitoring. Dr. Velazco monitoring     Colon polyps: Colonoscopy 2023.

## 2023-10-10 ENCOUNTER — MYC MEDICAL ADVICE (OUTPATIENT)
Dept: ONCOLOGY | Facility: CLINIC | Age: 73
End: 2023-10-10
Payer: MEDICARE

## 2023-10-11 ENCOUNTER — MYC MEDICAL ADVICE (OUTPATIENT)
Dept: FAMILY MEDICINE | Facility: CLINIC | Age: 73
End: 2023-10-11
Payer: MEDICARE

## 2023-10-11 NOTE — TELEPHONE ENCOUNTER
Pt stating she would like to discuss starting on an antiviral to help with COVID. Advised pt to call the COVID line to schedule a virtual appt with provider to discuss antiviral therapy.     Pt stated she will call her PCP to discuss antiviral therapy.

## 2023-10-20 ENCOUNTER — LAB (OUTPATIENT)
Dept: LAB | Facility: CLINIC | Age: 73
End: 2023-10-20
Payer: MEDICARE

## 2023-10-20 ENCOUNTER — ANTICOAGULATION THERAPY VISIT (OUTPATIENT)
Dept: ANTICOAGULATION | Facility: CLINIC | Age: 73
End: 2023-10-20

## 2023-10-20 DIAGNOSIS — Z79.01 LONG TERM CURRENT USE OF ANTICOAGULANT THERAPY: ICD-10-CM

## 2023-10-20 DIAGNOSIS — I48.0 PAROXYSMAL ATRIAL FIBRILLATION (H): ICD-10-CM

## 2023-10-20 DIAGNOSIS — I48.0 PAROXYSMAL ATRIAL FIBRILLATION (H): Primary | ICD-10-CM

## 2023-10-20 DIAGNOSIS — I48.91 ATRIAL FIBRILLATION WITH RAPID VENTRICULAR RESPONSE (H): ICD-10-CM

## 2023-10-20 LAB — INR BLD: 2.3 (ref 0.9–1.1)

## 2023-10-20 PROCEDURE — 85610 PROTHROMBIN TIME: CPT

## 2023-10-20 PROCEDURE — 36416 COLLJ CAPILLARY BLOOD SPEC: CPT

## 2023-10-20 NOTE — PROGRESS NOTES
ANTICOAGULATION MANAGEMENT     Ijeoma Shah 72 year old female is on warfarin with therapeutic INR result. (Goal INR 2.0-3.0)    Recent labs: (last 7 days)     10/20/23  1030   INR 2.3*       ASSESSMENT     Warfarin Lab Questionnaire    Warfarin Doses Last 7 Days      10/19/2023     8:58 AM   Dose in Tablet or Mg   TAB or MG? milligram (mg)     Pt Rptd Dose TED MONDAY TUESDAY WED THURS FRIDAY SATURDAY   10/19/2023   8:58 AM 7.5 10 7.5 7.5 7.5 10 7.5         10/19/2023   Warfarin Lab Questionnaire   Missed doses within past 14 days? No   Changes in diet or alcohol within past 14 days? No   Medication changes since last result? No   Injuries or illness since last result? No   New shortness of breath, severe headaches or sudden changes in vision since last result? No   Abnormal bleeding since last result? No   Upcoming surgery, procedure? No     Previous result: Therapeutic last 2(+) visits  Additional findings: None       PLAN     Recommended plan for no diet, medication or health factor changes affecting INR     Dosing Instructions: Continue your current warfarin dose with next INR in 5 weeks       Summary  As of 10/20/2023      Full warfarin instructions:  10 mg every Mon, Fri; 7.5 mg all other days   Next INR check:                 Telephone call with Salima who verbalizes understanding and agrees to plan    Lab visit scheduled    Education provided:   Please call back if any changes to your diet, medications or how you've been taking warfarin    Plan made per [facility] anticoagulation protocol    Michelle Watson, RN  Anticoagulation Clinic  10/20/2023    _______________________________________________________________________     Anticoagulation Episode Summary       Current INR goal:  2.0-3.0   TTR:  68.8% (5.1 mo)   Target end date:  Indefinite   Send INR reminders to:  [facility]    Indications    Paroxysmal atrial fibrillation (H) [I48.0]  Atrial fibrillation with rapid ventricular response  (H) [I48.91]             Comments:  DVM okay             Anticoagulation Care Providers       Provider Role Specialty Phone number    Warren Pisano MD Referring Cardiovascular Disease [phone]    Jyoti Espinal MD Referring Family Medicine [phone]

## 2023-11-24 ENCOUNTER — ANTICOAGULATION THERAPY VISIT (OUTPATIENT)
Dept: ANTICOAGULATION | Facility: CLINIC | Age: 73
End: 2023-11-24

## 2023-11-24 ENCOUNTER — LAB (OUTPATIENT)
Dept: LAB | Facility: CLINIC | Age: 73
End: 2023-11-24
Payer: MEDICARE

## 2023-11-24 DIAGNOSIS — I48.91 ATRIAL FIBRILLATION WITH RAPID VENTRICULAR RESPONSE (H): ICD-10-CM

## 2023-11-24 DIAGNOSIS — I48.0 PAROXYSMAL ATRIAL FIBRILLATION (H): ICD-10-CM

## 2023-11-24 DIAGNOSIS — Z79.01 LONG TERM CURRENT USE OF ANTICOAGULANT THERAPY: ICD-10-CM

## 2023-11-24 DIAGNOSIS — I48.0 PAROXYSMAL ATRIAL FIBRILLATION (H): Primary | ICD-10-CM

## 2023-11-24 LAB — INR BLD: 2.6 (ref 0.9–1.1)

## 2023-11-24 PROCEDURE — 36416 COLLJ CAPILLARY BLOOD SPEC: CPT

## 2023-11-24 PROCEDURE — 85610 PROTHROMBIN TIME: CPT

## 2023-11-24 NOTE — PROGRESS NOTES
ANTICOAGULATION MANAGEMENT     Ijeoma Shah 73 year old female is on warfarin with therapeutic INR result. (Goal INR 2.0-3.0)    Recent labs: (last 7 days)     11/24/23  1036   INR 2.6*       ASSESSMENT     Warfarin Lab Questionnaire    Warfarin Doses Last 7 Days      11/23/2023    11:12 AM   Dose in Tablet or Mg   TAB or MG? milligram (mg)     Pt Rptd Dose SUNDAY MONDAY TUESDAY WED THURS FRIDAY SATURDAY 11/23/2023  11:12 AM 7.5 10 7.5 7.5 7.5 10 7.5         11/23/2023   Warfarin Lab Questionnaire   Missed doses within past 14 days? No   Changes in diet or alcohol within past 14 days? No   Medication changes since last result? No   Injuries or illness since last result? No   New shortness of breath, severe headaches or sudden changes in vision since last result? No   Abnormal bleeding since last result? No   Upcoming surgery, procedure? No     Previous result: Therapeutic last 2(+) visits  Additional findings: None       PLAN     Recommended plan for no diet, medication or health factor changes affecting INR     Dosing Instructions: Continue your current warfarin dose with next INR in 6 weeks       Summary  As of 11/24/2023      Full warfarin instructions:  10 mg every Mon, Fri; 7.5 mg all other days   Next INR check:  1/8/2024               Telephone call with Salima who verbalizes understanding and agrees to plan    Lab visit scheduled    Education provided:   Goal range and lab monitoring: goal range and significance of current result    Plan made per [facility] anticoagulation protocol    Becky Capellan RN  Anticoagulation Clinic  11/24/2023    _______________________________________________________________________     Anticoagulation Episode Summary       Current INR goal:  2.0-3.0   TTR:  74.6% (6.3 mo)   Target end date:  Indefinite   Send INR reminders to:  [facility] [place]    Indications    Paroxysmal atrial fibrillation (H) [I48.0]  Atrial fibrillation with rapid ventricular response (H)  [I48.91]             Comments:  DVM okay             Anticoagulation Care Providers       Provider Role Specialty Phone number    Warren Pisano MD Referring Cardiovascular Disease [phone]    Jyoti Espinal MD Referring Family Medicine [phone]

## 2024-01-08 ENCOUNTER — ANTICOAGULATION THERAPY VISIT (OUTPATIENT)
Dept: ANTICOAGULATION | Facility: CLINIC | Age: 74
End: 2024-01-08

## 2024-01-08 ENCOUNTER — HOSPITAL ENCOUNTER (OUTPATIENT)
Dept: CT IMAGING | Facility: CLINIC | Age: 74
Discharge: HOME | End: 2024-01-08
Attending: NURSE PRACTITIONER | Admitting: NURSE PRACTITIONER
Payer: MEDICARE

## 2024-01-08 ENCOUNTER — LAB (OUTPATIENT)
Dept: LAB | Facility: CLINIC | Age: 74
End: 2024-01-08
Payer: MEDICARE

## 2024-01-08 DIAGNOSIS — I48.0 PAROXYSMAL ATRIAL FIBRILLATION (H): ICD-10-CM

## 2024-01-08 DIAGNOSIS — Z79.01 LONG TERM CURRENT USE OF ANTICOAGULANT THERAPY: ICD-10-CM

## 2024-01-08 DIAGNOSIS — I48.0 PAROXYSMAL ATRIAL FIBRILLATION (H): Primary | ICD-10-CM

## 2024-01-08 DIAGNOSIS — C34.32 SQUAMOUS CELL CARCINOMA OF BRONCHUS IN LEFT LOWER LOBE (H): ICD-10-CM

## 2024-01-08 DIAGNOSIS — I48.91 ATRIAL FIBRILLATION WITH RAPID VENTRICULAR RESPONSE (H): ICD-10-CM

## 2024-01-08 LAB
ALBUMIN SERPL BCG-MCNC: 4.3 G/DL (ref 3.5–5.2)
ALP SERPL-CCNC: 68 U/L (ref 40–150)
ALT SERPL W P-5'-P-CCNC: 13 U/L (ref 0–50)
ANION GAP SERPL CALCULATED.3IONS-SCNC: 9 MMOL/L (ref 7–15)
AST SERPL W P-5'-P-CCNC: 22 U/L (ref 0–45)
BASOPHILS # BLD AUTO: 0 10E3/UL (ref 0–0.2)
BASOPHILS NFR BLD AUTO: 0 %
BILIRUB SERPL-MCNC: 0.4 MG/DL
BUN SERPL-MCNC: 16.8 MG/DL (ref 8–23)
CALCIUM SERPL-MCNC: 9.2 MG/DL (ref 8.8–10.2)
CHLORIDE SERPL-SCNC: 96 MMOL/L (ref 98–107)
CREAT SERPL-MCNC: 0.98 MG/DL (ref 0.51–0.95)
DEPRECATED HCO3 PLAS-SCNC: 29 MMOL/L (ref 22–29)
EGFRCR SERPLBLD CKD-EPI 2021: 61 ML/MIN/1.73M2
EOSINOPHIL # BLD AUTO: 0.4 10E3/UL (ref 0–0.7)
EOSINOPHIL NFR BLD AUTO: 6 %
ERYTHROCYTE [DISTWIDTH] IN BLOOD BY AUTOMATED COUNT: 12.5 % (ref 10–15)
GLUCOSE SERPL-MCNC: 128 MG/DL (ref 70–99)
HCT VFR BLD AUTO: 44.3 % (ref 35–47)
HGB BLD-MCNC: 15.4 G/DL (ref 11.7–15.7)
IMM GRANULOCYTES # BLD: 0 10E3/UL
IMM GRANULOCYTES NFR BLD: 0 %
INR BLD: 2.9 (ref 0.9–1.1)
LYMPHOCYTES # BLD AUTO: 1.6 10E3/UL (ref 0.8–5.3)
LYMPHOCYTES NFR BLD AUTO: 21 %
MCH RBC QN AUTO: 30.5 PG (ref 26.5–33)
MCHC RBC AUTO-ENTMCNC: 34.8 G/DL (ref 31.5–36.5)
MCV RBC AUTO: 88 FL (ref 78–100)
MONOCYTES # BLD AUTO: 0.4 10E3/UL (ref 0–1.3)
MONOCYTES NFR BLD AUTO: 6 %
NEUTROPHILS # BLD AUTO: 5.1 10E3/UL (ref 1.6–8.3)
NEUTROPHILS NFR BLD AUTO: 67 %
NRBC # BLD AUTO: 0 10E3/UL
NRBC BLD AUTO-RTO: 0 /100
PLATELET # BLD AUTO: 192 10E3/UL (ref 150–450)
POTASSIUM SERPL-SCNC: 4.3 MMOL/L (ref 3.4–5.3)
PROT SERPL-MCNC: 6.5 G/DL (ref 6.4–8.3)
RBC # BLD AUTO: 5.05 10E6/UL (ref 3.8–5.2)
SODIUM SERPL-SCNC: 134 MMOL/L (ref 135–145)
WBC # BLD AUTO: 7.6 10E3/UL (ref 4–11)

## 2024-01-08 PROCEDURE — 85610 PROTHROMBIN TIME: CPT

## 2024-01-08 PROCEDURE — 85025 COMPLETE CBC W/AUTO DIFF WBC: CPT

## 2024-01-08 PROCEDURE — 250N000009 HC RX 250: Performed by: NURSE PRACTITIONER

## 2024-01-08 PROCEDURE — 36415 COLL VENOUS BLD VENIPUNCTURE: CPT

## 2024-01-08 PROCEDURE — 250N000011 HC RX IP 250 OP 636: Performed by: NURSE PRACTITIONER

## 2024-01-08 PROCEDURE — 80053 COMPREHEN METABOLIC PANEL: CPT

## 2024-01-08 PROCEDURE — 71260 CT THORAX DX C+: CPT | Mod: MG

## 2024-01-08 RX ORDER — IOPAMIDOL 755 MG/ML
500 INJECTION, SOLUTION INTRAVASCULAR ONCE
Status: COMPLETED | OUTPATIENT
Start: 2024-01-08 | End: 2024-01-08

## 2024-01-08 RX ADMIN — SODIUM CHLORIDE 60 ML: 9 INJECTION, SOLUTION INTRAVENOUS at 10:59

## 2024-01-08 RX ADMIN — IOPAMIDOL 80 ML: 755 INJECTION, SOLUTION INTRAVENOUS at 10:59

## 2024-01-08 NOTE — PROGRESS NOTES
ANTICOAGULATION MANAGEMENT     Ijeoma Shah 73 year old female is on warfarin with therapeutic INR result. (Goal INR 2.0-3.0)    Recent labs: (last 7 days)     01/08/24  0925   INR 2.9*       ASSESSMENT     Source(s): Chart Review  Previous INR was Therapeutic last 2(+) visits  Medication, diet, health changes since last INR chart reviewed; none identified         PLAN     Recommended plan for no diet, medication or health factor changes affecting INR     Dosing Instructions: Continue your current warfarin dose with next INR in 6 weeks       Summary  As of 1/8/2024      Full warfarin instructions:  10 mg every Mon, Fri; 7.5 mg all other days   Next INR check:  2/19/2024               Detailed voice message left for Salima with dosing instructions and follow up date.     Contact [phone]  to schedule and with any changes, questions or concerns.     Education provided:   Please call back if any changes to your diet, medications or how you've been taking warfarin    Plan made per [facility] anticoagulation protocol    Yoselin Lockett, RN  Anticoagulation Clinic  1/8/2024    _______________________________________________________________________     Anticoagulation Episode Summary       Current INR goal:  2.0-3.0   TTR:  79.5% (7.8 mo)   Target end date:  Indefinite   Send INR reminders to:  [facility]    Indications    Paroxysmal atrial fibrillation (H) [I48.0]  Atrial fibrillation with rapid ventricular response (H) [I48.91]             Comments:  RAJESH garibay             Anticoagulation Care Providers       Provider Role Specialty Phone number    Warren Pisano MD Referring Cardiovascular Disease [phone]    Jyoti Espinal MD Referring Family Medicine [phone]

## 2024-01-10 ENCOUNTER — VIRTUAL VISIT (OUTPATIENT)
Dept: ONCOLOGY | Facility: CLINIC | Age: 74
End: 2024-01-10
Attending: NURSE PRACTITIONER
Payer: MEDICARE

## 2024-01-10 VITALS — HEIGHT: 66 IN | BODY MASS INDEX: 27.32 KG/M2 | WEIGHT: 170 LBS

## 2024-01-10 DIAGNOSIS — C34.32 SQUAMOUS CELL CARCINOMA OF BRONCHUS IN LEFT LOWER LOBE (H): Primary | ICD-10-CM

## 2024-01-10 PROCEDURE — 99214 OFFICE O/P EST MOD 30 MIN: CPT | Mod: 95 | Performed by: INTERNAL MEDICINE

## 2024-01-10 ASSESSMENT — PAIN SCALES - GENERAL: PAINLEVEL: NO PAIN (0)

## 2024-01-10 NOTE — LETTER
1/10/2024         RE: Ijeoma Shah  [address] [address]        Dear Colleague,    Thank you for referring your patient, Ijeoma Shah, to the [facility]. Please see a copy of my visit note below.       [facility]    PATIENT NAME: Ijeoma Shah  MRN # [id number]   DATE OF VISIT: January 10, 2024  YOB: 1950     CANCER TYPE: SCC lung, poorly differentiated  STAGE: pT4N0 (IIIA)  ECOG PS: 0    PD-L1: TPS 15% on [id number] lobectomy, <1% on [id number] (LLL bx)  Lung panel: SMO R562Q on LLL bx, negative for fusions on lobectomy specimen.   NGS: N/A    SUMMARY  7/3/20  CT chest (screening). 6.7 cm LLL mass, 0.6 cm RML nodule, mediastinal and L hilar LNs  7/9/20 PET/CT. 7.1 x 5.6 cm LLL mass (SUV 19.6), post obstructive pneumonitis LLL inferior to the mass (SUV 2.8), 3.5 x 1.7 cm 4L node (SUV 5.9), L adrenal nodule 1.1 cm (SUV 4), indeterminate pulmonary nodules, pancreatic cysts, not hypermetabolic  7/27/20 Bronch, EBUS (Dr. Castro). 10R, 11R, 4R too small to biopsy. Stations 7, 4L, 11L negative for malignancy. LLL mass bx: SCC  8/12/20 Brain MRI negative  9/1/20 EUS to evaluate adrenal and 4L (Dr. Hogan). Both negative for malignancy. Adrenal with bland adrenal cells  10/22/20 L VATS, converted to thoracotomy, LLL lobectomy, MLND, R pulmonary arterioplasty (Dr. Sanabria, Dr. Davis). 8.3 cm poorly differentiated SCC, +angiolymphatic invasion  12/3/20~2/25/21 Cisplatin gemcitabine x 4. C2D8 delayed 1 week due to neutropenia, added neulasta    ASSESSMENT AND PLAN  SCC lung, [id number], IIIA, R0 resection, discrepant PD-L1: Just over 3 years after completion of therapy. Has had some waxing and waning nodules but they look completely stable to slightly better on the current scan on my own review as well. CT in 4-5 months. If the next scan is ok, I'm fine going to q6 month scans thereafter. Discussed annual CT  even after 5 years of surveillance.     Pulm nodules: stable - see above    Microscopic hematuria, dysuria: Urine cytology and FISH negative in the past, met with Dr. Leyva in Urology, discussed cystoscopy vs monitoring, Salima opted for monitoring. Will defer to Dr. Espinal on this matter.    30 minutes spent by me on the date of the encounter doing chart review, history and exam, documentation and further activities per the note     Eneida De Leon MD  Associate Professor of Medicine  Hematology, Oncology and Transplantation      SUBJECTIVE  Salima returns for routine follow up for SCC lung, now just about 3 years after completing adjuvant chemotherapy.    Doing well  No new issues  Has intermittent back pain - has been told she has arthritis   No new pain  Breathing ok  No HA, N/V    PAST MEDICAL HISTORY  SCC as above  Afib with RVR. Initially when she presented for surgery 10/1, then post-op in 10/2020. DCCV x 2 10/23/20, ibutilide --> NSR, dig load, amio gtt. TTE 10/16/20 unremarkable.   Low back pain, R radiculopathy, L5-S1 interlaminar lumbar epidural steroid injection 11/15/21  Sp cholecystectomy  Dyslipidemia  H/o bronchitis  Nose surgery  Tubal ligation  Adrenal nodule, bx 9/1/20, negative for malignancy  Cholelithiasis  Cataract repair 2011?  Colon polyps on colonoscopy Jan 2023.     PFT 8/20/20 (preop). FEV1 1.33 (58%), FVC 1.76 (60%), DLCO 17.72 (90%)  Colonoscopy 1/25/23. Hyperplastic polyp and tubular adenoma    CURRENT OUTPATIENT MEDICATIONS  Reviewed    ALLERGIES  Allergies   Allergen Reactions    Bee Venom Hives     Hot and sweating       PHYSICAL EXAM  There were no vitals taken for this visit.  GEN: NAD  HEENT: EOMI, no icterus, injection or pallor.    Remainder of physical exam deferred due to public health emergency and limitations of video visit.    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 Multiplanar reformats were  obtained. Dose reduction techniques were used.   CONTRAST: Isovue-370, 80 mL    COMPARISON: 10/3/2023    FINDINGS:   LUNGS AND PLEURA: Stable left lower lobectomy. Stable small amount of  fluid in the left posterior pleural space. A right upper lobe 3 mm  nodule previously measured 4 mm and is less solid-appearing today  (4/73). Few other stable pulmonary nodules. For example, a 6 mm nodule  in the right middle lobe (4/164) and a subpleural 7 mm nodular opacity  in the right lower lobe (4/200) are stable. No new or enlarging  nodules.    MEDIASTINUM/AXILLAE: No lymphadenopathy. No thoracic aortic aneurysm.    HEPATOBILIARY: Stable hepatic cysts. Cholecystectomy. No new lesions  in the liver.    PANCREAS: Stable small pancreatic cysts. For example, a 6 mm cyst in  the uncinate process is unchanged (3/161).    SPLEEN: Normal.    ADRENAL GLANDS: Stable mild adrenal gland thickening, left greater  than right.    KIDNEYS/BLADDER: Normal.    BOWEL: No obstruction or inflammatory change. Previously seen wall  thickening in the transverse colon is not apparent today.    PELVIC ORGANS: No pelvic masses.    ADDITIONAL FINDINGS: No lymphadenopathy in the abdomen and pelvis. No  abdominal aortic aneurysm. No free fluid or fluid collections. No free  air.    MUSCULOSKELETAL: Degenerative changes in the spine. No suspicious  lesions in the bones.  Impression: IMPRESSION:  1.  No metastatic disease in the chest, abdomen and pelvis.  2.  Stable to slight decrease size of few small pulmonary nodules.  3.  Stable small pancreatic cysts.    MERCEDES JIMENEZ MD          SYSTEM ID:  [id number]     Imaging was personally reviewed and interpreted by me    Virtual Visit Details  Type of service:  Video Visit   Video Start Time: 6:10 PM  Video End Time:6:23 PM  Originating Location (pt. Location): Home  Distant Location (provider location):  Off-site  Platform used for Video Visit: Steven De Leon MD

## 2024-01-10 NOTE — PROGRESS NOTES
[facility] CANCER [facility]    PATIENT NAME: Ijeoma Shah  MRN # [id number]   DATE OF VISIT: January 10, 2024  YOB: 1950     CANCER TYPE: SCC lung, poorly differentiated  STAGE: pT4N0 (IIIA)  ECOG PS: 0    PD-L1: TPS 15% on [id number] lobectomy, <1% on [id number] (LLL bx)  Lung panel: SMO R562Q on LLL bx, negative for fusions on lobectomy specimen.   NGS: N/A    SUMMARY  7/3/20  CT chest (screening). 6.7 cm LLL mass, 0.6 cm RML nodule, mediastinal and L hilar LNs  7/9/20 PET/CT. 7.1 x 5.6 cm LLL mass (SUV 19.6), post obstructive pneumonitis LLL inferior to the mass (SUV 2.8), 3.5 x 1.7 cm 4L node (SUV 5.9), L adrenal nodule 1.1 cm (SUV 4), indeterminate pulmonary nodules, pancreatic cysts, not hypermetabolic  7/27/20 Bronch, EBUS (Dr. Castro). 10R, 11R, 4R too small to biopsy. Stations 7, 4L, 11L negative for malignancy. LLL mass bx: SCC  8/12/20 Brain MRI negative  9/1/20 EUS to evaluate adrenal and 4L (Dr. Hogan). Both negative for malignancy. Adrenal with bland adrenal cells  10/22/20 L VATS, converted to thoracotomy, LLL lobectomy, MLND, R pulmonary arterioplasty (Dr. Sanabria, Dr. Davis). 8.3 cm poorly differentiated SCC, +angiolymphatic invasion  12/3/20~2/25/21 Cisplatin gemcitabine x 4. C2D8 delayed 1 week due to neutropenia, added neulasta    ASSESSMENT AND PLAN  SCC lung, [id number], IIIA, R0 resection, discrepant PD-L1: Just over 3 years after completion of therapy. Has had some waxing and waning nodules but they look completely stable to slightly better on the current scan on my own review as well. CT in 4-5 months. If the next scan is ok, I'm fine going to q6 month scans thereafter. Discussed annual CT even after 5 years of surveillance.     Pulm nodules: stable - see above    Microscopic hematuria, dysuria: Urine cytology and FISH negative in the past, met with Dr. Leyva in Urology, discussed cystoscopy vs monitoring, Salima opted for monitoring. Will defer to Dr. Espinal on  this matter.    30 minutes spent by me on the date of the encounter doing chart review, history and exam, documentation and further activities per the note     Eneida De Leon MD  Associate Professor of Medicine  Hematology, Oncology and Transplantation      SUBJECTIVE  Salima returns for routine follow up for SCC lung, now just about 3 years after completing adjuvant chemotherapy.    Doing well  No new issues  Has intermittent back pain - has been told she has arthritis   No new pain  Breathing ok  No HA, N/V    PAST MEDICAL HISTORY  SCC as above  Afib with RVR. Initially when she presented for surgery 10/1, then post-op in 10/2020. DCCV x 2 10/23/20, ibutilide --> NSR, dig load, amio gtt. TTE 10/16/20 unremarkable.   Low back pain, R radiculopathy, L5-S1 interlaminar lumbar epidural steroid injection 11/15/21  Sp cholecystectomy  Dyslipidemia  H/o bronchitis  Nose surgery  Tubal ligation  Adrenal nodule, bx 9/1/20, negative for malignancy  Cholelithiasis  Cataract repair 2011?  Colon polyps on colonoscopy Jan 2023.     PFT 8/20/20 (preop). FEV1 1.33 (58%), FVC 1.76 (60%), DLCO 17.72 (90%)  Colonoscopy 1/25/23. Hyperplastic polyp and tubular adenoma    CURRENT OUTPATIENT MEDICATIONS  Reviewed    ALLERGIES  Allergies   Allergen Reactions    Bee Venom Hives     Hot and sweating       PHYSICAL EXAM  There were no vitals taken for this visit.  GEN: NAD  HEENT: EOMI, no icterus, injection or pallor.    Remainder of physical exam deferred due to public health emergency and limitations of video visit.    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 Multiplanar reformats were  obtained. Dose reduction techniques were used.   CONTRAST: Isovue-370, 80 mL    COMPARISON: 10/3/2023    FINDINGS:   LUNGS AND PLEURA: Stable left lower lobectomy. Stable small amount of  fluid in the left posterior pleural space. A right upper lobe 3 mm  nodule previously measured 4 mm and is less solid-appearing today  (4/73). Few other stable pulmonary nodules. For example, a 6 mm nodule  in the right middle lobe (4/164) and a subpleural 7 mm nodular opacity  in the right lower lobe (4/200) are stable. No new or enlarging  nodules.    MEDIASTINUM/AXILLAE: No lymphadenopathy. No thoracic aortic aneurysm.    HEPATOBILIARY: Stable hepatic cysts. Cholecystectomy. No new lesions  in the liver.    PANCREAS: Stable small pancreatic cysts. For example, a 6 mm cyst in  the uncinate process is unchanged (3/161).    SPLEEN: Normal.    ADRENAL GLANDS: Stable mild adrenal gland thickening, left greater  than right.    KIDNEYS/BLADDER: Normal.    BOWEL: No obstruction or inflammatory change. Previously seen wall  thickening in the transverse colon is not apparent today.    PELVIC ORGANS: No pelvic masses.    ADDITIONAL FINDINGS: No lymphadenopathy in the abdomen and pelvis. No  abdominal aortic aneurysm. No free fluid or fluid collections. No free  air.    MUSCULOSKELETAL: Degenerative changes in the spine. No suspicious  lesions in the bones.  Impression: IMPRESSION:  1.  No metastatic disease in the chest, abdomen and pelvis.  2.  Stable to slight decrease size of few small pulmonary nodules.  3.  Stable small pancreatic cysts.    MERCEDES JIMENEZ MD         SYSTEM ID:  [id number]     Imaging was personally reviewed and interpreted by me    Virtual Visit Details  Type of service:  Video Visit   Video Start Time: 6:10 PM  Video End Time:6:23 PM  Originating Location (pt. Location): Home  Distant Location (provider location):  Off-site  Platform used for  Video Visit: Steven

## 2024-01-11 NOTE — NURSING NOTE
Is the patient currently in the state of [place]? YES    Visit mode:VIDEO    If the visit is dropped, the patient can be reconnected by: VIDEO VISIT: Text to cell phone:   Telephone Information:   Mobile [phone]       Will anyone else be joining the visit? NO  (If patient encounters technical issues they should call [phone]    How would you like to obtain your AVS? MyChart    Are changes needed to the allergy or medication list? Pt stated no med changes    Reason for visit: RECHECK    No other vitals to report per pt    Qing BARNETTF

## 2024-01-22 ENCOUNTER — OFFICE VISIT (OUTPATIENT)
Dept: FAMILY MEDICINE | Facility: CLINIC | Age: 74
End: 2024-01-22
Payer: MEDICARE

## 2024-01-22 VITALS
BODY MASS INDEX: 28.68 KG/M2 | TEMPERATURE: 97.5 F | DIASTOLIC BLOOD PRESSURE: 60 MMHG | HEIGHT: 64 IN | SYSTOLIC BLOOD PRESSURE: 102 MMHG | HEART RATE: 76 BPM | WEIGHT: 168 LBS | OXYGEN SATURATION: 98 % | RESPIRATION RATE: 20 BRPM

## 2024-01-22 DIAGNOSIS — R20.9 COLD FEET: Primary | ICD-10-CM

## 2024-01-22 DIAGNOSIS — Z79.01 LONG TERM CURRENT USE OF ANTICOAGULANT THERAPY: ICD-10-CM

## 2024-01-22 DIAGNOSIS — C34.32 MALIGNANT NEOPLASM OF LOWER LOBE OF LEFT LUNG (H): ICD-10-CM

## 2024-01-22 DIAGNOSIS — Z91.030 BEE STING ALLERGY: ICD-10-CM

## 2024-01-22 DIAGNOSIS — B35.3 TINEA PEDIS OF BOTH FEET: ICD-10-CM

## 2024-01-22 DIAGNOSIS — J44.9 CHRONIC OBSTRUCTIVE PULMONARY DISEASE, UNSPECIFIED COPD TYPE (H): ICD-10-CM

## 2024-01-22 DIAGNOSIS — Z87.448 HISTORY OF HEMATURIA: ICD-10-CM

## 2024-01-22 DIAGNOSIS — E78.5 HYPERLIPIDEMIA LDL GOAL <160: ICD-10-CM

## 2024-01-22 DIAGNOSIS — I48.0 PAROXYSMAL ATRIAL FIBRILLATION (H): ICD-10-CM

## 2024-01-22 DIAGNOSIS — L30.9 DERMATITIS: ICD-10-CM

## 2024-01-22 LAB
ALBUMIN UR-MCNC: NEGATIVE MG/DL
APPEARANCE UR: CLEAR
BILIRUB UR QL STRIP: NEGATIVE
COLOR UR AUTO: YELLOW
GLUCOSE UR STRIP-MCNC: NEGATIVE MG/DL
HGB UR QL STRIP: ABNORMAL
KETONES UR STRIP-MCNC: ABNORMAL MG/DL
LEUKOCYTE ESTERASE UR QL STRIP: NEGATIVE
NITRATE UR QL: NEGATIVE
PH UR STRIP: 6 [PH] (ref 5–7)
RBC #/AREA URNS AUTO: NORMAL /HPF
SP GR UR STRIP: 1.01 (ref 1–1.03)
UROBILINOGEN UR STRIP-ACNC: 0.2 E.U./DL
WBC #/AREA URNS AUTO: NORMAL /HPF

## 2024-01-22 PROCEDURE — 81001 URINALYSIS AUTO W/SCOPE: CPT | Performed by: FAMILY MEDICINE

## 2024-01-22 PROCEDURE — 99215 OFFICE O/P EST HI 40 MIN: CPT | Performed by: FAMILY MEDICINE

## 2024-01-22 RX ORDER — EPINEPHRINE 0.3 MG/.3ML
0.3 INJECTION SUBCUTANEOUS PRN
Qty: 2 EACH | Refills: 1 | Status: SHIPPED | OUTPATIENT
Start: 2024-01-22

## 2024-01-22 RX ORDER — KETOCONAZOLE 20 MG/G
CREAM TOPICAL DAILY
Qty: 60 G | Refills: 0 | Status: SHIPPED | OUTPATIENT
Start: 2024-01-22

## 2024-01-22 RX ORDER — TRIAMCINOLONE ACETONIDE 1 MG/G
OINTMENT TOPICAL 2 TIMES DAILY
Qty: 80 G | Refills: 1 | Status: SHIPPED | OUTPATIENT
Start: 2024-01-22

## 2024-01-22 RX ORDER — RESPIRATORY SYNCYTIAL VIRUS VACCINE 120MCG/0.5
0.5 KIT INTRAMUSCULAR ONCE
Qty: 1 EACH | Refills: 0 | Status: CANCELLED | OUTPATIENT
Start: 2024-01-22 | End: 2024-01-22

## 2024-01-22 ASSESSMENT — PATIENT HEALTH QUESTIONNAIRE - PHQ9
SUM OF ALL RESPONSES TO PHQ QUESTIONS 1-9: 1
SUM OF ALL RESPONSES TO PHQ QUESTIONS 1-9: 1

## 2024-01-22 ASSESSMENT — PAIN SCALES - GENERAL: PAINLEVEL: NO PAIN (0)

## 2024-01-22 NOTE — PROGRESS NOTES
"  Assessment & Plan     Cold feet  Good pulses  May have Raynauds  Is not interested in another medication    Malignant neoplasm of lower lobe of left lung (H)  Followed by oncology  CT scan in 6 months    Bee sting allergy  - EPINEPHrine (ANY BX GENERIC EQUIV) 0.3 MG/0.3ML injection 2-pack; Inject 0.3 mLs (0.3 mg) into the muscle as needed for anaphylaxis May repeat one time in 5-15 minutes if response to initial dose is inadequate.    Dermatitis  - triamcinolone (KENALOG) 0.1 % external ointment; Apply topically 2 times daily To heels as needed    Tinea pedis of both feet  - ketoconazole (NIZORAL) 2 % external cream; Apply topically daily For rash on feet.    Hyperlipidemia LDL goal <160  Continue statin  - Lipid panel reflex to direct LDL Non-fasting; Future    Chronic obstructive pulmonary disease, unspecified COPD type (H)  No symptoms     Paroxysmal atrial fibrillation (H)  Long term current use of anticoagulant therapy  Continue warfarin and metoprolol    History of hematuria  Recheck UA today  - UA Macroscopic with reflex to Microscopic and Culture - Lab Collect; Future  - UA Macroscopic with reflex to Microscopic and Culture - Lab Collect  - UA Microscopic with Reflex to Culture      I spent a total of 48 minutes on the day of the visit.   Time spent by me doing chart review, history and exam, documentation and further activities per the note      BMI  Estimated body mass index is 29.29 kg/m  as calculated from the following:    Height as of this encounter: 1.613 m (5' 3.5\").    Weight as of this encounter: 76.2 kg (168 lb).         Samantha Borrego is a 73 year old, presenting for the following health issues:  Foot Problems (Cold feet)      1/22/2024    11:17 AM   Additional Questions   Roomed by Diana      Has had cold feet    Answers submitted by the patient for this visit:  Patient Health Questionnaire (Submitted on 1/22/2024)  PHQ9 TOTAL SCORE: 1  General Questionnaire (Submitted on " "1/19/2024)  Chief Complaint: Chronic problems general questions HPI Form  What is the reason for your visit today? : Annual  How many servings of fruits and vegetables do you eat daily?: 0-1  On average, how many sweetened beverages do you drink each day (Examples: soda, juice, sweet tea, etc.  Do NOT count diet or artificially sweetened beverages)?: 3  How many minutes a day do you exercise enough to make your heart beat faster?: 20 to 29  How many days a week do you exercise enough to make your heart beat faster?: 3 or less  How many days per week do you miss taking your medication?: 0    Has always had cold feet, all her life  This winter especially  Can get charley horses in her legs    Lung cancer  Saw Dr De Loen on 1/10/23, CT stable, will go to 6 month CT scan surveillance    History of microscopic hematuria  Urine cytology and FISH negative in the past, met with Dr. Leyva in Urology 4/12/22, discussed cystoscopy vs monitoring  She has not had any blood in the urine    Afib  Has been followed by cardiology  Switched to warfarin, is doing well  Will be due to see Dr Pisano in May    Tinea pedis  Would like refills of ketoconazole    COPD  Doing well    hypertension   Well controlled on metoprolol, lisinopril    Review of Systems  Constitutional, HEENT, cardiovascular, pulmonary, gi and gu systems are negative, except as otherwise noted.      Objective    /60 (BP Location: Right arm)   Pulse 76   Temp 97.5  F (36.4  C) (Tympanic)   Resp 20   Ht 1.613 m (5' 3.5\")   Wt 76.2 kg (168 lb)   LMP 01/22/2002   SpO2 98%   BMI 29.29 kg/m    Body mass index is 29.29 kg/m .  Physical Exam   GENERAL: alert and no distress  NECK: no adenopathy, no asymmetry, masses, or scars  RESP: lungs clear to auscultation - no rales, rhonchi or wheezes  CV: regular rate and rhythm, normal S1 S2, no S3 or S4, no murmur, click or rub, no peripheral edema  MS: no gross musculoskeletal defects noted, no edema  Diabetic " foot exam: normal DP and PT pulses, no trophic changes or ulcerative lesions, normal sensory exam, normal monofilament exam, and onychomycosis          Signed Electronically by: Jyoti Garcia MD

## 2024-02-08 DIAGNOSIS — I10 BENIGN ESSENTIAL HYPERTENSION: ICD-10-CM

## 2024-02-08 DIAGNOSIS — I48.0 PAROXYSMAL ATRIAL FIBRILLATION (H): ICD-10-CM

## 2024-02-08 DIAGNOSIS — C34.92 SQUAMOUS CELL CARCINOMA OF LEFT LUNG (H): ICD-10-CM

## 2024-02-08 DIAGNOSIS — I48.91 ATRIAL FIBRILLATION WITH RAPID VENTRICULAR RESPONSE (H): Primary | ICD-10-CM

## 2024-02-08 RX ORDER — WARFARIN SODIUM 5 MG/1
TABLET ORAL
Qty: 150 TABLET | Refills: 1 | Status: SHIPPED | OUTPATIENT
Start: 2024-02-08 | End: 2024-07-30

## 2024-02-08 RX ORDER — WARFARIN SODIUM 5 MG/1
TABLET ORAL
Qty: 140 TABLET | Refills: 1 | Status: CANCELLED | OUTPATIENT
Start: 2024-02-08

## 2024-02-08 NOTE — TELEPHONE ENCOUNTER
Patient is using a pill cutter with her warfarin. Pt confirms dosage.  Patient referenced tablets as 10 mg several times. Discussed dosing in detail.   RN offered to order patient 2.5 mg tablets - discussed in detail.  Patient decided to stay with one pill strength - 5 mg tablets.  RN recommended trying a different pill cutter.    Patient verbalizes understanding and agrees to plan. No further questions or concerns.  Stephie Yost RN on 2/8/2024 at 2:18 PM

## 2024-02-08 NOTE — TELEPHONE ENCOUNTER
ANTICOAGULATION MANAGEMENT:  Medication Refill    Anticoagulation Summary  As of 1/8/2024      Warfarin maintenance plan:  10 mg (5 mg x 2) every Mon, Fri; 7.5 mg (5 mg x 1.5) all other days   Next INR check:  2/19/2024   Target end date:  Indefinite    Indications    Paroxysmal atrial fibrillation (H) [I48.0]  Atrial fibrillation with rapid ventricular response (H) [I48.91]                 Anticoagulation Care Providers       Provider Role Specialty Phone number    Warren Pisano MD Referring Cardiovascular Disease [phone]    Jyoti Espinal MD Referring Family Medicine [phone]            Refill Criteria    Visit with referring provider/group: Meets criteria: office visit within referring provider group in the last 1 year on 1/22/24    [facility] referral signed last signed: 05/12/2023; within last year: Yes    Lab monitoring not exceeding 2 weeks overdue: No    Ijeoma meets all criteria for refill. Rx instructions and quantity supplied updated to match patient's current dosing plan. Warfarin 90 day supply with 1 refill granted per [facility] protocol     Called patient regarding the request.  Patient stated the wrong dosage per chart review. Patient will call RN when she returns home to confirm the dosage she has been taking.    Is patient cutting the tablets with a knife?    Stephie Yost RN  Anticoagulation Clinic

## 2024-02-08 NOTE — TELEPHONE ENCOUNTER
Incoming fax from [facility] at [facility] requesting refill of warfarin.     Pharmacy noted that pt is having difficult time with splitting tablets and they request both 10mg and 5mg tablets of warfarin.     Routing to managing [facility] pool to complete warfarin request.       Segundo Marie RN

## 2024-02-26 ENCOUNTER — MYC MEDICAL ADVICE (OUTPATIENT)
Dept: ANTICOAGULATION | Facility: CLINIC | Age: 74
End: 2024-02-26
Payer: MEDICARE

## 2024-03-04 ENCOUNTER — ANTICOAGULATION THERAPY VISIT (OUTPATIENT)
Dept: ANTICOAGULATION | Facility: CLINIC | Age: 74
End: 2024-03-04

## 2024-03-04 ENCOUNTER — LAB (OUTPATIENT)
Dept: LAB | Facility: CLINIC | Age: 74
End: 2024-03-04
Payer: MEDICARE

## 2024-03-04 DIAGNOSIS — N18.30 CHRONIC KIDNEY DISEASE, STAGE 3 (H): Primary | ICD-10-CM

## 2024-03-04 DIAGNOSIS — Z79.01 LONG TERM CURRENT USE OF ANTICOAGULANT THERAPY: ICD-10-CM

## 2024-03-04 DIAGNOSIS — I48.91 ATRIAL FIBRILLATION WITH RAPID VENTRICULAR RESPONSE (H): ICD-10-CM

## 2024-03-04 DIAGNOSIS — I48.0 PAROXYSMAL ATRIAL FIBRILLATION (H): Primary | ICD-10-CM

## 2024-03-04 DIAGNOSIS — I48.0 PAROXYSMAL ATRIAL FIBRILLATION (H): ICD-10-CM

## 2024-03-04 LAB — INR BLD: 3.2 (ref 0.9–1.1)

## 2024-03-04 PROCEDURE — 36416 COLLJ CAPILLARY BLOOD SPEC: CPT

## 2024-03-04 PROCEDURE — 85610 PROTHROMBIN TIME: CPT

## 2024-03-04 NOTE — PROGRESS NOTES
ANTICOAGULATION MANAGEMENT     Ijeoma Shah 73 year old female is on warfarin with supratherapeutic INR result. (Goal INR 2.0-3.0)    Recent labs: (last 7 days)     03/04/24  1220   INR 3.2*       ASSESSMENT     Source(s): Chart Review and Patient/Caregiver Call     Warfarin doses taken: Warfarin taken as instructed  Diet: No new diet changes identified  Medication/supplement changes: None noted  New illness, injury, or hospitalization: Yes: Cold. Runny nose.   Signs or symptoms of bleeding or clotting: No  Previous result: Therapeutic last 2(+) visits  Additional findings: None       PLAN     Recommended plan for temporary change(s) affecting INR     Dosing Instructions: partial hold then continue your current warfarin dose with next INR in 2 weeks       Summary  As of 3/4/2024      Full warfarin instructions:  3/5: 5 mg; Otherwise 10 mg every Mon, Fri; 7.5 mg all other days   Next INR check:  3/18/2024               Telephone call with Salima who verbalizes understanding and agrees to plan    Contact [phone]  to schedule and with any changes, questions or concerns.     Education provided:   Contact [phone]  with any changes, questions or concerns.     Plan made per [facility] anticoagulation protocol    Ting CARLOS RN  Anticoagulation Clinic  3/4/2024    _______________________________________________________________________     Anticoagulation Episode Summary       Current INR goal:  2.0-3.0   TTR:  70.6% (9.7 mo)   Target end date:  Indefinite   Send INR reminders to:  [facility]    Indications    Paroxysmal atrial fibrillation (H) [I48.0]  Atrial fibrillation with rapid ventricular response (H) [I48.91]             Comments:  RAJESH garibay             Anticoagulation Care Providers       Provider Role Specialty Phone number    Warren Pisano MD Referring Cardiovascular Disease [phone]    Jyoti Espinal MD Referring Family Medicine [phone]

## 2024-03-18 ENCOUNTER — ANTICOAGULATION THERAPY VISIT (OUTPATIENT)
Dept: ANTICOAGULATION | Facility: CLINIC | Age: 74
End: 2024-03-18

## 2024-03-18 ENCOUNTER — LAB (OUTPATIENT)
Dept: LAB | Facility: CLINIC | Age: 74
End: 2024-03-18
Payer: MEDICARE

## 2024-03-18 DIAGNOSIS — I48.0 PAROXYSMAL ATRIAL FIBRILLATION (H): ICD-10-CM

## 2024-03-18 DIAGNOSIS — I48.91 ATRIAL FIBRILLATION WITH RAPID VENTRICULAR RESPONSE (H): ICD-10-CM

## 2024-03-18 DIAGNOSIS — I48.0 PAROXYSMAL ATRIAL FIBRILLATION (H): Primary | ICD-10-CM

## 2024-03-18 DIAGNOSIS — Z79.01 LONG TERM CURRENT USE OF ANTICOAGULANT THERAPY: ICD-10-CM

## 2024-03-18 LAB — INR BLD: 2 (ref 0.9–1.1)

## 2024-03-18 PROCEDURE — 85610 PROTHROMBIN TIME: CPT

## 2024-03-18 PROCEDURE — 36416 COLLJ CAPILLARY BLOOD SPEC: CPT

## 2024-03-18 NOTE — PROGRESS NOTES
ANTICOAGULATION MANAGEMENT     Ijeoma Shah 73 year old female is on warfarin with therapeutic INR result. (Goal INR 2.0-3.0)    Recent labs: (last 7 days)     03/18/24  1258   INR 2.0*       ASSESSMENT     Source(s): Chart Review and Patient/Caregiver Call     Warfarin doses taken: Warfarin taken as instructed  Diet: Increased greens/vitamin K in diet; plans to resume previous intake  Medication/supplement changes: None noted  New illness, injury, or hospitalization: No  Signs or symptoms of bleeding or clotting: No  Previous result: Supratherapeutic  Additional findings: None       PLAN     Recommended plan for no diet, medication or health factor changes affecting INR     Dosing Instructions: Continue your current warfarin dose with next INR in 3 weeks       Summary  As of 3/18/2024      Full warfarin instructions:  10 mg every Mon, Fri; 7.5 mg all other days   Next INR check:  4/8/2024               Telephone call with Salima who verbalizes understanding and agrees to plan    Patient elected to schedule next visit in 4 weeks    Education provided:   Dietary considerations: importance of consistent vitamin K intake, impact of vitamin K foods on INR, vitamin K content of foods, Impact of protein intake on INR , and importance of notifying [facility] to changes in diet  Importance of notifying anticoagulation clinic for: changes in medications; a sooner lab recheck maybe needed and diarrhea, nausea/vomiting, reduced intake, cold/flu, and/or infections; a sooner lab recheck maybe needed  Contact [phone]  with any changes, questions or concerns.     Plan made per [facility] anticoagulation protocol    Yoselin Lockett, RN  Anticoagulation Clinic  3/18/2024    _______________________________________________________________________     Anticoagulation Episode Summary       Current INR goal:  2.0-3.0   TTR:  71.1% (10.1 mo)   Target end date:  Indefinite   Send INR reminders to:  [facility]    Indications     Paroxysmal atrial fibrillation (H) [I48.0]  Atrial fibrillation with rapid ventricular response (H) [I48.91]             Comments:  DVM okay             Anticoagulation Care Providers       Provider Role Specialty Phone number    Warren Pisano MD Referring Cardiovascular Disease [phone]    Jyoti Espinal MD Referring Family Medicine [phone]

## 2024-04-01 NOTE — DISCHARGE INSTRUCTIONS
[facility] Endoscopic Ultrasound with Lymph node Fine needle aspiration, along with Monitored Anesthesia Care by Dr Hogan  For 24 hours after your procedure  Sedation:  1. Get plenty of rest. A responsible adult must stay with you for at least 24 hours after you leave the hospital.   2. Do not drive or use heavy equipment. If you have weakness or tingling, don't drive or use heavy equipment until this feeling goes away.  3. Do not drink alcohol.  4. Avoid strenuous or risky activities. Ask for help when climbing stairs.   5. You may feel lightheaded. IF so, sit for a few minutes before standing. Have someone help you get up.   6. If you have nausea (feel sick to your stomach): Drink only clear liquids such as apple juice, ginger ale, broth or 7-Up. Rest may also help. Be sure to drink enough fluids. Move to a regular diet as you feel able.  7. You may have a slight fever. Call the doctor if your fever is over 100 F (37.7 C) (taken under the tongue) or lasts longer than 24 hours.  8. You may have a dry mouth, a sore throat, muscle aches or trouble sleeping. These should go away after 24 hours.  9. Do not make important or legal decisions.   Procedural:  1. Start with sips of water. When your gag reflex has returned, you may return to your normal diet, medicines, and light exercise.  2. Some bloating is normal. You may have large burps or pass air.  3. You may have a sore throat for 2 to 3 days. If so, it may help to:    Use sore throat lozenges.    Gargle as need with salt water up to 4 times a day. Mix 1 cup of warm water with 1 teaspoon of salt. Do not swallow.  4. You may take Tylenol (acetaminophen) for pain unless your doctor has told you not to.     Call right away if you have:  1. Unusual pain in belly or chest pain not relieved with passing air.  2. Severe throat pain or trouble swallowing.  3. Black stools (tar-like looking bowel movement).  Signs of infection (fever).  Follow-up:  __X__ We took small tissue  [FreeTextEntry1] : 63-year-old male with evidence of mild sleep disordered breathing on home oximetry testing.  He has a history of comorbid glaucoma which triggered the assessment for possible sleep disordered breathing.  He has minimal sleep apnea related symptomatology with ESS=2.  Nevertheless the home oximetry demonstrated an FARHAD in the mild to moderate range which is suggestive of possible sleep disordered breathing.  The type of home technology utilized for this test may be less accurate in the more mild range of sleep disordered breathing.  Because of his medical comorbidities is important to accurately assess for sleep disordered breathing.  A more robust HST utilizing nasal cannula, respiratory effort oximetry and plethysmography will be ordered via the [facility] sleep disorder center for further assessment.  The ramifications of obstructive sleep apnea and its potential therapeutic modalities were discussed with the patient.  samples. Your doctor will send you the results within two weeks.  If you have severe pain, bleeding, vomit blood, or shortness of breath, go to an emergency room.  If you have questions, call:  Endoscopy: Monday to Friday, 7 a.m. to 4:30 p.m. [phone] (We may have to call you back)  After hours: Hospital  [phone] (Ask for the GI fellow on call)       H Plasty Text: Given the location of the defect, shape of the defect and the proximity to free margins a H-plasty was deemed most appropriate for repair.  Using a sterile surgical marker, the appropriate advancement arms of the H-plasty were drawn incorporating the defect and placing the expected incisions within the relaxed skin tension lines where possible. The area thus outlined was incised deep to adipose tissue with a #15 scalpel blade. The skin margins were undermined to an appropriate distance in all directions utilizing iris scissors.  The opposing advancement arms were then advanced into place in opposite direction and anchored with interrupted buried subcutaneous sutures.

## 2024-04-16 ENCOUNTER — ANTICOAGULATION THERAPY VISIT (OUTPATIENT)
Dept: ANTICOAGULATION | Facility: CLINIC | Age: 74
End: 2024-04-16

## 2024-04-16 ENCOUNTER — LAB (OUTPATIENT)
Dept: LAB | Facility: CLINIC | Age: 74
End: 2024-04-16
Payer: MEDICARE

## 2024-04-16 DIAGNOSIS — I48.91 ATRIAL FIBRILLATION WITH RAPID VENTRICULAR RESPONSE (H): ICD-10-CM

## 2024-04-16 DIAGNOSIS — I48.0 PAROXYSMAL ATRIAL FIBRILLATION (H): ICD-10-CM

## 2024-04-16 DIAGNOSIS — I48.0 PAROXYSMAL ATRIAL FIBRILLATION (H): Primary | ICD-10-CM

## 2024-04-16 DIAGNOSIS — Z79.01 LONG TERM CURRENT USE OF ANTICOAGULANT THERAPY: ICD-10-CM

## 2024-04-16 LAB — INR BLD: 3.6 (ref 0.9–1.1)

## 2024-04-16 PROCEDURE — 36416 COLLJ CAPILLARY BLOOD SPEC: CPT

## 2024-04-16 PROCEDURE — 85610 PROTHROMBIN TIME: CPT

## 2024-04-16 NOTE — PROGRESS NOTES
ANTICOAGULATION MANAGEMENT     Ijeoma Shah 73 year old female is on warfarin with supratherapeutic INR result. (Goal INR 2.0-3.0)    Recent labs: (last 7 days)     04/16/24  1321   INR 3.6*       ASSESSMENT     Source(s): Chart Review and Patient/Caregiver Call     Warfarin doses taken: Warfarin taken as instructed  Diet: Decreased greens/vitamin K in diet; plans to resume previous intake  Medication/supplement changes: None noted  New illness, injury, or hospitalization: No  Signs or symptoms of bleeding or clotting: No  Previous result: Therapeutic last 2(+) visits  Additional findings: None       PLAN     Recommended plan for temporary change(s) affecting INR     Dosing Instructions: hold dose then continue your current warfarin dose with next INR in 2 weeks       Summary  As of 4/16/2024      Full warfarin instructions:  4/17: Hold; Otherwise 10 mg every Mon, Fri; 7.5 mg all other days   Next INR check:  4/30/2024               Telephone call with Salima who verbalizes understanding and agrees to plan    Lab visit scheduled    Education provided:   Please call back if any changes to your diet, medications or how you've been taking warfarin    Plan made per [facility] anticoagulation protocol    Yoshi Jay RN  Anticoagulation Clinic  4/16/2024    _______________________________________________________________________     Anticoagulation Episode Summary       Current INR goal:  2.0-3.0   TTR:  70.4% (11.1 mo)   Target end date:  Indefinite   Send INR reminders to:  [facility]    Indications    Paroxysmal atrial fibrillation (H) [I48.0]  Atrial fibrillation with rapid ventricular response (H) [I48.91]             Comments:  [place]QUILLEM [place]             Anticoagulation Care Providers       Provider Role Specialty Phone number    Warren Pisano MD Referring Cardiovascular Disease [phone]    Jyoti Espinal MD Referring Family Medicine [phone]

## 2024-04-29 ENCOUNTER — MYC MEDICAL ADVICE (OUTPATIENT)
Dept: CARDIOLOGY | Facility: CLINIC | Age: 74
End: 2024-04-29
Payer: MEDICARE

## 2024-04-29 NOTE — TELEPHONE ENCOUNTER
Per HELGA with  Pt. To be seen back in 1 year. Will update pt. With velma message.     ASSESSMENT: I am pleased the patient remains symptomatic since last seen.  Because she was uncertain as to whether she wishes to switch from rivaroxaban to warfarin, I have provided prescriptions for both drugs and she will notify us if she plans to stop rivaroxaban and begin warfarin.  The patient plans to call back our clinic if she elects to start warfarin and will be enrolled in the warfarin clinic.       RECOMMENDATIONS:   1) await patient decision in regards to anticoagulation.  Have provided prescriptions for both and will enroll in warfarin clinic if the patient elects warfari  2) follow-up in about 1 year    Britt Bae on 4/29/2024 at 9:37 AM

## 2024-04-30 ENCOUNTER — TELEPHONE (OUTPATIENT)
Dept: FAMILY MEDICINE | Facility: CLINIC | Age: 74
End: 2024-04-30

## 2024-04-30 ENCOUNTER — ANTICOAGULATION THERAPY VISIT (OUTPATIENT)
Dept: ANTICOAGULATION | Facility: CLINIC | Age: 74
End: 2024-04-30

## 2024-04-30 ENCOUNTER — LAB (OUTPATIENT)
Dept: LAB | Facility: CLINIC | Age: 74
End: 2024-04-30
Payer: MEDICARE

## 2024-04-30 DIAGNOSIS — I48.0 PAROXYSMAL ATRIAL FIBRILLATION (H): ICD-10-CM

## 2024-04-30 DIAGNOSIS — Z79.01 LONG TERM CURRENT USE OF ANTICOAGULANT THERAPY: ICD-10-CM

## 2024-04-30 DIAGNOSIS — I48.91 ATRIAL FIBRILLATION WITH RAPID VENTRICULAR RESPONSE (H): ICD-10-CM

## 2024-04-30 DIAGNOSIS — I48.0 PAROXYSMAL ATRIAL FIBRILLATION (H): Primary | ICD-10-CM

## 2024-04-30 LAB — INR BLD: 2.4 (ref 0.9–1.1)

## 2024-04-30 PROCEDURE — 36416 COLLJ CAPILLARY BLOOD SPEC: CPT

## 2024-04-30 PROCEDURE — 85610 PROTHROMBIN TIME: CPT

## 2024-04-30 NOTE — PROGRESS NOTES
ANTICOAGULATION MANAGEMENT     Ijeoma Shah 73 year old female is on warfarin with therapeutic INR result. (Goal INR 2.0-3.0)    Recent labs: (last 7 days)     04/30/24  1337   INR 2.4*       ASSESSMENT     Warfarin Lab Questionnaire    Warfarin Doses Last 7 Days      4/29/2024     7:40 AM   Dose in Tablet or Mg   TAB or MG? milligram (mg)     Pt Rptd Dose SUNDAY MONDAY TUESDAY WED THURS FRIDAY SATURDAY 4/29/2024   7:40 AM 7.5 10 7.5 7.5 7.5 10 7.5         4/29/2024   Warfarin Lab Questionnaire   Missed doses within past 14 days? No   Changes in diet or alcohol within past 14 days? No   Medication changes since last result? No   Injuries or illness since last result? No   New shortness of breath, severe headaches or sudden changes in vision since last result? No   Abnormal bleeding since last result? No   Upcoming surgery, procedure? No   Best number to call with results? [phone]     Previous result: Supratherapeutic  Additional findings: None       PLAN     Recommended plan for no diet, medication or health factor changes affecting INR     Dosing Instructions: Continue your current warfarin dose with next INR in 3 weeks       Summary  As of 4/30/2024      Full warfarin instructions:  10 mg every Mon, Fri; 7.5 mg all other days   Next INR check:  5/21/2024               Telephone call with Salima who verbalizes understanding and agrees to plan    Lab visit scheduled    Education provided:   Goal range and lab monitoring: goal range and significance of current result    Plan made per [facility] anticoagulation protocol    Becky Capellan RN  Anticoagulation Clinic  4/30/2024    _______________________________________________________________________     Anticoagulation Episode Summary       Current INR goal:  2.0-3.0   TTR:  69.6% (11.6 mo)   Target end date:  Indefinite   Send INR reminders to:  [facility]    Indications    Paroxysmal atrial fibrillation (H) [I48.0]  Atrial fibrillation with rapid  ventricular response (H) [I48.91]             Comments:  DVM okay             Anticoagulation Care Providers       Provider Role Specialty Phone number    Warren Pisano MD Referring Cardiovascular Disease [phone]    Jyoti Espinal MD Referring Family Medicine [phone]

## 2024-04-30 NOTE — TELEPHONE ENCOUNTER
ANTICOAGULATION CLINIC REFERRAL RENEWAL REQUEST       An annual renewal order is required for all patients referred to [facility].?  Please review and sign the pended referral order for Ijeoma Shah.       ANTICOAGULATION SUMMARY      Warfarin indication(s)   Atrial Fibrillation    Mechanical heart valve present?  NO       Current goal range   INR: 2.0-3.0     Goal appropriate for indication? Goal INR 2-3, standard for indication(s) above     Time in Therapeutic Range (TTR)  (Goal > 60%) 70.4%       Office visit with referring provider's group within last year yes on 1/22/24       Becky Capellan RN  [facility]

## 2024-05-02 ENCOUNTER — PATIENT OUTREACH (OUTPATIENT)
Dept: CARE COORDINATION | Facility: CLINIC | Age: 74
End: 2024-05-02
Payer: MEDICARE

## 2024-05-13 DIAGNOSIS — I48.0 PAROXYSMAL ATRIAL FIBRILLATION (H): ICD-10-CM

## 2024-05-13 RX ORDER — METOPROLOL SUCCINATE 25 MG/1
25 TABLET, EXTENDED RELEASE ORAL EVERY MORNING
Qty: 90 TABLET | Refills: 0 | Status: SHIPPED | OUTPATIENT
Start: 2024-05-13 | End: 2024-05-16

## 2024-05-13 NOTE — TELEPHONE ENCOUNTER
Last Written Prescription Date:  5/22/23  Last Fill Quantity: 90,  # refills: 3   Last office visit: 5/9/2023 With   Future Office Visit:  Per HELGA pt. To be seen back in 1 year. Not yet scheduled. Routing message to scheduling to please call and help pt. Set up annual follow up.    Britt Bae on 5/13/2024 at 8:06 AM

## 2024-05-14 ENCOUNTER — MYC MEDICAL ADVICE (OUTPATIENT)
Dept: FAMILY MEDICINE | Facility: CLINIC | Age: 74
End: 2024-05-14
Payer: MEDICARE

## 2024-05-14 ENCOUNTER — OFFICE VISIT (OUTPATIENT)
Dept: URGENT CARE | Facility: URGENT CARE | Age: 74
End: 2024-05-14
Payer: MEDICARE

## 2024-05-14 VITALS
HEART RATE: 62 BPM | DIASTOLIC BLOOD PRESSURE: 78 MMHG | RESPIRATION RATE: 16 BRPM | OXYGEN SATURATION: 97 % | SYSTOLIC BLOOD PRESSURE: 120 MMHG | TEMPERATURE: 98.6 F | WEIGHT: 170 LBS | BODY MASS INDEX: 29.64 KG/M2

## 2024-05-14 DIAGNOSIS — T63.481A LOCAL REACTION TO INSECT STING, ACCIDENTAL OR UNINTENTIONAL, INITIAL ENCOUNTER: Primary | ICD-10-CM

## 2024-05-14 PROCEDURE — 99213 OFFICE O/P EST LOW 20 MIN: CPT | Performed by: PHYSICIAN ASSISTANT

## 2024-05-14 RX ORDER — PREDNISONE 20 MG/1
40 TABLET ORAL DAILY
Qty: 10 TABLET | Refills: 0 | Status: SHIPPED | OUTPATIENT
Start: 2024-05-14 | End: 2024-05-19

## 2024-05-14 NOTE — PROGRESS NOTES
"  Assessment & Plan     Local reaction to insect sting, accidental or unintentional, initial encounter  Will treat with prednisone burst x 1-5 days. Continue with antihistamine. Avoid itching. Can apply cool compress as needed for itching. Return to clinic if symptoms worsen or do not improve; otherwise follow up as needed      - predniSONE (DELTASONE) 20 MG tablet; Take 2 tablets (40 mg) by mouth daily for 5 days            BMI  Estimated body mass index is 29.64 kg/m  as calculated from the following:    Height as of 1/22/24: 1.613 m (5' 3.5\").    Weight as of this encounter: 77.1 kg (170 lb).             Return in about 1 week (around 5/21/2024), or if symptoms worsen or fail to improve.              Subjective   Chief Complaint   Patient presents with    Trauma     Right hand swelling- Pt was stung by an insect on Saturday, not sure what insect but she was closing her curtain and saw something, tried to catch it with her hand and got stung, now swollen and itches.         HPI       Insect bite     Onset of symptoms was 3 day(s) ago.  Course of illness is same.    Severity moderate  Current and Associated symptoms: stung on right thumb now has hand swelling and itching   Treatment measures tried include Antihistamine.  Predisposing factors include allergic to bees.                      Objective    /78   Pulse 62   Temp 98.6  F (37  C) (Tympanic)   Resp 16   Wt 77.1 kg (170 lb)   LMP 01/22/2002   SpO2 97%   BMI 29.64 kg/m    Body mass index is 29.64 kg/m .      Physical Exam  Constitutional:       General: She is not in acute distress.  Skin:     Comments: Right hand has diffuse redness and swelling. No tenderness with palpation    Neurological:      Mental Status: She is alert.   Psychiatric:         Speech: Speech normal.         Behavior: Behavior normal.                    Signed Electronically by: Amee Camacho PA-C    "

## 2024-05-16 ENCOUNTER — OFFICE VISIT (OUTPATIENT)
Dept: CARDIOLOGY | Facility: CLINIC | Age: 74
End: 2024-05-16
Payer: MEDICARE

## 2024-05-16 VITALS
RESPIRATION RATE: 18 BRPM | HEART RATE: 98 BPM | WEIGHT: 168 LBS | BODY MASS INDEX: 28.68 KG/M2 | DIASTOLIC BLOOD PRESSURE: 68 MMHG | OXYGEN SATURATION: 98 % | SYSTOLIC BLOOD PRESSURE: 104 MMHG | HEIGHT: 64 IN

## 2024-05-16 DIAGNOSIS — R00.0 TACHYCARDIA: ICD-10-CM

## 2024-05-16 DIAGNOSIS — I48.0 PAROXYSMAL ATRIAL FIBRILLATION (H): Primary | ICD-10-CM

## 2024-05-16 DIAGNOSIS — R06.09 DYSPNEA ON EXERTION: ICD-10-CM

## 2024-05-16 DIAGNOSIS — I10 BENIGN ESSENTIAL HYPERTENSION: ICD-10-CM

## 2024-05-16 DIAGNOSIS — I48.0 PAROXYSMAL ATRIAL FIBRILLATION (H): ICD-10-CM

## 2024-05-16 PROCEDURE — 99214 OFFICE O/P EST MOD 30 MIN: CPT | Performed by: NURSE PRACTITIONER

## 2024-05-16 PROCEDURE — 93000 ELECTROCARDIOGRAM COMPLETE: CPT

## 2024-05-16 RX ORDER — METOPROLOL SUCCINATE 25 MG/1
25 TABLET, EXTENDED RELEASE ORAL 2 TIMES DAILY
Qty: 180 TABLET | Refills: 3 | Status: SHIPPED | OUTPATIENT
Start: 2024-05-16 | End: 2024-06-12

## 2024-05-16 ASSESSMENT — PAIN SCALES - GENERAL: PAINLEVEL: NO PAIN (0)

## 2024-05-16 NOTE — PATIENT INSTRUCTIONS
Call my nurse with any questions or concerns:  [phone]  *If you have concerns after hours, please call [phone], option 2 to speak with on call Cardiologist.    1. Medication changes from today:    Stop lisinopril  Increase Metoprolol 25 mg twice daily  ZioPatch to be hooked up on 6/12    Pulse should be less than 100 bpm. If consistently greater please call us

## 2024-05-16 NOTE — LETTER
5/16/2024    [facility]  [address] [place] [id number]    RE: Ijeoma Shah       Dear Colleague,     I had the pleasure of seeing Ijeoma Shah in the [facility].  Cardiology Clinic Progress Note  Ijeoma Shah MRN# [id number]   YOB: 1950 Age: 73 year old       HPI:    Ijeoma Shah is a delightful 73 year old patient with past medical history significant for:    Paroxysmal atrial fibrillation on warfarin and metoprolol for rate control.  Diagnosed in 2020 initially placed on amiodarone following her lobectomy.  Amiodarone was continued through her chemotherapy and ultimately discontinued when chemotherapy ended.  Hypertension  Dyslipidemia  Squamous cell carcinoma of the lung  Previous left lower lobectomy and mediastinal lymph node dissection brain MRA negative.  Received cisplatin and gemcitabine x 4 2/2021.  Followed by oncology.    It is my pleasure meeting Salima at today's visit.  She is an established patient of Dr. Pisano.  She is here today for her annual follow-up.  She reports that she has noticed increased dyspnea, but is uncertain if it is related to her lungs or her heart.  She has family members with known history of coronary disease.  She does no formal type of exercise, but enjoys working in the yard and garden.    Denies chest pain, lightheadedness, dizziness, lower extremity edema.  Her dyspnea is mostly with exertion.  She also has some fatigue.  EKG today confirms patient is in A-fib with RVR.  Unaware of her rhythm.    Zio patch was completed 5/2022 which showed paroxysmal atrial fibrillation with rates up to 281 bpm.  She was seen in consultation by Dr. Arrieta from [facility] and was started on beta-blocker and Xarelto.  She was briefly on amiodarone back in 2020    Diagnotic studies:  EKG today: Atrial fibrillation with ventricular rate 118-124 bpm  Echo 4/2022: EF 65 to 70%.  Grade 1  diastolic dysfunction.  Normal atrial size.  No significant valvular abnormality.       Plan:   Paroxysmal atrial fibrillation now appears persistent  I have increased her metoprolol ER to 25 mg twice daily.  Stop lisinopril.  Patient does have a pulse oximetry I asked that she spot check her pulse rate every day and if consistently greater than 100 bpm to contact me.  I would then like to add diltiazem.    Rate control seems to be the best answer since she is unaware of her arrhythmia at this time.    I have ordered a Zio patch to be placed on 6/12 when she is here for chest CT.  We will also do an echocardiogram to assure LV function is stable.    She will continue warfarin therapy for anticoagulation.  [id number] score 3 (gender, HTN, age)    2. HTN stable  I stopped lisinopril to afford increasing metoprolol for better rate control.    3.  Dyspnea  Most likely multifactorial in the setting of lung cancer and lobectomy as well as A-fib with RVR.  Encourage patient to try to start a walking program, but also needs to keep an eye on her pulse rate.    I would like to see her back in the fall, sooner with questions or concerns.  Thank you for including me in her care.    Britt Gardner, NP, APRN CNP      Today's clinic visit entailed:  Review of the result(s) of each unique test - EKG and echo   Ordering of each unique test  Prescription drug management  30 minutes spent by me on the date of the encounter doing chart review, review of test results, patient visit, and documentation   Provider  Link to [facility] Help Grid     The level of medical decision making during this visit was of moderate complexity.      No orders of the defined types were placed in this encounter.    No orders of the defined types were placed in this encounter.    There are no discontinued medications.      No diagnosis found.    CURRENT MEDICATIONS:  Current Outpatient Medications   Medication Sig Dispense Refill    ketoconazole (NIZORAL) 2 %  external cream Apply topically daily For rash on feet. 60 g 0    lisinopril (ZESTRIL) 10 MG tablet Take 1 tablet (10 mg) by mouth every morning 90 tablet 4    metoprolol succinate ER (TOPROL XL) 25 MG 24 hr tablet Take 1 tablet (25 mg) by mouth every morning 90 tablet 0    triamcinolone (KENALOG) 0.1 % external ointment Apply topically 2 times daily To heels as needed 80 g 1    warfarin ANTICOAGULANT (COUMADIN) 5 MG tablet Take 10 mg (2 tablets) Mon/Fri and 7.5 mg (1 1/2 tablets) all other days of the week unless directed other wise by  tablet 1    EPINEPHrine (ANY BX GENERIC EQUIV) 0.3 MG/0.3ML injection 2-pack Inject 0.3 mLs (0.3 mg) into the muscle as needed for anaphylaxis May repeat one time in 5-15 minutes if response to initial dose is inadequate. 2 each 1    predniSONE (DELTASONE) 20 MG tablet Take 2 tablets (40 mg) by mouth daily for 5 days (Patient not taking: Reported on 5/16/2024) 10 tablet 0       ALLERGIES     Allergies   Allergen Reactions    Bee Venom Hives     Hot and sweating        PAST MEDICAL HISTORY:  Past Medical History:   Diagnosis Date    Arrhythmia     A-Fib    Arthritis     Benign essential hypertension     Calculus of gallbladder without cholecystitis without obstruction     COPD (chronic obstructive pulmonary disease) (H)     Lung cancer (H)     Simple chronic bronchitis (H)        PAST SURGICAL HISTORY:  Past Surgical History:   Procedure Laterality Date    ARTHROEREISIS, SUBTALAR      BRONCHOSCOPY RIGID OR FLEXIBLE W/TRANSENDOSCOPIC ENDOBRONCHIAL ULTRASOUND GUIDED N/A 07/27/2020    Procedure: Flexible bronchoscopy, endobronchial ultrasound with transbronchial biopsies;  Surgeon: Edin Castro MD;  Location: [facility] OR    CATARACT IOL, RT/LT Bilateral     COLONOSCOPY  06/29/2004    colonoscopy to the cecum    COLONOSCOPY N/A 1/25/2023    Procedure: COLONOSCOPY, WITH POLYPECTOMY;  Surgeon: Warren Soto MD;  Location:  GI    ESOPHAGOSCOPY, GASTROSCOPY, DUODENOSCOPY  (EGD), COMBINED N/A 09/01/2020    Procedure: ESOPHAGOGASTRODUODENOSCOPY, WITH FINE NEEDLE ASPIRATION BIOPSY, WITH ENDOSCOPIC ULTRASOUND GUIDANCE;  Surgeon: Barber Hogan MD;  Location: [facility] GI    LAPAROSCOPIC CHOLECYSTECTOMY N/A 5/20/2021    Procedure: CHOLECYSTECTOMY, LAPAROSCOPIC;  Surgeon: Gavin Castillo MD;  Location: [facility] OR    PICC TRIPLE LUMEN PLACEMENT Right 10/23/2020    5Fr - 36cm, Medial brachial vein    SURGICAL HISTORY OF -       nose surgery    THORACOSCOPIC RESECTION LUNG Left 10/22/2020    Procedure: Left thoracoscopic lobectomy converted to Left Thoracotomy, Medistinal lymph node dissection, Pulmonary Artery repair, flexible bronchoscopy, on-pump oxygenator on standy-by;  Surgeon: Andrea Sanabria MD;  Location: [facility] OR    THORACOTOMY N/A 10/22/2020    Procedure: Thoracotomy;  Surgeon: Andrea Sanabria MD;  Location: [facility] OR    TRANSCERVICAL EXTENDED MEDIASTINAL LYMPHADENECTOMY N/A 10/22/2020    Procedure: Transcervical extended mediastinal lymphadenectomy;  Surgeon: Andrea Sanabria MD;  Location: [facility] OR    TUBAL LIGATION      bilateral tubal ligation.  BTL       FAMILY HISTORY:  Family History   Problem Relation Age of Onset    Glaucoma Mother     LUNG DISEASE Mother     Melanoma Father     Down Syndrome Brother     Cancer Sister         bile duct    Coronary Artery Disease Brother     CABG Brother     No Known Problems Brother     No Known Problems Brother     No Known Problems Sister     Lung Cancer Sister         lung    No Known Problems Sister     No Known Problems Sister        SOCIAL HISTORY:  Social History     Socioeconomic History    Marital status:      Spouse name: None    Number of children: 1    Years of education: None    Highest education level: None   Occupational History    Occupation: seasonal summer work only   Tobacco Use    Smoking status: Former     Current packs/day: 0.00     Average packs/day: 1 pack/day for 33.0 years (33.0 ttl pk-yrs)     Types: Cigarettes      Start date: 1981     Quit date: 2014     Years since quittin.4     Passive exposure: Current (ocassional per pt)    Smokeless tobacco: Never   Vaping Use    Vaping status: Never Used   Substance and Sexual Activity    Alcohol use: Yes     Alcohol/week: 1.7 - 2.5 standard drinks of alcohol     Types: 2 - 3 Standard drinks or equivalent per week     Comment: Beer once in a while    Drug use: No    Sexual activity: Yes     Partners: Male   Other Topics Concern    Parent/sibling w/ CABG, MI or angioplasty before 65F 55M? No     Social Determinants of Health     Financial Resource Strain: Low Risk  (2024)    Financial Resource Strain     Within the past 12 months, have you or your family members you live with been unable to get utilities (heat, electricity) when it was really needed?: No   Food Insecurity: Low Risk  (2024)    Food Insecurity     Within the past 12 months, did you worry that your food would run out before you got money to buy more?: No     Within the past 12 months, did the food you bought just not last and you didn t have money to get more?: No   Transportation Needs: Low Risk  (2024)    Transportation Needs     Within the past 12 months, has lack of transportation kept you from medical appointments, getting your medicines, non-medical meetings or appointments, work, or from getting things that you need?: No   Interpersonal Safety: Low Risk  (2024)    Interpersonal Safety     Do you feel physically and emotionally safe where you currently live?: Yes     Within the past 12 months, have you been hit, slapped, kicked or otherwise physically hurt by someone?: No     Within the past 12 months, have you been humiliated or emotionally abused in other ways by your partner or ex-partner?: No   Housing Stability: Low Risk  (2024)    Housing Stability     Do you have housing? : Yes     Are you worried about losing your housing?: No       Review of Systems:  Skin:       "  Eyes:       ENT:       Respiratory:  Positive for dyspnea on exertion;cough  Cardiovascular:  Negative;chest pain;edema;lightheadedness;dizziness;syncope or near-syncope Positive for;palpitations  Gastroenterology:      Genitourinary:       Musculoskeletal:       Neurologic:  Positive for numbness or tingling of feet  Psychiatric:       Heme/Lymph/Imm:  Positive for allergies  Endocrine:         Physical Exam:    Vitals: /68 (BP Location: Right arm, Patient Position: Sitting, Cuff Size: Adult Regular)   Pulse 98   Resp 18   Ht 1.613 m (5' 3.5\")   Wt 76.2 kg (168 lb)   LMP 01/22/2002   SpO2 98%   BMI 29.29 kg/m    Constitutional: Well nourished and in no apparent distress.  Eyes: Pupils equal, round. Sclerae anicteric.   HEENT: Normocephalic, atraumatic.   Neck: Supple. No bruit or JVD   Respiratory: Breathing non-labored. Lungs clear to auscultation, diminished in left lobe  Cardiovascular: Irregular irregular S1-S2.  No murmur  Skin: Warm, dry. No rashes, cyanosis, or xanthelasma.  Extremities: No edema.  Neurologic: No gross motor deficits. Alert, awake, and oriented to person, place and time.  Psychiatric: Affect appropriate.        Recent Lab Results:  LIPID RESULTS:  Lab Results   Component Value Date    CHOL 251 (H) 03/08/2022    CHOL 238 (H) 06/23/2020    HDL 76 03/08/2022    HDL 68 06/23/2020     (H) 03/08/2022     (H) 06/23/2020    TRIG 115 03/08/2022    TRIG 117 06/23/2020    CHOLHDLRATIO 3.9 07/13/2015       LIVER ENZYME RESULTS:  Lab Results   Component Value Date    AST 22 01/08/2024    AST 15 04/12/2021    ALT 13 01/08/2024    ALT 23 04/12/2021       CBC RESULTS:  Lab Results   Component Value Date    WBC 7.6 01/08/2024    WBC 7.4 04/12/2021    RBC 5.05 01/08/2024    RBC 3.49 (L) 04/12/2021    HGB 15.4 01/08/2024    HGB 13.1 05/20/2021    HCT 44.3 01/08/2024    HCT 32.8 (L) 04/12/2021    MCV 88 01/08/2024    MCV 94 04/12/2021    MCH 30.5 01/08/2024    MCH 31.5 04/12/2021    " MCHC 34.8 01/08/2024    MCHC 33.5 04/12/2021    RDW 12.5 01/08/2024    RDW 13.8 04/12/2021     01/08/2024     04/12/2021       BMP RESULTS:  Lab Results   Component Value Date     (L) 01/08/2024     04/12/2021    POTASSIUM 4.3 01/08/2024    POTASSIUM 4.4 12/27/2022    POTASSIUM 4.2 05/20/2021    CHLORIDE 96 (L) 01/08/2024    CHLORIDE 101 12/27/2022    CHLORIDE 102 04/12/2021    CO2 29 01/08/2024    CO2 31 12/27/2022    CO2 28 04/12/2021    ANIONGAP 9 01/08/2024    ANIONGAP 5 12/27/2022    ANIONGAP 4 04/12/2021     (H) 01/08/2024     (H) 12/27/2022     (H) 04/12/2021    BUN 16.8 01/08/2024    BUN 18 12/27/2022    BUN 19 04/12/2021    CR 0.98 (H) 01/08/2024    CR 1.08 (H) 05/20/2021    GFRESTIMATED 61 01/08/2024    GFRESTIMATED 60 (L) 03/08/2022    GFRESTIMATED 52 (L) 05/20/2021    GFRESTBLACK 60 (L) 05/20/2021    SABINO 9.2 01/08/2024    SABINO 8.7 04/12/2021      A1C RESULTS:  Lab Results   Component Value Date    A1C 5.6 10/23/2020     INR RESULTS:  Lab Results   Component Value Date    INR 2.4 (H) 04/30/2024    INR 3.6 (H) 04/16/2024     CC  Referred Self,     Thank you for allowing me to participate in the care of your patient.      Sincerely,   Britt Gardner NP, APRN [facility]

## 2024-05-16 NOTE — PROGRESS NOTES
Cardiology Clinic Progress Note  Ijeoma Shah MRN# [id number]   YOB: 1950 Age: 73 year old       HPI:    Ijeoma Shah is a delightful 73 year old patient with past medical history significant for:    Paroxysmal atrial fibrillation on warfarin and metoprolol for rate control.  Diagnosed in 2020 initially placed on amiodarone following her lobectomy.  Amiodarone was continued through her chemotherapy and ultimately discontinued when chemotherapy ended.  Hypertension  Dyslipidemia  Squamous cell carcinoma of the lung  Previous left lower lobectomy and mediastinal lymph node dissection brain MRA negative.  Received cisplatin and gemcitabine x 4 2/2021.  Followed by oncology.    It is my pleasure meeting Salima at today's visit.  She is an established patient of Dr. Pisano.  She is here today for her annual follow-up.  She reports that she has noticed increased dyspnea, but is uncertain if it is related to her lungs or her heart.  She has family members with known history of coronary disease.  She does no formal type of exercise, but enjoys working in the yard and garden.    Denies chest pain, lightheadedness, dizziness, lower extremity edema.  Her dyspnea is mostly with exertion.  She also has some fatigue.  EKG today confirms patient is in A-fib with RVR.  Unaware of her rhythm.    Zio patch was completed 5/2022 which showed paroxysmal atrial fibrillation with rates up to 281 bpm.  She was seen in consultation by Dr. Arrieta from [facility] and was started on beta-blocker and Xarelto.  She was briefly on amiodarone back in 2020    Diagnotic studies:  EKG today: Atrial fibrillation with ventricular rate 118-124 bpm  Echo 4/2022: EF 65 to 70%.  Grade 1 diastolic dysfunction.  Normal atrial size.  No significant valvular abnormality.       Plan:   Paroxysmal atrial fibrillation now appears persistent  I have increased her metoprolol ER to 25 mg twice daily.  Stop lisinopril.  Patient does have a  pulse oximetry I asked that she spot check her pulse rate every day and if consistently greater than 100 bpm to contact me.  I would then like to add diltiazem.    Rate control seems to be the best answer since she is unaware of her arrhythmia at this time.    I have ordered a Zio patch to be placed on 6/12 when she is here for chest CT.  We will also do an echocardiogram to assure LV function is stable.    She will continue warfarin therapy for anticoagulation.  [id number] score 3 (gender, HTN, age)    2. HTN stable  I stopped lisinopril to afford increasing metoprolol for better rate control.    3.  Dyspnea  Most likely multifactorial in the setting of lung cancer and lobectomy as well as A-fib with RVR.  Encourage patient to try to start a walking program, but also needs to keep an eye on her pulse rate.    I would like to see her back in the fall, sooner with questions or concerns.  Thank you for including me in her care.    Britt Gardner, NP, APRN CNP      Today's clinic visit entailed:  Review of the result(s) of each unique test - EKG and echo   Ordering of each unique test  Prescription drug management  30 minutes spent by me on the date of the encounter doing chart review, review of test results, patient visit, and documentation   Provider  Link to [facility] Help Grid     The level of medical decision making during this visit was of moderate complexity.      No orders of the defined types were placed in this encounter.    No orders of the defined types were placed in this encounter.    There are no discontinued medications.      No diagnosis found.    CURRENT MEDICATIONS:  Current Outpatient Medications   Medication Sig Dispense Refill    ketoconazole (NIZORAL) 2 % external cream Apply topically daily For rash on feet. 60 g 0    lisinopril (ZESTRIL) 10 MG tablet Take 1 tablet (10 mg) by mouth every morning 90 tablet 4    metoprolol succinate ER (TOPROL XL) 25 MG 24 hr tablet Take 1 tablet (25 mg) by mouth every  morning 90 tablet 0    triamcinolone (KENALOG) 0.1 % external ointment Apply topically 2 times daily To heels as needed 80 g 1    warfarin ANTICOAGULANT (COUMADIN) 5 MG tablet Take 10 mg (2 tablets) Mon/Fri and 7.5 mg (1 1/2 tablets) all other days of the week unless directed other wise by  tablet 1    EPINEPHrine (ANY BX GENERIC EQUIV) 0.3 MG/0.3ML injection 2-pack Inject 0.3 mLs (0.3 mg) into the muscle as needed for anaphylaxis May repeat one time in 5-15 minutes if response to initial dose is inadequate. 2 each 1    predniSONE (DELTASONE) 20 MG tablet Take 2 tablets (40 mg) by mouth daily for 5 days (Patient not taking: Reported on 5/16/2024) 10 tablet 0       ALLERGIES     Allergies   Allergen Reactions    Bee Venom Hives     Hot and sweating        PAST MEDICAL HISTORY:  Past Medical History:   Diagnosis Date    Arrhythmia     A-Fib    Arthritis     Benign essential hypertension     Calculus of gallbladder without cholecystitis without obstruction     COPD (chronic obstructive pulmonary disease) (H)     Lung cancer (H)     Simple chronic bronchitis (H)        PAST SURGICAL HISTORY:  Past Surgical History:   Procedure Laterality Date    ARTHROEREISIS, SUBTALAR      BRONCHOSCOPY RIGID OR FLEXIBLE W/TRANSENDOSCOPIC ENDOBRONCHIAL ULTRASOUND GUIDED N/A 07/27/2020    Procedure: Flexible bronchoscopy, endobronchial ultrasound with transbronchial biopsies;  Surgeon: Edin Castro MD;  Location:  OR    CATARACT IOL, RT/LT Bilateral     COLONOSCOPY  06/29/2004    colonoscopy to the cecum    COLONOSCOPY N/A 1/25/2023    Procedure: COLONOSCOPY, WITH POLYPECTOMY;  Surgeon: Warren Soto MD;  Location:  GI    ESOPHAGOSCOPY, GASTROSCOPY, DUODENOSCOPY (EGD), COMBINED N/A 09/01/2020    Procedure: ESOPHAGOGASTRODUODENOSCOPY, WITH FINE NEEDLE ASPIRATION BIOPSY, WITH ENDOSCOPIC ULTRASOUND GUIDANCE;  Surgeon: Barber Hogan MD;  Location:  GI    LAPAROSCOPIC CHOLECYSTECTOMY N/A 5/20/2021     Procedure: CHOLECYSTECTOMY, LAPAROSCOPIC;  Surgeon: Gavin Castillo MD;  Location: [facility] OR    PICC TRIPLE LUMEN PLACEMENT Right 10/23/2020    5Fr - 36cm, Medial brachial vein    SURGICAL HISTORY OF -       nose surgery    THORACOSCOPIC RESECTION LUNG Left 10/22/2020    Procedure: Left thoracoscopic lobectomy converted to Left Thoracotomy, Medistinal lymph node dissection, Pulmonary Artery repair, flexible bronchoscopy, on-pump oxygenator on standy-by;  Surgeon: Andrea Sanabria MD;  Location: [facility] OR    THORACOTOMY N/A 10/22/2020    Procedure: Thoracotomy;  Surgeon: Andrea Sanabria MD;  Location: [facility] OR    TRANSCERVICAL EXTENDED MEDIASTINAL LYMPHADENECTOMY N/A 10/22/2020    Procedure: Transcervical extended mediastinal lymphadenectomy;  Surgeon: Andrea Sanabria MD;  Location: [facility] OR    TUBAL LIGATION      bilateral tubal ligation.  BTL       FAMILY HISTORY:  Family History   Problem Relation Age of Onset    Glaucoma Mother     LUNG DISEASE Mother     Melanoma Father     Down Syndrome Brother     Cancer Sister         bile duct    Coronary Artery Disease Brother     CABG Brother     No Known Problems Brother     No Known Problems Brother     No Known Problems Sister     Lung Cancer Sister         lung    No Known Problems Sister     No Known Problems Sister        SOCIAL HISTORY:  Social History     Socioeconomic History    Marital status:      Spouse name: None    Number of children: 1    Years of education: None    Highest education level: None   Occupational History    Occupation: seasonal summer work only   Tobacco Use    Smoking status: Former     Current packs/day: 0.00     Average packs/day: 1 pack/day for 33.0 years (33.0 ttl pk-yrs)     Types: Cigarettes     Start date: 1981     Quit date: 2014     Years since quittin.4     Passive exposure: Current (ocassional per pt)    Smokeless tobacco: Never   Vaping Use    Vaping status: Never Used   Substance and Sexual Activity    Alcohol use:  Yes     Alcohol/week: 1.7 - 2.5 standard drinks of alcohol     Types: 2 - 3 Standard drinks or equivalent per week     Comment: Beer once in a while    Drug use: No    Sexual activity: Yes     Partners: Male   Other Topics Concern    Parent/sibling w/ CABG, MI or angioplasty before 65F 55M? No     Social Determinants of Health     Financial Resource Strain: Low Risk  (1/19/2024)    Financial Resource Strain     Within the past 12 months, have you or your family members you live with been unable to get utilities (heat, electricity) when it was really needed?: No   Food Insecurity: Low Risk  (1/19/2024)    Food Insecurity     Within the past 12 months, did you worry that your food would run out before you got money to buy more?: No     Within the past 12 months, did the food you bought just not last and you didn t have money to get more?: No   Transportation Needs: Low Risk  (1/19/2024)    Transportation Needs     Within the past 12 months, has lack of transportation kept you from medical appointments, getting your medicines, non-medical meetings or appointments, work, or from getting things that you need?: No   Interpersonal Safety: Low Risk  (1/22/2024)    Interpersonal Safety     Do you feel physically and emotionally safe where you currently live?: Yes     Within the past 12 months, have you been hit, slapped, kicked or otherwise physically hurt by someone?: No     Within the past 12 months, have you been humiliated or emotionally abused in other ways by your partner or ex-partner?: No   Housing Stability: Low Risk  (1/19/2024)    Housing Stability     Do you have housing? : Yes     Are you worried about losing your housing?: No       Review of Systems:  Skin:        Eyes:       ENT:       Respiratory:  Positive for dyspnea on exertion;cough  Cardiovascular:  Negative;chest pain;edema;lightheadedness;dizziness;syncope or near-syncope Positive for;palpitations  Gastroenterology:      Genitourinary:      "  Musculoskeletal:       Neurologic:  Positive for numbness or tingling of feet  Psychiatric:       Heme/Lymph/Imm:  Positive for allergies  Endocrine:         Physical Exam:    Vitals: /68 (BP Location: Right arm, Patient Position: Sitting, Cuff Size: Adult Regular)   Pulse 98   Resp 18   Ht 1.613 m (5' 3.5\")   Wt 76.2 kg (168 lb)   LMP 01/22/2002   SpO2 98%   BMI 29.29 kg/m    Constitutional: Well nourished and in no apparent distress.  Eyes: Pupils equal, round. Sclerae anicteric.   HEENT: Normocephalic, atraumatic.   Neck: Supple. No bruit or JVD   Respiratory: Breathing non-labored. Lungs clear to auscultation, diminished in left lobe  Cardiovascular: Irregular irregular S1-S2.  No murmur  Skin: Warm, dry. No rashes, cyanosis, or xanthelasma.  Extremities: No edema.  Neurologic: No gross motor deficits. Alert, awake, and oriented to person, place and time.  Psychiatric: Affect appropriate.        Recent Lab Results:  LIPID RESULTS:  Lab Results   Component Value Date    CHOL 251 (H) 03/08/2022    CHOL 238 (H) 06/23/2020    HDL 76 03/08/2022    HDL 68 06/23/2020     (H) 03/08/2022     (H) 06/23/2020    TRIG 115 03/08/2022    TRIG 117 06/23/2020    CHOLHDLRATIO 3.9 07/13/2015       LIVER ENZYME RESULTS:  Lab Results   Component Value Date    AST 22 01/08/2024    AST 15 04/12/2021    ALT 13 01/08/2024    ALT 23 04/12/2021       CBC RESULTS:  Lab Results   Component Value Date    WBC 7.6 01/08/2024    WBC 7.4 04/12/2021    RBC 5.05 01/08/2024    RBC 3.49 (L) 04/12/2021    HGB 15.4 01/08/2024    HGB 13.1 05/20/2021    HCT 44.3 01/08/2024    HCT 32.8 (L) 04/12/2021    MCV 88 01/08/2024    MCV 94 04/12/2021    MCH 30.5 01/08/2024    MCH 31.5 04/12/2021    MCHC 34.8 01/08/2024    MCHC 33.5 04/12/2021    RDW 12.5 01/08/2024    RDW 13.8 04/12/2021     01/08/2024     04/12/2021       BMP RESULTS:  Lab Results   Component Value Date     (L) 01/08/2024     04/12/2021    " POTASSIUM 4.3 01/08/2024    POTASSIUM 4.4 12/27/2022    POTASSIUM 4.2 05/20/2021    CHLORIDE 96 (L) 01/08/2024    CHLORIDE 101 12/27/2022    CHLORIDE 102 04/12/2021    CO2 29 01/08/2024    CO2 31 12/27/2022    CO2 28 04/12/2021    ANIONGAP 9 01/08/2024    ANIONGAP 5 12/27/2022    ANIONGAP 4 04/12/2021     (H) 01/08/2024     (H) 12/27/2022     (H) 04/12/2021    BUN 16.8 01/08/2024    BUN 18 12/27/2022    BUN 19 04/12/2021    CR 0.98 (H) 01/08/2024    CR 1.08 (H) 05/20/2021    GFRESTIMATED 61 01/08/2024    GFRESTIMATED 60 (L) 03/08/2022    GFRESTIMATED 52 (L) 05/20/2021    GFRESTBLACK 60 (L) 05/20/2021    SABINO 9.2 01/08/2024    SABINO 8.7 04/12/2021        A1C RESULTS:  Lab Results   Component Value Date    A1C 5.6 10/23/2020       INR RESULTS:  Lab Results   Component Value Date    INR 2.4 (H) 04/30/2024    INR 3.6 (H) 04/16/2024           CC  Referred Self, MD  No address on file

## 2024-05-21 ENCOUNTER — ANTICOAGULATION THERAPY VISIT (OUTPATIENT)
Dept: ANTICOAGULATION | Facility: CLINIC | Age: 74
End: 2024-05-21

## 2024-05-21 ENCOUNTER — LAB (OUTPATIENT)
Dept: LAB | Facility: CLINIC | Age: 74
End: 2024-05-21
Payer: MEDICARE

## 2024-05-21 DIAGNOSIS — I48.91 ATRIAL FIBRILLATION WITH RAPID VENTRICULAR RESPONSE (H): ICD-10-CM

## 2024-05-21 DIAGNOSIS — I48.0 PAROXYSMAL ATRIAL FIBRILLATION (H): ICD-10-CM

## 2024-05-21 DIAGNOSIS — I48.0 PAROXYSMAL ATRIAL FIBRILLATION (H): Primary | ICD-10-CM

## 2024-05-21 LAB — INR BLD: 3 (ref 0.9–1.1)

## 2024-05-21 PROCEDURE — 36416 COLLJ CAPILLARY BLOOD SPEC: CPT

## 2024-05-21 PROCEDURE — 85610 PROTHROMBIN TIME: CPT

## 2024-05-21 NOTE — PROGRESS NOTES
ANTICOAGULATION MANAGEMENT     Ijeoma Shah 73 year old female is on warfarin with therapeutic INR result. (Goal INR 2.0-3.0)    Recent labs: (last 7 days)     05/21/24  1339   INR 3.0*       ASSESSMENT     Warfarin Lab Questionnaire    Warfarin Doses Last 7 Days    Pt Rptd Dose SUNDAY MONDAY TUESDAY WED THURS FRIDAY SATURDAY 5/20/2024   8:54 AM 7.5 10 7.5 7.5 7.5 10 7.5         5/20/2024   Warfarin Lab Questionnaire   Missed doses within past 14 days? No   Changes in diet or alcohol within past 14 days? No   Medication changes since last result? No   Injuries or illness since last result? No   New shortness of breath, severe headaches or sudden changes in vision since last result? Yes   If yes, please explain:  Hard to breathe sometimes. Is it allergies?   Abnormal bleeding since last result? Yes   Upcoming surgery, procedure? No     Previous result: Therapeutic last visit; previously outside of goal range  Additional findings:  Shortness of breathe at times, been going on months. Is being checked by cardiology       PLAN     Recommended plan for no diet, medication or health factor changes affecting INR     Dosing Instructions: Continue your current warfarin dose with next INR in 4 weeks       Summary  As of 5/21/2024      Full warfarin instructions:  10 mg every Mon, Fri; 7.5 mg all other days   Next INR check:  6/18/2024               Telephone call with Salima who verbalizes understanding and agrees to plan    Lab visit scheduled    Education provided:   Goal range and lab monitoring: goal range and significance of current result    Plan made per [facility] anticoagulation protocol    Becky Capellan RN  Anticoagulation Clinic  5/21/2024    _______________________________________________________________________     Anticoagulation Episode Summary       Current INR goal:  2.0-3.0   TTR:  71.1% (1 y)   Target end date:  Indefinite   Send INR reminders to:  [facility]    Indications    Paroxysmal  atrial fibrillation (H) [I48.0]  Atrial fibrillation with rapid ventricular response (H) [I48.91]             Comments:  DVM okay             Anticoagulation Care Providers       Provider Role Specialty Phone number    Warren Pisano MD Referring Cardiovascular Disease [phone]    Jyoti Espinal MD Referring Family Medicine [phone]

## 2024-06-12 ENCOUNTER — ALLIED HEALTH/NURSE VISIT (OUTPATIENT)
Dept: CARDIOLOGY | Facility: CLINIC | Age: 74
End: 2024-06-12
Attending: NURSE PRACTITIONER
Payer: MEDICARE

## 2024-06-12 ENCOUNTER — LAB (OUTPATIENT)
Dept: LAB | Facility: CLINIC | Age: 74
End: 2024-06-12
Payer: MEDICARE

## 2024-06-12 ENCOUNTER — HOSPITAL ENCOUNTER (OUTPATIENT)
Dept: CT IMAGING | Facility: CLINIC | Age: 74
Discharge: HOME | End: 2024-06-12
Attending: INTERNAL MEDICINE
Payer: MEDICARE

## 2024-06-12 ENCOUNTER — HOSPITAL ENCOUNTER (OUTPATIENT)
Dept: CARDIOLOGY | Facility: CLINIC | Age: 74
Discharge: HOME | End: 2024-06-12
Attending: NURSE PRACTITIONER
Payer: MEDICARE

## 2024-06-12 DIAGNOSIS — I48.0 PAROXYSMAL ATRIAL FIBRILLATION (H): ICD-10-CM

## 2024-06-12 DIAGNOSIS — C34.32 SQUAMOUS CELL CARCINOMA OF BRONCHUS IN LEFT LOWER LOBE (H): ICD-10-CM

## 2024-06-12 DIAGNOSIS — R06.09 DYSPNEA ON EXERTION: ICD-10-CM

## 2024-06-12 LAB
ALBUMIN SERPL BCG-MCNC: 4.3 G/DL (ref 3.5–5.2)
ALP SERPL-CCNC: 80 U/L (ref 40–150)
ALT SERPL W P-5'-P-CCNC: 33 U/L (ref 0–50)
ANION GAP SERPL CALCULATED.3IONS-SCNC: 11 MMOL/L (ref 7–15)
AST SERPL W P-5'-P-CCNC: 35 U/L (ref 0–45)
BASOPHILS # BLD AUTO: 0 10E3/UL (ref 0–0.2)
BASOPHILS NFR BLD AUTO: 0 %
BILIRUB SERPL-MCNC: 0.5 MG/DL
BUN SERPL-MCNC: 16.2 MG/DL (ref 8–23)
CALCIUM SERPL-MCNC: 9.5 MG/DL (ref 8.8–10.2)
CHLORIDE SERPL-SCNC: 96 MMOL/L (ref 98–107)
CREAT SERPL-MCNC: 1 MG/DL (ref 0.51–0.95)
DEPRECATED HCO3 PLAS-SCNC: 31 MMOL/L (ref 22–29)
EGFRCR SERPLBLD CKD-EPI 2021: 60 ML/MIN/1.73M2
EOSINOPHIL # BLD AUTO: 0.2 10E3/UL (ref 0–0.7)
EOSINOPHIL NFR BLD AUTO: 4 %
ERYTHROCYTE [DISTWIDTH] IN BLOOD BY AUTOMATED COUNT: 13.4 % (ref 10–15)
GLUCOSE SERPL-MCNC: 125 MG/DL (ref 70–99)
HCT VFR BLD AUTO: 44.5 % (ref 35–47)
HGB BLD-MCNC: 15.1 G/DL (ref 11.7–15.7)
IMM GRANULOCYTES # BLD: 0 10E3/UL
IMM GRANULOCYTES NFR BLD: 0 %
LVEF ECHO: NORMAL
LYMPHOCYTES # BLD AUTO: 1 10E3/UL (ref 0.8–5.3)
LYMPHOCYTES NFR BLD AUTO: 15 %
MCH RBC QN AUTO: 30.4 PG (ref 26.5–33)
MCHC RBC AUTO-ENTMCNC: 33.9 G/DL (ref 31.5–36.5)
MCV RBC AUTO: 90 FL (ref 78–100)
MONOCYTES # BLD AUTO: 0.3 10E3/UL (ref 0–1.3)
MONOCYTES NFR BLD AUTO: 5 %
NEUTROPHILS # BLD AUTO: 5.1 10E3/UL (ref 1.6–8.3)
NEUTROPHILS NFR BLD AUTO: 76 %
NRBC # BLD AUTO: 0 10E3/UL
NRBC BLD AUTO-RTO: 0 /100
PLATELET # BLD AUTO: 193 10E3/UL (ref 150–450)
POTASSIUM SERPL-SCNC: 4.2 MMOL/L (ref 3.4–5.3)
PROT SERPL-MCNC: 6.9 G/DL (ref 6.4–8.3)
RBC # BLD AUTO: 4.97 10E6/UL (ref 3.8–5.2)
SODIUM SERPL-SCNC: 138 MMOL/L (ref 135–145)
WBC # BLD AUTO: 6.6 10E3/UL (ref 4–11)

## 2024-06-12 PROCEDURE — 93306 TTE W/DOPPLER COMPLETE: CPT

## 2024-06-12 PROCEDURE — 71260 CT THORAX DX C+: CPT | Mod: MG

## 2024-06-12 PROCEDURE — 36415 COLL VENOUS BLD VENIPUNCTURE: CPT

## 2024-06-12 PROCEDURE — 250N000009 HC RX 250: Performed by: INTERNAL MEDICINE

## 2024-06-12 PROCEDURE — 250N000011 HC RX IP 250 OP 636: Performed by: INTERNAL MEDICINE

## 2024-06-12 PROCEDURE — 85025 COMPLETE CBC W/AUTO DIFF WBC: CPT

## 2024-06-12 PROCEDURE — 93306 TTE W/DOPPLER COMPLETE: CPT | Mod: 26 | Performed by: INTERNAL MEDICINE

## 2024-06-12 PROCEDURE — 93242 EXT ECG>48HR<7D RECORDING: CPT

## 2024-06-12 PROCEDURE — 80053 COMPREHEN METABOLIC PANEL: CPT

## 2024-06-12 RX ORDER — IOPAMIDOL 755 MG/ML
500 INJECTION, SOLUTION INTRAVASCULAR ONCE
Status: COMPLETED | OUTPATIENT
Start: 2024-06-12 | End: 2024-06-12

## 2024-06-12 RX ADMIN — SODIUM CHLORIDE 60 ML: 9 INJECTION, SOLUTION INTRAVENOUS at 13:03

## 2024-06-12 RX ADMIN — IOPAMIDOL 80 ML: 755 INJECTION, SOLUTION INTRAVENOUS at 13:04

## 2024-06-12 NOTE — PROGRESS NOTES
Ijeoma Shah arrived here on 6/12/2024 2:01 PM for 3-7 Days  Zio monitor placement per ordering provider Britt Gardner for the diagnosis Atrial Fibrillation.  Supervising MD is Dr Minerva Delcid. Patient s skin was prepped per protocol.  Zio monitor was placed.  Instructions were reviewed with and given to the patient.  Patient verbalized understanding of wear, troubleshooting and monitor return instructions.

## 2024-06-13 ENCOUNTER — VIRTUAL VISIT (OUTPATIENT)
Dept: ONCOLOGY | Facility: CLINIC | Age: 74
End: 2024-06-13
Attending: NURSE PRACTITIONER
Payer: MEDICARE

## 2024-06-13 VITALS — BODY MASS INDEX: 31.54 KG/M2 | WEIGHT: 178 LBS | HEIGHT: 63 IN

## 2024-06-13 DIAGNOSIS — C34.32 SQUAMOUS CELL CARCINOMA OF BRONCHUS IN LEFT LOWER LOBE (H): Primary | ICD-10-CM

## 2024-06-13 DIAGNOSIS — J90 PLEURAL EFFUSION: ICD-10-CM

## 2024-06-13 PROCEDURE — G2211 COMPLEX E/M VISIT ADD ON: HCPCS | Mod: 95 | Performed by: NURSE PRACTITIONER

## 2024-06-13 PROCEDURE — 99215 OFFICE O/P EST HI 40 MIN: CPT | Mod: 95 | Performed by: NURSE PRACTITIONER

## 2024-06-13 ASSESSMENT — PAIN SCALES - GENERAL: PAINLEVEL: NO PAIN (0)

## 2024-06-13 NOTE — PROGRESS NOTES
Virtual Visit Details    Type of service:  Video Visit   Video Start Time:  1104  Video End Time: 1120    Originating Location (pt. Location): Home    Distant Location (provider location):  Off-site  Platform used for Video Visit: Steven      Reason for Visit: seen in follow-up of squamous cell carcinoma of the lung    Oncology HPI:       CANCER TYPE: SCC lung, poorly differentiated  STAGE: pT4N0 (IIIA)  ECOG PS: 1     PD-L1: TPS 15% on [id number] lobectomy, <1% on [id number] (LLL bx)  Lung panel: SMO R562Q on LLL bx, negative for fusions on lobectomy specimen.   NGS: N/A     SUMMARY  7/3/20                 CT chest (screening). 6.7 cm LLL mass, 0.6 cm RML nodule, mediastinal and L hilar LNs  7/9/20                PET/CT. 7.1 x 5.6 cm LLL mass (SUV 19.6), post obstructive pneumonitis LLL inferior to the mass (SUV 2.8), 3.5 x 1.7 cm 4L node (SUV 5.9), L adrenal nodule 1.1 cm (SUV 4), indeterminate pulmonary nodules, pancreatic cysts, not hypermetabolic  7/27/20              Bronch, EBUS (Dr. Castro). 10R, 11R, 4R too small to biopsy. Stations 7, 4L, 11L negative for malignancy. LLL mass bx: SCC  8/12/20              Brain MRI negative  9/1/20                EUS to evaluate adrenal and 4L (Dr. Hogan). Both negative for malignancy. Adrenal with bland adrenal cells  10/22/20            L VATS, converted to thoracotomy, LLL lobectomy, MLND, R pulmonary arterioplasty (Dr. Sanabria, Dr. Davis). 8.3 cm poorly differentiated SCC, +angiolymphatic invasion  12/3/20~2/25/21 Cisplatin gemcitabine x 4. C2D8 delayed 1 week due to neutropenia, added neulasta    Interval history:   Salima is seen on video today. Her  joins the visit as well. She has recently been experiencing more shortness of breath. Has followed with cardiology and is found to be in atrial fibrillation. She had an increase to metoprolol, but found it made her very tired. She is working with them on adjusting the time of med administration.  -has noted  "a cough that is more present when she lays down. She has a runny nose and some post-nasal drip.  She has used benadryl on occasion.  -no fevers/chills, no chest pain or palpitations.  -no headaches, vision changes  -no new bone aches/pains  -bowel/bladder function wnl  -appetite is good    Current Outpatient Medications   Medication Sig Dispense Refill    EPINEPHrine (ANY BX GENERIC EQUIV) 0.3 MG/0.3ML injection 2-pack Inject 0.3 mLs (0.3 mg) into the muscle as needed for anaphylaxis May repeat one time in 5-15 minutes if response to initial dose is inadequate. 2 each 1    ketoconazole (NIZORAL) 2 % external cream Apply topically daily For rash on feet. 60 g 0    metoprolol succinate ER (TOPROL XL) 25 MG 24 hr tablet Take 2 tablets (50 mg) by mouth every evening      triamcinolone (KENALOG) 0.1 % external ointment Apply topically 2 times daily To heels as needed 80 g 1    warfarin ANTICOAGULANT (COUMADIN) 5 MG tablet Take 10 mg (2 tablets) Mon/Fri and 7.5 mg (1 1/2 tablets) all other days of the week unless directed other wise by  tablet 1          Allergies   Allergen Reactions    Bee Venom Hives     Hot and sweating      Video physical exam  General: Patient appears well in no acute distress.   Skin: No visualized rash or lesions on visualized skin  Eyes: EOMI, no erythema, sclera icterus or discharge noted  Resp: Appears to be breathing comfortably without accessory muscle usage, speaking in full sentences, no cough  MSK: Appears to have normal range of motion based on visualized movements  Neurologic: No apparent tremors, facial movements symmetric  Psych: affect engaged, alert and oriented   Height 1.6 m (5' 3\"), weight 80.7 kg (178 lb), last menstrual period 01/22/2002, not currently breastfeeding.  Wt Readings from Last 4 Encounters:   06/13/24 80.7 kg (178 lb)   05/16/24 76.2 kg (168 lb)   05/14/24 77.1 kg (170 lb)   01/22/24 76.2 kg (168 lb)         Labs:    Latest Reference Range & Units 06/12/24 " 11:37   Sodium 135 - 145 mmol/L 138   Potassium 3.4 - 5.3 mmol/L 4.2   Chloride 98 - 107 mmol/L 96 (L)   Carbon Dioxide (CO2) 22 - 29 mmol/L 31 (H)   Urea Nitrogen 8.0 - 23.0 mg/dL 16.2   Creatinine 0.51 - 0.95 mg/dL 1.00 (H)   GFR Estimate >60 mL/min/1.73m2 60 (L)   Calcium 8.8 - 10.2 mg/dL 9.5   Anion Gap 7 - 15 mmol/L 11   Albumin 3.5 - 5.2 g/dL 4.3   Protein Total 6.4 - 8.3 g/dL 6.9   Alkaline Phosphatase 40 - 150 U/L 80   ALT 0 - 50 U/L 33   AST 0 - 45 U/L 35   Bilirubin Total <=1.2 mg/dL 0.5   Glucose 70 - 99 mg/dL 125 (H)   WBC 4.0 - 11.0 10e3/uL 6.6   Hemoglobin 11.7 - 15.7 g/dL 15.1   Hematocrit 35.0 - 47.0 % 44.5   Platelet Count 150 - 450 10e3/uL 193   RBC Count 3.80 - 5.20 10e6/uL 4.97   MCV 78 - 100 fL 90   MCH 26.5 - 33.0 pg 30.4   MCHC 31.5 - 36.5 g/dL 33.9   RDW 10.0 - 15.0 % 13.4   % Neutrophils % 76   % Lymphocytes % 15   % Monocytes % 5   % Eosinophils % 4   % Basophils % 0   Absolute Basophils 0.0 - 0.2 10e3/uL 0.0   Absolute Eosinophils 0.0 - 0.7 10e3/uL 0.2   Absolute Immature Granulocytes <=0.4 10e3/uL 0.0   Absolute Lymphocytes 0.8 - 5.3 10e3/uL 1.0   Absolute Monocytes 0.0 - 1.3 10e3/uL 0.3   % Immature Granulocytes % 0   Absolute Neutrophils 1.6 - 8.3 10e3/uL 5.1   Absolute NRBCs 10e3/uL 0.0   NRBCs per 100 WBC <1 /100 0   (L): Data is abnormally low  (H): Data is abnormally high    Imaging:   CT CHEST AND ABDOMEN WITH CONTRAST 6/12/2024 1:10 PM     CLINICAL HISTORY: Restage SCC, stage IIIA, status post lobectomy and  adjuvant chemo completed in 2021; Squamous cell carcinoma of bronchus  in left lower lobe (H).     TECHNIQUE: CT scan of the chest and abdomen was performed following  injection of IV contrast. Multiplanar reformats were obtained. Dose  reduction techniques were used.      CONTRAST: ISOVUE-370, 80 mL     COMPARISON: 1/8/2024, 10/3/2023, 4/24/2023     FINDINGS:   LUNGS AND PLEURA: Patient is status post left lower lobectomy. Small  volume left-sided pleural effusion, increased  to comparison. A few  subcentimeter pulmonary nodules are unchanged. For example, a  triangular-shaped 6 x 4 mm, mean diameter of 5 mm, pulmonary nodule  seen in the anterior right midlung adjacent to the fissure, suggestive  of a benign fissural lymph node. No new or enlarging pulmonary  nodules. Suspected atelectasis in the left lower lobe. No findings  specific for pneumonia. Large airways are patent.     MEDIASTINUM/AXILLAE: Heart size is upper limits of normal. No  pericardial fluid. Thoracic aorta is normal in course and caliber. A  few scattered prominent mildly enlarged mediastinal lymph nodes are  present with the largest in the distal right peritracheal/precarinal  region measuring 11 mm in short axis. Thoracic aorta is normal in  course and caliber. Mild thoracic aortic atherosclerosis. Mild  coronary artery atherosclerosis.     HEPATOBILIARY: Several simple appearing hepatic cysts are similar to  comparison and do not require follow-up. Gallbladder is absent. No  bile duct dilation.     PANCREAS: Similar-appearing small cysts in the head of the pancreas  measuring up to 8 mm (3-158), favoring IMPNs. Recommend attention on  follow-up. No inflammatory changes of the pancreas. No dilation of the  pancreatic duct.      SPLEEN: Normal.     ADRENAL GLANDS: Similar appearing low-density thickening of the left  adrenal gland without discrete nodule or mass. Right adrenal gland is  normal-appearing. No follow-up is necessary.     KIDNEYS: Normal.     BOWEL: Normal.     ADDITIONAL FINDINGS: At least moderate atherosclerosis of the  abdominal aorta and proximal common iliac arteries. No aneurysmal  dilation.     MUSCULOSKELETAL: Multilevel hypertrophic degenerative changes of the  spine. No acute osseous abnormality.                                                                      IMPRESSION:  1.  Status post left lower lobectomy.  2.  Small volume left-sided pleural effusion, increased to comparison.  3.   Unchanged subcentimeter pulmonary nodules. No new or enlarging  pulmonary nodules.  4.  Nonacute findings as above.     SOBEIDA PAUL MD        Impression/plan:     SCC lung, [id number], IIIA, R0 resection, discrepant PD-L1: About 3 1/2 years from completion of therapy.  - I reviewed the CT and lab work with her. While there is no convincing evidence of recurrence at this time, I'm a little concerned about the slight growth in the left pleural effusion.  We reviewed symptoms of effusions. At this time, I think her shortness of breath is more likely attributable to the a fib and the cough to seasonal allergies/post-nasal drainage.  -will follow-up with a chest xray in a month or sooner if symptoms are worsening  -otherwise, plan for CT CAP and labs in 5-6 months and follow-up with Dr. Haley Spaulding--following with cardiology ,wearing a ziopatch currently, adjusting metoprolol    Cough, post nasal drainage, may be related to seasonal allergies  -recommended switching to claritine or zyrtec, trial of flonase    Lab abnormality,  -mild rise in Cr and CO2  -will recheck next month     Microscopic hematuria, dysuria: Urine cytology and FISH negative in March-April, met with Dr. Leyva in Urology, discussed cystoscopy vs monitoring, Salima opted for monitoring. Dr. Velazco monitoring     Colon polyps: Colonoscopy Jan 2023.     The longitudinal plan of care for the diagnosis(es)/condition(s) as documented were addressed during this visit. Due to the added complexity in care, I will continue to support Salima in the subsequent management and with ongoing continuity of care.

## 2024-06-13 NOTE — NURSING NOTE
Is the patient currently in the state of [place]? YES    Visit mode:VIDEO    If the visit is dropped, the patient can be reconnected by: VIDEO VISIT: Send to e-mail at: [email or web address]    Will anyone else be joining the visit? NO  (If patient encounters technical issues they should call [phone]    How would you like to obtain your AVS? MyChart    Are changes needed to the allergy or medication list? No    Are refills needed on medications prescribed by this physician? NO    Reason for visit: TYRONE SANZ

## 2024-06-13 NOTE — LETTER
6/13/2024      Ijeoma CARLITA Pablo  [address] [facility] [address]      Dear Colleague,    Thank you for referring your patient, Ijeoma Shah, to the [facility]. Please see a copy of my visit note below.    Virtual Visit Details    Type of service:  Video Visit   Video Start Time:  1104  Video End Time: 1120    Originating Location (pt. Location): Home    Distant Location (provider location):  Off-site  Platform used for Video Visit: Steven      Reason for Visit: seen in follow-up of squamous cell carcinoma of the lung    Oncology HPI:       CANCER TYPE: SCC lung, poorly differentiated  STAGE: pT4N0 (IIIA)  ECOG PS: 1     PD-L1: TPS 15% on [id number] lobectomy, <1% on [id number] (LLL bx)  Lung panel: SMO R562Q on LLL bx, negative for fusions on lobectomy specimen.   NGS: N/A     SUMMARY  7/3/20                 CT chest (screening). 6.7 cm LLL mass, 0.6 cm RML nodule, mediastinal and L hilar LNs  7/9/20                PET/CT. 7.1 x 5.6 cm LLL mass (SUV 19.6), post obstructive pneumonitis LLL inferior to the mass (SUV 2.8), 3.5 x 1.7 cm 4L node (SUV 5.9), L adrenal nodule 1.1 cm (SUV 4), indeterminate pulmonary nodules, pancreatic cysts, not hypermetabolic  7/27/20              Bronch, EBUS (Dr. Castro). 10R, 11R, 4R too small to biopsy. Stations 7, 4L, 11L negative for malignancy. LLL mass bx: SCC  8/12/20              Brain MRI negative  9/1/20                EUS to evaluate adrenal and 4L (Dr. Hogan). Both negative for malignancy. Adrenal with bland adrenal cells  10/22/20            L VATS, converted to thoracotomy, LLL lobectomy, MLND, R pulmonary arterioplasty (Dr. Sanabria, Dr. Davis). 8.3 cm poorly differentiated SCC, +angiolymphatic invasion  12/3/20~2/25/21 Cisplatin gemcitabine x 4. C2D8 delayed 1 week due to neutropenia, added neulasta    Interval history:   Salima is seen on video today. Her  joins the visit as well. She has recently  "been experiencing more shortness of breath. Has followed with cardiology and is found to be in atrial fibrillation. She had an increase to metoprolol, but found it made her very tired. She is working with them on adjusting the time of med administration.  -has noted a cough that is more present when she lays down. She has a runny nose and some post-nasal drip.  She has used benadryl on occasion.  -no fevers/chills, no chest pain or palpitations.  -no headaches, vision changes  -no new bone aches/pains  -bowel/bladder function wnl  -appetite is good    Current Outpatient Medications   Medication Sig Dispense Refill    EPINEPHrine (ANY BX GENERIC EQUIV) 0.3 MG/0.3ML injection 2-pack Inject 0.3 mLs (0.3 mg) into the muscle as needed for anaphylaxis May repeat one time in 5-15 minutes if response to initial dose is inadequate. 2 each 1    ketoconazole (NIZORAL) 2 % external cream Apply topically daily For rash on feet. 60 g 0    metoprolol succinate ER (TOPROL XL) 25 MG 24 hr tablet Take 2 tablets (50 mg) by mouth every evening      triamcinolone (KENALOG) 0.1 % external ointment Apply topically 2 times daily To heels as needed 80 g 1    warfarin ANTICOAGULANT (COUMADIN) 5 MG tablet Take 10 mg (2 tablets) Mon/Fri and 7.5 mg (1 1/2 tablets) all other days of the week unless directed other wise by  tablet 1          Allergies   Allergen Reactions    Bee Venom Hives     Hot and sweating      Video physical exam  General: Patient appears well in no acute distress.   Skin: No visualized rash or lesions on visualized skin  Eyes: EOMI, no erythema, sclera icterus or discharge noted  Resp: Appears to be breathing comfortably without accessory muscle usage, speaking in full sentences, no cough  MSK: Appears to have normal range of motion based on visualized movements  Neurologic: No apparent tremors, facial movements symmetric  Psych: affect engaged, alert and oriented   Height 1.6 m (5' 3\"), weight 80.7 kg (178 lb), last " menstrual period 01/22/2002, not currently breastfeeding.  Wt Readings from Last 4 Encounters:   06/13/24 80.7 kg (178 lb)   05/16/24 76.2 kg (168 lb)   05/14/24 77.1 kg (170 lb)   01/22/24 76.2 kg (168 lb)         Labs:    Latest Reference Range & Units 06/12/24 11:37   Sodium 135 - 145 mmol/L 138   Potassium 3.4 - 5.3 mmol/L 4.2   Chloride 98 - 107 mmol/L 96 (L)   Carbon Dioxide (CO2) 22 - 29 mmol/L 31 (H)   Urea Nitrogen 8.0 - 23.0 mg/dL 16.2   Creatinine 0.51 - 0.95 mg/dL 1.00 (H)   GFR Estimate >60 mL/min/1.73m2 60 (L)   Calcium 8.8 - 10.2 mg/dL 9.5   Anion Gap 7 - 15 mmol/L 11   Albumin 3.5 - 5.2 g/dL 4.3   Protein Total 6.4 - 8.3 g/dL 6.9   Alkaline Phosphatase 40 - 150 U/L 80   ALT 0 - 50 U/L 33   AST 0 - 45 U/L 35   Bilirubin Total <=1.2 mg/dL 0.5   Glucose 70 - 99 mg/dL 125 (H)   WBC 4.0 - 11.0 10e3/uL 6.6   Hemoglobin 11.7 - 15.7 g/dL 15.1   Hematocrit 35.0 - 47.0 % 44.5   Platelet Count 150 - 450 10e3/uL 193   RBC Count 3.80 - 5.20 10e6/uL 4.97   MCV 78 - 100 fL 90   MCH 26.5 - 33.0 pg 30.4   MCHC 31.5 - 36.5 g/dL 33.9   RDW 10.0 - 15.0 % 13.4   % Neutrophils % 76   % Lymphocytes % 15   % Monocytes % 5   % Eosinophils % 4   % Basophils % 0   Absolute Basophils 0.0 - 0.2 10e3/uL 0.0   Absolute Eosinophils 0.0 - 0.7 10e3/uL 0.2   Absolute Immature Granulocytes <=0.4 10e3/uL 0.0   Absolute Lymphocytes 0.8 - 5.3 10e3/uL 1.0   Absolute Monocytes 0.0 - 1.3 10e3/uL 0.3   % Immature Granulocytes % 0   Absolute Neutrophils 1.6 - 8.3 10e3/uL 5.1   Absolute NRBCs 10e3/uL 0.0   NRBCs per 100 WBC <1 /100 0   (L): Data is abnormally low  (H): Data is abnormally high    Imaging:   CT CHEST AND ABDOMEN WITH CONTRAST 6/12/2024 1:10 PM     CLINICAL HISTORY: Restage SCC, stage IIIA, status post lobectomy and  adjuvant chemo completed in 2021; Squamous cell carcinoma of bronchus  in left lower lobe (H).     TECHNIQUE: CT scan of the chest and abdomen was performed following  injection of IV contrast. Multiplanar reformats  were obtained. Dose  reduction techniques were used.      CONTRAST: ISOVUE-370, 80 mL     COMPARISON: 1/8/2024, 10/3/2023, 4/24/2023     FINDINGS:   LUNGS AND PLEURA: Patient is status post left lower lobectomy. Small  volume left-sided pleural effusion, increased to comparison. A few  subcentimeter pulmonary nodules are unchanged. For example, a  triangular-shaped 6 x 4 mm, mean diameter of 5 mm, pulmonary nodule  seen in the anterior right midlung adjacent to the fissure, suggestive  of a benign fissural lymph node. No new or enlarging pulmonary  nodules. Suspected atelectasis in the left lower lobe. No findings  specific for pneumonia. Large airways are patent.     MEDIASTINUM/AXILLAE: Heart size is upper limits of normal. No  pericardial fluid. Thoracic aorta is normal in course and caliber. A  few scattered prominent mildly enlarged mediastinal lymph nodes are  present with the largest in the distal right peritracheal/precarinal  region measuring 11 mm in short axis. Thoracic aorta is normal in  course and caliber. Mild thoracic aortic atherosclerosis. Mild  coronary artery atherosclerosis.     HEPATOBILIARY: Several simple appearing hepatic cysts are similar to  comparison and do not require follow-up. Gallbladder is absent. No  bile duct dilation.     PANCREAS: Similar-appearing small cysts in the head of the pancreas  measuring up to 8 mm (3-158), favoring IMPNs. Recommend attention on  follow-up. No inflammatory changes of the pancreas. No dilation of the  pancreatic duct.      SPLEEN: Normal.     ADRENAL GLANDS: Similar appearing low-density thickening of the left  adrenal gland without discrete nodule or mass. Right adrenal gland is  normal-appearing. No follow-up is necessary.     KIDNEYS: Normal.     BOWEL: Normal.     ADDITIONAL FINDINGS: At least moderate atherosclerosis of the  abdominal aorta and proximal common iliac arteries. No aneurysmal  dilation.     MUSCULOSKELETAL: Multilevel hypertrophic  degenerative changes of the  spine. No acute osseous abnormality.                                                                      IMPRESSION:  1.  Status post left lower lobectomy.  2.  Small volume left-sided pleural effusion, increased to comparison.  3.  Unchanged subcentimeter pulmonary nodules. No new or enlarging  pulmonary nodules.  4.  Nonacute findings as above.     SOBEIDA PAUL MD        Impression/plan:     SCC lung, [id number], IIIA, R0 resection, discrepant PD-L1: About 3 1/2 years from completion of therapy.  - I reviewed the CT and lab work with her. While there is no convincing evidence of recurrence at this time, I'm a little concerned about the slight growth in the left pleural effusion.  We reviewed symptoms of effusions. At this time, I think her shortness of breath is more likely attributable to the a fib and the cough to seasonal allergies/post-nasal drainage.  -will follow-up with a chest xray in a month or sooner if symptoms are worsening  -otherwise, plan for CT CAP and labs in 5-6 months and follow-up with Dr. Haley Spaulding--following with cardiology ,wearing a ziopatch currently, adjusting metoprolol    Cough, post nasal drainage, may be related to seasonal allergies  -recommended switching to claritine or zyrtec, trial of flonase    Lab abnormality,  -mild rise in Cr and CO2  -will recheck next month     Microscopic hematuria, dysuria: Urine cytology and FISH negative in March-April, met with Dr. Leyva in Urology, discussed cystoscopy vs monitoring, Salima opted for monitoring. Dr. Velazco monitoring     Colon polyps: Colonoscopy Jan 2023.     The longitudinal plan of care for the diagnosis(es)/condition(s) as documented were addressed during this visit. Due to the added complexity in care, I will continue to support Salima in the subsequent management and with ongoing continuity of care.          Shireen Quezada, RUDY CNP

## 2024-06-18 ENCOUNTER — LAB (OUTPATIENT)
Dept: LAB | Facility: CLINIC | Age: 74
End: 2024-06-18
Payer: MEDICARE

## 2024-06-18 ENCOUNTER — ANTICOAGULATION THERAPY VISIT (OUTPATIENT)
Dept: ANTICOAGULATION | Facility: CLINIC | Age: 74
End: 2024-06-18

## 2024-06-18 DIAGNOSIS — I48.0 PAROXYSMAL ATRIAL FIBRILLATION (H): ICD-10-CM

## 2024-06-18 DIAGNOSIS — I48.0 PAROXYSMAL ATRIAL FIBRILLATION (H): Primary | ICD-10-CM

## 2024-06-18 DIAGNOSIS — I48.91 ATRIAL FIBRILLATION WITH RAPID VENTRICULAR RESPONSE (H): ICD-10-CM

## 2024-06-18 LAB — INR BLD: 3.5 (ref 0.9–1.1)

## 2024-06-18 PROCEDURE — 36416 COLLJ CAPILLARY BLOOD SPEC: CPT

## 2024-06-18 PROCEDURE — 85610 PROTHROMBIN TIME: CPT

## 2024-06-18 NOTE — PROGRESS NOTES
ANTICOAGULATION MANAGEMENT     Ijeoma Shah 73 year old female is on warfarin with supratherapeutic INR result. (Goal INR 2.0-3.0)    Recent labs: (last 7 days)     06/18/24  1332   INR 3.5*       ASSESSMENT     Warfarin Lab Questionnaire    Warfarin Doses Last 7 Days      6/17/2024    10:27 AM   Dose in Tablet or Mg   TAB or MG? milligram (mg)     Pt Rptd Dose SUNDAY MONDAY TUESDAY WED THURS FRIDAY SATURDAY 6/17/2024  10:27 AM 7.5 10 7.5 7.5 7.5 10 7.5         6/17/2024   Warfarin Lab Questionnaire   Missed doses within past 14 days? No   Changes in diet or alcohol within past 14 days? No   Medication changes since last result? Yes   Please list: Metoprolol changed to 50 mg   Injuries or illness since last result? No   New shortness of breath, severe headaches or sudden changes in vision since last result? Yes   If yes, please explain:  Might be allergies   Abnormal bleeding since last result? No   Upcoming surgery, procedure? No   Best number to call with results? [phone]     Previous result: Therapeutic last 2(+) visits  Additional findings:  patient is waiting to hear back on her ECHO and holter monitor results. She reports just feeling so fatigued and tired that it is hard for her to be outside walking. Her provider is aware of this and patient will notify [facility] of other changes. She also mentioned she may want to go back on Xarelto. Advised that she discuss with her insurance and Provider on this.        PLAN     Recommended plan for no diet, medication or health factor changes affecting INR     Dosing Instructions: decrease your warfarin dose (5% change) with next INR in 2 weeks       Summary  As of 6/18/2024      Full warfarin instructions:  10 mg every Mon; 7.5 mg all other days   Next INR check:  7/2/2024               Telephone call with Salima who verbalizes understanding and agrees to plan and who agrees to plan and repeated back plan correctly    Lab visit scheduled    Education  provided:   Symptom monitoring: monitoring for bleeding signs and symptoms and when to seek medical attention/emergency care  Importance of notifying anticoagulation clinic for: changes in medications; a sooner lab recheck maybe needed and diarrhea, nausea/vomiting, reduced intake, cold/flu, and/or infections; a sooner lab recheck maybe needed  Contact [phone]  with any changes, questions or concerns.     Plan made per [facility] anticoagulation protocol    Yoselin Lockett RN  Anticoagulation Clinic  6/18/2024    _______________________________________________________________________     Anticoagulation Episode Summary       Current INR goal:  2.0-3.0   TTR:  68.8% (1 y)   Target end date:  Indefinite   Send INR reminders to:  [facility]    Indications    Paroxysmal atrial fibrillation (H) [I48.0]  Atrial fibrillation with rapid ventricular response (H) [I48.91]             Comments:  RAJESH garibay             Anticoagulation Care Providers       Provider Role Specialty Phone number    Warren Pisano MD Referring Cardiovascular Disease [phone]    Jyoti Espinal MD Referring Family Medicine [phone]

## 2024-06-24 PROCEDURE — 93244 EXT ECG>48HR<7D REV&INTERPJ: CPT | Performed by: INTERNAL MEDICINE

## 2024-06-25 ENCOUNTER — TELEPHONE (OUTPATIENT)
Dept: CARDIOLOGY | Facility: CLINIC | Age: 74
End: 2024-06-25
Payer: MEDICARE

## 2024-06-25 NOTE — TELEPHONE ENCOUNTER
Call from [employer] wanting to amend pt ZP report to show that pt was in Atrial Fibrillation 100% of the time worn and not A Flutter.  Ok given to change and then a new copy will be sent to be signed. Prince

## 2024-07-01 ENCOUNTER — TELEPHONE (OUTPATIENT)
Dept: CARDIOLOGY | Facility: CLINIC | Age: 74
End: 2024-07-01
Payer: MEDICARE

## 2024-07-01 DIAGNOSIS — I48.0 PAROXYSMAL ATRIAL FIBRILLATION (H): Primary | ICD-10-CM

## 2024-07-01 NOTE — TELEPHONE ENCOUNTER
7/1/24 Pt scheduled for Afib Ablation 7/5 and is on Warfarin. Has not had 4 weeks of therapeutic INRS. Msg recd from Dr Loi Monsivais, MD Osiris Owen Kathy, RN  I didn't realize she was on warfarin for anticoag.  Yes, we will need a FIOR or can wait for weekly INR's if she prefers.  Thanks!          Spoke w pt who would like to proceed w FIOR. Order placed, msg sent to scheduling  LAURELerroRN 410 pm

## 2024-07-01 NOTE — PROGRESS NOTES
"[facility] HEART CLINIC  Cardiac Electrophysiology Clinic    Ijeoma Shah MRN# [id number]   YOB: 1950 Age: 73 year old       I had the pleasure of seeing Ijeoma Shah  who is a 73 year old female with history of hypertension, hyperlipidemia, lung cancer s/p left lower lobectomy and chemo, persistent atrial fibrillation.  She follows with Dr. Pisano in cardiology clinic.  She was initially diagnosed with atrial fibrillation in 2020 and had been on amiodarone following her lobectomy and during her treatment with chemotherapy.  She was noted to have paroxysmal atrial fibrillation on a Zio patch monitor in 2022 and was started on metoprolol and Xarelto.  She most recently saw Britt Gardner in clinic on 5/16/2024 and was doing well at the time but was noted to be back in atrial fibrillation.  Her metoprolol dosage was increased for rate control and lisinopril was stopped.  She has noted increased fatigue since increasing the metoprolol, but unfortunately continues to have rapid rates on repeat Zio patch monitor.  She is referred to me today to discuss options for rhythm control.  Of note, she is currently on warfarin for anticoagulation.      Today's Visit:  Her  is also on this virtual visit with her today.  She states she is doing okay.  She sometimes feels the Afib but sometimes does not.  She usually feels it more when laying on her left side, feels like it is beating funny.  She does feel \"pooped out\" and doesn't feel like doing much when she is in Afib.  She feels this is worse since increasing the metoprolol a couple weeks ago.    She usually takes walks for exercise, but over the last 3 weeks, she has not had the energy to do it.  She feels some shortness of breath, mostly congestion which may be due to allergies.  No chest pain. No dizziness/lightheadedness.    Her  reminds her that she is having intermittent left arm pain, usually forearm, sometimes goes up " to the shoulder.  Sharp, sometimes helps if she rubs it.  Not always associated with exertion.  She states it started a few years ago but has been more constant in the past few months.   No issues with the amiodarone, but is concerned about side effects.   She is doing well with warfarin, but notes that her INR was a little high at the last check.  She is getting another INR check this afternoon and has not taken her warfarin yet today.    Family history of Afib and pacemaker in a sister.  Brother also had CAD and bypass surgery.        Diagnostic Testing:  EKG 5/16/2024-atrial fibrillation rate 118 bpm      Holter Monitor 6/2024 - personally reviewed  3 day Ziopatch monitor  Persistent atrial fibrillation with heart rates ranging , average 128 bpm.  Patient triggered events correlated with atrial fibrillation with heart rates between 112 to 169 bpm.    Echocardiogram 6/12/2024  The rhythm was rapid atrial fibrillation. 's to 140's during the study.  The left ventricle is normal in size.  Left ventricular systolic function is mildly reduced. The visual ejection fraction is 45-50%.  The right ventricle is mildly dilated. Mildly decreased right ventricular systolic function  There is mild (1+) mitral regurgitation.  There is mild (1+) tricuspid regurgitation.  Right ventricular systolic pressure is elevated, consistent with mild  pulmonary hypertension.     Compared to the previous study, the afib is new, rate is faster, and LV and RV  function have decreased.    Echo 4/20/2024   The left ventricle is normal in size.  The visual ejection fraction is 65-70%.  Grade I or early diastolic dysfunction.  The right ventricle is normal in structure, function and size.  Normal left atrial size.  No significant valve issues.  Compared to prior study, there is no significant change.        Last Comprehensive Metabolic Panel:  Lab Results   Component Value Date     06/12/2024    POTASSIUM 4.2 06/12/2024     CHLORIDE 96 (L) 06/12/2024    CO2 31 (H) 06/12/2024    ANIONGAP 11 06/12/2024     (H) 06/12/2024    BUN 16.2 06/12/2024    CR 1.00 (H) 06/12/2024    GFRESTIMATED 60 (L) 06/12/2024    SABINO 9.5 06/12/2024        Magnesium   Date Value Ref Range Status   01/25/2023 1.5 (L) 1.7 - 2.3 mg/dL Final   04/12/2021 1.9 1.6 - 2.3 mg/dL Final        TSH   Date Value Ref Range Status   12/27/2022 1.11 0.40 - 4.00 mU/L Final   02/18/2021 1.94 0.40 - 4.00 mU/L Final      INR   Date Value Ref Range Status   06/18/2024 3.5 (H) 0.9 - 1.1 Final             Assessment/Plan:     73 year old female with history of hypertension, hyperlipidemia, lung cancer s/p left lower lobectomy and chemo, persistent atrial fibrillation.  Her previous episodes of atrial fibrillation were triggered by lung surgery and bladder surgery.  Her Afib has now become persistent.  Her recent Zio patch monitor showed persistent atrial fibrillation with heart rates averaging 128 bpm.  Recent echocardiogram showed mildly reduced LV function, EF 45 to 50%, mild MR and TR, and normal atrial size.    We discussed the pathophysiology of atrial fibrillation and the long term implications.  I explained that atrial fibrillation has no cure, but is not dangerous and our main goal is to manage symptoms.  I discussed management strategies of rate versus rhythm control.  We discussed that patients who are completely asymptomatic and have no evidence of cardiomyopathy can remain in atrial fibrillation long term.  She has not had optimal rate control on metoprolol and already has side effects on the current dose so I would recommend pursuing rhythm control.  We discussed the options for rhythm control, including cardioversion, anti-arrhythmic drug therapy and ablation, and the risks and benefits of these therapies.  I suspect the patient will go back into Afib if we pursue cardioversion alone.  We discussed the various anti-arrhythmic medications that may be used and why we  may choose one over the other.  We discussed the ablation procedure in detail and I explained the risks and benefits.  They questioned the role of a pacemaker in treating her atrial fibrillation.  I explained that a pacemaker does not get rid of atrial fibrillation, but can be used in patients who have tachybradycardia syndrome and when we perform AV node ablation, which I would not recommended to her at this time.  We ultimately decided to proceed with ablation.    We also discussed the other major risk of Afib is that of stroke.  The patient's CHADS VASc is 3 (female, age, HTN), which warrants long term anticoagulation.  She is currently doing well on warfarin for anticoagulation.  She is due for another INR check this afternoon and since her last INR level was elevated I recommended holding off on taking her dose today until we get the repeat results.  We also discussed that since she has not had weekly INR checks to ensure she has been therapeutic, we will plan on doing a transesophageal echo tomorrow morning prior to the ablation procedure.      Proceed with FIOR and ablation tomorrow  Plan on starting amiodarone  INR check this afternoon, advise to hold dose until after  Continue metoprolol 50 mg daily      Nasim Monsivais MD        Orders this Visit:  No orders of the defined types were placed in this encounter.    No orders of the defined types were placed in this encounter.    There are no discontinued medications.    Encounter Diagnoses   Name Primary?    Persistent atrial fibrillation (H) Yes    Chronic anticoagulation     Supratherapeutic INR      Today's clinic visit entailed:  Review of the result(s) of each unique test - echo  Assessment requiring an independent historian(s) - family -   The following tests were independently interpreted by me as noted in my documentation: EKG, Holter  45 minutes spent by me on the date of the encounter doing chart review, history and exam, documentation and further  activities per the note  The longitudinal plan of care for the diagnosis(es)/condition(s) as documented were addressed during this visit. Due to the added complexity in care, I will continue to support Salima in the subsequent management and with ongoing continuity of care.    CURRENT MEDICATIONS:  Current Outpatient Medications   Medication Sig Dispense Refill    EPINEPHrine (ANY BX GENERIC EQUIV) 0.3 MG/0.3ML injection 2-pack Inject 0.3 mLs (0.3 mg) into the muscle as needed for anaphylaxis May repeat one time in 5-15 minutes if response to initial dose is inadequate. 2 each 1    ketoconazole (NIZORAL) 2 % external cream Apply topically daily For rash on feet. 60 g 0    metoprolol succinate ER (TOPROL XL) 25 MG 24 hr tablet Take 2 tablets (50 mg) by mouth every evening      triamcinolone (KENALOG) 0.1 % external ointment Apply topically 2 times daily To heels as needed 80 g 1    warfarin ANTICOAGULANT (COUMADIN) 5 MG tablet Take 10 mg (2 tablets) Mon/Fri and 7.5 mg (1 1/2 tablets) all other days of the week unless directed other wise by  tablet 1       Review of Systems:  Pertinent review of system as stated above in HPI    Skin:        Eyes:       ENT:       Respiratory:       Cardiovascular:       Gastroenterology:      Genitourinary:       Musculoskeletal:       Neurologic:       Psychiatric:       Heme/Lymph/Imm:       Endocrine:         Physical Exam:  Virtual visit    Constitutional: Pleasant, no apparent distress.  Respiratory: Breathing non-labored, speaking in complete sentences   Neurologic: Alert, awake, and answering questions appropriately     ALLERGIES  Allergies   Allergen Reactions    Bee Venom Hives     Hot and sweating        PAST MEDICAL HISTORY:  Past Medical History:   Diagnosis Date    Arrhythmia     A-Fib    Arthritis     Benign essential hypertension     Calculus of gallbladder without cholecystitis without obstruction     COPD (chronic obstructive pulmonary disease) (H)     Lung  cancer (H)     Simple chronic bronchitis (H)        PAST SURGICAL HISTORY:  Past Surgical History:   Procedure Laterality Date    ARTHROEREISIS, SUBTALAR      BRONCHOSCOPY RIGID OR FLEXIBLE W/TRANSENDOSCOPIC ENDOBRONCHIAL ULTRASOUND GUIDED N/A 07/27/2020    Procedure: Flexible bronchoscopy, endobronchial ultrasound with transbronchial biopsies;  Surgeon: Edin Castro MD;  Location: [facility] OR    CATARACT IOL, RT/LT Bilateral     COLONOSCOPY  06/29/2004    colonoscopy to the cecum    COLONOSCOPY N/A 1/25/2023    Procedure: COLONOSCOPY, WITH POLYPECTOMY;  Surgeon: Warren Soto MD;  Location: [facility]    ESOPHAGOSCOPY, GASTROSCOPY, DUODENOSCOPY (EGD), COMBINED N/A 09/01/2020    Procedure: ESOPHAGOGASTRODUODENOSCOPY, WITH FINE NEEDLE ASPIRATION BIOPSY, WITH ENDOSCOPIC ULTRASOUND GUIDANCE;  Surgeon: Barber Hogan MD;  Location: [facility]    LAPAROSCOPIC CHOLECYSTECTOMY N/A 5/20/2021    Procedure: CHOLECYSTECTOMY, LAPAROSCOPIC;  Surgeon: Gavin Castillo MD;  Location: [facility] OR    PICC TRIPLE LUMEN PLACEMENT Right 10/23/2020    5Fr - 36cm, Medial brachial vein    SURGICAL HISTORY OF -       nose surgery    THORACOSCOPIC RESECTION LUNG Left 10/22/2020    Procedure: Left thoracoscopic lobectomy converted to Left Thoracotomy, Medistinal lymph node dissection, Pulmonary Artery repair, flexible bronchoscopy, on-pump oxygenator on standy-by;  Surgeon: Andrea Sanabria MD;  Location: [facility]    THORACOTOMY N/A 10/22/2020    Procedure: Thoracotomy;  Surgeon: Andrea Sanabria MD;  Location: [facility]    TRANSCERVICAL EXTENDED MEDIASTINAL LYMPHADENECTOMY N/A 10/22/2020    Procedure: Transcervical extended mediastinal lymphadenectomy;  Surgeon: Andrea Sanabria MD;  Location: [facility]    TUBAL LIGATION      bilateral tubal ligation.  BTL       FAMILY HISTORY:  Family History   Problem Relation Age of Onset    Glaucoma Mother     LUNG DISEASE Mother     Melanoma Father     Down Syndrome Brother     Cancer Sister         bile duct     Coronary Artery Disease Brother     CABG Brother     No Known Problems Brother     No Known Problems Brother     No Known Problems Sister     Lung Cancer Sister         lung    No Known Problems Sister     No Known Problems Sister        SOCIAL HISTORY:  Social History     Socioeconomic History    Marital status:     Number of children: 1   Occupational History    Occupation: seasonal summer work only   Tobacco Use    Smoking status: Former     Current packs/day: 0.00     Average packs/day: 1 pack/day for 33.0 years (33.0 ttl pk-yrs)     Types: Cigarettes     Start date: 1981     Quit date: 2014     Years since quittin.5     Passive exposure: Current (ocassional per pt)    Smokeless tobacco: Never   Vaping Use    Vaping status: Never Used   Substance and Sexual Activity    Alcohol use: Yes     Alcohol/week: 1.7 - 2.5 standard drinks of alcohol     Types: 2 - 3 Standard drinks or equivalent per week     Comment: Beer once in a while    Drug use: No    Sexual activity: Yes     Partners: Male   Other Topics Concern    Parent/sibling w/ CABG, MI or angioplasty before 65F 55M? No     Social Determinants of Health     Financial Resource Strain: Low Risk  (2024)    Financial Resource Strain     Within the past 12 months, have you or your family members you live with been unable to get utilities (heat, electricity) when it was really needed?: No   Food Insecurity: Low Risk  (2024)    Food Insecurity     Within the past 12 months, did you worry that your food would run out before you got money to buy more?: No     Within the past 12 months, did the food you bought just not last and you didn t have money to get more?: No   Transportation Needs: Low Risk  (2024)    Transportation Needs     Within the past 12 months, has lack of transportation kept you from medical appointments, getting your medicines, non-medical meetings or appointments, work, or from getting things that you need?: No    Interpersonal Safety: Low Risk  (1/22/2024)    Interpersonal Safety     Do you feel physically and emotionally safe where you currently live?: Yes     Within the past 12 months, have you been hit, slapped, kicked or otherwise physically hurt by someone?: No     Within the past 12 months, have you been humiliated or emotionally abused in other ways by your partner or ex-partner?: No   Housing Stability: Low Risk  (1/19/2024)    Housing Stability     Do you have housing? : Yes     Are you worried about losing your housing?: No             CC  No referring provider defined for this encounter.

## 2024-07-02 ENCOUNTER — TELEPHONE (OUTPATIENT)
Dept: MEDSURG UNIT | Facility: CLINIC | Age: 74
End: 2024-07-02

## 2024-07-02 ENCOUNTER — ANTICOAGULATION THERAPY VISIT (OUTPATIENT)
Dept: ANTICOAGULATION | Facility: CLINIC | Age: 74
End: 2024-07-02

## 2024-07-02 ENCOUNTER — LAB (OUTPATIENT)
Dept: LAB | Facility: CLINIC | Age: 74
End: 2024-07-02
Payer: MEDICARE

## 2024-07-02 ENCOUNTER — ANESTHESIA EVENT (OUTPATIENT)
Dept: CARDIOLOGY | Facility: CLINIC | Age: 74
End: 2024-07-02
Payer: MEDICARE

## 2024-07-02 ENCOUNTER — VIRTUAL VISIT (OUTPATIENT)
Dept: CARDIOLOGY | Facility: CLINIC | Age: 74
End: 2024-07-02
Payer: MEDICARE

## 2024-07-02 ENCOUNTER — TELEPHONE (OUTPATIENT)
Dept: CARDIOLOGY | Facility: CLINIC | Age: 74
End: 2024-07-02

## 2024-07-02 DIAGNOSIS — I48.91 ATRIAL FIBRILLATION WITH RAPID VENTRICULAR RESPONSE (H): ICD-10-CM

## 2024-07-02 DIAGNOSIS — I48.19 PERSISTENT ATRIAL FIBRILLATION (H): Primary | ICD-10-CM

## 2024-07-02 DIAGNOSIS — Z79.01 CHRONIC ANTICOAGULATION: ICD-10-CM

## 2024-07-02 DIAGNOSIS — I48.0 PAROXYSMAL ATRIAL FIBRILLATION (H): ICD-10-CM

## 2024-07-02 DIAGNOSIS — I48.0 PAROXYSMAL ATRIAL FIBRILLATION (H): Primary | ICD-10-CM

## 2024-07-02 DIAGNOSIS — R79.1 SUPRATHERAPEUTIC INR: ICD-10-CM

## 2024-07-02 LAB — INR BLD: 4.5 (ref 0.9–1.1)

## 2024-07-02 PROCEDURE — 85610 PROTHROMBIN TIME: CPT

## 2024-07-02 PROCEDURE — 36416 COLLJ CAPILLARY BLOOD SPEC: CPT

## 2024-07-02 PROCEDURE — G2211 COMPLEX E/M VISIT ADD ON: HCPCS | Mod: 95 | Performed by: INTERNAL MEDICINE

## 2024-07-02 PROCEDURE — 99204 OFFICE O/P NEW MOD 45 MIN: CPT | Mod: 95 | Performed by: INTERNAL MEDICINE

## 2024-07-02 RX ORDER — SODIUM CHLORIDE, SODIUM LACTATE, POTASSIUM CHLORIDE, CALCIUM CHLORIDE 600; 310; 30; 20 MG/100ML; MG/100ML; MG/100ML; MG/100ML
INJECTION, SOLUTION INTRAVENOUS CONTINUOUS
Status: CANCELLED | OUTPATIENT
Start: 2024-07-02

## 2024-07-02 RX ORDER — METOPROLOL SUCCINATE 25 MG/1
50 TABLET, EXTENDED RELEASE ORAL EVERY EVENING
Qty: 180 TABLET | Refills: 0 | Status: ON HOLD | OUTPATIENT
Start: 2024-07-02 | End: 2024-08-12

## 2024-07-02 RX ORDER — LIDOCAINE 40 MG/G
CREAM TOPICAL
Status: CANCELLED | OUTPATIENT
Start: 2024-07-02

## 2024-07-02 ASSESSMENT — ENCOUNTER SYMPTOMS: DYSRHYTHMIAS: 1

## 2024-07-02 ASSESSMENT — COPD QUESTIONNAIRES
COPD: 1
CAT_SEVERITY: MILD

## 2024-07-02 ASSESSMENT — LIFESTYLE VARIABLES: TOBACCO_USE: 1

## 2024-07-02 NOTE — LETTER
"7/2/2024    [employer]  [address] [place] [id number]    RE: Ijeoma Shah       Dear Colleague,     I had the pleasure of seeing Ijeoma Shah in the [facility].  [facility]  Cardiac Electrophysiology Clinic    Ijeoma Shah MRN# [id number]   YOB: 1950 Age: 73 year old       I had the pleasure of seeing Ijeoma Shah  who is a 73 year old female with history of hypertension, hyperlipidemia, lung cancer s/p left lower lobectomy and chemo, persistent atrial fibrillation.  She follows with Dr. Pisano in cardiology clinic.  She was initially diagnosed with atrial fibrillation in 2020 and had been on amiodarone following her lobectomy and during her treatment with chemotherapy.  She was noted to have paroxysmal atrial fibrillation on a Zio patch monitor in 2022 and was started on metoprolol and Xarelto.  She most recently saw Britt Gardner in clinic on 5/16/2024 and was doing well at the time but was noted to be back in atrial fibrillation.  Her metoprolol dosage was increased for rate control and lisinopril was stopped.  She has noted increased fatigue since increasing the metoprolol, but unfortunately continues to have rapid rates on repeat Zio patch monitor.  She is referred to me today to discuss options for rhythm control.  Of note, she is currently on warfarin for anticoagulation.      Today's Visit:  Her  is also on this virtual visit with her today.  She states she is doing okay.  She sometimes feels the Afib but sometimes does not.  She usually feels it more when laying on her left side, feels like it is beating funny.  She does feel \"pooped out\" and doesn't feel like doing much when she is in Afib.  She feels this is worse since increasing the metoprolol a couple weeks ago.    She usually takes walks for exercise, but over the last 3 weeks, she has not had the energy to do it. "  She feels some shortness of breath, mostly congestion which may be due to allergies.  No chest pain. No dizziness/lightheadedness.    Her  reminds her that she is having intermittent left arm pain, usually forearm, sometimes goes up to the shoulder.  Sharp, sometimes helps if she rubs it.  Not always associated with exertion.  She states it started a few years ago but has been more constant in the past few months.   No issues with the amiodarone, but is concerned about side effects.   She is doing well with warfarin, but notes that her INR was a little high at the last check.  She is getting another INR check this afternoon and has not taken her warfarin yet today.    Family history of Afib and pacemaker in a sister.  Brother also had CAD and bypass surgery.        Diagnostic Testing:  EKG 5/16/2024-atrial fibrillation rate 118 bpm      Holter Monitor 6/2024 - personally reviewed  3 day Ziopatch monitor  Persistent atrial fibrillation with heart rates ranging , average 128 bpm.  Patient triggered events correlated with atrial fibrillation with heart rates between 112 to 169 bpm.    Echocardiogram 6/12/2024  The rhythm was rapid atrial fibrillation. 's to 140's during the study.  The left ventricle is normal in size.  Left ventricular systolic function is mildly reduced. The visual ejection fraction is 45-50%.  The right ventricle is mildly dilated. Mildly decreased right ventricular systolic function  There is mild (1+) mitral regurgitation.  There is mild (1+) tricuspid regurgitation.  Right ventricular systolic pressure is elevated, consistent with mild  pulmonary hypertension.     Compared to the previous study, the afib is new, rate is faster, and LV and RV  function have decreased.    Echo 4/20/2024   The left ventricle is normal in size.  The visual ejection fraction is 65-70%.  Grade I or early diastolic dysfunction.  The right ventricle is normal in structure, function and size.  Normal  left atrial size.  No significant valve issues.  Compared to prior study, there is no significant change.        Last Comprehensive Metabolic Panel:  Lab Results   Component Value Date     06/12/2024    POTASSIUM 4.2 06/12/2024    CHLORIDE 96 (L) 06/12/2024    CO2 31 (H) 06/12/2024    ANIONGAP 11 06/12/2024     (H) 06/12/2024    BUN 16.2 06/12/2024    CR 1.00 (H) 06/12/2024    GFRESTIMATED 60 (L) 06/12/2024    SABINO 9.5 06/12/2024        Magnesium   Date Value Ref Range Status   01/25/2023 1.5 (L) 1.7 - 2.3 mg/dL Final   04/12/2021 1.9 1.6 - 2.3 mg/dL Final        TSH   Date Value Ref Range Status   12/27/2022 1.11 0.40 - 4.00 mU/L Final   02/18/2021 1.94 0.40 - 4.00 mU/L Final      INR   Date Value Ref Range Status   06/18/2024 3.5 (H) 0.9 - 1.1 Final             Assessment/Plan:     73 year old female with history of hypertension, hyperlipidemia, lung cancer s/p left lower lobectomy and chemo, persistent atrial fibrillation.  Her previous episodes of atrial fibrillation were triggered by lung surgery and bladder surgery.  Her Afib has now become persistent.  Her recent Zio patch monitor showed persistent atrial fibrillation with heart rates averaging 128 bpm.  Recent echocardiogram showed mildly reduced LV function, EF 45 to 50%, mild MR and TR, and normal atrial size.    We discussed the pathophysiology of atrial fibrillation and the long term implications.  I explained that atrial fibrillation has no cure, but is not dangerous and our main goal is to manage symptoms.  I discussed management strategies of rate versus rhythm control.  We discussed that patients who are completely asymptomatic and have no evidence of cardiomyopathy can remain in atrial fibrillation long term.  She has not had optimal rate control on metoprolol and already has side effects on the current dose so I would recommend pursuing rhythm control.  We discussed the options for rhythm control, including cardioversion,  anti-arrhythmic drug therapy and ablation, and the risks and benefits of these therapies.  I suspect the patient will go back into Afib if we pursue cardioversion alone.  We discussed the various anti-arrhythmic medications that may be used and why we may choose one over the other.  We discussed the ablation procedure in detail and I explained the risks and benefits.  They questioned the role of a pacemaker in treating her atrial fibrillation.  I explained that a pacemaker does not get rid of atrial fibrillation, but can be used in patients who have tachybradycardia syndrome and when we perform AV node ablation, which I would not recommended to her at this time.  We ultimately decided to proceed with ablation.    We also discussed the other major risk of Afib is that of stroke.  The patient's CHADS VASc is 3 (female, age, HTN), which warrants long term anticoagulation.  She is currently doing well on warfarin for anticoagulation.  She is due for another INR check this afternoon and since her last INR level was elevated I recommended holding off on taking her dose today until we get the repeat results.  We also discussed that since she has not had weekly INR checks to ensure she has been therapeutic, we will plan on doing a transesophageal echo tomorrow morning prior to the ablation procedure.      Proceed with FIOR and ablation tomorrow  Plan on starting amiodarone  INR check this afternoon, advise to hold dose until after  Continue metoprolol 50 mg daily      Nasim Monsivais MD        Orders this Visit:  No orders of the defined types were placed in this encounter.    No orders of the defined types were placed in this encounter.    There are no discontinued medications.    Encounter Diagnoses   Name Primary?    Persistent atrial fibrillation (H) Yes    Chronic anticoagulation     Supratherapeutic INR      Today's clinic visit entailed:  Review of the result(s) of each unique test - echo  Assessment requiring an  independent historian(s) - family -   The following tests were independently interpreted by me as noted in my documentation: EKG, Holter  45 minutes spent by me on the date of the encounter doing chart review, history and exam, documentation and further activities per the note  The longitudinal plan of care for the diagnosis(es)/condition(s) as documented were addressed during this visit. Due to the added complexity in care, I will continue to support Salima in the subsequent management and with ongoing continuity of care.    CURRENT MEDICATIONS:  Current Outpatient Medications   Medication Sig Dispense Refill    EPINEPHrine (ANY BX GENERIC EQUIV) 0.3 MG/0.3ML injection 2-pack Inject 0.3 mLs (0.3 mg) into the muscle as needed for anaphylaxis May repeat one time in 5-15 minutes if response to initial dose is inadequate. 2 each 1    ketoconazole (NIZORAL) 2 % external cream Apply topically daily For rash on feet. 60 g 0    metoprolol succinate ER (TOPROL XL) 25 MG 24 hr tablet Take 2 tablets (50 mg) by mouth every evening      triamcinolone (KENALOG) 0.1 % external ointment Apply topically 2 times daily To heels as needed 80 g 1    warfarin ANTICOAGULANT (COUMADIN) 5 MG tablet Take 10 mg (2 tablets) Mon/Fri and 7.5 mg (1 1/2 tablets) all other days of the week unless directed other wise by  tablet 1       Review of Systems:  Pertinent review of system as stated above in HPI    Skin:        Eyes:       ENT:       Respiratory:       Cardiovascular:       Gastroenterology:      Genitourinary:       Musculoskeletal:       Neurologic:       Psychiatric:       Heme/Lymph/Imm:       Endocrine:         Physical Exam:  Virtual visit    Constitutional: Pleasant, no apparent distress.  Respiratory: Breathing non-labored, speaking in complete sentences   Neurologic: Alert, awake, and answering questions appropriately     ALLERGIES  Allergies   Allergen Reactions    Bee Venom Hives     Hot and sweating        PAST  MEDICAL HISTORY:  Past Medical History:   Diagnosis Date    Arrhythmia     A-Fib    Arthritis     Benign essential hypertension     Calculus of gallbladder without cholecystitis without obstruction     COPD (chronic obstructive pulmonary disease) (H)     Lung cancer (H)     Simple chronic bronchitis (H)        PAST SURGICAL HISTORY:  Past Surgical History:   Procedure Laterality Date    ARTHROEREISIS, SUBTALAR      BRONCHOSCOPY RIGID OR FLEXIBLE W/TRANSENDOSCOPIC ENDOBRONCHIAL ULTRASOUND GUIDED N/A 07/27/2020    Procedure: Flexible bronchoscopy, endobronchial ultrasound with transbronchial biopsies;  Surgeon: Edin Castro MD;  Location: [facility]    CATARACT IOL, RT/LT Bilateral     COLONOSCOPY  06/29/2004    colonoscopy to the cecum    COLONOSCOPY N/A 1/25/2023    Procedure: COLONOSCOPY, WITH POLYPECTOMY;  Surgeon: Warren Soto MD;  Location: [facility]    ESOPHAGOSCOPY, GASTROSCOPY, DUODENOSCOPY (EGD), COMBINED N/A 09/01/2020    Procedure: ESOPHAGOGASTRODUODENOSCOPY, WITH FINE NEEDLE ASPIRATION BIOPSY, WITH ENDOSCOPIC ULTRASOUND GUIDANCE;  Surgeon: Barber Hogan MD;  Location: [facility]    LAPAROSCOPIC CHOLECYSTECTOMY N/A 5/20/2021    Procedure: CHOLECYSTECTOMY, LAPAROSCOPIC;  Surgeon: Gavin Castillo MD;  Location: [facility]    PICC TRIPLE LUMEN PLACEMENT Right 10/23/2020    5Fr - 36cm, Medial brachial vein    SURGICAL HISTORY OF -       nose surgery    THORACOSCOPIC RESECTION LUNG Left 10/22/2020    Procedure: Left thoracoscopic lobectomy converted to Left Thoracotomy, Medistinal lymph node dissection, Pulmonary Artery repair, flexible bronchoscopy, on-pump oxygenator on standy-by;  Surgeon: Andrea Sanabria MD;  Location: [facility]    THORACOTOMY N/A 10/22/2020    Procedure: Thoracotomy;  Surgeon: Andrea Sanabria MD;  Location: [facility]    TRANSCERVICAL EXTENDED MEDIASTINAL LYMPHADENECTOMY N/A 10/22/2020    Procedure: Transcervical extended mediastinal lymphadenectomy;  Surgeon: Andrea Sanabria MD;   Location: [facility]    TUBAL LIGATION      bilateral tubal ligation.  BTL       FAMILY HISTORY:  Family History   Problem Relation Age of Onset    Glaucoma Mother     LUNG DISEASE Mother     Melanoma Father     Down Syndrome Brother     Cancer Sister         bile duct    Coronary Artery Disease Brother     CABG Brother     No Known Problems Brother     No Known Problems Brother     No Known Problems Sister     Lung Cancer Sister         lung    No Known Problems Sister     No Known Problems Sister        SOCIAL HISTORY:  Social History     Socioeconomic History    Marital status:     Number of children: 1   Occupational History    Occupation: seasonal summer work only   Tobacco Use    Smoking status: Former     Current packs/day: 0.00     Average packs/day: 1 pack/day for 33.0 years (33.0 ttl pk-yrs)     Types: Cigarettes     Start date: 1981     Quit date: 2014     Years since quittin.5     Passive exposure: Current (ocassional per pt)    Smokeless tobacco: Never   Vaping Use    Vaping status: Never Used   Substance and Sexual Activity    Alcohol use: Yes     Alcohol/week: 1.7 - 2.5 standard drinks of alcohol     Types: 2 - 3 Standard drinks or equivalent per week     Comment: Beer once in a while    Drug use: No    Sexual activity: Yes     Partners: Male   Other Topics Concern    Parent/sibling w/ CABG, MI or angioplasty before 65F 55M? No     Social Determinants of Health     Financial Resource Strain: Low Risk  (2024)    Financial Resource Strain     Within the past 12 months, have you or your family members you live with been unable to get utilities (heat, electricity) when it was really needed?: No   Food Insecurity: Low Risk  (2024)    Food Insecurity     Within the past 12 months, did you worry that your food would run out before you got money to buy more?: No     Within the past 12 months, did the food you bought just not last and you didn t have money to get more?: No    Transportation Needs: Low Risk  (1/19/2024)    Transportation Needs     Within the past 12 months, has lack of transportation kept you from medical appointments, getting your medicines, non-medical meetings or appointments, work, or from getting things that you need?: No   Interpersonal Safety: Low Risk  (1/22/2024)    Interpersonal Safety     Do you feel physically and emotionally safe where you currently live?: Yes     Within the past 12 months, have you been hit, slapped, kicked or otherwise physically hurt by someone?: No     Within the past 12 months, have you been humiliated or emotionally abused in other ways by your partner or ex-partner?: No   Housing Stability: Low Risk  (1/19/2024)    Housing Stability     Do you have housing? : Yes     Are you worried about losing your housing?: No             CC  No referring provider defined for this encounter.      Thank you for allowing me to participate in the care of your patient.      Sincerely,     Nasim Monsivais MD     [facility]

## 2024-07-02 NOTE — ANESTHESIA PREPROCEDURE EVALUATION
Anesthesia Pre-Procedure Evaluation    Patient: Ijeoma Shah   MRN: [id number] : 1950        Procedure : Procedure(s):  Ablation Atrial Fibrilation          Past Medical History:   Diagnosis Date    Arrhythmia     A-Fib    Arthritis     Benign essential hypertension     Calculus of gallbladder without cholecystitis without obstruction     COPD (chronic obstructive pulmonary disease) (H)     Lung cancer (H)     Simple chronic bronchitis (H)       Past Surgical History:   Procedure Laterality Date    ARTHROEREISIS, SUBTALAR      BRONCHOSCOPY RIGID OR FLEXIBLE W/TRANSENDOSCOPIC ENDOBRONCHIAL ULTRASOUND GUIDED N/A 2020    Procedure: Flexible bronchoscopy, endobronchial ultrasound with transbronchial biopsies;  Surgeon: Edin Castro MD;  Location: [facility]    CATARACT IOL, RT/LT Bilateral     COLONOSCOPY  2004    colonoscopy to the cecum    COLONOSCOPY N/A 2023    Procedure: COLONOSCOPY, WITH POLYPECTOMY;  Surgeon: Warren Soto MD;  Location: [facility]    ESOPHAGOSCOPY, GASTROSCOPY, DUODENOSCOPY (EGD), COMBINED N/A 2020    Procedure: ESOPHAGOGASTRODUODENOSCOPY, WITH FINE NEEDLE ASPIRATION BIOPSY, WITH ENDOSCOPIC ULTRASOUND GUIDANCE;  Surgeon: Barber Hogan MD;  Location: [facility]    LAPAROSCOPIC CHOLECYSTECTOMY N/A 2021    Procedure: CHOLECYSTECTOMY, LAPAROSCOPIC;  Surgeon: Gavin Castillo MD;  Location: [facility]    PICC TRIPLE LUMEN PLACEMENT Right 10/23/2020    5Fr - 36cm, Medial brachial vein    SURGICAL HISTORY OF -       nose surgery    THORACOSCOPIC RESECTION LUNG Left 10/22/2020    Procedure: Left thoracoscopic lobectomy converted to Left Thoracotomy, Medistinal lymph node dissection, Pulmonary Artery repair, flexible bronchoscopy, on-pump oxygenator on standy-by;  Surgeon: Andrea Sanabria MD;  Location: [facility]    THORACOTOMY N/A 10/22/2020    Procedure: Thoracotomy;  Surgeon: Andrea Sanabria MD;  [facility]: [facility]    TRANSCERVICAL EXTENDED MEDIASTINAL  LYMPHADENECTOMY N/A 10/22/2020    Procedure: Transcervical extended mediastinal lymphadenectomy;  Surgeon: Andrea Sanabria MD;  Location: [facility]    TUBAL LIGATION      bilateral tubal ligation.  BTL      Allergies   Allergen Reactions    Bee Venom Hives     Hot and sweating       Social History     Tobacco Use    Smoking status: Former     Current packs/day: 0.00     Average packs/day: 1 pack/day for 33.0 years (33.0 ttl pk-yrs)     Types: Cigarettes     Start date: 1981     Quit date: 2014     Years since quittin.5     Passive exposure: Current (ocassional per pt)    Smokeless tobacco: Never   Substance Use Topics    Alcohol use: Yes     Alcohol/week: 1.7 - 2.5 standard drinks of alcohol     Types: 2 - 3 Standard drinks or equivalent per week     Comment: Beer once in a while      Wt Readings from Last 1 Encounters:   24 80.7 kg (178 lb)        Anesthesia Evaluation   Pt has had prior anesthetic. Type: General and MAC.    No history of anesthetic complications       ROS/MED HX  ENT/Pulmonary: Comment: Is not using inhalers. Denies pulmonary symptoms. Some tightness under right breast after lung surgery. Some nasal congestion.  H/O Lung CA    (+)           allergic rhinitis,     tobacco use, Past use,        mild,  COPD,              Neurologic:  - neg neurologic ROS     Cardiovascular: Comment: Afib s/p cardioversion    (+)  hypertension- -   -  - -   Taking blood thinners Pt has received instructions: Instructions Given to patient: Planned hold of Xarelto for 72 hours prior to surgery.                   dysrhythmias, a-fib,        Previous cardiac testing   Echo: Date: 22 Results:  ______________________________________________________________________________  Interpretation Summary     The left ventricle is normal in size.  The visual ejection fraction is 65-70%.  Grade I or early diastolic dysfunction.  The right ventricle is normal in structure, function and size.  Normal left atrial  size.  No significant valve issues.     Compared to prior study, there is no significant change.  Stress Test:  Date: Results:    ECG Reviewed:  Date: 4/6/22 Results:  SR  Cath:  Date: Results:      METS/Exercise Tolerance: >4 METS    Hematologic:  - neg hematologic  ROS     Musculoskeletal:  - neg musculoskeletal ROS     GI/Hepatic: Comment: Abdominal pain if eating greasy food - neg GI/hepatic ROS     Renal/Genitourinary:     (+) renal disease, type: CRI, Pt does not require dialysis,           Endo:  - neg endo ROS     Psychiatric/Substance Use:  - neg psychiatric ROS     Infectious Disease:  - neg infectious disease ROS     Malignancy:   (+) Malignancy, History of Lung.  Lung CA Remission status post Surgery and Chemo.      Other:  - neg other ROS          Physical Exam    Airway        Mallampati: II   TM distance: > 3 FB   Neck ROM: full   Mouth opening: > 3 cm    Respiratory Devices and Support         Dental       (+) Minor Abnormalities - some fillings, tiny chips      Cardiovascular   cardiovascular exam normal       Rhythm and rate: irregular     Pulmonary   pulmonary exam normal                OUTSIDE LABS:  CBC:   Lab Results   Component Value Date    WBC 6.6 06/12/2024    WBC 7.6 01/08/2024    HGB 15.1 06/12/2024    HGB 15.4 01/08/2024    HCT 44.5 06/12/2024    HCT 44.3 01/08/2024     06/12/2024     01/08/2024     BMP:   Lab Results   Component Value Date     06/12/2024     (L) 01/08/2024    POTASSIUM 4.2 06/12/2024    POTASSIUM 4.3 01/08/2024    CHLORIDE 96 (L) 06/12/2024    CHLORIDE 96 (L) 01/08/2024    CO2 31 (H) 06/12/2024    CO2 29 01/08/2024    BUN 16.2 06/12/2024    BUN 16.8 01/08/2024    CR 1.00 (H) 06/12/2024    CR 0.98 (H) 01/08/2024     (H) 06/12/2024     (H) 01/08/2024     COAGS:   Lab Results   Component Value Date    INR 4.5 (H) 07/02/2024     POC:   Lab Results   Component Value Date    BGM 84 05/20/2021     HEPATIC:   Lab Results   Component Value  "Date    ALBUMIN 4.3 06/12/2024    PROTTOTAL 6.9 06/12/2024    ALT 33 06/12/2024    AST 35 06/12/2024    ALKPHOS 80 06/12/2024    BILITOTAL 0.5 06/12/2024     OTHER:   Lab Results   Component Value Date    PH 7.42 10/22/2020    LACT 1.0 10/22/2020    A1C 5.6 10/23/2020    SABINO 9.5 06/12/2024    PHOS 3.7 10/28/2020    MAG 1.5 (L) 01/25/2023    TSH 1.11 12/27/2022       Anesthesia Plan    ASA Status:  3    NPO Status:  NPO Appropriate    Anesthesia Type: General.     - Airway: ETT   Induction: Intravenous.   Maintenance: Balanced.   Techniques and Equipment:     - Lines/Monitors: Arterial Line     Consents    Anesthesia Plan(s) and associated risks, benefits, and realistic alternatives discussed. Questions answered and patient/representative(s) expressed understanding.     - Discussed:     - Discussed with:  Patient      - Extended Intubation/Ventilatory Support Discussed: No.      - Patient is DNR/DNI Status: No     Use of blood products discussed: Yes.     - Discussed with: Patient.     - Consented: consented to blood products            Reason for refusal: other.     Postoperative Care    Pain management: IV analgesics, Oral pain medications, Multi-modal analgesia.   PONV prophylaxis: Ondansetron (or other 5HT-3), Dexamethasone or Solumedrol, Background Propofol Infusion     Comments:               Edwar Roach, DO    I have reviewed the pertinent notes and labs in the chart from the past 30 days and (re)examined the patient.  Any updates or changes from those notes are reflected in this note.            # Coagulation Defect: INR = 4.5 (Ref range: 0.9 - 1.1) and/or PTT = N/A, will monitor for bleeding   # Obesity: Estimated body mass index is 31.53 kg/m  as calculated from the following:    Height as of 6/13/24: 1.6 m (5' 3\").    Weight as of 6/13/24: 80.7 kg (178 lb).      "

## 2024-07-02 NOTE — PROGRESS NOTES
ANTICOAGULATION MANAGEMENT     Ijeoma Shah 73 year old female is on warfarin with supratherapeutic INR result. (Goal INR 2.0-3.0)    Recent labs: (last 7 days)     07/02/24  1333   INR 4.5*       ASSESSMENT     Warfarin Lab Questionnaire    Warfarin Doses Last 7 Days      7/1/2024     9:13 AM   Dose in Tablet or Mg   TAB or MG? milligram (mg)     Pt Rptd Dose SUNDAY MONDAY TUESDAY WED THURS FRIDAY SATURDAY 7/1/2024   9:13 AM 7.5 10 7.5 7.5 7.5 7.5 7.5         7/1/2024   Warfarin Lab Questionnaire   Missed doses within past 14 days? No   Changes in diet or alcohol within past 14 days? No   Medication changes since last result? Yes   Please list: Metoprolol increases   Injuries or illness since last result? No   New shortness of breath, severe headaches or sudden changes in vision since last result? Yes   Abnormal bleeding since last result? No   Upcoming surgery, procedure? Yes   Please explain, date scheduled? Ablation 7-3-24   Best number to call with results? [phone]        Previous result: Supratherapeutic  Additional findings: Upcoming surgery/procedure FIOR ablation scheduled 7/3/24 but due to supra INR has been cancelled . Scheduled patient for weekly INRs moving forward. She hopes to have the ablation without the FIOR if possible.        PLAN     Recommended plan for no diet, medication or health factor changes affecting INR     Dosing Instructions: partial hold then decrease your warfarin dose (13.6% change) with next INR in 1 week       Summary  As of 7/2/2024      Full warfarin instructions:  7/2: 2.5 mg; Otherwise 5 mg every Tue, Sat; 7.5 mg all other days   Next INR check:  7/9/2024               Telephone call with Salima who verbalizes understanding and agrees to plan and who agrees to plan and repeated back plan correctly    Lab visit scheduled    Education provided: Symptom monitoring: monitoring for bleeding signs and symptoms, when to seek medical attention/emergency care, and if  you hit your head or have a bad fall seek emergency care  Importance of notifying anticoagulation clinic for: changes in medications; a sooner lab recheck maybe needed, diarrhea, nausea/vomiting, reduced intake, cold/flu, and/or infections; a sooner lab recheck maybe needed, and upcoming surgeries and procedures 2 weeks in advance  Contact [phone] with any changes, questions or concerns.     Plan made with [facility] Pharmacist Cristine Lockett RN  Anticoagulation Clinic  7/2/2024    _______________________________________________________________________     Anticoagulation Episode Summary       Current INR goal:  2.0-3.0   TTR:  67.8% (1 y)   Target end date:  Indefinite   Send INR reminders to:  [facility]    Indications    Paroxysmal atrial fibrillation (H) [I48.0]  Atrial fibrillation with rapid ventricular response (H) [I48.91]             Comments:  DVM okay             Anticoagulation Care Providers       Provider Role Specialty Phone number    Warren Pisano MD Referring Cardiovascular Disease [phone]    Jyoti Espinal MD Referring Family Medicine [phone]

## 2024-07-02 NOTE — TELEPHONE ENCOUNTER
Call to pt to to go over prep for ablation tomorrow.  Pt INR had come back today at 4.5. INR discussed with Dr Monsivais and after trying to move ablation to the afternoon to see if INR would be lower in the morning, but FIOR in the afternoon was not available. It was then decided that ablation needs to be rescheduled. Discussed this with pt who was bummed, but discussed that we can also look at all the EP MD's for next available or we can also wait for 3-4 weeks with weekly INR's and pt would not need to have FIOR.  Explained that will discuss tomorrow with Dina in scheduling as to what can be arrange. Pt stated understanding. Prince

## 2024-07-03 ENCOUNTER — TELEPHONE (OUTPATIENT)
Dept: CARDIOLOGY | Facility: CLINIC | Age: 74
End: 2024-07-03
Payer: MEDICARE

## 2024-07-03 ENCOUNTER — ANESTHESIA (OUTPATIENT)
Dept: CARDIOLOGY | Facility: CLINIC | Age: 74
End: 2024-07-03
Payer: MEDICARE

## 2024-07-03 NOTE — TELEPHONE ENCOUNTER
7/3/24 Pt called to say ever since she saw Britt in May and her Lisinopril was stopped and Metoprolol was increased she has felt very fatigues. She does not have any BP or P data as she does not ever check   Advised pt to checked BP and P 2x/day and we will call pt on Monday for update  Pt voiced understanding and agreement with plan.   Clarissa 445 pm

## 2024-07-08 NOTE — TELEPHONE ENCOUNTER
Patient called with blood pressure readings as instructed in previous note. She said she did buy a new blood pressure cuff.     7/4 - morning 120/86,  afternoon 125/94, pulse 96   7/5 - morning 120/82, pulse 60, afternoon 116/92  7/6 - morning 117/91, pulse 62, afternoon 120/90  7/7 - morning 116/90, afternoon 95/68    Patient currently taking Toprol xl 50mg in evening but patient is wondering if she can go down to one tab to equal 25mg daily due to extreme fatigue.     Patient also questioning her appointments and does she need to keep all of them?    Patient's current cardiology schedule:  7/15/24 - EKG  10am in [place]  8/12/24 -  A Fib ablation at [facility] with Dr. Monsivais  8/14/24 -  Follow up with Britt Gardner NP in [place] 2:30pm  8/19/24 - EKG 11:30am in [place] then Video Visit with Janis Carr NP on [place] schedule at 12:20pm  11/6/24 - Follow up with Dr. Monsivais Video Visit on [place] schedule at 4:05pm    Will route to Britt Gardner NP for recommendation on Toprol dosing and appointments.

## 2024-07-09 ENCOUNTER — ANTICOAGULATION THERAPY VISIT (OUTPATIENT)
Dept: ANTICOAGULATION | Facility: CLINIC | Age: 74
End: 2024-07-09

## 2024-07-09 ENCOUNTER — LAB (OUTPATIENT)
Dept: LAB | Facility: CLINIC | Age: 74
End: 2024-07-09
Payer: MEDICARE

## 2024-07-09 DIAGNOSIS — I48.0 PAROXYSMAL ATRIAL FIBRILLATION (H): Primary | ICD-10-CM

## 2024-07-09 DIAGNOSIS — I48.0 PAROXYSMAL ATRIAL FIBRILLATION (H): ICD-10-CM

## 2024-07-09 DIAGNOSIS — I48.91 ATRIAL FIBRILLATION WITH RAPID VENTRICULAR RESPONSE (H): ICD-10-CM

## 2024-07-09 LAB — INR BLD: 2.5 (ref 0.9–1.1)

## 2024-07-09 PROCEDURE — 85610 PROTHROMBIN TIME: CPT

## 2024-07-09 PROCEDURE — 36416 COLLJ CAPILLARY BLOOD SPEC: CPT

## 2024-07-09 NOTE — PROGRESS NOTES
ANTICOAGULATION MANAGEMENT     Ijeoma Shah 73 year old female is on warfarin with therapeutic INR result. (Goal INR 2.0-3.0)    Recent labs: (last 7 days)     07/09/24  1324   INR 2.5*       ASSESSMENT     Source(s): Chart Review and Patient/Caregiver Call     Warfarin doses taken: Warfarin taken as instructed  Diet: No new diet changes identified  Medication/supplement changes: None noted  New illness, injury, or hospitalization: No  Signs or symptoms of bleeding or clotting: No  Previous result: Supratherapeutic  Additional findings: Upcoming Atrial fib/flutter ablation; weekly INR monitoring. To notify EP pool and cardiologist if INR < 2 if scheduled, INR > 3.3 within a week of ablation/PVI, or non-compliance with monitoring > 1 week.       PLAN     Recommended plan for no diet, medication or health factor changes affecting INR     Dosing Instructions: Continue your current warfarin dose with next INR in 1 week       Summary  As of 7/9/2024      Full warfarin instructions:  5 mg every Tue, Sat; 7.5 mg all other days   Next INR check:  7/16/2024               Telephone call with Salima who verbalizes understanding and agrees to plan    Appt already scheduled for the next 2 weeks    Education provided: Contact [phone] with any changes, questions or concerns.     Plan made per [facility] anticoagulation protocol    Radha Tobias RN  Anticoagulation Clinic  7/9/2024    _______________________________________________________________________     Anticoagulation Episode Summary       Current INR goal:  2.0-3.0   TTR:  67.7% (1 y)   Target end date:  Indefinite   Send INR reminders to:  [facility]    Indications    Paroxysmal atrial fibrillation (H) [I48.0]  Atrial fibrillation with rapid ventricular response (H) [I48.91]             Comments:  RAJESH garibay             Anticoagulation Care Providers       Provider Role Specialty Phone number    Warren Pisano MD Referring Cardiovascular  Disease [phone]    Jyoti Espinal MD [facility] Medicine [phone]

## 2024-07-11 NOTE — TELEPHONE ENCOUNTER
The 8/14 visit was scheduled when I saw her a few months ago.  That can be canceled.  She should keep the follow-up with Janis because at her 1 week post ablation follow-up with an EKG.

## 2024-07-11 NOTE — TELEPHONE ENCOUNTER
Spoke with patient, relayed Britt Gardner NP message to her. She will decrease metoprolol to 25mg daily and canceled the visit on 8/14 but kept the EKG appt on 8/19 and video visit with Janis Carr NP on 8/19.

## 2024-07-15 DIAGNOSIS — I48.0 PAROXYSMAL ATRIAL FIBRILLATION (H): Primary | ICD-10-CM

## 2024-07-16 ENCOUNTER — ANTICOAGULATION THERAPY VISIT (OUTPATIENT)
Dept: ANTICOAGULATION | Facility: CLINIC | Age: 74
End: 2024-07-16

## 2024-07-16 ENCOUNTER — LAB (OUTPATIENT)
Dept: LAB | Facility: CLINIC | Age: 74
End: 2024-07-16
Payer: MEDICARE

## 2024-07-16 DIAGNOSIS — I48.0 PAROXYSMAL ATRIAL FIBRILLATION (H): Primary | ICD-10-CM

## 2024-07-16 DIAGNOSIS — I48.91 ATRIAL FIBRILLATION WITH RAPID VENTRICULAR RESPONSE (H): ICD-10-CM

## 2024-07-16 DIAGNOSIS — I48.0 PAROXYSMAL ATRIAL FIBRILLATION (H): ICD-10-CM

## 2024-07-16 LAB — INR BLD: 2.5 (ref 0.9–1.1)

## 2024-07-16 PROCEDURE — 85610 PROTHROMBIN TIME: CPT

## 2024-07-16 PROCEDURE — 36416 COLLJ CAPILLARY BLOOD SPEC: CPT

## 2024-07-16 NOTE — PROGRESS NOTES
ANTICOAGULATION MANAGEMENT     Ijeoma Shah 73 year old female is on warfarin with therapeutic INR result. (Goal INR 2.0-3.0)    Recent labs: (last 7 days)     07/16/24  1332   INR 2.5*       ASSESSMENT     Warfarin Lab Questionnaire    Warfarin Doses Last 7 Days      7/15/2024     8:27 AM   Dose in Tablet or Mg   TAB or MG? milligram (mg)     Pt Rptd Dose TED MONDAY TUESDAY WED THURS FRIDAY SATURDAY   7/15/2024   8:27 AM 7.5 7.5 5 7.5 7.5 7.5 5         7/15/2024   Warfarin Lab Questionnaire   Missed doses within past 14 days? No   Changes in diet or alcohol within past 14 days? No   Medication changes since last result? Yes   Please list: Metoprolol lowered to 25 mg per day   Injuries or illness since last result? No   New shortness of breath, severe headaches or sudden changes in vision since last result? No   Abnormal bleeding since last result? No   Upcoming surgery, procedure? Yes   Please explain, date scheduled? Ablation 8-12-24        Previous result: Therapeutic last visit; previously outside of goal range  Additional findings: Upcoming Atrial fib/flutter ablation; weekly INR monitoring. To notify EP pool and cardiologist if INR < 2 if scheduled, INR > 3.3 within a week of ablation/PVI, or non-compliance with monitoring > 1 week.       PLAN     Recommended plan for no diet, medication or health factor changes affecting INR     Dosing Instructions: Continue your current warfarin dose with next INR in 1 week       Summary  As of 7/16/2024      Full warfarin instructions:  5 mg every Tue, Sat; 7.5 mg all other days   Next INR check:  7/23/2024               Detailed voice message left for Salima with dosing instructions and follow up date.     Contact [phone] to schedule and with any changes, questions or concerns.  - next INR is already scheduled, but patient will need additional weekly INRs scheduled. Writer requested she return call to set these up.    Education provided: Please call back  if any changes to your diet, medications or how you've been taking warfarin    Plan made per [facility] anticoagulation protocol    Yoselin Lockett, RN  Anticoagulation Clinic  7/16/2024    _______________________________________________________________________     Anticoagulation Episode Summary       Current INR goal:  2.0-3.0   TTR:  69.6% (1 y)   Target end date:  Indefinite   Send INR reminders to:  [facility]    Indications    Paroxysmal atrial fibrillation (H) [I48.0]  Atrial fibrillation with rapid ventricular response (H) [I48.91]             Comments:  RAJESH garibay             Anticoagulation Care Providers       Provider Role Specialty Phone number    Warren Pisano MD Referring Cardiovascular Disease [phone]    Jyoti Espinal MD Referring Family Medicine [phone]

## 2024-07-23 ENCOUNTER — ANTICOAGULATION THERAPY VISIT (OUTPATIENT)
Dept: ANTICOAGULATION | Facility: CLINIC | Age: 74
End: 2024-07-23

## 2024-07-23 ENCOUNTER — LAB (OUTPATIENT)
Dept: LAB | Facility: CLINIC | Age: 74
End: 2024-07-23
Payer: MEDICARE

## 2024-07-23 DIAGNOSIS — I48.91 ATRIAL FIBRILLATION WITH RAPID VENTRICULAR RESPONSE (H): ICD-10-CM

## 2024-07-23 DIAGNOSIS — I48.0 PAROXYSMAL ATRIAL FIBRILLATION (H): Primary | ICD-10-CM

## 2024-07-23 DIAGNOSIS — I48.0 PAROXYSMAL ATRIAL FIBRILLATION (H): ICD-10-CM

## 2024-07-23 LAB — INR BLD: 2.3 (ref 0.9–1.1)

## 2024-07-23 PROCEDURE — 36416 COLLJ CAPILLARY BLOOD SPEC: CPT

## 2024-07-23 PROCEDURE — 85610 PROTHROMBIN TIME: CPT

## 2024-07-23 NOTE — PROGRESS NOTES
ANTICOAGULATION MANAGEMENT     Ijeoma Shah 73 year old female is on warfarin with therapeutic INR result. (Goal INR 2.0-3.0)    Recent labs: (last 7 days)     07/23/24  1231   INR 2.3*       ASSESSMENT     Warfarin Lab Questionnaire    Warfarin Doses Last 7 Days      7/22/2024     9:16 AM   Dose in Tablet or Mg   TAB or MG? milligram (mg)     Pt Rptd Dose SUNDAY MONDAY TUESDAY WED THURS FRIDAY SATURDAY 7/22/2024   9:16 AM 7.5 7.5 5 7.5 5 7.5 5         7/22/2024   Warfarin Lab Questionnaire   Missed doses within past 14 days? No   Changes in diet or alcohol within past 14 days? No   Medication changes since last result? No   Injuries or illness since last result? No   New shortness of breath, severe headaches or sudden changes in vision since last result? No   Abnormal bleeding since last result? No   Upcoming surgery, procedure? Yes   Please explain, date scheduled? 8-12-24 ablation   Best number to call with results? [phone]        Previous result: Therapeutic last 2(+) visits  Additional findings: Upcoming Atrial fib/flutter ablation; weekly INR monitoring. To notify EP pool and cardiologist if INR < 2 if scheduled, INR > 3.3 within a week of ablation/PVI, or non-compliance with monitoring > 1 week.       PLAN     Recommended plan for no diet, medication or health factor changes affecting INR     Dosing Instructions: Continue your current warfarin dose with next INR in 1 week       Summary  As of 7/23/2024      Full warfarin instructions:  5 mg every Tue, Sat; 7.5 mg all other days   Next INR check:  7/30/2024               Telephone call with Salima who verbalizes understanding and agrees to plan    Lab visit scheduled    Education provided: Please call back if any changes to your diet, medications or how you've been taking warfarin  Contact [phone] with any changes, questions or concerns.     Plan made per [facility] anticoagulation protocol    Sherie Esposito RN  Anticoagulation  Clinic  7/23/2024    _______________________________________________________________________     Anticoagulation Episode Summary       Current INR goal:  2.0-3.0   TTR:  71.3% (1 y)   Target end date:  Indefinite   Send INR reminders to:  [facility]    Indications    Paroxysmal atrial fibrillation (H) [I48.0]  Atrial fibrillation with rapid ventricular response (H) [I48.91]             Comments:  RAJESH garibay             Anticoagulation Care Providers       Provider Role Specialty Phone number    Warren Pisano MD Referring Cardiovascular Disease [phone]    Jyoti Espinal MD Referring Family Medicine [phone]

## 2024-07-30 ENCOUNTER — ANTICOAGULATION THERAPY VISIT (OUTPATIENT)
Dept: ANTICOAGULATION | Facility: CLINIC | Age: 74
End: 2024-07-30

## 2024-07-30 ENCOUNTER — LAB (OUTPATIENT)
Dept: LAB | Facility: CLINIC | Age: 74
End: 2024-07-30
Payer: MEDICARE

## 2024-07-30 DIAGNOSIS — C34.92 SQUAMOUS CELL CARCINOMA OF LEFT LUNG (H): ICD-10-CM

## 2024-07-30 DIAGNOSIS — I48.91 ATRIAL FIBRILLATION WITH RAPID VENTRICULAR RESPONSE (H): ICD-10-CM

## 2024-07-30 DIAGNOSIS — I48.0 PAROXYSMAL ATRIAL FIBRILLATION (H): Primary | ICD-10-CM

## 2024-07-30 DIAGNOSIS — I48.0 PAROXYSMAL ATRIAL FIBRILLATION (H): ICD-10-CM

## 2024-07-30 DIAGNOSIS — I10 BENIGN ESSENTIAL HYPERTENSION: ICD-10-CM

## 2024-07-30 LAB — INR BLD: 2.3 (ref 0.9–1.1)

## 2024-07-30 PROCEDURE — 85610 PROTHROMBIN TIME: CPT

## 2024-07-30 PROCEDURE — 36416 COLLJ CAPILLARY BLOOD SPEC: CPT

## 2024-07-30 RX ORDER — WARFARIN SODIUM 5 MG/1
TABLET ORAL
Qty: 125 TABLET | Refills: 1 | Status: ON HOLD | OUTPATIENT
Start: 2024-07-30 | End: 2024-08-17

## 2024-07-30 NOTE — PROGRESS NOTES
ANTICOAGULATION MANAGEMENT     Ijeoma Shah 73 year old female is on warfarin with therapeutic INR result. (Goal INR 2.0-3.0)    Recent labs: (last 7 days)     07/30/24  1322   INR 2.3*       ASSESSMENT     Source(s): Chart Review and Patient/Caregiver Call     Warfarin doses taken: Warfarin taken as instructed  Diet: No new diet changes identified  Medication/supplement changes: None noted  New illness, injury, or hospitalization: Pt was stung by hornet over the weekend, had to use epipen, arm is still a little swollen and itchy.    Signs or symptoms of bleeding or clotting: No  Previous result: Therapeutic last 2(+) visits  Additional findings: Upcoming Atrial fib/flutter ablation; weekly INR monitoring. To notify EP pool and cardiologist if INR < 2 if scheduled, INR > 3.3 within a week of ablation/PVI, or non-compliance with monitoring > 1 week. - Ablation 8/12/24  Refill needed today. Ijeoma meets all criteria for refill (current [facility] referral, office visit with referring provider/group in last 1 year unless directed to return in 2 years in last referring provider visit note, lab monitoring up to date or not exceeding 2 weeks overdue). Rx instructions and quantity supplied updated to match patient's current dosing plan. Warfarin 90 day supply with 1 refill granted per [facility] protocol        PLAN     Recommended plan for no diet, medication or health factor changes affecting INR     Dosing Instructions: Continue your current warfarin dose with next INR in 1 week       Summary  As of 7/30/2024      Full warfarin instructions:  5 mg every Tue, Sat; 7.5 mg all other days   Next INR check:  8/6/2024               Telephone call with Salima who verbalizes understanding and agrees to plan    Lab visit scheduled    Education provided: Goal range and lab monitoring: goal range and significance of current result, Importance of therapeutic range, and Importance of following up at instructed interval    Plan made  per [facility] anticoagulation protocol    Segundo Marie RN  Anticoagulation Clinic  7/30/2024    _______________________________________________________________________     Anticoagulation Episode Summary       Current INR goal:  2.0-3.0   TTR:  71.3% (1 y)   Target end date:  Indefinite   Send INR reminders to:  [facility]    Indications    Paroxysmal atrial fibrillation (H) [I48.0]  Atrial fibrillation with rapid ventricular response (H) [I48.91]             Comments:  RAJESH garibay             Anticoagulation Care Providers       Provider Role Specialty Phone number    Warren Pisano MD Referring Cardiovascular Disease [phone]    Jyoti Espinal MD Referring Family Medicine [phone]

## 2024-08-06 ENCOUNTER — OFFICE VISIT (OUTPATIENT)
Dept: FAMILY MEDICINE | Facility: CLINIC | Age: 74
End: 2024-08-06
Payer: MEDICARE

## 2024-08-06 ENCOUNTER — ANCILLARY PROCEDURE (OUTPATIENT)
Dept: GENERAL RADIOLOGY | Facility: CLINIC | Age: 74
End: 2024-08-06
Attending: FAMILY MEDICINE
Payer: MEDICARE

## 2024-08-06 ENCOUNTER — ANTICOAGULATION THERAPY VISIT (OUTPATIENT)
Dept: ANTICOAGULATION | Facility: CLINIC | Age: 74
End: 2024-08-06

## 2024-08-06 ENCOUNTER — LAB (OUTPATIENT)
Dept: LAB | Facility: CLINIC | Age: 74
End: 2024-08-06
Payer: MEDICARE

## 2024-08-06 VITALS
OXYGEN SATURATION: 98 % | HEIGHT: 63 IN | BODY MASS INDEX: 29.23 KG/M2 | SYSTOLIC BLOOD PRESSURE: 104 MMHG | TEMPERATURE: 98.5 F | DIASTOLIC BLOOD PRESSURE: 64 MMHG | RESPIRATION RATE: 18 BRPM | HEART RATE: 104 BPM | WEIGHT: 165 LBS

## 2024-08-06 DIAGNOSIS — I48.0 PAROXYSMAL ATRIAL FIBRILLATION (H): ICD-10-CM

## 2024-08-06 DIAGNOSIS — M25.521 CHRONIC ELBOW PAIN, RIGHT: ICD-10-CM

## 2024-08-06 DIAGNOSIS — G89.29 CHRONIC ELBOW PAIN, RIGHT: ICD-10-CM

## 2024-08-06 DIAGNOSIS — M25.521 CHRONIC ELBOW PAIN, RIGHT: Primary | ICD-10-CM

## 2024-08-06 DIAGNOSIS — G89.29 CHRONIC ELBOW PAIN, RIGHT: Primary | ICD-10-CM

## 2024-08-06 DIAGNOSIS — I48.91 ATRIAL FIBRILLATION WITH RAPID VENTRICULAR RESPONSE (H): ICD-10-CM

## 2024-08-06 DIAGNOSIS — I48.0 PAROXYSMAL ATRIAL FIBRILLATION (H): Primary | ICD-10-CM

## 2024-08-06 LAB — INR BLD: 2.4 (ref 0.9–1.1)

## 2024-08-06 PROCEDURE — 85610 PROTHROMBIN TIME: CPT

## 2024-08-06 PROCEDURE — 36416 COLLJ CAPILLARY BLOOD SPEC: CPT

## 2024-08-06 PROCEDURE — 99213 OFFICE O/P EST LOW 20 MIN: CPT | Performed by: FAMILY MEDICINE

## 2024-08-06 PROCEDURE — 73080 X-RAY EXAM OF ELBOW: CPT | Mod: TC | Performed by: RADIOLOGY

## 2024-08-06 ASSESSMENT — PAIN SCALES - GENERAL: PAINLEVEL: NO PAIN (0)

## 2024-08-06 NOTE — NURSING NOTE
"Chief Complaint   Patient presents with    Musculoskeletal Problem       Initial /64   Pulse 104   Temp 98.5  F (36.9  C)   Resp 18   Ht 1.6 m (5' 3\")   Wt 74.8 kg (165 lb)   LMP 01/22/2002   SpO2 98%   BMI 29.23 kg/m   Estimated body mass index is 29.23 kg/m  as calculated from the following:    Height as of this encounter: 1.6 m (5' 3\").    Weight as of this encounter: 74.8 kg (165 lb).    Patient presents to the clinic using No DME    Is there anyone who you would like to be able to receive your results? No  If yes have patient fill out HARPAL      "

## 2024-08-06 NOTE — PROGRESS NOTES
ANTICOAGULATION MANAGEMENT     Ijeoma Shah 73 year old female is on warfarin with therapeutic INR result. (Goal INR 2.0-3.0)    Recent labs: (last 7 days)     08/06/24  1121   INR 2.4*       ASSESSMENT     Warfarin Lab Questionnaire    Warfarin Doses Last 7 Days      8/5/2024     9:09 AM   Dose in Tablet or Mg   TAB or MG? milligram (mg)     Pt Rptd Dose SUNDAY MONDAY TUESDAY WED THURS FRIDAY SATURDAY 8/5/2024   9:09 AM 7.5 7.5 5 7.5 7.5 7.5 5         8/5/2024   Warfarin Lab Questionnaire   Missed doses within past 14 days? No   Changes in diet or alcohol within past 14 days? No   Medication changes since last result? No   Injuries or illness since last result? No   New shortness of breath, severe headaches or sudden changes in vision since last result? No   Abnormal bleeding since last result? No   Upcoming surgery, procedure? Yes   Please explain, date scheduled? Ablation 8-12   Best number to call with results? [phone]        Previous result: Therapeutic last 2(+) visits  Additional findings: Upcoming Atrial fib/flutter ablation; weekly INR monitoring. To notify EP pool and cardiologist if INR < 2 if scheduled, INR > 3.3 within a week of ablation/PVI, or non-compliance with monitoring > 1 week.       PLAN     Recommended plan for no diet, medication or health factor changes affecting INR     Dosing Instructions: Continue your current warfarin dose with next INR in 1 week at ablation appointment      Summary  As of 8/6/2024      Full warfarin instructions:  5 mg every Tue, Sat; 7.5 mg all other days   Next INR check:  8/12/2024               Telephone call with Salima who verbalizes understanding and agrees to plan    Lab visit scheduled for one week after ablation    Education provided: Please call back if any changes to your diet, medications or how you've been taking warfarin  Contact [phone] with any changes, questions or concerns.     Plan made per [facility] anticoagulation  protocol    Sherie Esposito RN  Anticoagulation Clinic  8/6/2024    _______________________________________________________________________     Anticoagulation Episode Summary       Current INR goal:  2.0-3.0   TTR:  71.3% (1 y)   Target end date:  Indefinite   Send INR reminders to:  [facility]    Indications    Paroxysmal atrial fibrillation (H) [I48.0]  Atrial fibrillation with rapid ventricular response (H) [I48.91]             Comments:  RAJESH garibay             Anticoagulation Care Providers       Provider Role Specialty Phone number    Warren Pisano MD Referring Cardiovascular Disease [phone]    Jyoti Espinal MD Referring Family Medicine [phone]

## 2024-08-06 NOTE — PROGRESS NOTES
"  Assessment & Plan     Chronic elbow pain, right  Based on location and description of symptoms likely lateral epicondylitis  Unable to reproduce pain today - she has been feeling better for the past couple of days due to resting  Plan for counter force brace, she is on coumadin - take NSAIDs sparingly, ice as needed  Xray as below  - XR Elbow Right G/E 3 Views; Future          BMI  Estimated body mass index is 29.23 kg/m  as calculated from the following:    Height as of this encounter: 1.6 m (5' 3\").    Weight as of this encounter: 74.8 kg (165 lb).             Samantha Borrego is a 73 year old, presenting for the following health issues:  Musculoskeletal Problem    History of Present Illness       Reason for visit:  Pain in right arm  Symptom onset:  More than a month  Symptoms include:  Pain in right arm by elbow  Symptom intensity:  Severe  Symptom progression:  Staying the same  Had these symptoms before:  Yes  Has tried/received treatment for these symptoms:  No  What makes it worse:  Using arm for some things  What makes it better:  Rubbing it or ice    She eats 0-1 servings of fruits and vegetables daily.She consumes 3 sweetened beverage(s) daily.She exercises with enough effort to increase her heart rate 10 to 19 minutes per day.  She exercises with enough effort to increase her heart rate 5 days per week.   She is taking medications regularly.    Last 6 months been getting worse  Worse when using key board  Mainly on the lateral side    No sports hobbies    No numbness or tingling down fingers    History of arthritis in back    No history of gout                  Objective    /64   Pulse 104   Temp 98.5  F (36.9  C)   Resp 18   Ht 1.6 m (5' 3\")   Wt 74.8 kg (165 lb)   LMP 01/22/2002   SpO2 98%   BMI 29.23 kg/m    Body mass index is 29.23 kg/m .  Physical Exam  Vitals reviewed.   Musculoskeletal:      Right elbow: Normal. No swelling, deformity, effusion or lacerations. Normal range of " motion. No tenderness.      Left elbow: Normal. No swelling, deformity, effusion or lacerations. Normal range of motion. No tenderness.   Neurological:      Mental Status: She is alert.                    Signed Electronically by: Ángela Gallardo MD

## 2024-08-08 NOTE — TELEPHONE ENCOUNTER
Patient scheduled for AF ablation on 8/12.  Needed 4 weeks of therapeutic INR prior to procedure.    INR  7/9  2.5  7/16 2.5  7/23 2.3  7/30 2.3  8/6 2.4  Spoke to patient asking ths she obtain one more INR tomorrow 8/9 as if this is therapeutic FIOR would not be need per note 7/2.  Patient agreed with plan and will get this scheduled.  She is aware she will be getting a call tomorrow to go over instructions and answer any questions she may have regarding procedure on 8/12. DEIDRE Manriquez

## 2024-08-09 ENCOUNTER — TELEPHONE (OUTPATIENT)
Dept: CARDIOLOGY | Facility: CLINIC | Age: 74
End: 2024-08-09

## 2024-08-09 ENCOUNTER — LAB (OUTPATIENT)
Dept: LAB | Facility: CLINIC | Age: 74
End: 2024-08-09
Payer: MEDICARE

## 2024-08-09 ENCOUNTER — TELEPHONE (OUTPATIENT)
Dept: MEDSURG UNIT | Facility: CLINIC | Age: 74
End: 2024-08-09

## 2024-08-09 ENCOUNTER — ANTICOAGULATION THERAPY VISIT (OUTPATIENT)
Dept: ANTICOAGULATION | Facility: CLINIC | Age: 74
End: 2024-08-09

## 2024-08-09 DIAGNOSIS — I48.0 PAROXYSMAL ATRIAL FIBRILLATION (H): Primary | ICD-10-CM

## 2024-08-09 DIAGNOSIS — I48.91 ATRIAL FIBRILLATION WITH RAPID VENTRICULAR RESPONSE (H): ICD-10-CM

## 2024-08-09 DIAGNOSIS — I48.0 PAROXYSMAL ATRIAL FIBRILLATION (H): ICD-10-CM

## 2024-08-09 LAB — INR BLD: 2.3 (ref 0.9–1.1)

## 2024-08-09 PROCEDURE — 36416 COLLJ CAPILLARY BLOOD SPEC: CPT

## 2024-08-09 PROCEDURE — 85610 PROTHROMBIN TIME: CPT

## 2024-08-09 RX ORDER — LIDOCAINE 40 MG/G
CREAM TOPICAL
Status: CANCELLED | OUTPATIENT
Start: 2024-08-09

## 2024-08-09 RX ORDER — SODIUM CHLORIDE, SODIUM LACTATE, POTASSIUM CHLORIDE, CALCIUM CHLORIDE 600; 310; 30; 20 MG/100ML; MG/100ML; MG/100ML; MG/100ML
INJECTION, SOLUTION INTRAVENOUS CONTINUOUS
Status: CANCELLED | OUTPATIENT
Start: 2024-08-09

## 2024-08-09 NOTE — PROGRESS NOTES
ANTICOAGULATION MANAGEMENT     Ijeoma Shah 73 year old female is on warfarin with therapeutic INR result. (Goal INR 2.0-3.0)    Recent labs: (last 7 days)     08/09/24  1059   INR 2.3*       ASSESSMENT     Warfarin Lab Questionnaire    Warfarin Doses Last 7 Days      8/9/2024     9:49 AM   Dose in Tablet or Mg   TAB or MG? milligram (mg)     Pt Rptd Dose SUNDAY MONDAY TUESDAY WED THURS FRIDAY SATURDAY 8/9/2024   9:49 AM 7.5 7.5 5 7.5 7.5 7.5 5         8/9/2024   Warfarin Lab Questionnaire   Missed doses within past 14 days? No   Changes in diet or alcohol within past 14 days? No   Medication changes since last result? No   Injuries or illness since last result? No   New shortness of breath, severe headaches or sudden changes in vision since last result? No   Abnormal bleeding since last result? No   Upcoming surgery, procedure? Yes   Please explain, date scheduled? Ablation   Best number to call with results? [phone]        Previous result: Therapeutic last 2(+) visits  Additional findings: Upcoming Atrial fib/flutter ablation; weekly INR monitoring. To notify EP pool and cardiologist if INR < 2 if scheduled, INR > 3.3 within a week of ablation/PVI, or non-compliance with monitoring > 1 week.       PLAN     Recommended plan for no diet, medication or health factor changes affecting INR     Dosing Instructions: Continue your current warfarin dose with next INR in 10 days. They will also check your INR prior to ablation on 8-12       Summary  As of 8/9/2024      Full warfarin instructions:  5 mg every Tue, Sat; 7.5 mg all other days   Next INR check:  8/19/2024               Telephone call with Salima who verbalizes understanding and agrees to plan    Lab visit scheduled    Education provided: Please call back if any changes to your diet, medications or how you've been taking warfarin  Contact [phone] with any changes, questions or concerns.     Plan made per [facility] anticoagulation  protocol    Sherie Esposito RN  Anticoagulation Clinic  8/9/2024    _______________________________________________________________________     Anticoagulation Episode Summary       Current INR goal:  2.0-3.0   TTR:  71.3% (1 y)   Target end date:  Indefinite   Send INR reminders to:  [facility]    Indications    Paroxysmal atrial fibrillation (H) [I48.0]  Atrial fibrillation with rapid ventricular response (H) [I48.91]             Comments:  RAJESH garibay             Anticoagulation Care Providers       Provider Role Specialty Phone number    Warren Pisano MD Referring Cardiovascular Disease [phone]    Jyoti Espinal MD Referring Family Medicine [phone]

## 2024-08-09 NOTE — TELEPHONE ENCOUNTER
8/9/24 Spoke [facility] and set up INR for 11 am today. Once that is resulted, will determine if FIOR is needed on Monday or not .  Pt aware of above and that I will be calling her after INR result is available  Clarissa 853 am

## 2024-08-09 NOTE — TELEPHONE ENCOUNTER
8/9/24 Called pt regarding Afib Ablation scheduled for 8/12/24   Notified of arrival time and location - 1030 am [facility] welcome desk   No solids 8 hours prior to arrival time  Clear liquids up until 2 hours prior to arrival  Discussed clear liquids allowed ( 7 up, ginger ale, chicken broth, apple juice, water, coffee with no cream or sugar)     Pt may have sips of water for am meds    Discussed meds to be held - NONE  Oral diabetes meds: NONE  Insulin: NONE  SGLT2 Inhibitors: NONE  GLP-1 Agonists: NONE  Diuretic: NONE    Pt is on Warfarin last INRS  7/9=2.5  7/16=2.5  7/23=2.3  7/30=2.3  8/6=2.4  8/9=2.3  Verbal ok from Britt Gardner NP that FIOR for Monday can be cancelled. Pt updated, message sent to scheduling to cancel      Discussed that patient will need a  for ride home and someone to stay with pt x 24 hours once pt arrives home   Pt will check temperature am of procedure and call [facility] at [phone] in the am in temp > 100.0  Pt voiced understanding and stated they have no further questions at this time.  Clarissa  1158 am

## 2024-08-12 ENCOUNTER — HOSPITAL ENCOUNTER (OUTPATIENT)
Facility: CLINIC | Age: 74
Discharge: HOME | End: 2024-08-12
Attending: INTERNAL MEDICINE | Admitting: INTERNAL MEDICINE
Payer: MEDICARE

## 2024-08-12 VITALS
DIASTOLIC BLOOD PRESSURE: 80 MMHG | SYSTOLIC BLOOD PRESSURE: 126 MMHG | TEMPERATURE: 97.2 F | WEIGHT: 165 LBS | RESPIRATION RATE: 16 BRPM | HEART RATE: 79 BPM | OXYGEN SATURATION: 99 % | BODY MASS INDEX: 29.23 KG/M2 | HEIGHT: 63 IN

## 2024-08-12 DIAGNOSIS — I48.19 PERSISTENT ATRIAL FIBRILLATION (H): Primary | ICD-10-CM

## 2024-08-12 DIAGNOSIS — I48.0 PAROXYSMAL ATRIAL FIBRILLATION (H): ICD-10-CM

## 2024-08-12 LAB
ACT BLD: 262 SECONDS (ref 74–150)
ACT BLD: 358 SECONDS (ref 74–150)
ACT BLD: 450 SECONDS (ref 74–150)
ANION GAP SERPL CALCULATED.3IONS-SCNC: 9 MMOL/L (ref 7–15)
BUN SERPL-MCNC: 21.6 MG/DL (ref 8–23)
CALCIUM SERPL-MCNC: 9.6 MG/DL (ref 8.8–10.4)
CHLORIDE SERPL-SCNC: 101 MMOL/L (ref 98–107)
CREAT SERPL-MCNC: 0.92 MG/DL (ref 0.51–0.95)
EGFRCR SERPLBLD CKD-EPI 2021: 66 ML/MIN/1.73M2
ERYTHROCYTE [DISTWIDTH] IN BLOOD BY AUTOMATED COUNT: 12.8 % (ref 10–15)
GLUCOSE SERPL-MCNC: 109 MG/DL (ref 70–99)
HCO3 SERPL-SCNC: 29 MMOL/L (ref 22–29)
HCT VFR BLD AUTO: 46.6 % (ref 35–47)
HGB BLD-MCNC: 15.8 G/DL (ref 11.7–15.7)
INR PPP: 1.98 (ref 0.85–1.15)
MCH RBC QN AUTO: 30.9 PG (ref 26.5–33)
MCHC RBC AUTO-ENTMCNC: 33.9 G/DL (ref 31.5–36.5)
MCV RBC AUTO: 91 FL (ref 78–100)
PLATELET # BLD AUTO: 173 10E3/UL (ref 150–450)
POTASSIUM SERPL-SCNC: 4.5 MMOL/L (ref 3.4–5.3)
RBC # BLD AUTO: 5.12 10E6/UL (ref 3.8–5.2)
SODIUM SERPL-SCNC: 139 MMOL/L (ref 135–145)
WBC # BLD AUTO: 6.8 10E3/UL (ref 4–11)

## 2024-08-12 PROCEDURE — 93005 ELECTROCARDIOGRAM TRACING: CPT

## 2024-08-12 PROCEDURE — 255N000002 HC RX 255 OP 636: Performed by: INTERNAL MEDICINE

## 2024-08-12 PROCEDURE — 250N000011 HC RX IP 250 OP 636: Performed by: INTERNAL MEDICINE

## 2024-08-12 PROCEDURE — C1760 CLOSURE DEV, VASC: HCPCS | Performed by: INTERNAL MEDICINE

## 2024-08-12 PROCEDURE — 250N000011 HC RX IP 250 OP 636: Performed by: NURSE ANESTHETIST, CERTIFIED REGISTERED

## 2024-08-12 PROCEDURE — 250N000013 HC RX MED GY IP 250 OP 250 PS 637: Performed by: INTERNAL MEDICINE

## 2024-08-12 PROCEDURE — 93656 COMPRE EP EVAL ABLTJ ATR FIB: CPT | Performed by: INTERNAL MEDICINE

## 2024-08-12 PROCEDURE — 85027 COMPLETE CBC AUTOMATED: CPT | Performed by: INTERNAL MEDICINE

## 2024-08-12 PROCEDURE — 93010 ELECTROCARDIOGRAM REPORT: CPT | Mod: XU | Performed by: INTERNAL MEDICINE

## 2024-08-12 PROCEDURE — 999N000184 HC STATISTIC TELEMETRY

## 2024-08-12 PROCEDURE — 250N000025 HC SEVOFLURANE, PER MIN: Performed by: INTERNAL MEDICINE

## 2024-08-12 PROCEDURE — C1769 GUIDE WIRE: HCPCS | Performed by: INTERNAL MEDICINE

## 2024-08-12 PROCEDURE — 93615 ESOPHAGEAL RECORDING: CPT | Mod: 26 | Performed by: INTERNAL MEDICINE

## 2024-08-12 PROCEDURE — 999N000054 HC STATISTIC EKG NON-CHARGEABLE

## 2024-08-12 PROCEDURE — 999N000071 HC STATISTIC HEART CATH LAB OR EP LAB

## 2024-08-12 PROCEDURE — 93656 COMPRE EP EVAL ABLTJ ATR FIB: CPT | Performed by: NURSE ANESTHETIST, CERTIFIED REGISTERED

## 2024-08-12 PROCEDURE — 93656 COMPRE EP EVAL ABLTJ ATR FIB: CPT | Performed by: STUDENT IN AN ORGANIZED HEALTH CARE EDUCATION/TRAINING PROGRAM

## 2024-08-12 PROCEDURE — 258N000003 HC RX IP 258 OP 636: Performed by: NURSE ANESTHETIST, CERTIFIED REGISTERED

## 2024-08-12 PROCEDURE — 250N000009 HC RX 250: Performed by: NURSE ANESTHETIST, CERTIFIED REGISTERED

## 2024-08-12 PROCEDURE — 272N000001 HC OR GENERAL SUPPLY STERILE: Performed by: INTERNAL MEDICINE

## 2024-08-12 PROCEDURE — C1730 CATH, EP, 19 OR FEW ELECT: HCPCS | Performed by: INTERNAL MEDICINE

## 2024-08-12 PROCEDURE — 250N000009 HC RX 250: Performed by: INTERNAL MEDICINE

## 2024-08-12 PROCEDURE — C1732 CATH, EP, DIAG/ABL, 3D/VECT: HCPCS | Performed by: INTERNAL MEDICINE

## 2024-08-12 PROCEDURE — C1894 INTRO/SHEATH, NON-LASER: HCPCS | Performed by: INTERNAL MEDICINE

## 2024-08-12 PROCEDURE — 258N000003 HC RX IP 258 OP 636: Performed by: INTERNAL MEDICINE

## 2024-08-12 PROCEDURE — 370N000017 HC ANESTHESIA TECHNICAL FEE, PER MIN: Performed by: INTERNAL MEDICINE

## 2024-08-12 PROCEDURE — 80048 BASIC METABOLIC PNL TOTAL CA: CPT | Performed by: INTERNAL MEDICINE

## 2024-08-12 PROCEDURE — C1759 CATH, INTRA ECHOCARDIOGRAPHY: HCPCS | Performed by: INTERNAL MEDICINE

## 2024-08-12 PROCEDURE — 99100 ANES PT EXTEME AGE<1 YR&>70: CPT | Performed by: NURSE ANESTHETIST, CERTIFIED REGISTERED

## 2024-08-12 PROCEDURE — 85347 COAGULATION TIME ACTIVATED: CPT

## 2024-08-12 PROCEDURE — C1733 CATH, EP, OTHR THAN COOL-TIP: HCPCS | Performed by: INTERNAL MEDICINE

## 2024-08-12 PROCEDURE — 93615 ESOPHAGEAL RECORDING: CPT | Performed by: INTERNAL MEDICINE

## 2024-08-12 PROCEDURE — 85610 PROTHROMBIN TIME: CPT | Performed by: INTERNAL MEDICINE

## 2024-08-12 PROCEDURE — 36415 COLL VENOUS BLD VENIPUNCTURE: CPT | Performed by: INTERNAL MEDICINE

## 2024-08-12 RX ORDER — NALOXONE HYDROCHLORIDE 0.4 MG/ML
0.2 INJECTION, SOLUTION INTRAMUSCULAR; INTRAVENOUS; SUBCUTANEOUS
Status: DISCONTINUED | OUTPATIENT
Start: 2024-08-12 | End: 2024-08-12 | Stop reason: HOSPADM

## 2024-08-12 RX ORDER — LIDOCAINE HYDROCHLORIDE 20 MG/ML
INJECTION, SOLUTION INFILTRATION; PERINEURAL PRN
Status: DISCONTINUED | OUTPATIENT
Start: 2024-08-12 | End: 2024-08-12

## 2024-08-12 RX ORDER — ACETAMINOPHEN 325 MG/1
650 TABLET ORAL EVERY 4 HOURS PRN
Status: DISCONTINUED | OUTPATIENT
Start: 2024-08-12 | End: 2024-08-12 | Stop reason: HOSPADM

## 2024-08-12 RX ORDER — DEXAMETHASONE SODIUM PHOSPHATE 4 MG/ML
INJECTION, SOLUTION INTRA-ARTICULAR; INTRALESIONAL; INTRAMUSCULAR; INTRAVENOUS; SOFT TISSUE PRN
Status: DISCONTINUED | OUTPATIENT
Start: 2024-08-12 | End: 2024-08-12

## 2024-08-12 RX ORDER — SODIUM CHLORIDE, SODIUM LACTATE, POTASSIUM CHLORIDE, CALCIUM CHLORIDE 600; 310; 30; 20 MG/100ML; MG/100ML; MG/100ML; MG/100ML
INJECTION, SOLUTION INTRAVENOUS CONTINUOUS
Status: DISCONTINUED | OUTPATIENT
Start: 2024-08-12 | End: 2024-08-12 | Stop reason: HOSPADM

## 2024-08-12 RX ORDER — NALOXONE HYDROCHLORIDE 0.4 MG/ML
0.4 INJECTION, SOLUTION INTRAMUSCULAR; INTRAVENOUS; SUBCUTANEOUS
Status: DISCONTINUED | OUTPATIENT
Start: 2024-08-12 | End: 2024-08-12 | Stop reason: HOSPADM

## 2024-08-12 RX ORDER — ONDANSETRON 2 MG/ML
INJECTION INTRAMUSCULAR; INTRAVENOUS PRN
Status: DISCONTINUED | OUTPATIENT
Start: 2024-08-12 | End: 2024-08-12

## 2024-08-12 RX ORDER — CALCIUM CARBONATE/VITAMIN D3 600 MG-10
1 TABLET ORAL 2 TIMES DAILY
COMMUNITY

## 2024-08-12 RX ORDER — AMIODARONE HYDROCHLORIDE 200 MG/1
400 TABLET ORAL ONCE
Status: COMPLETED | OUTPATIENT
Start: 2024-08-12 | End: 2024-08-12

## 2024-08-12 RX ORDER — FENTANYL CITRATE 50 UG/ML
INJECTION, SOLUTION INTRAMUSCULAR; INTRAVENOUS PRN
Status: DISCONTINUED | OUTPATIENT
Start: 2024-08-12 | End: 2024-08-12

## 2024-08-12 RX ORDER — LIDOCAINE 40 MG/G
CREAM TOPICAL
Status: DISCONTINUED | OUTPATIENT
Start: 2024-08-12 | End: 2024-08-12 | Stop reason: HOSPADM

## 2024-08-12 RX ORDER — AMIODARONE HYDROCHLORIDE 200 MG/1
TABLET ORAL
Qty: 118 TABLET | Refills: 3 | Status: SHIPPED | OUTPATIENT
Start: 2024-08-12 | End: 2024-08-19

## 2024-08-12 RX ORDER — WARFARIN SODIUM 4 MG/1
8 TABLET ORAL ONCE
Status: COMPLETED | OUTPATIENT
Start: 2024-08-12 | End: 2024-08-12

## 2024-08-12 RX ORDER — METOPROLOL SUCCINATE 25 MG/1
12.5 TABLET, EXTENDED RELEASE ORAL EVERY EVENING
Status: SHIPPED
Start: 2024-08-12

## 2024-08-12 RX ORDER — PROPOFOL 10 MG/ML
INJECTION, EMULSION INTRAVENOUS PRN
Status: DISCONTINUED | OUTPATIENT
Start: 2024-08-12 | End: 2024-08-12

## 2024-08-12 RX ORDER — PANTOPRAZOLE SODIUM 40 MG/1
40 TABLET, DELAYED RELEASE ORAL ONCE
Status: COMPLETED | OUTPATIENT
Start: 2024-08-12 | End: 2024-08-12

## 2024-08-12 RX ORDER — HEPARIN SODIUM 1000 [USP'U]/ML
INJECTION, SOLUTION INTRAVENOUS; SUBCUTANEOUS
Status: DISCONTINUED | OUTPATIENT
Start: 2024-08-12 | End: 2024-08-12 | Stop reason: HOSPADM

## 2024-08-12 RX ADMIN — PANTOPRAZOLE SODIUM 40 MG: 40 TABLET, DELAYED RELEASE ORAL at 18:39

## 2024-08-12 RX ADMIN — PHENYLEPHRINE HYDROCHLORIDE 0.7 MCG/KG/MIN: 10 INJECTION INTRAVENOUS at 13:08

## 2024-08-12 RX ADMIN — FENTANYL CITRATE 50 MCG: 50 INJECTION INTRAMUSCULAR; INTRAVENOUS at 13:32

## 2024-08-12 RX ADMIN — SODIUM CHLORIDE, POTASSIUM CHLORIDE, SODIUM LACTATE AND CALCIUM CHLORIDE: 600; 310; 30; 20 INJECTION, SOLUTION INTRAVENOUS at 11:25

## 2024-08-12 RX ADMIN — PHENYLEPHRINE HYDROCHLORIDE 100 MCG: 10 INJECTION INTRAVENOUS at 13:04

## 2024-08-12 RX ADMIN — AMIODARONE HYDROCHLORIDE 400 MG: 200 TABLET ORAL at 18:40

## 2024-08-12 RX ADMIN — DEXAMETHASONE SODIUM PHOSPHATE 4 MG: 4 INJECTION, SOLUTION INTRA-ARTICULAR; INTRALESIONAL; INTRAMUSCULAR; INTRAVENOUS; SOFT TISSUE at 16:11

## 2024-08-12 RX ADMIN — PHENYLEPHRINE HYDROCHLORIDE 200 MCG: 10 INJECTION INTRAVENOUS at 13:08

## 2024-08-12 RX ADMIN — LIDOCAINE HYDROCHLORIDE 80 MG: 20 INJECTION, SOLUTION INFILTRATION; PERINEURAL at 13:03

## 2024-08-12 RX ADMIN — ONDANSETRON 4 MG: 2 INJECTION INTRAMUSCULAR; INTRAVENOUS at 16:11

## 2024-08-12 RX ADMIN — SODIUM CHLORIDE, POTASSIUM CHLORIDE, SODIUM LACTATE AND CALCIUM CHLORIDE: 600; 310; 30; 20 INJECTION, SOLUTION INTRAVENOUS at 15:36

## 2024-08-12 RX ADMIN — SUGAMMADEX 200 MG: 100 INJECTION, SOLUTION INTRAVENOUS at 16:20

## 2024-08-12 RX ADMIN — WARFARIN SODIUM 8 MG: 4 TABLET ORAL at 18:39

## 2024-08-12 RX ADMIN — ROCURONIUM BROMIDE 40 MG: 50 INJECTION, SOLUTION INTRAVENOUS at 13:04

## 2024-08-12 RX ADMIN — PROPOFOL 200 MG: 10 INJECTION, EMULSION INTRAVENOUS at 13:03

## 2024-08-12 RX ADMIN — FENTANYL CITRATE 50 MCG: 50 INJECTION INTRAMUSCULAR; INTRAVENOUS at 13:03

## 2024-08-12 ASSESSMENT — ACTIVITIES OF DAILY LIVING (ADL)
ADLS_ACUITY_SCORE: 37

## 2024-08-12 NOTE — ANESTHESIA PROCEDURE NOTES
Airway       Patient location during procedure: OR       Procedure Start/Stop Times: 8/12/2024 1:07 PM  Staff -        Anesthesiologist:  Belen Alcantara MD       CRNA: Warren Meyers APRN CRNA       Performed By: CRNA  Consent for Airway        Urgency: elective  Indications and Patient Condition       Indications for airway management: raul-procedural       Induction type:intravenous       Mask difficulty assessment: 2 - vent by mask + OA or adjuvant +/- NMBA    Final Airway Details       Final airway type: endotracheal airway       Successful airway: ETT - single  Endotracheal Airway Details        ETT size (mm): 7.0       Cuffed: yes       Successful intubation technique: video laryngoscopy       VL Blade Size: Sow 3       Grade View of Cords: 1       Adjucts: stylet       Position: Right       Measured from: gums/teeth       Secured at (cm): 20       Bite block used: None    Post intubation assessment        Placement verified by: capnometry, equal breath sounds and chest rise        Number of attempts at approach: 1       Number of other approaches attempted: 0       Secured with: tape       Ease of procedure: easy       Dentition: Intact and Unchanged    Medication(s) Administered   Medication Administration Time: 8/12/2024 1:07 PM

## 2024-08-12 NOTE — Clinical Note
Arrhythmia Type: atrial fibrillation.   Method of Cardioversion: synchronous.   The arrhythmia was terminated.   Energy shock delivered: 360 joules.   Time shock delivered: 15:39 CDT.   Post cardioversion rhythm: sinus rhythm.   No early return to atrial fibrillation (ERAF). No immediate return to atrial fibrillation (IRAF).

## 2024-08-12 NOTE — PROGRESS NOTES
At 1845 - pt up - walked hallway > to restroom > + urination   Groin site remains dry and intact   Monitor . SR  Denies an CP -SOB

## 2024-08-12 NOTE — DISCHARGE INSTRUCTIONS
A Fib Ablation Discharge Instructions    After you go home:  Have an adult stay with you until tomorrow.  You may eat your normal diet, unless your doctor tells you otherwise.  RELAX and take it easy for 3 days.        For 24-48 hours (due to the sedation you received):  DO NOT DRIVE FOR 2 DAYS!   Do NOT make any important or legal decisions.  Do NOT drive or operate machines at home or at work.  Do NOT drink alcohol.    Care of Puncture Site:  Check the puncture site severy 1-2 hours while awake.  For 2-3 days, when you cough, sneeze, laugh or move your bowels, hold your hand over the puncture sites and press firmly.  Please remove Dressing after 24 hours, works best to take off in shower.  Then apply a band aid daily for at least 3 days (if needed). If there is minor oozing, apply another band aid and remove it after 12 hours.   It is normal to have a bruising or a small lump that is present under the skin . This will go away on its own after 3-4 weeks.   You may shower. Do NOT take a bath, or use a hot tub or pool until groin site heals, which may take up to a week.  Do NOT scrub the site. Do NOT use lotion or powder near the puncture site.    Activity:  NO bending, stooping over or squatting  for 3 days  Do NOT lift, push or pull more than 10 pounds (equal to a gallon of milk) for 3 days.  NO repetitive motions such as loading , vacuuming, raking, shoveling,   Limit going up and down the stairs repetitively, for the first 24 hours after procedure.     Bleeding:  If you start bleeding from the groin site, lie down flat and press firmly on the site for 10 minutes or until bleeding stops.   Once bleeding stops, lay flat for 1-2 hours.  Call your A Fib nurse if bleeding does not stop or after hours will need to go to ER.       Go to ER or Call 911 right away if you have heavy bleeding or bleeding that does not stop.    Medications:  Take your medications, including blood thinners, unless your doctor tells  you not to.  If you have stopped any other medicines, check with your nurse or provider about when to restart them.  If you have PAIN or a TIGHTNESS in your chest, you MAY take Tylenol (acetaminophen) and if this does not help, you MAY take Advil (ibuprofen-400 mg with food).  If you have constipation or prone to constipation,  take a fiber supplement, ie metamucil or stool softners.    Call the A Fib RN if:  Chest pain not relieved by acetaminophen or ibuprofen  Difficulty swallowing and/or coughing up blood  Shortness of breath  Increased groin pain or a large or growing hard lump around the site.  Groin site is red, swollen, hot or tender.  Blood or fluid is draining from the groin site.  You have chills or a fever greater than 101 F (38 C).  Your leg feels numb, cool or changes color.  If groin pain is not relieved by Tylenol or Ibuprofen.  Recurrent irregular or fast heart rate lasting over 2-3 hours.  Any questions or concerns.    Heart rhythms:  You may have some irregular heartbeats. These feel very strong. They may make you feel that the A Fib is going to start again.  Give it time. The irregular beats should occur less often.    Follow Up Appointments:  August 19th 11:30 EKG at [place] and then virtual visit with Janis Carr NP at 12:30   November 5th at 1:30 EKG at [place] and then virtual visit with Dr Monsivais on 11/6 at 4:15 pm      [facility]   A Fib clinic RN's [place] [phone] (Mon-Fri, 8:00-4:30)   [phone] Option 2 (7 days a week) after hours for on call Cardiologist.

## 2024-08-12 NOTE — PROGRESS NOTES
[facility] Admission Nursing Note    Patient Information  Name: Ijeoma Shah  Age: 73 year old  Reason for admission: Afib ablation  [facility] arrival time:     Visitor Information  Name: Richard     Patient Admission/Assessment   Pre-procedure assessment complete: Yes  If abnormal assessment/labs, provider notified: N/A  NPO: Yes  Medications held per instructions/orders: Yes  Consent: deferred  If applicable, pregnancy test status: deferred  Patient oriented to room: Yes  Education/questions answered: Yes  Plan/other: proceed as planned    Discharge Planning  Discharge name/phone number: Richard   Overnight post sedation caregiver: Richard  Discharge location: home    Kassidy Hair RN

## 2024-08-12 NOTE — ANESTHESIA PROCEDURE NOTES
Arterial Line Procedure Note    Pre-Procedure   Staff -        Anesthesiologist:  Belen Alcantara MD       Performed By: anesthesiologist       Location: OR       Pre-Anesthestic Checklist: patient identified, IV checked, site marked, risks and benefits discussed, informed consent, monitors and equipment checked, pre-op evaluation and at physician/surgeon's request  Timeout:       Correct Patient: Yes        Correct Procedure: Yes        Correct Site: Yes        Correct Position: Yes   Line Placement:   This line was placed Post Induction  Procedure   Procedure: arterial line       Laterality: left       Insertion Site: radial.  Sterile Prep        Standard elements of sterile barrier followed       Skin prep: Chloraprep  Insertion/Injection        Technique: ultrasound guided, Seldinger Technique and Tuan's test completed        1. Ultrasound was used to evaluate the access site.       2. Artery evaluated via ultrasound for patency/adequacy.       3. Using real-time ultrasound the needle/catheter was observed entering the artery/vein.       4. Permanent image was captured and entered into the patient's record.       5. The visualized structures were anatomically normal.       6. There were no apparent abnormal pathologic findings.       Catheter Type/Size: 20 G, 1.75 in/4.5 cm quick cath (integral wire)  Narrative        Tegaderm dressing used.       Complications: None apparent,        Arterial waveform: Yes        IBP within 10% of NIBP: Yes   Comments:  Initial attempt on right, unable to thread wire. Next attempt on left with arrow with easy access an threading of wire.

## 2024-08-12 NOTE — ANESTHESIA CARE TRANSFER NOTE
Patient: Ijeoma Shah    Procedure: Procedure(s):  Ablation Atrial Fibrilation       Diagnosis: persistent atrial fibrillation  Diagnosis Additional Information: No value filed.    Anesthesia Type:   General     Note:    Oropharynx: oropharynx clear of all foreign objects and spontaneously breathing  Level of Consciousness: awake  Oxygen Supplementation: face mask  Level of Supplemental Oxygen (L/min / FiO2): 6  Independent Airway: airway patency satisfactory and stable  Dentition: dentition unchanged  Vital Signs Stable: post-procedure vital signs reviewed and stable  Report to RN Given: handoff report given  Patient transferred to: PACU    Handoff Report: Identifed the Patient, Identified the Reponsible Provider, Reviewed the pertinent medical history, Discussed the surgical course, Reviewed Intra-OP anesthesia mangement and issues during anesthesia, Set expectations for post-procedure period and Allowed opportunity for questions and acknowledgement of understanding    Vitals:  Vitals Value Taken Time   BP     Temp     Pulse     Resp     SpO2         Electronically Signed By: RUDY Wagner CRNA  August 12, 2024  4:35 PM

## 2024-08-12 NOTE — PROGRESS NOTES
[facility] Post Procedure Note    Patient Information  Name: Ijeoma Shah  Age: 73 year old    Post Procedure  Time patient returned to [facility]: 1745 from PACU  Concerns/abnormal assessment: none at present - up at 1830  If abnormal assessment, provider notified: N/A  Plan/Other: pallavi Uribe RN

## 2024-08-12 NOTE — DISCHARGE SUMMARY
[facility] Discharge Nursing Note    Patient Information  Name: Ijeoma Shah  Age: 73 year old    Discharge Education:  Discharge instructions reviewed: Yes  Additional education/resources provided: AVS  Patient/patient representative verbalizes understanding: Yes  Patient discharging on new medications: Yes  Medication education completed: Yes Amiodarone    Discharge Plans:   Discharge location: home  Discharge ride contacted: Yes  Approximate discharge time: 1915    Discharge Criteria:  Discharge criteria met and vital signs stable: Yes    Patient Belongs:  Patient belongings returned to patient: Yes    Troy Uribe RN

## 2024-08-12 NOTE — Clinical Note
Arrhythmia Type: atrial fibrillation.   Method of Cardioversion: synchronous.   The arrhythmia was terminated.   Energy shock delivered: 360 joules.   Time shock delivered: 15:36 CDT.   Post cardioversion rhythm: sinus rhythm.   Early return to atrial fibrillation (ERAF).   No immediate return to atrial fibrillation (IRAF).

## 2024-08-13 ENCOUNTER — DOCUMENTATION ONLY (OUTPATIENT)
Dept: ANTICOAGULATION | Facility: CLINIC | Age: 74
End: 2024-08-13
Payer: MEDICARE

## 2024-08-13 ENCOUNTER — TELEPHONE (OUTPATIENT)
Dept: CARDIOLOGY | Facility: CLINIC | Age: 74
End: 2024-08-13
Payer: MEDICARE

## 2024-08-13 LAB
ATRIAL RATE - MUSE: 326 BPM
ATRIAL RATE - MUSE: 83 BPM
DIASTOLIC BLOOD PRESSURE - MUSE: NORMAL MMHG
DIASTOLIC BLOOD PRESSURE - MUSE: NORMAL MMHG
INTERPRETATION ECG - MUSE: NORMAL
INTERPRETATION ECG - MUSE: NORMAL
P AXIS - MUSE: 74 DEGREES
P AXIS - MUSE: NORMAL DEGREES
PR INTERVAL - MUSE: 126 MS
PR INTERVAL - MUSE: NORMAL MS
QRS DURATION - MUSE: 74 MS
QRS DURATION - MUSE: 86 MS
QT - MUSE: 312 MS
QT - MUSE: 370 MS
QTC - MUSE: 434 MS
QTC - MUSE: 486 MS
R AXIS - MUSE: 38 DEGREES
R AXIS - MUSE: 79 DEGREES
SYSTOLIC BLOOD PRESSURE - MUSE: NORMAL MMHG
SYSTOLIC BLOOD PRESSURE - MUSE: NORMAL MMHG
T AXIS - MUSE: -9 DEGREES
T AXIS - MUSE: 61 DEGREES
VENTRICULAR RATE- MUSE: 146 BPM
VENTRICULAR RATE- MUSE: 83 BPM

## 2024-08-13 NOTE — TELEPHONE ENCOUNTER
8/13/24 Called pt in follow up to Afib Ablation performed 8/12/24    Reviewed discharge instructions with patient    Pain level is 0    Groin dressing is C/D and I with plans to remove tomorrow . Discussed wound care and reasons to call Afib RN     Breathing feels comfortable     Pt is eating, drinking and urinating without difficulty . LBM was pre-procedure. Pt stated she is not passing gas yet. Discussed mild activity such as walking around house each hour and drinking water and eating fruits and vegetables . If no BM by tomorrow ok to use Miralax or stool softener to avoid straining    Reviewed activity instructions    Reviewed any medication changes - Amiodarone 200 mg BID x 14 days thenn 200 mg daily thereafter, decrease Metoprolol to 12.5 mg daily . Pt scheduled for next INR on 8/15 at 2 pm in [place]    Discussed that possible Afib episodes may occur and to call if > 2 hours in duration or if symptomatic or sustained HR > 120    Discussed reasons to call Afib RN    Reminded of follow up appointments    Verified pt has Afib RN and after hours Cardiology phone numbers    Pt voiced understanding and has no further questions/concerns at this time.    Clarissa  240 pm

## 2024-08-13 NOTE — PROGRESS NOTES
ANTICOAGULATION  MANAGEMENT: Discharge Review    Ijeoma Shah chart reviewed for anticoagulation continuity of care    Outpatient surgery/procedure on A8/12/2024 for ablation.    Discharge disposition: Home    Results:    Recent labs: (last 7 days)     08/06/24  1121 08/09/24  1059 08/12/24  1112   INR 2.4* 2.3* 1.98*     Anticoagulation inpatient management:     not applicable     Anticoagulation discharge instructions:     Warfarin dosing: home regimen continued   Bridging: No   INR goal change: No      Medication changes affecting anticoagulation: Yes: Will start amiodarone today was low inr yesterday per protocol no boost needed with first week starting amiodarone       Additional factors affecting anticoagulation: No     PLAN     No adjustment to anticoagulation plan needed    Patient not contacted has upcoming INR visit scheduled     Anticoagulation Calendar updated    Michelle Watson RN

## 2024-08-13 NOTE — ANESTHESIA POSTPROCEDURE EVALUATION
Patient: Ijeoma Shah    Procedure: Procedure(s):  Ablation Atrial Fibrilation       Anesthesia Type:  General    Note:  Disposition: Outpatient   Postop Pain Control: Uneventful            Sign Out: Well controlled pain   PONV: No   Neuro/Psych: Uneventful            Sign Out: Acceptable/Baseline neuro status   Airway/Respiratory: Uneventful            Sign Out: Acceptable/Baseline resp. status   CV/Hemodynamics: Uneventful            Sign Out: Acceptable CV status; No obvious hypovolemia; No obvious fluid overload   Other NRE: NONE   DID A NON-ROUTINE EVENT OCCUR? No           Last vitals:  Vitals Value Taken Time   /86 08/12/24 1730   Temp 36.2  C (97.2  F) 08/12/24 1730   Pulse 80 08/12/24 1731   Resp 20 08/12/24 1731   SpO2 99 % 08/12/24 1731   Vitals shown include unfiled device data.    Electronically Signed By: Ashlyn Quezada MD  August 12, 2024  7:19 PM

## 2024-08-15 ENCOUNTER — ANTICOAGULATION THERAPY VISIT (OUTPATIENT)
Dept: ANTICOAGULATION | Facility: CLINIC | Age: 74
End: 2024-08-15

## 2024-08-15 ENCOUNTER — LAB (OUTPATIENT)
Dept: LAB | Facility: CLINIC | Age: 74
End: 2024-08-15
Payer: MEDICARE

## 2024-08-15 DIAGNOSIS — I48.91 ATRIAL FIBRILLATION WITH RAPID VENTRICULAR RESPONSE (H): ICD-10-CM

## 2024-08-15 DIAGNOSIS — I48.0 PAROXYSMAL ATRIAL FIBRILLATION (H): Primary | ICD-10-CM

## 2024-08-15 DIAGNOSIS — I48.0 PAROXYSMAL ATRIAL FIBRILLATION (H): ICD-10-CM

## 2024-08-15 LAB — INR BLD: 3.1 (ref 0.9–1.1)

## 2024-08-15 PROCEDURE — 36416 COLLJ CAPILLARY BLOOD SPEC: CPT

## 2024-08-15 PROCEDURE — 85610 PROTHROMBIN TIME: CPT

## 2024-08-15 NOTE — PROGRESS NOTES
ANTICOAGULATION MANAGEMENT     Ijeoma Shah 73 year old female is on warfarin with supratherapeutic INR result. (Goal INR 2.0-3.0)    Recent labs: (last 7 days)     08/15/24  1406   INR 3.1*       ASSESSMENT     Warfarin Lab Questionnaire    Warfarin Doses Last 7 Days      8/14/2024     7:32 PM   Dose in Tablet or Mg   TAB or MG? milligram (mg)     Pt Rptd Dose SUNDAY MONDAY TUESDAY WED THURS FRIDAY SATURDAY 8/14/2024   7:32 PM 7.5 7.5 5 7.5 7.5 7.5 5         8/14/2024   Warfarin Lab Questionnaire   Missed doses within past 14 days? No   Changes in diet or alcohol within past 14 days? No   Medication changes since last result? Yes   Please list: Amiodarone   Injuries or illness since last result? No   New shortness of breath, severe headaches or sudden changes in vision since last result? Yes   If yes, please explain:  Shortness of breath   Abnormal bleeding since last result? No   Upcoming surgery, procedure? No   Best number to call with results? [phone]        Previous result: Subtherapeutic  Additional findings: None       PLAN     Recommended plan for ongoing change(s) affecting INR     Dosing Instructions: decrease your warfarin dose (15.8% change) with next INR in 1 week already took todays dose so took 7.5 mg today      Summary  As of 8/15/2024      Full warfarin instructions:  7.5 mg every Sun, Wed; 5 mg all other days   Next INR check:  8/22/2024               Telephone call with Salima who verbalizes understanding and agrees to plan    Lab visit scheduled    Education provided: Goal range and lab monitoring: goal range and significance of current result    Plan made with [facility] Pharmacist Joyce Capellan, DEIDRE  Anticoagulation Clinic  8/15/2024    _______________________________________________________________________     Anticoagulation Episode Summary       Current INR goal:  2.0-3.0   TTR:  71.2% (1 y)   Target end date:  Indefinite   Send INR reminders to:  [facility]    Indications    Paroxysmal atrial fibrillation (H) [I48.0]  Atrial fibrillation with rapid ventricular response (H) [I48.91]             Comments:  DVM okay             Anticoagulation Care Providers       Provider Role Specialty Phone number    Warren Pisano MD Referring Cardiovascular Disease [phone]    Jyoti Espinal MD Referring Family Medicine [phone]

## 2024-08-16 ENCOUNTER — MYC MEDICAL ADVICE (OUTPATIENT)
Dept: CARDIOLOGY | Facility: CLINIC | Age: 74
End: 2024-08-16
Payer: MEDICARE

## 2024-08-16 ENCOUNTER — HOSPITAL ENCOUNTER (INPATIENT)
Facility: CLINIC | Age: 74
LOS: 1 days | Discharge: HOME | DRG: 291 | End: 2024-08-17
Attending: EMERGENCY MEDICINE | Admitting: INTERNAL MEDICINE
Payer: MEDICARE

## 2024-08-16 ENCOUNTER — APPOINTMENT (OUTPATIENT)
Dept: GENERAL RADIOLOGY | Facility: CLINIC | Age: 74
DRG: 291 | End: 2024-08-16
Attending: EMERGENCY MEDICINE
Payer: MEDICARE

## 2024-08-16 ENCOUNTER — TELEPHONE (OUTPATIENT)
Dept: CARDIOLOGY | Facility: CLINIC | Age: 74
End: 2024-08-16
Payer: MEDICARE

## 2024-08-16 DIAGNOSIS — I50.9 CONGESTIVE HEART FAILURE, UNSPECIFIED HF CHRONICITY, UNSPECIFIED HEART FAILURE TYPE (H): ICD-10-CM

## 2024-08-16 DIAGNOSIS — I48.91 ATRIAL FIBRILLATION WITH RAPID VENTRICULAR RESPONSE (H): Primary | ICD-10-CM

## 2024-08-16 DIAGNOSIS — I48.0 PAROXYSMAL ATRIAL FIBRILLATION (H): Primary | ICD-10-CM

## 2024-08-16 DIAGNOSIS — J18.9 PNEUMONIA OF LEFT LOWER LOBE DUE TO INFECTIOUS ORGANISM: ICD-10-CM

## 2024-08-16 LAB
ALBUMIN SERPL BCG-MCNC: 3.9 G/DL (ref 3.5–5.2)
ALP SERPL-CCNC: 81 U/L (ref 40–150)
ALT SERPL W P-5'-P-CCNC: 22 U/L (ref 0–50)
ANION GAP SERPL CALCULATED.3IONS-SCNC: 11 MMOL/L (ref 7–15)
AST SERPL W P-5'-P-CCNC: 24 U/L (ref 0–45)
BASE EXCESS BLDV CALC-SCNC: 4.8 MMOL/L (ref -3–3)
BASOPHILS # BLD AUTO: 0 10E3/UL (ref 0–0.2)
BASOPHILS NFR BLD AUTO: 0 %
BILIRUB SERPL-MCNC: 0.6 MG/DL
BUN SERPL-MCNC: 12.5 MG/DL (ref 8–23)
CALCIUM SERPL-MCNC: 9.4 MG/DL (ref 8.8–10.4)
CHLORIDE SERPL-SCNC: 96 MMOL/L (ref 98–107)
CREAT SERPL-MCNC: 0.77 MG/DL (ref 0.51–0.95)
EGFRCR SERPLBLD CKD-EPI 2021: 82 ML/MIN/1.73M2
EOSINOPHIL # BLD AUTO: 0.2 10E3/UL (ref 0–0.7)
EOSINOPHIL NFR BLD AUTO: 2 %
ERYTHROCYTE [DISTWIDTH] IN BLOOD BY AUTOMATED COUNT: 12.6 % (ref 10–15)
FLUAV RNA SPEC QL NAA+PROBE: NEGATIVE
FLUBV RNA RESP QL NAA+PROBE: NEGATIVE
GLUCOSE SERPL-MCNC: 144 MG/DL (ref 70–99)
HCO3 BLDV-SCNC: 32 MMOL/L (ref 21–28)
HCO3 SERPL-SCNC: 28 MMOL/L (ref 22–29)
HCT VFR BLD AUTO: 42 % (ref 35–47)
HGB BLD-MCNC: 14.4 G/DL (ref 11.7–15.7)
IMM GRANULOCYTES # BLD: 0 10E3/UL
IMM GRANULOCYTES NFR BLD: 0 %
INR PPP: 2.75 (ref 0.85–1.15)
LACTATE SERPL-SCNC: 1.3 MMOL/L (ref 0.7–2)
LYMPHOCYTES # BLD AUTO: 0.7 10E3/UL (ref 0.8–5.3)
LYMPHOCYTES NFR BLD AUTO: 7 %
MCH RBC QN AUTO: 30.3 PG (ref 26.5–33)
MCHC RBC AUTO-ENTMCNC: 34.3 G/DL (ref 31.5–36.5)
MCV RBC AUTO: 88 FL (ref 78–100)
MONOCYTES # BLD AUTO: 0.5 10E3/UL (ref 0–1.3)
MONOCYTES NFR BLD AUTO: 5 %
NEUTROPHILS # BLD AUTO: 8.5 10E3/UL (ref 1.6–8.3)
NEUTROPHILS NFR BLD AUTO: 85 %
NRBC # BLD AUTO: 0 10E3/UL
NRBC BLD AUTO-RTO: 0 /100
NT-PROBNP SERPL-MCNC: 6909 PG/ML (ref 0–900)
O2/TOTAL GAS SETTING VFR VENT: 21 %
OXYHGB MFR BLDV: 62 % (ref 70–75)
PCO2 BLDV: 57 MM HG (ref 40–50)
PH BLDV: 7.36 [PH] (ref 7.32–7.43)
PLATELET # BLD AUTO: 154 10E3/UL (ref 150–450)
PO2 BLDV: 33 MM HG (ref 25–47)
POTASSIUM SERPL-SCNC: 4.1 MMOL/L (ref 3.4–5.3)
PROT SERPL-MCNC: 6.6 G/DL (ref 6.4–8.3)
RBC # BLD AUTO: 4.75 10E6/UL (ref 3.8–5.2)
RSV RNA SPEC NAA+PROBE: NEGATIVE
SAO2 % BLDV: 64.1 % (ref 70–75)
SARS-COV-2 RNA RESP QL NAA+PROBE: NEGATIVE
SODIUM SERPL-SCNC: 135 MMOL/L (ref 135–145)
TROPONIN T SERPL HS-MCNC: 179 NG/L
TROPONIN T SERPL HS-MCNC: 189 NG/L
WBC # BLD AUTO: 10 10E3/UL (ref 4–11)

## 2024-08-16 PROCEDURE — 250N000011 HC RX IP 250 OP 636: Performed by: EMERGENCY MEDICINE

## 2024-08-16 PROCEDURE — 83880 ASSAY OF NATRIURETIC PEPTIDE: CPT | Performed by: EMERGENCY MEDICINE

## 2024-08-16 PROCEDURE — 80053 COMPREHEN METABOLIC PANEL: CPT | Performed by: EMERGENCY MEDICINE

## 2024-08-16 PROCEDURE — 99285 EMERGENCY DEPT VISIT HI MDM: CPT | Mod: 25

## 2024-08-16 PROCEDURE — 84484 ASSAY OF TROPONIN QUANT: CPT | Performed by: INTERNAL MEDICINE

## 2024-08-16 PROCEDURE — 85610 PROTHROMBIN TIME: CPT | Performed by: EMERGENCY MEDICINE

## 2024-08-16 PROCEDURE — 85004 AUTOMATED DIFF WBC COUNT: CPT | Performed by: EMERGENCY MEDICINE

## 2024-08-16 PROCEDURE — 250N000011 HC RX IP 250 OP 636: Performed by: INTERNAL MEDICINE

## 2024-08-16 PROCEDURE — 83605 ASSAY OF LACTIC ACID: CPT | Performed by: EMERGENCY MEDICINE

## 2024-08-16 PROCEDURE — 36415 COLL VENOUS BLD VENIPUNCTURE: CPT | Performed by: INTERNAL MEDICINE

## 2024-08-16 PROCEDURE — 250N000013 HC RX MED GY IP 250 OP 250 PS 637: Performed by: INTERNAL MEDICINE

## 2024-08-16 PROCEDURE — 36415 COLL VENOUS BLD VENIPUNCTURE: CPT | Performed by: EMERGENCY MEDICINE

## 2024-08-16 PROCEDURE — 99285 EMERGENCY DEPT VISIT HI MDM: CPT | Mod: 25 | Performed by: EMERGENCY MEDICINE

## 2024-08-16 PROCEDURE — 71045 X-RAY EXAM CHEST 1 VIEW: CPT

## 2024-08-16 PROCEDURE — 120N000001 HC R&B MED SURG/OB

## 2024-08-16 PROCEDURE — 93005 ELECTROCARDIOGRAM TRACING: CPT

## 2024-08-16 PROCEDURE — 258N000003 HC RX IP 258 OP 636: Performed by: INTERNAL MEDICINE

## 2024-08-16 PROCEDURE — 87637 SARSCOV2&INF A&B&RSV AMP PRB: CPT | Performed by: EMERGENCY MEDICINE

## 2024-08-16 PROCEDURE — 82805 BLOOD GASES W/O2 SATURATION: CPT | Performed by: EMERGENCY MEDICINE

## 2024-08-16 PROCEDURE — 96374 THER/PROPH/DIAG INJ IV PUSH: CPT

## 2024-08-16 PROCEDURE — 87040 BLOOD CULTURE FOR BACTERIA: CPT | Performed by: EMERGENCY MEDICINE

## 2024-08-16 PROCEDURE — 93010 ELECTROCARDIOGRAM REPORT: CPT | Performed by: EMERGENCY MEDICINE

## 2024-08-16 PROCEDURE — 99222 1ST HOSP IP/OBS MODERATE 55: CPT | Performed by: INTERNAL MEDICINE

## 2024-08-16 RX ORDER — PIPERACILLIN SODIUM, TAZOBACTAM SODIUM 3; .375 G/15ML; G/15ML
3.38 INJECTION, POWDER, LYOPHILIZED, FOR SOLUTION INTRAVENOUS EVERY 6 HOURS
Status: DISCONTINUED | OUTPATIENT
Start: 2024-08-16 | End: 2024-08-17

## 2024-08-16 RX ORDER — PIPERACILLIN SODIUM, TAZOBACTAM SODIUM 4; .5 G/20ML; G/20ML
4.5 INJECTION, POWDER, LYOPHILIZED, FOR SOLUTION INTRAVENOUS ONCE
Status: COMPLETED | OUTPATIENT
Start: 2024-08-16 | End: 2024-08-16

## 2024-08-16 RX ORDER — ACETAMINOPHEN 325 MG/1
650 TABLET ORAL EVERY 4 HOURS PRN
Status: DISCONTINUED | OUTPATIENT
Start: 2024-08-16 | End: 2024-08-17 | Stop reason: HOSPADM

## 2024-08-16 RX ORDER — CALCIUM CARBONATE 500 MG/1
1000 TABLET, CHEWABLE ORAL 4 TIMES DAILY PRN
Status: DISCONTINUED | OUTPATIENT
Start: 2024-08-16 | End: 2024-08-17 | Stop reason: HOSPADM

## 2024-08-16 RX ORDER — WARFARIN SODIUM 5 MG/1
5 TABLET ORAL
Status: COMPLETED | OUTPATIENT
Start: 2024-08-16 | End: 2024-08-16

## 2024-08-16 RX ORDER — AMIODARONE HYDROCHLORIDE 200 MG/1
200 TABLET ORAL DAILY
Status: DISCONTINUED | OUTPATIENT
Start: 2024-08-27 | End: 2024-08-17

## 2024-08-16 RX ORDER — LIDOCAINE 40 MG/G
CREAM TOPICAL
Status: DISCONTINUED | OUTPATIENT
Start: 2024-08-16 | End: 2024-08-17 | Stop reason: HOSPADM

## 2024-08-16 RX ORDER — AMIODARONE HYDROCHLORIDE 200 MG/1
200 TABLET ORAL 2 TIMES DAILY
Status: DISCONTINUED | OUTPATIENT
Start: 2024-08-16 | End: 2024-08-17 | Stop reason: HOSPADM

## 2024-08-16 RX ORDER — ONDANSETRON 2 MG/ML
4 INJECTION INTRAMUSCULAR; INTRAVENOUS EVERY 6 HOURS PRN
Status: DISCONTINUED | OUTPATIENT
Start: 2024-08-16 | End: 2024-08-17 | Stop reason: HOSPADM

## 2024-08-16 RX ORDER — FUROSEMIDE 10 MG/ML
40 INJECTION INTRAMUSCULAR; INTRAVENOUS
Status: DISCONTINUED | OUTPATIENT
Start: 2024-08-17 | End: 2024-08-17 | Stop reason: HOSPADM

## 2024-08-16 RX ORDER — IPRATROPIUM BROMIDE AND ALBUTEROL SULFATE 2.5; .5 MG/3ML; MG/3ML
3 SOLUTION RESPIRATORY (INHALATION) 4 TIMES DAILY PRN
Status: DISCONTINUED | OUTPATIENT
Start: 2024-08-16 | End: 2024-08-17 | Stop reason: HOSPADM

## 2024-08-16 RX ORDER — AMOXICILLIN 250 MG
1 CAPSULE ORAL 2 TIMES DAILY PRN
Status: DISCONTINUED | OUTPATIENT
Start: 2024-08-16 | End: 2024-08-17 | Stop reason: HOSPADM

## 2024-08-16 RX ORDER — WARFARIN SODIUM 5 MG/1
5 TABLET ORAL
Status: ON HOLD | COMMUNITY
End: 2024-08-17

## 2024-08-16 RX ORDER — AMOXICILLIN 250 MG
2 CAPSULE ORAL 2 TIMES DAILY PRN
Status: DISCONTINUED | OUTPATIENT
Start: 2024-08-16 | End: 2024-08-17 | Stop reason: HOSPADM

## 2024-08-16 RX ORDER — FUROSEMIDE 10 MG/ML
60 INJECTION INTRAMUSCULAR; INTRAVENOUS ONCE
Status: COMPLETED | OUTPATIENT
Start: 2024-08-16 | End: 2024-08-16

## 2024-08-16 RX ADMIN — PIPERACILLIN AND TAZOBACTAM 3.38 G: 3; .375 INJECTION, POWDER, FOR SOLUTION INTRAVENOUS at 17:45

## 2024-08-16 RX ADMIN — FUROSEMIDE 60 MG: 10 INJECTION, SOLUTION INTRAMUSCULAR; INTRAVENOUS at 13:10

## 2024-08-16 RX ADMIN — PIPERACILLIN AND TAZOBACTAM 4.5 G: 4; .5 INJECTION, POWDER, FOR SOLUTION INTRAVENOUS at 13:39

## 2024-08-16 RX ADMIN — WARFARIN SODIUM 5 MG: 5 TABLET ORAL at 18:29

## 2024-08-16 RX ADMIN — PIPERACILLIN AND TAZOBACTAM 3.38 G: 3; .375 INJECTION, POWDER, FOR SOLUTION INTRAVENOUS at 23:48

## 2024-08-16 RX ADMIN — SODIUM CHLORIDE 500 ML: 9 INJECTION, SOLUTION INTRAVENOUS at 23:48

## 2024-08-16 RX ADMIN — SODIUM CHLORIDE 250 ML: 9 INJECTION, SOLUTION INTRAVENOUS at 22:10

## 2024-08-16 ASSESSMENT — ACTIVITIES OF DAILY LIVING (ADL)
VISION_MANAGEMENT: READING GLASSES
FALL_HISTORY_WITHIN_LAST_SIX_MONTHS: NO
ADLS_ACUITY_SCORE: 37
ADLS_ACUITY_SCORE: 37
ADLS_ACUITY_SCORE: 25
TOILETING_ISSUES: NO
DIFFICULTY_COMMUNICATING: NO
DOING_ERRANDS_INDEPENDENTLY_DIFFICULTY: NO
ADLS_ACUITY_SCORE: 22
ADLS_ACUITY_SCORE: 23
EQUIPMENT_CURRENTLY_USED_AT_HOME: GRAB BAR, TOILET;GRAB BAR, TUB/SHOWER
DIFFICULTY_EATING/SWALLOWING: NO
CONCENTRATING,_REMEMBERING_OR_MAKING_DECISIONS_DIFFICULTY: NO
CHANGE_IN_FUNCTIONAL_STATUS_SINCE_ONSET_OF_CURRENT_ILLNESS/INJURY: YES
WEAR_GLASSES_OR_BLIND: YES
ADLS_ACUITY_SCORE: 37
DRESSING/BATHING_DIFFICULTY: NO
WALKING_OR_CLIMBING_STAIRS_DIFFICULTY: NO
ADLS_ACUITY_SCORE: 37
HEARING_DIFFICULTY_OR_DEAF: NO
ADLS_ACUITY_SCORE: 25

## 2024-08-16 ASSESSMENT — COLUMBIA-SUICIDE SEVERITY RATING SCALE - C-SSRS
6. HAVE YOU EVER DONE ANYTHING, STARTED TO DO ANYTHING, OR PREPARED TO DO ANYTHING TO END YOUR LIFE?: NO
1. IN THE PAST MONTH, HAVE YOU WISHED YOU WERE DEAD OR WISHED YOU COULD GO TO SLEEP AND NOT WAKE UP?: NO
2. HAVE YOU ACTUALLY HAD ANY THOUGHTS OF KILLING YOURSELF IN THE PAST MONTH?: NO

## 2024-08-16 NOTE — TELEPHONE ENCOUNTER
8/16/24 Spoke w pt who reports she had a terrible night of sleep. She felt very short of breath and had trouble laying down to sleep. She feels she has had some short lived episodes fo AF since Afib Ablation performed on 8/12 . She cannot tell for sure if she is currently out of rhythm. Her ankles are slightly swollen and fingers and abdomen feel slightly tight . She has not weighed herself daily since procedure . She was coughing last night, but better today   Will have pt to go [place] for EKG today at 12, then will have Dr Monsivais review and call pt back w plan. Pt reported she checked BP and P while on phone  /122 P 97 , and she is currently in Afib   Pt voiced understanding and agreement with plan.   Clarissa 1005 am

## 2024-08-16 NOTE — ED TRIAGE NOTES
Reports feeling SOB, wet cough and not feeling well since her ablation last Monday.      Triage Assessment (Adult)       Row Name 08/16/24 1131          Respiratory WDL    Respiratory WDL X;cough;expansion/retractions;rhythm/pattern     Rhythm/Pattern, Respiratory shortness of breath

## 2024-08-16 NOTE — H&P
[facility]    History and Physical - Hospitalist Service       Date of Admission:  8/16/2024    Assessment & Plan      Ijeoma Shah is a 73 year old female admitted with recent history of atrial fibrillation ablation presented on 8/16/2024 for acute hypoxemic respiratory failure. She is admitted for CHF exacerbation and pneumonia.    HFrEF exacerbation:  Presents with SOB for 4 days with orthopnea.   Had atrial fibrillation ablation 4 days ago. EKG on admission is NSR.  Had elevated proBNP to 7000.  She has mildly elevated troponin. No anginal chest pain. Elevated troponin is likely from recent ablation and demand ischemia 2/2 CHF exacerbation.  Echo on 6/12/24 showed LVEF 45-50%.   - Telemetry  - Monitor troponin  - Lasix 40 mg IV q12h  - Intake and output  - Daily weight  - Monitor creatinine and electrolytes while on diuresis  - Echocardiogram    Healthcare-associated pneumonia:  Has SOB for 4 days  with chest congestion and productive cough.  Chest XR shows patchy opacity at the left lung base could be airspace disease.  - Started on Zosyn in ER. Will continue.  - Sputum culture  - Nebs prn    Acute hypoxemic respiratory failure:  Due to CHF exacerbation and pneumonia as above. She was hypoxic with oxygen saturation down to 89% on room air.  - Currently on 2 LPM supplemental oxygen. Continue and wean as tolerated.     Paroxysmal atrial fibrillation:  S/p ablation on 8/12/24. EKG on admission showed NSR.  - Continue PTA amiodarone. Per discharge summary on 8/12, patient is supposed to take 200 mg bid for 14 days, followed by 200 mg daily.  - Continue warfarin for anticoagulation, INR was 2.75 on admission. Pharmacy to dose.            Diet:  Low fat and low salt diet  DVT Prophylaxis: Warfarin  Jack Catheter: Not present  Lines: None     Cardiac Monitoring: None  Code Status:  Full code    Clinically Significant Risk Factors Present on Admission               # Drug  "Induced Coagulation Defect: home medication list includes an anticoagulant medication    # Hypertension: Noted on problem list  # Heart failure, NOS: heart failure noted on the problem list and last echo with EF 40-50%        # Overweight: Estimated body mass index is 28.84 kg/m  as calculated from the following:    Height as of this encounter: 1.626 m (5' 4\").    Weight as of this encounter: 76.2 kg (168 lb).                    Disposition Plan     Medically Ready for Discharge: Anticipated in 2-4 Days           Derrick Elizalde MD  Hospitalist Service  [facility]  Securely message with [employer] (more info)  Text page via [facility] Paging/Directory     ______________________________________________________________________    Chief Complaint   Shortness of breath    History is obtained from the patient    History of Present Illness   Ijeoma Shah is a 73 year old female with history of CHF, paroxysmal atrial fibrillation, hypertension and COPD, who presents to the ED for shortness of breath. Patient reports that she underwent ablation of her atrial fibrillation 4 days ago (8/12) in [facility]. After the procedure, she felt well initially. Two days ago, she developed shortness of breath, congestion and productive cough. When she coughed hard, she had left sided chest pain. She also notices that she has developed some leg edema. She feels better to sit up at night. Because of that, she has been sleeping at her recliner for the past two nights. In ER, she was found to be hypoxic. She needed 2 LPM supplemental oxygen. She has elevated proBNP. EKG shows normal sinus rhythm. Chest XR shows patchy opacity at the left lung base could be airspace disease or atelectasis.       Past Medical History    Past Medical History:   Diagnosis Date    Arrhythmia     A-Fib    Arthritis     Benign essential hypertension     Calculus of gallbladder without cholecystitis without obstruction     " Congestive heart failure, unspecified HF chronicity, unspecified heart failure type (H) 8/16/2024    COPD (chronic obstructive pulmonary disease) (H)     Lung cancer (H)     Simple chronic bronchitis (H)        Past Surgical History   Past Surgical History:   Procedure Laterality Date    ARTHROEREISIS, SUBTALAR      BRONCHOSCOPY RIGID OR FLEXIBLE W/TRANSENDOSCOPIC ENDOBRONCHIAL ULTRASOUND GUIDED N/A 07/27/2020    Procedure: Flexible bronchoscopy, endobronchial ultrasound with transbronchial biopsies;  Surgeon: Edin Castro MD;  Location: [facility] OR    CATARACT IOL, RT/LT Bilateral     COLONOSCOPY  06/29/2004    colonoscopy to the cecum    COLONOSCOPY N/A 1/25/2023    Procedure: COLONOSCOPY, WITH POLYPECTOMY;  Surgeon: Warren Soto MD;  Location:  GI    EP ABLATION FOCAL AFIB N/A 8/12/2024    Procedure: Ablation Atrial Fibrilation;  Surgeon: Nasim Monsivais MD;  Location: [facility] CARDIAC CATH LAB    ESOPHAGOSCOPY, GASTROSCOPY, DUODENOSCOPY (EGD), COMBINED N/A 09/01/2020    Procedure: ESOPHAGOGASTRODUODENOSCOPY, WITH FINE NEEDLE ASPIRATION BIOPSY, WITH ENDOSCOPIC ULTRASOUND GUIDANCE;  Surgeon: Barber Hogan MD;  Location: [facility] GI    LAPAROSCOPIC CHOLECYSTECTOMY N/A 5/20/2021    Procedure: CHOLECYSTECTOMY, LAPAROSCOPIC;  Surgeon: Gavin Castillo MD;  Location: [facility] OR    PICC TRIPLE LUMEN PLACEMENT Right 10/23/2020    5Fr - 36cm, Medial brachial vein    SURGICAL HISTORY OF -       nose surgery    THORACOSCOPIC RESECTION LUNG Left 10/22/2020    Procedure: Left thoracoscopic lobectomy converted to Left Thoracotomy, Medistinal lymph node dissection, Pulmonary Artery repair, flexible bronchoscopy, on-pump oxygenator on standy-by;  Surgeon: Andrea Sanabria MD;  Location: [facility] OR    THORACOTOMY N/A 10/22/2020    Procedure: Thoracotomy;  Surgeon: Andrea Sanabria MD;  Location: [facility] OR    TRANSCERVICAL EXTENDED MEDIASTINAL LYMPHADENECTOMY N/A 10/22/2020    Procedure: Transcervical extended mediastinal  lymphadenectomy;  Surgeon: Andrea Sanabria MD;  Location: [facility] OR    TUBAL LIGATION      bilateral tubal ligation.  BTL       Prior to Admission Medications   Prior to Admission Medications   Prescriptions Last Dose Informant Patient Reported? Taking?   EPINEPHrine (ANY BX GENERIC EQUIV) 0.3 MG/0.3ML injection 2-pack   No No   Sig: Inject 0.3 mLs (0.3 mg) into the muscle as needed for anaphylaxis May repeat one time in 5-15 minutes if response to initial dose is inadequate.   amiodarone (PACERONE) 200 MG tablet   No No   Sig: Take 1 tablet (200 mg) by mouth 2 times daily for 14 days, THEN 1 tablet (200 mg) daily for 90 days.   calcium carbonate-vitamin D (CALTRATE) 600-10 MG-MCG per tablet   Yes No   Sig: Take 1 tablet by mouth 2 times daily   ketoconazole (NIZORAL) 2 % external cream   No No   Sig: Apply topically daily For rash on feet.   metoprolol succinate ER (TOPROL XL) 25 MG 24 hr tablet   No No   Sig: Take 0.5 tablets (12.5 mg) by mouth every evening   triamcinolone (KENALOG) 0.1 % external ointment   No No   Sig: Apply topically 2 times daily To heels as needed   warfarin ANTICOAGULANT (COUMADIN) 5 MG tablet   No No   Sig: Take by mouth  5 mg (1 tablet) Tue/Sat and 7.5 mg (1.5 tablets) all other days of the week or as directed by the Anticoagulation Clinic      Facility-Administered Medications: None        Review of Systems    The 10 point Review of Systems is negative other than noted in the HPI or here.      Physical Exam   Vital Signs: Temp: 97.7  F (36.5  C) Temp src: Oral BP: (!) 138/100 Pulse: 94   Resp: 24 SpO2: (!) 89 % O2 Device: None (Room air)    Weight: 168 lbs 0 oz    General appearance: not in acute distress  HEENT: PERRL, EOMI  Lungs: Coarse breath sounds in bilateral lung fields  Cardiovascular: Regular rate and rhythm, normal S1-S2  Abdomen: Soft, non tender, no distension  Musculoskeletal: No joint swelling  Skin: No rash and no edema  Neurology: AAO ×3.  Cranial nerves II - XII normal.   Normal muscle strength in all four extremities.     Medical Decision Making       70 MINUTES SPENT BY ME on the date of service doing chart review, history, exam, documentation & further activities per the note.      Data     I have personally reviewed the following data over the past 24 hrs:    10.0  \   14.4   / 154     135 96 (L) 12.5 /  144 (H)   4.1 28 0.77 \     ALT: 22 AST: 24 AP: 81 TBILI: 0.6   ALB: 3.9 TOT PROTEIN: 6.6 LIPASE: N/A     Trop: N/A BNP: 6,909 (H)     Procal: N/A CRP: N/A Lactic Acid: 1.3       INR:  2.75 (H) PTT:  N/A   D-dimer:  N/A Fibrinogen:  N/A       Imaging results reviewed over the past 24 hrs:   Recent Results (from the past 24 hour(s))   XR Chest Port 1 View    Narrative    CHEST PORTABLE ONE VIEW   8/16/2024 12:00 PM     HISTORY: Cough.    COMPARISON: CT 6/12/2024.      Impression    IMPRESSION: Patchy opacity at the left lung base could be airspace  disease or atelectasis. Small left base pleural fluid. Bilateral  interstitial prominence could be mild edema. Mild cardiomegaly.     MARIELLE BYRNE MD         SYSTEM ID:  [id number]

## 2024-08-16 NOTE — TELEPHONE ENCOUNTER
8/16/24 See separate encounter from Karine JIANG who directed pt to go to ER . Will follow chart for outcome  Clarissa Longoria am

## 2024-08-16 NOTE — MEDICATION SCRIBE - ADMISSION MEDICATION HISTORY
Medication Scribe Admission Medication History    Admission medication history is complete. The information provided in this note is only as accurate as the sources available at the time of the update.    Information Source(s): Patient via in-person    Pertinent Information:     Changes made to PTA medication list:  Added: None  Deleted: None  Changed: Split warfarin into two separate entries to differentiate dosage differences     Allergies reviewed with patient and updates made in EHR: yes    Medication History Completed By: JONNATHAN BYRNE 8/16/2024 2:45 PM    PTA Med List   Medication Sig Note Last Dose    amiodarone (PACERONE) 200 MG tablet Take 1 tablet (200 mg) by mouth 2 times daily for 14 days, THEN 1 tablet (200 mg) daily for 90 days. 8/16/2024: Started 8/13/24 AM  8/16/2024 at am    calcium carbonate-vitamin D (CALTRATE) 600-10 MG-MCG per tablet Take 1 tablet by mouth 2 times daily  8/16/2024 at am    EPINEPHrine (ANY BX GENERIC EQUIV) 0.3 MG/0.3ML injection 2-pack Inject 0.3 mLs (0.3 mg) into the muscle as needed for anaphylaxis May repeat one time in 5-15 minutes if response to initial dose is inadequate.  More than a month at PRN    ketoconazole (NIZORAL) 2 % external cream Apply topically daily For rash on feet.  Past Month at PRN    metoprolol succinate ER (TOPROL XL) 25 MG 24 hr tablet Take 0.5 tablets (12.5 mg) by mouth every evening  8/15/2024 at hs    triamcinolone (KENALOG) 0.1 % external ointment Apply topically 2 times daily To heels as needed  Past Month at PRN    warfarin ANTICOAGULANT (COUMADIN) 5 MG tablet Take 5 mg by mouth 5 mg (1 tablet) Mondays, Tuesdays, Thursdays, Fridays, Saturdays and 1.5 tablets all other days.  8/16/2024 at am    warfarin ANTICOAGULANT (COUMADIN) 5 MG tablet Take by mouth  5 mg (1 tablet) Tue/Sat and 7.5 mg (1.5 tablets) all other days of the week or as directed by the Anticoagulation Clinic (Patient taking differently: Take 7.5 mg by mouth 7.5 mg (1.5 tablets)  Sundays and Wednesdays and 1 tablet all other days or as directed by the Anticoagulation Clinic)  Past Week at am

## 2024-08-16 NOTE — PROGRESS NOTES
DATE: 8/16/2024    TIME OF RECEIPT FROM LAB:  1505  LAB TEST:  Troponin  LAB VALUE:  179  RESULTS GIVEN WITH READ-BACK TO (PROVIDER):  Dr. Elizalde  TIME LAB VALUE REPORTED TO PROVIDER:   1506  NEW ORDERS: Repeat troponin at 1800.

## 2024-08-16 NOTE — ED PROVIDER NOTES
History     Chief Complaint   Patient presents with    Shortness of Breath     HPI  Ijeoma Shah is a 73 year old female who with a history of COPD, lung cancer who presents to the emergency department secondary to shortness of breath, cough not feeling well since her ablation Monday -  4 days ago.  She has had some increase in achiness but no significant headache.  She is still on Coumadin and that was not held for the procedure.  She feels like it is work to breathe.  She does have a history of partial pneumonectomy on the left secondary to lung cancer.  Her medications were changed on Monday with reduction in her metoprolol and increasing her amiodarone.  She does not have chest pain with this.  She has had a small amount of lower extremity edema.    Allergies:  Allergies   Allergen Reactions    Bee Venom Hives     Hot and sweating        Problem List:    Patient Active Problem List    Diagnosis Date Noted    Pneumonia of left lower lobe due to infectious organism 08/16/2024     Priority: Medium    Congestive heart failure, unspecified HF chronicity, unspecified heart failure type (H) 08/16/2024     Priority: Medium    Long term current use of anticoagulant therapy 06/02/2023     Priority: Medium    Atrial fibrillation with rapid ventricular response (H) 05/09/2023     Priority: Medium    Chronic kidney disease, stage 3 (H) 07/08/2021     Priority: Medium    Calculus of gallbladder without cholecystitis without obstruction 05/03/2021     Priority: Medium     Added automatically from request for surgery [id number]      Benign essential hypertension 03/18/2021     Priority: Medium    Gallstones 03/18/2021     Priority: Medium    Chronic rhinitis 03/18/2021     Priority: Medium    Chronic obstructive pulmonary disease, unspecified COPD type (H) 03/18/2021     Priority: Medium    Hypomagnesemia 11/17/2020     Priority: Medium    Paroxysmal atrial fibrillation (H) 10/25/2020     Priority: Medium    Mass of  left lung 10/01/2020     Priority: Medium     Added automatically from request for surgery [id number]      Malignant neoplasm of lower lobe of left lung (H) 09/17/2020     Priority: Medium     Added automatically from request for surgery [id number]      Mediastinal adenopathy 07/03/2020     Priority: Medium    Squamous cell carcinoma of left lung (H) 07/03/2020     Priority: Medium     Check 6.21 for f/u Fujioka      Squamous cell carcinoma of bronchus in left lower lobe (H) 07/03/2020     Priority: Medium    Acute sinusitis with symptoms > 10 days 03/06/2020     Priority: Medium    Uterine prolapse 02/18/2018     Priority: Medium    Bee sting reaction 08/28/2012     Priority: Medium    HYPERLIPIDEMIA LDL GOAL <160 10/31/2010     Priority: Medium    Dermatophytosis of foot 05/26/2006     Priority: Medium    Viral warts 05/26/2006     Priority: Medium     Problem list name updated by automated process. Provider to review          Past Medical History:    Past Medical History:   Diagnosis Date    Arrhythmia     Arthritis     Benign essential hypertension     Calculus of gallbladder without cholecystitis without obstruction     Congestive heart failure, unspecified HF chronicity, unspecified heart failure type (H) 8/16/2024    COPD (chronic obstructive pulmonary disease) (H)     Lung cancer (H)     Simple chronic bronchitis (H)        Past Surgical History:    Past Surgical History:   Procedure Laterality Date    ARTHROEREISIS, SUBTALAR      BRONCHOSCOPY RIGID OR FLEXIBLE W/TRANSENDOSCOPIC ENDOBRONCHIAL ULTRASOUND GUIDED N/A 07/27/2020    Procedure: Flexible bronchoscopy, endobronchial ultrasound with transbronchial biopsies;  Surgeon: Edin Castro MD;  Location: [facility]    CATARACT IOL, RT/LT Bilateral     COLONOSCOPY  06/29/2004    colonoscopy to the cecum    COLONOSCOPY N/A 1/25/2023    Procedure: COLONOSCOPY, WITH POLYPECTOMY;  Surgeon: Warren Soto MD;  Location: [facility]    EP ABLATION FOCAL AFIB N/A 8/12/2024     Procedure: Ablation Atrial Fibrilation;  Surgeon: Nasim Monsivais MD;  Location:  [facility]    ESOPHAGOSCOPY, GASTROSCOPY, DUODENOSCOPY (EGD), COMBINED N/A 09/01/2020    Procedure: ESOPHAGOGASTRODUODENOSCOPY, WITH FINE NEEDLE ASPIRATION BIOPSY, WITH ENDOSCOPIC ULTRASOUND GUIDANCE;  Surgeon: Barber Hogan MD;  Location: [facility]    LAPAROSCOPIC CHOLECYSTECTOMY N/A 5/20/2021    Procedure: CHOLECYSTECTOMY, LAPAROSCOPIC;  Surgeon: Gavin Castillo MD;  Location: [facility]    PICC TRIPLE LUMEN PLACEMENT Right 10/23/2020    5Fr - 36cm, Medial brachial vein    SURGICAL HISTORY OF -       nose surgery    THORACOSCOPIC RESECTION LUNG Left 10/22/2020    Procedure: Left thoracoscopic lobectomy converted to Left Thoracotomy, Medistinal lymph node dissection, Pulmonary Artery repair, flexible bronchoscopy, on-pump oxygenator on standy-by;  Surgeon: Andrea Sanabria MD;  Location: [facility]    THORACOTOMY N/A 10/22/2020    Procedure: Thoracotomy;  Surgeon: Andrea Sanabria MD;  Location: [facility]    TRANSCERVICAL EXTENDED MEDIASTINAL LYMPHADENECTOMY N/A 10/22/2020    Procedure: Transcervical extended mediastinal lymphadenectomy;  Surgeon: Andrea Sanabria MD;  Location: [facility]    TUBAL LIGATION      bilateral tubal ligation.  BTL       Family History:    Family History   Problem Relation Age of Onset    Glaucoma Mother     LUNG DISEASE Mother     Melanoma Father     Down Syndrome Brother     Cancer Sister         bile duct    Coronary Artery Disease Brother     CABG Brother     No Known Problems Brother     No Known Problems Brother     No Known Problems Sister     Lung Cancer Sister         lung    No Known Problems Sister     No Known Problems Sister        Social History:  Marital Status:   [2]  Social History     Tobacco Use    Smoking status: Former     Current packs/day: 0.00     Average packs/day: 1 pack/day for 33.0 years (33.0 ttl pk-yrs)     Types: Cigarettes     Start date: 12/13/1981     Quit date:  "2014     Years since quittin.6     Passive exposure: Current (ocassional per pt)    Smokeless tobacco: Never   Vaping Use    Vaping status: Never Used   Substance Use Topics    Alcohol use: Yes     Alcohol/week: 1.7 - 2.5 standard drinks of alcohol     Types: 2 - 3 Standard drinks or equivalent per week     Comment: Beer once in a while    Drug use: No        Medications:    No current outpatient medications on file.        Review of Systems   All other systems reviewed and are negative.      Physical Exam   BP: (!) 151/108  Pulse: 106  Temp: 97.7  F (36.5  C)  Resp: 20  Height: 162.6 cm (5' 4\")  Weight: 76.2 kg (168 lb)  SpO2: (!) 89 %      Physical Exam  Vitals and nursing note reviewed.   Constitutional:       General: She is not in acute distress.     Appearance: Normal appearance. She is well-developed.   HENT:      Head: Normocephalic and atraumatic.   Eyes:      General: No scleral icterus.     Conjunctiva/sclera: Conjunctivae normal.   Cardiovascular:      Rate and Rhythm: Normal rate.   Pulmonary:      Effort: Pulmonary effort is normal. No respiratory distress.      Breath sounds: Examination of the right-upper field reveals rhonchi and rales. Examination of the left-upper field reveals rhonchi and rales. Examination of the right-middle field reveals rhonchi and rales. Examination of the right-lower field reveals rhonchi and rales. Examination of the left-lower field reveals decreased breath sounds. Decreased breath sounds, rhonchi and rales present. No wheezing.   Abdominal:      General: Abdomen is flat.      Palpations: There is no mass.      Tenderness: There is no guarding or rebound.   Musculoskeletal:         General: Normal range of motion.      Cervical back: Normal range of motion and neck supple.      Right lower leg: No tenderness.      Left lower leg: No tenderness.   Skin:     General: Skin is warm and dry.      Findings: No rash.   Neurological:      General: No focal deficit " present.      Mental Status: She is alert and oriented to person, place, and time.   Psychiatric:         Mood and Affect: Mood normal.         ED Course        Procedures              EKG Interpretation:      Interpreted by Kadeem Mckinney MD  Time reviewed: 1145  Symptoms at time of EKG: sob   Rhythm: normal sinus   Rate: normal  Axis: normal  Ectopy: none  Conduction: pacs   ST Segments/ T Waves: Non specific ST-T wave changes  Q Waves: none  Comparison to prior: changed from atrial fibrillation    Clinical Impression: nsr, non specific st/t changes                Results for orders placed or performed during the hospital encounter of 08/16/24 (from the past 24 hour(s))   CBC with platelets differential    Narrative    The following orders were created for panel order CBC with platelets differential.  Procedure                               Abnormality         Status                     ---------                               -----------         ------                     CBC with platelets and d...[[id number]]  Abnormal            Final result                 Please view results for these tests on the individual orders.   Comprehensive metabolic panel   Result Value Ref Range    Sodium 135 135 - 145 mmol/L    Potassium 4.1 3.4 - 5.3 mmol/L    Carbon Dioxide (CO2) 28 22 - 29 mmol/L    Anion Gap 11 7 - 15 mmol/L    Urea Nitrogen 12.5 8.0 - 23.0 mg/dL    Creatinine 0.77 0.51 - 0.95 mg/dL    GFR Estimate 82 >60 mL/min/1.73m2    Calcium 9.4 8.8 - 10.4 mg/dL    Chloride 96 (L) 98 - 107 mmol/L    Glucose 144 (H) 70 - 99 mg/dL    Alkaline Phosphatase 81 40 - 150 U/L    AST 24 0 - 45 U/L    ALT 22 0 - 50 U/L    Protein Total 6.6 6.4 - 8.3 g/dL    Albumin 3.9 3.5 - 5.2 g/dL    Bilirubin Total 0.6 <=1.2 mg/dL   Lactic acid whole blood with 1x repeat in 2 hr when >2   Result Value Ref Range    Lactic Acid, Initial 1.3 0.7 - 2.0 mmol/L   Symptomatic Influenza A/B, RSV, & SARS-CoV2 PCR (COVID-19) Nose    Specimen: Nose; Swab    Result Value Ref Range    Influenza A PCR Negative Negative    Influenza B PCR Negative Negative    RSV PCR Negative Negative    SARS CoV2 PCR Negative Negative    Narrative    Testing was performed using the Xpert Xpress CoV2/Flu/RSV Assay on the Cepheid GeneXpert Instrument. This test should be ordered for the detection of SARS-CoV-2, influenza, and RSV viruses in individuals who meet clinical and/or epidemiological criteria. Test performance is unknown in asymptomatic patients. This test is for in vitro diagnostic use under the FDA EUA for laboratories certified under CLIA to perform high or moderate complexity testing. This test has not been FDA cleared or approved. A negative result does not rule out the presence of PCR inhibitors in the specimen or target RNA in concentration below the limit of detection for the assay. If only one viral target is positive but coinfection with multiple targets is suspected, the sample should be re-tested with another FDA cleared, approved, or authorized test, if coinfection would change clinical management. This test was validated by the [facility]. These laboratories are certified under the Clinical Laboratory Improvement Amendments of 1988 (CLIA-88) as qualified to perform high complexity laboratory testing.   NT pro BNP   Result Value Ref Range    N terminal Pro BNP Inpatient 6,909 (H) 0 - 900 pg/mL   Blood gas venous   Result Value Ref Range    pH Venous 7.36 7.32 - 7.43    pCO2 Venous 57 (H) 40 - 50 mm Hg    pO2 Venous 33 25 - 47 mm Hg    Bicarbonate Venous 32 (H) 21 - 28 mmol/L    Base Excess/Deficit Venous 4.8 (H) -3.0 - 3.0 mmol/L    FIO2 21     Oxyhemoglobin Venous 62 (L) 70 - 75 %    O2 Sat, Venous 64.1 (L) 70.0 - 75.0 %    Narrative    In healthy individuals, oxyhemoglobin (O2Hb) and oxygen saturation (SO2) are approximately equal. In the presence of dyshemoglobins, oxyhemoglobin can be considerably lower than oxygen saturation.   INR   Result  Value Ref Range    INR 2.75 (H) 0.85 - 1.15   CBC with platelets and differential   Result Value Ref Range    WBC Count 10.0 4.0 - 11.0 10e3/uL    RBC Count 4.75 3.80 - 5.20 10e6/uL    Hemoglobin 14.4 11.7 - 15.7 g/dL    Hematocrit 42.0 35.0 - 47.0 %    MCV 88 78 - 100 fL    MCH 30.3 26.5 - 33.0 pg    MCHC 34.3 31.5 - 36.5 g/dL    RDW 12.6 10.0 - 15.0 %    Platelet Count 154 150 - 450 10e3/uL    % Neutrophils 85 %    % Lymphocytes 7 %    % Monocytes 5 %    % Eosinophils 2 %    % Basophils 0 %    % Immature Granulocytes 0 %    NRBCs per 100 WBC 0 <1 /100    Absolute Neutrophils 8.5 (H) 1.6 - 8.3 10e3/uL    Absolute Lymphocytes 0.7 (L) 0.8 - 5.3 10e3/uL    Absolute Monocytes 0.5 0.0 - 1.3 10e3/uL    Absolute Eosinophils 0.2 0.0 - 0.7 10e3/uL    Absolute Basophils 0.0 0.0 - 0.2 10e3/uL    Absolute Immature Granulocytes 0.0 <=0.4 10e3/uL    Absolute NRBCs 0.0 10e3/uL   Troponin T, High Sensitivity   Result Value Ref Range    Troponin T, High Sensitivity 179 (HH) <=14 ng/L   XR Chest Port 1 View    Narrative    CHEST PORTABLE ONE VIEW   8/16/2024 12:00 PM     HISTORY: Cough.    COMPARISON: CT 6/12/2024.      Impression    IMPRESSION: Patchy opacity at the left lung base could be airspace  disease or atelectasis. Small left base pleural fluid. Bilateral  interstitial prominence could be mild edema. Mild cardiomegaly.     MARIELLE BYRNE MD         SYSTEM ID:  [id number]       Medications   amiodarone (PACERONE) tablet 200 mg (has no administration in time range)   metoprolol succinate ER (TOPROL-XL) 24 hr half-tab 12.5 mg (has no administration in time range)   Warfarin Dose Required Daily - Pharmacist Managed (has no administration in time range)   lidocaine 1 % 0.1-1 mL (has no administration in time range)   lidocaine (LMX4) kit (has no administration in time range)   sodium chloride (PF) 0.9% PF flush 3 mL (has no administration in time range)   sodium chloride (PF) 0.9% PF flush 3 mL (has no administration in time range)    senna-docusate (SENOKOT-S/PERICOLACE) 8.6-50 MG per tablet 1 tablet (has no administration in time range)     Or   senna-docusate (SENOKOT-S/PERICOLACE) 8.6-50 MG per tablet 2 tablet (has no administration in time range)   calcium carbonate (TUMS) chewable tablet 1,000 mg (has no administration in time range)   Patient is already receiving anticoagulation with heparin, enoxaparin (LOVENOX), warfarin (COUMADIN)  or other anticoagulant medication (has no administration in time range)   piperacillin-tazobactam (ZOSYN) 3.375 g vial to attach to  mL bag (has no administration in time range)   furosemide (LASIX) injection 40 mg (has no administration in time range)   warfarin ANTICOAGULANT (COUMADIN) tablet 5 mg (has no administration in time range)   amiodarone (PACERONE) tablet 200 mg (has no administration in time range)   furosemide (LASIX) injection 60 mg (60 mg Intravenous $Given 8/16/24 1310)   piperacillin-tazobactam (ZOSYN) 4.5 g vial to attach to  mL bag (0 g Intravenous Stopped 8/16/24 1438)       Assessments & Plan (with Medical Decision Making)  73-year-old female who presented with shortness of breath cough generalized malaise found to have an infiltrate on chest x-ray consistent with pneumonia.  Patient's oxygen saturations were just below 90%.  She required oxygen here in the emergency department and therefore will be admitted to the hospital for further management.  Her BNP was also elevated and therefore patient was given 60 mg of Lasix IV.  She is already on Coumadin and INR is therapeutic and therefore PE was not entertained as a potential diagnosis.  She did not have any chest pain.  She recently had ablation.  Given recent ablation and symptoms starting after that we will treat her as hospital-acquired pneumonia.  She was given Zosyn here in the emergency department.  Discussed with the hospitalist,  who accepts the patient for admission and agrees with the plan.  Patient also in  agreement with admission.     I have reviewed the nursing notes.    I have reviewed the findings, diagnosis, plan and need for follow up with the patient.          Current Discharge Medication List          Final diagnoses:   Congestive heart failure, unspecified HF chronicity, unspecified heart failure type (H)   Pneumonia of left lower lobe due to infectious organism       8/16/2024   [facility] EMERGENCY DEPT       Kadeem Mckinney MD  08/16/24 1982

## 2024-08-16 NOTE — TELEPHONE ENCOUNTER
Called and spoke with pt.   Please see notes from [employer] message sent by pt and phone note from cindy JIANG regarding pt's symptoms.    Cindy JIANG directed pt to have EKG done in clinic for symptoms, but after review of notes and speaking with the patient regarding increased shortness of breath, high BP, lower extremity edema, unable to lay flat, etc. Pt was directed to be evaluated in the nearest ER. Pt agreeable to plan and understands why.     Lola Morales RN

## 2024-08-16 NOTE — PROGRESS NOTES
S-(situation): Patient arrives to room 247 via cart from ED.    B-(background): LLL pneumonia, CHF. Pt. Comes from home with spouse, Richard. Persistent fatigue over the summer, thought it was A-fib related, had ablation done on Monday August 12th.     A-(assessment): Pt. A&Ox4, VSS on 1 LPM via oxymask. Denies pain, just an overall unwell feeling.     R-(recommendations): Orders reviewed with patient and daughter. Will monitor patient per MD orders.     Inpatient nursing criteria listed below were met:    Health care directives status obtained and documented: Yes  VTE ordered/documented: Yes  Skin issues/needs documented:NA  Isolation addressed and Signage used: NA  Fall Prevention: Care plan updated Yes Education given and documented Yes  Care Plan initiated and Co-Morbidities added: Yes  Education Assessment documented:Yes  Admission Education Documented: Yes  If present CAUTI/CLABI Education done: NA  New medication patient education completed and documented (Possible Side Effects of Common Medications handout): Yes  Allergies Reviewed: Yes  Admission Medication Reconciliation completed: Yes  Home medications if not able to send immediately home with family stored here: No  Reminder note placed in discharge instructions regarding home meds: NA  Individualized care needs/preferences addressed and charted: Yes  Provider Notified that patient has arrived to the unit: Yes

## 2024-08-17 VITALS
OXYGEN SATURATION: 93 % | TEMPERATURE: 97.9 F | RESPIRATION RATE: 20 BRPM | SYSTOLIC BLOOD PRESSURE: 117 MMHG | HEART RATE: 90 BPM | DIASTOLIC BLOOD PRESSURE: 70 MMHG | WEIGHT: 162.3 LBS | BODY MASS INDEX: 27.71 KG/M2 | HEIGHT: 64 IN

## 2024-08-17 LAB
ANION GAP SERPL CALCULATED.3IONS-SCNC: 14 MMOL/L (ref 7–15)
BUN SERPL-MCNC: 15.3 MG/DL (ref 8–23)
CALCIUM SERPL-MCNC: 8.1 MG/DL (ref 8.8–10.4)
CHLORIDE SERPL-SCNC: 100 MMOL/L (ref 98–107)
CREAT SERPL-MCNC: 0.88 MG/DL (ref 0.51–0.95)
CRP SERPL-MCNC: 33.91 MG/L
EGFRCR SERPLBLD CKD-EPI 2021: 69 ML/MIN/1.73M2
GLUCOSE SERPL-MCNC: 91 MG/DL (ref 70–99)
HCO3 SERPL-SCNC: 25 MMOL/L (ref 22–29)
HOLD SPECIMEN: NORMAL
INR PPP: 3.72 (ref 0.85–1.15)
NT-PROBNP SERPL-MCNC: ABNORMAL PG/ML (ref 0–900)
POTASSIUM SERPL-SCNC: 3.5 MMOL/L (ref 3.4–5.3)
PROCALCITONIN SERPL IA-MCNC: 0.44 NG/ML
PROCALCITONIN SERPL IA-MCNC: 0.54 NG/ML
SODIUM SERPL-SCNC: 139 MMOL/L (ref 135–145)
TROPONIN T SERPL HS-MCNC: 150 NG/L
TROPONIN T SERPL HS-MCNC: 163 NG/L

## 2024-08-17 PROCEDURE — 36415 COLL VENOUS BLD VENIPUNCTURE: CPT | Performed by: NURSE PRACTITIONER

## 2024-08-17 PROCEDURE — 84145 PROCALCITONIN (PCT): CPT | Performed by: NURSE PRACTITIONER

## 2024-08-17 PROCEDURE — 36415 COLL VENOUS BLD VENIPUNCTURE: CPT | Performed by: INTERNAL MEDICINE

## 2024-08-17 PROCEDURE — 86140 C-REACTIVE PROTEIN: CPT | Performed by: NURSE PRACTITIONER

## 2024-08-17 PROCEDURE — 258N000003 HC RX IP 258 OP 636: Performed by: INTERNAL MEDICINE

## 2024-08-17 PROCEDURE — 83880 ASSAY OF NATRIURETIC PEPTIDE: CPT | Performed by: NURSE PRACTITIONER

## 2024-08-17 PROCEDURE — 99207 PR NO BILLABLE SERVICE THIS VISIT: CPT | Performed by: STUDENT IN AN ORGANIZED HEALTH CARE EDUCATION/TRAINING PROGRAM

## 2024-08-17 PROCEDURE — 250N000011 HC RX IP 250 OP 636: Performed by: INTERNAL MEDICINE

## 2024-08-17 PROCEDURE — 80048 BASIC METABOLIC PNL TOTAL CA: CPT | Performed by: INTERNAL MEDICINE

## 2024-08-17 PROCEDURE — 93005 ELECTROCARDIOGRAM TRACING: CPT

## 2024-08-17 PROCEDURE — 250N000013 HC RX MED GY IP 250 OP 250 PS 637: Performed by: INTERNAL MEDICINE

## 2024-08-17 PROCEDURE — 85610 PROTHROMBIN TIME: CPT | Performed by: INTERNAL MEDICINE

## 2024-08-17 PROCEDURE — 84484 ASSAY OF TROPONIN QUANT: CPT | Performed by: INTERNAL MEDICINE

## 2024-08-17 PROCEDURE — 99239 HOSP IP/OBS DSCHRG MGMT >30: CPT | Performed by: NURSE PRACTITIONER

## 2024-08-17 RX ORDER — FUROSEMIDE 10 MG/ML
20 INJECTION INTRAMUSCULAR; INTRAVENOUS DAILY
Qty: 30 ML | Refills: 0 | Status: SHIPPED | OUTPATIENT
Start: 2024-08-17 | End: 2024-08-17

## 2024-08-17 RX ORDER — FUROSEMIDE 20 MG
20 TABLET ORAL DAILY
Qty: 30 TABLET | Refills: 0 | Status: SHIPPED | OUTPATIENT
Start: 2024-08-17 | End: 2024-08-30

## 2024-08-17 RX ADMIN — AMIODARONE HYDROCHLORIDE 200 MG: 200 TABLET ORAL at 09:48

## 2024-08-17 RX ADMIN — SODIUM CHLORIDE 500 ML: 9 INJECTION, SOLUTION INTRAVENOUS at 03:45

## 2024-08-17 RX ADMIN — PIPERACILLIN AND TAZOBACTAM 3.38 G: 3; .375 INJECTION, POWDER, FOR SOLUTION INTRAVENOUS at 11:31

## 2024-08-17 RX ADMIN — PIPERACILLIN AND TAZOBACTAM 3.38 G: 3; .375 INJECTION, POWDER, FOR SOLUTION INTRAVENOUS at 06:28

## 2024-08-17 ASSESSMENT — ACTIVITIES OF DAILY LIVING (ADL)
ADLS_ACUITY_SCORE: 25

## 2024-08-17 NOTE — CONSULTS
Care Management Note:     Care Management team received referral due to hospital readmission <30days.       Per IDT rounds, EMR review, discussion with pt's hospital medical provider, and/or brief discussion with pt, it has been determined that pt will discharge to home with no identifiable discharge needs. Patient is up moving SBA at this time.    CM requested scheduling of hospital follow-up appointment with [facility] team and pt will need hand off to clinic care coordination team at discharge.      Care Management will close referral at this time, but please place new consult should pt develop new needs during this stay.     Cortney Cortes, RNCM  Care Transitions Registered Nurse  Tele: [phone]

## 2024-08-17 NOTE — PROVIDER NOTIFICATION
Notified Dr. Soto: BP meds were held this evening per your BP parameters. BP checked at 2130 and BP 85/56 (MAP 66). Also, pt was given 60mg IV lasix in the ED prior to admission around 1300 and has voided 1700mL since. Potassium =4.1 prior to UOP. Do you want a potassium lab drawn and/or RN managed Potassium replacement order set started?     Sadia Soto - 9:50 pm  Her potassium is normal, as of right now, the K can be checked with AM labs. Her bp I'll order a 250 ml fluid bolus. Does she have lasix ordered for inpt?    There is a lasix dose 40mg IV BID but does not start until tomorrow at 0800.     Sadia Soto - 9:55 pm  ok that is fine, the pt received an adequate dosage.

## 2024-08-17 NOTE — PLAN OF CARE
Goal Outcome Evaluation:      Plan of Care Reviewed With: patient    Overall Patient Progress: decliningOverall Patient Progress: declining    Outcome Evaluation: See Provider Notification notes regarding hypotension. BP started out 96/67 and notified Dr. Soto of scheduled Metoprolol and Amiodarone. New orders to hold meds for SBP<105 and DBP<70. Checked BP later at 2115 and BP 80s/50s. Lungs are diminished, no crackles noted. New order for NS IV fluid bolus of 250mL and was given, and BP still 85/51, notified Dr. Soto of BP after 250mL bolus and new order placed for a 500mL fluid bolus and is receiving now at this time. Pt denies pain. O2 increased from 1L oxymask to 2L and O2 sats 94%, then while pt asleep O2 sats are 89%, so O2 increased to 3L per oxymask. Telemetry is NSR.

## 2024-08-17 NOTE — PROGRESS NOTES
4 hour shift note: Pt. Experiencing dry-heaves soon after arrival to unit, scant amount of yellow bile produced. Pt. Then wanting to get up to BSC, became diaphoretic, had large loose stool. Patient back to bed, BP 92/64, MAP 68. Had small emesis. Experienced chills, temp remained normal. MD updated and orders received. Resting between cares.

## 2024-08-17 NOTE — PROGRESS NOTES
S-(situation): Patient discharged to home via wheelchair with  and daughter.    B-(background): Pt. Admitted with LLL pneumonia, CHF.    A-(assessment): Pt. A&Ox 4, VSS on RA. Denies SOB with activity, LS CTA. Ambulating in room and halls at baseline.     R-(recommendations): Discharge instructions reviewed with patient and family. Listed belongings gathered and returned to patient.      Discharge Nursing Criteria:   Patient Education given and documented: CHF: Yes   Care Plan and Patient education resolved: Yes    New Medications- pt has been educated about purpose and side effects: Yes    Vaccines  Influenza status verified at discharge:  Yes    MISC  Prescriptions if needed, hard copies sent with patient  NA  Home medications returned to patient: NA  Medication Bin checked and emptied on discharge Yes  Patient reports post-discharge pain management plan is effective: Yes

## 2024-08-17 NOTE — PROVIDER NOTIFICATION
Notified Dr. Soto: Pt here with CHF exacerbation, pneumonia. Recent Ablation for Afib 4 days ago. Pt has scheduled Metoprolol 12.5mg every evening and Amiodarone 200mg BID. BP is 96/67 (MAP 72), . Do you want parameters for these meds to be given?     Sadia Soto - 8:28 pm  hold all BP medications if SBP is < 105 or DBP < 70

## 2024-08-17 NOTE — DISCHARGE SUMMARY
"[facility]  Hospitalist Discharge Summary      Date of Admission:  8/16/2024  Date of Discharge:  8/17/2024  Discharging Provider: Mary Godoy CNP  Discharge Service: Hospitalist Service      # Overweight: Estimated body mass index is 27.86 kg/m  as calculated from the following:    Height as of this encounter: 1.626 m (5' 4\").    Weight as of this encounter: 73.6 kg (162 lb 4.8 oz).       Follow-ups Needed After Discharge    Follow up with primary care provider, [facility], within 7 days for hospital follow- up.       Unresulted Labs Ordered in the Past 30 Days of this Admission       Date and Time Order Name Status Description    8/16/2024  1:25 PM Blood Culture Peripheral Blood Preliminary         These results will be followed up by the hospitalist team    Discharge Disposition   Discharged to home  Condition at discharge: Stable    Hospital Course   Ijeoma Shah is a 73 year old female admitted with recent history of atrial fibrillation ablation presented on 8/16/2024 for acute hypoxemic respiratory failure. She is admitted for CHF exacerbation versus pneumonia.     HFrEF exacerbation:  Presented with SOB for 4 days with orthopnea.   Had ablation for atrial fibrillation  4 days ago. EKG on admission is NSR.  Had elevated proBNP to 7000.  She has mildly elevated troponin. No anginal chest pain. Elevated troponin was likely from recent ablation and demand ischemia secondary to CHF exacerbation.  Echo on 6/12/24 showed LVEF 45-50% however she was in atrial fibrillation with RVR at the time of that echocardiogram last June.    On discussion with the patient, she felt she did have some episodes of atrial fibrillation post ablation as well as marked ankle edema and othopnea with shortness of breath.  She was then told to present to the Emergency department.   She was given furosemide 60 mg in the ED and had a brisk diuresis after this thus she " was not prescribed additional doses.   She was placed on telemetry overnight and remained in sinus rhythm.  Weight down from admission (168 pounds on admission to 162 pounds today).  She tells me she was able to lie flat in bed overnight and has no shortness of breath.  There was question of atelectasis versus airspace disease on xray (placed on zosyn on admission empirically) along with interstitial prominence consistent with chf however she has not had fever, elevated WBC or productive cough thus no antibiotics prescribed at discharge.    She is currently on room air with SaO2 in the mid 90's.      She will follow up with the EP service on Monday 8/19.  I have also scheduled her to follow up with the core heart failure service as well as a repeat echocardiogram outpatient as we do not have echocardiogram services on the weekend.  I have prescribed her a low dose furosemide 20 mg and she should follow with Cardiology closely.       Paroxysmal atrial fibrillation:  S/p ablation on 8/12/24. EKG on admission and has remained in NSR.  - She will continue her PTA amiodarone. Per discharge summary on 8/12, patient is supposed to take 200 mg bid for 14 days, followed by 200 mg daily.  Her INR has been subtherapeutic here to 3.72.  Her warfarin was held today and I have instructed her to hold her warfarin until a repeat INR is checked this Monday 8/19 and she will have her warfarin dosing directed at that time.     Consultations This Hospital Stay   PHARMACY TO DOSE WARFARIN  CARE MANAGEMENT / SOCIAL WORK IP CONSULT    Code Status   Full Code    Time Spent on this Encounter   I, Mary Godoy CNP, personally saw the patient today and spent greater than 30 minutes discharging this patient.       Mary Godoy CNP  [facility] 2A MEDICAL SURGICAL  [address] [place] [address]  Phone: [phone]  ______________________________________________________________________    Physical Exam   Vital Signs:  Temp: 97.9  F (36.6  C) Temp src: Oral BP: 117/70 Pulse: 90   Resp: 20 SpO2: 93 % O2 Device: None (Room air) Oxygen Delivery: 1 LPM  Weight: 162 lbs 4.8 oz  Gen:  Lying in bed no acute distress  HEENT:  normocephalic, atraumatic, oropharynx clear  Resp:  CTA posteriorly, normal respiratory effort  Card:  S1,S2, RRR no murmur, rub or gallop  Abd:  Soft nondistended, non tender,  normoactive bowel sounds   Neuro:  Neuro exam non focal         Primary Care Physician   [facility]    Discharge Orders      Follow-Up with Cardiology [facility] Heart Failure Discharge      ANTICOAGULATION CLINIC REFERRAL      Primary Care - Care Coordination Referral      Reason for your hospital stay    CHF     Follow-up and recommended labs and tests     Follow up with primary care provider, [facility], within 7 days for hospital follow- up.     Activity    Your activity upon discharge: activity as tolerated     Echocardiogram Complete    Administration of IV contrast will be tailored to this examination per the appropriate written protocol listed in the Echocardiography department Protocol Book, or by the supervising Cardiologist. This may result in an order change.    Use of contrast is at the discretion of the supervising Cardiologist.     Diet    Follow this diet upon discharge: Orders Placed This Encounter      Combination Diet Low Saturated Fat Na <2400mg Diet, No Caffeine Diet       Significant Results and Procedures   Most Recent 3 CBC's:  Recent Labs   Lab Test 08/16/24  1150 08/12/24  1112 06/12/24  1137   WBC 10.0 6.8 6.6   HGB 14.4 15.8* 15.1   MCV 88 91 90    173 193     Most Recent 3 BMP's:  Recent Labs   Lab Test 08/17/24  0529 08/16/24  1150 08/12/24  1112    135 139   POTASSIUM 3.5 4.1 4.5   CHLORIDE 100 96* 101   CO2 25 28 29   BUN 15.3 12.5 21.6   CR 0.88 0.77 0.92   ANIONGAP 14 11 9   SABINO 8.1* 9.4 9.6   GLC 91 144* 109*   ,   Results for orders placed or  performed during the hospital encounter of 08/16/24   XR Chest Port 1 View    Narrative    CHEST PORTABLE ONE VIEW   8/16/2024 12:00 PM     HISTORY: Cough.    COMPARISON: CT 6/12/2024.      Impression    IMPRESSION: Patchy opacity at the left lung base could be airspace  disease or atelectasis. Small left base pleural fluid. Bilateral  interstitial prominence could be mild edema. Mild cardiomegaly.     MARIELLE BYRNE MD         SYSTEM ID:  [id number]       Discharge Medications   Current Discharge Medication List        START taking these medications    Details   furosemide (LASIX) 20 MG tablet Take 1 tablet (20 mg) by mouth daily  Qty: 30 tablet, Refills: 0    Associated Diagnoses: Congestive heart failure, unspecified HF chronicity, unspecified heart failure type (H)           CONTINUE these medications which have NOT CHANGED    Details   amiodarone (PACERONE) 200 MG tablet Take 1 tablet (200 mg) by mouth 2 times daily for 14 days, THEN 1 tablet (200 mg) daily for 90 days.  Qty: 118 tablet, Refills: 3    Associated Diagnoses: Persistent atrial fibrillation (H)      calcium carbonate-vitamin D (CALTRATE) 600-10 MG-MCG per tablet Take 1 tablet by mouth 2 times daily      EPINEPHrine (ANY BX GENERIC EQUIV) 0.3 MG/0.3ML injection 2-pack Inject 0.3 mLs (0.3 mg) into the muscle as needed for anaphylaxis May repeat one time in 5-15 minutes if response to initial dose is inadequate.  Qty: 2 each, Refills: 1    Associated Diagnoses: Bee sting allergy      ketoconazole (NIZORAL) 2 % external cream Apply topically daily For rash on feet.  Qty: 60 g, Refills: 0    Associated Diagnoses: Tinea pedis of both feet      metoprolol succinate ER (TOPROL XL) 25 MG 24 hr tablet Take 0.5 tablets (12.5 mg) by mouth every evening    Associated Diagnoses: Paroxysmal atrial fibrillation (H)      triamcinolone (KENALOG) 0.1 % external ointment Apply topically 2 times daily To heels as needed  Qty: 80 g, Refills: 1    Associated Diagnoses:  Dermatitis           STOP taking these medications       warfarin ANTICOAGULANT (COUMADIN) 5 MG tablet Comments:   Reason for Stopping:         warfarin ANTICOAGULANT (COUMADIN) 5 MG tablet Comments:   Reason for Stopping:             Allergies   Allergies   Allergen Reactions    Bee Venom Hives     Hot and sweating

## 2024-08-17 NOTE — PROVIDER NOTIFICATION
Notified Dr. Soto: Status update: NS IV fluid bolus finished at 2330. BP 85/51 (MAP 62). HR 90s, RR 22. Oxymask at 3L 90% O2 sats.     Sadia Soto - 11:41 pm  i placed another bolus order for 500ml

## 2024-08-19 ENCOUNTER — HOSPITAL ENCOUNTER (OUTPATIENT)
Dept: CARDIOLOGY | Facility: CLINIC | Age: 74
Discharge: HOME | End: 2024-08-19
Admitting: INTERNAL MEDICINE
Payer: MEDICARE

## 2024-08-19 ENCOUNTER — PATIENT OUTREACH (OUTPATIENT)
Dept: CARE COORDINATION | Facility: CLINIC | Age: 74
End: 2024-08-19

## 2024-08-19 ENCOUNTER — VIRTUAL VISIT (OUTPATIENT)
Dept: CARDIOLOGY | Facility: CLINIC | Age: 74
End: 2024-08-19
Payer: MEDICARE

## 2024-08-19 ENCOUNTER — LAB (OUTPATIENT)
Dept: LAB | Facility: CLINIC | Age: 74
End: 2024-08-19
Payer: MEDICARE

## 2024-08-19 ENCOUNTER — DOCUMENTATION ONLY (OUTPATIENT)
Dept: ANTICOAGULATION | Facility: CLINIC | Age: 74
End: 2024-08-19

## 2024-08-19 ENCOUNTER — ANTICOAGULATION THERAPY VISIT (OUTPATIENT)
Dept: ANTICOAGULATION | Facility: CLINIC | Age: 74
End: 2024-08-19

## 2024-08-19 ENCOUNTER — MYC MEDICAL ADVICE (OUTPATIENT)
Dept: CARDIOLOGY | Facility: CLINIC | Age: 74
End: 2024-08-19

## 2024-08-19 DIAGNOSIS — I48.91 ATRIAL FIBRILLATION WITH RAPID VENTRICULAR RESPONSE (H): ICD-10-CM

## 2024-08-19 DIAGNOSIS — I48.0 PAROXYSMAL ATRIAL FIBRILLATION (H): ICD-10-CM

## 2024-08-19 DIAGNOSIS — I48.19 PERSISTENT ATRIAL FIBRILLATION (H): ICD-10-CM

## 2024-08-19 DIAGNOSIS — I48.0 PAROXYSMAL ATRIAL FIBRILLATION (H): Primary | ICD-10-CM

## 2024-08-19 DIAGNOSIS — I42.9 SECONDARY CARDIOMYOPATHY (H): Primary | ICD-10-CM

## 2024-08-19 LAB — INR BLD: 1.7 (ref 0.9–1.1)

## 2024-08-19 PROCEDURE — 85610 PROTHROMBIN TIME: CPT

## 2024-08-19 PROCEDURE — 99215 OFFICE O/P EST HI 40 MIN: CPT | Mod: 95 | Performed by: NURSE PRACTITIONER

## 2024-08-19 PROCEDURE — 93005 ELECTROCARDIOGRAM TRACING: CPT | Performed by: REHABILITATION PRACTITIONER

## 2024-08-19 PROCEDURE — 36416 COLLJ CAPILLARY BLOOD SPEC: CPT

## 2024-08-19 PROCEDURE — 93010 ELECTROCARDIOGRAM REPORT: CPT | Performed by: INTERNAL MEDICINE

## 2024-08-19 RX ORDER — AMIODARONE HYDROCHLORIDE 200 MG/1
200 TABLET ORAL DAILY
Status: SHIPPED
Start: 2024-08-19 | End: 2024-08-30

## 2024-08-19 NOTE — PROGRESS NOTES
"Salima is a 73 year old who is being evaluated via a billable video visit.    How would you like to obtain your AVS? MyChart  If the video visit is dropped, the invitation should be resent by: Text to cell phone: [phone]  Will anyone else be joining your video visit? No    Video-Visit Details    Type of service:  Video Visit   Start 12:38 pm  Video End Time:12:59 PM  Originating Location (pt. Location): Home    Distant Location (provider location):  On-site  Platform used for Video Visit: TripIt    Vitals - Patient Reported  Systolic (Patient Reported): 134  Weight (Patient Reported): 72.2 kg (159 lb 3.2 oz)  Height (Patient Reported): 162.6 cm (5' 4\")  BMI (Based on Pt Reported Ht/Wt): 27.33    Review Of Systems  Respiratory: NEGATIVE  Cardiovascular:NEGATIVE  Endocrine:  NEGATIVE    Telephone number of patient: [phone]    Bam        Cardiology Clinic Progress Note  Ijeoma Shah MRN# [id number]   YOB: 1950 Age: 73 year old     Primary cardiologist: Dr. Monsivais (EP) and Dr. Pisano (general)    Reason for visit: Atrial fibrillation and HF    History of presenting illness:    Ijeoma Shah, a pleasant 73 year old patient with a past medical history of:    Persistent atrial fibrillation:  Diagnosed in 2020 initially placed on amiodarone following her lobectomy.  Amiodarone was continued through her chemotherapy and ultimately discontinued when chemotherapy ended and was continued on metoprolol for rate control. Found to be in AF with RVR 5/2024 despite increase in BB. Started amiodarone 7/2/2024. S/p FIOR guided PVI on 8/12/2024.   [id number] Score: 3 (age, gender, HTN) on Warfarin  Secondary cardiomyopathy: LVEF 65-70% 4/2022 in SR and 45-50% 6/2024 in AF with RVR with recent HF exacerbation  Hypertension  Dyslipidemia  Squamous cell carcinoma of the lung  Previous left lower lobectomy and mediastinal lymph node dissection brain MRA negative.  Received cisplatin and " gemcitabine x 4 2/2021.  Followed by oncology    Salima underwent a successful FIOR guided PVI on 8/12/2024.  Unfortunately, she presented to [facility] on 8/16 with complaints for shortness of breath and orthopnea for 4 days prior to admission. Differentials were CHF versus pneumonia.  BNP was elevated and she responded well to diuresis and dropped 4 pounds with the use of IV Lasix.  She was discharged home on Lasix 20 mg daily.  Thankfully, during admission and post discharge she has been able to maintain NSR.     She is accompanied by her daughter today for a virtual visit and follow-up post ablation and discharge.  Thankfully, she has had no recurrent AF or HF symptoms.  Her weight is steadily declining on 20 mg of Lasix.  Her EKG notes sinus rhythm and QTc appears prolonged.  After review with Dr. Monsivais we will adjust amiodarone accordingly.    Diagnostic studies:   EKG 8/19/2024: SR at 84 bpm with prolonged QT   Echocardiogram 6/2024: LVEF 45-50% with mild MR, TR, normal biatrial size (in AF with RVR)  Zio Patch 6/2024: Persistent AF with HR  bpm (bll586 bpm)  EKG 5/2024:AF with ventricular rate 118-124 bpm  Echo 4/2022: EF 65 to 70%. Grade 1 diastolic dysfunction.  Normal atrial size.  No significant valvular abnormality.         Assessment and Plan:     ASSESSMENT:    Persistent atrial fibrillation   Diagnosed in 2020 with paroxysmal AF. Initially placed on amiodarone following her lobectomy.  Amiodarone was continued through her chemotherapy and ultimately discontinued when chemotherapy ended and was continued on metoprolol for rate control.   Found to be in AF with RVR 5/2024 despite increase in BB. Started amiodarone taper 200 mg BID x 2 weeks then 200 mg daily as of 8/12.   S/p FIOR guided PVI on 8/12/2024.   Prolonged QT on EKG today (per review with Dr. Monsivais)    Secondary cardiomyopathy with recent HF exacerbation  LVEF 65-70% 4/2022 in SR and 45-50% 6/2024 in AF with RVR with  recent HF exacerbation and admission 8/16-8/17  Discharged on lasix 20 mg daily  Discharge weight 162 lbs  Patient reports weight today 159 lbs    PLAN:     Decrease amiodarone from 200 mg twice daily to 200 mg daily  EKG in 1 week  Update echocardiogram prior to visit with Dr. Monsivais in November            Review of Systems:     Negative unless noted in HPI     Today's clinic visit entailed:  Review of the result(s) of each unique test - EKG, echo, Zio,   Assessment requiring an independent historian(s) - family - Daughter  The following tests were independently interpreted by me as noted in my documentation: EKG  I spent a total of 51 minutes on the day of the visit.   Time spent by me doing chart review, history and exam, documentation and further activities per the note  Provider  Link to [facility] Help Grid     The level of medical decision making during this visit was of moderate complexity.           Physical Exam:     General Appearance:  No distress, normal body habitus, upright.    ENT/Mouth:  Membranes moist, no nasal discharge or bleeding gums. Normal head shape, no evidence of injury or laceration.    EYES:  No scleral icterus, normal conjunctivae    Neurologic:  Normal arm motion bilateral, no tremors. No evidence of focal defect.    Psychiatric:  Alert and oriented x3, calm         Medications:     Current Outpatient Medications   Medication Sig Dispense Refill    amiodarone (PACERONE) 200 MG tablet Take 1 tablet (200 mg) by mouth daily for 90 days      calcium carbonate-vitamin D (CALTRATE) 600-10 MG-MCG per tablet Take 1 tablet by mouth 2 times daily      EPINEPHrine (ANY BX GENERIC EQUIV) 0.3 MG/0.3ML injection 2-pack Inject 0.3 mLs (0.3 mg) into the muscle as needed for anaphylaxis May repeat one time in 5-15 minutes if response to initial dose is inadequate. 2 each 1    furosemide (LASIX) 20 MG tablet Take 1 tablet (20 mg) by mouth daily 30 tablet 0    ketoconazole (NIZORAL) 2 % external cream Apply  topically daily For rash on feet. 60 g 0    metoprolol succinate ER (TOPROL XL) 25 MG 24 hr tablet Take 0.5 tablets (12.5 mg) by mouth every evening      triamcinolone (KENALOG) 0.1 % external ointment Apply topically 2 times daily To heels as needed 80 g 1       Family History   Problem Relation Age of Onset    Glaucoma Mother     LUNG DISEASE Mother     Melanoma Father     Down Syndrome Brother     Cancer Sister         bile duct    Coronary Artery Disease Brother     CABG Brother     No Known Problems Brother     No Known Problems Brother     No Known Problems Sister     Lung Cancer Sister         lung    No Known Problems Sister     No Known Problems Sister        Social History     Socioeconomic History    Marital status:      Spouse name: Not on file    Number of children: 1    Years of education: Not on file    Highest education level: Not on file   Occupational History    Occupation: seasonal summer work only   Tobacco Use    Smoking status: Former     Current packs/day: 0.00     Average packs/day: 1 pack/day for 33.0 years (33.0 ttl pk-yrs)     Types: Cigarettes     Start date: 1981     Quit date: 2014     Years since quittin.6     Passive exposure: Current (ocassional per pt)    Smokeless tobacco: Never   Vaping Use    Vaping status: Never Used   Substance and Sexual Activity    Alcohol use: Yes     Alcohol/week: 1.7 - 2.5 standard drinks of alcohol     Types: 2 - 3 Standard drinks or equivalent per week     Comment: Beer once in a while    Drug use: No    Sexual activity: Yes     Partners: Male   Other Topics Concern    Parent/sibling w/ CABG, MI or angioplasty before 65F 55M? No   Social History Narrative    Not on file     Social Determinants of Health     Financial Resource Strain: Low Risk  (2024)    Financial Resource Strain     Within the past 12 months, have you or your family members you live with been unable to get utilities (heat, electricity) when it was really  needed?: No   Food Insecurity: Low Risk  (1/19/2024)    Food Insecurity     Within the past 12 months, did you worry that your food would run out before you got money to buy more?: No     Within the past 12 months, did the food you bought just not last and you didn t have money to get more?: No   Transportation Needs: Low Risk  (1/19/2024)    Transportation Needs     Within the past 12 months, has lack of transportation kept you from medical appointments, getting your medicines, non-medical meetings or appointments, work, or from getting things that you need?: No   Physical Activity: Not on file   Stress: Not on file   Social Connections: Not on file   Interpersonal Safety: Low Risk  (8/6/2024)    Interpersonal Safety     Do you feel physically and emotionally safe where you currently live?: Yes     Within the past 12 months, have you been hit, slapped, kicked or otherwise physically hurt by someone?: No     Within the past 12 months, have you been humiliated or emotionally abused in other ways by your partner or ex-partner?: No   Housing Stability: Low Risk  (1/19/2024)    Housing Stability     Do you have housing? : Yes     Are you worried about losing your housing?: No            Past Medical History:     Past Medical History:   Diagnosis Date    Arrhythmia     A-Fib    Arthritis     Benign essential hypertension     Calculus of gallbladder without cholecystitis without obstruction     Congestive heart failure, unspecified HF chronicity, unspecified heart failure type (H) 8/16/2024    COPD (chronic obstructive pulmonary disease) (H)     Lung cancer (H)     Simple chronic bronchitis (H)               Past Surgical History:     Past Surgical History:   Procedure Laterality Date    ARTHROEREISIS, SUBTALAR      BRONCHOSCOPY RIGID OR FLEXIBLE W/TRANSENDOSCOPIC ENDOBRONCHIAL ULTRASOUND GUIDED N/A 07/27/2020    Procedure: Flexible bronchoscopy, endobronchial ultrasound with transbronchial biopsies;  Surgeon: Gloria  Edin Rosado MD;  Location: [facility] OR    CATARACT IOL, RT/LT Bilateral     COLONOSCOPY  06/29/2004    colonoscopy to the cecum    COLONOSCOPY N/A 1/25/2023    Procedure: COLONOSCOPY, WITH POLYPECTOMY;  Surgeon: Warren Soto MD;  Location: [facility]    EP ABLATION FOCAL AFIB N/A 8/12/2024    Procedure: Ablation Atrial Fibrilation;  Surgeon: Nasim Monsivais MD;  Location:  [facility] CARD[facility] CATH LAB    ESOPHAGOSCOPY, GASTROSCOPY, DUODENOSCOPY (EGD), COMBINED N/A 09/01/2020    Procedure: ESOPHAGOGASTRODUODENOSCOPY, WITH FINE NEEDLE ASPIRATION BIOPSY, WITH ENDOSCOPIC ULTRASOUND GUIDANCE;  Surgeon: Barber Hogan MD;  Location: [facility]    LAPAROSCOPIC CHOLECYSTECTOMY N/A 5/20/2021    Procedure: CHOLECYSTECTOMY, LAPAROSCOPIC;  Surgeon: Gavin Castillo MD;  Location: [facility] OR    PICC TRIPLE LUMEN PLACEMENT Right 10/23/2020    5Fr - 36cm, Medial brachial vein    SURGICAL HISTORY OF -       nose surgery    THORACOSCOPIC RESECTION LUNG Left 10/22/2020    Procedure: Left thoracoscopic lobectomy converted to Left Thoracotomy, Medistinal lymph node dissection, Pulmonary Artery repair, flexible bronchoscopy, on-pump oxygenator on standy-by;  Surgeon: Andrea Sanabria MD;  Location: [facility] OR    THORACOTOMY N/A 10/22/2020    Procedure: Thoracotomy;  Surgeon: Andrea Sanabria MD;  Location: [facility] OR    TRANSCERVICAL EXTENDED MEDIASTINAL LYMPHADENECTOMY N/A 10/22/2020    Procedure: Transcervical extended mediastinal lymphadenectomy;  Surgeon: Andrea Sanabria MD;  Location: [facility] OR    TUBAL LIGATION      bilateral tubal ligation.  BTL              Allergies:   Bee venom       Data:   Laboratory tests:    Recent Labs   Lab Test 12/27/22  0950 06/08/22  0716 04/20/22  0936 03/08/22  0909 10/24/20  0319 09/29/20  1221 06/23/20  1002   LDL  --   --   --  152*  --   --  147*   HDL  --   --   --  76  --   --  68   NHDL  --   --   --  175*  --   --  170*   CHOL  --   --   --  251*  --   --  238*   TRIG  --   --   --  115  --   --  117   TSH 1.11 0.69  0.68  --    < >  --  0.62   NTBNP  --   --   --   --   --  191*  --     < > = values in this interval not displayed.       Lab Results   Component Value Date    WBC 10.0 08/16/2024    WBC 7.4 04/12/2021    RBC 4.75 08/16/2024    RBC 3.49 (L) 04/12/2021    HGB 14.4 08/16/2024    HGB 13.1 05/20/2021    HCT 42.0 08/16/2024    HCT 32.8 (L) 04/12/2021    MCV 88 08/16/2024    MCV 94 04/12/2021    MCH 30.3 08/16/2024    MCH 31.5 04/12/2021    MCHC 34.3 08/16/2024    MCHC 33.5 04/12/2021    RDW 12.6 08/16/2024    RDW 13.8 04/12/2021     08/16/2024     04/12/2021       Lab Results   Component Value Date     08/17/2024     04/12/2021    POTASSIUM 3.5 08/17/2024    POTASSIUM 4.4 12/27/2022    POTASSIUM 4.2 05/20/2021    CHLORIDE 100 08/17/2024    CHLORIDE 101 12/27/2022    CHLORIDE 102 04/12/2021    CO2 25 08/17/2024    CO2 31 12/27/2022    CO2 28 04/12/2021    ANIONGAP 14 08/17/2024    ANIONGAP 5 12/27/2022    ANIONGAP 4 04/12/2021    GLC 91 08/17/2024     (H) 12/27/2022     (H) 04/12/2021    BUN 15.3 08/17/2024    BUN 18 12/27/2022    BUN 19 04/12/2021    CR 0.88 08/17/2024    CR 1.08 (H) 05/20/2021    GFRESTIMATED 69 08/17/2024    GFRESTIMATED 60 (L) 03/08/2022    GFRESTIMATED 52 (L) 05/20/2021    GFRESTBLACK 60 (L) 05/20/2021    SABINO 8.1 (L) 08/17/2024    SABINO 8.7 04/12/2021      Lab Results   Component Value Date    AST 24 08/16/2024    AST 15 04/12/2021    ALT 22 08/16/2024    ALT 23 04/12/2021       Lab Results   Component Value Date    A1C 5.6 10/23/2020       Lab Results   Component Value Date    INR 1.7 (H) 08/19/2024    INR 3.72 (H) 08/17/2024    INR 2.75 (H) 08/16/2024       RUDY Schneider [facility]  Pager: [phone]  RN phone: [phone]

## 2024-08-19 NOTE — PROGRESS NOTES
Clinic Care Coordination Contact  Clinic Care Coordination Contact  OUTREACH    Referral Information:  Referral Source: IP Handoff    Primary Diagnosis: CHF    Chief Complaint   Patient presents with    Clinic Care Coordination - Post Hospital     Clinic Care Coordination RN         Universal Utilization:   [facility]  Hospitalist Discharge Summary       Date of Admission:  8/16/2024  Date of Discharge:  8/17/2024  CHF exacerbation   Clinic Utilization  Difficulty keeping appointments:: No  Compliance Concerns: No  No-Show Concerns: No  No PCP office visit in Past Year: No  Utilization      No Show Count (past year)  0             ED Visits  1             Hospital Admissions  2                    Current as of: 8/19/2024  1:25 PM                Clinical Concerns:  Current Medical Concerns:    Patient reports she is feeling much better after her ablation   Patient is checking her weight daily and denies any leg edema SOB episodes of chest discomfort   Patient had a cardiology visit this morning   Incoming call during our conversation  Patient agrees to a follow up call at the need of the week for a full assessment   Current Behavioral Concerns: No    Education Provided to patient: CC role introduced    Pain  Pain (GOAL):: No  Health Maintenance Reviewed: Not assessed  Clinical Pathway: Clinic Care Coordination - Heart Failure Clinical Pathway    Heart Failure Symptoms:  Shortness of breath:: No  Able to lie flat?: Yes  Wheezing or noisy breathing?: No  Cough: No  Increased sputum: No  Fever: No  New, different, or worsening chest pain? : No  Swelling:: No  Fatigue:: Yes, at baseline  Overall your CHF symptoms are (GOAL):: Stable       Heart Failure Management:  Does patient have access to a digital scale?: Yes  Checking weight daily? : Yes  Weight?: Unchanged  Weight increase more than 2 lbs in 24 hours?: No  Weight Increase more than 5 lbs in 1 week? : No  Medication adherence problem  (GOAL):: No  Knowledgeable about how to use meds:: Yes  Medication side effects suspected:: No  Does the patient have understanding of Diuretic self-management?: Yes  Diet:: No added salt    Medication Management:  Medication review status:   Reviewed at Cardiology visit this morning      Functional Status:       Living Situation:  Current living arrangement:: I live in a private home with spouse    Lifestyle & Psychosocial Needs:    Social Determinants of Health     Food Insecurity: Low Risk  (1/19/2024)    Food Insecurity     Within the past 12 months, did you worry that your food would run out before you got money to buy more?: No     Within the past 12 months, did the food you bought just not last and you didn t have money to get more?: No   Depression: At risk (1/22/2024)    PHQ-2     PHQ-2 Score: 3   Housing Stability: Low Risk  (1/19/2024)    Housing Stability     Do you have housing? : Yes     Are you worried about losing your housing?: No   Tobacco Use: Medium Risk (8/19/2024)    Patient History     Smoking Tobacco Use: Former     Smokeless Tobacco Use: Never     Passive Exposure: Current   Financial Resource Strain: Low Risk  (1/19/2024)    Financial Resource Strain     Within the past 12 months, have you or your family members you live with been unable to get utilities (heat, electricity) when it was really needed?: No   Alcohol Use: Not on file   Transportation Needs: Low Risk  (1/19/2024)    Transportation Needs     Within the past 12 months, has lack of transportation kept you from medical appointments, getting your medicines, non-medical meetings or appointments, work, or from getting things that you need?: No   Physical Activity: Not on file   Interpersonal Safety: Low Risk  (8/6/2024)    Interpersonal Safety     Do you feel physically and emotionally safe where you currently live?: Yes     Within the past 12 months, have you been hit, slapped, kicked or otherwise physically hurt by someone?: No      Within the past 12 months, have you been humiliated or emotionally abused in other ways by your partner or ex-partner?: No   Stress: Not on file   Social Connections: Not on file   Health Literacy: Not on file     Diet:: No added salt  Inadequate nutrition (GOAL):: No  Tube Feeding: No  Inadequate activity/exercise (GOAL):: No  Significant changes in sleep pattern (GOAL): No        Pentecostal or spiritual beliefs that impact treatment:: No  Mental health DX:: No  Mental health management concern (GOAL):: No  Chemical Dependency Status: No Current Concerns  Informal Support system:: Spouse           Resources and Interventions:  Current Resources:      Community Resources: None     Equipment Currently Used at Home: grab bar, toilet, grab bar, tub/shower            Advance Care Plan/Directive  Advanced Care Plans/Directives on file:: No    Referrals Placed: None    Patient/Caregiver understanding: Patient expresses good understanding of discharge instructions     Outreach Frequency: weekly, more frequently as needed  Future Appointments                In 3 days NB LAB [facility]    In 1 week Justin Salas PA-C [facility]    In 2 weeks PH EVENT/HOLTER MONITOR [facility]    In 1 month PHECHR1 [facility]    In 2 months PH EVENT/HOLTER MONITOR [facility]    In 2 months Nasim Monsivais MD [facility]    In 3 months PH LAB [facility]    In 3 months PHCT1 [facility]    In 4 months Eneida De Leon MD [facility]            Plan:   Patient will keep hospital follow up scheduled 8/30/2024   CC RN will follow up in 3-5 business days      M  [facility]   Ava Webb RN, Care Coordinator   [facility]   E-mail [email or web address]   [phone]

## 2024-08-19 NOTE — PROGRESS NOTES
Salima was here today for post ablation ECG and vital check.  Her vitals today were stable and she reports feeling well. Her Wt today fully clothed was 163.6lbs.  ( At home this morning on her scale without clothing her weight was 159.2lbs. which was 1 lb less than yesterday.) Her resting blood pressure was 138/84.   She reports that she has a video visit this afternoon with Cardiology.

## 2024-08-19 NOTE — PROGRESS NOTES
ANTICOAGULATION  MANAGEMENT: Discharge Review    Ijeoma Shah chart reviewed for anticoagulation continuity of care    Hospital Admission on 8/16/24-8/17/24 for shortness of breath, CHF exacerbation vs Pneumonia     Discharge disposition: Home    Results:    Recent labs: (last 7 days)     08/12/24  1112 08/15/24  1406 08/16/24  1150 08/17/24  0529   INR 1.98* 3.1* 2.75* 3.72*     Anticoagulation inpatient management:     anticoagulation calendar updated with inpatient dosing    Anticoagulation discharge instructions:     Warfarin dosing: hold warfarin until INR on 8/19/24   Bridging: No   INR goal change: No      Medication changes affecting anticoagulation: Yes: lasix at discharge; was treated with zosyn on admission but no antibiotics on discharge as she had no fever, cough or elevated WBC    Additional factors affecting anticoagulation: Yes: continues on amiodarone, lasix given in ED and weight dropped from 168-162 lbs by discharge.      PLAN     No adjustment to anticoagulation plan needed    Patient not contacted- patient has INR today, will call patient with dosing instructions once INR has resulted.     Anticoagulation Calendar updated    Yoselin Lockett RN

## 2024-08-19 NOTE — PROGRESS NOTES
ANTICOAGULATION MANAGEMENT     Ijeoma Shah 73 year old female is on warfarin with subtherapeutic INR result. (Goal INR 2.0-3.0)    Recent labs: (last 7 days)     08/19/24  1050   INR 1.7*       ASSESSMENT     Source(s): Chart Review and Patient/Caregiver Call     Warfarin doses taken: Missed dose(s) may be affecting INR- 2 doses of warfarin held while hospitalized.  Diet: No new diet changes identified  Medication/supplement changes:  lasix started at hospital discharge , patient continues on amiodarone  New illness, injury, or hospitalization: Yes: discharged from the hospital on 8/17/24, see ADT  Signs or symptoms of bleeding or clotting: No  Previous result: Supratherapeutic  Additional findings: Recent Atrial fib/flutter ablation within 12 weeks; Hx of Labile INR, Recent warfarin dose changes, and Recent amiodarone start: at least weekly INR x 4 advised. Notify cardiologist and EP pool if INR <= 1.7 within 4 weeks, procedure/surgery hold requested, or non-compliance with monitoring > 1 week.       PLAN     Recommended plan for temporary change(s) affecting INR     Dosing Instructions: Continue your current warfarin dose with next INR in 3 days (moved days that patient takes 7.5 mg to Mon/Thu due to recent holds while inpatient. Will keep recheck on Thursday as already scheduled      Summary  As of 8/19/2024      Full warfarin instructions:  7.5 mg every Mon, Thu; 5 mg all other days   Next INR check:  8/22/2024               Telephone call with Salima who verbalizes understanding and agrees to plan and who agrees to plan and repeated back plan correctly    Lab visit scheduled    Education provided: Symptom monitoring: monitoring for bleeding signs and symptoms, monitoring for clotting signs and symptoms, and when to seek medical attention/emergency care  Importance of notifying anticoagulation clinic for: changes in medications; a sooner lab recheck maybe needed and diarrhea, nausea/vomiting, reduced  intake, cold/flu, and/or infections; a sooner lab recheck maybe needed  Contact [phone] with any changes, questions or concerns.     Plan made per [facility] anticoagulation protocol    Yoselin Lockett RN  Anticoagulation Clinic  8/19/2024    _______________________________________________________________________     Anticoagulation Episode Summary       Current INR goal:  2.0-3.0   TTR:  70.9% (1 y)   Target end date:  Indefinite   Send INR reminders to:  [facility] [place]    Indications    Paroxysmal atrial fibrillation (H) [I48.0]  Atrial fibrillation with rapid ventricular response (H) [I48.91]             Comments:  RAJESH garibay             Anticoagulation Care Providers       Provider Role Specialty Phone number    Warren Pisano MD Referring Cardiovascular Disease [phone]    Jyoti Espinal MD Referring Family Medicine [phone]    Mary Godoy CNP Referring Critical Care [phone]

## 2024-08-19 NOTE — PROGRESS NOTES
"Salima is a 73 year old who is being evaluated via a billable video visit.    How would you like to obtain your AVS? MyChart  If the video visit is dropped, the invitation should be resent by: Text to cell phone: [phone]  Will anyone else be joining your video visit? No  {If patient encounters technical issues they should call [phone] :[id number]}  Video-Visit Details    Type of service:  Video Visit   Start 12:38 pm  Video End Time:12:59 PM  Originating Location (pt. Location): Home  {PROVIDER LOCATION On-site should be selected for visits conducted from your clinic location or adjoining [facility] hospital, academic office, or other nearby [facility] building. Off-site should be selected for all other provider locations, including home:[id number]}  Distant Location (provider location):  On-site  Platform used for Video Visit: Lumavita    Vitals - Patient Reported  Systolic (Patient Reported): 134  Weight (Patient Reported): 72.2 kg (159 lb 3.2 oz)  Height (Patient Reported): 162.6 cm (5' 4\")  BMI (Based on Pt Reported Ht/Wt): 27.33    Review Of Systems  Respiratory: NEGATIVE  Cardiovascular:NEGATIVE  Endocrine:  NEGATIVE    Telephone number of patient: [phone]    Bam  "

## 2024-08-19 NOTE — LETTER
"8/19/2024    [facility]  [address] [place] [address]    RE: Ijeoma Shah       Dear Colleague,     I had the pleasure of seeing Ijeoma Shah in the [facility].  Salima is a 73 year old who is being evaluated via a billable video visit.    How would you like to obtain your AVS? MyChart  If the video visit is dropped, the invitation should be resent by: Text to cell phone: [phone]  Will anyone else be joining your video visit? No    Video-Visit Details    Type of service:  Video Visit   Start 12:38 pm  Video End Time:12:59 PM  Originating Location (pt. Location): Home    Distant Location (provider location):  On-site  Platform used for Video Visit: Rambus    Vitals - Patient Reported  Systolic (Patient Reported): 134  Weight (Patient Reported): 72.2 kg (159 lb 3.2 oz)  Height (Patient Reported): 162.6 cm (5' 4\")  BMI (Based on Pt Reported Ht/Wt): 27.33    Review Of Systems  Respiratory: NEGATIVE  Cardiovascular:NEGATIVE  Endocrine:  NEGATIVE    Telephone number of patient: [phone]    [email or web address]        Cardiology Clinic Progress Note  Ijeoma Shah MRN# [id number]   YOB: 1950 Age: 73 year old     Primary cardiologist: Dr. Monsivais (EP) and Dr. Pisano (general)    Reason for visit: Atrial fibrillation and HF    History of presenting illness:    Ijeoma Shah, a pleasant 73 year old patient with a past medical history of:    Persistent atrial fibrillation:  Diagnosed in 2020 initially placed on amiodarone following her lobectomy.  Amiodarone was continued through her chemotherapy and ultimately discontinued when chemotherapy ended and was continued on metoprolol for rate control. Found to be in AF with RVR 5/2024 despite increase in BB. Started amiodarone 7/2/2024. S/p FIOR guided PVI on 8/12/2024.   [id number] Score: 3 (age, gender, HTN) on Warfarin  Secondary cardiomyopathy: LVEF 65-70% 4/2022 in " SR and 45-50% 6/2024 in AF with RVR with recent HF exacerbation  Hypertension  Dyslipidemia  Squamous cell carcinoma of the lung  Previous left lower lobectomy and mediastinal lymph node dissection brain MRA negative.  Received cisplatin and gemcitabine x 4 2/2021.  Followed by oncology    Salima underwent a successful FIOR guided PVI on 8/12/2024.  Unfortunately, she presented to [facility] on 8/16 with complaints for shortness of breath and orthopnea for 4 days prior to admission. Differentials were CHF versus pneumonia.  BNP was elevated and she responded well to diuresis and dropped 4 pounds with the use of IV Lasix.  She was discharged home on Lasix 20 mg daily.  Thankfully, during admission and post discharge she has been able to maintain NSR.     She is accompanied by her daughter today for a virtual visit and follow-up post ablation and discharge.  Thankfully, she has had no recurrent AF or HF symptoms.  Her weight is steadily declining on 20 mg of Lasix.  Her EKG notes sinus rhythm and QTc appears prolonged.  After review with Dr. Mnosivais we will adjust amiodarone accordingly.    Diagnostic studies:   EKG 8/19/2024: SR at 84 bpm with prolonged QT   Echocardiogram 6/2024: LVEF 45-50% with mild MR, TR, normal biatrial size (in AF with RVR)  Zio Patch 6/2024: Persistent AF with HR  bpm ([id number] bpm)  EKG 5/2024:AF with ventricular rate 118-124 bpm  Echo 4/2022: EF 65 to 70%. Grade 1 diastolic dysfunction.  Normal atrial size.  No significant valvular abnormality.         Assessment and Plan:     ASSESSMENT:    Persistent atrial fibrillation   Diagnosed in 2020 with paroxysmal AF. Initially placed on amiodarone following her lobectomy.  Amiodarone was continued through her chemotherapy and ultimately discontinued when chemotherapy ended and was continued on metoprolol for rate control.   Found to be in AF with RVR 5/2024 despite increase in BB. Started amiodarone taper 200 mg BID x 2 weeks  then 200 mg daily as of 8/12.   S/p FIOR guided PVI on 8/12/2024.   Prolonged QT on EKG today (per review with Dr. Monsivais)    Secondary cardiomyopathy with recent HF exacerbation  LVEF 65-70% 4/2022 in SR and 45-50% 6/2024 in AF with RVR with recent HF exacerbation and admission 8/16-8/17  Discharged on lasix 20 mg daily  Discharge weight 162 lbs  Patient reports weight today 159 lbs    PLAN:     Decrease amiodarone from 200 mg twice daily to 200 mg daily  EKG in 1 week  Update echocardiogram prior to visit with Dr. Monsivais in November            Review of Systems:     Negative unless noted in HPI     Today's clinic visit entailed:  Review of the result(s) of each unique test - EKG, echo, Zio,   Assessment requiring an independent historian(s) - family - Daughter  The following tests were independently interpreted by me as noted in my documentation: EKG  I spent a total of 51 minutes on the day of the visit.   Time spent by me doing chart review, history and exam, documentation and further activities per the note  Provider  Link to [facility] Help Grid     The level of medical decision making during this visit was of moderate complexity.           Physical Exam:     General Appearance:  No distress, normal body habitus, upright.    ENT/Mouth:  Membranes moist, no nasal discharge or bleeding gums. Normal head shape, no evidence of injury or laceration.    EYES:  No scleral icterus, normal conjunctivae    Neurologic:  Normal arm motion bilateral, no tremors. No evidence of focal defect.    Psychiatric:  Alert and oriented x3, calm         Medications:     Current Outpatient Medications   Medication Sig Dispense Refill     amiodarone (PACERONE) 200 MG tablet Take 1 tablet (200 mg) by mouth daily for 90 days       calcium carbonate-vitamin D (CALTRATE) 600-10 MG-MCG per tablet Take 1 tablet by mouth 2 times daily       EPINEPHrine (ANY BX GENERIC EQUIV) 0.3 MG/0.3ML injection 2-pack Inject 0.3 mLs (0.3 mg) into the muscle as needed  for anaphylaxis May repeat one time in 5-15 minutes if response to initial dose is inadequate. 2 each 1     furosemide (LASIX) 20 MG tablet Take 1 tablet (20 mg) by mouth daily 30 tablet 0     ketoconazole (NIZORAL) 2 % external cream Apply topically daily For rash on feet. 60 g 0     metoprolol succinate ER (TOPROL XL) 25 MG 24 hr tablet Take 0.5 tablets (12.5 mg) by mouth every evening       triamcinolone (KENALOG) 0.1 % external ointment Apply topically 2 times daily To heels as needed 80 g 1       Family History   Problem Relation Age of Onset     Glaucoma Mother      LUNG DISEASE Mother      Melanoma Father      Down Syndrome Brother      Cancer Sister         bile duct     Coronary Artery Disease Brother      CABG Brother      No Known Problems Brother      No Known Problems Brother      No Known Problems Sister      Lung Cancer Sister         lung     No Known Problems Sister      No Known Problems Sister        Social History     Socioeconomic History     Marital status:      Spouse name: Not on file     Number of children: 1     Years of education: Not on file     Highest education level: Not on file   Occupational History     Occupation: seasonal summer work only   Tobacco Use     Smoking status: Former     Current packs/day: 0.00     Average packs/day: 1 pack/day for 33.0 years (33.0 ttl pk-yrs)     Types: Cigarettes     Start date: 1981     Quit date: 2014     Years since quittin.6     Passive exposure: Current (ocassional per pt)     Smokeless tobacco: Never   Vaping Use     Vaping status: Never Used   Substance and Sexual Activity     Alcohol use: Yes     Alcohol/week: 1.7 - 2.5 standard drinks of alcohol     Types: 2 - 3 Standard drinks or equivalent per week     Comment: Beer once in a while     Drug use: No     Sexual activity: Yes     Partners: Male   Other Topics Concern     Parent/sibling w/ CABG, MI or angioplasty before 65F 55M? No   Social History Narrative     Not on  file     Social Determinants of Health     Financial Resource Strain: Low Risk  (1/19/2024)    Financial Resource Strain      Within the past 12 months, have you or your family members you live with been unable to get utilities (heat, electricity) when it was really needed?: No   Food Insecurity: Low Risk  (1/19/2024)    Food Insecurity      Within the past 12 months, did you worry that your food would run out before you got money to buy more?: No      Within the past 12 months, did the food you bought just not last and you didn t have money to get more?: No   Transportation Needs: Low Risk  (1/19/2024)    Transportation Needs      Within the past 12 months, has lack of transportation kept you from medical appointments, getting your medicines, non-medical meetings or appointments, work, or from getting things that you need?: No   Physical Activity: Not on file   Stress: Not on file   Social Connections: Not on file   Interpersonal Safety: Low Risk  (8/6/2024)    Interpersonal Safety      Do you feel physically and emotionally safe where you currently live?: Yes      Within the past 12 months, have you been hit, slapped, kicked or otherwise physically hurt by someone?: No      Within the past 12 months, have you been humiliated or emotionally abused in other ways by your partner or ex-partner?: No   Housing Stability: Low Risk  (1/19/2024)    Housing Stability      Do you have housing? : Yes      Are you worried about losing your housing?: No            Past Medical History:     Past Medical History:   Diagnosis Date     Arrhythmia     A-Fib     Arthritis      Benign essential hypertension      Calculus of gallbladder without cholecystitis without obstruction      Congestive heart failure, unspecified HF chronicity, unspecified heart failure type (H) 8/16/2024     COPD (chronic obstructive pulmonary disease) (H)      Lung cancer (H)      Simple chronic bronchitis (H)               Past Surgical History:     Past  Surgical History:   Procedure Laterality Date     ARTHROEREISIS, SUBTALAR       BRONCHOSCOPY RIGID OR FLEXIBLE W/TRANSENDOSCOPIC ENDOBRONCHIAL ULTRASOUND GUIDED N/A 07/27/2020    Procedure: Flexible bronchoscopy, endobronchial ultrasound with transbronchial biopsies;  Surgeon: Edin Castro MD;  Location: [facility] OR     CATARACT IOL, RT/LT Bilateral      COLONOSCOPY  06/29/2004    colonoscopy to the cecum     COLONOSCOPY N/A 1/25/2023    Procedure: COLONOSCOPY, WITH POLYPECTOMY;  Surgeon: Warren Soto MD;  Location:  GI     EP ABLATION FOCAL AFIB N/A 8/12/2024    Procedure: Ablation Atrial Fibrilation;  Surgeon: Nasim Monsivais MD;  Location:  HEART CARDIAC CATH LAB     ESOPHAGOSCOPY, GASTROSCOPY, DUODENOSCOPY (EGD), COMBINED N/A 09/01/2020    Procedure: ESOPHAGOGASTRODUODENOSCOPY, WITH FINE NEEDLE ASPIRATION BIOPSY, WITH ENDOSCOPIC ULTRASOUND GUIDANCE;  Surgeon: Barber Hogan MD;  Location: [facility] GI     LAPAROSCOPIC CHOLECYSTECTOMY N/A 5/20/2021    Procedure: CHOLECYSTECTOMY, LAPAROSCOPIC;  Surgeon: Gavin Castillo MD;  Location: [facility] OR     PICC TRIPLE LUMEN PLACEMENT Right 10/23/2020    5Fr - 36cm, Medial brachial vein     SURGICAL HISTORY OF -       nose surgery     THORACOSCOPIC RESECTION LUNG Left 10/22/2020    Procedure: Left thoracoscopic lobectomy converted to Left Thoracotomy, Medistinal lymph node dissection, Pulmonary Artery repair, flexible bronchoscopy, on-pump oxygenator on standy-by;  Surgeon: Andrea Sanabria MD;  [facility]     THORACOTOMY N/A 10/22/2020    Procedure: Thoracotomy;  Surgeon: Andrea Sanabria MD;  [facility]     TRANSCERVICAL EXTENDED MEDIASTINAL LYMPHADENECTOMY N/A 10/22/2020    Procedure: Transcervical extended mediastinal lymphadenectomy;  Surgeon: Andrea Sanabria MD;  Location: [facility]     TUBAL LIGATION      bilateral tubal ligation.  BTL              Allergies:   Bee venom       Data:   Laboratory tests:    Recent Labs   Lab Test 12/27/22  0950  06/08/22  0716 04/20/22  0936 03/08/22  0909 10/24/20  0319 09/29/20  1221 06/23/20  1002   LDL  --   --   --  152*  --   --  147*   HDL  --   --   --  76  --   --  68   NHDL  --   --   --  175*  --   --  170*   CHOL  --   --   --  251*  --   --  238*   TRIG  --   --   --  115  --   --  117   TSH 1.11 0.69 0.68  --    < >  --  0.62   NTBNP  --   --   --   --   --  191*  --     < > = values in this interval not displayed.       Lab Results   Component Value Date    WBC 10.0 08/16/2024    WBC 7.4 04/12/2021    RBC 4.75 08/16/2024    RBC 3.49 (L) 04/12/2021    HGB 14.4 08/16/2024    HGB 13.1 05/20/2021    HCT 42.0 08/16/2024    HCT 32.8 (L) 04/12/2021    MCV 88 08/16/2024    MCV 94 04/12/2021    MCH 30.3 08/16/2024    MCH 31.5 04/12/2021    MCHC 34.3 08/16/2024    MCHC 33.5 04/12/2021    RDW 12.6 08/16/2024    RDW 13.8 04/12/2021     08/16/2024     04/12/2021       Lab Results   Component Value Date     08/17/2024     04/12/2021    POTASSIUM 3.5 08/17/2024    POTASSIUM 4.4 12/27/2022    POTASSIUM 4.2 05/20/2021    CHLORIDE 100 08/17/2024    CHLORIDE 101 12/27/2022    CHLORIDE 102 04/12/2021    CO2 25 08/17/2024    CO2 31 12/27/2022    CO2 28 04/12/2021    ANIONGAP 14 08/17/2024    ANIONGAP 5 12/27/2022    ANIONGAP 4 04/12/2021    GLC 91 08/17/2024     (H) 12/27/2022     (H) 04/12/2021    BUN 15.3 08/17/2024    BUN 18 12/27/2022    BUN 19 04/12/2021    CR 0.88 08/17/2024    CR 1.08 (H) 05/20/2021    GFRESTIMATED 69 08/17/2024    GFRESTIMATED 60 (L) 03/08/2022    GFRESTIMATED 52 (L) 05/20/2021    GFRESTBLACK 60 (L) 05/20/2021    SABINO 8.1 (L) 08/17/2024    SABINO 8.7 04/12/2021      Lab Results   Component Value Date    AST 24 08/16/2024    AST 15 04/12/2021    ALT 22 08/16/2024    ALT 23 04/12/2021       Lab Results   Component Value Date    A1C 5.6 10/23/2020       Lab Results   Component Value Date    INR 1.7 (H) 08/19/2024    INR 3.72 (H) 08/17/2024    INR 2.75 (H) 08/16/2024        RUDY Schneider CNP  [facility]  Pager: [phone]  RN phone: [phone]      Thank you for allowing me to participate in the care of your patient.      Sincerely,     RUDY Schneider CNP     [facility]  cc:   Lolita Krishnamurthy MD  No address on file

## 2024-08-19 NOTE — TELEPHONE ENCOUNTER
EKG repeat and echo appointments have been scheduled.    Future Appointments 8/19/2024 - 2/15/2025        Date Visit Type Length Department Provider     8/22/2024  1:00 PM LAB INR 15 min NB LABORATORY NB LAB              8/30/2024  9:00 AM ED/HOSP FOLLOW UP 30 min NB FAMILY PRACTICE Justin Salas PA-C    Location Instructions:     [facility] is located at [address] [place] [address], less than a mile west of the [address] exit off of [address]. From [address], turn south on [address] or [address] to reach [address] [place] [address].              9/3/2024  1:30 PM FOLLOW UP 30 min PH CARDIAC SERVICES PH EVENT/HOLTER MONITOR    Location Instructions:     From [address]: Exit at [address] on south side of [place]. Turn right on [address]. Turn left at stoplight on [address]. [facility] will be in view two blocks ahead  This appointment is in a hospital-based location.&nbsp; Before your visit, you may want to check with your insurance company for coverage and referral options, including cost differences between services provided in different clinic settings.&nbsp; For more information visit this link on the [employer] Website:&nbsp; [email or web address]              9/19/2024  1:00 PM ECHO COMPLETE 60 min PH CARDIAC SERVICES [email or web address]    Location Instructions:     From [address]: Exit at [address] on south side [facility]. Turn right on [address]. Turn left at stoplight on [address]. [facility] will be in view two blocks ahead  This appointment is in a hospital-based location.&nbsp; Before your visit, you may want to check with your insurance company for coverage and referral options, including cost differences between services provided in different clinic settings.&nbsp; For more information visit this link on the [employer] Website:&nbsp; [email or web address]              11/5/2024  1:30 PM HOLTER MONITOR 30 min PH  CARDIAC SERVICES PH EVENT/HOLTER MONITOR    Location Instructions:     From [address]: Exit at [email or web address] on south side of [place]. Turn right on [address]. Turn left at stoplight on [address]. [facility] will be in view two blocks ahead  This appointment is in a hospital-based location.&nbsp; Before your visit, you may want to check with your insurance company for coverage and referral options, including cost differences between services provided in different clinic settings.&nbsp; For more information visit this link on the [employer] Website:&nbsp; [email or web address]              11/6/2024  4:15 PM RETURN EP 30 min [facility] Nasim Monsivais MD    Location Instructions:     Our clinic is located at [address], [facility] [place] [facility] [address]. You can park your vehicle at the parking ramp west of [address] across the street from [facility]. You will cross the skyway to access our clinic on the 2nd floor.              12/16/2024 11:00 AM LAB 15 min PH LAB CLINIC PH LAB              12/16/2024 11:50 AM CT CHEST/ABDOMEN/PELVIS W 20 min PH CT SCAN PHCT1    Location Instructions:     [facility] [address] [place] [address]  Parking Imaging customers can park in the lot across from the hospital's main entrance.  Entrance and check-in location Enter through the main hospital entrance, under the canopy. Please check-in at the registration desk just inside the main entrance. Once you are checked-in, you'll be directed down the natarajan past the check-in desk and around the corner to the left. Follow the signs labeled Radiology. This will lead to the waiting area outside the imaging center. Please check in at the Radiology desk when you arrive in this waiting area.  This appointment is in a hospital-based location.&nbsp; Before your visit, you may want to check with your insurance company for coverage and  referral options, including cost differences between services provided in different clinic settings.&nbsp; For more information visit this link on the ip[employer]access Website:&nbsp; [email or web address]              12/18/2024 12:15 PM RETURN CCSL 30 min  ONCOLOGY ADULT Eneida De Leon MD    Location Instructions:     The [facility] is in a dense urban area with multiple transportation and parking options. You may wish to review options for  service and self-parking in more detail on the [facility] s website at [email or web address].   This appointment is in a hospital-based location.&nbsp; Before your visit, you may want to check with your insurance company for coverage and referral options, including cost differences between services provided in different clinic settings.&nbsp; For more information visit this link on the ip.access Website:&nbsp; [email or web address]

## 2024-08-19 NOTE — PATIENT INSTRUCTIONS
TODAY'S RECOMMENDATIONS:    Restart Warfarin per anticoagulation team  I spoke to Dr. Monsivais, and she would like you to go down on amiodarone from 200 mg twice a day to 200 mg daily now instead of next week.   Please get a repeat EKG in [place] in ~1 week  Echocardiogram prior to visit with Dr. Monsivais in November  Continue all other medications without changes.    If you have questions or concerns please call clinic at [phone]    Please call [phone] for scheduling.      It was a pleasure seeing you today!

## 2024-08-20 NOTE — UTILIZATION REVIEW
Admission Status; Secondary Review Determination              As part of the [facility] Utilization review plan, a self-audit is done on Medicare inpatient admission with less than 2 midnights stay. The 2014 IPPS Final Rule allows outpatient billing in the event that a hospital determines that an inpatient admission was not medically necessary under utilization review process.                   (x) Outpatient status would be Appropriate- Short Stay- Post discharge review.         RATIONALE FOR DETERMINATION    Ijeoma Shah is a 73 year old female with recent history of atrial fibrillation ablation who was admitted on 8/16/2024 with a 4 day history of orthopnea and ESCALONA.  EKG on admission was NSR.  BNP elevated at 7000.  Oxygen saturation on room air 87% and she required supplemental oxygen.  CXR with question of atelectasis vs infiltrate and empiric antibiotics were initiated.  She received one dose of IV lasix and has excellent diuresis.  She improved rapidly and was medically stable for discharge on 8/17/2024.       This account and medical record number for a Medicare beneficiary was an inpatient short stay (<2 midnights) and didn't meet medical necessity for inpatient admission. We recommend billing outpatient services based on the severity of illness, intensity of service provided and the inpatient length of stay.  Please contact me within one week of receiving this letter only if you disagree with this determination. If you concur, no further action is needed.                   The information on this document is developed by the utilization review team in order for the business office to ensure compliance.  This only denotes the appropriateness of proper admission status and does not reflect the quality of care rendered.            The definitions of Inpatient Status and Observation Status used in making the determination above are those provided in the CMS Coverage Manual, Chapter 1 and Chapter 6,  section 70.4.         Sincerely,    Alana Latham MD  Physician Advisor  Utilization Management

## 2024-08-21 LAB — BACTERIA BLD CULT: NO GROWTH

## 2024-08-22 ENCOUNTER — LAB (OUTPATIENT)
Dept: LAB | Facility: CLINIC | Age: 74
End: 2024-08-22
Payer: MEDICARE

## 2024-08-22 ENCOUNTER — ANTICOAGULATION THERAPY VISIT (OUTPATIENT)
Dept: ANTICOAGULATION | Facility: CLINIC | Age: 74
End: 2024-08-22

## 2024-08-22 DIAGNOSIS — I48.0 PAROXYSMAL ATRIAL FIBRILLATION (H): ICD-10-CM

## 2024-08-22 DIAGNOSIS — I48.91 ATRIAL FIBRILLATION WITH RAPID VENTRICULAR RESPONSE (H): ICD-10-CM

## 2024-08-22 DIAGNOSIS — I48.0 PAROXYSMAL ATRIAL FIBRILLATION (H): Primary | ICD-10-CM

## 2024-08-22 LAB — INR BLD: 1.7 (ref 0.9–1.1)

## 2024-08-22 PROCEDURE — 36416 COLLJ CAPILLARY BLOOD SPEC: CPT

## 2024-08-22 PROCEDURE — 85610 PROTHROMBIN TIME: CPT

## 2024-08-22 NOTE — PROGRESS NOTES
ANTICOAGULATION MANAGEMENT     Ijeoma Shah 73 year old female is on warfarin with subtherapeutic INR result. (Goal INR 2.0-3.0)    Recent labs: (last 7 days)     08/22/24  1250   INR 1.7*       ASSESSMENT     Source(s): Chart Review and Patient/Caregiver Call     Warfarin doses taken: Missed dose(s) may be affecting INR  Diet: No new diet changes identified  Medication/supplement changes:  Amiodarone is now 200 mg daily  New illness, injury, or hospitalization: No  Signs or symptoms of bleeding or clotting: No  Previous result: Subtherapeutic  Additional findings: None       PLAN     Recommended plan for temporary change(s) affecting INR     Dosing Instructions: booster dose then continue your current warfarin dose with next INR in 1 week       Summary  As of 8/22/2024      Full warfarin instructions:  8/22: 10 mg; Otherwise 7.5 mg every Mon, Thu; 5 mg all other days   Next INR check:  8/29/2024               Telephone call with Salima who verbalizes understanding and agrees to plan    Lab visit scheduled    Education provided: Goal range and lab monitoring: goal range and significance of current result    Plan made per [facility] anticoagulation protocol with [facility]    Becky Capellan RN  Anticoagulation Clinic  8/22/2024    _______________________________________________________________________     Anticoagulation Episode Summary       Current INR goal:  2.0-3.0   TTR:  70.0% (1 y)   Target end date:  Indefinite   Send INR reminders to:  [facility]    Indications    Paroxysmal atrial fibrillation (H) [I48.0]  Atrial fibrillation with rapid ventricular response (H) [I48.91]             Comments:  DVM okay             Anticoagulation Care Providers       Provider Role Specialty Phone number    Warren Pisano MD Referring Cardiovascular Disease [phone]    Jyoti Espinal MD Referring Family Medicine [phone]    Mary Godoy CNP Referring Critical Care [phone]

## 2024-08-23 ENCOUNTER — TELEPHONE (OUTPATIENT)
Dept: ADMISSION | Facility: CLINIC | Age: 74
End: 2024-08-23
Payer: MEDICARE

## 2024-08-23 NOTE — TELEPHONE ENCOUNTER
Reason for Call:  Other Questions re: future holter monitor    Detailed comments: Salima is requesting a call to discuss a future holter monitor. Patient had an ablation on 8/16/24 and was under the impression she was to be scheduled for a holter monitor. Please call to clarify to patient.    Phone Number Patient can be reached at: Home number on file [phone] (home)    Best Time:     Can we leave a detailed message on this number? YES    Call taken on 8/23/2024 at 11:10 AM by Nkechi Flores

## 2024-08-23 NOTE — TELEPHONE ENCOUNTER
Spoke to patient to discuss future holter.  Informed patient that she does not need to do a holter.  Patient had OV on 8/19 and had adjustment to her amiodarone medication (decreased from 200mg bid to 200mg every day).  EKG was needed in one week.  Patient scheduled for 9/3.  Will have scheduling assist patient in getting this set up for next week instead.  DEIDRE Manriquez

## 2024-08-23 NOTE — ADDENDUM NOTE
Addendum  created 08/23/24 8067 by Belen Alcantara MD    Clinical Note Signed, Intraprocedure Blocks edited, SmartForm saved

## 2024-08-26 ENCOUNTER — MYC MEDICAL ADVICE (OUTPATIENT)
Dept: CARDIOLOGY | Facility: CLINIC | Age: 74
End: 2024-08-26

## 2024-08-26 ENCOUNTER — TELEPHONE (OUTPATIENT)
Dept: FAMILY MEDICINE | Facility: CLINIC | Age: 74
End: 2024-08-26

## 2024-08-26 ENCOUNTER — HOSPITAL ENCOUNTER (OUTPATIENT)
Dept: CARDIOLOGY | Facility: CLINIC | Age: 74
Discharge: HOME | End: 2024-08-26
Attending: NURSE PRACTITIONER | Admitting: NURSE PRACTITIONER
Payer: MEDICARE

## 2024-08-26 DIAGNOSIS — I48.0 PAROXYSMAL ATRIAL FIBRILLATION (H): ICD-10-CM

## 2024-08-26 DIAGNOSIS — I48.0 PAROXYSMAL ATRIAL FIBRILLATION (H): Primary | ICD-10-CM

## 2024-08-26 PROCEDURE — 93010 ELECTROCARDIOGRAM REPORT: CPT | Performed by: NURSE PRACTITIONER

## 2024-08-26 PROCEDURE — 93005 ELECTROCARDIOGRAM TRACING: CPT | Performed by: REHABILITATION PRACTITIONER

## 2024-08-26 RX ORDER — POTASSIUM CHLORIDE 1500 MG/1
TABLET, EXTENDED RELEASE ORAL
Qty: 30 TABLET | Refills: 1 | Status: SHIPPED | OUTPATIENT
Start: 2024-08-26 | End: 2024-09-25

## 2024-08-26 NOTE — TELEPHONE ENCOUNTER
General Call    Contacts       Contact Date/Time Type Contact Phone/Fax    08/26/2024 11:39 AM CDT Phone (Incoming) Salima Shah (Self) [phone] (M)          Reason for Call:  speak to ACN    What are your questions or concerns:  Patient needs to know her Warfarin dosing- she is saying it has gotten switched so much she needs to verify    Date of last appointment with provider:     Could we send this information to you in [employer] or would you prefer to receive a phone call?:   Patient would prefer a phone call   Okay to leave a detailed message?: Yes at Cell number on file:    Telephone Information:   Mobile [phone]

## 2024-08-26 NOTE — TELEPHONE ENCOUNTER
8/26/24 Spoke w pt and shared recommendations from Janis   Continue Amiodarone 200 mg daily , give it another week to see if sx persist  Started KCL 40 meq x 5 days then decrease to 20 mg daily  Magnesium Oxide 400 mg BID  BMP in 1-2 weeks  Rx sent to [facility]  Order for BMP placed, pt will call and schedule in [place]  Updated pt vis [place] 144 pm

## 2024-08-26 NOTE — TELEPHONE ENCOUNTER
Spoke to pt, who states that her weight has not changed since being off the furosemide and pt is not SOB. Pt will continue to hold the lasix for a few more days and then pt will check in on Wednesday to see if the itching has gone a away. Pt told to also call if her weight goes up or she becomes SOB. Pt states understanding. Prince

## 2024-08-26 NOTE — TELEPHONE ENCOUNTER
Please ask the patient if she has had any weight gain, SOBOE or orthopnea since holding the lasix.     I would continue to hold Lasix for a few more days and update us with symptoms itching and HF symptoms on Wednesday or sooner if deems necessary.     Janis Carr, APRN, CNP

## 2024-08-29 ENCOUNTER — LAB (OUTPATIENT)
Dept: LAB | Facility: CLINIC | Age: 74
End: 2024-08-29
Payer: MEDICARE

## 2024-08-29 ENCOUNTER — ANTICOAGULATION THERAPY VISIT (OUTPATIENT)
Dept: ANTICOAGULATION | Facility: CLINIC | Age: 74
End: 2024-08-29

## 2024-08-29 ENCOUNTER — PATIENT OUTREACH (OUTPATIENT)
Dept: CARE COORDINATION | Facility: CLINIC | Age: 74
End: 2024-08-29

## 2024-08-29 DIAGNOSIS — I48.0 PAROXYSMAL ATRIAL FIBRILLATION (H): ICD-10-CM

## 2024-08-29 DIAGNOSIS — I48.0 PAROXYSMAL ATRIAL FIBRILLATION (H): Primary | ICD-10-CM

## 2024-08-29 DIAGNOSIS — I48.91 ATRIAL FIBRILLATION WITH RAPID VENTRICULAR RESPONSE (H): ICD-10-CM

## 2024-08-29 LAB — INR BLD: 3.1 (ref 0.9–1.1)

## 2024-08-29 PROCEDURE — 36416 COLLJ CAPILLARY BLOOD SPEC: CPT

## 2024-08-29 PROCEDURE — 85610 PROTHROMBIN TIME: CPT

## 2024-08-29 NOTE — PROGRESS NOTES
Clinic Care Coordination Contact  Follow Up Progress Note   [facility]  Hospitalist Discharge Summary       Date of Admission:  8/16/2024  Date of Discharge:  8/17/2024  CHF exacerbation      Assessment:   Previous hospital discharge call was brief due to a incoming call .  Patient agrees to a follow up call from CC RN .   No weight gain or leg edema or increased SOB episodes   Brief call with patient and she is wondering why CC RN is calling when she has a PCP visit tomorrow   One concern that she has some itching and sent a message to the Cardiology team via My Chart     Care Gaps:    Health Maintenance Due   Topic Date Due    DEXA  Never done    HF ACTION PLAN  Never done    COPD ACTION PLAN  Never done    RSV VACCINE (1 - 1-dose 60+ series) Never done    COVID-19 Vaccine (3 - Pfizer risk series) 04/23/2021    DTAP/TDAP/TD IMMUNIZATION (2 - Td or Tdap) 08/28/2022    MICROALBUMIN  03/01/2023    LIPID  03/08/2023    MEDICARE ANNUAL WELLNESS VISIT  06/01/2024       No discussed         Plan:   No unmet needs, no further care coordination needed at this time     [facility]   Ava Webb RN, Care Coordinator   [facility]'s   E-mail [email or web address]   [phone]

## 2024-08-29 NOTE — TELEPHONE ENCOUNTER
Please have her hold her amiodarone.  This may take a week or so to improve.  If the symptoms are unbearable and it appears she is scratching quite a bit, she may need to see her PCP locally for prednisone or other treatment..     RUDY Montes De Oca, CNP

## 2024-08-29 NOTE — PROGRESS NOTES
ANTICOAGULATION MANAGEMENT     Ijeoma Shah 73 year old female is on warfarin with supratherapeutic INR result. (Goal INR 2.0-3.0)    Recent labs: (last 7 days)     08/29/24  1319   INR 3.1*       ASSESSMENT     Source(s): Chart Review and Patient/Caregiver Call     Warfarin doses taken: Warfarin taken as instructed  Diet: No new diet changes identified  Medication/supplement changes:  taking benadryl for itch, stopping amiodarone and lasix as of today  New illness, injury, or hospitalization: No  Signs or symptoms of bleeding or clotting: No  Previous result: Subtherapeutic  Additional findings: None       PLAN     Recommended plan for ongoing change(s) affecting INR     Dosing Instructions: Continue your current warfarin dose (did not switch 7.5 mg from Thursday to Friday due to patient already taking dose today) with next INR in 1 week       Summary  As of 8/29/2024      Full warfarin instructions:  7.5 mg every Mon, Thu; 5 mg all other days   Next INR check:  9/5/2024               Telephone call with Salima who verbalizes understanding and agrees to plan    Lab visit scheduled    Education provided: Goal range and lab monitoring: goal range and significance of current result    Plan made with [facility] Pharmacist Bhakti Capellan, DEIDRE  Anticoagulation Clinic  8/29/2024    _______________________________________________________________________     Anticoagulation Episode Summary       Current INR goal:  2.0-3.0   TTR:  69.5% (1 y)   Target end date:  Indefinite   Send INR reminders to:  [facility]    Indications    Paroxysmal atrial fibrillation (H) [I48.0]  Atrial fibrillation with rapid ventricular response (H) [I48.91]             Comments:  RAJESH garibay             Anticoagulation Care Providers       Provider Role Specialty Phone number    Warren Pisano MD Referring Cardiovascular Disease [phone]    Jyoti Espinal MD Referring Family Medicine [phone]     Mary Godoy, CNP Referring Critical Care [phone]

## 2024-08-30 ENCOUNTER — OFFICE VISIT (OUTPATIENT)
Dept: FAMILY MEDICINE | Facility: CLINIC | Age: 74
End: 2024-08-30
Payer: MEDICARE

## 2024-08-30 VITALS
DIASTOLIC BLOOD PRESSURE: 82 MMHG | OXYGEN SATURATION: 96 % | TEMPERATURE: 97.4 F | RESPIRATION RATE: 18 BRPM | SYSTOLIC BLOOD PRESSURE: 138 MMHG | HEART RATE: 75 BPM

## 2024-08-30 DIAGNOSIS — I10 BENIGN ESSENTIAL HYPERTENSION: ICD-10-CM

## 2024-08-30 DIAGNOSIS — N18.31 STAGE 3A CHRONIC KIDNEY DISEASE (H): ICD-10-CM

## 2024-08-30 DIAGNOSIS — L50.3 DERMOGRAPHIC URTICARIA: ICD-10-CM

## 2024-08-30 DIAGNOSIS — I48.0 PAROXYSMAL ATRIAL FIBRILLATION (H): Primary | ICD-10-CM

## 2024-08-30 PROBLEM — K80.20 CALCULUS OF GALLBLADDER WITHOUT CHOLECYSTITIS WITHOUT OBSTRUCTION: Status: RESOLVED | Noted: 2021-05-03 | Resolved: 2024-08-30

## 2024-08-30 PROBLEM — R91.8 MASS OF LEFT LUNG: Status: RESOLVED | Noted: 2020-10-01 | Resolved: 2024-08-30

## 2024-08-30 PROBLEM — C34.92 SQUAMOUS CELL CARCINOMA OF LEFT LUNG (H): Status: RESOLVED | Noted: 2020-07-03 | Resolved: 2024-08-30

## 2024-08-30 PROBLEM — I50.9 CONGESTIVE HEART FAILURE, UNSPECIFIED HF CHRONICITY, UNSPECIFIED HEART FAILURE TYPE (H): Status: RESOLVED | Noted: 2024-08-16 | Resolved: 2024-08-30

## 2024-08-30 PROBLEM — K80.20 GALLSTONES: Status: RESOLVED | Noted: 2021-03-18 | Resolved: 2024-08-30

## 2024-08-30 PROBLEM — I48.91 ATRIAL FIBRILLATION WITH RAPID VENTRICULAR RESPONSE (H): Status: RESOLVED | Noted: 2023-05-09 | Resolved: 2024-08-30

## 2024-08-30 PROBLEM — J01.90 ACUTE SINUSITIS WITH SYMPTOMS > 10 DAYS: Status: RESOLVED | Noted: 2020-03-06 | Resolved: 2024-08-30

## 2024-08-30 PROBLEM — C34.32 MALIGNANT NEOPLASM OF LOWER LOBE OF LEFT LUNG (H): Status: RESOLVED | Noted: 2020-09-17 | Resolved: 2024-08-30

## 2024-08-30 PROCEDURE — 99495 TRANSJ CARE MGMT MOD F2F 14D: CPT | Performed by: PHYSICIAN ASSISTANT

## 2024-08-30 RX ORDER — WARFARIN SODIUM 5 MG/1
TABLET ORAL
COMMUNITY
Start: 2024-08-30

## 2024-08-30 ASSESSMENT — PAIN SCALES - GENERAL: PAINLEVEL: NO PAIN (0)

## 2024-08-30 NOTE — PROGRESS NOTES
"Assessment & Plan   Paroxysmal atrial fibrillation (H)  Hosp Notes reviewed. Had ablation due to Afib with RVR. Was started on Amio and lasix but both of these were discontinued by Cardiology due to a possible allergic reaction. She did have urticaria on exam today, see below. She is otherwise doing well. Rhythm is regular on exam today and HR is controlled. She denies any symptoms, and feels much better. No longer has fatigue throughout the day. Follow-up with Cardiology as scheduled. RTC prn for any new, changing or worsening symptoms.    - warfarin ANTICOAGULANT (COUMADIN) 5 MG tablet; Take 1 tablet PO every day except 7.5 mg on Mondays and Thursdays.    Dermographic urticaria  Present on exam today. This is improved since she held Amiodarone yesterday. There was not really any improvement in the urticaria after holding lasix. Encouraged to take Benadryl when she gets home. Continue to monitor symptoms, but she may have a reaction to Amio.     Benign essential hypertension  BP normotensive after a few rechecks. Used to take Lisinopril but this was stopped prior to her ablation. BP's are borderline at home, and did recommend restarting Lisinopril today, but patient wanted to wait until discussion with Cardiology at follow-up in 2 weeks.     Stage 3a chronic kidney disease (H)  Considered this in her treatment plan for above.     MED REC REQUIRED  Post Medication Reconciliation Status:  Discharge medications reconciled and changed, see notes/orders  BMI  Estimated body mass index is 27.86 kg/m  as calculated from the following:    Height as of 8/16/24: 1.626 m (5' 4\").    Weight as of 8/17/24: 73.6 kg (162 lb 4.8 oz).     Samantha Borrego is a 73 year old, presenting for the following health issues:  Hospital F/U        8/30/2024     9:00 AM   Additional Questions   Roomed by Cortney WHEAT CMA   Accompanied by Self       HPI   Hospital Follow-up Visit:  Hospital/Nursing Home/IP Rehab Facility: [facility]  Date of Admission: 08/16/2024  Date of Discharge: 08/17/2024  Reason(s) for Admission: CHF and Pneumonia   Was the patient in the ICU or did the patient experience delirium during hospitalization?  No  Do you have any other stressors you would like to discuss with your provider? No    Problems taking medications regularly:  None  Medication changes since discharge: Was called yesterday and told to stop the amiodarone. Says that she is not taking the lasix   Problems adhering to non-medication therapy:  None    Summary of hospitalization:  [facility] discharge summary reviewed  Diagnostic Tests/Treatments reviewed.  Follow up needed: none  Other Healthcare Providers Involved in Patient s Care:         Specialist appointment - Cardiology  Update since discharge: improved.       Plan of care communicated with patient    Doing well. No longer fatigued.   Stopped Amio yesterday. Itching and rash was much less today compared to previous days. Did not take any Benadryl today yet.           Review of Systems  See HPI       Objective    /82   Pulse 75   Temp 97.4  F (36.3  C) (Tympanic)   Resp 18   LMP 01/22/2002   SpO2 96%   There is no height or weight on file to calculate BMI.  Physical Exam   Constitutional: healthy, alert, and no distress  Head: Normocephalic. Atraumatic  Eyes: No conjunctival injection, sclera anicteric  Neck: supple, no thyromegaly, nodules or asymmetry of the thyroid. No cervical LAD.  Cardiovascular: RRR. No murmurs, clicks, gallops, or rubs. No peripheral edema.   Respiratory: No resp distress. Lungs CTAB bilaterally.   Musculoskeletal: extremities normal- no gross deformities noted, and normal muscle tone  Skin: Dermatography present on the R arm.   Neurologic: Gait normal. CN 2-12 grossly intact  Psychiatric: mentation appears normal and affect normal/bright       Signed Electronically by: Justin Salas PA-C

## 2024-08-31 ENCOUNTER — HEALTH MAINTENANCE LETTER (OUTPATIENT)
Age: 74
End: 2024-08-31

## 2024-09-05 ENCOUNTER — ANTICOAGULATION THERAPY VISIT (OUTPATIENT)
Dept: ANTICOAGULATION | Facility: CLINIC | Age: 74
End: 2024-09-05

## 2024-09-05 ENCOUNTER — LAB (OUTPATIENT)
Dept: LAB | Facility: CLINIC | Age: 74
End: 2024-09-05
Payer: MEDICARE

## 2024-09-05 DIAGNOSIS — I48.0 PAROXYSMAL ATRIAL FIBRILLATION (H): Primary | ICD-10-CM

## 2024-09-05 DIAGNOSIS — I48.0 PAROXYSMAL ATRIAL FIBRILLATION (H): ICD-10-CM

## 2024-09-05 LAB
ANION GAP SERPL CALCULATED.3IONS-SCNC: 7 MMOL/L (ref 7–15)
BUN SERPL-MCNC: 17.8 MG/DL (ref 8–23)
CALCIUM SERPL-MCNC: 9.4 MG/DL (ref 8.8–10.4)
CHLORIDE SERPL-SCNC: 97 MMOL/L (ref 98–107)
CREAT SERPL-MCNC: 0.95 MG/DL (ref 0.51–0.95)
EGFRCR SERPLBLD CKD-EPI 2021: 63 ML/MIN/1.73M2
GLUCOSE SERPL-MCNC: 93 MG/DL (ref 70–99)
HCO3 SERPL-SCNC: 31 MMOL/L (ref 22–29)
INR BLD: 2.2 (ref 0.9–1.1)
POTASSIUM SERPL-SCNC: 4.4 MMOL/L (ref 3.4–5.3)
SODIUM SERPL-SCNC: 135 MMOL/L (ref 135–145)

## 2024-09-05 PROCEDURE — 36415 COLL VENOUS BLD VENIPUNCTURE: CPT

## 2024-09-05 PROCEDURE — 85610 PROTHROMBIN TIME: CPT

## 2024-09-05 PROCEDURE — 80048 BASIC METABOLIC PNL TOTAL CA: CPT

## 2024-09-05 NOTE — PROGRESS NOTES
ANTICOAGULATION MANAGEMENT     Ijeoma Shah 73 year old female is on warfarin with therapeutic INR result. (Goal INR 2.0-3.0)    Recent labs: (last 7 days)     09/05/24  0933   INR 2.2*       ASSESSMENT     Warfarin Lab Questionnaire    Warfarin Doses Last 7 Days    Pt Rptd Dose SUNDAY MONDAY TUESDAY WED THURS FRIDAY SATURDAY 9/4/2024   9:35 AM 5 7.5 5 5 7.5 5 5         9/4/2024   Warfarin Lab Questionnaire   Missed doses within past 14 days? No   Changes in diet or alcohol within past 14 days? No   Medication changes since last result? No   Injuries or illness since last result? No   New shortness of breath, severe headaches or sudden changes in vision since last result? No   Abnormal bleeding since last result? No   Upcoming surgery, procedure? No   Best number to call with results? [phone]        Previous result: Supratherapeutic  Additional findings: Amiodarone stopped on 8/29/24       PLAN     Recommended plan for ongoing change(s) affecting INR     Dosing Instructions: Increase your warfarin dose (6.2% change) with next INR in 1 week       Summary  As of 9/5/2024      Full warfarin instructions:  7.5 mg every Mon, Thu, Sat; 5 mg all other days   Next INR check:  9/12/2024               Telephone call with Salima who verbalizes understanding and agrees to plan    Lab visit scheduled    Education provided: Goal range and lab monitoring: goal range and significance of current result    Plan made with [facility] Pharmacist Bhakti Capellan, RN  Anticoagulation Clinic  9/5/2024    _______________________________________________________________________     Anticoagulation Episode Summary       Current INR goal:  2.0-3.0   TTR:  69.2% (1 y)   Target end date:  Indefinite   Send INR reminders to:  [facility] [place]    Indications    Paroxysmal atrial fibrillation (H) [I48.0]  Atrial fibrillation with rapid ventricular response (H) (Resolved) [I48.91]             Comments:  RAJESH garibay              Anticoagulation Care Providers       Provider Role Specialty Phone number    Warren Pisano MD Referring Cardiovascular Disease [phone]    Jyoti Espinal MD Referring Family Medicine [phone]    Mary Godoy CNP Referring Critical Care [phone]

## 2024-09-12 ENCOUNTER — ANTICOAGULATION THERAPY VISIT (OUTPATIENT)
Dept: ANTICOAGULATION | Facility: CLINIC | Age: 74
End: 2024-09-12

## 2024-09-12 ENCOUNTER — MYC MEDICAL ADVICE (OUTPATIENT)
Dept: CARDIOLOGY | Facility: CLINIC | Age: 74
End: 2024-09-12

## 2024-09-12 ENCOUNTER — LAB (OUTPATIENT)
Dept: LAB | Facility: CLINIC | Age: 74
End: 2024-09-12
Payer: MEDICARE

## 2024-09-12 DIAGNOSIS — R60.0 LOWER EXTREMITY EDEMA: Primary | ICD-10-CM

## 2024-09-12 DIAGNOSIS — I48.0 PAROXYSMAL ATRIAL FIBRILLATION (H): ICD-10-CM

## 2024-09-12 DIAGNOSIS — I48.0 PAROXYSMAL ATRIAL FIBRILLATION (H): Primary | ICD-10-CM

## 2024-09-12 LAB — INR BLD: 2 (ref 0.9–1.1)

## 2024-09-12 PROCEDURE — 36416 COLLJ CAPILLARY BLOOD SPEC: CPT

## 2024-09-12 PROCEDURE — 85610 PROTHROMBIN TIME: CPT

## 2024-09-12 NOTE — PROGRESS NOTES
ANTICOAGULATION MANAGEMENT     Ijeoma Shah 73 year old female is on warfarin with therapeutic INR result. (Goal INR 2.0-3.0)    Recent labs: (last 7 days)     09/12/24  1050   INR 2.0*       ASSESSMENT     Source(s): Chart Review and Patient/Caregiver Call     Warfarin doses taken: Warfarin taken as instructed  Diet: No new diet changes identified  Medication/supplement changes: None noted  New illness, injury, or hospitalization: No  Signs or symptoms of bleeding or clotting: No  Previous result: Therapeutic last visit; previously outside of goal range  Additional findings:  Amio stopped 2 weeks ago.        PLAN     Recommended plan for temporary change(s) affecting INR     Dosing Instructions: Increase your warfarin dose (11.8% change) with next INR in 2 weeks       Summary  As of 9/12/2024      Full warfarin instructions:  5 mg every Sun, Wed; 7.5 mg all other days   Next INR check:  9/26/2024               Telephone call with Salima who verbalizes understanding and agrees to plan    Lab visit scheduled    Education provided: Interaction IS anticipated between warfarin and Amiodarone    Plan made with [facility] Pharmacist Bhakti CARLOS, RN  9/12/2024  Anticoagulation Clinic  [employer] for routing messages: p [facility] patient phone line: [phone]        _______________________________________________________________________     Anticoagulation Episode Summary       Current INR goal:  2.0-3.0   TTR:  69.2% (1 y)   Target end date:  Indefinite   Send INR reminders to:  [facility]    Indications    Paroxysmal atrial fibrillation (H) [I48.0]  Atrial fibrillation with rapid ventricular response (H) (Resolved) [I48.91]             Comments:  RAJESH garibay             Anticoagulation Care Providers       Provider Role Specialty Phone number    Warren Pisano MD Referring Cardiovascular Disease [phone]    Jyoti Espinal MD Referring Family Medicine  [phone]    Mary Godoy, CNP Referring Critical Care [phone]

## 2024-09-13 RX ORDER — FUROSEMIDE 20 MG
20 TABLET ORAL DAILY PRN
Qty: 30 TABLET | Refills: 1 | Status: SHIPPED | OUTPATIENT
Start: 2024-09-13

## 2024-09-13 NOTE — TELEPHONE ENCOUNTER
Per Dr Monsivais:  She can continue holding amiodarone.  I'm not sure when it was stopped, but could take several weeks to be completely out of her system.  We can restart Lasix 20 mg daily as needed for weight gain/edema. I would continue mag and K supplements while she is on Lasix.

## 2024-09-19 ENCOUNTER — HOSPITAL ENCOUNTER (OUTPATIENT)
Dept: CARDIOLOGY | Facility: CLINIC | Age: 74
Discharge: HOME | End: 2024-09-19
Attending: NURSE PRACTITIONER | Admitting: NURSE PRACTITIONER
Payer: MEDICARE

## 2024-09-19 DIAGNOSIS — I42.9 SECONDARY CARDIOMYOPATHY (H): ICD-10-CM

## 2024-09-19 LAB — LVEF ECHO: NORMAL

## 2024-09-19 PROCEDURE — 93306 TTE W/DOPPLER COMPLETE: CPT | Mod: 26 | Performed by: INTERNAL MEDICINE

## 2024-09-19 PROCEDURE — 93306 TTE W/DOPPLER COMPLETE: CPT

## 2024-09-25 ENCOUNTER — MYC MEDICAL ADVICE (OUTPATIENT)
Dept: CARDIOLOGY | Facility: CLINIC | Age: 74
End: 2024-09-25
Payer: MEDICARE

## 2024-09-25 DIAGNOSIS — I48.0 PAROXYSMAL ATRIAL FIBRILLATION (H): Primary | ICD-10-CM

## 2024-09-25 RX ORDER — LISINOPRIL 10 MG/1
10 TABLET ORAL DAILY
Qty: 30 TABLET | Refills: 3 | Status: SHIPPED | OUTPATIENT
Start: 2024-09-25

## 2024-09-25 NOTE — TELEPHONE ENCOUNTER
Her blood pressures are still elevated. Can we see if she would be open to restarting lisinopril 10 mg daily? If so, let's can stop potassium and recheck BMP in 1-2 weeks. BP goal is consistently <140/90.     Moving forward only need BP reading 1-2 times a week 1 hour post BP meds.     RUDY Montes De Oca, CNP

## 2024-09-26 ENCOUNTER — LAB (OUTPATIENT)
Dept: LAB | Facility: CLINIC | Age: 74
End: 2024-09-26
Payer: MEDICARE

## 2024-09-26 ENCOUNTER — ANTICOAGULATION THERAPY VISIT (OUTPATIENT)
Dept: ANTICOAGULATION | Facility: CLINIC | Age: 74
End: 2024-09-26

## 2024-09-26 DIAGNOSIS — I48.0 PAROXYSMAL ATRIAL FIBRILLATION (H): Primary | ICD-10-CM

## 2024-09-26 DIAGNOSIS — I48.0 PAROXYSMAL ATRIAL FIBRILLATION (H): ICD-10-CM

## 2024-09-26 LAB — INR BLD: 1.8 (ref 0.9–1.1)

## 2024-09-26 PROCEDURE — 36416 COLLJ CAPILLARY BLOOD SPEC: CPT

## 2024-09-26 PROCEDURE — 85610 PROTHROMBIN TIME: CPT

## 2024-09-26 NOTE — PROGRESS NOTES
ANTICOAGULATION MANAGEMENT     Ijeoma Shah 73 year old female is on warfarin with subtherapeutic INR result. (Goal INR 2.0-3.0)    Recent labs: (last 7 days)     09/26/24  1133   INR 1.8*       ASSESSMENT     Warfarin Lab Questionnaire    Warfarin Doses Last 7 Days      9/25/2024     1:54 PM   Dose in Tablet or Mg   TAB or MG? milligram (mg)     Pt Rptd Dose SUNDAY MONDAY TUESDAY WED THURS FRIDAY SATURDAY 9/25/2024   1:54 PM 5 7.5 7.5 5 7.5 7.5 7.5         9/25/2024   Warfarin Lab Questionnaire   Missed doses within past 14 days? No   Changes in diet or alcohol within past 14 days? No   Medication changes since last result? Yes   Please list: started lasik again 9/13/24   Injuries or illness since last result? No   New shortness of breath, severe headaches or sudden changes in vision since last result? No   Abnormal bleeding since last result? No   Upcoming surgery, procedure? No        Previous result: Therapeutic last 2(+) visits  Additional findings: None       PLAN     Recommended plan for ongoing change(s) affecting INR     Dosing Instructions: Increase your warfarin dose (5.3% change) with next INR in 2 weeks       Summary  As of 9/26/2024      Full warfarin instructions:  5 mg every Wed; 7.5 mg all other days   Next INR check:  10/10/2024               Telephone call with Salima who verbalizes understanding and agrees to plan    Lab visit scheduled    Education provided: Goal range and lab monitoring: goal range and significance of current result    Plan made per [facility] anticoagulation protocol    Becky Capellan RN  9/26/2024  Anticoagulation Clinic  [employer] for routing messages: [facility] phone [facility]: [phone]        _______________________________________________________________________     Anticoagulation Episode Summary       Current INR goal:  2.0-3.0   TTR:  65.4% (1 y)   Target end date:  Indefinite   [facility] [place]    Indications     Paroxysmal atrial fibrillation (H) [I48.0]  Atrial fibrillation with rapid ventricular response (H) (Resolved) [I48.91]             Comments:  DVM okay             Anticoagulation Care Providers       Provider Role Specialty Phone number    Warren Pisano MD Referring Cardiovascular Disease [phone]    Jyoti Espinal MD Referring Family Medicine [phone]    Mary Godoy CNP Referring Critical Care [phone]

## 2024-10-08 ENCOUNTER — PATIENT OUTREACH (OUTPATIENT)
Dept: CARE COORDINATION | Facility: CLINIC | Age: 74
End: 2024-10-08
Payer: MEDICARE

## 2024-10-10 ENCOUNTER — ANTICOAGULATION THERAPY VISIT (OUTPATIENT)
Dept: ANTICOAGULATION | Facility: CLINIC | Age: 74
End: 2024-10-10

## 2024-10-10 ENCOUNTER — LAB (OUTPATIENT)
Dept: LAB | Facility: CLINIC | Age: 74
End: 2024-10-10
Payer: MEDICARE

## 2024-10-10 DIAGNOSIS — C34.32 SQUAMOUS CELL CARCINOMA OF BRONCHUS IN LEFT LOWER LOBE (H): ICD-10-CM

## 2024-10-10 DIAGNOSIS — I48.0 PAROXYSMAL ATRIAL FIBRILLATION (H): Primary | ICD-10-CM

## 2024-10-10 DIAGNOSIS — N18.30 CHRONIC KIDNEY DISEASE, STAGE 3 (H): Primary | ICD-10-CM

## 2024-10-10 DIAGNOSIS — I48.0 PAROXYSMAL ATRIAL FIBRILLATION (H): ICD-10-CM

## 2024-10-10 DIAGNOSIS — E78.5 HYPERLIPIDEMIA LDL GOAL <160: ICD-10-CM

## 2024-10-10 DIAGNOSIS — J90 PLEURAL EFFUSION: ICD-10-CM

## 2024-10-10 LAB
ALBUMIN SERPL BCG-MCNC: 4.3 G/DL (ref 3.5–5.2)
ALP SERPL-CCNC: 84 U/L (ref 40–150)
ALT SERPL W P-5'-P-CCNC: 26 U/L (ref 0–50)
ANION GAP SERPL CALCULATED.3IONS-SCNC: 7 MMOL/L (ref 7–15)
AST SERPL W P-5'-P-CCNC: 32 U/L (ref 0–45)
BASOPHILS # BLD AUTO: 0 10E3/UL (ref 0–0.2)
BASOPHILS NFR BLD AUTO: 0 %
BILIRUB SERPL-MCNC: 0.4 MG/DL
BUN SERPL-MCNC: 19.5 MG/DL (ref 8–23)
CALCIUM SERPL-MCNC: 9.5 MG/DL (ref 8.8–10.4)
CHLORIDE SERPL-SCNC: 97 MMOL/L (ref 98–107)
CHOLEST SERPL-MCNC: 297 MG/DL
CREAT SERPL-MCNC: 0.99 MG/DL (ref 0.51–0.95)
CREAT UR-MCNC: 196.5 MG/DL
EGFRCR SERPLBLD CKD-EPI 2021: 60 ML/MIN/1.73M2
EOSINOPHIL # BLD AUTO: 0.4 10E3/UL (ref 0–0.7)
EOSINOPHIL NFR BLD AUTO: 7 %
ERYTHROCYTE [DISTWIDTH] IN BLOOD BY AUTOMATED COUNT: 12.4 % (ref 10–15)
FASTING STATUS PATIENT QL REPORTED: NO
FASTING STATUS PATIENT QL REPORTED: NO
GLUCOSE SERPL-MCNC: 130 MG/DL (ref 70–99)
HCO3 SERPL-SCNC: 33 MMOL/L (ref 22–29)
HCT VFR BLD AUTO: 43.2 % (ref 35–47)
HDLC SERPL-MCNC: 86 MG/DL
HGB BLD-MCNC: 14.6 G/DL (ref 11.7–15.7)
IMM GRANULOCYTES # BLD: 0 10E3/UL
IMM GRANULOCYTES NFR BLD: 0 %
INR BLD: 2.4 (ref 0.9–1.1)
LDLC SERPL CALC-MCNC: 179 MG/DL
LYMPHOCYTES # BLD AUTO: 1.3 10E3/UL (ref 0.8–5.3)
LYMPHOCYTES NFR BLD AUTO: 21 %
MCH RBC QN AUTO: 29.7 PG (ref 26.5–33)
MCHC RBC AUTO-ENTMCNC: 33.8 G/DL (ref 31.5–36.5)
MCV RBC AUTO: 88 FL (ref 78–100)
MICROALBUMIN UR-MCNC: <12 MG/L
MICROALBUMIN/CREAT UR: NORMAL MG/G{CREAT}
MONOCYTES # BLD AUTO: 0.4 10E3/UL (ref 0–1.3)
MONOCYTES NFR BLD AUTO: 7 %
NEUTROPHILS # BLD AUTO: 3.9 10E3/UL (ref 1.6–8.3)
NEUTROPHILS NFR BLD AUTO: 65 %
NONHDLC SERPL-MCNC: 211 MG/DL
PLATELET # BLD AUTO: 174 10E3/UL (ref 150–450)
POTASSIUM SERPL-SCNC: 5 MMOL/L (ref 3.4–5.3)
PROT SERPL-MCNC: 7 G/DL (ref 6.4–8.3)
RBC # BLD AUTO: 4.92 10E6/UL (ref 3.8–5.2)
SODIUM SERPL-SCNC: 137 MMOL/L (ref 135–145)
TRIGL SERPL-MCNC: 161 MG/DL
WBC # BLD AUTO: 6 10E3/UL (ref 4–11)

## 2024-10-10 PROCEDURE — 85610 PROTHROMBIN TIME: CPT

## 2024-10-10 PROCEDURE — 80061 LIPID PANEL: CPT

## 2024-10-10 PROCEDURE — 36415 COLL VENOUS BLD VENIPUNCTURE: CPT

## 2024-10-10 PROCEDURE — 85025 COMPLETE CBC W/AUTO DIFF WBC: CPT

## 2024-10-10 PROCEDURE — 80053 COMPREHEN METABOLIC PANEL: CPT

## 2024-10-10 PROCEDURE — 82043 UR ALBUMIN QUANTITATIVE: CPT

## 2024-10-10 PROCEDURE — 82570 ASSAY OF URINE CREATININE: CPT

## 2024-10-10 NOTE — PROGRESS NOTES
ANTICOAGULATION MANAGEMENT     Ijeomavaleri Shah 73 year old female is on warfarin with therapeutic INR result. (Goal INR 2.0-3.0)    Recent labs: (last 7 days)     10/10/24  1143   INR 2.4*       ASSESSMENT     Warfarin Lab Questionnaire    Warfarin Doses Last 7 Days      10/9/2024    11:14 AM   Dose in Tablet or Mg   TAB or MG? milligram (mg)     Pt Rptd Dose TED MONDAY TUESDAY WED THURS FRIDAY SATURDAY   10/9/2024  11:14 AM 7.5 7.5 7.5 5 7.5 7.5 7.5         10/9/2024   Warfarin Lab Questionnaire   Missed doses within past 14 days? No   Changes in diet or alcohol within past 14 days? No   Medication changes since last result? No   Injuries or illness since last result? No   New shortness of breath, severe headaches or sudden changes in vision since last result? No   Abnormal bleeding since last result? No   Upcoming surgery, procedure? No   Best number to call with results? [phone]        Previous result: Subtherapeutic  Additional findings:  Amiodarone stopped 7 weeks ago.       PLAN     Recommended plan for no diet, medication or health factor changes affecting INR     Dosing Instructions: Continue your current warfarin dose with next INR in 3 weeks       Summary  As of 10/10/2024      Full warfarin instructions:  5 mg every Wed; 7.5 mg all other days   Next INR check:  10/31/2024               Detailed voice message left for Salima with dosing instructions and follow up date.     Contact [phone] to schedule and with any changes, questions or concerns.     Education provided: Please call back if any changes to your diet, medications or how you've been taking warfarin    Plan made per [facility] anticoagulation protocol    Yoselin Lockett, RN  10/10/2024  Anticoagulation Clinic  [facility] for routing messages: mirella NUNEZ [facility] patient phone line: [phone]        _______________________________________________________________________     Anticoagulation Episode Summary       Current  INR goal:  2.0-3.0   TTR:  64.1% (1 y)   Target end date:  Indefinite   [facility] [place]    Indications    Paroxysmal atrial fibrillation (H) [I48.0]  Atrial fibrillation with rapid ventricular response (H) (Resolved) [I48.91]             Comments:  DVM okay             Anticoagulation Care Providers       Provider Role Specialty Phone number    Warren Pisano MD Referring Cardiovascular Disease [phone]    Jyoti Espinal MD Referring Family Medicine [phone]    Mary Godoy CNP Referring Critical Care [phone]

## 2024-10-15 ENCOUNTER — MYC MEDICAL ADVICE (OUTPATIENT)
Dept: CARDIOLOGY | Facility: CLINIC | Age: 74
End: 2024-10-15
Payer: MEDICARE

## 2024-10-15 DIAGNOSIS — I48.0 PAROXYSMAL ATRIAL FIBRILLATION (H): Primary | ICD-10-CM

## 2024-10-15 DIAGNOSIS — E78.5 HYPERLIPIDEMIA LDL GOAL <160: Primary | ICD-10-CM

## 2024-10-15 RX ORDER — ROSUVASTATIN CALCIUM 5 MG/1
5 TABLET, COATED ORAL DAILY
Qty: 90 TABLET | Refills: 3 | Status: SHIPPED | OUTPATIENT
Start: 2024-10-15 | End: 2024-11-06

## 2024-10-16 RX ORDER — POTASSIUM CHLORIDE 1500 MG/1
20 TABLET, EXTENDED RELEASE ORAL 2 TIMES DAILY
Qty: 90 TABLET | Refills: 3 | Status: SHIPPED | OUTPATIENT
Start: 2024-10-16 | End: 2024-11-06

## 2024-10-16 NOTE — TELEPHONE ENCOUNTER
Her blood pressure readings are improved while off lisinopril.  No need to started at this time and will reassess in the future.

## 2024-10-24 ENCOUNTER — MYC MEDICAL ADVICE (OUTPATIENT)
Dept: CARDIOLOGY | Facility: CLINIC | Age: 74
End: 2024-10-24
Payer: MEDICARE

## 2024-10-24 NOTE — TELEPHONE ENCOUNTER
Spoke to patient who is requesting to move her appt from video visit to in clinic.  Indicated that it was not a problem.  She also wanted to discuss her lasix and potassium medication.  She does not have issues with swelling or weight.  She reports she is taking the medication every day.  Discussed that this was to be taken as needed.  She misunderstood the instructions on this.  Suggested she stop and take as needed.  Being she is not taking the diuretic her potassium was to replenish her electrolyte loss.  She indicated she was going to hold both of them and discuss at her OV on 11/6 with Dr Monsivais.  She also was prescribed Crestor d/t elevated lipids and was unsure about that.  Suggested that she be in contact with PCP as she manages that medication.  Patient indicated she would reach out to her.  Patient had no further questions at this time.  DEIDRE Manriquez

## 2024-10-31 ENCOUNTER — LAB (OUTPATIENT)
Dept: LAB | Facility: CLINIC | Age: 74
End: 2024-10-31
Payer: MEDICARE

## 2024-10-31 ENCOUNTER — ANTICOAGULATION THERAPY VISIT (OUTPATIENT)
Dept: ANTICOAGULATION | Facility: CLINIC | Age: 74
End: 2024-10-31

## 2024-10-31 DIAGNOSIS — I48.0 PAROXYSMAL ATRIAL FIBRILLATION (H): ICD-10-CM

## 2024-10-31 DIAGNOSIS — I48.0 PAROXYSMAL ATRIAL FIBRILLATION (H): Primary | ICD-10-CM

## 2024-10-31 LAB — INR BLD: 1.7 (ref 0.9–1.1)

## 2024-10-31 PROCEDURE — 36416 COLLJ CAPILLARY BLOOD SPEC: CPT

## 2024-10-31 PROCEDURE — 85610 PROTHROMBIN TIME: CPT

## 2024-10-31 NOTE — PROGRESS NOTES
ANTICOAGULATION MANAGEMENT     Ijeoma Shah 74 year old female is on warfarin with subtherapeutic INR result. (Goal INR 2.0-3.0)    Recent labs: (last 7 days)     10/31/24  1329   INR 1.7*       ASSESSMENT     Warfarin Lab Questionnaire    Warfarin Doses Last 7 Days      10/30/2024     3:26 PM   Dose in Tablet or Mg   TAB or MG? milligram (mg)     Pt Rptd Dose TED MONDAY TUESDAY WED THURS FRIDAY SATURDAY   10/30/2024   3:26 PM 5 5 5 7.5 5 5 5         10/30/2024   Warfarin Lab Questionnaire   Missed doses within past 14 days? No   Changes in diet or alcohol within past 14 days? No   Medication changes since last result? No   Injuries or illness since last result? No   New shortness of breath, severe headaches or sudden changes in vision since last result? No   Abnormal bleeding since last result? No   Upcoming surgery, procedure? No   Best number to call with results? [phone]        Previous result: Therapeutic last visit; previously outside of goal range  Additional findings:  Clarified dosing with patient. She confirms the dosing she is taking matches the dose in [facility] calendar (5 mg every Wed; 7.5 mg all other days of the week).What is entered into the questionnaire was incorrect       PLAN     Recommended plan for no diet, medication or health factor changes affecting INR     Dosing Instructions: booster dose then Increase your warfarin dose (5% change) with next INR in 2 weeks       Summary  As of 10/31/2024      Full warfarin instructions:  10/31: 10 mg; Otherwise 7.5 mg every day   Next INR check:  11/14/2024               Telephone call with Salima who verbalizes understanding and agrees to plan and who agrees to plan and repeated back plan correctly    Lab visit scheduled    Education provided: Symptom monitoring: monitoring for bleeding signs and symptoms, monitoring for clotting signs and symptoms, monitoring for stroke signs and symptoms, and when to seek medical attention/emergency  care  Contact [phone] with any changes, questions or concerns.     Plan made per [facility] anticoagulation protocol    Yoselin Lockett RN  10/31/2024  Anticoagulation Clinic  [facility] for routing messages: mirella ENRIQUE [place]  [facility] patient phone line: [phone]        _______________________________________________________________________     Anticoagulation Episode Summary       Current INR goal:  2.0-3.0   TTR:  61.6% (1 y)   Target end date:  Indefinite   Send INR reminders to:  [facility]    Indications    Paroxysmal atrial fibrillation (H) [I48.0]  Atrial fibrillation with rapid ventricular response (H) (Resolved) [I48.91]             Comments:  RAJESH garibay             Anticoagulation Care Providers       Provider Role Specialty Phone number    Warren Pisano MD Referring Cardiovascular Disease [phone]    Jyoti Espinal MD Referring Family Medicine [phone]    Mary Godoy CNP Referring Critical Care [phone]

## 2024-11-06 ENCOUNTER — HOSPITAL ENCOUNTER (OUTPATIENT)
Dept: CARDIOLOGY | Facility: CLINIC | Age: 74
Discharge: HOME | End: 2024-11-06
Attending: INTERNAL MEDICINE | Admitting: INTERNAL MEDICINE
Payer: MEDICARE

## 2024-11-06 ENCOUNTER — VIRTUAL VISIT (OUTPATIENT)
Dept: CARDIOLOGY | Facility: CLINIC | Age: 74
End: 2024-11-06
Payer: MEDICARE

## 2024-11-06 DIAGNOSIS — I10 BENIGN ESSENTIAL HYPERTENSION: ICD-10-CM

## 2024-11-06 DIAGNOSIS — I48.0 PAROXYSMAL ATRIAL FIBRILLATION (H): ICD-10-CM

## 2024-11-06 DIAGNOSIS — Z79.01 CHRONIC ANTICOAGULATION: ICD-10-CM

## 2024-11-06 DIAGNOSIS — Z98.890 S/P ABLATION OF ATRIAL FIBRILLATION: ICD-10-CM

## 2024-11-06 DIAGNOSIS — I48.19 PERSISTENT ATRIAL FIBRILLATION (H): Primary | ICD-10-CM

## 2024-11-06 DIAGNOSIS — E78.2 MIXED HYPERLIPIDEMIA: ICD-10-CM

## 2024-11-06 DIAGNOSIS — Z86.79 S/P ABLATION OF ATRIAL FIBRILLATION: ICD-10-CM

## 2024-11-06 PROBLEM — J18.9 PNEUMONIA OF LEFT LOWER LOBE DUE TO INFECTIOUS ORGANISM: Status: RESOLVED | Noted: 2024-08-16 | Resolved: 2024-11-06

## 2024-11-06 PROBLEM — N18.30 CHRONIC KIDNEY DISEASE, STAGE 3 (H): Status: RESOLVED | Noted: 2021-07-08 | Resolved: 2024-11-06

## 2024-11-06 PROCEDURE — 99215 OFFICE O/P EST HI 40 MIN: CPT | Mod: 95 | Performed by: INTERNAL MEDICINE

## 2024-11-06 PROCEDURE — 93005 ELECTROCARDIOGRAM TRACING: CPT | Performed by: REHABILITATION PRACTITIONER

## 2024-11-06 PROCEDURE — 93010 ELECTROCARDIOGRAM REPORT: CPT | Performed by: INTERNAL MEDICINE

## 2024-11-06 RX ORDER — PRAVASTATIN SODIUM 10 MG
10 TABLET ORAL AT BEDTIME
Qty: 90 TABLET | Refills: 3 | Status: SHIPPED | OUTPATIENT
Start: 2024-11-06

## 2024-11-06 NOTE — LETTER
11/6/2024    [facility]  [address] [place] [id number]    RE: Ijeoma Shah       Dear Colleague,     I had the pleasure of seeing Ijeoma Shah in the [facility].  [facility]  Cardiac Electrophysiology Clinic    Ijeoma Shah MRN# [id number]   YOB: 1950 Age: 74 year old       I had the pleasure of seeing Ijeoma Shah  who is a 74 year old female with history of hypertension, hyperlipidemia, lung cancer s/p left lower lobectomy and chemo, persistent atrial fibrillation s/p ablation.  She follows with Dr. Pisano in cardiology clinic.    She was initially diagnosed with atrial fibrillation in 2020 and had been on amiodarone following her lobectomy and during her treatment with chemotherapy.  She was noted to have paroxysmal atrial fibrillation on a Zio patch monitor in 2022 and was started on metoprolol and Xarelto.  She most recently saw Britt Gardner in clinic on 5/16/2024 and was noted to be back in atrial fibrillation.  Her metoprolol dosage was increased for rate control and lisinopril was stopped.  She has noted increased fatigue since increasing the metoprolol, but unfortunately continues to have rapid rates on repeat Zio patch monitor.    I initially met her in clinic on 7/2/2024.  We decided to proceed with ablation, but unfortunately this had to be postponed due to supratherapeutic INR level.    She underwent pulmonary vein isolation on 8/12/2024 without complications.  We started amiodarone and decreased her metoprolol dosage postop.  She was admitted a few days later with shortness of breath due to CHF exacerbation.  She was diuresed with improvement and maintained sinus rhythm during her hospitalization.  She was noted to have a prolonged QT interval on EKG when she followed up with Janis on 8/19/24 so her amiodarone dose was decreased to once daily dosing.  Repeat EKG showed  improved QT.  She developed significant itching and was taken off amiodarone at the end of August.      Today's Visit:  She has been doing really well and states she has felt great since the procedure.    She is concerned about the problem list saying she had pneumonia, CKD, COPD.  She notes that they initially thought she had pneumonia, but then diagnosed as fluid overload/CHF.  She was not aware she had kidney disease and has never had COPD testing to her knowledge.   She is not currently on any cardiac medications other than warfarin.  She is no longer on metoprolol or lisinopril.  Her blood pressures have been running 120s systolic at home.  She never got Crestor because of cost and was not sure who had ordered the medication.  She has not yet established care with a new PCP.          Diagnostic Testing:  EKG today, which I overread, showed sinus tachycardia rate 110 bpm.  , QRS 88, QTc 420 ms.  EKG 8/26/2024-sinus rhythm rate 70 bpm.  ms (measured manually)  EKG 8/19/2024-sinus rhythm rate of 84 bpm. QTc 490 ms (measured manually)  EKG 8/12/2024-sinus rhythm rate 83 bpm.  , QRS 86, QTc 434 ms.  EKG 5/16/2024-atrial fibrillation rate 118 bpm    Echocardiogram 9/19/2024    Left ventricular systolic function is normal.The visual ejection fraction is  55-60%.  The right ventricular systolic function is normal.  There is mild (1+) mitral regurgitation.  Sinus rhythm was noted.  Echo dated 06/12/2024 LVEF was noted 45 to 50% and patient was in rapid atrial  fibrillation.       Holter Monitor 6/2024 - personally reviewed  3 day Ziopatch monitor  Persistent atrial fibrillation with heart rates ranging , average 128 bpm.  Patient triggered events correlated with atrial fibrillation with heart rates between 112 to 169 bpm.     Echocardiogram 6/12/2024  The rhythm was rapid atrial fibrillation. 's to 140's during the study.  The left ventricle is normal in size.  Left ventricular systolic  function is mildly reduced. The visual ejection fraction is 45-50%.  The right ventricle is mildly dilated. Mildly decreased right ventricular systolic function  There is mild (1+) mitral regurgitation.  There is mild (1+) tricuspid regurgitation.  Right ventricular systolic pressure is elevated, consistent with mild  pulmonary hypertension.     Compared to the previous study, the afib is new, rate is faster, and LV and RV  function have decreased.     Echo 4/20/2024   The left ventricle is normal in size.  The visual ejection fraction is 65-70%.  Grade I or early diastolic dysfunction.  The right ventricle is normal in structure, function and size.  Normal left atrial size.  No significant valve issues.  Compared to prior study, there is no significant change.      Last Comprehensive Metabolic Panel:  Lab Results   Component Value Date     10/10/2024    POTASSIUM 5.0 10/10/2024    CHLORIDE 97 (L) 10/10/2024    CO2 33 (H) 10/10/2024    ANIONGAP 7 10/10/2024     (H) 10/10/2024    BUN 19.5 10/10/2024    CR 0.99 (H) 10/10/2024    GFRESTIMATED 60 (L) 10/10/2024    SABINO 9.5 10/10/2024        Magnesium   Date Value Ref Range Status   01/25/2023 1.5 (L) 1.7 - 2.3 mg/dL Final   04/12/2021 1.9 1.6 - 2.3 mg/dL Final        TSH   Date Value Ref Range Status   12/27/2022 1.11 0.40 - 4.00 mU/L Final   02/18/2021 1.94 0.40 - 4.00 mU/L Final          Assessment/Plan:    74 year old female with history of hypertension, hyperlipidemia, lung cancer s/p left lower lobectomy and chemo, persistent atrial fibrillation s/p ablation.  Her previous episodes of atrial fibrillation were triggered by lung surgery and bladder surgery, but recently had become persistent.  Her recent Zio patch monitor showed persistent atrial fibrillation with heart rates averaging 128 bpm.  Echocardiogram showed mildly reduced LV function, EF 45 to 50%, mild MR and TR, and normal atrial size.    She underwent pulmonary vein isolation in 8/2024  without complications.  We started her on amiodarone following ablation, however this was stopped soon after due to side effects of itching.  Unfortunately, she was fluid overloaded and admitted with heart failure exacerbation a few days postop.  She has diuresed with good response.  Since then she has been doing well and has not had any recurrent atrial fibrillation.  She is actually off all cardiac medications at this point and appears euvolemic with normal blood pressures.  Repeat echocardiogram in sinus rhythm also showed recovered LV function, EF 55 to 60%.      The patient's CHADS VASc is 3 (female, age, HTN), which warrants long term anticoagulation.  She is currently doing well on warfarin for anticoagulation.      Was recently prescribed rosuvastatin for HLD but was not able to afford it and she is worried about potential side effects.  I did recommend starting a statin given her high CVD risk.  We decided to start low-dose pravastatin.     The 10-year ASCVD risk score (Breanna DK, et al., 2019) is: 20.4%    Values used to calculate the score:      Age: 74 years      Sex: Female      Is Non- : No      Diabetic: No      Tobacco smoker: No      Systolic Blood Pressure: 138 mmHg      Is BP treated: Yes      HDL Cholesterol: 86 mg/dL      Total Cholesterol: 297 mg/dL      Will hold off on restarting metoprolol and lisinopril, encouraged to continue monitoring BP's at home   Continue warfarin  Start pravastatin 10 mg qhs  Routine follow up in 1 year      Nasim Monsivais MD        Orders this Visit:  Orders Placed This Encounter   Procedures     Follow-Up with Cardiology EP     Orders Placed This Encounter   Medications     pravastatin (PRAVACHOL) 10 MG tablet     Sig: Take 1 tablet (10 mg) by mouth at bedtime.     Dispense:  90 tablet     Refill:  3     Medications Discontinued During This Encounter   Medication Reason     rosuvastatin (CRESTOR) 5 MG tablet Therapy completed (No AVS)      furosemide (LASIX) 20 MG tablet      potassium chloride mohan ER (KLOR-CON M20) 20 MEQ CR tablet      metoprolol succinate ER (TOPROL XL) 25 MG 24 hr tablet        Encounter Diagnoses   Name Primary?     Mixed hyperlipidemia      Persistent atrial fibrillation (H) Yes     Chronic anticoagulation      S/P ablation of atrial fibrillation      Benign essential hypertension      Today's clinic visit entailed:  Review of the result(s) of each unique test - echo  The following tests were independently interpreted by me as noted in my documentation: EKG  45 minutes spent by me on the date of the encounter doing chart review, history and exam, documentation and further activities per the note    CURRENT MEDICATIONS:  Current Outpatient Medications   Medication Sig Dispense Refill     calcium carbonate-vitamin D (CALTRATE) 600-10 MG-MCG per tablet Take 1 tablet by mouth 2 times daily       EPINEPHrine (ANY BX GENERIC EQUIV) 0.3 MG/0.3ML injection 2-pack Inject 0.3 mLs (0.3 mg) into the muscle as needed for anaphylaxis May repeat one time in 5-15 minutes if response to initial dose is inadequate. 2 each 1     ketoconazole (NIZORAL) 2 % external cream Apply topically daily For rash on feet. 60 g 0     triamcinolone (KENALOG) 0.1 % external ointment Apply topically 2 times daily To heels as needed 80 g 1     warfarin ANTICOAGULANT (COUMADIN) 5 MG tablet Take 1 tablet PO every day except 7.5 mg on Mondays and Thursdays.       furosemide (LASIX) 20 MG tablet Take 1 tablet (20 mg) by mouth daily as needed (weight gain/edema). (Patient not taking: Reported on 11/6/2024) 30 tablet 1     metoprolol succinate ER (TOPROL XL) 25 MG 24 hr tablet Take 0.5 tablets (12.5 mg) by mouth every evening (Patient not taking: Reported on 11/6/2024)       potassium chloride mohan ER (KLOR-CON M20) 20 MEQ CR tablet Take 1 tablet (20 mEq) by mouth 2 times daily. (Patient not taking: Reported on 11/6/2024) 90 tablet 3       Review of Systems:  Pertinent  review of system as stated above in HPI    Skin:        Eyes:       ENT:       Respiratory:       Cardiovascular:       Gastroenterology:      Genitourinary:       Musculoskeletal:       Neurologic:       Psychiatric:       Heme/Lymph/Imm:       Endocrine:         Physical Exam:    Virtual visit    Patient appears alert, in good spirits.  Breathing comfortably.    ALLERGIES  Allergies   Allergen Reactions     Bee Venom Hives     Hot and sweating      Amiodarone Itching     Lasix [Furosemide] Itching       PAST MEDICAL HISTORY:  Past Medical History:   Diagnosis Date     Arrhythmia     A-Fib     Arthritis      Benign essential hypertension      Calculus of gallbladder without cholecystitis without obstruction      Calculus of gallbladder without cholecystitis without obstruction 05/03/2021    Added automatically from request for surgery [id number]       Congestive heart failure, unspecified HF chronicity, unspecified heart failure type (H) 08/16/2024     COPD (chronic obstructive pulmonary disease) (H)      Lung cancer (H)      Simple chronic bronchitis (H)        PAST SURGICAL HISTORY:  Past Surgical History:   Procedure Laterality Date     ARTHROEREISIS, SUBTALAR       BRONCHOSCOPY RIGID OR FLEXIBLE W/TRANSENDOSCOPIC ENDOBRONCHIAL ULTRASOUND GUIDED N/A 07/27/2020    Procedure: Flexible bronchoscopy, endobronchial ultrasound with transbronchial biopsies;  Surgeon: Edin Castro MD;  Location: [facility] OR     CATARACT IOL, RT/LT Bilateral      COLONOSCOPY  06/29/2004    colonoscopy to the cecum     COLONOSCOPY N/A 1/25/2023    Procedure: COLONOSCOPY, WITH POLYPECTOMY;  Surgeon: Warren Soto MD;  Location:  GI     EP ABLATION FOCAL AFIB N/A 8/12/2024    Procedure: Ablation Atrial Fibrilation;  Surgeon: Nasim Monsivais MD;  Location: [facility] CARDIAC CATH LAB     ESOPHAGOSCOPY, GASTROSCOPY, DUODENOSCOPY (EGD), COMBINED N/A 09/01/2020    Procedure: ESOPHAGOGASTRODUODENOSCOPY, WITH FINE NEEDLE ASPIRATION BIOPSY, WITH  ENDOSCOPIC ULTRASOUND GUIDANCE;  Surgeon: Barber Hogan MD;  Location: [facility] GI     LAPAROSCOPIC CHOLECYSTECTOMY N/A 2021    Procedure: CHOLECYSTECTOMY, LAPAROSCOPIC;  Surgeon: Gavin Castillo MD;  Location: [facility] OR     PICC TRIPLE LUMEN PLACEMENT Right 10/23/2020    5Fr - 36cm, Medial brachial vein     SURGICAL HISTORY OF -       nose surgery     THORACOSCOPIC RESECTION LUNG Left 10/22/2020    Procedure: Left thoracoscopic lobectomy converted to Left Thoracotomy, Medistinal lymph node dissection, Pulmonary Artery repair, flexible bronchoscopy, on-pump oxygenator on standy-by;  Surgeon: Andrea Sanabria MD;  Location: [facility] OR     THORACOTOMY N/A 10/22/2020    Procedure: Thoracotomy;  Surgeon: Andrea Sanabria MD;  Location: [facility] OR     TRANSCERVICAL EXTENDED MEDIASTINAL LYMPHADENECTOMY N/A 10/22/2020    Procedure: Transcervical extended mediastinal lymphadenectomy;  Surgeon: Andrea Sanabria MD;  Location: [facility] OR     TUBAL LIGATION      bilateral tubal ligation.  BTL       FAMILY HISTORY:  Family History   Problem Relation Age of Onset     Glaucoma Mother      LUNG DISEASE Mother      Melanoma Father      Down Syndrome Brother      Cancer Sister         bile duct     Coronary Artery Disease Brother      CABG Brother      No Known Problems Brother      No Known Problems Brother      No Known Problems Sister      Lung Cancer Sister         lung     No Known Problems Sister      No Known Problems Sister        SOCIAL HISTORY:  Social History     Socioeconomic History     Marital status:      Number of children: 1   Occupational History     Occupation: seasonal summer work only   Tobacco Use     Smoking status: Former     Current packs/day: 0.00     Average packs/day: 1 pack/day for 33.0 years (33.0 ttl pk-yrs)     Types: Cigarettes     Start date: 1981     Quit date: 2014     Years since quittin.9     Passive exposure: Current (ocassional per pt)     Smokeless tobacco: Never   Vaping Use      Vaping status: Never Used   Substance and Sexual Activity     Alcohol use: Yes     Alcohol/week: 1.7 - 2.5 standard drinks of alcohol     Types: 2 - 3 Standard drinks or equivalent per week     Comment: Beer once in a while     Drug use: No     Sexual activity: Yes     Partners: Male   Other Topics Concern     Parent/sibling w/ CABG, MI or angioplasty before 65F 55M? No     Social Drivers of Health     Financial Resource Strain: Low Risk  (1/19/2024)    Financial Resource Strain      Within the past 12 months, have you or your family members you live with been unable to get utilities (heat, electricity) when it was really needed?: No   Food Insecurity: Low Risk  (1/19/2024)    Food Insecurity      Within the past 12 months, did you worry that your food would run out before you got money to buy more?: No      Within the past 12 months, did the food you bought just not last and you didn t have money to get more?: No   Transportation Needs: Low Risk  (1/19/2024)    Transportation Needs      Within the past 12 months, has lack of transportation kept you from medical appointments, getting your medicines, non-medical meetings or appointments, work, or from getting things that you need?: No   Interpersonal Safety: Low Risk  (8/6/2024)    Interpersonal Safety      Do you feel physically and emotionally safe where you currently live?: Yes      Within the past 12 months, have you been hit, slapped, kicked or otherwise physically hurt by someone?: No      Within the past 12 months, have you been humiliated or emotionally abused in other ways by your partner or ex-partner?: No   Housing Stability: Low Risk  (1/19/2024)    Housing Stability      Do you have housing? : Yes      Are you worried about losing your housing?: No               Thank you for allowing me to participate in the care of your patient.      Sincerely,     Nasim Monsivais MD     [facility]  cc:   Nasim NUNEZ  MD Loi  [address] [place] [address]

## 2024-11-06 NOTE — PROGRESS NOTES
[facility]  Cardiac Electrophysiology Clinic    Ijeoma Shah MRN# [id number]   YOB: 1950 Age: 74 year old       I had the pleasure of seeing Ijeoma Shah  who is a 74 year old female with history of hypertension, hyperlipidemia, lung cancer s/p left lower lobectomy and chemo, persistent atrial fibrillation s/p ablation.  She follows with Dr. Pisano in cardiology clinic.    She was initially diagnosed with atrial fibrillation in 2020 and had been on amiodarone following her lobectomy and during her treatment with chemotherapy.  She was noted to have paroxysmal atrial fibrillation on a Zio patch monitor in 2022 and was started on metoprolol and Xarelto.  She most recently saw Britt Gardner in clinic on 5/16/2024 and was noted to be back in atrial fibrillation.  Her metoprolol dosage was increased for rate control and lisinopril was stopped.  She has noted increased fatigue since increasing the metoprolol, but unfortunately continues to have rapid rates on repeat Zio patch monitor.    I initially met her in clinic on 7/2/2024.  We decided to proceed with ablation, but unfortunately this had to be postponed due to supratherapeutic INR level.    She underwent pulmonary vein isolation on 8/12/2024 without complications.  We started amiodarone and decreased her metoprolol dosage postop.  She was admitted a few days later with shortness of breath due to CHF exacerbation.  She was diuresed with improvement and maintained sinus rhythm during her hospitalization.  She was noted to have a prolonged QT interval on EKG when she followed up with Janis on 8/19/24 so her amiodarone dose was decreased to once daily dosing.  Repeat EKG showed improved QT.  She developed significant itching and was taken off amiodarone at the end of August.      Today's Visit:  She has been doing really well and states she has felt great since the procedure.    She is concerned about the problem  list saying she had pneumonia, CKD, COPD.  She notes that they initially thought she had pneumonia, but then diagnosed as fluid overload/CHF.  She was not aware she had kidney disease and has never had COPD testing to her knowledge.   She is not currently on any cardiac medications other than warfarin.  She is no longer on metoprolol or lisinopril.  Her blood pressures have been running 120s systolic at home.  She never got Crestor because of cost and was not sure who had ordered the medication.  She has not yet established care with a new PCP.          Diagnostic Testing:  EKG today, which I overread, showed sinus tachycardia rate 110 bpm.  , QRS 88, QTc 420 ms.  EKG 8/26/2024-sinus rhythm rate 70 bpm.  ms (measured manually)  EKG 8/19/2024-sinus rhythm rate of 84 bpm. QTc 490 ms (measured manually)  EKG 8/12/2024-sinus rhythm rate 83 bpm.  , QRS 86, QTc 434 ms.  EKG 5/16/2024-atrial fibrillation rate 118 bpm    Echocardiogram 9/19/2024    Left ventricular systolic function is normal.The visual ejection fraction is  55-60%.  The right ventricular systolic function is normal.  There is mild (1+) mitral regurgitation.  Sinus rhythm was noted.  Echo dated 06/12/2024 LVEF was noted 45 to 50% and patient was in rapid atrial  fibrillation.       Holter Monitor 6/2024 - personally reviewed  3 day Ziopatch monitor  Persistent atrial fibrillation with heart rates ranging , average 128 bpm.  Patient triggered events correlated with atrial fibrillation with heart rates between 112 to 169 bpm.     Echocardiogram 6/12/2024  The rhythm was rapid atrial fibrillation. 's to 140's during the study.  The left ventricle is normal in size.  Left ventricular systolic function is mildly reduced. The visual ejection fraction is 45-50%.  The right ventricle is mildly dilated. Mildly decreased right ventricular systolic function  There is mild (1+) mitral regurgitation.  There is mild (1+) tricuspid  regurgitation.  Right ventricular systolic pressure is elevated, consistent with mild  pulmonary hypertension.     Compared to the previous study, the afib is new, rate is faster, and LV and RV  function have decreased.     Echo 4/20/2024   The left ventricle is normal in size.  The visual ejection fraction is 65-70%.  Grade I or early diastolic dysfunction.  The right ventricle is normal in structure, function and size.  Normal left atrial size.  No significant valve issues.  Compared to prior study, there is no significant change.      Last Comprehensive Metabolic Panel:  Lab Results   Component Value Date     10/10/2024    POTASSIUM 5.0 10/10/2024    CHLORIDE 97 (L) 10/10/2024    CO2 33 (H) 10/10/2024    ANIONGAP 7 10/10/2024     (H) 10/10/2024    BUN 19.5 10/10/2024    CR 0.99 (H) 10/10/2024    GFRESTIMATED 60 (L) 10/10/2024    SABINO 9.5 10/10/2024        Magnesium   Date Value Ref Range Status   01/25/2023 1.5 (L) 1.7 - 2.3 mg/dL Final   04/12/2021 1.9 1.6 - 2.3 mg/dL Final        TSH   Date Value Ref Range Status   12/27/2022 1.11 0.40 - 4.00 mU/L Final   02/18/2021 1.94 0.40 - 4.00 mU/L Final          Assessment/Plan:    74 year old female with history of hypertension, hyperlipidemia, lung cancer s/p left lower lobectomy and chemo, persistent atrial fibrillation s/p ablation.  Her previous episodes of atrial fibrillation were triggered by lung surgery and bladder surgery, but recently had become persistent.  Her recent Zio patch monitor showed persistent atrial fibrillation with heart rates averaging 128 bpm.  Echocardiogram showed mildly reduced LV function, EF 45 to 50%, mild MR and TR, and normal atrial size.    She underwent pulmonary vein isolation in 8/2024 without complications.  We started her on amiodarone following ablation, however this was stopped soon after due to side effects of itching.  Unfortunately, she was fluid overloaded and admitted with heart failure exacerbation a few days  postop.  She has diuresed with good response.  Since then she has been doing well and has not had any recurrent atrial fibrillation.  She is actually off all cardiac medications at this point and appears euvolemic with normal blood pressures.  Repeat echocardiogram in sinus rhythm also showed recovered LV function, EF 55 to 60%.      The patient's CHADS VASc is 3 (female, age, HTN), which warrants long term anticoagulation.  She is currently doing well on warfarin for anticoagulation.      Was recently prescribed rosuvastatin for HLD but was not able to afford it and she is worried about potential side effects.  I did recommend starting a statin given her high CVD risk.  We decided to start low-dose pravastatin.     The 10-year ASCVD risk score (Breanna DK, et al., 2019) is: 20.4%    Values used to calculate the score:      Age: 74 years      Sex: Female      Is Non- : No      Diabetic: No      Tobacco smoker: No      Systolic Blood Pressure: 138 mmHg      Is BP treated: Yes      HDL Cholesterol: 86 mg/dL      Total Cholesterol: 297 mg/dL      Will hold off on restarting metoprolol and lisinopril, encouraged to continue monitoring BP's at home   Continue warfarin  Start pravastatin 10 mg qhs  Routine follow up in 1 year      Nasim Monsivais MD        Orders this Visit:  Orders Placed This Encounter   Procedures    Follow-Up with Cardiology EP     Orders Placed This Encounter   Medications    pravastatin (PRAVACHOL) 10 MG tablet     Sig: Take 1 tablet (10 mg) by mouth at bedtime.     Dispense:  90 tablet     Refill:  3     Medications Discontinued During This Encounter   Medication Reason    rosuvastatin (CRESTOR) 5 MG tablet Therapy completed (No AVS)    furosemide (LASIX) 20 MG tablet     potassium chloride mohan ER (KLOR-CON M20) 20 MEQ CR tablet     metoprolol succinate ER (TOPROL XL) 25 MG 24 hr tablet        Encounter Diagnoses   Name Primary?    Mixed hyperlipidemia     Persistent atrial  fibrillation (H) Yes    Chronic anticoagulation     S/P ablation of atrial fibrillation     Benign essential hypertension      Today's clinic visit entailed:  Review of the result(s) of each unique test - echo  The following tests were independently interpreted by me as noted in my documentation: EKG  45 minutes spent by me on the date of the encounter doing chart review, history and exam, documentation and further activities per the note    CURRENT MEDICATIONS:  Current Outpatient Medications   Medication Sig Dispense Refill    calcium carbonate-vitamin D (CALTRATE) 600-10 MG-MCG per tablet Take 1 tablet by mouth 2 times daily      EPINEPHrine (ANY BX GENERIC EQUIV) 0.3 MG/0.3ML injection 2-pack Inject 0.3 mLs (0.3 mg) into the muscle as needed for anaphylaxis May repeat one time in 5-15 minutes if response to initial dose is inadequate. 2 each 1    ketoconazole (NIZORAL) 2 % external cream Apply topically daily For rash on feet. 60 g 0    triamcinolone (KENALOG) 0.1 % external ointment Apply topically 2 times daily To heels as needed 80 g 1    warfarin ANTICOAGULANT (COUMADIN) 5 MG tablet Take 1 tablet PO every day except 7.5 mg on Mondays and Thursdays.      furosemide (LASIX) 20 MG tablet Take 1 tablet (20 mg) by mouth daily as needed (weight gain/edema). (Patient not taking: Reported on 11/6/2024) 30 tablet 1    metoprolol succinate ER (TOPROL XL) 25 MG 24 hr tablet Take 0.5 tablets (12.5 mg) by mouth every evening (Patient not taking: Reported on 11/6/2024)      potassium chloride mohan ER (KLOR-CON M20) 20 MEQ CR tablet Take 1 tablet (20 mEq) by mouth 2 times daily. (Patient not taking: Reported on 11/6/2024) 90 tablet 3       Review of Systems:  Pertinent review of system as stated above in HPI    Skin:        Eyes:       ENT:       Respiratory:       Cardiovascular:       Gastroenterology:      Genitourinary:       Musculoskeletal:       Neurologic:       Psychiatric:       Heme/Lymph/Imm:       Endocrine:          Physical Exam:    Virtual visit    Patient appears alert, in good spirits.  Breathing comfortably.    ALLERGIES  Allergies   Allergen Reactions    Bee Venom Hives     Hot and sweating     Amiodarone Itching    Lasix [Furosemide] Itching       PAST MEDICAL HISTORY:  Past Medical History:   Diagnosis Date    Arrhythmia     A-Fib    Arthritis     Benign essential hypertension     Calculus of gallbladder without cholecystitis without obstruction     Calculus of gallbladder without cholecystitis without obstruction 05/03/2021    Added automatically from request for surgery [id number]      Congestive heart failure, unspecified HF chronicity, unspecified heart failure type (H) 08/16/2024    COPD (chronic obstructive pulmonary disease) (H)     Lung cancer (H)     Simple chronic bronchitis (H)        PAST SURGICAL HISTORY:  Past Surgical History:   Procedure Laterality Date    ARTHROEREISIS, SUBTALAR      BRONCHOSCOPY RIGID OR FLEXIBLE W/TRANSENDOSCOPIC ENDOBRONCHIAL ULTRASOUND GUIDED N/A 07/27/2020    Procedure: Flexible bronchoscopy, endobronchial ultrasound with transbronchial biopsies;  Surgeon: Edin Castro MD;  Location: [facility] OR    CATARACT IOL, RT/LT Bilateral     COLONOSCOPY  06/29/2004    colonoscopy to the cecum    COLONOSCOPY N/A 1/25/2023    Procedure: COLONOSCOPY, WITH POLYPECTOMY;  Surgeon: Warren Soto MD;  Location:  GI    EP ABLATION FOCAL AFIB N/A 8/12/2024    Procedure: Ablation Atrial Fibrilation;  Surgeon: Nasim Monsivais MD;  Location: [facility] CARDIAC CATH LAB    ESOPHAGOSCOPY, GASTROSCOPY, DUODENOSCOPY (EGD), COMBINED N/A 09/01/2020    Procedure: ESOPHAGOGASTRODUODENOSCOPY, WITH FINE NEEDLE ASPIRATION BIOPSY, WITH ENDOSCOPIC ULTRASOUND GUIDANCE;  Surgeon: Barber Hogan MD;  Location:  GI    LAPAROSCOPIC CHOLECYSTECTOMY N/A 5/20/2021    Procedure: CHOLECYSTECTOMY, LAPAROSCOPIC;  Surgeon: Gavin Castillo MD;  Location:  OR    PICC TRIPLE LUMEN PLACEMENT Right 10/23/2020     5Fr - 36cm, Medial brachial vein    SURGICAL HISTORY OF -       nose surgery    THORACOSCOPIC RESECTION LUNG Left 10/22/2020    Procedure: Left thoracoscopic lobectomy converted to Left Thoracotomy, Medistinal lymph node dissection, Pulmonary Artery repair, flexible bronchoscopy, on-pump oxygenator on standy-by;  Surgeon: Andrea Sanabria MD;  Location: [facility] OR    THORACOTOMY N/A 10/22/2020    Procedure: Thoracotomy;  Surgeon: Andrea Sanabria MD;  Location: [facility] OR    TRANSCERVICAL EXTENDED MEDIASTINAL LYMPHADENECTOMY N/A 10/22/2020    Procedure: Transcervical extended mediastinal lymphadenectomy;  Surgeon: Andrea Sanabria MD;  Location: [facility] OR    TUBAL LIGATION      bilateral tubal ligation.  BTL       FAMILY HISTORY:  Family History   Problem Relation Age of Onset    Glaucoma Mother     LUNG DISEASE Mother     Melanoma Father     Down Syndrome Brother     Cancer Sister         bile duct    Coronary Artery Disease Brother     CABG Brother     No Known Problems Brother     No Known Problems Brother     No Known Problems Sister     Lung Cancer Sister         lung    No Known Problems Sister     No Known Problems Sister        SOCIAL HISTORY:  Social History     Socioeconomic History    Marital status:     Number of children: 1   Occupational History    Occupation: seasonal summer work only   Tobacco Use    Smoking status: Former     Current packs/day: 0.00     Average packs/day: 1 pack/day for 33.0 years (33.0 ttl pk-yrs)     Types: Cigarettes     Start date: 1981     Quit date: 2014     Years since quittin.9     Passive exposure: Current (ocassional per pt)    Smokeless tobacco: Never   Vaping Use    Vaping status: Never Used   Substance and Sexual Activity    Alcohol use: Yes     Alcohol/week: 1.7 - 2.5 standard drinks of alcohol     Types: 2 - 3 Standard drinks or equivalent per week     Comment: Beer once in a while    Drug use: No    Sexual activity: Yes     Partners: Male   Other Topics  Concern    Parent/sibling w/ CABG, MI or angioplasty before 65F 55M? No     Social Drivers of Health     Financial Resource Strain: Low Risk  (1/19/2024)    Financial Resource Strain     Within the past 12 months, have you or your family members you live with been unable to get utilities (heat, electricity) when it was really needed?: No   Food Insecurity: Low Risk  (1/19/2024)    Food Insecurity     Within the past 12 months, did you worry that your food would run out before you got money to buy more?: No     Within the past 12 months, did the food you bought just not last and you didn t have money to get more?: No   Transportation Needs: Low Risk  (1/19/2024)    Transportation Needs     Within the past 12 months, has lack of transportation kept you from medical appointments, getting your medicines, non-medical meetings or appointments, work, or from getting things that you need?: No   Interpersonal Safety: Low Risk  (8/6/2024)    Interpersonal Safety     Do you feel physically and emotionally safe where you currently live?: Yes     Within the past 12 months, have you been hit, slapped, kicked or otherwise physically hurt by someone?: No     Within the past 12 months, have you been humiliated or emotionally abused in other ways by your partner or ex-partner?: No   Housing Stability: Low Risk  (1/19/2024)    Housing Stability     Do you have housing? : Yes     Are you worried about losing your housing?: No

## 2024-11-14 ENCOUNTER — LAB (OUTPATIENT)
Dept: LAB | Facility: CLINIC | Age: 74
End: 2024-11-14
Payer: MEDICARE

## 2024-11-14 ENCOUNTER — DOCUMENTATION ONLY (OUTPATIENT)
Dept: ANTICOAGULATION | Facility: CLINIC | Age: 74
End: 2024-11-14

## 2024-11-14 ENCOUNTER — ANTICOAGULATION THERAPY VISIT (OUTPATIENT)
Dept: ANTICOAGULATION | Facility: CLINIC | Age: 74
End: 2024-11-14

## 2024-11-14 DIAGNOSIS — Z79.01 LONG TERM CURRENT USE OF ANTICOAGULANT THERAPY: ICD-10-CM

## 2024-11-14 DIAGNOSIS — I48.0 PAROXYSMAL ATRIAL FIBRILLATION (H): ICD-10-CM

## 2024-11-14 DIAGNOSIS — I48.91 ATRIAL FIBRILLATION WITH RAPID VENTRICULAR RESPONSE (H): ICD-10-CM

## 2024-11-14 DIAGNOSIS — I48.0 PAROXYSMAL ATRIAL FIBRILLATION (H): Primary | ICD-10-CM

## 2024-11-14 LAB — INR BLD: 1.5 (ref 0.9–1.1)

## 2024-11-14 PROCEDURE — 85610 PROTHROMBIN TIME: CPT

## 2024-11-14 PROCEDURE — 36416 COLLJ CAPILLARY BLOOD SPEC: CPT

## 2024-11-14 RX ORDER — WARFARIN SODIUM 5 MG/1
TABLET ORAL
Qty: 150 TABLET | Refills: 1 | Status: SHIPPED | OUTPATIENT
Start: 2024-11-14

## 2024-11-14 NOTE — CONFIDENTIAL NOTE
Janis Ekman APRN CNP,    Your patient, Ijeoma Shah, is under the care of [facility] Anticoagulation. Previously referred by Jyoti Espinal MD and referred by inpatient provider Mary Godoy CNP.  Anticoagulation clinic needing a new referring provider due to previous provider left [facility]     Indication(s): Atrial Fibrillation   Goal Range: 2.0-3.0  Duration: indefinite     Anticoagulation clinic requires a referral from one of [facility]'s [facility] ambulatory provider (PCP or specialist) in order to provide anticoagulation management. The referring provider should anticipate seeing the patient on an ongoing basis, will have INRs and warfarin Rx ordered under their name per protocol, and will be point of contact for [facility] questions on patient's anticoagulation care (procedural holds, critical results, etc).     Please review and sign the pended referral order for Ijeoma Shah if you are willing to oversee anticoagulation care.         Thank you,  Stephie Yost RN  Anticoagulation clinic

## 2024-11-14 NOTE — PROGRESS NOTES
ANTICOAGULATION MANAGEMENT     Ijeoma Shah 74 year old female is on warfarin with subtherapeutic INR result. (Goal INR 2.0-3.0)    Recent labs: (last 7 days)     11/14/24  1309   INR 1.5*       ASSESSMENT     Warfarin Lab Questionnaire    Warfarin Doses Last 7 Days      11/13/2024     2:31 PM   Dose in Tablet or Mg   TAB or MG? milligram (mg)     Pt Rptd Dose SUNDAY MONDAY TUESDAY WED THURS FRIDAY SATURDAY 11/13/2024   2:31 PM 7.5 7.5 7.5 5 7.5 7.5 7.5         11/13/2024   Warfarin Lab Questionnaire   Missed doses within past 14 days? No   Changes in diet or alcohol within past 14 days? No   Medication changes since last result? No   Injuries or illness since last result? No   New shortness of breath, severe headaches or sudden changes in vision since last result? No   Abnormal bleeding since last result? No   Upcoming surgery, procedure? No      Previous result: Subtherapeutic  Additional findings:  start pravastatin 10 mg daily    Summary HMG-CoA Reductase Inhibitors (Statins) may enhance the anticoagulant effect of Vitamin K Antagonists. Severity Moderate Reliability Rating Good  Patient Management Monitor for increased effects of oral anticoagulants if an HMG-CoA reductase inhibitor (aka, statin) is initiated/dose increased, or decreased effects if a statin is discontinued/dose decreased. Dosage adjustments of the anticoagulant may be needed. Red yeast rice contains lovastatin (possibly 2.4 mg per 600 mg of red yeast rice).    Patient was taking 50 mg/wk. Did discontinue amiodarone (see note 8/29/24)    Refill request  Ijeoma meets all criteria for refill. Rx instructions and quantity supplied updated to match patient's current dosing plan. Warfarin 90 day supply with 1 refill granted per [facility] protocol     Referral review - from in patient provider in hospital and from provider who left [facility]. Sent new referral to cardiology team.     PLAN     Recommended plan for temporary change(s)  and ongoing change(s) affecting INR     Dosing Instructions: Increase your warfarin dose (9.5% change) (however about 15% increase from what patient has been taking) with next INR in 2 weeks       Summary  As of 11/14/2024      Full warfarin instructions:  10 mg every Mon, Thu; 7.5 mg all other days   Next INR check:  11/26/2024               Telephone call with Salima who verbalizes understanding and agrees to plan    Lab visit scheduled    Education provided: Please call back if any changes to your diet, medications or how you've been taking warfarin  Symptom monitoring: monitoring for clotting signs and symptoms, monitoring for stroke signs and symptoms, and when to seek medical attention/emergency care    Plan made with [facility] Pharmacist Bhakti Yost RN  11/14/2024  Anticoagulation Clinic  [facility] for routing messages: mirella [facility] patient phone line: [phone]        _______________________________________________________________________     Anticoagulation Episode Summary       Current INR goal:  2.0-3.0   TTR:  57.7% (1 y)   Target end date:  Indefinite   Send INR reminders to:  [facility]    Indications    Paroxysmal atrial fibrillation (H) [I48.0]  Atrial fibrillation with rapid ventricular response (H) (Resolved) [I48.91]  Atrial fibrillation with rapid ventricular response (H) [I48.91]  Long term current use of anticoagulant therapy [Z79.01]             Comments:  RAJESH garibay             Anticoagulation Care Providers       Provider Role Specialty Phone number    Warren Pisano MD Referring Cardiovascular Disease [phone]    Jyoti Espinal MD Referring Family Medicine [phone]    Mary Godoy CNP Referring Critical Care [phone]    Janis Carr APRN CNP Referring Cardiovascular Disease [phone]

## 2024-11-26 ENCOUNTER — LAB (OUTPATIENT)
Dept: LAB | Facility: CLINIC | Age: 74
End: 2024-11-26
Payer: MEDICARE

## 2024-11-26 ENCOUNTER — ANTICOAGULATION THERAPY VISIT (OUTPATIENT)
Dept: ANTICOAGULATION | Facility: CLINIC | Age: 74
End: 2024-11-26

## 2024-11-26 DIAGNOSIS — I48.91 ATRIAL FIBRILLATION WITH RAPID VENTRICULAR RESPONSE (H): ICD-10-CM

## 2024-11-26 DIAGNOSIS — I48.0 PAROXYSMAL ATRIAL FIBRILLATION (H): ICD-10-CM

## 2024-11-26 DIAGNOSIS — Z79.01 LONG TERM CURRENT USE OF ANTICOAGULANT THERAPY: ICD-10-CM

## 2024-11-26 DIAGNOSIS — I48.0 PAROXYSMAL ATRIAL FIBRILLATION (H): Primary | ICD-10-CM

## 2024-11-26 LAB — INR BLD: 2 (ref 0.9–1.1)

## 2024-11-26 PROCEDURE — 36416 COLLJ CAPILLARY BLOOD SPEC: CPT

## 2024-11-26 PROCEDURE — 85610 PROTHROMBIN TIME: CPT

## 2024-11-26 NOTE — PROGRESS NOTES
ANTICOAGULATION MANAGEMENT     Ijeoma Shah 74 year old female is on warfarin with therapeutic INR result. (Goal INR 2.0-3.0)    Recent labs: (last 7 days)     11/26/24  1129   INR 2.0*       ASSESSMENT     Warfarin Lab Questionnaire    Warfarin Doses Last 7 Days      11/25/2024    11:41 AM   Dose in Tablet or Mg   TAB or MG? milligram (mg)     Pt Rptd Dose SUNDAY MONDAY TUESDAY WED THURS FRIDAY SATURDAY 11/25/2024  11:41 AM 7.5 10 7.5 7.5 10 7.5 7.5         11/25/2024   Warfarin Lab Questionnaire   Missed doses within past 14 days? No   Changes in diet or alcohol within past 14 days? No   Medication changes since last result? No   Injuries or illness since last result? No   New shortness of breath, severe headaches or sudden changes in vision since last result? No   Abnormal bleeding since last result? No   Upcoming surgery, procedure? No   Best number to call with results? [phone]        Previous result: Subtherapeutic  Additional findings: None       PLAN     Recommended plan for no diet, medication or health factor changes affecting INR     Dosing Instructions: Continue your current warfarin dose with next INR in 2-3 weeks       Summary  As of 11/26/2024      Full warfarin instructions:  10 mg every Mon, Thu; 7.5 mg all other days   Next INR check:  12/17/2024               Telephone call with Salima who agrees to plan and repeated back plan correctly    Lab visit scheduled    Education provided: Please call back if any changes to your diet, medications or how you've been taking warfarin  Goal range and lab monitoring: goal range and significance of current result and Importance of therapeutic range  Contact [phone] with any changes, questions or concerns.     Plan made per [facility] anticoagulation protocol    Stacie Andino RN  11/26/2024  Anticoagulation Clinic  [facility] for routing messages: mirella NUNEZ [facility] patient phone line:  [phone]        _______________________________________________________________________     Anticoagulation Episode Summary       Current INR goal:  2.0-3.0   TTR:  54.4% (1 y)   Target end date:  Indefinite   Send INR reminders to:  [facility]    Indications    Paroxysmal atrial fibrillation (H) [I48.0]  Atrial fibrillation with rapid ventricular response (H) (Resolved) [I48.91]  Atrial fibrillation with rapid ventricular response (H) [I48.91]  Long term current use of anticoagulant therapy [Z79.01]             Comments:  RAJESH garibay             Anticoagulation Care Providers       Provider Role Specialty Phone number    Warren Pisano MD Referring Cardiovascular Disease [phone]    Jyoti Espinal MD Referring Family Medicine [phone]    Mary Godoy CNP Referring Critical Care [phone]    Janis Carr APRN CNP Referring Cardiovascular Disease [phone]

## 2024-11-27 ENCOUNTER — PATIENT OUTREACH (OUTPATIENT)
Dept: CARE COORDINATION | Facility: CLINIC | Age: 74
End: 2024-11-27
Payer: MEDICARE

## 2024-12-13 ENCOUNTER — MYC MEDICAL ADVICE (OUTPATIENT)
Dept: CARDIOLOGY | Facility: CLINIC | Age: 74
End: 2024-12-13
Payer: MEDICARE

## 2024-12-13 DIAGNOSIS — I10 HTN (HYPERTENSION): Primary | ICD-10-CM

## 2024-12-16 ENCOUNTER — ANTICOAGULATION THERAPY VISIT (OUTPATIENT)
Dept: ANTICOAGULATION | Facility: CLINIC | Age: 74
End: 2024-12-16

## 2024-12-16 ENCOUNTER — LAB (OUTPATIENT)
Dept: LAB | Facility: CLINIC | Age: 74
End: 2024-12-16
Payer: MEDICARE

## 2024-12-16 ENCOUNTER — HOSPITAL ENCOUNTER (OUTPATIENT)
Dept: CT IMAGING | Facility: CLINIC | Age: 74
Discharge: HOME | End: 2024-12-16
Attending: NURSE PRACTITIONER | Admitting: NURSE PRACTITIONER
Payer: MEDICARE

## 2024-12-16 DIAGNOSIS — J90 PLEURAL EFFUSION: ICD-10-CM

## 2024-12-16 DIAGNOSIS — Z79.01 LONG TERM CURRENT USE OF ANTICOAGULANT THERAPY: ICD-10-CM

## 2024-12-16 DIAGNOSIS — C34.32 SQUAMOUS CELL CARCINOMA OF BRONCHUS IN LEFT LOWER LOBE (H): ICD-10-CM

## 2024-12-16 DIAGNOSIS — I48.0 PAROXYSMAL ATRIAL FIBRILLATION (H): Primary | ICD-10-CM

## 2024-12-16 DIAGNOSIS — I48.91 ATRIAL FIBRILLATION WITH RAPID VENTRICULAR RESPONSE (H): ICD-10-CM

## 2024-12-16 DIAGNOSIS — I48.0 PAROXYSMAL ATRIAL FIBRILLATION (H): ICD-10-CM

## 2024-12-16 LAB
ALBUMIN SERPL BCG-MCNC: 4.6 G/DL (ref 3.5–5.2)
ALP SERPL-CCNC: 88 U/L (ref 40–150)
ALT SERPL W P-5'-P-CCNC: 24 U/L (ref 0–50)
ANION GAP SERPL CALCULATED.3IONS-SCNC: 11 MMOL/L (ref 7–15)
AST SERPL W P-5'-P-CCNC: 26 U/L (ref 0–45)
BASOPHILS # BLD AUTO: 0 10E3/UL (ref 0–0.2)
BASOPHILS NFR BLD AUTO: 0 %
BILIRUB SERPL-MCNC: 0.5 MG/DL
BUN SERPL-MCNC: 16.9 MG/DL (ref 8–23)
CALCIUM SERPL-MCNC: 9.5 MG/DL (ref 8.8–10.4)
CHLORIDE SERPL-SCNC: 96 MMOL/L (ref 98–107)
CREAT SERPL-MCNC: 0.9 MG/DL (ref 0.51–0.95)
EGFRCR SERPLBLD CKD-EPI 2021: 67 ML/MIN/1.73M2
EOSINOPHIL # BLD AUTO: 0.4 10E3/UL (ref 0–0.7)
EOSINOPHIL NFR BLD AUTO: 6 %
ERYTHROCYTE [DISTWIDTH] IN BLOOD BY AUTOMATED COUNT: 12.4 % (ref 10–15)
GLUCOSE SERPL-MCNC: 102 MG/DL (ref 70–99)
HCO3 SERPL-SCNC: 28 MMOL/L (ref 22–29)
HCT VFR BLD AUTO: 42.3 % (ref 35–47)
HGB BLD-MCNC: 14.6 G/DL (ref 11.7–15.7)
IMM GRANULOCYTES # BLD: 0 10E3/UL
IMM GRANULOCYTES NFR BLD: 1 %
INR BLD: 1.6 (ref 0.9–1.1)
LYMPHOCYTES # BLD AUTO: 1.2 10E3/UL (ref 0.8–5.3)
LYMPHOCYTES NFR BLD AUTO: 19 %
MCH RBC QN AUTO: 30.1 PG (ref 26.5–33)
MCHC RBC AUTO-ENTMCNC: 34.5 G/DL (ref 31.5–36.5)
MCV RBC AUTO: 87 FL (ref 78–100)
MONOCYTES # BLD AUTO: 0.4 10E3/UL (ref 0–1.3)
MONOCYTES NFR BLD AUTO: 6 %
NEUTROPHILS # BLD AUTO: 4.2 10E3/UL (ref 1.6–8.3)
NEUTROPHILS NFR BLD AUTO: 68 %
NRBC # BLD AUTO: 0 10E3/UL
NRBC BLD AUTO-RTO: 0 /100
PLATELET # BLD AUTO: 187 10E3/UL (ref 150–450)
POTASSIUM SERPL-SCNC: 4.4 MMOL/L (ref 3.4–5.3)
PROT SERPL-MCNC: 7.3 G/DL (ref 6.4–8.3)
RBC # BLD AUTO: 4.85 10E6/UL (ref 3.8–5.2)
SODIUM SERPL-SCNC: 135 MMOL/L (ref 135–145)
WBC # BLD AUTO: 6.1 10E3/UL (ref 4–11)

## 2024-12-16 PROCEDURE — 71260 CT THORAX DX C+: CPT | Mod: MG

## 2024-12-16 PROCEDURE — 85610 PROTHROMBIN TIME: CPT

## 2024-12-16 PROCEDURE — 250N000011 HC RX IP 250 OP 636: Performed by: NURSE PRACTITIONER

## 2024-12-16 PROCEDURE — 80053 COMPREHEN METABOLIC PANEL: CPT

## 2024-12-16 PROCEDURE — 85025 COMPLETE CBC W/AUTO DIFF WBC: CPT

## 2024-12-16 PROCEDURE — 250N000009 HC RX 250: Performed by: NURSE PRACTITIONER

## 2024-12-16 PROCEDURE — 36415 COLL VENOUS BLD VENIPUNCTURE: CPT

## 2024-12-16 RX ORDER — IOPAMIDOL 755 MG/ML
500 INJECTION, SOLUTION INTRAVASCULAR ONCE
Status: COMPLETED | OUTPATIENT
Start: 2024-12-16 | End: 2024-12-16

## 2024-12-16 RX ORDER — LISINOPRIL 5 MG/1
5 TABLET ORAL DAILY
COMMUNITY
Start: 2024-12-16 | End: 2024-12-18

## 2024-12-16 RX ADMIN — IOPAMIDOL 79 ML: 755 INJECTION, SOLUTION INTRAVENOUS at 11:48

## 2024-12-16 RX ADMIN — SODIUM CHLORIDE 60 ML: 9 INJECTION, SOLUTION INTRAVENOUS at 11:48

## 2024-12-16 NOTE — TELEPHONE ENCOUNTER
12/16/24 Msg recd from Dr Monsivais    I am concerned that blood pressure reading on 12/12 was very low.  Why do not we have her decrease the lisinopril dose to 5 mg daily.  She should continue monitoring her blood pressures at home.  And I would ask that she follow-up with her PCP so they can continue monitoring this.     Pt updated via [place] 310 pm

## 2024-12-16 NOTE — PROGRESS NOTES
"ANTICOAGULATION MANAGEMENT     Ijeoma Shah 74 year old female is on warfarin with subtherapeutic INR result. (Goal INR 2.0-3.0)    Recent labs: (last 7 days)     12/16/24  1100   INR 1.6*       ASSESSMENT     Source(s): Chart Review and Patient/Caregiver Call     Warfarin doses taken: Warfarin taken as instructed  Diet: No new diet changes identified  Medication/supplement changes:  patient reports she resumed lisinopril \"on her own recently\", writer encouraged her to discuss with her provider, patient verbalized understanding.  New illness, injury, or hospitalization: No  Signs or symptoms of bleeding or clotting: No  Previous result: Therapeutic last visit; previously outside of goal range  Additional findings:  several recent subtherapeutic INRs so warfarin maintenance dose was increased today.       PLAN     Recommended plan for no diet, medication or health factor changes affecting INR     Dosing Instructions: Increase your warfarin dose (13% change) with next INR in 2 weeks       Summary  As of 12/16/2024      Full warfarin instructions:  7.5 mg every Sun, Wed; 10 mg all other days   Next INR check:  12/30/2024               Telephone call with Salima who verbalizes understanding and agrees to plan and who agrees to plan and repeated back plan correctly    Lab visit scheduled    Education provided: Taking warfarin: Importance of taking warfarin as instructed  Dietary considerations: importance of consistent vitamin K intake, impact of vitamin K foods on INR, and vitamin K content of foods    Plan made per [facility] anticoagulation protocol    Cristal Griffith RN  12/16/2024  Anticoagulation Clinic  [employer] for routing messages: mirella [facility]UN[facility] patient phone line: [phone]        _______________________________________________________________________     Anticoagulation Episode Summary       Current INR goal:  2.0-3.0   TTR:  48.9% (1 y)   Target end date:  Indefinite   Send INR reminders " to:  [facility]    Indications    Paroxysmal atrial fibrillation (H) [I48.0]  Atrial fibrillation with rapid ventricular response (H) (Resolved) [I48.91]  Atrial fibrillation with rapid ventricular response (H) [I48.91]  Long term current use of anticoagulant therapy [Z79.01]             Comments:  RAJESH garibay             Anticoagulation Care Providers       Provider Role Specialty Phone number    Warren Pisano MD Referring Cardiovascular Disease [phone]    Jyoti Espinal MD Referring Family Medicine [phone]    Mary Godoy CNP Referring Critical Care [phone]    Janis Carr APRN CNP Referring Cardiovascular Disease [phone]

## 2024-12-18 RX ORDER — LISINOPRIL 5 MG/1
5 TABLET ORAL DAILY
Qty: 30 TABLET | Refills: 1 | Status: SHIPPED | OUTPATIENT
Start: 2024-12-18

## 2024-12-19 ENCOUNTER — VIRTUAL VISIT (OUTPATIENT)
Dept: ONCOLOGY | Facility: CLINIC | Age: 74
End: 2024-12-19
Attending: NURSE PRACTITIONER
Payer: MEDICARE

## 2024-12-19 VITALS — WEIGHT: 159 LBS | BODY MASS INDEX: 27.14 KG/M2 | HEIGHT: 64 IN

## 2024-12-19 DIAGNOSIS — K86.9 PANCREATIC LESION: Primary | ICD-10-CM

## 2024-12-19 ASSESSMENT — PAIN SCALES - GENERAL: PAINLEVEL_OUTOF10: NO PAIN (0)

## 2024-12-19 NOTE — NURSING NOTE
Current patient location: [facility] [address]    Is the patient currently in the state of [place]? YES    Visit mode:VIDEO    If the visit is dropped, the patient can be reconnected by:VIDEO VISIT: Text to cell phone:   Telephone Information:   Mobile [phone]       Will anyone else be joining the visit? NO  (If patient encounters technical issues they should call [phone]    Are changes needed to the allergy or medication list? Pt stated no changes to allergies and Pt stated no med changes    Are refills needed on medications prescribed by this physician? NO    Rooming Documentation:  Unable to complete questionnaire(s) due to time    Reason for visit: TYRONE BARNETTF

## 2024-12-19 NOTE — PROGRESS NOTES
[facility]  [address] [facility] [address]  Phone: [phone]  Fax: [phone]    PATIENT NAME: Ijeoma Shah  MRN # [id number]   DATE OF VISIT: December 19, 2024  YOB: 1950     CANCER TYPE: SCC lung, poorly differentiated  STAGE: pT4N0 (IIIA)  ECOG PS: 0     PD-L1: TPS 15% on [id number] lobectomy, <1% on [id number] (LLL bx)  Lung panel: SMO R562Q on LLL bx, negative for fusions on lobectomy specimen.   NGS: N/A    ASSESSMENT AND PLAN  SCC lung, [id number], IIIA, R0 resection, discrepant PD-L1: Nearly 4 years after completion of therapy. Waxing and waning nodules over the years, but nothing worrisome. RML nodule looks the same to me as compared to Jan 2024. CT chest abd in 6 months, then annual CT chest non contrast if the next CT is stable. Discussed getting these scans through PCP or us, potentially long-term follow up clinic, etc. We'll decide next time.       IPMN?: Small, ~ 1 cm, will re-evaluate on next CT, then get MRI about 3-6 months after that to radiographically confirm IPMN. If IPMN, will follow surveillance guidelines through GI or PCP.      Microscopic hematuria, dysuria: Urine cytology and FISH negative in the past, met with Dr. Leyva in Urology, discussed cystoscopy vs monitoring; Salima had opted for monitoring, not active recently.    The longitudinal plan of care for the condition(s) below were addressed during this visit. Due to the added complexity in care, I will continue to support Salima in the subsequent management of this condition(s) and with the ongoing continuity of care of this condition(s): SCC lung      30 minutes spent by me on the date of the encounter doing chart review, review of test results, interpretation of tests, patient visit, documentation, orders    Eneida De Leon MD  Associate Professor of Medicine  Hematology, Oncology and Transplantation    SUBJECTIVE  Salima returns for routine follow  up  Doing well  Felt much better the day after ablation for afib.  No other new issues. Trying to remain active despite cold weather, etc.     CANCER SUMMARY  7/3/20  CT chest (screening). 6.7 cm LLL mass, 0.6 cm RML nodule, mediastinal and L hilar LNs  7/9/20 PET/CT. 7.1 x 5.6 cm LLL mass (SUV 19.6), post obstructive pneumonitis LLL inferior to the mass (SUV 2.8), 3.5 x 1.7 cm 4L node (SUV 5.9), L adrenal nodule 1.1 cm (SUV 4), indeterminate pulmonary nodules, pancreatic cysts, not hypermetabolic  7/27/20 Bronch, EBUS (Dr. Castro). 10R, 11R, 4R too small to biopsy. Stations 7, 4L, 11L negative for malignancy. LLL mass bx: SCC  8/12/20 Brain MRI negative  9/1/20 EUS to evaluate adrenal and 4L (Dr. Hogan). Both negative for malignancy. Adrenal with bland adrenal cells  10/22/20 L VATS, converted to thoracotomy, LLL lobectomy, MLND, R pulmonary arterioplasty (Dr. Sanabria, Dr. Davis). 8.3 cm poorly differentiated SCC, +angiolymphatic invasion  12/3/20~2/25/21 Cisplatin gemcitabine x 4. C2D8 delayed 1 week due to neutropenia, added neulasta    PAST MEDICAL HISTORY  SCC as above  Afib with RVR. Initially when she presented for surgery 10/1, then post-op in 10/2020. DCCV x 2 10/23/20, ibutilide --> NSR, dig load, amio gtt. TTE 10/16/20 unremarkable. Seeing Dr. Monsivais in Cardiology. Pulmonary vein isolation 8/2024.   Low back pain, R radiculopathy, L5-S1 interlaminar lumbar epidural steroid injection 11/15/21  Sp cholecystectomy  Dyslipidemia  H/o bronchitis  Nose surgery  Tubal ligation  Adrenal nodule, bx 9/1/20, negative for malignancy  Cholelithiasis  Cataract repair 2011?  Colon polyps on colonoscopy Jan 2023.      PFT 8/20/20 (preop). FEV1 1.33 (58%), FVC 1.76 (60%), DLCO 17.72 (90%)  Colonoscopy 1/25/23. Hyperplastic polyp and tubular adenoma    CURRENT OUTPATIENT MEDICATIONS  Reviewed    ALLERGIES  Allergies   Allergen Reactions    Bee Venom Hives     Hot and sweating     Amiodarone Itching    Lasix  [Furosemide] Itching     PHYSICAL EXAM  No vitals due to video visit   GEN: NAD  HEENT: EOMI, no icterus, injection or pallor  NEURO: alert    Remainder of physical exam deferred due to public health emergency and limitations of video visit.    LABORATORY AND IMAGING STUDIES    Labs 12/16/24 were independently reviewed and interpreted by me  CBC pd and CMP normal     CT Chest/Abdomen/Pelvis w Contrast  Narrative: CT CHEST/ABDOMEN/PELVIS WITH CONTRAST 12/16/2024 12:03 PM    CLINICAL HISTORY: Stage III lung cancer, status post resection,  chemoradiation; evaluate for recurrence. 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 Pleural effusion.    TECHNIQUE: CT scan of the chest, abdomen, and pelvis was performed  following injection of IV contrast. Multiplanar reformats were  obtained. Dose reduction techniques were used.   CONTRAST: 79 mL Isovue-370    COMPARISON: 6/12/2024, 1/8/2024, 10/3/2023    FINDINGS:   LUNGS AND PLEURA: Similar-appearing subcentimeter pulmonary nodules.  Largest pulmonary nodule is again seen in the anterior right mid lung  at 6 x 4 mm (4-165), previously 5 x 4 mm. A couple benign densely  calcified pulmonary granulomas are present. No new or enlarging  pulmonary nodules. There has been interval resolution of the  previously seen left-sided pleural effusion. Postsurgical change of  the left lung consistent with left lower lobectomy. Minimal scarring  and atelectasis are seen in the posterior medial left lower lung. No  signs of pneumonia or pneumothorax. The large airways are patent.  Minimal biapical pleuroparenchymal scarring.    MEDIASTINUM/AXILLAE: Heart size is normal. No pericardial fluid.  Thoracic aorta is normal in course and caliber. A few prominent, but  not technically enlarged, mediastinal lymph nodes are noted. Mild  coronary artery atherosclerosis.    HEPATOBILIARY: Simple-appearing left hepatic cysts. Tiny low-density  foci in the right hepatic lobe, too small to  characterize but most  likely representing cysts. No specific follow-up is recommended.  Gallbladder is absent. Common bile duct is slightly enlarged, likely  secondary to postcholecystectomy reservoir state, which is similar to  comparison. No obstructing stone or mass is seen.    PANCREAS: Small low-density cystic foci are seen in the head of the  pancreas measuring up to 10 mm in greatest dimension, similar to  comparison, previously 13 mm when remeasured at a similar level for  consistency. These cystic foci are favored to be IPMNs and can be  monitored on future surveillance imaging. No dilation of the  pancreatic duct. No inflammatory changes in the pancreas.    SPLEEN: Normal.    ADRENAL GLANDS: There is similar-appearing thickening of the left  adrenal gland without discrete nodule or mass. No follow-up is  necessary. Right adrenal gland is normal in appearance.    KIDNEYS/BLADDER: There is mild urinary bladder wall thickening, which  is likely secondary to underdistention. The kidneys enhance  homogenously and symmetrically. No cystic or solid renal mass. No  nephrolithiasis or hydronephrosis.    BOWEL: Diverticulosis in the colon. No acute inflammatory change. No  obstruction.     PELVIC ORGANS: No pelvic mass or free pelvic fluid.    ADDITIONAL FINDINGS: Moderate nonaneurysmal atherosclerosis of the  abdominal aorta and iliac arteries.    MUSCULOSKELETAL: Multilevel degenerative changes in the spine. No  aggressive-appearing lytic or blastic osseous lesions.  Impression: IMPRESSION:  1.  Status post left lower lobectomy.  2.  Interval resolution of previously seen small volume left-sided  pleural effusion.  3.  Unchanged subcentimeter pulmonary nodules.  4.  Nonacute findings as above.    SOBEIDA PAUL MD         SYSTEM ID:  [id number]     CT CAP was personally reviewed and interpreted by me as above    Virtual Visit Details  Type of service:  Video Visit   Video Start Time: 8:25 AM  Video End Time:8:40  AM  Originating Location (pt. Location): Home  Distant Location (provider location):  Off-site  Platform used for Video Visit: Steven

## 2024-12-19 NOTE — LETTER
12/19/2024      Ijeoma Shah  [address] [facility] [address]      Dear Colleague,    Thank you for referring your patient, Ijeoma Shah, to the [facility]. Please see a copy of my visit note below.        [facility]  [address] [facility] [address] [facility] [address]  Phone: [phone]  Fax: [phone]    PATIENT NAME: Ijeoma Shah  MRN # [id number]   DATE OF VISIT: December 19, 2024  YOB: 1950     CANCER TYPE: SCC lung, poorly differentiated  STAGE: pT4N0 (IIIA)  ECOG PS: 0     PD-L1: TPS 15% on [id number] lobectomy, <1% on [id number] (LLL bx)  Lung panel: SMO R562Q on LLL bx, negative for fusions on lobectomy specimen.   NGS: N/A    ASSESSMENT AND PLAN  SCC lung, [id number], IIIA, R0 resection, discrepant PD-L1: Nearly 4 years after completion of therapy. Waxing and waning nodules over the years, but nothing worrisome. RML nodule looks the same to me as compared to Jan 2024. CT chest abd in 6 months, then annual CT chest non contrast if the next CT is stable. Discussed getting these scans through PCP or us, potentially long-term follow up clinic, etc. We'll decide next time.       IPMN?: Small, ~ 1 cm, will re-evaluate on next CT, then get MRI about 3-6 months after that to radiographically confirm IPMN. If IPMN, will follow surveillance guidelines through GI or PCP.      Microscopic hematuria, dysuria: Urine cytology and FISH negative in the past, met with Dr. Leyva in Urology, discussed cystoscopy vs monitoring; Salima had opted for monitoring, not active recently.    The longitudinal plan of care for the condition(s) below were addressed during this visit. Due to the added complexity in care, I will continue to support Salima in the subsequent management of this condition(s) and with the ongoing continuity of care of this condition(s): SCC lung      30 minutes spent by me on the date of the  encounter doing chart review, review of test results, interpretation of tests, patient visit, documentation, orders    Eneida De Leon MD  Associate Professor of Medicine  Hematology, Oncology and Transplantation    SUBJECTIVE  Salima returns for routine follow up  Doing well  Felt much better the day after ablation for afib.  No other new issues. Trying to remain active despite cold weather, etc.     CANCER SUMMARY  7/3/20  CT chest (screening). 6.7 cm LLL mass, 0.6 cm RML nodule, mediastinal and L hilar LNs  7/9/20 PET/CT. 7.1 x 5.6 cm LLL mass (SUV 19.6), post obstructive pneumonitis LLL inferior to the mass (SUV 2.8), 3.5 x 1.7 cm 4L node (SUV 5.9), L adrenal nodule 1.1 cm (SUV 4), indeterminate pulmonary nodules, pancreatic cysts, not hypermetabolic  7/27/20 Bronch, EBUS (Dr. Castro). 10R, 11R, 4R too small to biopsy. Stations 7, 4L, 11L negative for malignancy. LLL mass bx: SCC  8/12/20 Brain MRI negative  9/1/20 EUS to evaluate adrenal and 4L (Dr. Hogan). Both negative for malignancy. Adrenal with bland adrenal cells  10/22/20 L VATS, converted to thoracotomy, LLL lobectomy, MLND, R pulmonary arterioplasty (Dr. Sanabria, Dr. Davis). 8.3 cm poorly differentiated SCC, +angiolymphatic invasion  12/3/20~2/25/21 Cisplatin gemcitabine x 4. C2D8 delayed 1 week due to neutropenia, added neulasta    PAST MEDICAL HISTORY  SCC as above  Afib with RVR. Initially when she presented for surgery 10/1, then post-op in 10/2020. DCCV x 2 10/23/20, ibutilide --> NSR, dig load, amio gtt. TTE 10/16/20 unremarkable. Seeing Dr. Monsivais in Cardiology. Pulmonary vein isolation 8/2024.   Low back pain, R radiculopathy, L5-S1 interlaminar lumbar epidural steroid injection 11/15/21  Sp cholecystectomy  Dyslipidemia  H/o bronchitis  Nose surgery  Tubal ligation  Adrenal nodule, bx 9/1/20, negative for malignancy  Cholelithiasis  Cataract repair 2011?  Colon polyps on colonoscopy Jan 2023.      PFT 8/20/20 (preop). FEV1 1.33 (58%), FVC  1.76 (60%), DLCO 17.72 (90%)  Colonoscopy 1/25/23. Hyperplastic polyp and tubular adenoma    CURRENT OUTPATIENT MEDICATIONS  Reviewed    ALLERGIES  Allergies   Allergen Reactions     Bee Venom Hives     Hot and sweating      Amiodarone Itching     Lasix [Furosemide] Itching     PHYSICAL EXAM  No vitals due to video visit   GEN: NAD  HEENT: EOMI, no icterus, injection or pallor  NEURO: alert    Remainder of physical exam deferred due to public health emergency and limitations of video visit.    LABORATORY AND IMAGING STUDIES    Labs 12/16/24 were independently reviewed and interpreted by me  CBC pd and CMP normal     CT Chest/Abdomen/Pelvis w Contrast  Narrative: CT CHEST/ABDOMEN/PELVIS WITH CONTRAST 12/16/2024 12:03 PM    CLINICAL HISTORY: Stage III lung cancer, status post resection,  chemoradiation; evaluate for recurrence. 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 Pleural effusion.    TECHNIQUE: CT scan of the chest, abdomen, and pelvis was performed  following injection of IV contrast. Multiplanar reformats were  obtained. Dose reduction techniques were used.   CONTRAST: 79 mL Isovue-370    COMPARISON: 6/12/2024, 1/8/2024, 10/3/2023    FINDINGS:   LUNGS AND PLEURA: Similar-appearing subcentimeter pulmonary nodules.  Largest pulmonary nodule is again seen in the anterior right mid lung  at 6 x 4 mm (4-165), previously 5 x 4 mm. A couple benign densely  calcified pulmonary granulomas are present. No new or enlarging  pulmonary nodules. There has been interval resolution of the  previously seen left-sided pleural effusion. Postsurgical change of  the left lung consistent with left lower lobectomy. Minimal scarring  and atelectasis are seen in the posterior medial left lower lung. No  signs of pneumonia or pneumothorax. The large airways are patent.  Minimal biapical pleuroparenchymal scarring.    MEDIASTINUM/AXILLAE: Heart size is normal. No pericardial fluid.  Thoracic aorta is normal in course  and caliber. A few prominent, but  not technically enlarged, mediastinal lymph nodes are noted. Mild  coronary artery atherosclerosis.    HEPATOBILIARY: Simple-appearing left hepatic cysts. Tiny low-density  foci in the right hepatic lobe, too small to characterize but most  likely representing cysts. No specific follow-up is recommended.  Gallbladder is absent. Common bile duct is slightly enlarged, likely  secondary to postcholecystectomy reservoir state, which is similar to  comparison. No obstructing stone or mass is seen.    PANCREAS: Small low-density cystic foci are seen in the head of the  pancreas measuring up to 10 mm in greatest dimension, similar to  comparison, previously 13 mm when remeasured at a similar level for  consistency. These cystic foci are favored to be IPMNs and can be  monitored on future surveillance imaging. No dilation of the  pancreatic duct. No inflammatory changes in the pancreas.    SPLEEN: Normal.    ADRENAL GLANDS: There is similar-appearing thickening of the left  adrenal gland without discrete nodule or mass. No follow-up is  necessary. Right adrenal gland is normal in appearance.    KIDNEYS/BLADDER: There is mild urinary bladder wall thickening, which  is likely secondary to underdistention. The kidneys enhance  homogenously and symmetrically. No cystic or solid renal mass. No  nephrolithiasis or hydronephrosis.    BOWEL: Diverticulosis in the colon. No acute inflammatory change. No  obstruction.     PELVIC ORGANS: No pelvic mass or free pelvic fluid.    ADDITIONAL FINDINGS: Moderate nonaneurysmal atherosclerosis of the  abdominal aorta and iliac arteries.    MUSCULOSKELETAL: Multilevel degenerative changes in the spine. No  aggressive-appearing lytic or blastic osseous lesions.  Impression: IMPRESSION:  1.  Status post left lower lobectomy.  2.  Interval resolution of previously seen small volume left-sided  pleural effusion.  3.  Unchanged subcentimeter pulmonary nodules.  4.   Nonacute findings as above.    SOBEIDA PAUL MD         SYSTEM ID:  [id number]     CT CAP was personally reviewed and interpreted by me as above    Virtual Visit Details  Type of service:  Video Visit   Video Start Time: 8:25 AM  Video End Time:8:40 AM  Originating Location (pt. Location): Home  Distant Location (provider location):  Off-site  Platform used for Video Visit: [facility]               Again, thank you for allowing me to participate in the care of your patient.        Sincerely,        Eneida De Leon MD

## 2024-12-25 ENCOUNTER — PATIENT OUTREACH (OUTPATIENT)
Dept: CARE COORDINATION | Facility: CLINIC | Age: 74
End: 2024-12-25
Payer: MEDICARE

## 2024-12-30 ENCOUNTER — LAB (OUTPATIENT)
Dept: LAB | Facility: CLINIC | Age: 74
End: 2024-12-30
Payer: MEDICARE

## 2024-12-30 ENCOUNTER — ANTICOAGULATION THERAPY VISIT (OUTPATIENT)
Dept: ANTICOAGULATION | Facility: CLINIC | Age: 74
End: 2024-12-30

## 2024-12-30 DIAGNOSIS — Z79.01 LONG TERM CURRENT USE OF ANTICOAGULANT THERAPY: ICD-10-CM

## 2024-12-30 DIAGNOSIS — I48.0 PAROXYSMAL ATRIAL FIBRILLATION (H): Primary | ICD-10-CM

## 2024-12-30 DIAGNOSIS — I48.91 ATRIAL FIBRILLATION WITH RAPID VENTRICULAR RESPONSE (H): ICD-10-CM

## 2024-12-30 DIAGNOSIS — I48.0 PAROXYSMAL ATRIAL FIBRILLATION (H): ICD-10-CM

## 2024-12-30 LAB — INR BLD: 3.6 (ref 0.9–1.1)

## 2024-12-30 PROCEDURE — 85610 PROTHROMBIN TIME: CPT

## 2024-12-30 PROCEDURE — 36416 COLLJ CAPILLARY BLOOD SPEC: CPT

## 2024-12-30 NOTE — PROGRESS NOTES
ANTICOAGULATION MANAGEMENT     Ijeoma Shah 74 year old female is on warfarin with supratherapeutic INR result. (Goal INR 2.0-3.0)    Recent labs: (last 7 days)     12/30/24  1057   INR 3.6*       ASSESSMENT     Warfarin Lab Questionnaire    Warfarin Doses Last 7 Days      12/29/2024    10:55 AM   Dose in Tablet or Mg   TAB or MG? milligram (mg)     Pt Rptd Dose SUNDAY MONDAY TUESDAY WED THURS FRIDAY SATURDAY 12/29/2024  10:55 AM 7.5 10 10 7.5 10 10 10         12/29/2024   Warfarin Lab Questionnaire   Missed doses within past 14 days? No   Changes in diet or alcohol within past 14 days? No   Medication changes since last result? No   Injuries or illness since last result? No   New shortness of breath, severe headaches or sudden changes in vision since last result? No   Abnormal bleeding since last result? No   Upcoming surgery, procedure? No   Best number to call with results? [phone]     Previous result: Subtherapeutic  Additional findings: None, denies illness or concerns.  Resume lisinopril, patient did report taking wine over [place] (about 1-2 glasses) - pt does occasionally have Beer     PLAN     Recommended plan for no diet, medication or health factor changes affecting INR     Dosing Instructions: decrease your warfarin dose (7.7% change) with next INR in 2 weeks       Summary  As of 12/30/2024      Full warfarin instructions:  10 mg every Tue, Thu, Sat; 7.5 mg all other days   Next INR check:  1/13/2025               Telephone call with Salima who verbalizes understanding and agrees to plan    Lab visit scheduled    Education provided: Please call back if any changes to your diet, medications or how you've been taking warfarin  Symptom monitoring: monitoring for bleeding signs and symptoms, when to seek medical attention/emergency care, and if you hit your head or have a bad fall seek emergency care    Plan made per [facility] anticoagulation protocol    Stephie Yost  RN  12/30/2024  Anticoagulation Clinic  [facility] for routing messages: mirella [facility]  [facility] patient phone line: [phone]        _______________________________________________________________________     Anticoagulation Episode Summary       Current INR goal:  2.0-3.0   TTR:  47.0% (1 y)   Target end date:  Indefinite   Send INR reminders to:  [facility]    Indications    Paroxysmal atrial fibrillation (H) [I48.0]  Atrial fibrillation with rapid ventricular response (H) (Resolved) [I48.91]  Atrial fibrillation with rapid ventricular response (H) [I48.91]  Long term current use of anticoagulant therapy [Z79.01]             Comments:  RAJESH garibay             Anticoagulation Care Providers       Provider Role Specialty Phone number    Warren Pisano MD Referring Cardiovascular Disease [phone]    Jyoti Espinal MD Referring Family Medicine [phone]    Mary Godoy CNP Referring Critical Care [phone]    Janis Carr APRN CNP Referring Cardiovascular Disease [phone]

## 2025-01-13 ENCOUNTER — LAB (OUTPATIENT)
Dept: LAB | Facility: CLINIC | Age: 75
End: 2025-01-13
Payer: MEDICARE

## 2025-01-13 ENCOUNTER — ANTICOAGULATION THERAPY VISIT (OUTPATIENT)
Dept: ANTICOAGULATION | Facility: CLINIC | Age: 75
End: 2025-01-13

## 2025-01-13 DIAGNOSIS — I48.0 PAROXYSMAL ATRIAL FIBRILLATION (H): ICD-10-CM

## 2025-01-13 DIAGNOSIS — I48.91 ATRIAL FIBRILLATION WITH RAPID VENTRICULAR RESPONSE (H): ICD-10-CM

## 2025-01-13 DIAGNOSIS — Z79.01 LONG TERM CURRENT USE OF ANTICOAGULANT THERAPY: ICD-10-CM

## 2025-01-13 DIAGNOSIS — I48.0 PAROXYSMAL ATRIAL FIBRILLATION (H): Primary | ICD-10-CM

## 2025-01-13 LAB — INR BLD: 2.4 (ref 0.9–1.1)

## 2025-01-13 PROCEDURE — 36416 COLLJ CAPILLARY BLOOD SPEC: CPT

## 2025-01-13 PROCEDURE — 85610 PROTHROMBIN TIME: CPT

## 2025-01-13 NOTE — PROGRESS NOTES
ANTICOAGULATION MANAGEMENT     Ijeoma Shah 74 year old female is on warfarin with therapeutic INR result. (Goal INR 2.0-3.0)    Recent labs: (last 7 days)     01/13/25  1306   INR 2.4*       ASSESSMENT     Warfarin Lab Questionnaire    Warfarin Doses Last 7 Days      1/12/2025     9:18 AM   Dose in Tablet or Mg   TAB or MG? milligram (mg)     Pt Rptd Dose SUNDAY MONDAY TUESDAY WED THURS FRIDAY SATURDAY 1/12/2025   9:18 AM 7.5 7.5 10 7.5 10 7.5 10         1/12/2025   Warfarin Lab Questionnaire   Missed doses within past 14 days? No   Changes in diet or alcohol within past 14 days? No   Medication changes since last result? No   Injuries or illness since last result? No   New shortness of breath, severe headaches or sudden changes in vision since last result? No   Abnormal bleeding since last result? No   Upcoming surgery, procedure? No   Best number to call with results? [phone]     Previous result: Supratherapeutic  Additional findings: None       PLAN     Recommended plan for no diet, medication or health factor changes affecting INR     Dosing Instructions: Continue your current warfarin dose with next INR in 3 weeks       Summary  As of 1/13/2025      Full warfarin instructions:  10 mg every Tue, Thu, Sat; 7.5 mg all other days   Next INR check:  2/5/2025               Telephone call with Salima who verbalizes understanding and agrees to plan and who agrees to plan and repeated back plan correctly    Lab visit scheduled    Education provided: Contact [phone] with any changes, questions or concerns.     Plan made per [facility] anticoagulation protocol    Yoselin Lockett, RN  1/13/2025  Anticoagulation Clinic  [employer] for routing messages: mirella NUNEZ [facility] patient phone line: [phone]        _______________________________________________________________________     Anticoagulation Episode Summary       Current INR goal:  2.0-3.0   TTR:  45.1% (1 y)   Target end date:   Indefinite   Send INR reminders to:  [facility]    Indications    Paroxysmal atrial fibrillation (H) [I48.0]  Atrial fibrillation with rapid ventricular response (H) (Resolved) [I48.91]  Atrial fibrillation with rapid ventricular response (H) [I48.91]  Long term current use of anticoagulant therapy [Z79.01]             Comments:  RAJESH garibay             Anticoagulation Care Providers       Provider Role Specialty Phone number    Warren Pisano MD Referring Cardiovascular Disease [phone]    Jyoti Espinal MD Referring Family Medicine [phone]    Mary Godoy CNP Referring Critical Care [phone]    Janis Carr APRN CNP Referring Cardiovascular Disease [phone]

## 2025-02-05 ENCOUNTER — LAB (OUTPATIENT)
Dept: LAB | Facility: CLINIC | Age: 75
End: 2025-02-05
Payer: MEDICARE

## 2025-02-05 ENCOUNTER — ANTICOAGULATION THERAPY VISIT (OUTPATIENT)
Dept: ANTICOAGULATION | Facility: CLINIC | Age: 75
End: 2025-02-05

## 2025-02-05 DIAGNOSIS — Z79.01 LONG TERM CURRENT USE OF ANTICOAGULANT THERAPY: ICD-10-CM

## 2025-02-05 DIAGNOSIS — I48.0 PAROXYSMAL ATRIAL FIBRILLATION (H): ICD-10-CM

## 2025-02-05 DIAGNOSIS — I48.91 ATRIAL FIBRILLATION WITH RAPID VENTRICULAR RESPONSE (H): ICD-10-CM

## 2025-02-05 DIAGNOSIS — I48.0 PAROXYSMAL ATRIAL FIBRILLATION (H): Primary | ICD-10-CM

## 2025-02-05 LAB — INR BLD: 2.4 (ref 0.9–1.1)

## 2025-02-05 PROCEDURE — 36416 COLLJ CAPILLARY BLOOD SPEC: CPT

## 2025-02-05 PROCEDURE — 85610 PROTHROMBIN TIME: CPT

## 2025-02-05 NOTE — PROGRESS NOTES
ANTICOAGULATION MANAGEMENT     Ijeoma Shah 74 year old female is on warfarin with therapeutic INR result. (Goal INR 2.0-3.0)    Recent labs: (last 7 days)     02/05/25  1133   INR 2.4*       ASSESSMENT     Warfarin Lab Questionnaire    Warfarin Doses Last 7 Days      2/4/2025     7:31 AM   Dose in Tablet or Mg   TAB or MG? milligram (mg)     Pt Rptd Dose SUNDAY MONDAY TUESDAY WED THURS FRIDAY SATURDAY 2/4/2025   7:31 AM 7.5 7.5 10 7.5 10 7.5 10         2/4/2025   Warfarin Lab Questionnaire   Missed doses within past 14 days? No   Changes in diet or alcohol within past 14 days? No   Medication changes since last result? No   Injuries or illness since last result? No   New shortness of breath, severe headaches or sudden changes in vision since last result? No   Abnormal bleeding since last result? No   Upcoming surgery, procedure? No   Best number to call with results? [phone]     Previous result: Therapeutic last visit; previously outside of goal range  Additional findings: None       PLAN     Recommended plan for no diet, medication or health factor changes affecting INR     Dosing Instructions: Continue your current warfarin dose with next INR in 4 weeks       Summary  As of 2/5/2025      Full warfarin instructions:  10 mg every Tue, Thu, Sat; 7.5 mg all other days   Next INR check:  3/5/2025               Telephone call with Salima who verbalizes understanding and agrees to plan    Lab visit scheduled    Education provided: Goal range and lab monitoring: goal range and significance of current result    Plan made per [facility] anticoagulation protocol    eBcky Capellan, RN  2/5/2025  Anticoagulation Clinic  [employer] for routing messages: mirella NUNEZ [facility] patient phone line: [phone]        _______________________________________________________________________     Anticoagulation Episode Summary       Current INR goal:  2.0-3.0   TTR:  47.8% (1 y)   Target end date:  Indefinite   Send  INR reminders to:  [facility]    Indications    Paroxysmal atrial fibrillation (H) [I48.0]  Atrial fibrillation with rapid ventricular response (H) (Resolved) [I48.91]  Atrial fibrillation with rapid ventricular response (H) [I48.91]  Long term current use of anticoagulant therapy [Z79.01]             Comments:  RAJESH garibay             Anticoagulation Care Providers       Provider Role Specialty Phone number    Warren Pisano MD Referring Cardiovascular Disease [phone]    Jyoti Espinal MD Referring Family Medicine [phone]    Mary Godoy CNP Referring Critical Care [phone]    Janis Carr APRN CNP Referring Cardiovascular Disease [phone]

## 2025-02-16 SDOH — HEALTH STABILITY: PHYSICAL HEALTH: ON AVERAGE, HOW MANY DAYS PER WEEK DO YOU ENGAGE IN MODERATE TO STRENUOUS EXERCISE (LIKE A BRISK WALK)?: 2 DAYS

## 2025-02-16 SDOH — HEALTH STABILITY: PHYSICAL HEALTH: ON AVERAGE, HOW MANY MINUTES DO YOU ENGAGE IN EXERCISE AT THIS LEVEL?: 20 MIN

## 2025-02-16 ASSESSMENT — SOCIAL DETERMINANTS OF HEALTH (SDOH): HOW OFTEN DO YOU GET TOGETHER WITH FRIENDS OR RELATIVES?: TWICE A WEEK

## 2025-02-17 ENCOUNTER — OFFICE VISIT (OUTPATIENT)
Dept: FAMILY MEDICINE | Facility: CLINIC | Age: 75
End: 2025-02-17
Payer: MEDICARE

## 2025-02-17 VITALS
HEART RATE: 111 BPM | OXYGEN SATURATION: 99 % | DIASTOLIC BLOOD PRESSURE: 80 MMHG | RESPIRATION RATE: 20 BRPM | TEMPERATURE: 97.9 F | SYSTOLIC BLOOD PRESSURE: 122 MMHG | BODY MASS INDEX: 28.51 KG/M2 | HEIGHT: 64 IN | WEIGHT: 167 LBS

## 2025-02-17 DIAGNOSIS — Z78.0 ASYMPTOMATIC POSTMENOPAUSAL STATUS: ICD-10-CM

## 2025-02-17 DIAGNOSIS — Z00.00 ENCOUNTER FOR MEDICARE ANNUAL WELLNESS EXAM: ICD-10-CM

## 2025-02-17 DIAGNOSIS — L30.9 DERMATITIS: ICD-10-CM

## 2025-02-17 DIAGNOSIS — J42 CHRONIC BRONCHITIS, UNSPECIFIED CHRONIC BRONCHITIS TYPE (H): ICD-10-CM

## 2025-02-17 DIAGNOSIS — I10 BENIGN ESSENTIAL HYPERTENSION: ICD-10-CM

## 2025-02-17 DIAGNOSIS — Z91.030 BEE STING ALLERGY: ICD-10-CM

## 2025-02-17 DIAGNOSIS — I48.91 ATRIAL FIBRILLATION WITH RAPID VENTRICULAR RESPONSE (H): ICD-10-CM

## 2025-02-17 DIAGNOSIS — E78.2 MIXED HYPERLIPIDEMIA: Primary | ICD-10-CM

## 2025-02-17 DIAGNOSIS — B35.3 TINEA PEDIS OF BOTH FEET: ICD-10-CM

## 2025-02-17 DIAGNOSIS — C34.32 SQUAMOUS CELL CARCINOMA OF BRONCHUS IN LEFT LOWER LOBE (H): ICD-10-CM

## 2025-02-17 DIAGNOSIS — Z13.1 SCREENING FOR DIABETES MELLITUS: ICD-10-CM

## 2025-02-17 DIAGNOSIS — N18.31 STAGE 3A CHRONIC KIDNEY DISEASE (H): ICD-10-CM

## 2025-02-17 DIAGNOSIS — Z12.31 VISIT FOR SCREENING MAMMOGRAM: ICD-10-CM

## 2025-02-17 PROBLEM — I11.0 HYPERTENSIVE HEART DISEASE WITH HEART FAILURE (H): Status: RESOLVED | Noted: 2025-02-17 | Resolved: 2025-02-17

## 2025-02-17 PROBLEM — R59.0 MEDIASTINAL ADENOPATHY: Status: RESOLVED | Noted: 2020-07-03 | Resolved: 2025-02-17

## 2025-02-17 PROBLEM — E83.42 HYPOMAGNESEMIA: Status: RESOLVED | Noted: 2020-11-17 | Resolved: 2025-02-17

## 2025-02-17 PROBLEM — I11.0 HYPERTENSIVE HEART DISEASE WITH HEART FAILURE (H): Status: ACTIVE | Noted: 2025-02-17

## 2025-02-17 PROCEDURE — G2211 COMPLEX E/M VISIT ADD ON: HCPCS | Performed by: PHYSICIAN ASSISTANT

## 2025-02-17 PROCEDURE — G0439 PPPS, SUBSEQ VISIT: HCPCS | Performed by: PHYSICIAN ASSISTANT

## 2025-02-17 PROCEDURE — 99214 OFFICE O/P EST MOD 30 MIN: CPT | Mod: 25 | Performed by: PHYSICIAN ASSISTANT

## 2025-02-17 RX ORDER — TRIAMCINOLONE ACETONIDE 1 MG/G
OINTMENT TOPICAL 2 TIMES DAILY
Qty: 80 G | Refills: 1 | Status: SHIPPED | OUTPATIENT
Start: 2025-02-17

## 2025-02-17 RX ORDER — LISINOPRIL 10 MG/1
10 TABLET ORAL DAILY
Qty: 90 TABLET | Refills: 3 | Status: SHIPPED | OUTPATIENT
Start: 2025-02-17

## 2025-02-17 RX ORDER — PRAVASTATIN SODIUM 10 MG
10 TABLET ORAL AT BEDTIME
Qty: 90 TABLET | Refills: 3 | Status: SHIPPED | OUTPATIENT
Start: 2025-02-17

## 2025-02-17 RX ORDER — EPINEPHRINE 0.3 MG/.3ML
0.3 INJECTION SUBCUTANEOUS PRN
Qty: 2 EACH | Refills: 1 | Status: SHIPPED | OUTPATIENT
Start: 2025-02-17

## 2025-02-17 RX ORDER — KETOCONAZOLE 20 MG/G
CREAM TOPICAL DAILY
Qty: 60 G | Refills: 1 | Status: SHIPPED | OUTPATIENT
Start: 2025-02-17

## 2025-02-17 ASSESSMENT — PAIN SCALES - GENERAL: PAINLEVEL_OUTOF10: NO PAIN (0)

## 2025-02-17 NOTE — PATIENT INSTRUCTIONS
Patient Education   Preventive Care Advice   This is general advice given by our system to help you stay healthy. However, your care team may have specific advice just for you. Please talk to your care team about your preventive care needs.  Nutrition  Eat 5 or more servings of fruits and vegetables each day.  Try wheat bread, brown rice and whole grain pasta (instead of white bread, rice, and pasta).  Get enough calcium and vitamin D. Check the label on foods and aim for 100% of the RDA (recommended daily allowance).  Lifestyle  Exercise at least 150 minutes each week  (30 minutes a day, 5 days a week).  Do muscle strengthening activities 2 days a week. These help control your weight and prevent disease.  No smoking.  Wear sunscreen to prevent skin cancer.  Have a dental exam and cleaning every 6 months.  Yearly exams  See your health care team every year to talk about:  Any changes in your health.  Any medicines your care team has prescribed.  Preventive care, family planning, and ways to prevent chronic diseases.  Shots (vaccines)   HPV shots (up to age 26), if you've never had them before.  Hepatitis B shots (up to age 59), if you've never had them before.  COVID-19 shot: Get this shot when it's due.  Flu shot: Get a flu shot every year.  Tetanus shot: Get a tetanus shot every 10 years.  Pneumococcal, hepatitis A, and RSV shots: Ask your care team if you need these based on your risk.  Shingles shot (for age 50 and up)  General health tests  Diabetes screening:  Starting at age 35, Get screened for diabetes at least every 3 years.  If you are younger than age 35, ask your care team if you should be screened for diabetes.  Cholesterol test: At age 39, start having a cholesterol test every 5 years, or more often if advised.  Bone density scan (DEXA): At age 50, ask your care team if you should have this scan for osteoporosis (brittle bones).  Hepatitis C: Get tested at least once in your life.  STIs (sexually  transmitted infections)  Before age 24: Ask your care team if you should be screened for STIs.  After age 24: Get screened for STIs if you're at risk. You are at risk for STIs (including HIV) if:  You are sexually active with more than one person.  You don't use condoms every time.  You or a partner was diagnosed with a sexually transmitted infection.  If you are at risk for HIV, ask about PrEP medicine to prevent HIV.  Get tested for HIV at least once in your life, whether you are at risk for HIV or not.  Cancer screening tests  Cervical cancer screening: If you have a cervix, begin getting regular cervical cancer screening tests starting at age 21.  Breast cancer scan (mammogram): If you've ever had breasts, begin having regular mammograms starting at age 40. This is a scan to check for breast cancer.  Colon cancer screening: It is important to start screening for colon cancer at age 45.  Have a colonoscopy test every 10 years (or more often if you're at risk) Or, ask your provider about stool tests like a FIT test every year or Cologuard test every 3 years.  To learn more about your testing options, visit:   .  For help making a decision, visit:   https://bit.ly/cm75909.  Prostate cancer screening test: If you have a prostate, ask your care team if a prostate cancer screening test (PSA) at age 55 is right for you.  Lung cancer screening: If you are a current or former smoker ages 50 to 80, ask your care team if ongoing lung cancer screenings are right for you.  For informational purposes only. Not to replace the advice of your health care provider. Copyright   2023 [employer]. All rights reserved. Clinically reviewed by the [facility] Transitions Program. [employer] [id number] - REV 01/24.

## 2025-02-17 NOTE — PROGRESS NOTES
Preventive Care Visit  [facility]  Justin Salas PA-C, Family Medicine  Feb 17, 2025      Assessment & Plan   Mixed hyperlipidemia  Due for recheck and refill.    - Lipid panel reflex to direct LDL Fasting; Future  - pravastatin (PRAVACHOL) 10 MG tablet; Take 1 tablet (10 mg) by mouth at bedtime.  - Comprehensive metabolic panel; Future    Benign essential hypertension  Normotensive here and at home. Continue current regimen. Refilled.   - lisinopril (ZESTRIL) 10 MG tablet; Take 1 tablet (10 mg) by mouth daily.  - Comprehensive metabolic panel; Future    Atrial fibrillation with rapid ventricular response (H)  S/p ablation without recurrence of symptoms. Rhythm is regular today. Continue to follow-up with Cardiology and monitor symptoms closely.     Stage 3a chronic kidney disease (H)  Estimated Creatinine Clearance: 55.5 mL/min (based on SCr of 0.9 mg/dL).   Considered this in her treatment plan above. No indication to change any medication therapies at this time. Recheck in 6 months.     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 Monitoring with routine CT scans. Last done 2 months ago. Has follow-up every 6 months.      Chronic bronchitis, unspecified chronic bronchitis type (H)  Stable without exacerbations. Not on any controller medicines. Continue to monitor.     Dermatitis  Stable on current regimen. Refill sent.   - triamcinolone (KENALOG) 0.1 % external ointment; Apply topically 2 times daily. To heels as needed    Tinea pedis of both feet  Stable on current regimen. Refill sent.   - ketoconazole (NIZORAL) 2 % external cream; Apply topically daily. For rash on feet.    Bee sting allergy  Stable on current regimen. Refill sent.   - EPINEPHrine (ANY BX GENERIC EQUIV) 0.3 MG/0.3ML injection 2-pack; Inject 0.3 mLs (0.3 mg) into the muscle as needed for anaphylaxis. May repeat  "one time in 5-15 minutes if response to initial dose is inadequate.    Encounter for Medicare annual wellness exam  74 yr old here for Medicare Wellness exam. Last MWE done 1 year(s) ago. Discussed preventative screenings which are updated below. Counseled on immunizations and healthy lifestyle. Follow-up in 1 year for repeat physical exam.     Screening for diabetes mellitus  Due for screening.   - Hemoglobin A1c; Future    Visit for screening mammogram  Due for screening.   - MA Screening Bilateral w/ [place]; Future    Asymptomatic postmenopausal status  Due for screening.   - DEXA HIP/PELVIS/SPINE - Future; Future    Patient has been advised of split billing requirements and indicates understanding: Yes    BMI  Estimated body mass index is 28.67 kg/m  as calculated from the following:    Height as of this encounter: 1.626 m (5' 4\").    Weight as of this encounter: 75.8 kg (167 lb).     Counseling  Appropriate preventive services were addressed with this patient via screening, questionnaire, or discussion as appropriate for fall prevention, nutrition, physical activity, Tobacco-use cessation, social engagement, weight loss and cognition.  Checklist reviewing preventive services available has been given to the patient.  Reviewed patient's diet, addressing concerns and/or questions.   She is at risk for lack of exercise and has been provided with information to increase physical activity for the benefit of her well-being.   The patient was instructed to see the dentist every 6 months.     Samantha Borrego is a 74 year old, presenting for the following:  Physical        2/17/2025    11:22 AM   Additional Questions   Roomed by [facility]    Health Care Directive  Patient does not have a Health Care Directive: Discussed advance care planning with patient; information given to patient to review.      2/16/2025   General Health   How would you rate your overall physical health? (!) FAIR   Feel stress (tense, " anxious, or unable to sleep) Only a little   (!) STRESS CONCERN      2/16/2025   Nutrition   Diet: Regular (no restrictions)         2/16/2025   Exercise   Days per week of moderate/strenous exercise 2 days   Average minutes spent exercising at this level 20 min   (!) EXERCISE CONCERN      2/16/2025   Social Factors   Frequency of gathering with friends or relatives Twice a week   Worry food won't last until get money to buy more No   Food not last or not have enough money for food? No   Do you have housing? (Housing is defined as stable permanent housing and does not include staying ouside in a car, in a tent, in an abandoned building, in an overnight shelter, or couch-surfing.) Yes   Are you worried about losing your housing? No   Lack of transportation? No   Unable to get utilities (heat,electricity)? Yes   Want help with housing or utility concern? No   (!) FINANCIAL RESOURCE STRAIN CONCERN      2/16/2025   Fall Risk   Fallen 2 or more times in the past year? No   Trouble with walking or balance? No          2/16/2025   Activities of Daily Living- Home Safety   Needs help with the following daily activites None of the above   Safety concerns in the home None of the above         2/16/2025   Dental   Dentist two times every year? (!) NO         2/16/2025   Hearing Screening   Hearing concerns? None of the above         2/16/2025   Driving Risk Screening   Patient/family members have concerns about driving No         2/16/2025   General Alertness/Fatigue Screening   Have you been more tired than usual lately? No         2/16/2025   Urinary Incontinence Screening   Bothered by leaking urine in past 6 months No            Today's PHQ-2 Score:       2/16/2025    12:24 PM   PHQ-2 ( 1999 Pf[employer])   Q1: Little interest or pleasure in doing things 0   Q2: Feeling down, depressed or hopeless 0   PHQ-2 Score 0    Q1: Little interest or pleasure in doing things Not at all   Q2: Feeling down, depressed or hopeless Not at all    PHQ-2 Score 0       Patient-reported           2/16/2025   Substance Use   Alcohol more than 3/day or more than 7/wk No   Do you have a current opioid prescription? No   How severe/bad is pain from 1 to 10? 0/10 (No Pain)   Do you use any other substances recreationally? No     Social History     Tobacco Use    Smoking status: Former     Current packs/day: 0.00     Average packs/day: 1 pack/day for 33.0 years (33.0 ttl pk-yrs)     Types: Cigarettes     Start date: 12/13/1981     Quit date: 12/13/2014     Years since quitting: 10.1     Passive exposure: Current (ocassional per pt)    Smokeless tobacco: Never   Vaping Use    Vaping status: Never Used   Substance Use Topics    Alcohol use: Yes     Alcohol/week: 1.7 - 2.5 standard drinks of alcohol     Types: 2 - 3 Standard drinks or equivalent per week     Comment: Beer once in a while    Drug use: No           12/27/2022   LAST FHS-7 RESULTS   1st degree relative breast or ovarian cancer No   Any relative bilateral breast cancer No   Any male have breast cancer No   Any ONE woman have BOTH breast AND ovarian cancer No   Any woman with breast cancer before 50yrs No   2 or more relatives with breast AND/OR ovarian cancer No   2 or more relatives with breast AND/OR bowel cancer No          ASCVD Risk   The 10-year ASCVD risk score (Breanna REICH, et al., 2019) is: 16.3%    Values used to calculate the score:      Age: 74 years      Sex: Female      Is Non- : No      Diabetic: No      Tobacco smoker: No      Systolic Blood Pressure: 122 mmHg      Is BP treated: Yes      HDL Cholesterol: 86 mg/dL      Total Cholesterol: 297 mg/dL        Reviewed and updated as needed this visit by Provider   Tobacco  Allergies  Meds  Problems  Med Hx  Surg Hx  Fam Hx            Current providers sharing in care for this patient include:  Patient Care Team:  Justin Salas PA-C as PCP - General (Family Medicine)  Edin Castro MD as  "Referring Physician (Pulmonary Disease)  Andrea Sanabria MD as MD (Thoracic Surgery)  Eneida De Leon MD as MD (Hematology & Oncology)  Michelle Carlisle, RN as Specialty Care Coordinator (Cardiology)  Alexa Goldstein MD (Cardiovascular Disease)  Rogelio Rodriguez, RN as Registered Nurse  Justin Salas PA-C as Assigned PCP  Nasim Monsivais MD as Assigned Heart and Vascular Provider  Eneida De Leon MD as Assigned Cancer Care Provider    The following health maintenance items are reviewed in Epic and correct as of today:  Health Maintenance   Topic Date Due    DEXA  Never done    HF ACTION PLAN  Never done    COPD ACTION PLAN  Never done    RSV VACCINE (1 - Risk 60-74 years 1-dose series) Never done    DTAP/TDAP/TD IMMUNIZATION (2 - Td or Tdap) 08/28/2022    INFLUENZA VACCINE (1) 09/01/2024    COVID-19 Vaccine (3 - 2024-25 season) 09/01/2024    MAMMO SCREENING  12/27/2024    BMP  06/16/2025    LIPID  10/10/2025    ALT  12/16/2025    CBC  12/16/2025    MEDICARE ANNUAL WELLNESS VISIT  02/17/2026    ANNUAL REVIEW OF HM ORDERS  02/17/2026    FALL RISK ASSESSMENT  02/17/2026    GLUCOSE  12/16/2027    COLORECTAL CANCER SCREENING  01/25/2028    ADVANCE CARE PLANNING  06/02/2028    TSH W/FREE T4 REFLEX  Completed    SPIROMETRY  Completed    HEPATITIS C SCREENING  Completed    PHQ-2 (once per calendar year)  Completed    Pneumococcal Vaccine: 50+ Years  Completed    ZOSTER IMMUNIZATION  Completed    HPV IMMUNIZATION  Aged Out    MENINGITIS IMMUNIZATION  Aged Out    LUNG CANCER SCREENING  Discontinued     Review of Systems  See HPI      Objective    Exam  /80 (BP Location: Right arm)   Pulse 111   Temp 97.9  F (36.6  C) (Tympanic)   Resp 20   Ht 1.626 m (5' 4\")   Wt 75.8 kg (167 lb)   LMP 01/22/2002   SpO2 99%   BMI 28.67 kg/m     Estimated body mass index is 28.67 kg/m  as calculated from the following:    Height as of this encounter: 1.626 m (5' 4\").    Weight as of this encounter: 75.8 kg (167 lb).    Physical " Exam  Constitutional: healthy, alert, and no distress  Head: Normocephalic. Atraumatic  Eyes: No conjunctival injection, sclera anicteric  Neck: supple, no thyromegaly, nodules or asymmetry of the thyroid. No cervical LAD.  Cardiovascular: RRR. No murmurs, clicks, gallops, or rubs. No peripheral edema.   Respiratory: No resp distress. Lungs CTAB bilaterally.   Musculoskeletal: extremities normal- no gross deformities noted, and normal muscle tone  Skin: no suspicious lesions or rashes  Neurologic: Gait normal. CN 2-12 grossly intact  Psychiatric: mentation appears normal and affect normal/bright         2/17/2025   Mini Cog   Clock Draw Score 0 Abnormal   3 Item Recall 2 objects recalled   Mini Cog Total Score 2          Physician Attestation   I, Justin Salsa PA-C, was present with the medical/TAYLOR student who participated in the service and in the documentation of the note.  I have verified the history and personally performed the physical exam and medical decision making.  I agree with the assessment and plan of care as documented in the note.      Justin Salas PA-C

## 2025-02-18 ENCOUNTER — PATIENT OUTREACH (OUTPATIENT)
Dept: CARE COORDINATION | Facility: CLINIC | Age: 75
End: 2025-02-18
Payer: MEDICARE

## 2025-03-02 ENCOUNTER — HEALTH MAINTENANCE LETTER (OUTPATIENT)
Age: 75
End: 2025-03-02

## 2025-03-05 ENCOUNTER — ANTICOAGULATION THERAPY VISIT (OUTPATIENT)
Dept: ANTICOAGULATION | Facility: CLINIC | Age: 75
End: 2025-03-05

## 2025-03-05 ENCOUNTER — LAB (OUTPATIENT)
Dept: LAB | Facility: CLINIC | Age: 75
End: 2025-03-05
Payer: MEDICARE

## 2025-03-05 DIAGNOSIS — I48.0 PAROXYSMAL ATRIAL FIBRILLATION (H): ICD-10-CM

## 2025-03-05 DIAGNOSIS — E78.2 MIXED HYPERLIPIDEMIA: ICD-10-CM

## 2025-03-05 DIAGNOSIS — Z79.01 LONG TERM CURRENT USE OF ANTICOAGULANT THERAPY: ICD-10-CM

## 2025-03-05 DIAGNOSIS — I48.0 PAROXYSMAL ATRIAL FIBRILLATION (H): Primary | ICD-10-CM

## 2025-03-05 DIAGNOSIS — I10 BENIGN ESSENTIAL HYPERTENSION: ICD-10-CM

## 2025-03-05 DIAGNOSIS — I48.91 ATRIAL FIBRILLATION WITH RAPID VENTRICULAR RESPONSE (H): ICD-10-CM

## 2025-03-05 DIAGNOSIS — Z13.1 SCREENING FOR DIABETES MELLITUS: ICD-10-CM

## 2025-03-05 LAB
ALBUMIN SERPL BCG-MCNC: 4.4 G/DL (ref 3.5–5.2)
ALP SERPL-CCNC: 91 U/L (ref 40–150)
ALT SERPL W P-5'-P-CCNC: 24 U/L (ref 0–50)
ANION GAP SERPL CALCULATED.3IONS-SCNC: 9 MMOL/L (ref 7–15)
AST SERPL W P-5'-P-CCNC: 29 U/L (ref 0–45)
BILIRUB SERPL-MCNC: 0.3 MG/DL
BUN SERPL-MCNC: 21.6 MG/DL (ref 8–23)
CALCIUM SERPL-MCNC: 9.5 MG/DL (ref 8.8–10.4)
CHLORIDE SERPL-SCNC: 97 MMOL/L (ref 98–107)
CHOLEST SERPL-MCNC: 247 MG/DL
CREAT SERPL-MCNC: 0.92 MG/DL (ref 0.51–0.95)
EGFRCR SERPLBLD CKD-EPI 2021: 65 ML/MIN/1.73M2
EST. AVERAGE GLUCOSE BLD GHB EST-MCNC: 120 MG/DL
FASTING STATUS PATIENT QL REPORTED: NO
FASTING STATUS PATIENT QL REPORTED: NO
GLUCOSE SERPL-MCNC: 134 MG/DL (ref 70–99)
HBA1C MFR BLD: 5.8 % (ref 0–5.6)
HCO3 SERPL-SCNC: 29 MMOL/L (ref 22–29)
HDLC SERPL-MCNC: 96 MG/DL
INR BLD: 2.5 (ref 0.9–1.1)
LDLC SERPL CALC-MCNC: 123 MG/DL
NONHDLC SERPL-MCNC: 151 MG/DL
POTASSIUM SERPL-SCNC: 4.5 MMOL/L (ref 3.4–5.3)
PROT SERPL-MCNC: 6.9 G/DL (ref 6.4–8.3)
SODIUM SERPL-SCNC: 135 MMOL/L (ref 135–145)
TRIGL SERPL-MCNC: 140 MG/DL

## 2025-03-05 PROCEDURE — 80061 LIPID PANEL: CPT

## 2025-03-05 PROCEDURE — 83036 HEMOGLOBIN GLYCOSYLATED A1C: CPT

## 2025-03-05 PROCEDURE — 80053 COMPREHEN METABOLIC PANEL: CPT

## 2025-03-05 PROCEDURE — 85610 PROTHROMBIN TIME: CPT

## 2025-03-05 PROCEDURE — 36415 COLL VENOUS BLD VENIPUNCTURE: CPT

## 2025-03-05 NOTE — PROGRESS NOTES
ANTICOAGULATION MANAGEMENT     Ijeoma Shah 74 year old female is on warfarin with therapeutic INR result. (Goal INR 2.0-3.0)    Recent labs: (last 7 days)     03/05/25  1147   INR 2.5*       ASSESSMENT     Warfarin Lab Questionnaire    Warfarin Doses Last 7 Days      3/4/2025     4:21 PM   Dose in Tablet or Mg   TAB or MG? milligram (mg)     Pt Rptd Dose TED MONDAY TUESDAY WED THURS FRIDAY SATURDAY   3/4/2025   4:21 PM 7.5 7.5 10 7.5 10 7.5 10         3/4/2025   Warfarin Lab Questionnaire   Missed doses within past 14 days? No   Changes in diet or alcohol within past 14 days? No   Medication changes since last result? No   Injuries or illness since last result? No   New shortness of breath, severe headaches or sudden changes in vision since last result? No   Abnormal bleeding since last result? No   Upcoming surgery, procedure? No     Previous result: Therapeutic last 2(+) visits  Additional findings: None       PLAN     Recommended plan for no diet, medication or health factor changes affecting INR     Dosing Instructions: Continue your current warfarin dose with next INR in 5 weeks       Summary  As of 3/5/2025      Full warfarin instructions:  10 mg every Tue, Thu, Sat; 7.5 mg all other days   Next INR check:  4/9/2025               Telephone call with Salima who verbalizes understanding and agrees to plan    Lab visit scheduled    Education provided: Goal range and lab monitoring: goal range and significance of current result    Plan made per [facility] anticoagulation protocol    Becky Capellan RN  3/5/2025  Anticoagulation Clinic  [employer] for routing messages: mirella [facility] patient phone line: [phone]        _______________________________________________________________________     Anticoagulation Episode Summary       Current INR goal:  2.0-3.0   TTR:  55.5% (1 y)   Target end date:  Indefinite   Send INR reminders to:  [facility]    Indications    Paroxysmal atrial  fibrillation (H) [I48.0]  Atrial fibrillation with rapid ventricular response (H) (Resolved) [I48.91]  Atrial fibrillation with rapid ventricular response (H) [I48.91]  Long term current use of anticoagulant therapy [Z79.01]             Comments:  RAJESH garibay             Anticoagulation Care Providers       Provider Role Specialty Phone number    Warren Pisano MD Referring Cardiovascular Disease [phone]    Jyoti Espinal MD Referring Family Medicine [phone]    Mary Godoy CNP Referring Critical Care [phone]    Janis Carr APRN CNP Referring Cardiovascular Disease [phone]

## 2025-03-13 ENCOUNTER — DOCUMENTATION ONLY (OUTPATIENT)
Dept: ANTICOAGULATION | Facility: CLINIC | Age: 75
End: 2025-03-13

## 2025-03-13 ENCOUNTER — OFFICE VISIT (OUTPATIENT)
Dept: FAMILY MEDICINE | Facility: CLINIC | Age: 75
End: 2025-03-13
Payer: MEDICARE

## 2025-03-13 ENCOUNTER — ANCILLARY PROCEDURE (OUTPATIENT)
Dept: GENERAL RADIOLOGY | Facility: CLINIC | Age: 75
End: 2025-03-13
Payer: MEDICARE

## 2025-03-13 VITALS
OXYGEN SATURATION: 99 % | SYSTOLIC BLOOD PRESSURE: 104 MMHG | TEMPERATURE: 99.1 F | RESPIRATION RATE: 16 BRPM | DIASTOLIC BLOOD PRESSURE: 64 MMHG | BODY MASS INDEX: 28 KG/M2 | HEIGHT: 64 IN | HEART RATE: 110 BPM | WEIGHT: 164 LBS

## 2025-03-13 DIAGNOSIS — R05.1 ACUTE COUGH: Primary | ICD-10-CM

## 2025-03-13 DIAGNOSIS — I48.0 PAROXYSMAL ATRIAL FIBRILLATION (H): ICD-10-CM

## 2025-03-13 DIAGNOSIS — Z23 NEED FOR TDAP VACCINATION: ICD-10-CM

## 2025-03-13 DIAGNOSIS — R06.2 WHEEZING: ICD-10-CM

## 2025-03-13 DIAGNOSIS — Z85.89 HISTORY OF SQUAMOUS CELL CARCINOMA: ICD-10-CM

## 2025-03-13 DIAGNOSIS — Z29.11 NEED FOR VACCINATION AGAINST RESPIRATORY SYNCYTIAL VIRUS: ICD-10-CM

## 2025-03-13 DIAGNOSIS — R09.81 NASAL CONGESTION: ICD-10-CM

## 2025-03-13 DIAGNOSIS — J44.9 CHRONIC OBSTRUCTIVE PULMONARY DISEASE, UNSPECIFIED COPD TYPE (H): ICD-10-CM

## 2025-03-13 DIAGNOSIS — I10 BENIGN ESSENTIAL HYPERTENSION: ICD-10-CM

## 2025-03-13 RX ORDER — AZITHROMYCIN 250 MG/1
TABLET, FILM COATED ORAL
Qty: 6 TABLET | Refills: 0 | Status: CANCELLED | OUTPATIENT
Start: 2025-03-13 | End: 2025-03-18

## 2025-03-13 RX ORDER — BENZONATATE 100 MG/1
100-200 CAPSULE ORAL 3 TIMES DAILY PRN
Qty: 60 CAPSULE | Refills: 0 | Status: SHIPPED | OUTPATIENT
Start: 2025-03-13

## 2025-03-13 RX ORDER — ALBUTEROL SULFATE 90 UG/1
2 INHALANT RESPIRATORY (INHALATION) EVERY 4 HOURS PRN
Qty: 18 G | Refills: 1 | Status: SHIPPED | OUTPATIENT
Start: 2025-03-13

## 2025-03-13 RX ORDER — PREDNISONE 20 MG/1
TABLET ORAL
Qty: 20 TABLET | Refills: 0 | Status: CANCELLED | OUTPATIENT
Start: 2025-03-13

## 2025-03-13 RX ORDER — DOXYCYCLINE HYCLATE 100 MG
100 TABLET ORAL 2 TIMES DAILY
Qty: 20 TABLET | Refills: 0 | Status: SHIPPED | OUTPATIENT
Start: 2025-03-13 | End: 2025-03-23

## 2025-03-13 ASSESSMENT — PAIN SCALES - GENERAL: PAINLEVEL_OUTOF10: NO PAIN (0)

## 2025-03-13 NOTE — PROGRESS NOTES
ANTICOAGULATION  MANAGEMENT     Interacting Medication Review    Interacting medication(s):  Doxycycline  with warfarin.    Duration: 10 days  (3/13 to 3/23)    Indication:  URI    New medication?: Yes, but no interaction with warfarin anticipated        PLAN     Continue your current warfarin dose with next INR in 4 weeks            Patient was not contacted    No adjustment to anticoagulation calendar was required    No change to [facility] priority; patient stable on interacting medication    Plan made per [facility] anticoagulation protocol    Becky Capellan RN  3/13/2025  Anticoagulation Clinic  [facility] for routing messages: mirella NUNEZ [facility] patient phone line: [phone]

## 2025-03-13 NOTE — PROGRESS NOTES
Assessment & Plan     Acute cough  Nasal congestion  Wheezing  URI symptoms started 3/8/25 with no improvement, reports cough worse at night, occasional wheezing and shortness of breath on exertion. Low grade fever and elevated heart rate 110-126 in today in clinic. No respiratory distress.   Lung sounds with exp wheezes throughout.  Will treat symptoms with antibiotic due to risk factors pneumonia and symptoms. Discussed medication use and side effects.   Continue supportive cares, sleep with head of bed elevated, may try nasal flonase to reduce inflammation.   Discussed precautions when to be seen right away if any changes.   - doxycycline hyclate (VIBRA-TABS) 100 MG tablet  twice a day for 10 days. - albuterol (PROAIR HFA/PROVENTIL HFA/VENTOLIN HFA) 108 (90 Base) MCG/ACT inhaler  Dispense: 18 g; Refill: 1  - benzonatate (TESSALON) 100 MG capsule  Dispense: 60 capsule; Refill: 0    Chronic obstructive pulmonary disease, unspecified COPD type (H)  History of squamous cell carcinoma  Has been stable without controller medications.   Albuterol inhaler as needed for wheezing, coughing, shortness of breath. Discussed medication use and side effects.   - doxycycline hyclate (VIBRA-TABS) 100 MG tablet  Dispense: 20 tablet; Refill: 0  - albuterol (PROAIR HFA/PROVENTIL HFA/VENTOLIN HFA) 108 (90 Base) MCG/ACT inhaler  Dispense: 18 g; Refill: 1    Paroxysmal atrial fibrillation (H)   Heart rate regular. S/p ablation and taking coumadin.   Salima has pulse oximeter with heart rate at home and will monitor. Discussed risk for atrial fibrillation with RVR during illness and what signs to watch for including increased shortness of breath, chest pain, palpitations, dizziness and to see care right away.   Elevated heart rate in setting of low grade fever and illness. She will monitor and notify if any increased heart rate or changes. She is stable and no need to start rate control medication today.     Benign essential  hypertension  Blood pressure stable. Taking lisinopril daily.     Need for Tdap vaccination  Need for vaccination against respiratory syncytial virus  Would like to obtain at pharmacy when feeling better.         Patient verbalizes understanding and is in agreement with the plan of care. All questions and concerns addressed.     Follow up if symptoms do not improve           Subjective   Salima is a 74 year old, presenting for the following health issues:  URI (Symptoms began 3/8/25. Waking coughing during the night, coughing, runny nose)        3/13/2025     2:21 PM   Additional Questions   Roomed by Julieth SAMSON   Accompanied by Laura         3/13/2025     2:21 PM   Patient Reported Additional Medications   Patient reports taking the following new medications None     History of Present Illness       Reason for visit:  Bad cold  Symptom onset:  3-7 days ago  Symptoms include:  Cough runny nose. Short of breath  Symptom intensity:  Severe  Symptom progression:  Worsening  Had these symptoms before:  No  What makes it worse:  No  What makes it better:  Not   She is taking medications regularly.        Acute Illness  Acute illness concerns: runny nose, cough  Onset/Duration: 03/08/2025  Symptoms:  Fever: YES- low grade  Chills/Sweats: No  Headache (location?): No  Sinus Pressure: YES  Conjunctivitis:  No  Ear Pain: no  Rhinorrhea: No  Congestion: YES  Sore Throat: No  Cough: YES-productive of yellow sputum, productive of green sputum, with shortness of breath  Wheeze: YES  Decreased Appetite: YES  Nausea: No  Vomiting: No  Diarrhea: No  Dysuria/Freq.: No  Dysuria or Hematuria: No  Fatigue/Achiness: YES  Sick/Strep Exposure: No  Therapies tried and outcome: OTC cold medicine  Feeling hard to breathe sometimes when she is walking  Wheezing and coughing at night  Went to sleep on the couch last night due to cough   Denies any chest pain, tightness, palpitations, diaphoresis or other concerns.     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     History of atrial fibrillation-S/p ablation without recurrence of symptoms. Rate and rhythm has been stable   Taking Coumadin      Review of Systems  Constitutional, HEENT, cardiovascular, pulmonary, gi and gu systems are negative, except as otherwise noted.      Objective    /64   Pulse 110   Temp 99.1  F (37.3  C) (Tympanic)   Resp 16   Ht 1.613 m (5' 3.5\")   Wt 74.4 kg (164 lb)   LMP 01/22/2002   SpO2 99%   BMI 28.60 kg/m    Body mass index is 28.6 kg/m .  Physical Exam   GENERAL: alert and no distress  HENT: head normocephalic and atraumatic  Eyes grossly normal to inspection,conjunctivae and sclerae normal. Bilateral Ear canals clear and left TM normal, right TM with small effusion, no erythema.   Nasal congestion and rhinorrhea present. . Mucous membranes moist. Oropharynx is clear. Uvula midline. Posterior oropharyngeal erythema and postnasal drip present. Tonsils: no tonsillar exudate or tonsillar abscesses. Sinus cavities slight tender.   NECK: no adenopathy, no asymmetry, masses, or scars  RESP: No respiratory distress, respirations regular, non labored. lungs with scattered expiratory wheezing bilaterally   CV: regular rate and rhythm, normal S1 S2, no S3 or S4, no murmur, click or rub, no peripheral edema   ABDOMEN: soft, nontender, bowel sounds normal  SKIN: no suspicious lesions or rashes  NEURO: Normal strength and tone, mentation intact and speech normal  PSYCH: mentation appears normal, affect normal/bright    CXR completed: awaiting final radiology reading. Per my eye difficult to ascertain due to previous left lung resection. Difficult to clearly identify if there is an infiltrate in the left upper lobe vs previous lung resection scar tissue on the side view, bilateral lungs appear clear anterior view.             Signed Electronically by: Mame " Gino, DNP

## 2025-03-25 ENCOUNTER — DOCUMENTATION ONLY (OUTPATIENT)
Dept: OTHER | Facility: CLINIC | Age: 75
End: 2025-03-25
Payer: MEDICARE

## 2025-04-09 ENCOUNTER — ANTICOAGULATION THERAPY VISIT (OUTPATIENT)
Dept: ANTICOAGULATION | Facility: CLINIC | Age: 75
End: 2025-04-09

## 2025-04-09 ENCOUNTER — LAB (OUTPATIENT)
Dept: LAB | Facility: CLINIC | Age: 75
End: 2025-04-09
Payer: MEDICARE

## 2025-04-09 DIAGNOSIS — I48.0 PAROXYSMAL ATRIAL FIBRILLATION (H): ICD-10-CM

## 2025-04-09 DIAGNOSIS — Z79.01 LONG TERM CURRENT USE OF ANTICOAGULANT THERAPY: ICD-10-CM

## 2025-04-09 DIAGNOSIS — I48.91 ATRIAL FIBRILLATION WITH RAPID VENTRICULAR RESPONSE (H): ICD-10-CM

## 2025-04-09 DIAGNOSIS — I48.0 PAROXYSMAL ATRIAL FIBRILLATION (H): Primary | ICD-10-CM

## 2025-04-09 LAB — INR BLD: 1.8 (ref 0.9–1.1)

## 2025-04-09 PROCEDURE — 85610 PROTHROMBIN TIME: CPT

## 2025-04-09 PROCEDURE — 36416 COLLJ CAPILLARY BLOOD SPEC: CPT

## 2025-04-09 NOTE — PROGRESS NOTES
ANTICOAGULATION MANAGEMENT     Ijeoma Shah 74 year old female is on warfarin with subtherapeutic INR result. (Goal INR 2.0-3.0)    Recent labs: (last 7 days)     04/09/25  1126   INR 1.8*       ASSESSMENT     Warfarin Lab Questionnaire    Warfarin Doses Last 7 Days      4/8/2025     6:49 PM   Dose in Tablet or Mg   TAB or MG? milligram (mg)     Pt Rptd Dose SUNDAY MONDAY TUESDAY WED THURS FRIDAY SATURDAY 4/8/2025   6:49 PM 7.5 7.5 10 7.5 10 7.5 10         4/8/2025   Warfarin Lab Questionnaire   Missed doses within past 14 days? No   Changes in diet or alcohol within past 14 days? No   Medication changes since last result? No   Injuries or illness since last result? No   New shortness of breath, severe headaches or sudden changes in vision since last result? No   Abnormal bleeding since last result? No   Upcoming surgery, procedure? No     Previous result: Therapeutic last 2(+) visits  Additional findings:  doxycyline with warfarin 3/13-3/23  for URI, pt is still working on feeling better (per pt)     PLAN     Recommended plan for temporary change(s) affecting INR     Dosing Instructions: booster dose then continue your current warfarin dose with next INR in 2 weeks       Summary  As of 4/9/2025      Full warfarin instructions:  4/9: 12.5 mg; Otherwise 10 mg every Tue, Thu, Sat; 7.5 mg all other days   Next INR check:  4/23/2025               Telephone call with Salima who verbalizes understanding and agrees to plan    Lab visit scheduled    Education provided: Please call back if any changes to your diet, medications or how you've been taking warfarin  Symptom monitoring: monitoring for clotting signs and symptoms, monitoring for stroke signs and symptoms, and when to seek medical attention/emergency care    Plan made per [facility] anticoagulation protocol    Stephie Yost RN  4/9/2025  Anticoagulation Clinic  [facility] for routing messages: mirella NUNEZ [facility] patient phone line:  [phone]        _______________________________________________________________________     Anticoagulation Episode Summary       Current INR goal:  2.0-3.0   TTR:  54.2% (1 y)   Target end date:  Indefinite   Send INR reminders to:  [facility]    Indications    Paroxysmal atrial fibrillation (H) [I48.0]  Atrial fibrillation with rapid ventricular response (H) (Resolved) [I48.91]  Atrial fibrillation with rapid ventricular response (H) [I48.91]  Long term current use of anticoagulant therapy [Z79.01]             Comments:  RAJESH garibay             Anticoagulation Care Providers       Provider Role Specialty Phone number    Warren Pisano MD Referring Cardiovascular Disease [phone]    Jyoti Espinal MD Referring Family Medicine [phone]    Mary Godoy CNP Referring Critical Care [phone]    Janis Carr APRN CNP Referring Cardiovascular Disease [phone]

## 2025-04-23 ENCOUNTER — LAB (OUTPATIENT)
Dept: LAB | Facility: CLINIC | Age: 75
End: 2025-04-23
Payer: MEDICARE

## 2025-04-23 ENCOUNTER — ANTICOAGULATION THERAPY VISIT (OUTPATIENT)
Dept: ANTICOAGULATION | Facility: CLINIC | Age: 75
End: 2025-04-23

## 2025-04-23 DIAGNOSIS — Z79.01 LONG TERM CURRENT USE OF ANTICOAGULANT THERAPY: ICD-10-CM

## 2025-04-23 DIAGNOSIS — I48.0 PAROXYSMAL ATRIAL FIBRILLATION (H): Primary | ICD-10-CM

## 2025-04-23 DIAGNOSIS — I48.91 ATRIAL FIBRILLATION WITH RAPID VENTRICULAR RESPONSE (H): ICD-10-CM

## 2025-04-23 DIAGNOSIS — I48.0 PAROXYSMAL ATRIAL FIBRILLATION (H): ICD-10-CM

## 2025-04-23 LAB — INR BLD: 2.1 (ref 0.9–1.1)

## 2025-04-23 PROCEDURE — 36416 COLLJ CAPILLARY BLOOD SPEC: CPT

## 2025-04-23 PROCEDURE — 85610 PROTHROMBIN TIME: CPT

## 2025-04-23 NOTE — PROGRESS NOTES
ANTICOAGULATION MANAGEMENT     Ijeoma Shah 74 year old female is on warfarin with therapeutic INR result. (Goal INR 2.0-3.0)    Recent labs: (last 7 days)     04/23/25  1141   INR 2.1*       ASSESSMENT     Warfarin Lab Questionnaire    Warfarin Doses Last 7 Days      4/23/2025     8:16 AM   Dose in Tablet or Mg   TAB or MG? milligram (mg)     Pt Rptd Dose SUNDAY MONDAY TUESDAY WED THURS FRIDAY SATURDAY 4/23/2025   8:16 AM 7.5 7.5 10 7.5 10 7.5 10         4/23/2025   Warfarin Lab Questionnaire   Missed doses within past 14 days? No   Changes in diet or alcohol within past 14 days? No   Medication changes since last result? No   Injuries or illness since last result? No   New shortness of breath, severe headaches or sudden changes in vision since last result? No   Abnormal bleeding since last result? No   Upcoming surgery, procedure? No     Previous result: Subtherapeutic  Additional findings: None       PLAN     Recommended plan for no diet, medication or health factor changes affecting INR     Dosing Instructions: Continue your current warfarin dose with next INR in 3 weeks       Summary  As of 4/23/2025      Full warfarin instructions:  10 mg every Tue, Thu, Sat; 7.5 mg all other days   Next INR check:  5/14/2025               Telephone call with Salima who verbalizes understanding and agrees to plan    Lab visit scheduled    Education provided: Please call back if any changes to your diet, medications or how you've been taking warfarin  Goal range and lab monitoring: goal range and significance of current result    Plan made per [facility] anticoagulation protocol    Becky Capellan RN  4/23/2025  Anticoagulation Clinic  [employer] for routing messages: mirella NUNEZ [facility] patient phone line: [phone]        _______________________________________________________________________     Anticoagulation Episode Summary       Current INR goal:  2.0-3.0   TTR:  55.5% (1 y)   Target end date:  Indefinite    Send INR reminders to:  [facility]    Indications    Paroxysmal atrial fibrillation (H) [I48.0]  Atrial fibrillation with rapid ventricular response (H) (Resolved) [I48.91]  Atrial fibrillation with rapid ventricular response (H) [I48.91]  Long term current use of anticoagulant therapy [Z79.01]             Comments:  RAJESH garibay             Anticoagulation Care Providers       Provider Role Specialty Phone number    Warren Pisano MD Referring Cardiovascular Disease [phone]    Jyoti Espinal MD Referring Family Medicine [phone]    Mary Godoy CNP Referring Critical Care [phone]    Janis Carr APRN CNP Referring Cardiovascular Disease [phone]

## 2025-05-14 ENCOUNTER — ANTICOAGULATION THERAPY VISIT (OUTPATIENT)
Dept: ANTICOAGULATION | Facility: CLINIC | Age: 75
End: 2025-05-14

## 2025-05-14 ENCOUNTER — LAB (OUTPATIENT)
Dept: LAB | Facility: CLINIC | Age: 75
End: 2025-05-14
Payer: MEDICARE

## 2025-05-14 ENCOUNTER — RESULTS FOLLOW-UP (OUTPATIENT)
Dept: ANTICOAGULATION | Facility: CLINIC | Age: 75
End: 2025-05-14

## 2025-05-14 DIAGNOSIS — I48.91 ATRIAL FIBRILLATION WITH RAPID VENTRICULAR RESPONSE (H): ICD-10-CM

## 2025-05-14 DIAGNOSIS — I48.0 PAROXYSMAL ATRIAL FIBRILLATION (H): Primary | ICD-10-CM

## 2025-05-14 DIAGNOSIS — Z79.01 LONG TERM CURRENT USE OF ANTICOAGULANT THERAPY: ICD-10-CM

## 2025-05-14 DIAGNOSIS — I48.0 PAROXYSMAL ATRIAL FIBRILLATION (H): ICD-10-CM

## 2025-05-14 LAB — INR BLD: 2 (ref 0.9–1.1)

## 2025-05-14 PROCEDURE — 85610 PROTHROMBIN TIME: CPT

## 2025-05-14 PROCEDURE — 36416 COLLJ CAPILLARY BLOOD SPEC: CPT

## 2025-05-14 NOTE — PROGRESS NOTES
ANTICOAGULATION MANAGEMENT     Ijeoma Shah 74 year old female is on warfarin with therapeutic INR result. (Goal INR 2.0-3.0)    Recent labs: (last 7 days)     05/14/25  1133   INR 2.0*       ASSESSMENT     Warfarin Lab Questionnaire    Warfarin Doses Last 7 Days      5/13/2025     8:34 AM   Dose in Tablet or Mg   TAB or MG? milligram (mg)     Pt Rptd Dose SUNDAY MONDAY TUESDAY WED THURS FRIDAY SATURDAY 5/13/2025   8:34 AM 7.5 7.5 10 7.5 10 7.5 10         5/13/2025   Warfarin Lab Questionnaire   Missed doses within past 14 days? No   Changes in diet or alcohol within past 14 days? No   Medication changes since last result? No   Injuries or illness since last result? No   New shortness of breath, severe headaches or sudden changes in vision since last result? No   Abnormal bleeding since last result? No   Upcoming surgery, procedure? No   Best number to call with results? [phone]     Previous result: Therapeutic last visit; previously outside of goal range  Additional findings: None       PLAN     Recommended plan for no diet, medication or health factor changes affecting INR     Dosing Instructions: Continue your current warfarin dose with next INR in 4 weeks       Summary  As of 5/14/2025      Full warfarin instructions:  10 mg every Tue, Thu, Sat; 7.5 mg all other days   Next INR check:  6/11/2025               Telephone call with Salima who verbalizes understanding and agrees to plan    Lab visit scheduled    Education provided: Taking warfarin: Importance of taking warfarin as instructed    Plan made per [facility] anticoagulation protocol    Cristal Griffith RN  5/14/2025  Anticoagulation Clinic  [facility] for routing messages: mirella NUNEZ [facility] patient phone line: [phone]        _______________________________________________________________________     Anticoagulation Episode Summary       Current INR goal:  2.0-3.0   TTR:  55.6% (1 y)   Target end date:  Indefinite   Send INR reminders  to:  [facility]    Indications    Paroxysmal atrial fibrillation (H) [I48.0]  Atrial fibrillation with rapid ventricular response (H) (Resolved) [I48.91]  Atrial fibrillation with rapid ventricular response (H) [I48.91]  Long term current use of anticoagulant therapy [Z79.01]             Comments:  RAJESH garibay             Anticoagulation Care Providers       Provider Role Specialty Phone number    Warren Pisano MD Referring Cardiovascular Disease [phone]    Jyoti Espinal MD Referring Family Medicine [phone]    Mary Godoy CNP Referring Critical Care [phone]    Janis Carr APRN CNP Referring Cardiovascular Disease [phone]

## 2025-06-11 ENCOUNTER — ANTICOAGULATION THERAPY VISIT (OUTPATIENT)
Dept: ANTICOAGULATION | Facility: CLINIC | Age: 75
End: 2025-06-11

## 2025-06-11 ENCOUNTER — RESULTS FOLLOW-UP (OUTPATIENT)
Dept: ANTICOAGULATION | Facility: CLINIC | Age: 75
End: 2025-06-11

## 2025-06-11 ENCOUNTER — LAB (OUTPATIENT)
Dept: LAB | Facility: CLINIC | Age: 75
End: 2025-06-11
Payer: MEDICARE

## 2025-06-11 DIAGNOSIS — I48.0 PAROXYSMAL ATRIAL FIBRILLATION (H): Primary | ICD-10-CM

## 2025-06-11 DIAGNOSIS — Z79.01 LONG TERM CURRENT USE OF ANTICOAGULANT THERAPY: ICD-10-CM

## 2025-06-11 DIAGNOSIS — I48.91 ATRIAL FIBRILLATION WITH RAPID VENTRICULAR RESPONSE (H): ICD-10-CM

## 2025-06-11 DIAGNOSIS — I48.0 PAROXYSMAL ATRIAL FIBRILLATION (H): ICD-10-CM

## 2025-06-11 LAB — INR BLD: 2.4 (ref 0.9–1.1)

## 2025-06-11 PROCEDURE — 85610 PROTHROMBIN TIME: CPT

## 2025-06-11 PROCEDURE — 36415 COLL VENOUS BLD VENIPUNCTURE: CPT

## 2025-06-11 NOTE — PROGRESS NOTES
ANTICOAGULATION MANAGEMENT     Ijeoma Shah 74 year old female is on warfarin with therapeutic INR result. (Goal INR 2.0-3.0)    Recent labs: (last 7 days)     06/11/25  1112   INR 2.4*       ASSESSMENT     Warfarin Lab Questionnaire    Warfarin Doses Last 7 Days      6/10/2025    12:09 PM   Dose in Tablet or Mg   TAB or MG? milligram (mg)     Pt Rptd Dose TED MONDAY TUESDAY WED THURS FRIDAY SATURDAY   6/10/2025  12:09 PM 7.5 7.5 10 7.5 10 7.5 10         6/10/2025   Warfarin Lab Questionnaire   Missed doses within past 14 days? No   Changes in diet or alcohol within past 14 days? No   Medication changes since last result? No   Injuries or illness since last result? No   New shortness of breath, severe headaches or sudden changes in vision since last result? No   Abnormal bleeding since last result? No   Upcoming surgery, procedure? No   Best number to call with results? [phone]     Previous result: Therapeutic last 2(+) visits  Additional findings: None       PLAN     Recommended plan for no diet, medication or health factor changes affecting INR     Dosing Instructions: Continue your current warfarin dose with next INR in 5 weeks       Summary  As of 6/11/2025      Full warfarin instructions:  10 mg every Tue, Thu, Sat; 7.5 mg all other days   Next INR check:  7/16/2025               Telephone call with Salima who verbalizes understanding and agrees to plan    Lab visit scheduled    Education provided: Contact [phone] with any changes, questions or concerns.     Plan made per [facility] anticoagulation protocol    Nickie Boss RN  6/11/2025  Anticoagulation Clinic  [employer] for routing messages: p [facility] patient phone line: [phone]        _______________________________________________________________________     Anticoagulation Episode Summary       Current INR goal:  2.0-3.0   TTR:  61.3% (1 y)   Target end date:  Indefinite   Send INR reminders to:  [facility]    Indications    Paroxysmal atrial fibrillation (H) [I48.0]  Atrial fibrillation with rapid ventricular response (H) (Resolved) [I48.91]  Atrial fibrillation with rapid ventricular response (H) [I48.91]  Long term current use of anticoagulant therapy [Z79.01]             Comments:                Anticoagulation Care Providers       Provider Role Specialty Phone number    Warren Pisano MD Referring Cardiovascular Disease [phone]    Jyoti Espinal MD Referring Family Medicine [phone]    Mary Godoy CNP Referring Critical Care [phone]    Janis Carr APRN CNP Referring Cardiovascular Disease [phone]

## 2025-07-01 ENCOUNTER — RESULTS FOLLOW-UP (OUTPATIENT)
Dept: NURSING | Facility: CLINIC | Age: 75
End: 2025-07-01

## 2025-07-01 ENCOUNTER — ANTICOAGULATION THERAPY VISIT (OUTPATIENT)
Dept: ANTICOAGULATION | Facility: CLINIC | Age: 75
End: 2025-07-01

## 2025-07-01 ENCOUNTER — LAB (OUTPATIENT)
Dept: LAB | Facility: CLINIC | Age: 75
End: 2025-07-01
Payer: MEDICARE

## 2025-07-01 ENCOUNTER — HOSPITAL ENCOUNTER (OUTPATIENT)
Dept: CT IMAGING | Facility: CLINIC | Age: 75
Discharge: HOME | End: 2025-07-01
Attending: INTERNAL MEDICINE
Payer: MEDICARE

## 2025-07-01 ENCOUNTER — TELEPHONE (OUTPATIENT)
Dept: FAMILY MEDICINE | Facility: CLINIC | Age: 75
End: 2025-07-01

## 2025-07-01 DIAGNOSIS — K86.9 PANCREATIC LESION: ICD-10-CM

## 2025-07-01 DIAGNOSIS — I48.91 ATRIAL FIBRILLATION WITH RAPID VENTRICULAR RESPONSE (H): ICD-10-CM

## 2025-07-01 DIAGNOSIS — I48.0 PAROXYSMAL ATRIAL FIBRILLATION (H): ICD-10-CM

## 2025-07-01 DIAGNOSIS — I48.0 PAROXYSMAL ATRIAL FIBRILLATION (H): Primary | ICD-10-CM

## 2025-07-01 DIAGNOSIS — Z79.01 LONG TERM CURRENT USE OF ANTICOAGULANT THERAPY: ICD-10-CM

## 2025-07-01 LAB
ALBUMIN SERPL BCG-MCNC: 4.2 G/DL (ref 3.5–5.2)
ALP SERPL-CCNC: 91 U/L (ref 40–150)
ALT SERPL W P-5'-P-CCNC: 14 U/L (ref 0–50)
ANION GAP SERPL CALCULATED.3IONS-SCNC: 7 MMOL/L (ref 7–15)
AST SERPL W P-5'-P-CCNC: 19 U/L (ref 0–45)
BASOPHILS # BLD AUTO: 0 10E3/UL (ref 0–0.2)
BASOPHILS NFR BLD AUTO: 0 %
BILIRUB SERPL-MCNC: 0.4 MG/DL
BUN SERPL-MCNC: 13.6 MG/DL (ref 8–23)
CALCIUM SERPL-MCNC: 9.8 MG/DL (ref 8.8–10.4)
CHLORIDE SERPL-SCNC: 95 MMOL/L (ref 98–107)
CREAT SERPL-MCNC: 0.89 MG/DL (ref 0.51–0.95)
EGFRCR SERPLBLD CKD-EPI 2021: 68 ML/MIN/1.73M2
EOSINOPHIL # BLD AUTO: 0.4 10E3/UL (ref 0–0.7)
EOSINOPHIL NFR BLD AUTO: 7 %
ERYTHROCYTE [DISTWIDTH] IN BLOOD BY AUTOMATED COUNT: 12.5 % (ref 10–15)
GLUCOSE SERPL-MCNC: 124 MG/DL (ref 70–99)
HCO3 SERPL-SCNC: 33 MMOL/L (ref 22–29)
HCT VFR BLD AUTO: 40.7 % (ref 35–47)
HGB BLD-MCNC: 14.2 G/DL (ref 11.7–15.7)
IMM GRANULOCYTES # BLD: 0 10E3/UL
IMM GRANULOCYTES NFR BLD: 0 %
INR BLD: 1.6 (ref 0.9–1.1)
LYMPHOCYTES # BLD AUTO: 1 10E3/UL (ref 0.8–5.3)
LYMPHOCYTES NFR BLD AUTO: 15 %
MCH RBC QN AUTO: 30 PG (ref 26.5–33)
MCHC RBC AUTO-ENTMCNC: 34.9 G/DL (ref 31.5–36.5)
MCV RBC AUTO: 86 FL (ref 78–100)
MONOCYTES # BLD AUTO: 0.3 10E3/UL (ref 0–1.3)
MONOCYTES NFR BLD AUTO: 5 %
NEUTROPHILS # BLD AUTO: 4.5 10E3/UL (ref 1.6–8.3)
NEUTROPHILS NFR BLD AUTO: 72 %
NRBC # BLD AUTO: 0 10E3/UL
NRBC BLD AUTO-RTO: 0 /100
PLATELET # BLD AUTO: 214 10E3/UL (ref 150–450)
POTASSIUM SERPL-SCNC: 4.2 MMOL/L (ref 3.4–5.3)
PROT SERPL-MCNC: 6.9 G/DL (ref 6.4–8.3)
RBC # BLD AUTO: 4.73 10E6/UL (ref 3.8–5.2)
SODIUM SERPL-SCNC: 135 MMOL/L (ref 135–145)
WBC # BLD AUTO: 6.3 10E3/UL (ref 4–11)

## 2025-07-01 PROCEDURE — 85610 PROTHROMBIN TIME: CPT

## 2025-07-01 PROCEDURE — 74177 CT ABD & PELVIS W/CONTRAST: CPT

## 2025-07-01 PROCEDURE — 80053 COMPREHEN METABOLIC PANEL: CPT | Performed by: INTERNAL MEDICINE

## 2025-07-01 PROCEDURE — 85025 COMPLETE CBC W/AUTO DIFF WBC: CPT | Performed by: INTERNAL MEDICINE

## 2025-07-01 PROCEDURE — 250N000011 HC RX IP 250 OP 636: Performed by: INTERNAL MEDICINE

## 2025-07-01 PROCEDURE — 250N000009 HC RX 250: Performed by: INTERNAL MEDICINE

## 2025-07-01 PROCEDURE — 36415 COLL VENOUS BLD VENIPUNCTURE: CPT

## 2025-07-01 RX ORDER — IOPAMIDOL 755 MG/ML
500 INJECTION, SOLUTION INTRAVASCULAR ONCE
Status: COMPLETED | OUTPATIENT
Start: 2025-07-01 | End: 2025-07-01

## 2025-07-01 RX ADMIN — IOPAMIDOL 80 ML: 755 INJECTION, SOLUTION INTRAVENOUS at 12:01

## 2025-07-01 RX ADMIN — SODIUM CHLORIDE 60 ML: 9 INJECTION, SOLUTION INTRAVENOUS at 12:01

## 2025-07-01 NOTE — PROGRESS NOTES
ANTICOAGULATION MANAGEMENT     Ijeoma Shah 74 year old female is on warfarin with subtherapeutic INR result. (Goal INR 2.0-3.0)    Recent labs: (last 7 days)     07/01/25  1143   INR 1.6*       ASSESSMENT     Source(s): Chart Review and Patient/Caregiver Call     Warfarin doses taken: Warfarin taken as instructed  Diet: No new diet changes identified  Medication/supplement changes: None noted  New illness, injury, or hospitalization: No  Signs or symptoms of bleeding or clotting: No  Previous result: Therapeutic last 2(+) visits  Additional findings: None       PLAN     Recommended plan for no diet, medication or health factor changes affecting INR     Dosing Instructions: Increase your warfarin dose (8.3% change) with next INR in 2 weeks       Summary  As of 7/1/2025      Full warfarin instructions:  7.5 mg every Sun, Wed; 10 mg all other days   Next INR check:  7/16/2025               Telephone call with Salima who verbalizes understanding and agrees to plan    Lab visit scheduled    Education provided: Goal range and lab monitoring: goal range and significance of current result    Plan made per [facility] anticoagulation protocol    Becky Capellan RN  7/1/2025  Anticoagulation Clinic  [email or web address] for routing messages: mirella [facility] patient phone line: [phone]        _______________________________________________________________________     Anticoagulation Episode Summary       Current INR goal:  2.0-3.0   TTR:  64.0% (1 y)   Target end date:  Indefinite   Send INR reminders to:  [facility]    Indications    Paroxysmal atrial fibrillation (H) [I48.0]  Atrial fibrillation with rapid ventricular response (H) (Resolved) [I48.91]  Atrial fibrillation with rapid ventricular response (H) [I48.91]  Long term current use of anticoagulant therapy [Z79.01]             Comments:                Anticoagulation Care Providers       Provider Role Specialty Phone number    Warren Pisano,  MD Referring Cardiovascular Disease [phone]    Jyoti Espinal MD Referring Family Medicine [phone]    Mary Godoy CNP Referring Critical Care [phone]    Janis Carr APRN CNP Referring Cardiovascular Disease [phone]

## 2025-07-01 NOTE — PROGRESS NOTES
ANTICOAGULATION MANAGEMENT     Ijeoma CARLITA Shah 74 year old female is on warfarin with subtherapeutic INR result. (Goal INR 2.0-3.0)    Recent labs: (last 7 days)     07/01/25  1143   INR 1.6*       ASSESSMENT     Source(s): Chart Review  Previous INR was Therapeutic last 2(+) visits  Medication, diet, health changes since last INR: chart reviewed; none identified         PLAN     Unable to reach Salima today.    LMTCB    Follow up required to confirm warfarin dose taken and assess for changes and discuss out of range result     Becky Capellan RN  7/1/2025  Anticoagulation Clinic  [facility] for routing messages: mirella NUNEZ [facility] patient phone line: [phone]

## 2025-07-01 NOTE — TELEPHONE ENCOUNTER
Patient Returning Call    Reason for call:  Returning call. Please reach out at your earliest convenience.      Could we send this information to you in [employer] or would you prefer to receive a phone call?:   Patient would prefer a phone call   Okay to leave a detailed message?: Yes at Cell number on file:    Telephone Information:   Mobile [phone]

## 2025-07-01 NOTE — TELEPHONE ENCOUNTER
See anticoag encounter for 7/1/25    Becky Capellan RN   [facility] Anticoagulation Clinic

## 2025-07-02 ENCOUNTER — ONCOLOGY VISIT (OUTPATIENT)
Dept: ONCOLOGY | Facility: CLINIC | Age: 75
End: 2025-07-02
Attending: INTERNAL MEDICINE
Payer: MEDICARE

## 2025-07-02 VITALS
OXYGEN SATURATION: 98 % | SYSTOLIC BLOOD PRESSURE: 142 MMHG | TEMPERATURE: 98.1 F | HEART RATE: 108 BPM | WEIGHT: 162.4 LBS | DIASTOLIC BLOOD PRESSURE: 85 MMHG | RESPIRATION RATE: 18 BRPM | BODY MASS INDEX: 28.32 KG/M2

## 2025-07-02 DIAGNOSIS — C34.32 SQUAMOUS CELL CARCINOMA OF BRONCHUS IN LEFT LOWER LOBE (H): Primary | ICD-10-CM

## 2025-07-02 DIAGNOSIS — B35.3 TINEA PEDIS OF BOTH FEET: ICD-10-CM

## 2025-07-02 PROCEDURE — G0463 HOSPITAL OUTPT CLINIC VISIT: HCPCS | Performed by: INTERNAL MEDICINE

## 2025-07-02 RX ORDER — KETOCONAZOLE 20 MG/G
CREAM TOPICAL DAILY PRN
COMMUNITY
Start: 2025-07-02

## 2025-07-02 ASSESSMENT — PAIN SCALES - GENERAL: PAINLEVEL_OUTOF10: NO PAIN (0)

## 2025-07-02 NOTE — LETTER
7/2/2025      Ijeoma Shah  [address] [facility] [address]      Dear Colleague,    Thank you for referring your patient, Ijeoma Shah, to the [facility]. Please see a copy of my visit note below.        [facility]  [address] [address]  Phone: [phone]  Fax: [phone]    PATIENT NAME: Ijeoma Shah  MRN # [id number]   DATE OF VISIT: July 2, 2025  YOB: 1950     CANCER TYPE: SCC lung, poorly differentiated  STAGE: pT4N0 (IIIA)  ECOG PS: 0     PD-L1: TPS 15% on [id number] lobectomy, <1% on PD45-2364 (LLL bx)  Lung panel: SMO R562Q on LLL bx, negative for fusions on lobectomy specimen.   NGS: N/A    ASSESSMENT AND PLAN  SCC lung, [id number], IIIA, R0 resection, discrepant PD-L1: Nearly 41/2 years after completion of adjuvant therapy. Waxing and waning nodules over the years, but nothing worrisome. There is one small R peripheral nodule that I agree wasn't there in 12/2024 or 6/2024, but it's small and doesn't look terribly worrisome. The other nodules look stable to me. CT chest non contrast in 6 months, then annually thereafter. Can decide next visit if follow up will be with PCP or us.      IPMN?: Small, ~ 1 cm, hasn't changed. Will get MRI in 1 year. Will discuss next visit whether to do through us or PCP. Typically would be done through PCP. One possibility is that I could order the MRCP and just follow up on it since if anything changes, it would involve referral to GI.    The longitudinal plan of care for the condition(s) below were addressed during this visit. Due to the added complexity in care, I will continue to support Salima in the subsequent management of this condition(s) and with the ongoing continuity of care of this condition(s): SCC lung      30 minutes spent by me on the date of the encounter doing chart review, review of test results, interpretation of  tests, patient visit, documentation, orders    Eneida De Leon MD  Associate Professor of Medicine  Hematology, Oncology and Transplantation    SUBJECTIVE  Salima returns for routine follow up  Had another ablation  Chronic cough - unchanged for many months  O/w ok     CANCER SUMMARY  7/3/20  CT chest (screening). 6.7 cm LLL mass, 0.6 cm RML nodule, mediastinal and L hilar LNs  7/9/20 PET/CT. 7.1 x 5.6 cm LLL mass (SUV 19.6), post obstructive pneumonitis LLL inferior to the mass (SUV 2.8), 3.5 x 1.7 cm 4L node (SUV 5.9), L adrenal nodule 1.1 cm (SUV 4), indeterminate pulmonary nodules, pancreatic cysts, not hypermetabolic  7/27/20 Bronch, EBUS (Dr. Castro). 10R, 11R, 4R too small to biopsy. Stations 7, 4L, 11L negative for malignancy. LLL mass bx: SCC  8/12/20 Brain MRI negative  9/1/20 EUS to evaluate adrenal and 4L (Dr. Hogan). Both negative for malignancy. Adrenal with bland adrenal cells  10/22/20 L VATS, converted to thoracotomy, LLL lobectomy, MLND, R pulmonary arterioplasty (Dr. Sanabria, Dr. Davis). 8.3 cm poorly differentiated SCC, +angiolymphatic invasion  12/3/20~2/25/21 Cisplatin gemcitabine x 4. C2D8 delayed 1 week due to neutropenia, added neulasta    PAST MEDICAL HISTORY  SCC as above  Afib with RVR. Initially when she presented for surgery 10/1, then post-op in 10/2020. DCCV x 2 10/23/20, ibutilide --> NSR, dig load, amio gtt. TTE 10/16/20 unremarkable. Seeing Dr. Monsivais in Cardiology. Pulmonary vein isolation 8/2024.   Low back pain, R radiculopathy, L5-S1 interlaminar lumbar epidural steroid injection 11/15/21  Sp cholecystectomy  Dyslipidemia  H/o bronchitis  Nose surgery  Tubal ligation  Adrenal nodule, bx 9/1/20, negative for malignancy  Cholelithiasis  Cataract repair 2011?  Colon polyps on colonoscopy Jan 2023.  Microscopic hematuria - urine cytology and FISH negative in the past, opted for surveillance.      PFT 8/20/20 (preop). FEV1 1.33 (58%), FVC 1.76 (60%), DLCO 17.72  (90%)  Colonoscopy 1/25/23. Hyperplastic polyp and tubular adenoma    CURRENT OUTPATIENT MEDICATIONS  Reviewed    ALLERGIES  Allergies   Allergen Reactions     Bee Venom Hives     Hot and sweating      Amiodarone Itching     Lasix [Furosemide] Itching      PHYSICAL EXAM  BP (!) 142/85 (BP Location: Right arm, Patient Position: Sitting, Cuff Size: Adult Regular)   Pulse 108   Temp 98.1  F (36.7  C) (Oral)   Resp 18   Wt 73.7 kg (162 lb 6.4 oz)   LMP 01/22/2002   SpO2 98%   BMI 28.32 kg/m    GEN: NAD  HEENT: EOMI, no icterus, injection or pallor  EXT: no edema  NEURO: alert  SKIN: no rashes    LABORATORY AND IMAGING STUDIES    Labs 7/1/25 independently reviewed and interpreted by me  CMP acceptable  CBC pd ok    CT CAP personally reviewed and interpreted by me as above and here   Small RUL/RML nodules that are unchanged compared to CT 6/12/24  Small mediastinal nodes - not worrisome, unchanged since 6/12/24  No spine lesions    EXAM: CT CHEST/ABDOMEN/PELVIS W CONTRAST  LOCATION: [facility]  DATE: 7/1/2025     INDICATION: restage SCC lung, IIIA, ~4 1 2 years ago. Pulmonary nodules, ensure stability. IPMN, assess stability.  COMPARISON: 12/16/2024  TECHNIQUE: CT scan of the chest, abdomen, and pelvis was performed following injection of IV contrast. Multiplanar reformats were obtained. Dose reduction techniques were used.   CONTRAST: Isovue 370, 80mL     FINDINGS:   LUNGS AND PLEURA: Patent airways. Mild lung emphysematous changes and biapical pleural parenchymal scarring. Stable postsurgical changes of left lower lobectomy. There are few new right upper lobe nodules of predominantly groundglass attenuation   measuring up to 3 mm (series 4, image 71) and a new small patchy subpleural right lower lobe subsolid opacity (image 130). Otherwise, pulmonary nodules remain stable measuring up to 6 x 3 mm in the right middle lobe (image 156), previously 6 x 4 mm.   Stable trace loculated  left pleural effusion. No confluent pneumonic consolidation or right pleural effusion.     MEDIASTINUM/AXILLAE: No thoracic lymphadenopathy. No thoracic aorta aneurysm.     CORONARY ARTERY CALCIFICATION: Moderate.     HEPATOBILIARY: No suspicious liver lesion. Cholecystectomy. Normal caliber bile ducts.     PANCREAS: Stable 9 mm cystic lesion in the proximal pancreas. No new pancreatic lesion or duct dilation.     SPLEEN: Normal.     ADRENAL GLANDS: No significant nodules.     KIDNEYS/BLADDER: No significant mass, stone, or hydronephrosis.     BOWEL: No obstruction or inflammatory change. Colonic diverticulosis.     LYMPH NODES: No lymphadenopathy.     VASCULATURE: Mild to moderate aortoiliac atherosclerosis without aneurysmal dilation.     PELVIC ORGANS: No suspicious pelvic mass or ascites.     MUSCULOSKELETAL: No aggressive osseous lesion. Remote left rib fractures.                                                                IMPRESSION:  1.  Few new right upper lobe nodules of predominantly groundglass attenuation and a new small patchy subpleural right lower lobe subsolid opacity are favored to be of inflammatory or infectious etiologies. Could consider follow-up chest CT in 3 months   versus attention at restaging.  2.  Otherwise, stable pulmonary nodules and postsurgical changes of left lower lobectomy.  3.  No evidence of abdominopelvic metastatic disease.  4.  Stable 9 mm cystic lesion in the proximal pancreas, likely representing a sidebranch IPMN.     REFERENCE:  Revisions of international consensus [place] guidelines for the management of IPMN of the pancreas. Pancreatology 2017;17(5):738-753.     Largest cyst less than 10 mm: CT or MRI/MRCP in 6 months and then every 2 years if no change.             Again, thank you for allowing me to participate in the care of your patient.        Sincerely,        Eneida De Leon MD    Electronically signed

## 2025-07-02 NOTE — PROGRESS NOTES
[facility]  [address] [address]  Phone: [phone]  Fax: [phone]    PATIENT NAME: Ijeoma Shah  MRN # [id number]   DATE OF VISIT: July 2, 2025  YOB: 1950     CANCER TYPE: SCC lung, poorly differentiated  STAGE: pT4N0 (IIIA)  ECOG PS: 0     PD-L1: TPS 15% on [id number] lobectomy, <1% on GU37-7479 (LLL bx)  Lung panel: SMO R562Q on LLL bx, negative for fusions on lobectomy specimen.   NGS: N/A    ASSESSMENT AND PLAN  SCC lung, pN9P1N9, IIIA, R0 resection, discrepant PD-L1: Nearly 41/2 years after completion of adjuvant therapy. Waxing and waning nodules over the years, but nothing worrisome. There is one small R peripheral nodule that I agree wasn't there in 12/2024 or 6/2024, but it's small and doesn't look terribly worrisome. The other nodules look stable to me. CT chest non contrast in 6 months, then annually thereafter. Can decide next visit if follow up will be with PCP or us.      IPMN?: Small, ~ 1 cm, hasn't changed. Will get MRI in 1 year. Will discuss next visit whether to do through us or PCP. Typically would be done through PCP. One possibility is that I could order the MRCP and just follow up on it since if anything changes, it would involve referral to GI.    The longitudinal plan of care for the condition(s) below were addressed during this visit. Due to the added complexity in care, I will continue to support Salima in the subsequent management of this condition(s) and with the ongoing continuity of care of this condition(s): SCC lung      30 minutes spent by me on the date of the encounter doing chart review, review of test results, interpretation of tests, patient visit, documentation, orders    Eneida De Leon MD  Associate Professor of Medicine  Hematology, Oncology and Transplantation    SUBJECTIVE  Salima returns for routine follow up  Had another ablation  Chronic cough - unchanged for many months  O/w ok      CANCER SUMMARY  7/3/20  CT chest (screening). 6.7 cm LLL mass, 0.6 cm RML nodule, mediastinal and L hilar LNs  7/9/20 PET/CT. 7.1 x 5.6 cm LLL mass (SUV 19.6), post obstructive pneumonitis LLL inferior to the mass (SUV 2.8), 3.5 x 1.7 cm 4L node (SUV 5.9), L adrenal nodule 1.1 cm (SUV 4), indeterminate pulmonary nodules, pancreatic cysts, not hypermetabolic  7/27/20 Bronch, EBUS (Dr. Castro). 10R, 11R, 4R too small to biopsy. Stations 7, 4L, 11L negative for malignancy. LLL mass bx: SCC  8/12/20 Brain MRI negative  9/1/20 EUS to evaluate adrenal and 4L (Dr. Hogan). Both negative for malignancy. Adrenal with bland adrenal cells  10/22/20 L VATS, converted to thoracotomy, LLL lobectomy, MLND, R pulmonary arterioplasty (Dr. Sanabria, Dr. Davis). 8.3 cm poorly differentiated SCC, +angiolymphatic invasion  12/3/20~2/25/21 Cisplatin gemcitabine x 4. C2D8 delayed 1 week due to neutropenia, added neulasta    PAST MEDICAL HISTORY  SCC as above  Afib with RVR. Initially when she presented for surgery 10/1, then post-op in 10/2020. DCCV x 2 10/23/20, ibutilide --> NSR, dig load, amio gtt. TTE 10/16/20 unremarkable. Seeing Dr. Monsivais in Cardiology. Pulmonary vein isolation 8/2024.   Low back pain, R radiculopathy, L5-S1 interlaminar lumbar epidural steroid injection 11/15/21  Sp cholecystectomy  Dyslipidemia  H/o bronchitis  Nose surgery  Tubal ligation  Adrenal nodule, bx 9/1/20, negative for malignancy  Cholelithiasis  Cataract repair 2011?  Colon polyps on colonoscopy Jan 2023.  Microscopic hematuria - urine cytology and FISH negative in the past, opted for surveillance.      PFT 8/20/20 (preop). FEV1 1.33 (58%), FVC 1.76 (60%), DLCO 17.72 (90%)  Colonoscopy 1/25/23. Hyperplastic polyp and tubular adenoma    CURRENT OUTPATIENT MEDICATIONS  Reviewed    ALLERGIES  Allergies   Allergen Reactions    Bee Venom Hives     Hot and sweating     Amiodarone Itching    Lasix [Furosemide] Itching      PHYSICAL EXAM  BP (!)  142/85 (BP Location: Right arm, Patient Position: Sitting, Cuff Size: Adult Regular)   Pulse 108   Temp 98.1  F (36.7  C) (Oral)   Resp 18   Wt 73.7 kg (162 lb 6.4 oz)   LMP 01/22/2002   SpO2 98%   BMI 28.32 kg/m    GEN: NAD  HEENT: EOMI, no icterus, injection or pallor  EXT: no edema  NEURO: alert  SKIN: no rashes    LABORATORY AND IMAGING STUDIES    Labs 7/1/25 independently reviewed and interpreted by me  CMP acceptable  CBC pd ok    CT CAP personally reviewed and interpreted by me as above and here   Small RUL/RML nodules that are unchanged compared to CT 6/12/24  Small mediastinal nodes - not worrisome, unchanged since 6/12/24  No spine lesions    EXAM: CT CHEST/ABDOMEN/PELVIS W CONTRAST  LOCATION: [facility]  DATE: 7/1/2025     INDICATION: restage SCC lung, IIIA, ~4 1 2 years ago. Pulmonary nodules, ensure stability. IPMN, assess stability.  COMPARISON: 12/16/2024  TECHNIQUE: CT scan of the chest, abdomen, and pelvis was performed following injection of IV contrast. Multiplanar reformats were obtained. Dose reduction techniques were used.   CONTRAST: Isovue 370, 80mL     FINDINGS:   LUNGS AND PLEURA: Patent airways. Mild lung emphysematous changes and biapical pleural parenchymal scarring. Stable postsurgical changes of left lower lobectomy. There are few new right upper lobe nodules of predominantly groundglass attenuation   measuring up to 3 mm (series 4, image 71) and a new small patchy subpleural right lower lobe subsolid opacity (image 130). Otherwise, pulmonary nodules remain stable measuring up to 6 x 3 mm in the right middle lobe (image 156), previously 6 x 4 mm.   Stable trace loculated left pleural effusion. No confluent pneumonic consolidation or right pleural effusion.     MEDIASTINUM/AXILLAE: No thoracic lymphadenopathy. No thoracic aorta aneurysm.     CORONARY ARTERY CALCIFICATION: Moderate.     HEPATOBILIARY: No suspicious liver lesion. Cholecystectomy.  Normal caliber bile ducts.     PANCREAS: Stable 9 mm cystic lesion in the proximal pancreas. No new pancreatic lesion or duct dilation.     SPLEEN: Normal.     ADRENAL GLANDS: No significant nodules.     KIDNEYS/BLADDER: No significant mass, stone, or hydronephrosis.     BOWEL: No obstruction or inflammatory change. Colonic diverticulosis.     LYMPH NODES: No lymphadenopathy.     VASCULATURE: Mild to moderate aortoiliac atherosclerosis without aneurysmal dilation.     PELVIC ORGANS: No suspicious pelvic mass or ascites.     MUSCULOSKELETAL: No aggressive osseous lesion. Remote left rib fractures.                                                                IMPRESSION:  1.  Few new right upper lobe nodules of predominantly groundglass attenuation and a new small patchy subpleural right lower lobe subsolid opacity are favored to be of inflammatory or infectious etiologies. Could consider follow-up chest CT in 3 months   versus attention at restaging.  2.  Otherwise, stable pulmonary nodules and postsurgical changes of left lower lobectomy.  3.  No evidence of abdominopelvic metastatic disease.  4.  Stable 9 mm cystic lesion in the proximal pancreas, likely representing a sidebranch IPMN.     REFERENCE:  Revisions of international consensus Fuk[place] guidelines for the management of IPMN of the pancreas. Pancreatology 2017;17(5):738-753.     Largest cyst less than 10 mm: CT or MRI/MRCP in 6 months and then every 2 years if no change.

## 2025-07-02 NOTE — NURSING NOTE
"Oncology Rooming Note    July 2, 2025 12:05 PM   Ijeoma Shah is a 74 year old female who presents for:    Chief Complaint   Patient presents with    Oncology Clinic Visit     Squamous cell carcinoma of bronchus in left lower lobe (H)        Initial Vitals: BP (!) 142/85 (BP Location: Right arm, Patient Position: Sitting, Cuff Size: Adult Regular)   Pulse 108   Temp 98.1  F (36.7  C) (Oral)   Resp 18   Wt 73.7 kg (162 lb 6.4 oz)   LMP 01/22/2002   SpO2 98%   BMI 28.32 kg/m   Estimated body mass index is 28.32 kg/m  as calculated from the following:    Height as of 3/13/25: 1.613 m (5' 3.5\").    Weight as of this encounter: 73.7 kg (162 lb 6.4 oz). Body surface area is 1.82 meters squared.  No Pain (0) Comment: Data Unavailable   Patient's last menstrual period was 01/22/2002.  Allergies reviewed: Yes  Medications reviewed: Yes    Medications: Medication refills not needed today.  Pharmacy name entered into [employer]: [facility] [address]    Frailty Screening:   Is the patient here for a new oncology consult visit in cancer care? 2. No    PHQ9:  Did this patient require a PHQ9?: No      Clinical concerns: none.       Sonya Carlos LPN            "

## 2025-07-16 ENCOUNTER — ANTICOAGULATION THERAPY VISIT (OUTPATIENT)
Dept: ANTICOAGULATION | Facility: CLINIC | Age: 75
End: 2025-07-16

## 2025-07-16 ENCOUNTER — LAB (OUTPATIENT)
Dept: LAB | Facility: CLINIC | Age: 75
End: 2025-07-16
Payer: MEDICARE

## 2025-07-16 DIAGNOSIS — I48.0 PAROXYSMAL ATRIAL FIBRILLATION (H): Primary | ICD-10-CM

## 2025-07-16 DIAGNOSIS — I48.0 PAROXYSMAL ATRIAL FIBRILLATION (H): ICD-10-CM

## 2025-07-16 DIAGNOSIS — Z79.01 LONG TERM CURRENT USE OF ANTICOAGULANT THERAPY: ICD-10-CM

## 2025-07-16 DIAGNOSIS — I48.91 ATRIAL FIBRILLATION WITH RAPID VENTRICULAR RESPONSE (H): ICD-10-CM

## 2025-07-16 LAB — INR BLD: 1.9 (ref 0.9–1.1)

## 2025-07-16 PROCEDURE — 85610 PROTHROMBIN TIME: CPT

## 2025-07-16 PROCEDURE — 36416 COLLJ CAPILLARY BLOOD SPEC: CPT

## 2025-07-16 NOTE — PROGRESS NOTES
ANTICOAGULATION MANAGEMENT     Ijeoma Shah 74 year old female is on warfarin with subtherapeutic INR result. (Goal INR 2.0-3.0)    Recent labs: (last 7 days)     07/16/25  1136   INR 1.9*       ASSESSMENT     Warfarin Lab Questionnaire    Warfarin Doses Last 7 Days      7/15/2025    11:00 AM   Dose in Tablet or Mg   TAB or MG? milligram (mg)     Pt Rptd Dose TED MONDAY TUESDAY WED THURS FRIDAY SATURDAY   7/15/2025  11:00 AM 7.5 10 10 7.5 10 10 10         7/15/2025   Warfarin Lab Questionnaire   Missed doses within past 14 days? No   Changes in diet or alcohol within past 14 days? No   Medication changes since last result? No   Injuries or illness since last result? No   New shortness of breath, severe headaches or sudden changes in vision since last result? Yes   If yes, please explain:  Seems like I m more short of breath   Abnormal bleeding since last result? No   Upcoming surgery, procedure? No   Best number to call with results? [phone]     Previous result: Subtherapeutic  Additional findings: increase in SOB with activity     PLAN     Recommended plan for ongoing change(s) affecting INR     Dosing Instructions: Increase your warfarin dose (7.7% change) with next INR in 2 weeks       Discuss SOB with activity with PCP.    Summary  As of 7/16/2025      Full warfarin instructions:  10 mg every day   Next INR check:  7/30/2025               Telephone call with Salima who verbalizes understanding and agrees to plan    Lab visit scheduled    Education provided: Please call back if any changes to your diet, medications or how you've been taking warfarin  Symptom monitoring: monitoring for clotting signs and symptoms, monitoring for stroke signs and symptoms, and when to seek medical attention/emergency care    Plan made per [facility] anticoagulation protocol    Stephie Yost RN  7/16/2025  Anticoagulation Clinic  [facility] for routing messages: mirella NUNEZ [facility] patient phone line:  [phone]        _______________________________________________________________________     Anticoagulation Episode Summary       Current INR goal:  2.0-3.0   TTR:  61.8% (1 y)   Target end date:  Indefinite   Send INR reminders to:  [facility]    Indications    Paroxysmal atrial fibrillation (H) [I48.0]  Atrial fibrillation with rapid ventricular response (H) (Resolved) [I48.91]  Atrial fibrillation with rapid ventricular response (H) [I48.91]  Long term current use of anticoagulant therapy [Z79.01]             Comments:                Anticoagulation Care Providers       Provider Role Specialty Phone number    Warren Pisano MD Referring Cardiovascular Disease [phone]    Jyoti Espinal MD Referring Family Medicine [phone]    Mary Godoy CNP Referring Critical Care [phone]    Janis Carr APRN CNP Referring Cardiovascular Disease [phone]

## 2025-07-21 ENCOUNTER — TELEPHONE (OUTPATIENT)
Dept: FAMILY MEDICINE | Facility: CLINIC | Age: 75
End: 2025-07-21
Payer: MEDICARE

## 2025-07-21 NOTE — TELEPHONE ENCOUNTER
If the patient suddenly lost her hearing in one ear with associated pain, she needs to be evaluated same day. If she can get into [facility], that is what I would recommend. If not, then the ER.     Justin Salas PA-C

## 2025-07-21 NOTE — TELEPHONE ENCOUNTER
Left message for the patient to call back as soon as possible. [facility] can not see this patient today due to time frame and patient load.      DEIDRE Hernandez

## 2025-07-21 NOTE — TELEPHONE ENCOUNTER
"Spoke with Salima. Recent subtherapeutic INR 1.9 on 7/16/25 resulting in maintenance dose increase. Patient reports no bleeding/bruising concerns. She states the pain in her head has been occurring for the last 4 days, is located on the left side of her head, near her ear and to the top side of her head, and it comes and goes and feels like a \"zing\" when it happens. Denies vision changes but states has had changes with her hearing. She reports her  was pointing out the alarm from their refrigerator going off, and she was unable to hear it.     Patient reports she has been monitoring her blood pressure at home and reports it has been 140s/90s. She states she did not take her lisinopril or warfarin today. Advised that she should be seen by her Provider for the ongoing head pain so she can be evaluated.     Offered sooner INR lab due to recent warfarin dose change; patient requested to wait on scheduling this lab appointment until she hears from her PCP/care team if any recommendations.     Can a member of the care team please reach out to patient?    Thank you,     Yoselin Lockett RN, BSN  [facility]  [phone]    "

## 2025-07-21 NOTE — TELEPHONE ENCOUNTER
General Call      Reason for Call:  dosing instructions    What are your questions or concerns:  she is getting a pain on left side of her head towards the top of her head off and on and her dosage was increased. She is concerned and just wanted to make sure her dosing instructions were correct. Not a severe pain. Today is the 4th day. Was going to be sent to be triaged but patient hung up while waiting.    Date of last appointment with provider: 7-16-25    Could we send this information to you in [place] or would you prefer to receive a phone call?:   Patient would prefer a phone call   Okay to leave a detailed message?: No at Home number on file [phone] (home)

## 2025-07-21 NOTE — TELEPHONE ENCOUNTER
Dimitrios Salas:    Please review messages below, please advise next steps. Urgent Care, [facility] today?      DEIDRE Hernandez

## 2025-07-22 ENCOUNTER — TELEPHONE (OUTPATIENT)
Dept: FAMILY MEDICINE | Facility: CLINIC | Age: 75
End: 2025-07-22
Payer: MEDICARE

## 2025-07-22 NOTE — TELEPHONE ENCOUNTER
See TE below.       Spoke with patient who states she did not receive any incoming phone calls yesterday that she has registered.     Spoke with patient who states her hearing has improved along with the pain, states she did not completely lose her hearing in the left ear it was just muffled and has only had the pain x 2 today.     She is unable to go in for an appointment at this time as she is in the [facility].     States she she could follow up tomorrow 7/23/25. Routing to PCP Dimitrios LUTHER to advise.     Julie Behrendt RN

## 2025-07-22 NOTE — TELEPHONE ENCOUNTER
Referral to Acute and Diagnostic Services    [phone] ([place]) [place] [address] [place] [address]    Transition to Acute & Diagnostic Services Clinic has been discussed with patient, and she agrees with next level of care.   Patient understands that evaluation/treatment at [facility] typically takes significantly longer than in clinic/urgent care (>2 hours).  The [facility] has been contacted by provider/staff to confirm patient acceptance.     Special issues:      None            The following provider has assessed this patient for intervention at [facility], and directed the patient for referral: Matt Weiseman PA Julie Behrendt RN

## 2025-07-22 NOTE — TELEPHONE ENCOUNTER
"Received teams message from [facility] in [place] that Dr. Rafal Yung :    \"Per [facility] Provider that pt is to see someone in clinic tomorrow for an Ear exam and after clinical examination if still needed [facility] is ok\".    Patient notified of this, no same day appointments available, will route to PCP to see if they can work her for an appointment tomorrow and if so patient is wanting to have her cholesterol rechecked also.     Julie Behrendt RN    "

## 2025-07-22 NOTE — TELEPHONE ENCOUNTER
Pt may need a CT or MRI of the brain given her symptoms. I would again recommend [facility] who can do this same day.     Justin Salas PA-C

## 2025-07-22 NOTE — TELEPHONE ENCOUNTER
Spoke with patient who states she would be willing to go to [facility] [facility] since there is not a [place] location, call placed to Ivette JIANG with [facility] who will have the provider review and let us know via teams.    See separate telephone encounter for referral.    Julie Behrendt RN

## 2025-07-23 NOTE — TELEPHONE ENCOUNTER
Called patient to schedule with [facility] and she is no longer experiencing any symptoms. Does not have a headache or ear pain. Scheduled appointment with primary for Friday to check ears. She has not had hearing checked in a long time, does not wearing hearing aids. Was told to call clinic back right away if symptoms come back and we would be happy to see her in [facility] anytime.     Merced BLANKENSHIP MA

## 2025-07-30 ENCOUNTER — RESULTS FOLLOW-UP (OUTPATIENT)
Dept: NURSING | Facility: CLINIC | Age: 75
End: 2025-07-30

## 2025-07-30 ENCOUNTER — LAB (OUTPATIENT)
Dept: LAB | Facility: CLINIC | Age: 75
End: 2025-07-30
Payer: MEDICARE

## 2025-07-30 ENCOUNTER — ANTICOAGULATION THERAPY VISIT (OUTPATIENT)
Dept: ANTICOAGULATION | Facility: CLINIC | Age: 75
End: 2025-07-30

## 2025-07-30 DIAGNOSIS — Z79.01 LONG TERM CURRENT USE OF ANTICOAGULANT THERAPY: ICD-10-CM

## 2025-07-30 DIAGNOSIS — I48.0 PAROXYSMAL ATRIAL FIBRILLATION (H): ICD-10-CM

## 2025-07-30 DIAGNOSIS — I48.91 ATRIAL FIBRILLATION WITH RAPID VENTRICULAR RESPONSE (H): ICD-10-CM

## 2025-07-30 DIAGNOSIS — I48.0 PAROXYSMAL ATRIAL FIBRILLATION (H): Primary | ICD-10-CM

## 2025-07-30 LAB — INR BLD: 2.7 (ref 0.9–1.1)

## 2025-07-30 PROCEDURE — 36416 COLLJ CAPILLARY BLOOD SPEC: CPT

## 2025-07-30 PROCEDURE — 85610 PROTHROMBIN TIME: CPT

## 2025-07-30 RX ORDER — WARFARIN SODIUM 5 MG/1
TABLET ORAL
Qty: 180 TABLET | Refills: 1 | Status: SHIPPED | OUTPATIENT
Start: 2025-07-30

## 2025-07-30 NOTE — PROGRESS NOTES
ANTICOAGULATION MANAGEMENT     Ijeoma Shah 74 year old female is on warfarin with therapeutic INR result. (Goal INR 2.0-3.0)    Recent labs: (last 7 days)     07/30/25  1301   INR 2.7*       ASSESSMENT     Warfarin Lab Questionnaire    Warfarin Doses Last 7 Days    Pt Rptd Dose SUNDAY MONDAY TUESDAY WED THURS FRIDAY SATURDAY 7/29/2025   8:43 AM 10 10 10 10 10 10 10         7/29/2025   Warfarin Lab Questionnaire   Missed doses within past 14 days? No   Changes in diet or alcohol within past 14 days? No   Medication changes since last result? No   Injuries or illness since last result? No   New shortness of breath, severe headaches or sudden changes in vision since last result? No   Abnormal bleeding since last result? No   Upcoming surgery, procedure? No   Best number to call with results? [phone]     Previous result: Subtherapeutic  Additional findings: None       PLAN     Recommended plan for no diet, medication or health factor changes affecting INR     Dosing Instructions: Continue your current warfarin dose with next INR in 3 weeks       Summary  As of 7/30/2025      Full warfarin instructions:  10 mg every day   Next INR check:  8/20/2025               Telephone call with Salima who verbalizes understanding and agrees to plan    Lab visit scheduled    Education provided: Goal range and lab monitoring: goal range and significance of current result    Plan made per [facility] anticoagulation protocol    Becky Capellan RN  7/30/2025  Anticoagulation Clinic  [facility] for routing messages: mirella [facility] patient phone line: [phone]        _______________________________________________________________________     Anticoagulation Episode Summary       Current INR goal:  2.0-3.0   TTR:  61.3% (1 y)   Target end date:  Indefinite   Send INR reminders to:  [facility]    Indications    Paroxysmal atrial fibrillation (H) [I48.0]  Atrial fibrillation with rapid ventricular response  (H) (Resolved) [I48.91]  Atrial fibrillation with rapid ventricular response (H) [I48.91]  Long term current use of anticoagulant therapy [Z79.01]             Comments:                Anticoagulation Care Providers       Provider Role Specialty Phone number    Warren Pisano MD Referring Cardiovascular Disease [phone]    Jyoti Espinal MD Referring Family Medicine [phone]    Mary Godoy CNP Referring Critical Care [phone]    Janis Carr APRN CNP Referring Cardiovascular Disease [phone]

## (undated) DEVICE — PROBE TEMP CIRCA S-CATH M ESPH 12-SNSR 3D MAP CS-21EP

## (undated) DEVICE — CATH EP 6FR 2MM TIP 2-8-2 115C

## (undated) DEVICE — CLIP APPLIER ENDO 05MMX35CM AE05ML

## (undated) DEVICE — LINEN TOWEL PACK X5 5464

## (undated) DEVICE — SU VICRYL 0 UR-6 27" J603H

## (undated) DEVICE — SPONGE RAY-TEC 4X8" 7318

## (undated) DEVICE — DRSG TELFA 3X8" 1238

## (undated) DEVICE — LIGHT HANDLE X1 31140133

## (undated) DEVICE — SPONGE DOUBLE 1.25" W/STRING 23275-690

## (undated) DEVICE — ANTIFOG SOLUTION W/FOAM PAD 31142527

## (undated) DEVICE — BLADE SAW STERNAL 20X30MM KM-32

## (undated) DEVICE — ENDO TROCAR SLEEVE KII Z-THREADED 05X100MM CTS02

## (undated) DEVICE — ESU GROUND PAD ADULT W/CORD E7507

## (undated) DEVICE — KIT ENDO TURNOVER/PROCEDURE CARRY-ON 101822

## (undated) DEVICE — CATH SOUNDSTAR 8FRX90CM 10439011

## (undated) DEVICE — ESU LIGASURE MARYLAND LAPAROSCOPIC SLR/DVDR 5MMX37CM LF1937

## (undated) DEVICE — LUBRICANT INST KIT ENDO-LUBE 220-90

## (undated) DEVICE — ENDO TROCAR FIRST ENTRY KII FIOS Z-THRD 05X100MM CTF03

## (undated) DEVICE — WIPES FOLEY CARE SURESTEP PROVON DFC100

## (undated) DEVICE — LINEN TOWEL PACK X30 5481

## (undated) DEVICE — SU PROLENE 5-0 RB-2DA 30" 8710H

## (undated) DEVICE — SU PROLENE 6-0 C-1DA 30" 8706H

## (undated) DEVICE — SPONGE LAP 18X18" X8435

## (undated) DEVICE — ENDO SCOPE WARMER SEAL  C3101

## (undated) DEVICE — SPONGE PACK VAGINAL 2"X9

## (undated) DEVICE — SU VICRYL 3-0 SH 27" UND J416H

## (undated) DEVICE — Device

## (undated) DEVICE — SYR 10ML SLIP TIP W/O NDL 303134

## (undated) DEVICE — PROTECTOR ARM ONE-STEP TRENDELENBURG 40418

## (undated) DEVICE — ENDO VALVE SUCTION BRONCH EVIS MAJ-209

## (undated) DEVICE — SOL ADH LIQUID BENZOIN SWAB 0.6ML C1544

## (undated) DEVICE — LINEN TOWEL PACK X6 WHITE 5487

## (undated) DEVICE — SU VICRYL 2-0 CT-2 27" J333H

## (undated) DEVICE — CATH NAV PENTARAY F CURVE

## (undated) DEVICE — DRAPE IOBAN INCISE 23X17" 6650EZ

## (undated) DEVICE — GLOVE EXAM NITRILE LG

## (undated) DEVICE — GUIDEWIRE VASC 0.035INX150CM INQWIRE J TIP IQ35F150J3F/A

## (undated) DEVICE — ADH SKIN CLOSURE PREMIERPRO EXOFIN 1.0ML 3470

## (undated) DEVICE — SU PROLENE 4-0 SHDA 36" 8521H

## (undated) DEVICE — PREP CHLORAPREP 26ML TINTED ORANGE  260815

## (undated) DEVICE — ESU ELEC CLEANCOAT LAP L-HOOK 36CM E3773-36C

## (undated) DEVICE — RAD G/W INQWIRE .035X260CM J-TIP EXCHANGE IQ35F260J1O5RS

## (undated) DEVICE — SUCTION TIP YANKAUER STR K87

## (undated) DEVICE — SPONGE TONSIL W/STRING MED 23275-680

## (undated) DEVICE — VESSEL LOOPS RED MINI 31145710

## (undated) DEVICE — CLIP HORIZON SM RED WIDE SLOT 001201

## (undated) DEVICE — TOURNIQUET VASCULAR KIT 7 1/2" 79012

## (undated) DEVICE — SU MONOCRYL 4-0 PS-2 27" UND Y426H

## (undated) DEVICE — SUCTION MANIFOLD DORNOCH ULTRA CART UL-CL500

## (undated) DEVICE — LUBRICATING JELLY 4.25OZ

## (undated) DEVICE — LINEN GOWN XLG 5407

## (undated) DEVICE — SUCTION IRR STRYKERFLOW II W/TIP 250-070-520

## (undated) DEVICE — COVER CAMERA IN-LIGHT DISP LT-C02

## (undated) DEVICE — GLOVE PROTEXIS W/NEU-THERA 7.5  2D73TE75

## (undated) DEVICE — CLIP HORIZON MED BLUE 002200

## (undated) DEVICE — STPL ENDO HANDLE GIA ULTRA UNIVERSAL STD EGIAUSTND

## (undated) DEVICE — ENDO TROCAR FIRST ENTRY KII FIOS Z-THRD 12X100MM CTF73

## (undated) DEVICE — DRSG STERI STRIP 1/2X4" R1547

## (undated) DEVICE — ESU PENCIL W/COATED BLADE E2450H

## (undated) DEVICE — DRAIN CHEST TUBE 28FR STR 8028

## (undated) DEVICE — CLIP APPLIER ENDO 5MM M/L LIGAMAX EL5ML

## (undated) DEVICE — TUBE SET SMARKABLATE IRRIGATION

## (undated) DEVICE — SYR 30ML LL W/O NDL 302832

## (undated) DEVICE — SU UMBILICAL TAPE .125X30" U11T

## (undated) DEVICE — SOL WATER 10ML VIAL 6332318510

## (undated) DEVICE — LABEL MEDICATION SYSTEM 3303-P

## (undated) DEVICE — SYR BULB IRRIG 50ML LATEX FREE 0035280

## (undated) DEVICE — CLOSURE DEVICE 6FR VASC PROGLIDE MEDICATED SUTURE 12673-03

## (undated) DEVICE — SU PROLENE 4-0 RB-1DA 36" 8557H

## (undated) DEVICE — GLOVE PROTEXIS BLUE W/NEU-THERA 8.0  2D73EB80

## (undated) DEVICE — TIES BANDING T50R

## (undated) DEVICE — TUBING SUCTION 12"X1/4" N612

## (undated) DEVICE — SYR 10ML LL W/O NDL 302995

## (undated) DEVICE — INSERT INTRACK 66M CONFORMING N-1012V

## (undated) DEVICE — ESU ELEC BLADE 6" COATED/INSULATED E1455-6

## (undated) DEVICE — DEFIB PRO-PADZ LVP LQD GEL ADULT 8900-2105-01

## (undated) DEVICE — ESU ENDO SCISSORS 5MM CVD 5DCS

## (undated) DEVICE — VESSEL LOOPS YELLOW MAXI 31145694

## (undated) DEVICE — BAYLIS STEERABLE ACCESS SOLUTIOIN - SHEATH AND RF WIRE

## (undated) DEVICE — SYR 30ML SLIP TIP W/O NDL 302833

## (undated) DEVICE — SU VICRYL 4-0 PS-2 18" UND J496H

## (undated) DEVICE — ENDO VALVE BX EVIS MAJ-210

## (undated) DEVICE — SOL NACL 0.9% IRRIG 1000ML BOTTLE 2F7124

## (undated) DEVICE — PATCH CARTO 3 EXTERNAL REFERENCE 3D MAPPING CREFP6

## (undated) DEVICE — SOL WATER IRRIG 1000ML BOTTLE 2F7114

## (undated) DEVICE — SOL WATER IRRIG 1000ML BOTTLE 07139-09

## (undated) DEVICE — RAD INTRODUCER KIT MICRO 5FRX10CM .018 NITINOL G/W

## (undated) DEVICE — SU PLEDGET SOFT TFE 13MMX7MMX1.5MM D7044

## (undated) DEVICE — TUBING SUCTION 10'X3/16" N510

## (undated) DEVICE — PITCHER STERILE 1000ML  SSK9004A

## (undated) DEVICE — TUBING SUCTION 6"X3/16" N56A

## (undated) DEVICE — SUTURE BOOTS 051003PBX

## (undated) DEVICE — STPL ENDO RELOAD SIG GIA CVD TIP TAN 30MM MED SIG30CTAVM

## (undated) DEVICE — INTRODUCER SHEATH GREEN 6.5FRX11CM .038IN PSI-6F-11-038ACT

## (undated) DEVICE — TUBING C02 INSUFFLATION LAP FILTER HEATER 6198

## (undated) DEVICE — CATH TRAY FOLEY SURESTEP 16FR W/URNE MTR STLK LATEX A303316A

## (undated) DEVICE — SOL NACL 0.9% INJ 1000ML BAG 2B1324X

## (undated) DEVICE — SU SILK 0 SH 30" K834H

## (undated) DEVICE — CLIP HORIZON LG ORANGE 004200

## (undated) DEVICE — ENDO VALVE SYR NDL KIT ULTRASOUND BRONCH NA-201SX-4022-A

## (undated) DEVICE — SU ETHIBOND 5 LRDA 30" B499T

## (undated) DEVICE — ESU PENCIL W/ROCKER SWITCH BLADE HOLSTER E2350HDB

## (undated) DEVICE — SU SILK 0 TIE 6X30" A306H

## (undated) DEVICE — ESU ELEC BLADE 2.75" COATED/INSULATED E1455

## (undated) DEVICE — CATH ABLATION 8FR 115CM D-F CURVE BI-DIR QDOT MICRO D139505

## (undated) DEVICE — SU VICRYL 0 CTX 36" J370H

## (undated) DEVICE — SUCTION MANIFOLD NEPTUNE 2 SYS 4 PORT 0702-020-000

## (undated) DEVICE — TUBING PRESSURE M/F CONNECTOR 12" 50P112

## (undated) DEVICE — PACK EP SRG PROC LF DISP SAN32EPFSR

## (undated) DEVICE — DRAPE SPLIT SHEET 77X108 REINFORCED 29436

## (undated) DEVICE — STPL ENDO RELOAD 45MM MEDIUM THICK PURPLE EGIA45AMT

## (undated) DEVICE — SUCTION DRY CHEST DRAIN OASIS 3600-100

## (undated) DEVICE — SURGICEL ABSORBABLE HEMOSTAT SNOW 4"X4" 2083

## (undated) DEVICE — APPLICATOR SPRAY TIP PROGEL PGEN005-1

## (undated) DEVICE — ENDO ADPT BRONCH SWIVEL Y A1002

## (undated) DEVICE — ENDO VALVE SUCTION ULTRASOUND BRONCH MAJ-1414

## (undated) DEVICE — ENDO TROCAR BLUNT TIP KII BALLOON 12X100MM C0R47

## (undated) DEVICE — SU PLEDGET SOFT TFE 3/8"X3/26"X1/16" PCP40

## (undated) DEVICE — TAPE MEDIPORE 4"X2YD 2864

## (undated) DEVICE — ENDO POUCH UNIV RETRIEVAL SYSTEM INZII 10MM CD001

## (undated) DEVICE — KIT ENDO FIRST STEP DISINFECTANT 200ML W/POUCH EP-4

## (undated) DEVICE — INTRODUCER SHEATH FAST-CATH 9FRX12CM 406116

## (undated) DEVICE — CELL SAVER

## (undated) RX ORDER — FENTANYL CITRATE 50 UG/ML
INJECTION, SOLUTION INTRAMUSCULAR; INTRAVENOUS
Status: DISPENSED
Start: 2024-08-12

## (undated) RX ORDER — FENTANYL CITRATE-0.9 % NACL/PF 10 MCG/ML
PLASTIC BAG, INJECTION (ML) INTRAVENOUS
Status: DISPENSED
Start: 2020-10-22

## (undated) RX ORDER — GLYCOPYRROLATE 0.2 MG/ML
INJECTION, SOLUTION INTRAMUSCULAR; INTRAVENOUS
Status: DISPENSED
Start: 2020-10-22

## (undated) RX ORDER — DEXAMETHASONE SODIUM PHOSPHATE 10 MG/ML
INJECTION, SOLUTION INTRAMUSCULAR; INTRAVENOUS
Status: DISPENSED
Start: 2020-10-01

## (undated) RX ORDER — ESMOLOL HYDROCHLORIDE 10 MG/ML
INJECTION INTRAVENOUS
Status: DISPENSED
Start: 2020-10-01

## (undated) RX ORDER — FENTANYL CITRATE 50 UG/ML
INJECTION, SOLUTION INTRAMUSCULAR; INTRAVENOUS
Status: DISPENSED
Start: 2021-05-20

## (undated) RX ORDER — LIDOCAINE HYDROCHLORIDE 20 MG/ML
INJECTION, SOLUTION EPIDURAL; INFILTRATION; INTRACAUDAL; PERINEURAL
Status: DISPENSED
Start: 2020-10-22

## (undated) RX ORDER — HEPARIN SODIUM 5000 [USP'U]/.5ML
INJECTION, SOLUTION INTRAVENOUS; SUBCUTANEOUS
Status: DISPENSED
Start: 2020-10-22

## (undated) RX ORDER — EPHEDRINE SULFATE 50 MG/ML
INJECTION, SOLUTION INTRAMUSCULAR; INTRAVENOUS; SUBCUTANEOUS
Status: DISPENSED
Start: 2020-10-22

## (undated) RX ORDER — ACETAMINOPHEN 325 MG/1
TABLET ORAL
Status: DISPENSED
Start: 2021-05-20

## (undated) RX ORDER — SODIUM CHLORIDE, SODIUM LACTATE, POTASSIUM CHLORIDE, CALCIUM CHLORIDE 600; 310; 30; 20 MG/100ML; MG/100ML; MG/100ML; MG/100ML
INJECTION, SOLUTION INTRAVENOUS
Status: DISPENSED
Start: 2021-05-20

## (undated) RX ORDER — CEFAZOLIN SODIUM 1 G/3ML
INJECTION, POWDER, FOR SOLUTION INTRAMUSCULAR; INTRAVENOUS
Status: DISPENSED
Start: 2020-10-22

## (undated) RX ORDER — LIDOCAINE HYDROCHLORIDE 10 MG/ML
INJECTION, SOLUTION EPIDURAL; INFILTRATION; INTRACAUDAL; PERINEURAL
Status: DISPENSED
Start: 2024-08-12

## (undated) RX ORDER — FENTANYL CITRATE 50 UG/ML
INJECTION, SOLUTION INTRAMUSCULAR; INTRAVENOUS
Status: DISPENSED
Start: 2020-10-22

## (undated) RX ORDER — CEFAZOLIN SODIUM 2 G/100ML
INJECTION, SOLUTION INTRAVENOUS
Status: DISPENSED
Start: 2020-10-01

## (undated) RX ORDER — HYDROMORPHONE HYDROCHLORIDE 1 MG/ML
INJECTION, SOLUTION INTRAMUSCULAR; INTRAVENOUS; SUBCUTANEOUS
Status: DISPENSED
Start: 2021-05-20

## (undated) RX ORDER — ALBUTEROL SULFATE 0.83 MG/ML
SOLUTION RESPIRATORY (INHALATION)
Status: DISPENSED
Start: 2020-07-27

## (undated) RX ORDER — ONDANSETRON 2 MG/ML
INJECTION INTRAMUSCULAR; INTRAVENOUS
Status: DISPENSED
Start: 2020-10-22

## (undated) RX ORDER — FENTANYL CITRATE 50 UG/ML
INJECTION, SOLUTION INTRAMUSCULAR; INTRAVENOUS
Status: DISPENSED
Start: 2020-10-01

## (undated) RX ORDER — FENTANYL CITRATE 50 UG/ML
INJECTION, SOLUTION INTRAMUSCULAR; INTRAVENOUS
Status: DISPENSED
Start: 2020-07-27

## (undated) RX ORDER — DEXAMETHASONE SODIUM PHOSPHATE 10 MG/ML
INJECTION, SOLUTION INTRAMUSCULAR; INTRAVENOUS
Status: DISPENSED
Start: 2021-09-14

## (undated) RX ORDER — HYDROMORPHONE HYDROCHLORIDE 1 MG/ML
INJECTION, SOLUTION INTRAMUSCULAR; INTRAVENOUS; SUBCUTANEOUS
Status: DISPENSED
Start: 2020-10-22

## (undated) RX ORDER — GABAPENTIN 300 MG/1
CAPSULE ORAL
Status: DISPENSED
Start: 2020-10-01

## (undated) RX ORDER — ACETAMINOPHEN 325 MG/1
TABLET ORAL
Status: DISPENSED
Start: 2020-10-01

## (undated) RX ORDER — LABETALOL HYDROCHLORIDE 5 MG/ML
INJECTION, SOLUTION INTRAVENOUS
Status: DISPENSED
Start: 2020-09-01

## (undated) RX ORDER — PROPOFOL 10 MG/ML
INJECTION, EMULSION INTRAVENOUS
Status: DISPENSED
Start: 2020-09-01

## (undated) RX ORDER — CALCIUM CHLORIDE 100 MG/ML
INJECTION INTRAVENOUS; INTRAVENTRICULAR
Status: DISPENSED
Start: 2020-10-22

## (undated) RX ORDER — LABETALOL HYDROCHLORIDE 5 MG/ML
INJECTION, SOLUTION INTRAVENOUS
Status: DISPENSED
Start: 2021-05-20

## (undated) RX ORDER — ESMOLOL HYDROCHLORIDE 10 MG/ML
INJECTION INTRAVENOUS
Status: DISPENSED
Start: 2020-09-01

## (undated) RX ORDER — DEXAMETHASONE SODIUM PHOSPHATE 10 MG/ML
INJECTION, SOLUTION INTRAMUSCULAR; INTRAVENOUS
Status: DISPENSED
Start: 2020-10-22

## (undated) RX ORDER — HEPARIN SODIUM 1000 [USP'U]/ML
INJECTION, SOLUTION INTRAVENOUS; SUBCUTANEOUS
Status: DISPENSED
Start: 2024-08-12

## (undated) RX ORDER — DEXAMETHASONE SODIUM PHOSPHATE 4 MG/ML
INJECTION, SOLUTION INTRA-ARTICULAR; INTRALESIONAL; INTRAMUSCULAR; INTRAVENOUS; SOFT TISSUE
Status: DISPENSED
Start: 2020-10-22

## (undated) RX ORDER — CEFAZOLIN SODIUM 2 G/100ML
INJECTION, SOLUTION INTRAVENOUS
Status: DISPENSED
Start: 2020-10-22

## (undated) RX ORDER — METOPROLOL TARTRATE 1 MG/ML
INJECTION, SOLUTION INTRAVENOUS
Status: DISPENSED
Start: 2020-10-01

## (undated) RX ORDER — BUPIVACAINE HYDROCHLORIDE 2.5 MG/ML
INJECTION, SOLUTION EPIDURAL; INFILTRATION; INTRACAUDAL
Status: DISPENSED
Start: 2021-09-14

## (undated) RX ORDER — DEXMEDETOMIDINE HYDROCHLORIDE 100 UG/ML
INJECTION, SOLUTION INTRAVENOUS
Status: DISPENSED
Start: 2020-10-22

## (undated) RX ORDER — HEPARIN SODIUM 5000 [USP'U]/.5ML
INJECTION, SOLUTION INTRAVENOUS; SUBCUTANEOUS
Status: DISPENSED
Start: 2020-10-01

## (undated) RX ORDER — HEPARIN SODIUM 1000 [USP'U]/ML
INJECTION, SOLUTION INTRAVENOUS; SUBCUTANEOUS
Status: DISPENSED
Start: 2020-10-22

## (undated) RX ORDER — CHLORHEXIDINE GLUCONATE ORAL RINSE 1.2 MG/ML
SOLUTION DENTAL
Status: DISPENSED
Start: 2020-10-01

## (undated) RX ORDER — CELECOXIB 200 MG/1
CAPSULE ORAL
Status: DISPENSED
Start: 2020-10-01

## (undated) RX ORDER — SODIUM CHLORIDE, SODIUM LACTATE, POTASSIUM CHLORIDE, CALCIUM CHLORIDE 600; 310; 30; 20 MG/100ML; MG/100ML; MG/100ML; MG/100ML
INJECTION, SOLUTION INTRAVENOUS
Status: DISPENSED
Start: 2020-10-22

## (undated) RX ORDER — PROPOFOL 10 MG/ML
INJECTION, EMULSION INTRAVENOUS
Status: DISPENSED
Start: 2020-10-22

## (undated) RX ORDER — BUPIVACAINE HYDROCHLORIDE 2.5 MG/ML
INJECTION, SOLUTION EPIDURAL; INFILTRATION; INTRACAUDAL
Status: DISPENSED
Start: 2020-10-22

## (undated) RX ORDER — BUPIVACAINE HYDROCHLORIDE 2.5 MG/ML
INJECTION, SOLUTION EPIDURAL; INFILTRATION; INTRACAUDAL
Status: DISPENSED
Start: 2020-10-01

## (undated) RX ORDER — LIDOCAINE HYDROCHLORIDE 10 MG/ML
INJECTION, SOLUTION EPIDURAL; INFILTRATION; INTRACAUDAL; PERINEURAL
Status: DISPENSED
Start: 2021-09-14

## (undated) RX ORDER — CEFAZOLIN SODIUM 2 G/100ML
INJECTION, SOLUTION INTRAVENOUS
Status: DISPENSED
Start: 2021-05-20